# Patient Record
Sex: FEMALE | Race: OTHER | HISPANIC OR LATINO | Employment: UNEMPLOYED | URBAN - METROPOLITAN AREA
[De-identification: names, ages, dates, MRNs, and addresses within clinical notes are randomized per-mention and may not be internally consistent; named-entity substitution may affect disease eponyms.]

---

## 2017-10-17 ENCOUNTER — HOSPITAL ENCOUNTER (EMERGENCY)
Facility: HOSPITAL | Age: 52
Discharge: HOME | End: 2017-10-17
Admitting: EMERGENCY MEDICINE
Payer: COMMERCIAL

## 2017-10-17 VITALS
OXYGEN SATURATION: 98 % | RESPIRATION RATE: 18 BRPM | SYSTOLIC BLOOD PRESSURE: 193 MMHG | DIASTOLIC BLOOD PRESSURE: 84 MMHG | HEART RATE: 86 BPM | WEIGHT: 184.4 LBS | TEMPERATURE: 98 F

## 2017-10-17 DIAGNOSIS — J32.9 SINUSITIS: Primary | ICD-10-CM

## 2017-10-17 DIAGNOSIS — I10 HYPERTENSION: ICD-10-CM

## 2017-10-17 PROCEDURE — 99282 EMERGENCY DEPT VISIT SF MDM: CPT

## 2017-10-17 RX ORDER — LISINOPRIL AND HYDROCHLOROTHIAZIDE 25; 20 MG/1; MG/1
1 TABLET ORAL DAILY
COMMUNITY
End: 2018-03-08 | Stop reason: SDDI

## 2017-10-17 RX ORDER — FLUTICASONE PROPIONATE 50 MCG
1 SPRAY, SUSPENSION (ML) NASAL DAILY
Qty: 16 G | Refills: 0 | Status: SHIPPED | OUTPATIENT
Start: 2017-10-17 | End: 2018-07-24

## 2017-10-17 RX ORDER — AMOXICILLIN AND CLAVULANATE POTASSIUM 875; 125 MG/1; MG/1
1 TABLET, FILM COATED ORAL EVERY 12 HOURS SCHEDULED
Qty: 14 TABLET | Refills: 0 | Status: SHIPPED | OUTPATIENT
Start: 2017-10-17 | End: 2017-10-24

## 2017-10-17 RX ORDER — AMLODIPINE BESYLATE 5 MG/1
10 TABLET ORAL DAILY
COMMUNITY
End: 2019-05-10

## 2017-10-18 NOTE — ED PROVIDER NOTES
History  Chief Complaint   Patient presents with    Cough     Pt reports "I'm sick, I have a cold", reports cough, nasal congestion, denies fevers, generalized body aches "my whole body ache"  46year old female PMH HTN, DM presents today complaining of 5 days of head congestion, sinus pressure and nonproductive cough  She denies fevers but admits to chills  No sick contacts  No recent history of sinusitis  Has not tried taking anything for her symptoms  No chest pain, shortness of breath, abdominal pain, urinary symptoms  Prior to Admission Medications   Prescriptions Last Dose Informant Patient Reported? Taking? amLODIPine (NORVASC) 5 mg tablet   Yes Yes   Sig: Take 5 mg by mouth daily   insulin aspart (NovoLOG) 100 units/mL injection   Yes Yes   Sig: Inject under the skin 3 (three) times a day before meals   insulin detemir (LEVEMIR) 100 units/mL subcutaneous injection   Yes Yes   Sig: Inject under the skin daily at bedtime   lisinopril-hydrochlorothiazide (PRINZIDE,ZESTORETIC) 20-25 MG per tablet   Yes Yes   Sig: Take 1 tablet by mouth daily      Facility-Administered Medications: None       Past Medical History:   Diagnosis Date    Diabetes mellitus ([facility] [address] )     Hypertension        History reviewed  No pertinent surgical history  History reviewed  No pertinent family history  I have reviewed and agree with the history as documented  Social History   Substance Use Topics    Smoking status: Former Smoker    Smokeless tobacco: Never Used    Alcohol use No        Review of Systems   HENT: Positive for congestion, sinus pain and sinus pressure  Respiratory: Positive for cough  Musculoskeletal: Positive for myalgias  All other systems reviewed and are negative        Physical Exam  ED Triage Vitals [10/17/17 1955]   Temperature Pulse Respirations Blood Pressure SpO2   98 °F (36 7 °C) 86 18 (!) 193/84 98 %      Temp Source Heart Rate Source Patient Position - Orthostatic VS BP Location FiO2 (%)   Oral Monitor Sitting Right arm --      Pain Score       Worst Possible Pain           Physical Exam   Constitutional: She is oriented to person, place, and time  She appears well-developed and well-nourished  No distress  HENT:   Head: Normocephalic and atraumatic  Mouth/Throat: Oropharynx is clear and moist  No oropharyngeal exudate  Frontal and maxillary sinuses tender to percussion  Eyes: Conjunctivae and EOM are normal  Pupils are equal, round, and reactive to light  Neck: Normal range of motion  Cardiovascular: Normal rate and regular rhythm  Pulmonary/Chest: Effort normal and breath sounds normal  No respiratory distress  She has no wheezes  Abdominal: Soft  Bowel sounds are normal  She exhibits no distension  There is no tenderness  There is no guarding  Musculoskeletal: Normal range of motion  Lymphadenopathy:     She has no cervical adenopathy  Neurological: She is alert and oriented to person, place, and time  Skin: Skin is warm and dry  Capillary refill takes less than 2 seconds  She is not diaphoretic  Psychiatric: She has a normal mood and affect  Her behavior is normal        ED Medications  Medications - No data to display    Diagnostic Studies  Labs Reviewed - No data to display    No orders to display       Procedures  Procedures      Phone Contacts  ED Phone Contact    ED Course  ED Course                                MDM  Number of Diagnoses or Management Options  Hypertension:   Sinusitis:   Diagnosis management comments: Pt taking her HTN meds (lisinopril-HCTZ and amlodipine) after triage evaluation which explains her hypertension  She is asymptomatic at this time      CritCare Time    Disposition  Final diagnoses:   Sinusitis   Hypertension     Time reflects when diagnosis was documented in both MDM as applicable and the Disposition within this note     Time User Action Codes Description Comment    10/17/2017  8:52 PM Papito Pantoja Add [J32 9] Sinusitis     10/17/2017  8:52 PM Eri Sebastian [facility] [address] Hypertension       ED Disposition     ED Disposition Condition Comment    Discharge  [address] discharge to home/self care  Condition at discharge: Good        Follow-up Information     Follow up With Specialties Details Why [address]  Schedule an appointment as soon as possible for a visit  [address] [facility] [address]  [phone]          Discharge Medication List as of 10/17/2017  8:55 PM      START taking these medications    Details   amoxicillin-clavulanate (AUGMENTIN) 875-125 mg per tablet Take 1 tablet by mouth every 12 (twelve) hours for 7 days, Starting Tue 10/17/2017, Until Tue 10/24/2017, Print      fluticasone (FLONASE) 50 mcg/act nasal spray 1 spray into each nostril daily, Starting Tue 10/17/2017, Print         CONTINUE these medications which have NOT CHANGED    Details   amLODIPine (NORVASC) 5 mg tablet Take 5 mg by mouth daily, Historical Med      insulin aspart (NovoLOG) 100 units/mL injection Inject under the skin 3 (three) times a day before meals, Historical Med      insulin detemir (LEVEMIR) 100 units/mL subcutaneous injection Inject under the skin daily at bedtime, Historical Med      lisinopril-hydrochlorothiazide (PRINZIDE,ZESTORETIC) 20-25 MG per tablet Take 1 tablet by mouth daily, Historical Med           No discharge procedures on file      ED Provider  Electronically Signed by       Shauna Brito PA-C  10/27/17 7813

## 2017-10-18 NOTE — DISCHARGE INSTRUCTIONS

## 2018-03-08 ENCOUNTER — OFFICE VISIT (OUTPATIENT)
Dept: FAMILY MEDICINE CLINIC | Facility: CLINIC | Age: 53
End: 2018-03-08
Payer: COMMERCIAL

## 2018-03-08 VITALS
RESPIRATION RATE: 20 BRPM | DIASTOLIC BLOOD PRESSURE: 80 MMHG | HEIGHT: 59 IN | HEART RATE: 99 BPM | SYSTOLIC BLOOD PRESSURE: 190 MMHG | BODY MASS INDEX: 36.69 KG/M2 | OXYGEN SATURATION: 97 % | TEMPERATURE: 97.1 F | WEIGHT: 182 LBS

## 2018-03-08 DIAGNOSIS — E11.9 TYPE 2 DIABETES MELLITUS WITHOUT COMPLICATION, WITH LONG-TERM CURRENT USE OF INSULIN (HCC): Primary | ICD-10-CM

## 2018-03-08 DIAGNOSIS — Z79.4 TYPE 2 DIABETES MELLITUS WITHOUT COMPLICATION, WITH LONG-TERM CURRENT USE OF INSULIN (HCC): Primary | ICD-10-CM

## 2018-03-08 DIAGNOSIS — E11.9 COMPREHENSIVE DIABETIC FOOT EXAMINATION, TYPE 2 DM, ENCOUNTER FOR (HCC): ICD-10-CM

## 2018-03-08 DIAGNOSIS — E11.9 DIABETIC EYE EXAM (HCC): ICD-10-CM

## 2018-03-08 DIAGNOSIS — Z01.00 DIABETIC EYE EXAM (HCC): ICD-10-CM

## 2018-03-08 DIAGNOSIS — I10 ESSENTIAL HYPERTENSION: ICD-10-CM

## 2018-03-08 LAB — SL AMB POCT HEMOGLOBIN AIC: 14.3

## 2018-03-08 PROCEDURE — 99205 OFFICE O/P NEW HI 60 MIN: CPT | Performed by: FAMILY MEDICINE

## 2018-03-08 PROCEDURE — 83036 HEMOGLOBIN GLYCOSYLATED A1C: CPT | Performed by: FAMILY MEDICINE

## 2018-03-08 RX ORDER — LISINOPRIL 40 MG/1
40 TABLET ORAL DAILY
Qty: 90 TABLET | Refills: 1 | Status: SHIPPED | OUTPATIENT
Start: 2018-03-08 | End: 2018-06-11 | Stop reason: SDUPTHER

## 2018-03-08 RX ORDER — ATORVASTATIN CALCIUM 20 MG/1
20 TABLET, FILM COATED ORAL DAILY
Qty: 90 TABLET | Refills: 1 | Status: SHIPPED | OUTPATIENT
Start: 2018-03-08 | End: 2018-06-11 | Stop reason: SDUPTHER

## 2018-03-08 RX ORDER — BLOOD-GLUCOSE METER
1 KIT MISCELLANEOUS 3 TIMES DAILY
Qty: 1 EACH | Refills: 0 | Status: SHIPPED | OUTPATIENT
Start: 2018-03-08 | End: 2018-07-16 | Stop reason: CLARIF

## 2018-03-08 RX ORDER — CHLORTHALIDONE 25 MG/1
25 TABLET ORAL DAILY
Qty: 90 TABLET | Refills: 1 | Status: SHIPPED | OUTPATIENT
Start: 2018-03-08 | End: 2018-06-11 | Stop reason: SDUPTHER

## 2018-03-08 NOTE — PROGRESS NOTES
Assessment/Plan:    Type 2 diabetes mellitus without complication, with long-term current use of insulin ([facility])  HgA1 POC done today is 14 3   Increase Levemir to 60 units qHs  Increase Novolog to 20 units three times a day  Discussed at length importance  Of a low carbohydrate diet  Discussed difference between simple and complex carbohydrates  Asked to keep detail food and BG journal  Gave examples of low carbohydrate diets and 1 week 1500 claudy ADA menu   Spent over 50% of 40 minute encounter counseling on above, as well as importance of BG control to prevent diabetic complications  Discussed possible diabetic complications  Essential hypertension  Increase Lisinopril to 40 mg  Start Atenolol 25 mg  Continue Chlorthalidone 25 mg, Amlodipine 10   Check BW including kidney function   Discussed importance of low salt diet  Return in 1 week for BP nurse check   Follow up in 1 month   Go to ED if CP or SOB            Problem List Items Addressed This Visit        Endocrine    Type 2 diabetes mellitus without complication, with long-term current use of insulin ([facility]) - Primary     HgA1 POC done today is 14 3   Increase Levemir to 60 units qHs  Increase Novolog to 20 units three times a day  Discussed at length importance  Of a low carbohydrate diet  Discussed difference between simple and complex carbohydrates  Asked to keep detail food and BG journal  Gave examples of low carbohydrate diets and 1 week 1500 claudy ADA menu   Spent over 50% of 40 minute encounter counseling on above, as well as importance of BG control to prevent diabetic complications  Discussed possible diabetic complications            Relevant Medications    atorvastatin (LIPITOR) 20 mg tablet    glucose monitoring kit (FREESTYLE) monitoring kit    glucose monitoring kit (FREESTYLE) monitoring kit    Other Relevant Orders    CBC and differential    Comprehensive metabolic panel    TSH, 3rd generation with T4 reflex    Microalbumin / creatinine urine ratio    Lipid panel    Glucometer test strips    Lancets    POCT hemoglobin A1c (Completed)       Cardiovascular and Mediastinum    Essential hypertension     Increase Lisinopril to 40 mg  Start Atenolol 25 mg  Continue Chlorthalidone 25 mg, Amlodipine 10   Check BW including kidney function   Discussed importance of low salt diet  Return in 1 week for BP nurse check   Follow up in 1 month   Go to ED if CP or SOB            Relevant Medications    lisinopril (ZESTRIL) 40 mg tablet    chlorthalidone 25 mg tablet    atorvastatin (LIPITOR) 20 mg tablet    Other Relevant Orders    CBC and differential    Comprehensive metabolic panel    TSH, 3rd generation with T4 reflex    Microalbumin / creatinine urine ratio    Lipid panel      Other Visit Diagnoses     Diabetic eye exam [facility])        Relevant Orders    Ambulatory referral to Ophthalmology    Comprehensive diabetic foot examination, type 2 DM, encounter for [facility])        Relevant Orders    Ambulatory referral to Podiatry            Subjective:      Patient ID: Venice Little is a 46 y o  female  45 yo female patient new patient to the office  As per patient she has a long standing history of uncontrolled DM and uncontrolled HTN She states she does not check her BG at home because her glucometer broke several months ago  She denies headache, chest pain, lower extremity swelling  She states she has been taking her medications daily for the last month however she ran out of some of her medications 5 days ago  Diabetes   She presents for her initial diabetic visit  She has type 2 diabetes mellitus  No MedicAlert identification noted  Her disease course has been worsening  There are no hypoglycemic associated symptoms  There are no diabetic associated symptoms  There are no hypoglycemic complications  Risk factors for coronary artery disease include hypertension, obesity, post-menopausal and sedentary lifestyle   Current diabetic treatment includes intensive insulin program  She is following a generally unhealthy diet  She rarely participates in exercise  An ACE inhibitor/angiotensin II receptor blocker is being taken  She does not see a podiatrist Eye exam is not current  Hypertension   This is a chronic problem  The current episode started more than 1 year ago  The problem is uncontrolled  There are no associated agents to hypertension  Risk factors for coronary artery disease include diabetes mellitus  Past treatments include diuretics, calcium channel blockers and ACE inhibitors  The current treatment provides mild improvement  The following portions of the patient's history were reviewed and updated as appropriate:   She  has a past medical history of Diabetes mellitus ([facility] ) and Hypertension  She   Patient Active Problem List    Diagnosis Date Noted    Type 2 diabetes mellitus without complication, with long-term current use of insulin ([facility] ) 03/08/2018    Essential hypertension 03/08/2018     She  has no past surgical history on file  Her family history includes Diabetes in her mother; No Known Problems in her father  She  reports that she has quit smoking  She has never used smokeless tobacco  She reports that she does not drink alcohol or use drugs    Current Outpatient Prescriptions   Medication Sig Dispense Refill    amLODIPine (NORVASC) 5 mg tablet Take 10 mg by mouth daily       atorvastatin (LIPITOR) 20 mg tablet Take 1 tablet (20 mg total) by mouth daily 90 tablet 1    chlorthalidone 25 mg tablet Take 1 tablet (25 mg total) by mouth daily 90 tablet 1    fluticasone (FLONASE) 50 mcg/act nasal spray 1 spray into each nostril daily 16 g 0    glucose monitoring kit (FREESTYLE) monitoring kit 1 each by Does not apply route 3 (three) times a day 1 each 0    glucose monitoring kit (FREESTYLE) monitoring kit 1 each by Does not apply route 3 (three) times a day 1 each 0    insulin aspart (NovoLOG) 100 units/mL injection Inject 15 Units under the skin 3 (three) times a day before meals       insulin detemir (LEVEMIR) 100 units/mL subcutaneous injection Inject under the skin daily at bedtime      lisinopril (ZESTRIL) 40 mg tablet Take 1 tablet (40 mg total) by mouth daily 90 tablet 1     No current facility-administered medications for this visit       Review of Systems   Musculoskeletal:        L foot pain   All other systems reviewed and are negative  Objective:      BP (!) 190/80 (BP Location: Right arm, Patient Position: Sitting, Cuff Size: Standard)   Pulse 99   Temp (!) 97 1 °F (36 2 °C) (Tympanic)   Resp 20   Ht 4' 11" (1 499 m)   Wt 82 6 kg (182 lb)   LMP 02/12/2018 (Approximate)   SpO2 97%   Breastfeeding? No   BMI 36 76 kg/m²          Physical Exam   Constitutional: She is oriented to person, place, and time  She appears well-developed  HENT:   Head: Normocephalic  Right Ear: External ear normal    Left Ear: External ear normal    Nose: Nose normal    Mouth/Throat: Oropharynx is clear and moist    Eyes: Conjunctivae and EOM are normal  Pupils are equal, round, and reactive to light  Neck: Normal range of motion  Neck supple  No thyromegaly present  Cardiovascular: Normal rate, regular rhythm and normal heart sounds  Pulmonary/Chest: Effort normal and breath sounds normal    Abdominal: Soft  There is no tenderness  There is no rebound and no guarding  Musculoskeletal: Normal range of motion  Neurological: She is alert and oriented to person, place, and time  She has normal reflexes  Skin: Skin is dry  Psychiatric: She has a normal mood and affect  Nursing note and vitals reviewed

## 2018-03-08 NOTE — ASSESSMENT & PLAN NOTE
HgA1 POC done today is 14 3   Increase Levemir to 60 units qHs  Increase Novolog to 20 units three times a day  Discussed at length importance  Of a low carbohydrate diet  Discussed difference between simple and complex carbohydrates  Asked to keep detail food and BG journal  Gave examples of low carbohydrate diets and 1 week 1500 claudy ADA menu   Spent over 50% of 40 minute encounter counseling on above, as well as importance of BG control to prevent diabetic complications  Discussed possible diabetic complications

## 2018-03-08 NOTE — ASSESSMENT & PLAN NOTE
Increase Lisinopril to 40 mg  Start Atenolol 25 mg  Continue Chlorthalidone 25 mg, Amlodipine 10   Check BW including kidney function   Discussed importance of low salt diet  Return in 1 week for BP nurse check   Follow up in 1 month   Go to ED if CP or SOB

## 2018-06-11 ENCOUNTER — OFFICE VISIT (OUTPATIENT)
Dept: FAMILY MEDICINE CLINIC | Facility: CLINIC | Age: 53
End: 2018-06-11
Payer: COMMERCIAL

## 2018-06-11 VITALS
WEIGHT: 193.56 LBS | DIASTOLIC BLOOD PRESSURE: 74 MMHG | SYSTOLIC BLOOD PRESSURE: 126 MMHG | HEIGHT: 59 IN | TEMPERATURE: 98 F | RESPIRATION RATE: 18 BRPM | OXYGEN SATURATION: 99 % | HEART RATE: 92 BPM | BODY MASS INDEX: 39.02 KG/M2

## 2018-06-11 DIAGNOSIS — I10 ESSENTIAL HYPERTENSION: ICD-10-CM

## 2018-06-11 DIAGNOSIS — E11.9 TYPE 2 DIABETES MELLITUS WITHOUT COMPLICATION, WITH LONG-TERM CURRENT USE OF INSULIN (HCC): Primary | ICD-10-CM

## 2018-06-11 DIAGNOSIS — Z12.11 COLON CANCER SCREENING: ICD-10-CM

## 2018-06-11 DIAGNOSIS — Z79.4 TYPE 2 DIABETES MELLITUS WITHOUT COMPLICATION, WITH LONG-TERM CURRENT USE OF INSULIN (HCC): Primary | ICD-10-CM

## 2018-06-11 DIAGNOSIS — E11.9 TYPE 2 DIABETES MELLITUS WITHOUT COMPLICATION, WITHOUT LONG-TERM CURRENT USE OF INSULIN (HCC): ICD-10-CM

## 2018-06-11 LAB — SL AMB POCT HEMOGLOBIN AIC: 11.8

## 2018-06-11 PROCEDURE — 3078F DIAST BP <80 MM HG: CPT | Performed by: FAMILY MEDICINE

## 2018-06-11 PROCEDURE — 83036 HEMOGLOBIN GLYCOSYLATED A1C: CPT | Performed by: FAMILY MEDICINE

## 2018-06-11 PROCEDURE — 3074F SYST BP LT 130 MM HG: CPT | Performed by: FAMILY MEDICINE

## 2018-06-11 PROCEDURE — 99215 OFFICE O/P EST HI 40 MIN: CPT | Performed by: FAMILY MEDICINE

## 2018-06-11 PROCEDURE — 4010F ACE/ARB THERAPY RXD/TAKEN: CPT | Performed by: FAMILY MEDICINE

## 2018-06-11 PROCEDURE — 3008F BODY MASS INDEX DOCD: CPT | Performed by: FAMILY MEDICINE

## 2018-06-11 RX ORDER — CHLORTHALIDONE 25 MG/1
25 TABLET ORAL DAILY
Qty: 90 TABLET | Refills: 0 | Status: SHIPPED | OUTPATIENT
Start: 2018-06-11 | End: 2018-11-06 | Stop reason: SDUPTHER

## 2018-06-11 RX ORDER — LISINOPRIL 40 MG/1
40 TABLET ORAL DAILY
Qty: 90 TABLET | Refills: 0 | Status: SHIPPED | OUTPATIENT
Start: 2018-06-11 | End: 2018-11-06 | Stop reason: SDUPTHER

## 2018-06-11 RX ORDER — ATORVASTATIN CALCIUM 20 MG/1
20 TABLET, FILM COATED ORAL DAILY
Qty: 90 TABLET | Refills: 0 | Status: SHIPPED | OUTPATIENT
Start: 2018-06-11 | End: 2018-11-06 | Stop reason: SDUPTHER

## 2018-06-11 NOTE — ASSESSMENT & PLAN NOTE
Patient's HgA1c POC done today has improved slightly compared to prior visit  Currently 11 2 compared to 14 3 from last visit  Patient admits that she has improved her diet but still eats plenty of ice cream   Given patient's reaction to Levemir gave samples of Toujeo, if no rash with Toujeo will switch  Increase to 70 units qHs   Discussed diet at length  Discussed simple vs complex carbohydrates  Dicussed how to read food labels  Dicussed importance of physical activity  Start with 15 minute walk 3 to 4 times a week and slowly increase both duration and intensity     Gave written examples of 1500 claudy ADA diet  Gave information for Fit2me amy   Added Metformin to regimen   Over 50% of 40 minute encounter was spent counseling on above

## 2018-06-11 NOTE — PROGRESS NOTES
Assessment/Plan:    Type 2 diabetes mellitus without complication, with long-term current use of insulin ([facility])  Patient's HgA1c POC done today has improved slightly compared to prior visit  Currently 11 2 compared to 14 3 from last visit  Patient admits that she has improved her diet but still eats plenty of ice cream   Given patient's reaction to Levemir gave samples of Toujeo, if no rash with Toujeo will switch  Increase to 70 units qHs   Discussed diet at length  Discussed simple vs complex carbohydrates  Dicussed how to read food labels  Dicussed importance of physical activity  Start with 15 minute walk 3 to 4 times a week and slowly increase both duration and intensity  Gave written examples of 1500 claudy ADA diet  Gave information for Fit2[employer] amy   Over 50% of 40 minute encounter was spent counseling on above          Problem List Items Addressed This Visit        Endocrine    Type 2 diabetes mellitus without complication, with long-term current use of insulin ([facility] ) - Primary     Patient's HgA1c POC done today has improved slightly compared to prior visit  Currently 11 2 compared to 14 3 from last visit  Patient admits that she has improved her diet but still eats plenty of ice cream   Given patient's reaction to Levemir gave samples of Toujeo, if no rash with Toujeo will switch  Increase to 70 units qHs   Discussed diet at length  Discussed simple vs complex carbohydrates  Dicussed how to read food labels  Dicussed importance of physical activity  Start with 15 minute walk 3 to 4 times a week and slowly increase both duration and intensity     Gave written examples of 1500 claudy ADA diet  Gave information for Fit2me amy   Added Metformin to regimen   Over 50% of 40 minute encounter was spent counseling on above             Cardiovascular and Mediastinum    Essential hypertension      Other Visit Diagnoses     Type 2 diabetes mellitus without complication, without long-term current use of insulin ([facility] ) Relevant Orders    POCT hemoglobin A1c (Completed)    Colon cancer screening        Relevant Orders    Ambulatory referral to Gastroenterology            Subjective:      Patient ID: Sherin Carter is a 46 y o  female  45 yo female with uncontrolled DM, here today for follow up  Patient states she has been trying to improve her diet, has cut back on bread and rice however still eating ice cream   FBG at home in the 200 range  Patient states she gets a red itchy rash on the site of the Levemir injection  Diabetes   She presents for her follow-up diabetic visit  She has type 2 diabetes mellitus  No MedicAlert identification noted  Her disease course has been improving  There are no hypoglycemic associated symptoms  There are no diabetic associated symptoms  There are no hypoglycemic complications  Risk factors for coronary artery disease include dyslipidemia, hypertension and sedentary lifestyle  Current diabetic treatment includes insulin injections  She is compliant with treatment most of the time  She is following a generally unhealthy diet  Meal planning includes avoidance of concentrated sweets  She has not had a previous visit with a dietitian  She rarely participates in exercise  An ACE inhibitor/angiotensin II receptor blocker is being taken  The following portions of the patient's history were reviewed and updated as appropriate:   She  has a past medical history of Diabetes mellitus ([facility] [address] ) and Hypertension  She   Patient Active Problem List    Diagnosis Date Noted    Type 2 diabetes mellitus without complication, with long-term current use of insulin ([facility] [address] ) 03/08/2018    Essential hypertension 03/08/2018     She  has no past surgical history on file  Her family history includes Diabetes in her mother; No Known Problems in her father  She  reports that she has quit smoking  She has never used smokeless tobacco  She reports that she does not drink alcohol or use drugs    Current Outpatient Prescriptions   Medication Sig Dispense Refill    amLODIPine (NORVASC) 5 mg tablet Take 10 mg by mouth daily       atorvastatin (LIPITOR) 20 mg tablet Take 1 tablet (20 mg total) by mouth daily 90 tablet 1    chlorthalidone 25 mg tablet Take 1 tablet (25 mg total) by mouth daily 90 tablet 1    fluticasone (FLONASE) 50 mcg/act nasal spray 1 spray into each nostril daily 16 g 0    glucose monitoring kit (FREESTYLE) monitoring kit 1 each by Does not apply route 3 (three) times a day 1 each 0    glucose monitoring kit (FREESTYLE) monitoring kit 1 each by Does not apply route 3 (three) times a day 1 each 0    insulin aspart (NovoLOG) 100 units/mL injection Inject 15 Units under the skin 3 (three) times a day before meals       insulin detemir (LEVEMIR) 100 units/mL subcutaneous injection Inject under the skin daily at bedtime      lisinopril (ZESTRIL) 40 mg tablet Take 1 tablet (40 mg total) by mouth daily 90 tablet 1     No current facility-administered medications for this visit        Current Outpatient Prescriptions on File Prior to Visit   Medication Sig    amLODIPine (NORVASC) 5 mg tablet Take 10 mg by mouth daily     atorvastatin (LIPITOR) 20 mg tablet Take 1 tablet (20 mg total) by mouth daily    chlorthalidone 25 mg tablet Take 1 tablet (25 mg total) by mouth daily    fluticasone (FLONASE) 50 mcg/act nasal spray 1 spray into each nostril daily    glucose monitoring kit (FREESTYLE) monitoring kit 1 each by Does not apply route 3 (three) times a day    glucose monitoring kit (FREESTYLE) monitoring kit 1 each by Does not apply route 3 (three) times a day    insulin aspart (NovoLOG) 100 units/mL injection Inject 15 Units under the skin 3 (three) times a day before meals     insulin detemir (LEVEMIR) 100 units/mL subcutaneous injection Inject under the skin daily at bedtime    lisinopril (ZESTRIL) 40 mg tablet Take 1 tablet (40 mg total) by mouth daily     No current facility-administered medications on file prior to visit       Review of Systems   Skin: Positive for rash  All other systems reviewed and are negative  Objective:      /74 (BP Location: Right arm, Patient Position: Sitting, Cuff Size: Large)   Pulse 92   Temp 98 °F (36 7 °C)   Resp 18   Ht 4' 11" (1 499 m)   Wt 87 8 kg (193 lb 9 oz)   LMP 05/15/2018 (Approximate)   SpO2 99%   BMI 39 09 kg/m²          Physical Exam   Constitutional: She is oriented to person, place, and time  She appears well-developed  HENT:   Head: Normocephalic  Right Ear: External ear normal    Left Ear: External ear normal    Nose: Nose normal    Mouth/Throat: Oropharynx is clear and moist    Eyes: Conjunctivae and EOM are normal  Pupils are equal, round, and reactive to light  Neck: Normal range of motion  Neck supple  No thyromegaly present  Cardiovascular: Normal rate, regular rhythm and normal heart sounds  Pulmonary/Chest: Effort normal and breath sounds normal    Abdominal: Soft  There is no tenderness  There is no rebound and no guarding  Musculoskeletal: Normal range of motion  Neurological: She is alert and oriented to person, place, and time  She has normal reflexes  Skin: Skin is dry  Psychiatric: She has a normal mood and affect  Nursing note and vitals reviewed

## 2018-07-16 ENCOUNTER — TELEPHONE (OUTPATIENT)
Dept: FAMILY MEDICINE CLINIC | Facility: CLINIC | Age: 53
End: 2018-07-16

## 2018-07-16 DIAGNOSIS — Z79.4 TYPE 2 DIABETES MELLITUS WITHOUT COMPLICATION, WITH LONG-TERM CURRENT USE OF INSULIN (HCC): ICD-10-CM

## 2018-07-16 DIAGNOSIS — E11.9 TYPE 2 DIABETES MELLITUS WITHOUT COMPLICATION, WITH LONG-TERM CURRENT USE OF INSULIN (HCC): ICD-10-CM

## 2018-07-16 DIAGNOSIS — E11.9 TYPE 2 DIABETES MELLITUS WITHOUT COMPLICATION, WITH LONG-TERM CURRENT USE OF INSULIN (HCC): Primary | ICD-10-CM

## 2018-07-16 DIAGNOSIS — Z79.4 TYPE 2 DIABETES MELLITUS WITHOUT COMPLICATION, WITH LONG-TERM CURRENT USE OF INSULIN (HCC): Primary | ICD-10-CM

## 2018-07-17 RX ORDER — BLOOD-GLUCOSE METER
EACH MISCELLANEOUS
Qty: 1 EACH | Refills: 0 | Status: SHIPPED | OUTPATIENT
Start: 2018-07-17 | End: 2019-10-30

## 2018-07-24 ENCOUNTER — HOSPITAL ENCOUNTER (EMERGENCY)
Facility: HOSPITAL | Age: 53
Discharge: HOME | End: 2018-07-24
Attending: EMERGENCY MEDICINE | Admitting: EMERGENCY MEDICINE
Payer: COMMERCIAL

## 2018-07-24 ENCOUNTER — APPOINTMENT (EMERGENCY)
Dept: RADIOLOGY | Facility: HOSPITAL | Age: 53
End: 2018-07-24
Payer: COMMERCIAL

## 2018-07-24 VITALS
DIASTOLIC BLOOD PRESSURE: 88 MMHG | TEMPERATURE: 98.3 F | HEART RATE: 94 BPM | RESPIRATION RATE: 18 BRPM | OXYGEN SATURATION: 99 % | SYSTOLIC BLOOD PRESSURE: 219 MMHG | BODY MASS INDEX: 37.77 KG/M2 | WEIGHT: 187 LBS

## 2018-07-24 DIAGNOSIS — S49.91XA SHOULDER INJURY, RIGHT, INITIAL ENCOUNTER: Primary | ICD-10-CM

## 2018-07-24 PROCEDURE — 99283 EMERGENCY DEPT VISIT LOW MDM: CPT

## 2018-07-24 PROCEDURE — 96372 THER/PROPH/DIAG INJ SC/IM: CPT

## 2018-07-24 PROCEDURE — 73030 X-RAY EXAM OF SHOULDER: CPT

## 2018-07-24 RX ORDER — DIAZEPAM 5 MG/ML
5 INJECTION, SOLUTION INTRAMUSCULAR; INTRAVENOUS ONCE
Status: COMPLETED | OUTPATIENT
Start: 2018-07-24 | End: 2018-07-24

## 2018-07-24 RX ORDER — KETOROLAC TROMETHAMINE 30 MG/ML
15 INJECTION, SOLUTION INTRAMUSCULAR; INTRAVENOUS ONCE
Status: COMPLETED | OUTPATIENT
Start: 2018-07-24 | End: 2018-07-24

## 2018-07-24 RX ORDER — MELOXICAM 7.5 MG/1
7.5 TABLET ORAL DAILY
Qty: 5 TABLET | Refills: 0 | Status: SHIPPED | OUTPATIENT
Start: 2018-07-24 | End: 2019-04-30

## 2018-07-24 RX ADMIN — Medication 5 MG: at 08:36

## 2018-07-24 RX ADMIN — KETOROLAC TROMETHAMINE 15 MG: 30 INJECTION, SOLUTION INTRAMUSCULAR at 08:38
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
## 2018-07-24 NOTE — DISCHARGE INSTRUCTIONS
Shoulder Sprain   WHAT YOU NEED TO KNOW:   A shoulder sprain happens when a ligament in your shoulder is stretched or torn  Ligaments are the tough tissues that connect bones  Ligaments allow you to lift, lower, and rotate your arm  DISCHARGE INSTRUCTIONS:   Return to the emergency department if:   · You are short of breath  · Your throat feels tight, or you have trouble swallowing  · You feel sudden, sharp chest pain on the same side as your injury  · Your skin feels cold or clammy  Contact your healthcare provider if:   · The skin on your injured shoulder looks blue or pale  · You have new or increased swelling and pain in your shoulder  · You have new or increased stiffness when you move your injured shoulder  · You have questions or concerns about your condition or care  Medicines:   · Prescription pain medicine  may be given  Ask how to take this medicine safely  · Take your medicine as directed  Contact your healthcare provider if you think your medicine is not helping or if you have side effects  Tell him or her if you are allergic to any medicine  Keep a list of the medicines, vitamins, and herbs you take  Include the amounts, and when and why you take them  Bring the list or the pill bottles to follow-up visits  Carry your medicine list with you in case of an emergency  Follow up with your healthcare provider as directed:  Write down your questions so you remember to ask them during your visits  Self-care:   · Rest  your shoulder so it can heal  Avoid moving your shoulder as your injury heals  This will help decrease the risk of more damage to your shoulder  · Apply ice  on your shoulder for 15 to 20 minutes every hour or as directed  Use an ice pack, or put crushed ice in a plastic bag  Cover it with a towel  Ice helps prevent tissue damage and decreases swelling and pain  · Compress your shoulder as directed   Compression provides support and helps decrease swelling and movement so your shoulder can heal  For mild sprains, you may be given a sling to support your arm  You may need a padded brace or a plaster cast to hold your shoulder in place if the sprain is more serious  How to wear a brace, sling, or splint:  A brace, sling, or splint may be needed to limit your movement and protect your injured shoulder  · Wear your brace, sling, or splint as directed  You may need to wear it all the time and take it off only to bathe or do exercises  Ask your healthcare provider how long you should wear it  · Keep your skin clean and dry  Padding under your armpit will help absorb sweat and prevent sores on your skin  · Do not hunch your shoulders  This may cause pain  Keep your shoulders relaxed  · Position the sling over your arm and hand so that it also covers your knuckles  This will help the sling support your wrist and hand  Position your wrist higher than your elbow  Your wrist may start to hurt or go numb if your sling is too short  Exercise your shoulder:  After you rest your shoulder for 3 to 7 days, you will need to do light exercises to decrease shoulder stiffness  Check with your healthcare provider before you return to your normal activities or sports  Prevent another injury:  You can hurt your shoulder again if you stop treatment too soon  The following may decrease your risk for sprains:  · Do not exercise when you are tired or in pain  Warm up and stretch before you exercise  · Wear equipment to protect yourself when you play sports  · Wear shoes that fit well and run on flat surfaces to prevent falls  © 2017 [address] [employer] Information is for End User's use only and may not be sold, redistributed or otherwise used for commercial purposes  All illustrations and images included in CareNotes® are the copyrighted property of [employer]  or [employer]  The above information is an  only   It is not intended as medical advice for individual conditions or treatments  Talk to your doctor, nurse or pharmacist before following any medical regimen to see if it is safe and effective for you

## 2018-11-06 ENCOUNTER — OFFICE VISIT (OUTPATIENT)
Dept: FAMILY MEDICINE CLINIC | Facility: CLINIC | Age: 53
End: 2018-11-06
Payer: COMMERCIAL

## 2018-11-06 VITALS
OXYGEN SATURATION: 99 % | SYSTOLIC BLOOD PRESSURE: 136 MMHG | WEIGHT: 192 LBS | HEART RATE: 90 BPM | HEIGHT: 59 IN | DIASTOLIC BLOOD PRESSURE: 80 MMHG | BODY MASS INDEX: 38.71 KG/M2 | RESPIRATION RATE: 18 BRPM | TEMPERATURE: 96.6 F

## 2018-11-06 DIAGNOSIS — Z79.4 TYPE 2 DIABETES MELLITUS WITHOUT COMPLICATION, WITH LONG-TERM CURRENT USE OF INSULIN (HCC): Primary | ICD-10-CM

## 2018-11-06 DIAGNOSIS — E11.9 TYPE 2 DIABETES MELLITUS WITHOUT COMPLICATION, WITH LONG-TERM CURRENT USE OF INSULIN (HCC): Primary | ICD-10-CM

## 2018-11-06 DIAGNOSIS — I10 ESSENTIAL HYPERTENSION: ICD-10-CM

## 2018-11-06 DIAGNOSIS — Z12.11 SCREENING FOR COLON CANCER: ICD-10-CM

## 2018-11-06 LAB — SL AMB POCT HEMOGLOBIN AIC: 12

## 2018-11-06 PROCEDURE — 3008F BODY MASS INDEX DOCD: CPT | Performed by: FAMILY MEDICINE

## 2018-11-06 PROCEDURE — 4010F ACE/ARB THERAPY RXD/TAKEN: CPT | Performed by: FAMILY MEDICINE

## 2018-11-06 PROCEDURE — 99215 OFFICE O/P EST HI 40 MIN: CPT | Performed by: FAMILY MEDICINE

## 2018-11-06 PROCEDURE — 3079F DIAST BP 80-89 MM HG: CPT | Performed by: FAMILY MEDICINE

## 2018-11-06 PROCEDURE — 83036 HEMOGLOBIN GLYCOSYLATED A1C: CPT | Performed by: FAMILY MEDICINE

## 2018-11-06 PROCEDURE — 3046F HEMOGLOBIN A1C LEVEL >9.0%: CPT | Performed by: FAMILY MEDICINE

## 2018-11-06 PROCEDURE — 3075F SYST BP GE 130 - 139MM HG: CPT | Performed by: FAMILY MEDICINE

## 2018-11-06 RX ORDER — CHLORTHALIDONE 25 MG/1
25 TABLET ORAL DAILY
Qty: 90 TABLET | Refills: 0 | Status: SHIPPED | OUTPATIENT
Start: 2018-11-06 | End: 2019-04-30

## 2018-11-06 RX ORDER — ATORVASTATIN CALCIUM 20 MG/1
20 TABLET, FILM COATED ORAL DAILY
Qty: 90 TABLET | Refills: 0 | Status: SHIPPED | OUTPATIENT
Start: 2018-11-06 | End: 2019-05-21 | Stop reason: SDUPTHER

## 2018-11-06 RX ORDER — LISINOPRIL 40 MG/1
40 TABLET ORAL DAILY
Qty: 90 TABLET | Refills: 0 | Status: SHIPPED | OUTPATIENT
Start: 2018-11-06 | End: 2019-04-30

## 2018-11-06 NOTE — ASSESSMENT & PLAN NOTE
Lab Results   Component Value Date    HGBA1C 12 0 11/06/2018       No results for input(s): POCGLU in the last 72 hours      Blood Sugar Average: Last 72 hrs:  Spent over 50% of 40 minute encounter counseling patient on:  1- Importance of medication compliance  2- Importance of checking BG  3- Low carbohydrate diet  4- Dangers of uncontrolled BG  5- Importance of getting BW done      Referred to Endocrine

## 2018-11-06 NOTE — PROGRESS NOTES
Assessment/Plan:    Type 2 diabetes mellitus without complication, with long-term current use of insulin ([facility])  Lab Results   Component Value Date    HGBA1C 12 0 11/06/2018       No results for input(s): POCGLU in the last 72 hours  Blood Sugar Average: Last 72 hrs:  Spent over 50% of 40 minute encounter counseling patient on:  1- Importance of medication compliance  2- Importance of checking BG  3- Low carbohydrate diet  4- Dangers of uncontrolled BG  5- Importance of getting BW done      Referred to Endocrine     Stated received Flu vaccine at work     Problem List Items Addressed This Visit        Endocrine    Type 2 diabetes mellitus without complication, with long-term current use of insulin ([facility] [address] ) - Primary     Lab Results   Component Value Date    HGBA1C 12 0 11/06/2018       No results for input(s): POCGLU in the last 72 hours      Blood Sugar Average: Last 72 hrs:  Spent over 50% of 40 minute encounter counseling patient on:  1- Importance of medication compliance  2- Importance of checking BG  3- Low carbohydrate diet  4- Dangers of uncontrolled BG  5- Importance of getting BW done      Referred to Endocrine          Relevant Medications    Insulin Glargine (TOUJEO MAX SOLOSTAR) 300 units/mL CONCETRATED U-300 injection pen    metFORMIN (GLUCOPHAGE) 1000 MG tablet    insulin aspart (NOVOLOG FLEXPEN) 100 Units/mL injection pen    atorvastatin (LIPITOR) 20 mg tablet    Other Relevant Orders    POCT hemoglobin A1c (Completed)    CBC and differential    Comprehensive metabolic panel    Lipid panel    Microalbumin / creatinine urine ratio    TSH, 3rd generation with Free T4 reflex    Ambulatory referral to Endocrinology       Cardiovascular and Mediastinum    Essential hypertension    Relevant Medications    chlorthalidone 25 mg tablet    lisinopril (ZESTRIL) 40 mg tablet    atorvastatin (LIPITOR) 20 mg tablet    Other Relevant Orders    CBC and differential    Comprehensive metabolic panel    Lipid panel Microalbumin / creatinine urine ratio    TSH, 3rd generation with Free T4 reflex      Other Visit Diagnoses     Screening for colon cancer                Subjective:      Patient ID: Kevin Kaur is a 48 y o  female  53 non complaint feamle here today for follow up of chronic conditions   States not watching her diet and often forgets to us her insulin       Diabetes   She presents for her follow-up diabetic visit  She has type 2 diabetes mellitus  No MedicAlert identification noted  Her disease course has been worsening  There are no hypoglycemic associated symptoms  There are no diabetic associated symptoms  There are no hypoglycemic complications  Risk factors for coronary artery disease include dyslipidemia, obesity, hypertension, sedentary lifestyle, post-menopausal and stress  Current diabetic treatment includes intensive insulin program and oral agent (monotherapy)  She is compliant with treatment some of the time  She is currently taking insulin pre-breakfast, pre-lunch, pre-dinner and at bedtime  Insulin injections are given by patient  Her weight is stable  She is following a generally unhealthy diet  When asked about meal planning, she reported none  She has not had a previous visit with a dietitian  She rarely participates in exercise  Her home blood glucose trend is increasing steadily  An ACE inhibitor/angiotensin II receptor blocker is being taken  She does not see a podiatrist Eye exam is not current  Hypertension   This is a chronic problem  The current episode started more than 1 year ago  The problem has been gradually improving since onset  The problem is controlled  There are no associated agents to hypertension  Risk factors for coronary artery disease include diabetes mellitus, obesity and post-menopausal state  Past treatments include ACE inhibitors and diuretics  The current treatment provides moderate improvement         The following portions of the patient's history were reviewed and updated as appropriate:   She  has a past medical history of Diabetes mellitus ([address] ) and Hypertension  She   Patient Active Problem List    Diagnosis Date Noted    Type 2 diabetes mellitus without complication, with long-term current use of insulin ([facility] ) 03/08/2018    Essential hypertension 03/08/2018     She  has no past surgical history on file  Her family history includes Diabetes in her mother; No Known Problems in her father  She  reports that she has quit smoking  She has never used smokeless tobacco  She reports that she does not drink alcohol or use drugs  Current Outpatient Prescriptions   Medication Sig Dispense Refill    amLODIPine (NORVASC) 5 mg tablet Take 10 mg by mouth daily       atorvastatin (LIPITOR) 20 mg tablet Take 1 tablet (20 mg total) by mouth daily 90 tablet 0    Blood Glucose Monitoring Suppl (ONE TOUCH ULTRA 2) w/Device KIT Testing Three times a day 1 each 0    chlorthalidone 25 mg tablet Take 1 tablet (25 mg total) by mouth daily 90 tablet 0    glucose blood (ONE TOUCH ULTRA TEST) test strip Testing Three times a day 100 each 5    insulin aspart (NOVOLOG FLEXPEN) 100 Units/mL injection pen Inject 25 Units under the skin 3 (three) times a day with meals 8 pen 3    lisinopril (ZESTRIL) 40 mg tablet Take 1 tablet (40 mg total) by mouth daily 90 tablet 0    meloxicam (MOBIC) 7 5 mg tablet Take 1 tablet (7 5 mg total) by mouth daily 5 tablet 0    metFORMIN (GLUCOPHAGE) 1000 MG tablet Take 1 tablet (1,000 mg total) by mouth 2 (two) times a day with meals 90 tablet 0    ONETOUCH DELICA LANCETS FINE MISC Testing Three times a day 100 each 5    insulin aspart (NovoLOG) 100 units/mL injection Inject 15 Units under the skin 3 (three) times a day before meals       Insulin Glargine (TOUJEO MAX SOLOSTAR) 300 units/mL CONCETRATED U-300 injection pen Inject 75 Units under the skin daily 6 pen 3     No current facility-administered medications for this visit        Current Outpatient Prescriptions on File Prior to Visit   Medication Sig    amLODIPine (NORVASC) 5 mg tablet Take 10 mg by mouth daily     Blood Glucose Monitoring Suppl (ONE TOUCH ULTRA 2) w/Device KIT Testing Three times a day    glucose blood (ONE TOUCH ULTRA TEST) test strip Testing Three times a day    meloxicam (MOBIC) 7 5 mg tablet Take 1 tablet (7 5 mg total) by mouth daily    ONETOUCH DELICA LANCETS FINE MISC Testing Three times a day    [DISCONTINUED] atorvastatin (LIPITOR) 20 mg tablet Take 1 tablet (20 mg total) by mouth daily    [DISCONTINUED] chlorthalidone 25 mg tablet Take 1 tablet (25 mg total) by mouth daily    [DISCONTINUED] insulin aspart (NOVOLOG FLEXPEN) 100 Units/mL injection pen Inject 20 Units under the skin 3 (three) times a day with meals    [DISCONTINUED] lisinopril (ZESTRIL) 40 mg tablet Take 1 tablet (40 mg total) by mouth daily    [DISCONTINUED] metFORMIN (GLUCOPHAGE) 1000 MG tablet Take 1 tablet (1,000 mg total) by mouth 2 (two) times a day with meals    insulin aspart (NovoLOG) 100 units/mL injection Inject 15 Units under the skin 3 (three) times a day before meals      No current facility-administered medications on file prior to visit       Review of Systems   All other systems reviewed and are negative  Objective:      /80 (BP Location: Left arm, Patient Position: Sitting, Cuff Size: Large)   Pulse 90   Temp (!) 96 6 °F (35 9 °C) (Tympanic)   Resp 18   Ht 4' 11" (1 499 m)   Wt 87 1 kg (192 lb)   SpO2 99%   Breastfeeding? No   BMI 38 78 kg/m²          Physical Exam   Constitutional: She is oriented to person, place, and time  She appears well-developed  HENT:   Head: Normocephalic  Right Ear: External ear normal    Left Ear: External ear normal    Nose: Nose normal    Mouth/Throat: Oropharynx is clear and moist  Abnormal dentition  Dental caries present  Eyes: Pupils are equal, round, and reactive to light   Conjunctivae and EOM are normal    Neck: Normal range of motion  Neck supple  No thyromegaly present  Cardiovascular: Normal rate, regular rhythm and normal heart sounds  Pulmonary/Chest: Effort normal and breath sounds normal    Abdominal: Soft  There is no tenderness  There is no rebound and no guarding  Musculoskeletal: Normal range of motion  Neurological: She is alert and oriented to person, place, and time  She has normal reflexes  Skin: Skin is dry  Psychiatric: She has a normal mood and affect  Nursing note and vitals reviewed

## 2018-11-13 DIAGNOSIS — E11.9 TYPE 2 DIABETES MELLITUS WITHOUT COMPLICATION, WITH LONG-TERM CURRENT USE OF INSULIN (HCC): ICD-10-CM

## 2018-11-13 DIAGNOSIS — Z79.4 TYPE 2 DIABETES MELLITUS WITHOUT COMPLICATION, WITH LONG-TERM CURRENT USE OF INSULIN (HCC): ICD-10-CM

## 2018-11-15 DIAGNOSIS — E11.9 TYPE 2 DIABETES MELLITUS WITHOUT COMPLICATION, WITH LONG-TERM CURRENT USE OF INSULIN (HCC): ICD-10-CM

## 2018-11-15 DIAGNOSIS — Z79.4 TYPE 2 DIABETES MELLITUS WITHOUT COMPLICATION, WITH LONG-TERM CURRENT USE OF INSULIN (HCC): ICD-10-CM

## 2018-11-30 ENCOUNTER — HOSPITAL ENCOUNTER (EMERGENCY)
Facility: HOSPITAL | Age: 53
Discharge: HOME | End: 2018-11-30
Attending: EMERGENCY MEDICINE | Admitting: EMERGENCY MEDICINE
Payer: COMMERCIAL

## 2018-11-30 VITALS
WEIGHT: 198 LBS | SYSTOLIC BLOOD PRESSURE: 185 MMHG | HEART RATE: 97 BPM | RESPIRATION RATE: 20 BRPM | TEMPERATURE: 98 F | BODY MASS INDEX: 39.99 KG/M2 | OXYGEN SATURATION: 97 % | DIASTOLIC BLOOD PRESSURE: 79 MMHG

## 2018-11-30 DIAGNOSIS — J06.9 URI WITH COUGH AND CONGESTION: Primary | ICD-10-CM

## 2018-11-30 PROCEDURE — 99283 EMERGENCY DEPT VISIT LOW MDM: CPT

## 2018-11-30 RX ORDER — GUAIFENESIN/DEXTROMETHORPHAN 100-10MG/5
10 SYRUP ORAL 3 TIMES DAILY PRN
Qty: 118 ML | Refills: 0 | Status: SHIPPED | OUTPATIENT
Start: 2018-11-30 | End: 2019-04-30

## 2018-12-01 NOTE — DISCHARGE INSTRUCTIONS
Upper Respiratory Infection   WHAT YOU NEED TO KNOW:   An upper respiratory infection is also called the common cold  It is an infection that can affect your nose, throat, ears, and sinuses  For healthy people, the common cold is usually not serious and does not need special treatment  Cold symptoms are usually worst for the first 3 to 5 days  Most people get better in 7 to 14 days  You may continue to cough for 2 to 3 weeks  Colds are caused by viruses and do not get better with antibiotics  DISCHARGE INSTRUCTIONS:   Return to the emergency department if:   · You have chest pain or trouble breathing  Contact your healthcare provider if:   · You have a fever over 102ºF (39°C)  · Your sore throat gets worse or you see white or yellow spots in your throat  · Your symptoms get worse after 3 to 5 days or your cold is not better in 14 days  · You have a rash anywhere on your skin  · You have large, tender lumps in your neck  · You have thick, green or yellow drainage from your nose  · You cough up thick yellow, green, or bloody mucus  · You have vomiting for more than 24 hours and cannot keep fluids down  · You have a bad earache  · You have questions or concerns about your condition or care  Medicines: You may need any of the following:  · Decongestants  help reduce nasal congestion and help you breathe more easily  If you take decongestant pills, they may make you feel restless or cause problems with your sleep  Do not use decongestant sprays for more than a few days  · Cough suppressants  help reduce coughing  Ask your healthcare provider which type of cough medicine is best for you  · NSAIDs , such as ibuprofen, help decrease swelling, pain, and fever  NSAIDs can cause stomach bleeding or kidney problems in certain people  If you take blood thinner medicine, always ask your healthcare provider if NSAIDs are safe for you   Always read the medicine label and follow directions  · Acetaminophen  decreases pain and fever  It is available without a doctor's order  Ask how much to take and how often to take it  Follow directions  Read the labels of all other medicines you are using to see if they also contain acetaminophen, or ask your doctor or pharmacist  Acetaminophen can cause liver damage if not taken correctly  Do not use more than 4 grams (4,000 milligrams) total of acetaminophen in one day  · Take your medicine as directed  Contact your healthcare provider if you think your medicine is not helping or if you have side effects  Tell him or her if you are allergic to any medicine  Keep a list of the medicines, vitamins, and herbs you take  Include the amounts, and when and why you take them  Bring the list or the pill bottles to follow-up visits  Carry your medicine list with you in case of an emergency  Follow up with your healthcare provider as directed:  Write down your questions so you remember to ask them during your visits  Self-care:   · Rest as much as possible  Slowly start to do more each day  · Drink more liquids as directed  Liquids will help thin and loosen mucus so you can cough it up  Liquids will also help prevent dehydration  Liquids that help prevent dehydration include water, fruit juice, and broth  Do not drink liquids that contain caffeine  Caffeine can increase your risk for dehydration  Ask your healthcare provider how much liquid to drink each day  · Soothe a sore throat  Gargle with warm salt water  This helps your sore throat feel better  Make salt water by dissolving ¼ teaspoon salt in 1 cup warm water  You may also suck on hard candy or throat lozenges  You may use a sore throat spray  · Use a humidifier or vaporizer  Use a cool mist humidifier or a vaporizer to increase air moisture in your home  This may make it easier for you to breathe and help decrease your cough  · Use saline nasal drops as directed    These help relieve congestion  · Apply petroleum-based jelly around the outside of your nostrils  This can decrease irritation from blowing your nose  · Do not smoke  Nicotine and other chemicals in cigarettes and cigars can make your symptoms worse  They can also cause infections such as bronchitis or pneumonia  Ask your healthcare provider for information if you currently smoke and need help to quit  E-cigarettes or smokeless tobacco still contain nicotine  Talk to your healthcare provider before you use these products  Prevent spreading your cold to others:   · Try to stay away from other people during the first 2 to 3 days of your cold when it is more easily spread  · Do not share food or drinks  · Do not share hand towels with household members  · Wash your hands often, especially after you blow your nose  Turn away from other people and cover your mouth and nose with a tissue when you sneeze or cough  © 2017 [address] [employer]  Information is for End User's use only and may not be sold, redistributed or otherwise used for commercial purposes  All illustrations and images included in CareNotes® are the copyrighted property of [employer]  or [employer]  The above information is an  only  It is not intended as medical advice for individual conditions or treatments  Talk to your doctor, nurse or pharmacist before following any medical regimen to see if it is safe and effective for you

## 2018-12-01 NOTE — ED PROVIDER NOTES
History  Chief Complaint   Patient presents with    Cold Like Symptoms     Nasal congestion and cold symptoms since Thursday  The the 40-year-old female presents to the ER with nasal congestion, rhinorrhea, cough that started on Thursday  Patient denies fevers, chills, nausea, vomiting  patient states the cough is worse at night but states that she has coughing throughout the day as well  Patient states that the cough is mainly a dry cough but at times will be productive  Prior to Admission Medications   Prescriptions Last Dose Informant Patient Reported? Taking? Blood Glucose Monitoring Suppl (ONE TOUCH ULTRA 2) w/Device KIT   No No   Sig: Testing Three times a day   Insulin Glargine (TOUJEO MAX SOLOSTAR) 300 units/mL CONCETRATED U-300 injection pen   No No   Sig: Inject 75 Units under the skin daily   Patient not taking: Reported on 12/6/2018    Insulin Pen Needle 32G X 4 MM MISC   No No   Sig: Use once daily   ONETOUCH DELICA LANCETS FINE MISC   No No   Sig: Testing Three times a day   amLODIPine (NORVASC) 5 mg tablet   Yes No   Sig: Take 10 mg by mouth daily    atorvastatin (LIPITOR) 20 mg tablet   No No   Sig: Take 1 tablet (20 mg total) by mouth daily   chlorthalidone 25 mg tablet   No No   Sig: Take 1 tablet (25 mg total) by mouth daily   glucose blood (ONE TOUCH ULTRA TEST) test strip   No No   Sig: Testing Three times a day   insulin aspart (NOVOLOG FLEXPEN) 100 Units/mL injection pen   No No   Sig: Inject 25 Units under the skin 3 (three) times a day with meals   insulin aspart (NovoLOG) 100 units/mL injection   Yes No   Sig: Inject 15 Units under the skin 3 (three) times a day before meals    insulin glargine (BASAGLAR KWIKPEN) 100 units/mL injection pen   No No   Sig: Inject 75 units under the skin daily     lisinopril (ZESTRIL) 40 mg tablet   No No   Sig: Take 1 tablet (40 mg total) by mouth daily   meloxicam (MOBIC) 7 5 mg tablet   No No   Sig: Take 1 tablet (7 5 mg total) by mouth daily   metFORMIN (GLUCOPHAGE) 1000 MG tablet   No No   Sig: Take 1 tablet (1,000 mg total) by mouth 2 (two) times a day with meals      Facility-Administered Medications: None       Past Medical History:   Diagnosis Date    [facility]     Hypertension        History reviewed  No pertinent surgical history  Family History   Problem Relation Age of Onset    Diabetes Mother     No Known Problems Father      I have reviewed and agree with the history as documented  Social History   Substance Use Topics    Smoking status: Former Smoker    Smokeless tobacco: Former User    Alcohol use No        Review of Systems   Constitutional: Negative for chills and fever  HENT: Positive for congestion, rhinorrhea and sore throat (Scratchy and irritated)  Negative for ear pain  Respiratory: Positive for cough  Negative for chest tightness, shortness of breath and wheezing  Gastrointestinal: Negative for abdominal pain, nausea and vomiting  Musculoskeletal: Negative for back pain  Skin: Negative for rash  Neurological: Negative for dizziness, syncope, weakness and light-headedness  All other systems reviewed and are negative  Physical Exam  Physical Exam   Constitutional: She is oriented to person, place, and time  She appears well-developed and well-nourished  No distress  HENT:   Head: Normocephalic and atraumatic  Right Ear: Tympanic membrane normal    Left Ear: Tympanic membrane normal    Nose: Mucosal edema and rhinorrhea present  Mouth/Throat: Uvula is midline, oropharynx is clear and moist and mucous membranes are normal  No tonsillar exudate  Eyes: Conjunctivae and lids are normal    Neck: Normal range of motion  Cardiovascular: Normal rate, regular rhythm, normal heart sounds and intact distal pulses  Pulmonary/Chest: Effort normal and breath sounds normal    Abdominal: Soft  Bowel sounds are normal  There is no tenderness     Musculoskeletal: Normal range of motion  Neurological: She is alert and oriented to person, place, and time  Skin: Skin is warm and intact  Capillary refill takes less than 2 seconds  No rash noted  She is not diaphoretic  No erythema  Nursing note and vitals reviewed  Vital Signs  ED Triage Vitals   Temperature Pulse Respirations Blood Pressure SpO2   11/30/18 1847 11/30/18 1847 11/30/18 1847 11/30/18 1849 11/30/18 1847   98 °F (36 7 °C) 97 20 (!) 185/79 97 %      Temp Source Heart Rate Source Patient Position - Orthostatic VS BP Location FiO2 (%)   11/30/18 1847 11/30/18 1847 11/30/18 1847 11/30/18 1847 --   Oral Monitor Sitting Right arm       Pain Score       11/30/18 1847       No Pain           Vitals:    11/30/18 1847 11/30/18 1849   BP:  (!) 185/79   Pulse: 97    Patient Position - Orthostatic VS: Sitting        Visual Acuity      ED Medications  Medications - No data to display    Diagnostic Studies  Results Reviewed     None                 No orders to display              Procedures  Procedures       Phone Contacts  ED Phone Contact    ED Course                               MDM  Number of Diagnoses or Management Options  URI with cough and congestion: new and does not require workup  Patient Progress  Patient progress: stable    CritCare Time    Disposition  Final diagnoses:   URI with cough and congestion     Time reflects when diagnosis was documented in both MDM as applicable and the Disposition within this note     Time User Action Codes Description Comment    11/30/2018  7:21 PM Dennise Sheppard Add [J06 9] URI with cough and congestion       ED Disposition     ED Disposition Condition Comment    Discharge  [address] discharge to home/self care      Condition at discharge: Stable        Follow-up Information     Follow up With Specialties Details Why Contact Jeferson Tom MD Family Medicine Call For Recheck, If symptoms worsen [address] [place]  [address] [facility]  [phone] [facility] Discharge Medication List as of 11/30/2018  7:24 PM      START taking these medications    Details   dextromethorphan-guaiFENesin (ROBITUSSIN DM)  mg/5 mL syrup Take 10 mL by mouth 3 (three) times a day as needed for cough or congestion, Starting Fri 11/30/2018, Normal         CONTINUE these medications which have NOT CHANGED    Details   amLODIPine (NORVASC) 5 mg tablet Take 10 mg by mouth daily , Historical Med      atorvastatin (LIPITOR) 20 mg tablet Take 1 tablet (20 mg total) by mouth daily, Starting Tue 11/6/2018, Normal      Blood Glucose Monitoring Suppl (ONE TOUCH ULTRA 2) w/Device KIT Testing Three times a day, Normal      chlorthalidone 25 mg tablet Take 1 tablet (25 mg total) by mouth daily, Starting Tue 11/6/2018, Normal      glucose blood (ONE TOUCH ULTRA TEST) test strip Testing Three times a day, Normal      !! insulin aspart (NOVOLOG FLEXPEN) 100 Units/mL injection pen Inject 25 Units under the skin 3 (three) times a day with meals, Starting Tue 11/6/2018, Normal      !! insulin aspart (NovoLOG) 100 units/mL injection Inject 15 Units under the skin 3 (three) times a day before meals , Historical Med      insulin glargine (BASAGLAR KWIKPEN) 100 units/mL injection pen Inject 75 units under the skin daily  , Normal      Insulin Glargine (TOUJEO MAX SOLOSTAR) 300 units/mL CONCETRATED U-300 injection pen Inject 75 Units under the skin daily, Starting Tue 11/6/2018, Normal      Insulin Pen Needle 32G X 4 MM MISC Use once daily, Normal      lisinopril (ZESTRIL) 40 mg tablet Take 1 tablet (40 mg total) by mouth daily, Starting Tue 11/6/2018, Normal      meloxicam (MOBIC) 7 5 mg tablet Take 1 tablet (7 5 mg total) by mouth daily, Starting Tue 7/24/2018, Print      metFORMIN (GLUCOPHAGE) 1000 MG tablet Take 1 tablet (1,000 mg total) by mouth 2 (two) times a day with meals, Starting Mon 11/19/2018, Normal      ONETOUCH DELICA LANCETS FINE MISC Testing Three times a day, Normal       !! - Potential duplicate medications found  Please discuss with provider  No discharge procedures on file      ED Provider  Electronically Signed by           Constance Buchanan PA-C  12/10/18 3317

## 2018-12-04 ENCOUNTER — HOSPITAL ENCOUNTER (EMERGENCY)
Facility: HOSPITAL | Age: 53
Discharge: HOME | End: 2018-12-04
Attending: EMERGENCY MEDICINE | Admitting: EMERGENCY MEDICINE
Payer: COMMERCIAL

## 2018-12-04 ENCOUNTER — APPOINTMENT (EMERGENCY)
Dept: RADIOLOGY | Facility: HOSPITAL | Age: 53
End: 2018-12-04
Payer: COMMERCIAL

## 2018-12-04 VITALS
OXYGEN SATURATION: 99 % | HEART RATE: 87 BPM | TEMPERATURE: 97.8 F | RESPIRATION RATE: 18 BRPM | SYSTOLIC BLOOD PRESSURE: 143 MMHG | DIASTOLIC BLOOD PRESSURE: 67 MMHG

## 2018-12-04 DIAGNOSIS — J06.9 URI (UPPER RESPIRATORY INFECTION): Primary | ICD-10-CM

## 2018-12-04 DIAGNOSIS — I10 HYPERTENSION: ICD-10-CM

## 2018-12-04 LAB
ANION GAP SERPL CALCULATED.3IONS-SCNC: 11 MMOL/L (ref 4–13)
ATRIAL RATE: 84 BPM
BACTERIA UR QL AUTO: ABNORMAL /HPF
BASOPHILS # BLD AUTO: 0.03 THOUSANDS/ΜL (ref 0–0.1)
BASOPHILS NFR BLD AUTO: 0 % (ref 0–1)
BILIRUB UR QL STRIP: NEGATIVE
BUN SERPL-MCNC: 32 MG/DL (ref 5–25)
CALCIUM SERPL-MCNC: 8.6 MG/DL (ref 8.3–10.1)
CHLORIDE SERPL-SCNC: 101 MMOL/L (ref 100–108)
CLARITY UR: CLEAR
CO2 SERPL-SCNC: 25 MMOL/L (ref 21–32)
COLOR UR: YELLOW
COLOR, POC: YELLOW
CREAT SERPL-MCNC: 1.24 MG/DL (ref 0.6–1.3)
EOSINOPHIL # BLD AUTO: 0.27 THOUSAND/ΜL (ref 0–0.61)
EOSINOPHIL NFR BLD AUTO: 2 % (ref 0–6)
ERYTHROCYTE [DISTWIDTH] IN BLOOD BY AUTOMATED COUNT: 14.6 % (ref 11.6–15.1)
GFR SERPL CREATININE-BSD FRML MDRD: 50 ML/MIN/1.73SQ M
GLUCOSE SERPL-MCNC: 226 MG/DL (ref 65–140)
GLUCOSE UR STRIP-MCNC: NEGATIVE MG/DL
HCT VFR BLD AUTO: 37.6 % (ref 34.8–46.1)
HGB BLD-MCNC: 11.1 G/DL (ref 11.5–15.4)
HGB UR QL STRIP.AUTO: ABNORMAL
IMM GRANULOCYTES # BLD AUTO: 0.07 THOUSAND/UL (ref 0–0.2)
IMM GRANULOCYTES NFR BLD AUTO: 1 % (ref 0–2)
KETONES UR STRIP-MCNC: NEGATIVE MG/DL
LEUKOCYTE ESTERASE UR QL STRIP: NEGATIVE
LYMPHOCYTES # BLD AUTO: 2.14 THOUSANDS/ΜL (ref 0.6–4.47)
LYMPHOCYTES NFR BLD AUTO: 19 % (ref 14–44)
MAGNESIUM SERPL-MCNC: 1.8 MG/DL (ref 1.6–2.6)
MCH RBC QN AUTO: 22.6 PG (ref 26.8–34.3)
MCHC RBC AUTO-ENTMCNC: 29.5 G/DL (ref 31.4–37.4)
MCV RBC AUTO: 76 FL (ref 82–98)
MONOCYTES # BLD AUTO: 0.84 THOUSAND/ΜL (ref 0.17–1.22)
MONOCYTES NFR BLD AUTO: 7 % (ref 4–12)
NEUTROPHILS # BLD AUTO: 8.18 THOUSANDS/ΜL (ref 1.85–7.62)
NEUTS SEG NFR BLD AUTO: 71 % (ref 43–75)
NITRITE UR QL STRIP: NEGATIVE
NON-SQ EPI CELLS URNS QL MICRO: ABNORMAL /HPF
NRBC BLD AUTO-RTO: 0 /100 WBCS
NT-PROBNP SERPL-MCNC: 67 PG/ML
P AXIS: 33 DEGREES
PH UR STRIP.AUTO: 6 [PH] (ref 4.5–8)
PLATELET # BLD AUTO: 302 THOUSANDS/UL (ref 149–390)
PMV BLD AUTO: 10.1 FL (ref 8.9–12.7)
POTASSIUM SERPL-SCNC: 4.6 MMOL/L (ref 3.5–5.3)
PR INTERVAL: 168 MS
PROT UR STRIP-MCNC: ABNORMAL MG/DL
QRS AXIS: 38 DEGREES
QRSD INTERVAL: 76 MS
QT INTERVAL: 342 MS
QTC INTERVAL: 404 MS
RBC # BLD AUTO: 4.92 MILLION/UL (ref 3.81–5.12)
RBC #/AREA URNS AUTO: ABNORMAL /HPF
SODIUM SERPL-SCNC: 137 MMOL/L (ref 136–145)
SP GR UR STRIP.AUTO: 1.01 (ref 1–1.03)
T WAVE AXIS: 8 DEGREES
TROPONIN I SERPL-MCNC: <0.02 NG/ML
UROBILINOGEN UR QL STRIP.AUTO: 0.2 E.U./DL
VENTRICULAR RATE: 84 BPM
WBC # BLD AUTO: 11.53 THOUSAND/UL (ref 4.31–10.16)
WBC #/AREA URNS AUTO: ABNORMAL /HPF

## 2018-12-04 PROCEDURE — 83735 ASSAY OF MAGNESIUM: CPT | Performed by: EMERGENCY MEDICINE

## 2018-12-04 PROCEDURE — 93010 ELECTROCARDIOGRAM REPORT: CPT | Performed by: INTERNAL MEDICINE

## 2018-12-04 PROCEDURE — 80048 BASIC METABOLIC PNL TOTAL CA: CPT | Performed by: EMERGENCY MEDICINE

## 2018-12-04 PROCEDURE — 84484 ASSAY OF TROPONIN QUANT: CPT | Performed by: EMERGENCY MEDICINE

## 2018-12-04 PROCEDURE — 81001 URINALYSIS AUTO W/SCOPE: CPT

## 2018-12-04 PROCEDURE — 83880 ASSAY OF NATRIURETIC PEPTIDE: CPT | Performed by: EMERGENCY MEDICINE

## 2018-12-04 PROCEDURE — 99284 EMERGENCY DEPT VISIT MOD MDM: CPT

## 2018-12-04 PROCEDURE — 85025 COMPLETE CBC W/AUTO DIFF WBC: CPT | Performed by: EMERGENCY MEDICINE

## 2018-12-04 PROCEDURE — 36415 COLL VENOUS BLD VENIPUNCTURE: CPT | Performed by: EMERGENCY MEDICINE

## 2018-12-04 PROCEDURE — 70360 X-RAY EXAM OF NECK: CPT

## 2018-12-04 PROCEDURE — 93005 ELECTROCARDIOGRAM TRACING: CPT

## 2018-12-04 PROCEDURE — 71046 X-RAY EXAM CHEST 2 VIEWS: CPT

## 2018-12-04 RX ORDER — CHLORPHENIRAMINE MALEATE 4 MG/1
4 TABLET ORAL EVERY 6 HOURS PRN
Qty: 20 TABLET | Refills: 0 | Status: SHIPPED | OUTPATIENT
Start: 2018-12-04 | End: 2019-10-30

## 2018-12-04 NOTE — ED PROVIDER NOTES
History  Chief Complaint   Patient presents with    Cold Like Symptoms     Patient reports cough, nasal congestion, sore throat, clogged ears which began friday  No fever  Ill contacts at home  History provided by:  Patient   used: No    Medical Problem - Major   Location:  Cough, congestion and sore throat  Severity:  Moderate  Onset quality:  Gradual  Duration: several days  Timing:  Constant  Progression:  Unchanged  Chronicity:  New  Relieved by:  Nothing  Worsened by:  Nothing  Ineffective treatments:  Over-the-counter cough medicine  Associated symptoms: congestion, cough, rhinorrhea, shortness of breath and sore throat    Associated symptoms: no abdominal pain, no chest pain, no diarrhea, no ear pain, no fever, no headaches, no nausea, no vomiting and no wheezing      Patient with a history of diabetes and hypertension  URI symptoms without fever  Cough congestion sore throat which is getting worse  No nausea or vomiting  Slight shortness of breath  Patient has been taking over-the-counter cough medicines which may be elevating her blood pressure  She has no chest pain  No abdominal pain nausea vomiting or diarrhea  No known fever  She has been checking her blood sugars  No polyuria or polydipsia  Prior to Admission Medications   Prescriptions Last Dose Informant Patient Reported? Taking?    Blood Glucose Monitoring Suppl (ONE TOUCH ULTRA 2) w/Device KIT   No No   Sig: Testing Three times a day   Insulin Glargine (TOUJEO MAX SOLOSTAR) 300 units/mL CONCETRATED U-300 injection pen   No No   Sig: Inject 75 Units under the skin daily   Insulin Pen Needle 32G X 4 MM MISC   No No   Sig: Use once daily   ONETOUCH DELICA LANCETS FINE MISC   No No   Sig: Testing Three times a day   amLODIPine (NORVASC) 5 mg tablet   Yes No   Sig: Take 10 mg by mouth daily    atorvastatin (LIPITOR) 20 mg tablet   No No   Sig: Take 1 tablet (20 mg total) by mouth daily   chlorthalidone 25 mg tablet   No No   Sig: Take 1 tablet (25 mg total) by mouth daily   dextromethorphan-guaiFENesin (ROBITUSSIN DM)  mg/5 mL syrup   No No   Sig: Take 10 mL by mouth 3 (three) times a day as needed for cough or congestion   glucose blood (ONE TOUCH ULTRA TEST) test strip   No No   Sig: Testing Three times a day   insulin aspart (NOVOLOG FLEXPEN) 100 Units/mL injection pen   No No   Sig: Inject 25 Units under the skin 3 (three) times a day with meals   insulin aspart (NovoLOG) 100 units/mL injection   Yes No   Sig: Inject 15 Units under the skin 3 (three) times a day before meals    insulin glargine (BASAGLAR KWIKPEN) 100 units/mL injection pen   No No   Sig: Inject 75 units under the skin daily  lisinopril (ZESTRIL) 40 mg tablet   No No   Sig: Take 1 tablet (40 mg total) by mouth daily   meloxicam (MOBIC) 7 5 mg tablet   No No   Sig: Take 1 tablet (7 5 mg total) by mouth daily   metFORMIN (GLUCOPHAGE) 1000 MG tablet   No No   Sig: Take 1 tablet (1,000 mg total) by mouth 2 (two) times a day with meals      Facility-Administered Medications: None       Past Medical History:   Diagnosis Date    [facility] )     Hypertension        History reviewed  No pertinent surgical history  Family History   Problem Relation Age of Onset    Diabetes Mother     No Known Problems Father      I have reviewed and agree with the history as documented  Social History   Substance Use Topics    Smoking status: Former Smoker    Smokeless tobacco: Never Used    Alcohol use No        Review of Systems   Constitutional: Negative for appetite change, chills and fever  HENT: Positive for congestion, rhinorrhea and sore throat  Negative for ear pain  Respiratory: Positive for cough and shortness of breath  Negative for chest tightness and wheezing  Cardiovascular: Negative for chest pain and leg swelling  Gastrointestinal: Negative for abdominal pain, diarrhea, nausea and vomiting     Genitourinary: Negative for dysuria and flank pain  Neurological: Negative for weakness and headaches  All other systems reviewed and are negative  Physical Exam  Physical Exam   Constitutional: She appears well-developed and well-nourished  She is cooperative  Non-toxic appearance  She does not have a sickly appearance  She does not appear ill  No distress  HENT:   Head: Normocephalic and atraumatic  Right Ear: Hearing and tympanic membrane normal  No drainage or swelling  Left Ear: Hearing and tympanic membrane normal  No drainage or swelling  Nose: Mucosal edema present  Mouth/Throat: Uvula is midline, oropharynx is clear and moist and mucous membranes are normal  No oropharyngeal exudate  Eyes: Conjunctivae and lids are normal  Right eye exhibits no discharge  Left eye exhibits no discharge  Neck: Trachea normal and normal range of motion  Neck supple  No JVD present  Cardiovascular: Normal rate, regular rhythm, normal heart sounds, intact distal pulses and normal pulses  Exam reveals no gallop and no friction rub  No murmur heard  Pulmonary/Chest: Effort normal and breath sounds normal  No stridor  No respiratory distress  She has no wheezes  She has no rales  Abdominal: Soft  Normal appearance  She exhibits no ascites and no mass  There is no hepatosplenomegaly  There is no tenderness  There is no rebound, no guarding and no CVA tenderness  Musculoskeletal: Normal range of motion  She exhibits no edema or tenderness  Lymphadenopathy:     She has no cervical adenopathy  Right: No inguinal adenopathy present  Left: No inguinal adenopathy present  Neurological: She is alert  She has normal strength  No sensory deficit  She exhibits normal muscle tone  Gait normal  GCS eye subscore is 4  GCS verbal subscore is 5  GCS motor subscore is 6  Skin: Skin is warm, dry and intact  No rash noted  She is not diaphoretic  No pallor  Psychiatric: She has a normal mood and affect   Her speech is normal  Cognition and memory are normal    Nursing note and vitals reviewed  Vital Signs  ED Triage Vitals [12/04/18 0829]   Temperature Pulse Respirations Blood Pressure SpO2   97 8 °F (36 6 °C) 88 18 (!) 200/84 98 %      Temp Source Heart Rate Source Patient Position - Orthostatic VS BP Location FiO2 (%)   Oral Monitor Sitting Right arm --      Pain Score       4           Vitals:    12/04/18 0829 12/04/18 1048   BP: (!) 200/84 143/67   Pulse: 88 87   Patient Position - Orthostatic VS: Sitting Sitting       Visual Acuity      ED Medications  Medications - No data to display    Diagnostic Studies  Results Reviewed     Procedure Component Value Units Date/Time    Urine Microscopic [[id number]]  (Abnormal) Collected:  12/04/18 0939    Lab Status:  Final result Specimen:  Urine from Urine, Clean Catch Updated:  12/04/18 0956     RBC, UA 0-1 (A) /hpf      WBC, UA None Seen /hpf      Epithelial Cells Occasional /hpf      Bacteria, UA Occasional /hpf     Basic metabolic panel [[id number]]  (Abnormal) Collected:  12/04/18 0913    Lab Status:  Final result Specimen:  Blood from Arm, Right Updated:  12/04/18 0941     Sodium 137 mmol/L      Potassium 4 6 mmol/L      Chloride 101 mmol/L      CO2 25 mmol/L      ANION GAP 11 mmol/L      BUN 32 (H) mg/dL      Creatinine 1 24 mg/dL      Glucose 226 (H) mg/dL      Calcium 8 6 mg/dL      eGFR 50 ml/min/1 73sq m     Narrative:         National Kidney Disease Education Program recommendations are as follows:  GFR calculation is accurate only with a steady state creatinine  Chronic Kidney disease less than 60 ml/min/1 73 sq  meters  Kidney failure less than 15 ml/min/1 73 sq  meters      Magnesium [[id number]]  (Normal) Collected:  12/04/18 0913    Lab Status:  Final result Specimen:  Blood from Arm, Right Updated:  12/04/18 0941     Magnesium 1 8 mg/dL     B-type natriuretic peptide [[id number]]  (Normal) Collected:  12/04/18 0913    Lab Status:  Final result Specimen:  Blood from Arm, Right Updated:  12/04/18 0941     NT-proBNP 67 pg/mL     Troponin I [[id number]]  (Normal) Collected:  12/04/18 0913    Lab Status:  Final result Specimen:  Blood from Arm, Right Updated:  12/04/18 0937     Troponin I <0 02 ng/mL     POCT urinalysis dipstick [[id number]]  (Abnormal) Resulted:  12/04/18 0926    Lab Status:  Final result Specimen:  Urine Updated:  12/04/18 0926     Color, UA yellow    ED Urine Macroscopic [[id number]]  (Abnormal) Collected:  12/04/18 0939    Lab Status:  Final result Specimen:  Urine Updated:  12/04/18 0925     Color, UA Yellow     Clarity, UA Clear     pH, UA 6 0     Leukocytes, UA Negative     Nitrite, UA Negative     Protein, UA 30 (1+) (A) mg/dl      Glucose, UA Negative mg/dl      Ketones, UA Negative mg/dl      Urobilinogen, UA 0 2 E U /dl      Bilirubin, UA Negative     Blood, UA Trace (A)     Specific [place], UA 1 010    Narrative:       CLINITEK RESULT    CBC and differential [[id number]]  (Abnormal) Collected:  12/04/18 0913    Lab Status:  Final result Specimen:  Blood from Arm, Right Updated:  12/04/18 0919     WBC 11 53 (H) Thousand/uL      RBC 4 92 Million/uL      Hemoglobin 11 1 (L) g/dL      Hematocrit 37 6 %      MCV 76 (L) fL      MCH 22 6 (L) pg      MCHC 29 5 (L) g/dL      RDW 14 6 %      MPV 10 1 fL      Platelets 148 Thousands/uL      nRBC 0 /100 WBCs      Neutrophils Relative 71 %      Immat GRANS % 1 %      Lymphocytes Relative 19 %      Monocytes Relative 7 %      Eosinophils Relative 2 %      Basophils Relative 0 %      Neutrophils Absolute 8 18 (H) Thousands/µL      Immature Grans Absolute 0 07 Thousand/uL      Lymphocytes Absolute 2 14 Thousands/µL      Monocytes Absolute 0 84 Thousand/µL      Eosinophils Absolute 0 27 Thousand/µL      Basophils Absolute 0 03 Thousands/µL                  XR chest 2 views   ED Interpretation by Josette Morel MD (12/04 1020)   I have personally reviewed the x-ray and my findings are: no acute disease        Final Result by Dedra Cantu MD (12/04 1642)      No acute cardiopulmonary disease  Workstation performed: [id number]         XR neck soft tissue   ED Interpretation by Anderson Medrano MD (12/04 1019)   I have personally reviewed the x-ray and my findings are: no acute disease  Final Result by Heather Johansen MD (12/04 1006)      No acute findings  Chronic disc degeneration of the cervical spine  Workstation performed: [id number]                    Procedures  ECG 12 Lead Documentation  Date/Time: 12/4/2018 9:18 AM  Performed by: Jacinto Alcala by: Andres Montenegro     Indications / Diagnosis:  Htn  ECG reviewed by me, the ED Provider: yes    Patient location:  ED  Interpretation:     Interpretation: normal    Rate:     ECG rate assessment: normal    Rhythm:     Rhythm: sinus rhythm    Ectopy:     Ectopy: none    QRS:     QRS axis:  Normal    QRS intervals:  Normal  Conduction:     Conduction: normal    ST segments:     ST segments:  Normal  T waves:     T waves: normal             Phone Contacts  ED Phone Contact    ED Course                               MDM  Number of Diagnoses or Management Options  Hypertension:   URI (upper respiratory infection):   Diagnosis management comments: EKG unremarkable  Chest x-ray unremarkable  Throat x-ray unremarkable  Laboratory evaluation only show some hyperglycemia  I discussed not taking any over-the-counter cough and cold medicines because they have sugar as well as medications in there that can elevate her blood pressure  She has no chest pain and she is not short of breath  She is afebrile without hypoxia  Her blood pressure has improved on observation alone here         Amount and/or Complexity of Data Reviewed  Clinical lab tests: reviewed and ordered  Tests in the radiology section of CPT®: reviewed and ordered  Tests in the medicine section of CPT®: ordered and reviewed  Independent visualization of images, tracings, or specimens: yes    Patient Progress  Patient progress: stable    CritCare Time    Disposition  Final diagnoses:   URI (upper respiratory infection)   Hypertension     Time reflects when diagnosis was documented in both MDM as applicable and the Disposition within this note     Time User Action Codes Description Comment    12/4/2018 10:54 AM Jeanette Bell Add [J06 9] URI (upper respiratory infection)     12/4/2018 10:54 AM Jeanette Bell Add [I10] Hypertension       ED Disposition     ED Disposition Condition Comment    Discharge  [address] [facility] discharge to home/self care      Condition at discharge: Good        Follow-up Information     Follow up With Specialties Details Why [address] [facility] [address], [place] Family Medicine Schedule an appointment as soon as possible for a visit in 2 days If symptoms worsen [address] [facility] [place]  [address] [facility] [address] [facility]            Discharge Medication List as of 12/4/2018 10:57 AM      START taking these medications    Details   chlorpheniramine (CHLORPHEN) 4 MG tablet Take 1 tablet (4 mg total) by mouth every 6 (six) hours as needed for allergies, Starting Tue 12/4/2018, Print         CONTINUE these medications which have NOT CHANGED    Details   amLODIPine (NORVASC) 5 mg tablet Take 10 mg by mouth daily , Historical Med      atorvastatin (LIPITOR) 20 mg tablet Take 1 tablet (20 mg total) by mouth daily, Starting Tue 11/6/2018, Normal      Blood Glucose Monitoring Suppl (ONE TOUCH ULTRA 2) w/Device KIT Testing Three times a day, Normal      chlorthalidone 25 mg tablet Take 1 tablet (25 mg total) by mouth daily, Starting Tue 11/6/2018, Normal      dextromethorphan-guaiFENesin (ROBITUSSIN DM)  mg/5 mL syrup Take 10 mL by mouth 3 (three) times a day as needed for cough or congestion, Starting Fri 11/30/2018, Normal      glucose blood (ONE TOUCH ULTRA TEST) test strip Testing Three times a day, Normal      !! insulin aspart (NOVOLOG FLEXPEN) 100 Units/mL injection pen Inject 25 Units under the skin 3 (three) times a day with meals, Starting Tue 11/6/2018, Normal      !! insulin aspart (NovoLOG) 100 units/mL injection Inject 15 Units under the skin 3 (three) times a day before meals , Historical Med      insulin glargine (BASAGLAR KWIKPEN) 100 units/mL injection pen Inject 75 units under the skin daily  , Normal      Insulin Glargine (TOUJEO MAX SOLOSTAR) 300 units/mL CONCETRATED U-300 injection pen Inject 75 Units under the skin daily, Starting Tue 11/6/2018, Normal      Insulin Pen Needle 32G X 4 MM MISC Use once daily, Normal      lisinopril (ZESTRIL) 40 mg tablet Take 1 tablet (40 mg total) by mouth daily, Starting Tue 11/6/2018, Normal      meloxicam (MOBIC) 7 5 mg tablet Take 1 tablet (7 5 mg total) by mouth daily, Starting Tue 7/24/2018, Print      metFORMIN (GLUCOPHAGE) 1000 MG tablet Take 1 tablet (1,000 mg total) by mouth 2 (two) times a day with meals, Starting Mon 11/19/2018, Normal      ONETOUCH DELICA LANCETS FINE MISC Testing Three times a day, Normal       !! - Potential duplicate medications found  Please discuss with provider  No discharge procedures on file      ED Provider  Electronically Signed by           John Mae MD  12/04/18 8802

## 2018-12-04 NOTE — DISCHARGE INSTRUCTIONS
Avoid over the counter cold medicines  Take your blood pressure and diabetes medicine  Neti pot with distilled water  Humidifier  Chronic Hypertension   WHAT YOU NEED TO KNOW:   Hypertension is high blood pressure (BP)  Your BP is the force of your blood moving against the walls of your arteries  Normal BP is less than 120/80  Prehypertension is between 120/80 and 139/89  Hypertension is 140/90 or higher  Hypertension causes your BP to get so high that your heart has to work much harder than normal  This can damage your heart  Chronic hypertension is a long-term condition that you can control with a healthy lifestyle or medicines  A controlled blood pressure helps protect your organs, such as your heart, lungs, brain, and kidneys  DISCHARGE INSTRUCTIONS:   Call 911 for any of the following:   · You have discomfort in your chest that feels like squeezing, pressure, fullness, or pain  · You become confused or have difficulty speaking  · You suddenly feel lightheaded or have trouble breathing  · You have pain or discomfort in your back, neck, jaw, stomach, or arm  Return to the emergency department if:   · You have a severe headache or vision loss  · You have weakness in an arm or leg  Contact your healthcare provider if:   · You feel faint, dizzy, confused, or drowsy  · You have been taking your BP medicine and your BP is still higher than your healthcare provider says it should be  · You have questions or concerns about your condition or care  Medicines: You may need any of the following:  · Medicine  may be used to help lower your BP  You may need more than one type of medicine  Take the medicine exactly as directed  · Diuretics  help decrease extra fluid that collects in your body  This will help lower your BP  You may urinate more often while you take this medicine  · Cholesterol medicine  helps lower your cholesterol level   A low cholesterol level helps prevent heart disease and makes it easier to control your blood pressure  · Take your medicine as directed  Contact your healthcare provider if you think your medicine is not helping or if you have side effects  Tell him or her if you are allergic to any medicine  Keep a list of the medicines, vitamins, and herbs you take  Include the amounts, and when and why you take them  Bring the list or the pill bottles to follow-up visits  Carry your medicine list with you in case of an emergency  Follow up with your healthcare provider as directed: You will need to return to have your blood pressure checked and to have other lab tests done  Write down your questions so you remember to ask them during your visits  Manage chronic hypertension:  Talk with your healthcare provider about these and other ways to manage hypertension:  · Take your BP at home  Sit and rest for 5 minutes before you take your BP  Extend your arm and support it on a flat surface  Your arm should be at the same level as your heart  Follow the directions that came with your BP monitor  If possible, take at least 2 BP readings each time  Take your BP at least twice a day at the same times each day, such as morning and evening  Keep a record of your BP readings and bring it to your follow-up visits  Ask your healthcare provider what your blood pressure should be  · Limit sodium (salt) as directed  Too much sodium can affect your fluid balance  Check labels to find low-sodium or no-salt-added foods  Some low-sodium foods use potassium salts for flavor  Too much potassium can also cause health problems  Your healthcare provider will tell you how much sodium and potassium are safe for you to have in a day  He or she may recommend that you limit sodium to 2,300 mg a day  · Follow the meal plan recommended by your healthcare provider    A dietitian or your provider can give you more information on low-sodium plans or the DASH (Dietary Approaches to Stop Hypertension) eating plan  The DASH plan is low in sodium, unhealthy fats, and total fat  It is high in potassium, calcium, and fiber  · Exercise to maintain a healthy weight  Exercise at least 30 minutes per day, on most days of the week  This will help decrease your blood pressure  Ask about the best exercise plan for you  · Decrease stress  This may help lower your BP  Learn ways to relax, such as deep breathing or listening to music  · Limit alcohol  Women should limit alcohol to 1 drink a day  Men should limit alcohol to 2 drinks a day  A drink of alcohol is 12 ounces of beer, 5 ounces of wine, or 1½ ounces of liquor  · Do not smoke  Nicotine and other chemicals in cigarettes and cigars can increase your BP and also cause lung damage  Ask your healthcare provider for information if you currently smoke and need help to quit  E-cigarettes or smokeless tobacco still contain nicotine  Talk to your healthcare provider before you use these products  © 2017 [facility] [facility] Information is for End User's use only and may not be sold, redistributed or otherwise used for commercial purposes  All illustrations and images included in CareNotes® are the copyrighted property of [employer]  or [employer]  The above information is an  only  It is not intended as medical advice for individual conditions or treatments  Talk to your doctor, nurse or pharmacist before following any medical regimen to see if it is safe and effective for you  Upper Respiratory Infection   WHAT YOU NEED TO KNOW:   An upper respiratory infection is also called the common cold  It is an infection that can affect your nose, throat, ears, and sinuses  For healthy people, the common cold is usually not serious and does not need special treatment  Cold symptoms are usually worst for the first 3 to 5 days  Most people get better in 7 to 14 days   You may continue to cough for 2 to 3 weeks  Colds are caused by viruses and do not get better with antibiotics  DISCHARGE INSTRUCTIONS:   Return to the emergency department if:   · You have chest pain or trouble breathing  Contact your healthcare provider if:   · You have a fever over 102ºF (39°C)  · Your sore throat gets worse or you see white or yellow spots in your throat  · Your symptoms get worse after 3 to 5 days or your cold is not better in 14 days  · You have a rash anywhere on your skin  · You have large, tender lumps in your neck  · You have thick, green or yellow drainage from your nose  · You cough up thick yellow, green, or bloody mucus  · You have vomiting for more than 24 hours and cannot keep fluids down  · You have a bad earache  · You have questions or concerns about your condition or care  Medicines: You may need any of the following:  · Decongestants  help reduce nasal congestion and help you breathe more easily  If you take decongestant pills, they may make you feel restless or cause problems with your sleep  Do not use decongestant sprays for more than a few days  · Cough suppressants  help reduce coughing  Ask your healthcare provider which type of cough medicine is best for you  · NSAIDs , such as ibuprofen, help decrease swelling, pain, and fever  NSAIDs can cause stomach bleeding or kidney problems in certain people  If you take blood thinner medicine, always ask your healthcare provider if NSAIDs are safe for you  Always read the medicine label and follow directions  · Acetaminophen  decreases pain and fever  It is available without a doctor's order  Ask how much to take and how often to take it  Follow directions  Read the labels of all other medicines you are using to see if they also contain acetaminophen, or ask your doctor or pharmacist  Acetaminophen can cause liver damage if not taken correctly   Do not use more than 4 grams (4,000 milligrams) total of acetaminophen in one day      · Take your medicine as directed  Contact your healthcare provider if you think your medicine is not helping or if you have side effects  Tell him or her if you are allergic to any medicine  Keep a list of the medicines, vitamins, and herbs you take  Include the amounts, and when and why you take them  Bring the list or the pill bottles to follow-up visits  Carry your medicine list with you in case of an emergency  Follow up with your healthcare provider as directed:  Write down your questions so you remember to ask them during your visits  Self-care:   · Rest as much as possible  Slowly start to do more each day  · Drink more liquids as directed  Liquids will help thin and loosen mucus so you can cough it up  Liquids will also help prevent dehydration  Liquids that help prevent dehydration include water, fruit juice, and broth  Do not drink liquids that contain caffeine  Caffeine can increase your risk for dehydration  Ask your healthcare provider how much liquid to drink each day  · Soothe a sore throat  Gargle with warm salt water  This helps your sore throat feel better  Make salt water by dissolving ¼ teaspoon salt in 1 cup warm water  You may also suck on hard candy or throat lozenges  You may use a sore throat spray  · Use a humidifier or vaporizer  Use a cool mist humidifier or a vaporizer to increase air moisture in your home  This may make it easier for you to breathe and help decrease your cough  · Use saline nasal drops as directed  These help relieve congestion  · Apply petroleum-based jelly around the outside of your nostrils  This can decrease irritation from blowing your nose  · Do not smoke  Nicotine and other chemicals in cigarettes and cigars can make your symptoms worse  They can also cause infections such as bronchitis or pneumonia  Ask your healthcare provider for information if you currently smoke and need help to quit   E-cigarettes or smokeless tobacco still contain nicotine  Talk to your healthcare provider before you use these products  Prevent spreading your cold to others:   · Try to stay away from other people during the first 2 to 3 days of your cold when it is more easily spread  · Do not share food or drinks  · Do not share hand towels with household members  · Wash your hands often, especially after you blow your nose  Turn away from other people and cover your mouth and nose with a tissue when you sneeze or cough  © 2017 [address] [employer] Information is for End User's use only and may not be sold, redistributed or otherwise used for commercial purposes  All illustrations and images included in CareNotes® are the copyrighted property of [employer] M [employer] Inc  or [employer]o [employer]  The above information is an  only  It is not intended as medical advice for individual conditions or treatments  Talk to your doctor, nurse or pharmacist before following any medical regimen to see if it is safe and effective for you

## 2018-12-06 ENCOUNTER — OFFICE VISIT (OUTPATIENT)
Dept: FAMILY MEDICINE CLINIC | Facility: CLINIC | Age: 53
End: 2018-12-06
Payer: COMMERCIAL

## 2018-12-06 VITALS
RESPIRATION RATE: 18 BRPM | HEIGHT: 59 IN | DIASTOLIC BLOOD PRESSURE: 80 MMHG | OXYGEN SATURATION: 99 % | WEIGHT: 190.56 LBS | SYSTOLIC BLOOD PRESSURE: 110 MMHG | TEMPERATURE: 98 F | BODY MASS INDEX: 38.42 KG/M2 | HEART RATE: 91 BPM

## 2018-12-06 DIAGNOSIS — J02.9 SORE THROAT: ICD-10-CM

## 2018-12-06 DIAGNOSIS — J02.0 STREP PHARYNGITIS: Primary | ICD-10-CM

## 2018-12-06 LAB — S PYO AG THROAT QL: NEGATIVE

## 2018-12-06 PROCEDURE — 3008F BODY MASS INDEX DOCD: CPT | Performed by: PHYSICIAN ASSISTANT

## 2018-12-06 PROCEDURE — 87880 STREP A ASSAY W/OPTIC: CPT | Performed by: PHYSICIAN ASSISTANT

## 2018-12-06 PROCEDURE — 87070 CULTURE OTHR SPECIMN AEROBIC: CPT | Performed by: PHYSICIAN ASSISTANT

## 2018-12-06 PROCEDURE — 99213 OFFICE O/P EST LOW 20 MIN: CPT | Performed by: PHYSICIAN ASSISTANT

## 2018-12-06 RX ORDER — AMOXICILLIN 500 MG/1
500 CAPSULE ORAL EVERY 12 HOURS SCHEDULED
Qty: 20 CAPSULE | Refills: 0 | Status: SHIPPED | OUTPATIENT
Start: 2018-12-06 | End: 2018-12-16

## 2018-12-06 NOTE — PROGRESS NOTES
Assessment/Plan:    Discussed with patient that her symptoms may still be viral upper respiratory infection, and that if she does not take the antibiotic her symptoms might start improving tomorrow  However due to length of symptoms, and the fact that it seems like the sore throat is getting worse, will send over prescription for amoxicillin  Make sure to drink plenty of fluids  Would recommend following up if there is no improvement symptoms after finishing the antibiotic  Diagnoses and all orders for this visit:    Strep pharyngitis  -     amoxicillin (AMOXIL) 500 mg capsule; Take 1 capsule (500 mg total) by mouth every 12 (twelve) hours for 10 days    Sore throat  -     POCT rapid strepA          Subjective:      Patient ID: Guru Smith is a 48 y o  female  51-year-old female presenting with cold-like symptoms x1 week  Patient states that she has been experiencing nasal congestion, sore throat, nonproductive cough  Was seen in the emergency room on 11/30/2018, and 12/04/2018  A both visits they told her that it was a viral upper respiratory infection  Patient states that the symptoms have not been improving, and the sore throat is getting worse  Has been taking Robitussin, and Tylenol without any relief of her symptoms  States that she has felt feverish, but has not taken her temperature  Patient is a former smoker  Has never been diagnosed with COPD or asthma  The following portions of the patient's history were reviewed and updated as appropriate:   She  has a past medical history of Diabetes mellitus ([facility] [address] ) and Hypertension  She   Patient Active Problem List    Diagnosis Date Noted    Type 2 diabetes mellitus without complication, with long-term current use of insulin ([facility] [address] ) 03/08/2018    Essential hypertension 03/08/2018     She  has no past surgical history on file  Her family history includes Diabetes in her mother; No Known Problems in her father    She  reports that she has quit smoking  She has quit using smokeless tobacco  She reports that she does not drink alcohol or use drugs  Current Outpatient Prescriptions   Medication Sig Dispense Refill    amLODIPine (NORVASC) 5 mg tablet Take 10 mg by mouth daily       atorvastatin (LIPITOR) 20 mg tablet Take 1 tablet (20 mg total) by mouth daily 90 tablet 0    Blood Glucose Monitoring Suppl (ONE TOUCH ULTRA 2) w/Device KIT Testing Three times a day 1 each 0    chlorpheniramine (CHLORPHEN) 4 MG tablet Take 1 tablet (4 mg total) by mouth every 6 (six) hours as needed for allergies 20 tablet 0    chlorthalidone 25 mg tablet Take 1 tablet (25 mg total) by mouth daily 90 tablet 0    dextromethorphan-guaiFENesin (ROBITUSSIN DM)  mg/5 mL syrup Take 10 mL by mouth 3 (three) times a day as needed for cough or congestion 118 mL 0    glucose blood (ONE TOUCH ULTRA TEST) test strip Testing Three times a day 100 each 5    insulin aspart (NOVOLOG FLEXPEN) 100 Units/mL injection pen Inject 25 Units under the skin 3 (three) times a day with meals 8 pen 3    insulin glargine (BASAGLAR KWIKPEN) 100 units/mL injection pen Inject 75 units under the skin daily   5 pen 5    Insulin Pen Needle 32G X 4 MM MISC Use once daily 100 each 3    lisinopril (ZESTRIL) 40 mg tablet Take 1 tablet (40 mg total) by mouth daily 90 tablet 0    meloxicam (MOBIC) 7 5 mg tablet Take 1 tablet (7 5 mg total) by mouth daily 5 tablet 0    metFORMIN (GLUCOPHAGE) 1000 MG tablet Take 1 tablet (1,000 mg total) by mouth 2 (two) times a day with meals 180 tablet 0    ONETOUCH DELICA LANCETS FINE MISC Testing Three times a day 100 each 5    amoxicillin (AMOXIL) 500 mg capsule Take 1 capsule (500 mg total) by mouth every 12 (twelve) hours for 10 days 20 capsule 0    insulin aspart (NovoLOG) 100 units/mL injection Inject 15 Units under the skin 3 (three) times a day before meals       Insulin Glargine (TOUJEO MAX SOLOSTAR) 300 units/mL CONCETRATED U-300 injection pen Inject 75 Units under the skin daily (Patient not taking: Reported on 12/6/2018 ) 6 pen 3     No current facility-administered medications for this visit  She has No Known Allergies       Review of Systems   Constitutional: Negative for chills, diaphoresis, fatigue and fever  HENT: Positive for congestion and sore throat  Negative for ear pain, postnasal drip, rhinorrhea, sinus pain, sinus pressure, sneezing and trouble swallowing  Respiratory: Positive for cough  Negative for shortness of breath and wheezing  Cardiovascular: Negative for chest pain  Gastrointestinal: Negative for abdominal pain  Neurological: Negative for headaches  Objective:      /80 (BP Location: Left arm, Patient Position: Sitting, Cuff Size: Large)   Pulse 91   Temp 98 °F (36 7 °C) (Tympanic)   Resp 18   Ht 4' 11" (1 499 m)   Wt 86 4 kg (190 lb 9 oz)   LMP 11/16/2018   SpO2 99%   BMI 38 49 kg/m²          Physical Exam   Constitutional: She appears well-developed and well-nourished  No distress  HENT:   Right Ear: External ear normal    Left Ear: External ear normal    Nose: Mucosal edema present  Mouth/Throat: Mucous membranes are normal  Oropharyngeal exudate and posterior oropharyngeal erythema present  Neck: Normal range of motion  Neck supple  Cardiovascular: Normal rate, regular rhythm and normal heart sounds  Exam reveals no gallop and no friction rub  No murmur heard  Pulmonary/Chest: Effort normal and breath sounds normal  No respiratory distress  She has no wheezes  She has no rales  Lymphadenopathy:     She has cervical adenopathy  Skin: She is not diaphoretic  Nursing note and vitals reviewed

## 2018-12-06 NOTE — LETTER
December 6, 2018     Patient: Jacob Lyon   YOB: 1965   Date of Visit: 12/6/2018       To Whom it May Concern:    Joaquin Llamas is under my professional care  She was seen in my office on 12/6/2018  She may return to work on 12/7/2018  If you have any questions or concerns, please don't hesitate to call           Sincerely,          Octavia Rosales PA-C        CC: No Recipients

## 2018-12-08 LAB — BACTERIA THROAT CULT: NORMAL

## 2019-01-21 ENCOUNTER — OFFICE VISIT (OUTPATIENT)
Dept: FAMILY MEDICINE CLINIC | Facility: CLINIC | Age: 54
End: 2019-01-21

## 2019-01-21 VITALS
WEIGHT: 194 LBS | OXYGEN SATURATION: 97 % | BODY MASS INDEX: 39.11 KG/M2 | DIASTOLIC BLOOD PRESSURE: 80 MMHG | HEART RATE: 87 BPM | TEMPERATURE: 96.2 F | SYSTOLIC BLOOD PRESSURE: 132 MMHG | HEIGHT: 59 IN | RESPIRATION RATE: 18 BRPM

## 2019-01-21 DIAGNOSIS — S91.109A OPEN WOUND OF TOE, INITIAL ENCOUNTER: Primary | ICD-10-CM

## 2019-01-21 PROCEDURE — 99213 OFFICE O/P EST LOW 20 MIN: CPT | Performed by: PODIATRIST

## 2019-01-21 RX ORDER — CEPHALEXIN 500 MG/1
500 CAPSULE ORAL EVERY 8 HOURS SCHEDULED
Qty: 21 CAPSULE | Refills: 0 | Status: ON HOLD | OUTPATIENT
Start: 2019-01-21 | End: 2019-02-07

## 2019-01-21 NOTE — PROGRESS NOTES
Podiatry Clinic Visit  Molly Lyon 48 y o  female MRN: [id number]  Encounter: [id number]    Assessment/Plan     Assessment:  1  Left 5th digit wound  2  DM type 2    Plan:  - Patient was seen/examined  All questions and concerns addressed  - left 5th digit wound was excisionally debrided with a sterile 15 blade down to subcutaneous tissue  Sharon wound hyperkeratosis, and fibrous tissue was removed  Remaining wound bed was fibro granular in appearance, and measures approximately 2 5 x 2 5 x 0 2 cm  <6sqcm was debrided  - left foot x-rays prescribed to rule out osteomyelitis  - LEADs ordered due to nonpalpable pulses, and nonhealing left foot wound  -prescription for Keflex 500 mg q 8 hours was given for 7 days  - Instructed patient to return to clinic, or [facility] in 1 week  History of Present Illness     HPI:  Karen Rubio is a 48 y o  female who presents with a left 5th digit wound  She states that she works in the [employer] at [employer]  She states that she is unsure of how long the wound has been present  She thinks that it has been there for a couple weeks  She started to experience pain in her left 5th digit for about a week now  She states that over the past couple days the left 5th digit started to become red  The patient denies any nausea, vomiting, fever, chills, shortness of breath, or chest pains  Review of Systems   Constitutional: Negative  HENT: Negative  Eyes: Negative  Respiratory: Negative  Cardiovascular: Negative  Gastrointestinal: Negative  Musculoskeletal: Negative   Skin: left 5th digit wound  Neurological: Negative  Historical Information   Past Medical History:   Diagnosis Date    Diabetes mellitus ([facility] [address] )     Hypertension      No past surgical history on file    Social History   History   Alcohol Use No     History   Drug Use No     History   Smoking Status    Former Smoker   Smokeless Tobacco    Former User Family History:   Family History   Problem Relation Age of Onset    Diabetes Mother     No Known Problems Father        Meds/Allergies     (Not in a hospital admission)  No Known Allergies    Objective     Current Vitals:   Blood Pressure: 132/80 (01/21/19 0929)  Pulse: 87 (01/21/19 0929)  Temperature: (!) 96 2 °F (35 7 °C) (01/21/19 0929)  Temp Source: Tympanic (01/21/19 0929)  Respirations: 18 (01/21/19 0929)  Height: 4' 11" (149 9 cm) (01/21/19 0929)  Weight - Scale: 88 kg (194 lb) (01/21/19 0929)  SpO2: 97 % (01/21/19 0929)        /80 (BP Location: Left arm, Patient Position: Sitting, Cuff Size: Large)   Pulse 87   Temp (!) 96 2 °F (35 7 °C) (Tympanic)   Resp 18   Ht 4' 11" (1 499 m)   Wt 88 kg (194 lb)   LMP 12/23/2018   SpO2 97%   Breastfeeding? No   BMI 39 18 kg/m²       Lower Extremity Exam:    Musculoskeletal:  MMT is 5/5 to all compartments of the LE, +1/4 edema left foot, Digital ROM is intact,      Pulses:   R DP is +0/4, R PT is +0/4, L DP is +0/4, L PT is +0/4, CFT< 3sec to all digits  Pedal hair is Absent     Skin:  Left 5th digit wound noted  Wound bed is fibro granular in appearance and measures 2 5 x 2 5 x 0 2 cm  Wound does not probe to bone  No drainage noted  Sharon wound erythema noted to the level of the MPJ  Neurologic:  Gross sensation is intact   Protective sensation is Diminished

## 2019-01-21 NOTE — LETTER
January 21, 2019     Patient: Mady Lyon   YOB: 1965   Date of Visit: 1/21/2019       To Whom it May Concern:    Maribel Coredro is under my professional care  She was seen in my office on 1/21/2019  Please excuse her from work until her re-evaluation in 1 week (1/28/19)  If you have any questions or concerns, please don't hesitate to call           Sincerely,          [facility]        CC: No Recipients

## 2019-01-24 ENCOUNTER — HOSPITAL ENCOUNTER (OUTPATIENT)
Dept: RADIOLOGY | Facility: HOSPITAL | Age: 54
Discharge: HOME | End: 2019-01-24
Payer: COMMERCIAL

## 2019-01-24 DIAGNOSIS — S91.109A OPEN WOUND OF TOE, INITIAL ENCOUNTER: ICD-10-CM

## 2019-01-24 PROCEDURE — 73630 X-RAY EXAM OF FOOT: CPT

## 2019-01-28 ENCOUNTER — APPOINTMENT (INPATIENT)
Dept: RADIOLOGY | Facility: HOSPITAL | Age: 54
DRG: 253 | End: 2019-01-28
Payer: COMMERCIAL

## 2019-01-28 ENCOUNTER — OFFICE VISIT (OUTPATIENT)
Dept: FAMILY MEDICINE CLINIC | Facility: CLINIC | Age: 54
End: 2019-01-28

## 2019-01-28 ENCOUNTER — HOSPITAL ENCOUNTER (INPATIENT)
Facility: HOSPITAL | Age: 54
LOS: 10 days | Discharge: HOME | DRG: 253 | End: 2019-02-07
Attending: PODIATRIST | Admitting: PODIATRIST
Payer: COMMERCIAL

## 2019-01-28 VITALS
OXYGEN SATURATION: 99 % | WEIGHT: 188 LBS | HEART RATE: 88 BPM | DIASTOLIC BLOOD PRESSURE: 80 MMHG | SYSTOLIC BLOOD PRESSURE: 140 MMHG | HEIGHT: 59 IN | BODY MASS INDEX: 37.9 KG/M2 | TEMPERATURE: 96.6 F | RESPIRATION RATE: 18 BRPM

## 2019-01-28 DIAGNOSIS — E11.9 TYPE 2 DIABETES MELLITUS WITHOUT COMPLICATION, WITH LONG-TERM CURRENT USE OF INSULIN (HCC): ICD-10-CM

## 2019-01-28 DIAGNOSIS — I73.9 PAD (PERIPHERAL ARTERY DISEASE) (HCC): ICD-10-CM

## 2019-01-28 DIAGNOSIS — Z47.89 SURGICAL AFTERCARE, MUSCULOSKELETAL SYSTEM: ICD-10-CM

## 2019-01-28 DIAGNOSIS — L03.032 CELLULITIS OF FIFTH TOE, LEFT: Primary | ICD-10-CM

## 2019-01-28 DIAGNOSIS — M86.272 SUBACUTE OSTEOMYELITIS OF LEFT FOOT (HCC): ICD-10-CM

## 2019-01-28 DIAGNOSIS — E11.9 TYPE 2 DIABETES MELLITUS WITHOUT COMPLICATION, WITH LONG-TERM CURRENT USE OF INSULIN (HCC): Primary | ICD-10-CM

## 2019-01-28 DIAGNOSIS — Z79.4 TYPE 2 DIABETES MELLITUS WITHOUT COMPLICATION, WITH LONG-TERM CURRENT USE OF INSULIN (HCC): Primary | ICD-10-CM

## 2019-01-28 DIAGNOSIS — Z79.4 TYPE 2 DIABETES MELLITUS WITHOUT COMPLICATION, WITH LONG-TERM CURRENT USE OF INSULIN (HCC): ICD-10-CM

## 2019-01-28 DIAGNOSIS — S91.109A OPEN WOUND OF TOE, INITIAL ENCOUNTER: ICD-10-CM

## 2019-01-28 PROBLEM — E78.5 HLD (HYPERLIPIDEMIA): Status: ACTIVE | Noted: 2019-01-28

## 2019-01-28 PROBLEM — L03.039 CELLULITIS OF TOE: Status: ACTIVE | Noted: 2019-01-28

## 2019-01-28 LAB
ALBUMIN SERPL BCP-MCNC: 2.7 G/DL (ref 3.5–5)
ALP SERPL-CCNC: 82 U/L (ref 46–116)
ALT SERPL W P-5'-P-CCNC: 22 U/L (ref 12–78)
ANION GAP SERPL CALCULATED.3IONS-SCNC: 11 MMOL/L (ref 4–13)
AST SERPL W P-5'-P-CCNC: 22 U/L (ref 5–45)
BASOPHILS # BLD AUTO: 0.05 THOUSANDS/ΜL (ref 0–0.1)
BASOPHILS NFR BLD AUTO: 1 % (ref 0–1)
BILIRUB SERPL-MCNC: 0.21 MG/DL (ref 0.2–1)
BUN SERPL-MCNC: 31 MG/DL (ref 5–25)
CALCIUM SERPL-MCNC: 8.5 MG/DL (ref 8.3–10.1)
CHLORIDE SERPL-SCNC: 104 MMOL/L (ref 100–108)
CO2 SERPL-SCNC: 23 MMOL/L (ref 21–32)
CREAT SERPL-MCNC: 1.21 MG/DL (ref 0.6–1.3)
EOSINOPHIL # BLD AUTO: 0.11 THOUSAND/ΜL (ref 0–0.61)
EOSINOPHIL NFR BLD AUTO: 1 % (ref 0–6)
ERYTHROCYTE [DISTWIDTH] IN BLOOD BY AUTOMATED COUNT: 15.1 % (ref 11.6–15.1)
GFR SERPL CREATININE-BSD FRML MDRD: 51 ML/MIN/1.73SQ M
GLUCOSE SERPL-MCNC: 127 MG/DL (ref 65–140)
GLUCOSE SERPL-MCNC: 163 MG/DL (ref 65–140)
GLUCOSE SERPL-MCNC: 242 MG/DL (ref 65–140)
GLUCOSE SERPL-MCNC: 41 MG/DL (ref 65–140)
GLUCOSE SERPL-MCNC: 93 MG/DL (ref 65–140)
HCT VFR BLD AUTO: 34 % (ref 34.8–46.1)
HGB BLD-MCNC: 10.1 G/DL (ref 11.5–15.4)
IMM GRANULOCYTES # BLD AUTO: 0.03 THOUSAND/UL (ref 0–0.2)
IMM GRANULOCYTES NFR BLD AUTO: 0 % (ref 0–2)
LYMPHOCYTES # BLD AUTO: 3.29 THOUSANDS/ΜL (ref 0.6–4.47)
LYMPHOCYTES NFR BLD AUTO: 36 % (ref 14–44)
MCH RBC QN AUTO: 22.4 PG (ref 26.8–34.3)
MCHC RBC AUTO-ENTMCNC: 29.7 G/DL (ref 31.4–37.4)
MCV RBC AUTO: 76 FL (ref 82–98)
MONOCYTES # BLD AUTO: 0.57 THOUSAND/ΜL (ref 0.17–1.22)
MONOCYTES NFR BLD AUTO: 6 % (ref 4–12)
NEUTROPHILS # BLD AUTO: 5.17 THOUSANDS/ΜL (ref 1.85–7.62)
NEUTS SEG NFR BLD AUTO: 56 % (ref 43–75)
NRBC BLD AUTO-RTO: 0 /100 WBCS
PLATELET # BLD AUTO: 272 THOUSANDS/UL (ref 149–390)
PMV BLD AUTO: 10.3 FL (ref 8.9–12.7)
POTASSIUM SERPL-SCNC: 4.1 MMOL/L (ref 3.5–5.3)
PROT SERPL-MCNC: 6.9 G/DL (ref 6.4–8.2)
RBC # BLD AUTO: 4.5 MILLION/UL (ref 3.81–5.12)
SODIUM SERPL-SCNC: 138 MMOL/L (ref 136–145)
WBC # BLD AUTO: 9.22 THOUSAND/UL (ref 4.31–10.16)

## 2019-01-28 PROCEDURE — 71046 X-RAY EXAM CHEST 2 VIEWS: CPT

## 2019-01-28 PROCEDURE — 80053 COMPREHEN METABOLIC PANEL: CPT | Performed by: PODIATRIST

## 2019-01-28 PROCEDURE — 82948 REAGENT STRIP/BLOOD GLUCOSE: CPT

## 2019-01-28 PROCEDURE — 93005 ELECTROCARDIOGRAM TRACING: CPT

## 2019-01-28 PROCEDURE — 85025 COMPLETE CBC W/AUTO DIFF WBC: CPT | Performed by: PODIATRIST

## 2019-01-28 PROCEDURE — 99213 OFFICE O/P EST LOW 20 MIN: CPT | Performed by: STUDENT IN AN ORGANIZED HEALTH CARE EDUCATION/TRAINING PROGRAM

## 2019-01-28 RX ORDER — AMLODIPINE BESYLATE 10 MG/1
10 TABLET ORAL DAILY
Status: DISCONTINUED | OUTPATIENT
Start: 2019-01-28 | End: 2019-01-30

## 2019-01-28 RX ORDER — ACETAMINOPHEN 325 MG/1
650 TABLET ORAL EVERY 6 HOURS PRN
Status: DISCONTINUED | OUTPATIENT
Start: 2019-01-28 | End: 2019-02-07 | Stop reason: HOSPADM

## 2019-01-28 RX ORDER — HEPARIN SODIUM 5000 [USP'U]/ML
5000 INJECTION, SOLUTION INTRAVENOUS; SUBCUTANEOUS EVERY 8 HOURS SCHEDULED
Status: DISCONTINUED | OUTPATIENT
Start: 2019-01-28 | End: 2019-02-07 | Stop reason: HOSPADM

## 2019-01-28 RX ORDER — CHLORTHALIDONE 25 MG/1
25 TABLET ORAL DAILY
Status: DISCONTINUED | OUTPATIENT
Start: 2019-01-28 | End: 2019-01-30

## 2019-01-28 RX ORDER — INSULIN GLARGINE 100 [IU]/ML
75 INJECTION, SOLUTION SUBCUTANEOUS EVERY MORNING
Status: DISCONTINUED | OUTPATIENT
Start: 2019-01-28 | End: 2019-02-03

## 2019-01-28 RX ORDER — CHLORPHENIRAMINE MALEATE 4 MG/1
4 TABLET ORAL EVERY 6 HOURS PRN
Status: DISCONTINUED | OUTPATIENT
Start: 2019-01-28 | End: 2019-01-28 | Stop reason: CLARIF

## 2019-01-28 RX ORDER — OXYCODONE HYDROCHLORIDE AND ACETAMINOPHEN 5; 325 MG/1; MG/1
1 TABLET ORAL EVERY 6 HOURS PRN
Status: DISCONTINUED | OUTPATIENT
Start: 2019-01-28 | End: 2019-02-07 | Stop reason: HOSPADM

## 2019-01-28 RX ORDER — LISINOPRIL 20 MG/1
40 TABLET ORAL DAILY
Status: DISCONTINUED | OUTPATIENT
Start: 2019-01-28 | End: 2019-01-30

## 2019-01-28 RX ORDER — ATORVASTATIN CALCIUM 20 MG/1
20 TABLET, FILM COATED ORAL EVERY EVENING
Status: DISCONTINUED | OUTPATIENT
Start: 2019-01-28 | End: 2019-02-07 | Stop reason: HOSPADM

## 2019-01-28 RX ORDER — LORATADINE 10 MG/1
10 TABLET ORAL DAILY PRN
Status: DISCONTINUED | OUTPATIENT
Start: 2019-01-28 | End: 2019-02-07 | Stop reason: HOSPADM

## 2019-01-28 RX ORDER — CEFAZOLIN SODIUM 2 G/50ML
2000 SOLUTION INTRAVENOUS EVERY 8 HOURS
Status: DISCONTINUED | OUTPATIENT
Start: 2019-01-28 | End: 2019-02-07 | Stop reason: HOSPADM

## 2019-01-28 RX ADMIN — CEFAZOLIN SODIUM 2000 MG: 2 SOLUTION INTRAVENOUS at 15:29

## 2019-01-28 RX ADMIN — HEPARIN SODIUM 5000 UNITS: 5000 INJECTION INTRAVENOUS; SUBCUTANEOUS at 15:40

## 2019-01-28 RX ADMIN — INSULIN LISPRO 15 UNITS: 100 INJECTION, SOLUTION INTRAVENOUS; SUBCUTANEOUS at 15:38

## 2019-01-28 RX ADMIN — INSULIN LISPRO 15 UNITS: 100 INJECTION, SOLUTION INTRAVENOUS; SUBCUTANEOUS at 19:21

## 2019-01-28 RX ADMIN — LISINOPRIL 40 MG: 20 TABLET ORAL at 22:49

## 2019-01-28 RX ADMIN — HEPARIN SODIUM 5000 UNITS: 5000 INJECTION INTRAVENOUS; SUBCUTANEOUS at 22:48

## 2019-01-28 RX ADMIN — ATORVASTATIN CALCIUM 20 MG: 20 TABLET, FILM COATED ORAL at 19:21

## 2019-01-28 RX ADMIN — CEFAZOLIN SODIUM 2000 MG: 2 SOLUTION INTRAVENOUS at 22:48

## 2019-01-28 RX ADMIN — INSULIN LISPRO 1 UNITS: 100 INJECTION, SOLUTION INTRAVENOUS; SUBCUTANEOUS at 19:22

## 2019-01-28 RX ADMIN — INSULIN LISPRO 3 UNITS: 100 INJECTION, SOLUTION INTRAVENOUS; SUBCUTANEOUS at 15:38

## 2019-01-28 RX ADMIN — CHLORTHALIDONE 25 MG: 25 TABLET ORAL at 22:48

## 2019-01-28 NOTE — H&P
H&P Exam - Molly BraxtonCharan 48 y o  female MRN: [id number]    Unit/Bed#: E2 -01 Encounter: [id number]    Assessment:  1  Left 5th toe wound/eschar with associated cellulitis and likely OM  2  DM 2   3  PAD  4  HLD  5  DVT ppx: Heparin  6  Code Status: Level 1    Plan:  · Admit to podiatry service under Dr Leanne Harrell for vascular workup, left 5th digit wound, and possible podiatric surgical intervention  Patient will likely need left 5th digit amputation pending results of LEADs  · Left foot x-rays reviewed  Results show cortical erosion of the left 5th digit  However primary pending  Chest x-ray, and EKG ordered for preoperative planning  · Start IV Ancef  · LEADs ordered due to nonpalpable pulses  If results are below healing range will consult vascular  · Weight-bearing as tolerated in a surgical shoe  · SLIM consulted for medical management, and preoperative clearance  History of Present Illness   Robles Fabian is a 59-year-old female with a past medical history of type 2 diabetes, PAD, HLD that presents for direct admission under the service of Dr Leanne Harrell for left 5th digit osteomyelitis  She was seen at the [facility] today  She was last seen last week for the same problem  She states that the left 5th digit has become painful and has scabbed over  She states that she has been taking Keflex that was given to her 1 week ago  She still says the digit is painful  She was unable to get her LEADs done  X-rays were taken as an outpatient  She denies any recent nausea, vomiting, fever, chills, shortness of breath, or chest pains  Review of Systems   Constitutional: Negative for chills, fatigue and fever  HENT: Negative  Respiratory: Negative for chest tightness, shortness of breath and wheezing  Cardiovascular: Negative for chest pain and leg swelling  Gastrointestinal: Negative for abdominal pain, constipation, diarrhea, nausea and vomiting  Genitourinary: Negative for difficulty urinating  Skin: Positive for color change and wound  Neurological: Negative for dizziness, syncope, weakness, numbness and headaches  Psychiatric/Behavioral: Negative  Historical Information   Past Medical History:   Diagnosis Date    Diabetes mellitus ([address] )     Hypertension      No past surgical history on file  Social History   History   Alcohol Use No     History   Drug Use No     History   Smoking Status    Former Smoker   Smokeless Tobacco    Former User     Family History: non-contributory    Meds/Allergies   all medications and allergies reviewed  No Known Allergies    Objective   First Vitals:   Blood Pressure: 132/70 (01/28/19 1225)  Pulse: 86 (01/28/19 1225)  Temperature: 97 6 °F (36 4 °C) (01/28/19 1225)  Temp Source: Tympanic (01/28/19 1225)  Respirations: 18 (01/28/19 1225)  Height: 5' (152 4 cm) (01/28/19 1225)  Weight - Scale: 86 8 kg (191 lb 5 8 oz) (01/28/19 1225)  SpO2: 100 % (01/28/19 1225)    Current Vitals:   Blood Pressure: 132/70 (01/28/19 1225)  Pulse: 86 (01/28/19 1225)  Temperature: 97 6 °F (36 4 °C) (01/28/19 1225)  Temp Source: Tympanic (01/28/19 1225)  Respirations: 18 (01/28/19 1225)  Height: 5' (152 4 cm) (01/28/19 1225)  Weight - Scale: 86 8 kg (191 lb 5 8 oz) (01/28/19 1225)  SpO2: 100 % (01/28/19 1225)    No intake or output data in the 24 hours ending 01/28/19 1354    Invasive Devices          No matching active lines, drains, or airways          Physical Exam   Constitutional: She is oriented to person, place, and time  She appears well-developed and well-nourished  No distress  HENT:   Head: Normocephalic and atraumatic  Eyes: Pupils are equal, round, and reactive to light  Right eye exhibits no discharge  Left eye exhibits no discharge  Neck: Normal range of motion  Neck supple  Cardiovascular: Normal rate, regular rhythm and normal heart sounds  DP pulses are 0/4 bilaterally  PT pulses are 0/4 bilaterally  Capillary refill time is <3 seconds noted to all digits bilaterally  Pedal hair is absent bilaterally  No edema noted bilaterally  DP, and PT pulses are dopplerable  Pulmonary/Chest: Effort normal and breath sounds normal  No respiratory distress  She has no wheezes  She has no rales  She exhibits no tenderness  Abdominal: Soft  Bowel sounds are normal    Musculoskeletal: Normal range of motion  She exhibits no edema or tenderness  MMT is 5/5 in all compartments of the foot bilaterally  Pain on palpation of the left 5th digit  No calf tenderness noted bilaterally  Neurological: She is alert and oriented to person, place, and time  Gross sensation is intact, protective sensation is diminished bilaterally  Skin: Skin is warm  There is erythema  LLE: 5th toe lateral distal wound dry eschar, no active drainage, periwound erythema-cellulitis extending from toe to distal lateral foot noted, no purulence, crepitus noted  No open Lesions  Skin of the LE is normal texture, turgor  Psychiatric: She has a normal mood and affect  Her behavior is normal  Judgment and thought content normal    Nursing note and vitals reviewed  Lab Results: Pending  Imaging: Pending  EKG, Pathology, and Other Studies: Pending    Code Status: Level 1 - Full Code    Counseling / Coordination of Care: Total floor / unit time spent today 30 minutes

## 2019-01-28 NOTE — PROGRESS NOTES
Podiatry Clinic Visit  Marco A Lyon 48 y o  female MRN: [id number]  Encounter: [id number]    Assessment/Plan     Assessment:  1  Left 5th toe wound/eschar with associated cellulitis  2  DM 2     Plan:  - Patient was seen/examined  All questions and concerns addressed  - Wound to left 5th toe today appears with dry, eschar and surrounding cellulitis  Due to nonpalpable pedal pulses and faintly dopplerable in association with tissue loss and cellulitis to left 5th toe, admission for IV abx and vascular workup warrented  - XR Left foot reviewed; some periosteal reaction noted per my read, however pending official radiology read  - LEADs not yet done by patient; has not yet scheduled  - 7 days course of keflex was started last Tuesday, Q8h  - Applied betadine and bandaid to toe  - Instructed patient to report to [address] for workup      History of Present Illness     HPI:  Guru Smith is a 48 y o  female who presents with left 5th toe wound  She still endorses pain to toe  She has taken her keflex for 7 days; started Tuesday and has about 1 day left, taken 2/day every 8 hrs  She did get the XR of there left foot  She has not gotten her LEADs  Has only changed her dressing once over the course of the week  The patient denies any nausea, vomiting, fever, chills, shortness of breath, or chest pains  Review of Systems   Constitutional: Negative  HENT: Negative  Eyes: Negative  Respiratory: Negative  Cardiovascular: Negative  Gastrointestinal: Negative  Musculoskeletal: Negative   Skin: left 5th toe wound/eschar  Neurological: Negative  Historical Information   Past Medical History:   Diagnosis Date    Diabetes mellitus ([address] )     Hypertension      No past surgical history on file    Social History   History   Alcohol Use No     History   Drug Use No     History   Smoking Status    Former Smoker   Smokeless Tobacco    Former User     Family History: Family History   Problem Relation Age of Onset    Diabetes Mother     No Known Problems Father        Meds/Allergies     (Not in a hospital admission)  No Known Allergies    Objective     Current Vitals: There were no vitals taken for this visit  Lower Extremity Exam:    Musculoskeletal:  MMT is 5/5 to all compartments of the LE, +1/4 edema B/L, Digital ROM is intact      Pulses:   R DP is +0/4, R PT is +0/4, L DP is +0/4, L PT is +0/4, DP biphasic on doppler, PT faintly dopplerable  CFT< 3sec to all digits  Pedal hair is Absent  Legs to toes warm to cool     Skin:  Left 5th toe lateral distal wound dry eschar, no active drainage, periwound erythema-cellulitis extending from toe to distal lateral foot noted, no purulence, crepitus noted  No open Lesions  Skin of the LE is normal texture, turgor  Neurologic:  Gross sensation is intact   Protective sensation is Diminished

## 2019-01-29 ENCOUNTER — APPOINTMENT (INPATIENT)
Dept: NON INVASIVE DIAGNOSTICS | Facility: HOSPITAL | Age: 54
DRG: 253 | End: 2019-01-29
Payer: COMMERCIAL

## 2019-01-29 LAB
ANION GAP SERPL CALCULATED.3IONS-SCNC: 12 MMOL/L (ref 4–13)
ATRIAL RATE: 81 BPM
BUN SERPL-MCNC: 32 MG/DL (ref 5–25)
CALCIUM SERPL-MCNC: 8.8 MG/DL (ref 8.3–10.1)
CHLORIDE SERPL-SCNC: 103 MMOL/L (ref 100–108)
CO2 SERPL-SCNC: 20 MMOL/L (ref 21–32)
CREAT SERPL-MCNC: 1.15 MG/DL (ref 0.6–1.3)
ERYTHROCYTE [DISTWIDTH] IN BLOOD BY AUTOMATED COUNT: 15.2 % (ref 11.6–15.1)
EST. AVERAGE GLUCOSE BLD GHB EST-MCNC: 280 MG/DL
GFR SERPL CREATININE-BSD FRML MDRD: 54 ML/MIN/1.73SQ M
GLUCOSE SERPL-MCNC: 130 MG/DL (ref 65–140)
GLUCOSE SERPL-MCNC: 158 MG/DL (ref 65–140)
GLUCOSE SERPL-MCNC: 200 MG/DL (ref 65–140)
GLUCOSE SERPL-MCNC: 226 MG/DL (ref 65–140)
GLUCOSE SERPL-MCNC: 235 MG/DL (ref 65–140)
GLUCOSE SERPL-MCNC: 63 MG/DL (ref 65–140)
GLUCOSE SERPL-MCNC: 78 MG/DL (ref 65–140)
HBA1C MFR BLD: 11.4 % (ref 4.2–6.3)
HCT VFR BLD AUTO: 34 % (ref 34.8–46.1)
HGB BLD-MCNC: 10.3 G/DL (ref 11.5–15.4)
MCH RBC QN AUTO: 22.8 PG (ref 26.8–34.3)
MCHC RBC AUTO-ENTMCNC: 30.3 G/DL (ref 31.4–37.4)
MCV RBC AUTO: 75 FL (ref 82–98)
P AXIS: 33 DEGREES
PLATELET # BLD AUTO: 268 THOUSANDS/UL (ref 149–390)
PMV BLD AUTO: 10.2 FL (ref 8.9–12.7)
POTASSIUM SERPL-SCNC: 4.4 MMOL/L (ref 3.5–5.3)
PR INTERVAL: 168 MS
QRS AXIS: 12 DEGREES
QRSD INTERVAL: 76 MS
QT INTERVAL: 360 MS
QTC INTERVAL: 418 MS
RBC # BLD AUTO: 4.51 MILLION/UL (ref 3.81–5.12)
SODIUM SERPL-SCNC: 135 MMOL/L (ref 136–145)
T WAVE AXIS: 24 DEGREES
VENTRICULAR RATE: 81 BPM
WBC # BLD AUTO: 9.07 THOUSAND/UL (ref 4.31–10.16)

## 2019-01-29 PROCEDURE — 93925 LOWER EXTREMITY STUDY: CPT

## 2019-01-29 PROCEDURE — 82948 REAGENT STRIP/BLOOD GLUCOSE: CPT

## 2019-01-29 PROCEDURE — 93925 LOWER EXTREMITY STUDY: CPT | Performed by: SURGERY

## 2019-01-29 PROCEDURE — 93005 ELECTROCARDIOGRAM TRACING: CPT

## 2019-01-29 PROCEDURE — 93010 ELECTROCARDIOGRAM REPORT: CPT | Performed by: INTERNAL MEDICINE

## 2019-01-29 PROCEDURE — 83036 HEMOGLOBIN GLYCOSYLATED A1C: CPT | Performed by: PODIATRIST

## 2019-01-29 PROCEDURE — 99253 IP/OBS CNSLTJ NEW/EST LOW 45: CPT | Performed by: INTERNAL MEDICINE

## 2019-01-29 PROCEDURE — 93923 UPR/LXTR ART STDY 3+ LVLS: CPT

## 2019-01-29 PROCEDURE — 93922 UPR/L XTREMITY ART 2 LEVELS: CPT | Performed by: SURGERY

## 2019-01-29 PROCEDURE — 85027 COMPLETE CBC AUTOMATED: CPT | Performed by: PODIATRIST

## 2019-01-29 PROCEDURE — 80048 BASIC METABOLIC PNL TOTAL CA: CPT | Performed by: PODIATRIST

## 2019-01-29 RX ADMIN — CHLORTHALIDONE 25 MG: 25 TABLET ORAL at 21:19

## 2019-01-29 RX ADMIN — CEFAZOLIN SODIUM 2000 MG: 2 SOLUTION INTRAVENOUS at 05:08

## 2019-01-29 RX ADMIN — HEPARIN SODIUM 5000 UNITS: 5000 INJECTION INTRAVENOUS; SUBCUTANEOUS at 13:00

## 2019-01-29 RX ADMIN — INSULIN LISPRO 15 UNITS: 100 INJECTION, SOLUTION INTRAVENOUS; SUBCUTANEOUS at 12:46

## 2019-01-29 RX ADMIN — INSULIN LISPRO 15 UNITS: 100 INJECTION, SOLUTION INTRAVENOUS; SUBCUTANEOUS at 17:11

## 2019-01-29 RX ADMIN — ATORVASTATIN CALCIUM 20 MG: 20 TABLET, FILM COATED ORAL at 17:11

## 2019-01-29 RX ADMIN — CEFAZOLIN SODIUM 2000 MG: 2 SOLUTION INTRAVENOUS at 12:46

## 2019-01-29 RX ADMIN — INSULIN LISPRO 1 UNITS: 100 INJECTION, SOLUTION INTRAVENOUS; SUBCUTANEOUS at 17:11

## 2019-01-29 RX ADMIN — INSULIN LISPRO 15 UNITS: 100 INJECTION, SOLUTION INTRAVENOUS; SUBCUTANEOUS at 09:29

## 2019-01-29 RX ADMIN — CEFAZOLIN SODIUM 2000 MG: 2 SOLUTION INTRAVENOUS at 21:18

## 2019-01-29 RX ADMIN — AMLODIPINE BESYLATE 10 MG: 10 TABLET ORAL at 09:28

## 2019-01-29 RX ADMIN — INSULIN LISPRO 3 UNITS: 100 INJECTION, SOLUTION INTRAVENOUS; SUBCUTANEOUS at 12:46

## 2019-01-29 RX ADMIN — INSULIN LISPRO 2 UNITS: 100 INJECTION, SOLUTION INTRAVENOUS; SUBCUTANEOUS at 09:28

## 2019-01-29 RX ADMIN — HEPARIN SODIUM 5000 UNITS: 5000 INJECTION INTRAVENOUS; SUBCUTANEOUS at 05:08

## 2019-01-29 RX ADMIN — HEPARIN SODIUM 5000 UNITS: 5000 INJECTION INTRAVENOUS; SUBCUTANEOUS at 21:18

## 2019-01-29 RX ADMIN — INSULIN GLARGINE 75 UNITS: 100 INJECTION, SOLUTION SUBCUTANEOUS at 09:27

## 2019-01-29 RX ADMIN — LISINOPRIL 40 MG: 20 TABLET ORAL at 21:19

## 2019-01-29 NOTE — CONSULTS
Inpatient Medical Consultation - [facility]    Patient Information: Fatou Lyon 48 y o  female MRN: [id number]  Unit/Bed#: E2 -01 Encounter: [id number]  PCP: Adina Pan MD  Date of Admission:  1/28/2019  Date of Consultation: 01/29/19  Requesting Physician: Emory Cameron DPM    Reason For Consultation:   Pre op clearance and med management    Assessment/Plan:  1  Left 5th toe wound with associated cellulitis and possible om- per podiatry  Continue ancef  For pre op clearance  Pt will likely be an intermediate risk but awaiting ekg  If wnl may proceed    2  type 2 diabetes- uncontrolled  Agree with holding metformin  Agree with current insulin orders and insulin sliding scale  Will monitor blood glucose and adjust as needed    3  Pad- abnormal leads  Vascular consult pending  Continue statin    4  htn- continue current treatment    5  Hyperlipidemia- continue statin      History of Present Illness:    Mayco Gilbert is a 48 y o  female who is originally admitted to the podiatry service on 1/28/2019 due to left 5th toe wound with cellulitis  We are consulted for pre op clearance and medical management  Pt is noted to be noncompliant with diabetic medications per note from outpt visit  Her last a1c was 12  Pt denies cardiac history  She denies chest pain with ambulation 2 city blocks or up a flight of stairs  She denies shortness of breath with ambulation  She was direct admit from podiatry office due to the toe wound  No f/c no cp no sob no n/v/d no abd pain    Review of Systems:    Review of Systems   Constitutional: Negative  Negative for chills and fever  HENT: Negative  Eyes: Negative  Respiratory: Negative  Cardiovascular: Negative  Gastrointestinal: Negative  Endocrine: Negative  Genitourinary: Negative  Musculoskeletal: Negative  Skin: Negative  Allergic/Immunologic: Negative  Neurological: Negative      Hematological: Negative  Psychiatric/Behavioral: Negative  Past Medical and Surgical History:     Past Medical History:   Diagnosis Date    Diabetes mellitus ([address] )     Hypertension        No past surgical history on file  Meds/Allergies:    all medications and allergies reviewed    Allergies: No Known Allergies    Social History:     Marital Status: Single    Substance Use History:   History   Alcohol Use No     History   Smoking Status    Former Smoker   Smokeless Tobacco    Former User     History   Drug Use No       Family History:    Family History   Problem Relation Age of Onset    Diabetes Mother     No Known Problems Father        Physical Exam:     Vitals:   Blood Pressure: 125/58 (01/29/19 1531)  Pulse: 86 (01/29/19 1531)  Temperature: 97 9 °F (36 6 °C) (01/29/19 1531)  Temp Source: Tympanic (01/29/19 1531)  Respirations: 18 (01/29/19 1531)  Height: 5' (152 4 cm) (01/28/19 1225)  Weight - Scale: 86 8 kg (191 lb 5 8 oz) (01/28/19 1225)  SpO2: 100 % (01/29/19 1531)    Physical Exam   Constitutional: She is oriented to person, place, and time  No distress  HENT:   Head: Normocephalic and atraumatic  Eyes: Pupils are equal, round, and reactive to light  EOM are normal    Neck: Normal range of motion  Neck supple  Cardiovascular: Normal rate, regular rhythm and normal heart sounds  Exam reveals no gallop and no friction rub  No murmur heard  Pulmonary/Chest: Effort normal and breath sounds normal  No respiratory distress  She has no wheezes  She has no rales  Abdominal: Soft  Bowel sounds are normal  She exhibits no distension  There is no tenderness  There is no rebound  Musculoskeletal: Normal range of motion  Neurological: She is alert and oriented to person, place, and time  Skin: She is not diaphoretic  Bandage c/d/i       Additional Data:     Lab Results: I have personally reviewed pertinent reports        Results from last 7 days  Lab Units 01/29/19  0546 01/28/19  1945   WBC Thousand/uL 9 07 9 22   HEMOGLOBIN g/dL 10 3* 10 1*   HEMATOCRIT % 34 0* 34 0*   PLATELETS Thousands/uL 268 272   NEUTROS PCT %  --  56   LYMPHS PCT %  --  36   MONOS PCT %  --  6   EOS PCT %  --  1       Results from last 7 days  Lab Units 01/29/19  0546 01/28/19  1945   POTASSIUM mmol/L 4 4 4 1   CHLORIDE mmol/L 103 104   CO2 mmol/L 20* 23   BUN mg/dL 32* 31*   CREATININE mg/dL 1 15 1 21   CALCIUM mg/dL 8 8 8 5   ALK PHOS U/L  --  82   ALT U/L  --  22   AST U/L  --  22           Imaging: I have personally reviewed pertinent reports  Xr Chest Pa & Lateral    Result Date: 1/28/2019  Narrative: CHEST INDICATION:   Preoperative assessment  COMPARISON:  12/4/2018 EXAM PERFORMED/VIEWS:  XR CHEST PA & LATERAL FINDINGS: Cardiomediastinal silhouette appears unremarkable  The lungs are clear  No pneumothorax or pleural effusion  Osseous structures appear within normal limits for patient age  Impression: No acute cardiopulmonary disease  Workstation performed: [id number]     Vas Lower Limb Arterial Duplex, Complete Bilateral    Result Date: 1/29/2019  Narrative:  [facility] REPORT CLINICAL: Indications:  Left 5th digit ulceration with decreased peripheral pulses bilaterally  Risk Factors The patient has history of Obesity, HTN, Diabetes, previous remote smoking, and HLD    FINDINGS:  Segment                Right                 Left                                          Impression  PSV  EDV  Impression  PSV  EDV  Common Femoral Artery               86    0               79   11  Prox Profunda                      130    0               85   10  Prox SFA                           103   14               64   12  Mid SFA                >75%        407   16  Occluded    133   23  Dist SFA                            91   10               39   12  Proximal Pop                        73   11              104   22  Distal Pop                          56    0               68   24  Dist Post Tibial 21    7                0    0  Dist  Ant  Tibial                   35    0               52   19     CONCLUSION: Impression: RIGHT LOWER LIMB: A >75% stenosis is noted in the mid superficial femoral artery with diffuse disease noted throughout the remaining portions of the femoral-popliteal   Findings suggests tibioperoneal disease  Ankle/Brachial index: 1 61, supra normal, consistent with poorly compressible vessels  PVR/ PPG tracings are dampened  Metatarsal pressure of 96 mm Hg Great toe pressure of 116 mm Hg, above healing threshold for a diabetic  LEFT LOWER LIMB: An occlusion versus a high grade stenosis is noted in the mid superficial femoral artery with distal reconstitution with diffuse disease noted throughout the remaining portions of the femoral-popliteal   Findings suggests tibioperoneal disease  Ankle/Brachial index: 0 66, moderate claudication range  PVR/ PPG tracings are dampened  Metatarsal pressure of 83 mm Hg Great toe pressure of 61 mm Hg, above healing threshold for a diabetic  Recommend repeat testing in 1year as per protocol unless otherwise indicated    SIGNATURE: Electronically Signed by: Sweta Barrientos on 2019-01-29 01:49:55 PM      EKG, Pathology, and Other Studies Reviewed on Admission:   · EKG: pending

## 2019-01-29 NOTE — UTILIZATION REVIEW
Initial Clinical Review    Admission: Date/Time/Statement: 1/28/19 @ 1223   Orders Placed This Encounter   Procedures    Inpatient Admission     Standing Status:   Standing     Number of Occurrences:   1     Order Specific Question:   Admitting Physician     Answer:   Judge Edge     Order Specific Question:   Level of Care     Answer:   Med Surg [16]     Order Specific Question:   Estimated length of stay     Answer:   More than 2 Midnights     Order Specific Question:   Certification     Answer:   I certify that inpatient services are medically necessary for this patient for a duration of greater than two midnights  See H&P and MD Progress Notes for additional information about the patient's course of treatment  Chief Complaint: No chief complaint on file  History of Illness: Alberto Gracia is a 26-year-old female with a past medical history of type 2 diabetes, PAD, HLD that presents for direct admission under the service of Dr Mathieu Li for left 5th digit osteomyelitis  She was seen at the [facility] today  She was last seen last week for the same problem  She states that the left 5th digit has become painful and has scabbed over  She states that she has been taking Keflex that was given to her 1 week ago  She still says the digit is painful  She was unable to get her LEADs done  X-rays were taken as an outpatient    She denies any recent nausea, vomiting, fever, chills, shortness of breath, or chest pains    ED Vital Signs:   ED Triage Vitals   Temperature Pulse Respirations Blood Pressure SpO2   01/28/19 1225 01/28/19 1225 01/28/19 1225 01/28/19 1225 01/28/19 1225   97 6 °F (36 4 °C) 86 18 132/70 100 %      Temp Source Heart Rate Source Patient Position - Orthostatic VS BP Location FiO2 (%)   01/28/19 1225 -- 01/28/19 1225 01/28/19 1225 --   Tympanic  Lying Left arm       Pain Score       01/28/19 1947       No Pain        Wt Readings from Last 1 Encounters:   01/28/19 86 8 kg (191 lb 5 8 oz)     Vital Signs (abnormal): Above    Pertinent Labs/Diagnostic Test Results:    CXR:  NAD  H/H    10 3/34  NA  135  CO2   20  BUN 32           Past Medical/Surgical History: Active Ambulatory Problems     Diagnosis Date Noted    Type 2 diabetes mellitus without complication, with long-term current use of insulin ([facility] ) 03/08/2018    Essential hypertension 03/08/2018    Open wound of toe 01/21/2019     Resolved Ambulatory Problems     Diagnosis Date Noted    No Resolved Ambulatory Problems     Past Medical History:   Diagnosis Date    Diabetes mellitus ([facility] )     Hypertension      Admitting Diagnosis: Cellulitis of toe [L03 [id number]]     Assessment/Plan: Left 5th toe wound/eschar with associated cellulitis and likely OM  2  DM 2   3  PAD  4  HLD  5  DVT ppx: Heparin  6  Code Status: Level 1     Plan:  · Admit to podiatry service under Dr Jonathan Barrett for vascular workup, left 5th digit wound, and possible podiatric surgical intervention  Patient will likely need left 5th digit amputation pending results of LEADs  · Left foot x-rays reviewed  Results show cortical erosion of the left 5th digit  However primary pending  Chest x-ray, and EKG ordered for preoperative planning  · Start IV Ancef  · LEADs ordered due to nonpalpable pulses  If results are below healing range will consult vascular  · Weight-bearing as tolerated in a surgical shoe  SLIM consulted for medical management, and preoperative clearance      Admission Orders:   IP  1/28   @   1308  Scheduled Meds:   Current Facility-Administered Medications:  acetaminophen 650 mg Oral Q6H PRN Acie Crisp, DPM    amLODIPine 10 mg Oral Daily Acie Crisp, DPM    atorvastatin 20 mg Oral QPM Acie Crisp, DPM    cefazolin 2,000 mg Intravenous Q8H Acie Crisp, DPM Last Rate: Stopped (01/29/19 0549)   chlorthalidone 25 mg Oral Daily Acie Crisp, DPM    heparin (porcine) 5,000 Units Subcutaneous [facility] Acie Isabela, DPM insulin glargine 75 Units Subcutaneous QAM Rosmery Marie, DPM    insulin lispro 1-5 Units Subcutaneous HS Rosmery Marie, DPM    insulin lispro 1-6 Units Subcutaneous TID AC Rosmery Marie, DPM    insulin lispro 15 Units Subcutaneous TID With Meals Rosmery Marie, DPM    lisinopril 40 mg Oral Daily Rosmery Marie, DPM    loratadine 10 mg Oral Daily PRN Rosmery Marie, DPM    oxyCODONE-acetaminophen 1 tablet Oral Q6H PRN Rosmery Marie, DPM      Continuous Infusions:    PRN Meds:   acetaminophen    loratadine    oxyCODONE-acetaminophen     CCD diet  VAS  B/L  LE  Cons  IM    PROGRESS  NOTE   1/29  Left 5th toe wound/eschar with associated cellulitis and likely OM  2  DM 2 -HBA1c Pending   3  PAD  4  HLD     Plan:   -afebrile with VSS, no leukocytosis, non toxic in appearance   -c/w Ancef for now   -LEADs pending   -Left foot xray images pending official reading  However on my interpretation there is concern for bony erosion suggestive of osteomyelitis   -WBAT for now       Network Utilization Review Department  Phone: [phone]; Fax [phone]  [email or web address]  ATTENTION: Please call with any questions or concerns to [phone]  and carefully listen to the prompts so that you are directed to the right person  Send all requests for admission clinical reviews, approved or denied determinations and any other requests to fax [phone]   All voicemails are confidential

## 2019-01-29 NOTE — PROGRESS NOTES
Progress Note - Podiatry  Debo Lyon 48 y o  female MRN: [id number]  Unit/Bed#: E2 -01 Encounter: [id number]    Assessment:  1  Left 5th toe wound/eschar with associated cellulitis and likely OM  2  DM 2 -HBA1c Pending   3  PAD  4  HLD    Plan:   -afebrile with VSS, no leukocytosis, non toxic in appearance   -c/w Ancef for now   -LEADs pending   -Left foot xray images pending official reading  However on my interpretation there is concern for bony erosion suggestive of osteomyelitis   -WBAT for now   -SLIM consult pending       Subjective/Objective   Chief Complaint: No chief complaint on file  Subjective: 48 y o  y/o female was seen and evaluated at bedside  Denies any complains at this time such as n/v/f/Sob/CP  Blood pressure 155/79, pulse 66, temperature 97 6 °F (36 4 °C), temperature source Tympanic, resp  rate 18, height 5' (1 524 m), weight 86 8 kg (191 lb 5 8 oz), last menstrual period 01/15/2019, SpO2 97 %, not currently breastfeeding  ,Body mass index is 37 37 kg/m²  Invasive Devices     Peripheral Intravenous Line            Peripheral IV 01/28/19 Left Arm less than 1 day                Physical Exam:   General: Alert, cooperative and no distress  Lungs: Non labored breathing  Heart: Positive S1, S2  Abdomen: Soft, non-tender  Extremity: DP/PT non palpable b/l  Left 5th digit appears to be stable without any drainage  Lab, Imaging and other studies:   I have personally reviewed pertinent lab results    , CBC:   Lab Results   Component Value Date    WBC 9 07 01/29/2019    HGB 10 3 (L) 01/29/2019    HCT 34 0 (L) 01/29/2019    MCV 75 (L) 01/29/2019     01/29/2019    MCH 22 8 (L) 01/29/2019    MCHC 30 3 (L) 01/29/2019    RDW 15 2 (H) 01/29/2019    MPV 10 2 01/29/2019    NRBC 0 01/28/2019   , CMP:   Lab Results   Component Value Date    SODIUM 135 (L) 01/29/2019    K 4 4 01/29/2019     01/29/2019    CO2 20 (L) 01/29/2019    BUN 32 (H) 01/29/2019 CREATININE 1 15 01/29/2019    CALCIUM 8 8 01/29/2019    AST 22 01/28/2019    ALT 22 01/28/2019    ALKPHOS 82 01/28/2019    EGFR 54 01/29/2019       Imaging: I have personally reviewed pertinent films in PACS  EKG, Pathology, and Other Studies: I have personally reviewed pertinent reports    VTE Pharmacologic Prophylaxis: Heparin

## 2019-01-30 ENCOUNTER — APPOINTMENT (INPATIENT)
Dept: MRI IMAGING | Facility: HOSPITAL | Age: 54
DRG: 253 | End: 2019-01-30
Payer: COMMERCIAL

## 2019-01-30 LAB
ANION GAP SERPL CALCULATED.3IONS-SCNC: 11 MMOL/L (ref 4–13)
ATRIAL RATE: 81 BPM
BACTERIA UR QL AUTO: ABNORMAL /HPF
BILIRUB UR QL STRIP: NEGATIVE
BUN SERPL-MCNC: 39 MG/DL (ref 5–25)
CALCIUM SERPL-MCNC: 8.6 MG/DL (ref 8.3–10.1)
CHLORIDE SERPL-SCNC: 102 MMOL/L (ref 100–108)
CLARITY UR: CLEAR
CO2 SERPL-SCNC: 21 MMOL/L (ref 21–32)
COLOR UR: YELLOW
CREAT SERPL-MCNC: 1.41 MG/DL (ref 0.6–1.3)
ERYTHROCYTE [DISTWIDTH] IN BLOOD BY AUTOMATED COUNT: 15.3 % (ref 11.6–15.1)
GFR SERPL CREATININE-BSD FRML MDRD: 43 ML/MIN/1.73SQ M
GLUCOSE SERPL-MCNC: 174 MG/DL (ref 65–140)
GLUCOSE SERPL-MCNC: 281 MG/DL (ref 65–140)
GLUCOSE SERPL-MCNC: 307 MG/DL (ref 65–140)
GLUCOSE SERPL-MCNC: 330 MG/DL (ref 65–140)
GLUCOSE SERPL-MCNC: 71 MG/DL (ref 65–140)
GLUCOSE UR STRIP-MCNC: ABNORMAL MG/DL
HCT VFR BLD AUTO: 36 % (ref 34.8–46.1)
HGB BLD-MCNC: 10.5 G/DL (ref 11.5–15.4)
HGB UR QL STRIP.AUTO: NEGATIVE
INR PPP: 0.99 (ref 0.86–1.17)
KETONES UR STRIP-MCNC: NEGATIVE MG/DL
LEUKOCYTE ESTERASE UR QL STRIP: NEGATIVE
MCH RBC QN AUTO: 22.5 PG (ref 26.8–34.3)
MCHC RBC AUTO-ENTMCNC: 29.2 G/DL (ref 31.4–37.4)
MCV RBC AUTO: 77 FL (ref 82–98)
NITRITE UR QL STRIP: NEGATIVE
NON-SQ EPI CELLS URNS QL MICRO: ABNORMAL /HPF
P AXIS: 21 DEGREES
PH UR STRIP.AUTO: 5.5 [PH] (ref 4.5–8)
PLATELET # BLD AUTO: 274 THOUSANDS/UL (ref 149–390)
PMV BLD AUTO: 10.3 FL (ref 8.9–12.7)
POTASSIUM SERPL-SCNC: 5.1 MMOL/L (ref 3.5–5.3)
PR INTERVAL: 162 MS
PROT UR STRIP-MCNC: NEGATIVE MG/DL
PROTHROMBIN TIME: 13.2 SECONDS (ref 11.8–14.2)
QRS AXIS: 7 DEGREES
QRSD INTERVAL: 78 MS
QT INTERVAL: 366 MS
QTC INTERVAL: 425 MS
RBC # BLD AUTO: 4.66 MILLION/UL (ref 3.81–5.12)
RBC #/AREA URNS AUTO: ABNORMAL /HPF
SODIUM SERPL-SCNC: 134 MMOL/L (ref 136–145)
SP GR UR STRIP.AUTO: 1.02 (ref 1–1.03)
T WAVE AXIS: 12 DEGREES
UROBILINOGEN UR QL STRIP.AUTO: 0.2 E.U./DL
VENTRICULAR RATE: 81 BPM
WBC # BLD AUTO: 8.67 THOUSAND/UL (ref 4.31–10.16)
WBC #/AREA URNS AUTO: ABNORMAL /HPF

## 2019-01-30 PROCEDURE — 99254 IP/OBS CNSLTJ NEW/EST MOD 60: CPT | Performed by: INTERNAL MEDICINE

## 2019-01-30 PROCEDURE — 82948 REAGENT STRIP/BLOOD GLUCOSE: CPT

## 2019-01-30 PROCEDURE — 99222 1ST HOSP IP/OBS MODERATE 55: CPT | Performed by: NURSE PRACTITIONER

## 2019-01-30 PROCEDURE — 99232 SBSQ HOSP IP/OBS MODERATE 35: CPT | Performed by: PHYSICIAN ASSISTANT

## 2019-01-30 PROCEDURE — 73718 MRI LOWER EXTREMITY W/O DYE: CPT

## 2019-01-30 PROCEDURE — 80048 BASIC METABOLIC PNL TOTAL CA: CPT | Performed by: PODIATRIST

## 2019-01-30 PROCEDURE — 85610 PROTHROMBIN TIME: CPT | Performed by: PHYSICIAN ASSISTANT

## 2019-01-30 PROCEDURE — 81001 URINALYSIS AUTO W/SCOPE: CPT | Performed by: NURSE PRACTITIONER

## 2019-01-30 PROCEDURE — 85027 COMPLETE CBC AUTOMATED: CPT | Performed by: PODIATRIST

## 2019-01-30 PROCEDURE — 93010 ELECTROCARDIOGRAM REPORT: CPT | Performed by: INTERNAL MEDICINE

## 2019-01-30 PROCEDURE — 87205 SMEAR GRAM STAIN: CPT | Performed by: NURSE PRACTITIONER

## 2019-01-30 RX ORDER — SODIUM CHLORIDE 9 MG/ML
60 INJECTION, SOLUTION INTRAVENOUS CONTINUOUS
Status: DISCONTINUED | OUTPATIENT
Start: 2019-01-31 | End: 2019-01-31

## 2019-01-30 RX ORDER — SODIUM CHLORIDE 9 MG/ML
60 INJECTION, SOLUTION INTRAVENOUS CONTINUOUS
Status: DISCONTINUED | OUTPATIENT
Start: 2019-01-30 | End: 2019-01-30

## 2019-01-30 RX ORDER — AMLODIPINE BESYLATE 10 MG/1
10 TABLET ORAL DAILY
Status: DISCONTINUED | OUTPATIENT
Start: 2019-01-31 | End: 2019-02-07 | Stop reason: HOSPADM

## 2019-01-30 RX ORDER — ASPIRIN 81 MG/1
81 TABLET, CHEWABLE ORAL DAILY
Status: DISCONTINUED | OUTPATIENT
Start: 2019-01-30 | End: 2019-02-07 | Stop reason: HOSPADM

## 2019-01-30 RX ADMIN — INSULIN LISPRO 1 UNITS: 100 INJECTION, SOLUTION INTRAVENOUS; SUBCUTANEOUS at 21:41

## 2019-01-30 RX ADMIN — ATORVASTATIN CALCIUM 20 MG: 20 TABLET, FILM COATED ORAL at 19:41

## 2019-01-30 RX ADMIN — INSULIN LISPRO 4 UNITS: 100 INJECTION, SOLUTION INTRAVENOUS; SUBCUTANEOUS at 09:01

## 2019-01-30 RX ADMIN — SODIUM CHLORIDE 60 ML/HR: 0.9 INJECTION, SOLUTION INTRAVENOUS at 23:56

## 2019-01-30 RX ADMIN — HEPARIN SODIUM 5000 UNITS: 5000 INJECTION INTRAVENOUS; SUBCUTANEOUS at 13:36

## 2019-01-30 RX ADMIN — INSULIN LISPRO 5 UNITS: 100 INJECTION, SOLUTION INTRAVENOUS; SUBCUTANEOUS at 12:42

## 2019-01-30 RX ADMIN — CEFAZOLIN SODIUM 2000 MG: 2 SOLUTION INTRAVENOUS at 21:07

## 2019-01-30 RX ADMIN — SODIUM CHLORIDE 500 ML: 0.9 INJECTION, SOLUTION INTRAVENOUS at 12:46

## 2019-01-30 RX ADMIN — INSULIN LISPRO 15 UNITS: 100 INJECTION, SOLUTION INTRAVENOUS; SUBCUTANEOUS at 12:41

## 2019-01-30 RX ADMIN — CEFAZOLIN SODIUM 2000 MG: 2 SOLUTION INTRAVENOUS at 12:43

## 2019-01-30 RX ADMIN — ASPIRIN 81 MG 81 MG: 81 TABLET ORAL at 12:46

## 2019-01-30 RX ADMIN — HEPARIN SODIUM 5000 UNITS: 5000 INJECTION INTRAVENOUS; SUBCUTANEOUS at 05:00

## 2019-01-30 RX ADMIN — INSULIN LISPRO 15 UNITS: 100 INJECTION, SOLUTION INTRAVENOUS; SUBCUTANEOUS at 09:01

## 2019-01-30 RX ADMIN — INSULIN GLARGINE 75 UNITS: 100 INJECTION, SOLUTION SUBCUTANEOUS at 09:00

## 2019-01-30 RX ADMIN — HEPARIN SODIUM 5000 UNITS: 5000 INJECTION INTRAVENOUS; SUBCUTANEOUS at 21:15

## 2019-01-30 RX ADMIN — AMLODIPINE BESYLATE 10 MG: 10 TABLET ORAL at 09:02

## 2019-01-30 RX ADMIN — CEFAZOLIN SODIUM 2000 MG: 2 SOLUTION INTRAVENOUS at 05:00

## 2019-01-30 NOTE — PROGRESS NOTES
[facility] Internal Medicine Progress Note  Patient: Shmuel Lyon 48 y o  female   MRN: [id number]  PCP: Jasvir Burns MD  Unit/Bed#: E2 -01 Encounter: [id number]  Date Of Visit: 01/30/19    Assessment:    Principal Problem:    Subacute osteomyelitis of left foot ([facility] )  Active Problems:    Type 2 diabetes mellitus without complication, with long-term current use of insulin ([facility] )    Essential hypertension    Open wound of toe    Cellulitis of toe    HLD (hyperlipidemia)    PAD (peripheral artery disease) ([facility] )      Plan:    · Left 5th toe wound with surrounding cellulitis and possible osteomyelitis: Right foot xray with bone erosions of the 5th toe distable phalanx consistent with osteomyelitis  Management per primary team, Podiatry  Continue Ancef day # 3  Preoperative clearance team to be an intermediate risk  EKG -normal sinus rhythm  · DEMETRIUS on CKD stage 3:  Limited records in terms of baseline  Current creatinine 1 41  Baseline appears to be around 1 1-1 2  Nephrology consulted for assistance with CKD and prevention of DEMETRIUS  Hold chlorthalidone given rise in Cr overnight from 1 1 to 1 4    · Type 2 diabetes:  Uncontrolled with A1c of 11 4  Metformin on hold  Continue with insulin sliding scale  Also on premeal insulin with humalog 15 tid and Lantus 75 units  · PAD:  Abnormal leads 1/29/19   - right leg greater than 75% stenosis of mid SFA with diffuse disease      - left leg- occlusion versus high-grade stenosis of mid SFA with distal reconstitution / diffuse disease throughout fem-pop    · Essential hypertension:  Home regimen of lisinopril 40 mg daily, chlorthalidone 25 mg daily, amlodipine 10 mg daily  Hold lisinopril and chlorthalidone the setting of rising creatinine overnight  Will continue to monitor blood pressures as patient may need additional medications for control  · Hyperlipidemia:  Pre-hospital is not on a statin  Lipid panel pending    Started on atorvastatin 20 mg daily  VTE Pharmacologic Prophylaxis:   Pharmacologic: Heparin  Mechanical VTE Prophylaxis in Place: Yes    Discharge Plan: With need for continued inpatient stay/Podiatry management    Discussions with Specialists or Other Care Team Provider:  Dr Dary Sifuentes    Education and Discussions with Family / Patient:  Patient    Time Spent for Care: 20 minutes  More than 50% of total time spent on counseling and coordination of care as described above  Current Length of Stay: 2 day(s)  Current Patient Status: Inpatient   Code Status: Level 1 - Full Code    Subjective:   Resting in bed  Discussed blood sugars and need for better control the setting of toe wound  Hold nephrotoxins  Denies any chest pain, chest pressure, palpitations, shortness of breath, nausea vomiting, fevers or chills  Discussed medication regimen and aspirin and statin were added this admission  Objective:     Vitals:   Temp (24hrs), Av 3 °F (36 8 °C), Min:97 9 °F (36 6 °C), Max:98 8 °F (37 1 °C)    Temp:  [97 9 °F (36 6 °C)-98 8 °F (37 1 °C)] 98 8 °F (37 1 °C)  HR:  [75-86] 75  Resp:  [18-20] 18  BP: (114-131)/(58-79) 114/79  SpO2:  [96 %-100 %] 96 %  Body mass index is 37 37 kg/m²  Input and Output Summary (last 24 hours): Intake/Output Summary (Last 24 hours) at 19 1017  Last data filed at 19 0901   Gross per 24 hour   Intake              530 ml   Output                0 ml   Net              530 ml       Physical Exam:     Physical Exam   Constitutional: She is oriented to person, place, and time  She appears well-developed and well-nourished  Missing dentition   HENT:   Head: Normocephalic and atraumatic  Eyes: Conjunctivae are normal    Cardiovascular: Normal rate, regular rhythm and normal heart sounds  Pulmonary/Chest: Effort normal and breath sounds normal  No respiratory distress  She has no wheezes  She has no rales  She exhibits no tenderness  Abdominal: Soft   Bowel sounds are normal  She exhibits no distension and no mass  There is no tenderness  There is no rebound and no guarding  Neurological: She is alert and oriented to person, place, and time  Skin: Skin is warm and dry  Psychiatric: She has a normal mood and affect  Her behavior is normal    Nursing note and vitals reviewed  Additional Data:     Labs:      Results from last 7 days  Lab Units 01/30/19  0505  01/28/19  1945   WBC Thousand/uL 8 67  < > 9 22   HEMOGLOBIN g/dL 10 5*  < > 10 1*   HEMATOCRIT % 36 0  < > 34 0*   PLATELETS Thousands/uL 274  < > 272   NEUTROS PCT %  --   --  56   LYMPHS PCT %  --   --  36   MONOS PCT %  --   --  6   EOS PCT %  --   --  1   < > = values in this interval not displayed  Results from last 7 days  Lab Units 01/30/19  0505  01/28/19  1945   POTASSIUM mmol/L 5 1  < > 4 1   CHLORIDE mmol/L 102  < > 104   CO2 mmol/L 21  < > 23   BUN mg/dL 39*  < > 31*   CREATININE mg/dL 1 41*  < > 1 21   CALCIUM mg/dL 8 6  < > 8 5   ALK PHOS U/L  --   --  82   ALT U/L  --   --  22   AST U/L  --   --  22   < > = values in this interval not displayed  * I Have Reviewed All Lab Data Listed Above  * Additional Pertinent Lab Tests Reviewed:  [facility] Admission Reviewed    Imaging:    Imaging Reports Reviewed Today Include:   Imaging Personally Reviewed by Myself Includes:      Recent Cultures (last 7 days):           Last 24 Hours Medication List:     Current Facility-Administered Medications:  acetaminophen 650 mg Oral Q6H PRN Mayco Jin DPM    amLODIPine 10 mg Oral Daily Mayco Jin DPM    aspirin 81 mg Oral Daily MIKEL Bermudez    atorvastatin 20 mg Oral QPM Mayco Jin DPM    cefazolin 2,000 mg Intravenous Q8H Mayco Jin DPM Last Rate: Stopped (01/30/19 0549)   chlorthalidone 25 mg Oral Daily Mayco Jin DPM    heparin (porcine) 5,000 Units Subcutaneous [facility] Mayco Jin DPM    insulin glargine 75 Units Subcutaneous QAM Mayco Jin DPM insulin lispro 1-5 Units Subcutaneous HS Anatoliy Arvizu, DPM    insulin lispro 1-6 Units Subcutaneous TID AC Anatoliy Arvizu, DPM    insulin lispro 15 Units Subcutaneous TID With Meals Anatoliy Arvizu, VIVIANA    lisinopril 40 mg Oral Daily Anatoliy Arvizu, VIVIANA    loratadine 10 mg Oral Daily PRN Anatoliy Arvizu, VIVIANA    oxyCODONE-acetaminophen 1 tablet Oral Q6H PRN Anatoliy Arvizu DPM         Today, Patient Was Seen By: Jeimy Gomes PA-C    ** Please Note: This note has been constructed using a voice recognition system   **

## 2019-01-30 NOTE — CONSULTS
[address] [facility],Third Floor 1965, 48 y o  female MRN: [id number]    Unit/Bed#: E2 -01 Encounter: [id number]    Primary Care Provider: Kayden Neal MD   Date and time admitted to hospital: 1/28/2019 12:23 PM      Inpatient consult to Vascular Surgery  Consult performed by: Yu Nunez ordered by: Winnie Davis          PAD (peripheral artery disease) ([facility] )   Assessment & Plan    Peripheral artery disease with ulceration to left 11h toe  -27-year-old female PMH HTN, HLD, DM, former smoker presents with DFU to left 5th toe, cellulitis, OM    -SHERYL 01/29/2019:    ---RIGHT: >75% stenosis mid SFA w/diffuse dx; tibioperoneal dx; [place]: 1 61/96/116  ---LEFT:  Occlusion versus high-grade stenosis of mid SFA with distal reconstitution w/diffuse disease throughout fem-pop:  Tibioperoneal disease; [place]: 0 66/83/61 within healing range for diabetic  Dampened waveforms   -Left foot Xray: bone erosions of the 5th toe distal phalanx consistent with osteomyelitis  -left AT/DP were good but there is a decrease in the PT/Peroneal  2+femoral bilaterally     Recommendations:  -continue statin  -recommend starting ASA daily  -patient with high-grade stenosis with distal reconstitution and toe pressures above healing range for DM  Will discuss with Dr Jenna Child to determine whether intervention is needed at this time   -continue with local wound care per Podiatry  -patient does have a creatinine of 1 41, GFR 43  If we decide to proceed with an angiogram will need a nephrology consult and recommendations for renal protection  I did explain the possibility of an angiogram to the patient as well as the risks to her kidneys  She expressed understanding  *Discussed with Dr Jenna Child  Will order ASA daily  Consult to Nephrology for renal protection, patient will need to go for angiogram tomorrow if seen by Nephrology and able to be scheduled with ELO DEMARCO (hyperlipidemia)   Assessment & Plan HLD  -no recent lipid panel  -continue with statin therapy;   -recommend LDL < 70     Open wound of toe   Assessment & Plan    DFU with cellulitis to the left 5th toe; dry necrotic tissue with no malodor or drainage  -patient on ABX per primary team  -[facility] per primary team     Type 2 diabetes mellitus without complication, with long-term current use of insulin [facility])   Assessment & Plan    Lab Results   Component Value Date    HGBA1C 11 4 (H) 01/29/2019       Recent Labs      01/29/19 2028 01/29/19 2052 01/29/19 2155 01/30/19   0641   POCGLU  63*  78  130  307*       Blood Sugar Average: Last 72 hrs:  (P) 157 [id number]     -obtain better control of DM for wound healing  -management per SLIM/primary team         Consult Note - Vascular Surgery     Consulting Service: Podiatry    Chief Complaint: PAD, left 5th toe ulceration    HPI: Jerrod Anne is a 48 y o  female who has PMH HTN, HLD, DM, former smoker presents with DFU to left 5th toe, cellulitis, OM  Patient reports left toe ulceration present x2 weeks  She reports some pain to the left foot on the plantar aspect, occasional pain at night to the left foot that wakes her  She denies any persistent claudication or rest pain symptoms  She has never had any interventions on bilateral lower extremities  She is not currently on aspirin  She is a former smoker who quit 7 years ago  She works on her feet and is able to accomplish her daily activities without pain or issue to bilateral lower extremities  Patient denies any fever, chills, chest pain, shortness of breath  She is on statin daily      Review of Systems:  General: negative  Cardiovascular: no chest pain or dyspnea on exertion  Respiratory: no cough, shortness of breath, or wheezing  Gastrointestinal: no abdominal pain, change in bowel habits, or black or bloody stools  Genitourinary ROS: no dysuria, trouble voiding, or hematuria  Musculoskeletal ROS: positive for - left foot pain  Neurological ROS: no TIA or stroke symptoms  Hematological and Lymphatic ROS: negative  Dermatological ROS: left 5th toe ulceration  Psychological ROS: negative  Ophthalmic ROS: negative  ENT ROS: negative    Past Medical History:  Past Medical History:   Diagnosis Date    Diabetes mellitus ([address] )     Hypertension        Past Surgical History:  No past surgical history on file      Social History:  History   Alcohol Use No     History   Drug Use No     History   Smoking Status    Former Smoker   Smokeless Tobacco    Former User       Family History:  Family History   Problem Relation Age of Onset    Diabetes Mother     No Known Problems Father        Allergies:  No Known Allergies    Medications:  Current Facility-Administered Medications   Medication Dose Route Frequency    acetaminophen (TYLENOL) tablet 650 mg  650 mg Oral Q6H PRN    amLODIPine (NORVASC) tablet 10 mg  10 mg Oral Daily    atorvastatin (LIPITOR) tablet 20 mg  20 mg Oral QPM    ceFAZolin (ANCEF) IVPB (premix) 2,000 mg  2,000 mg Intravenous Q8H    chlorthalidone tablet 25 mg  25 mg Oral Daily    heparin (porcine) subcutaneous injection 5,000 Units  5,000 Units Subcutaneous Q8H [address]    insulin glargine (LANTUS) subcutaneous injection 75 Units 0 75 mL  75 Units Subcutaneous QAM    insulin lispro (HumaLOG) 100 units/mL subcutaneous injection 1-5 Units  1-5 Units Subcutaneous HS    insulin lispro (HumaLOG) 100 units/mL subcutaneous injection 1-6 Units  1-6 Units Subcutaneous TID AC    insulin lispro (HumaLOG) 100 units/mL subcutaneous injection 15 Units  15 Units Subcutaneous TID With Meals    lisinopril (ZESTRIL) tablet 40 mg  40 mg Oral Daily    loratadine (CLARITIN) tablet 10 mg  10 mg Oral Daily PRN    oxyCODONE-acetaminophen (PERCOCET) 5-325 mg per tablet 1 tablet  1 tablet Oral Q6H PRN       Vitals:  Vitals:    01/29/19 0815 01/29/19 1531 01/29/19 2300 01/30/19 0824   BP: 155/79 125/58 131/71 114/79   BP Location: Right arm Right arm  Left arm   Pulse: 66 86 76 75   Resp: 18 18 20 18   Temp: 97 6 °F (36 4 °C) 97 9 °F (36 6 °C) 98 2 °F (36 8 °C) 98 8 °F (37 1 °C)   TempSrc: Tympanic Tympanic Tympanic Tympanic   SpO2: 97% 100% 99% 96%   Weight:       Height:           I/Os:  I/O last 3 completed shifts: In: 1483 3 [IV Piggyback:1483 3]  Out: -   No intake/output data recorded  Lab Results and Cultures:   CBC with diff:   Lab Results   Component Value Date    WBC 8 67 2019    HGB 10 5 (L) 2019    HCT 36 0 2019    MCV 77 (L) 2019     2019    MCH 22 5 (L) 2019    MCHC 29 2 (L) 2019    RDW 15 3 (H) 2019    MPV 10 3 2019    NRBC 0 2019   ,   BMP/CMP:  Lab Results   Component Value Date    K 5 1 2019     2019    CO2 21 2019    BUN 39 (H) 2019    CREATININE 1 41 (H) 2019    CALCIUM 8 6 2019    AST 22 2019    ALT 22 2019    ALKPHOS 82 2019    EGFR 43 2019   ,   Lipid Panel: No results found for: CHOL,   Coags: No results found for: PT, PTT, INR,     Blood Culture: No results found for: BLOODCX,   Urinalysis:   Lab Results   Component Value Date    COLORU Yellow 2018    CLARITYU Clear 2018    SPECGRAV 1 010 2018    PHUR 6 0 2018    LEUKOCYTESUR Negative 2018    NITRITE Negative 2018    GLUCOSEU Negative 2018    KETONESU Negative 2018    BILIRUBINUR Negative 2018    BLOODU Trace (A) 2018   ,   Urine Culture: No results found for: URINECX,   Wound Culure: No results found for: WOUNDCULT    Im[facility]19 Left Foot Xray:  IMPRESSION:     There are bone erosions of the 5th toe distal phalanx consistent with osteomyelitis      In addition, there is abnormal alignment of the tarsometatarsal joints, with lateral shift of the 2nd through 5th metatarsals    This is consistent with a Lisfranc ligament tear, either due to acute traumatic injury, or part of neuropathic joint  1/29/19 LEAD:  Impression:  RIGHT LOWER LIMB:  A >75% stenosis is noted in the mid superficial femoral artery with diffuse  disease noted throughout the remaining portions of the femoral-popliteal    Findings suggests tibioperoneal disease  Ankle/Brachial index: 1 61, supra normal, consistent with poorly compressible  vessels  PVR/ PPG tracings are dampened  Metatarsal pressure of 96 mm Hg  Great toe pressure of 116 mm Hg, above healing threshold for a diabetic  LEFT LOWER LIMB:  An occlusion versus a high grade stenosis is noted in the mid superficial  femoral artery with distal reconstitution with diffuse disease noted throughout  the remaining portions of the femoral-popliteal   Findings suggests  tibioperoneal disease  Ankle/Brachial index: 0 66, moderate claudication range  PVR/ PPG tracings are dampened  Metatarsal pressure of 83 mm Hg  Great toe pressure of 61 mm Hg, above healing threshold for a diabetic  Physical Exam:    General appearance: alert and oriented, in no acute distress, cooperative, no distress and moderately obese  Head: Normocephalic, without obvious abnormality, atraumatic  Eyes: PERRL, EOMIs  Throat: abnormal findings: poor dentition, moist mucous membrane  Neck: no adenopathy, no carotid bruit, no JVD, supple, symmetrical, trachea midline and thyroid not enlarged, symmetric, no tenderness/mass/nodules  Lungs: clear to auscultation bilaterally  Chest wall: no tenderness  Heart: regular rate and rhythm, S1, S2 normal, no murmur, click, rub or gallop  Abdomen: soft, non-tender; bowel sounds normal; no masses,  no organomegaly  Extremities: bilateral lower extremities are warm and dry  There are non-palplable pulses, doppler signals obtained as noted below  There is a left 5th toe necrotic ulceration with no drainage, no malodor, no erythema on exam today  No edema noted    Skin: Skin color, texture, turgor normal  No rashes or lesions or left 5th toe ulceration, see clinical images  Neurologic: Grossly normal    Wound/Incision:    LEFT 5th Toe      Pulse exam:  Radial: Right: 2+ Left[de-identified] 2+  Femoral: Right: 2+ Left: 2+  Popliteal: Right: non-palpable Left: non-palpable  DP: Right: doppler signal and non-palpable Left: doppler signal and non-palpable  PT: Right: doppler signal and non-palpable Left: doppler signal and non-palpable  RIGHT: doppler AT/DP, faint PT/Peroneal  LEFT:  Doppler AT/DP with decreased signal at the PT/Peroneal      [address] [place]  1/30/2019  [facility]  [phone]

## 2019-01-30 NOTE — ASSESSMENT & PLAN NOTE
Peripheral artery disease with ulceration to left 11h toe  -51-year-old female PMH HTN, HLD, DM, former smoker presents with DFU to left 5th toe, cellulitis, OM    -SHERYL 01/29/2019:    ---RIGHT: >75% stenosis mid SFA w/diffuse dx; tibioperoneal dx; [place]: 1 61/96/116  ---LEFT:  Occlusion versus high-grade stenosis of mid SFA with distal reconstitution w/diffuse disease throughout fem-pop:  Tibioperoneal disease; [place]: 0 66/83/61 within healing range for diabetic  Dampened waveforms   -Left foot Xray: bone erosions of the 5th toe distal phalanx consistent with osteomyelitis  -left AT/DP were good but there is a decrease in the PT/Peroneal  2+femoral bilaterally     Recommendations:  -continue statin  -recommend starting ASA daily  -patient with high-grade stenosis with distal reconstitution and toe pressures above healing range for DM  Will discuss with Dr Ron Ascencio to determine whether intervention is needed at this time   -continue with local wound care per Podiatry  -patient does have a creatinine of 1 41, GFR 43  If we decide to proceed with an angiogram will need a nephrology consult and recommendations for renal protection  I did explain the possibility of an angiogram to the patient as well as the risks to her kidneys  She expressed understanding

## 2019-01-30 NOTE — NUTRITION
01/30/19 1111   Assessment   Timepoint Initial  (elevated A1c )   Labs   List Completed Labs (A1c 11 4, June 2018 A1c 11 2; Na 134, BUN 39, creat 1 41, , POC 78, 130, 307; meds- lipitor, ancef, lantus, humalog, lisinopril)   Feeding Route   PO Independent   Adequacy of Intake   Nutrition Modality PO  (CCD 2)   Intake Meals %   Estimated calorie intake compared to estimated need this AM pt stated she ate rice krispies with 2% milk and a banana  she does not report any appetite changes  Nutrition Prognosis   Nutrition Concerns (L 5th toe wound with OM, to have amputation; no GI issues at present, BG slightly elevated)   Comorbid Concerns (DM, HLD, OM)   Nutrition Considerations (provided pt with DM diet education with handouts on CHO counting and OP MNT options)   PES Statement   Problem Behavioral-Environmental   Knowledge and Beliefs (1) Food- and nutrition-related knowledge deficit NB-1 1   Related to Other (comment)  (DM)   As evidenced by: Abnormal lab (specify); Other (Comment); Per patient interview  (elevated A1c 11 4, toe wound requiring amputation; pt stating she has never had DM diet education before)   Patient Nutrition Goals   Goal meet PO needs;comprehend education   Goal Status initiated   Timeframe to complete goal by next f/u   Recommendations/Interventions   Summary Pt states that her appetite is good, she does not have any complaints at this time  She relays no prior education for DM diet  Handouts and discussion on CHO counting was provided as well as information regarding OP MNT options  Will continue to monitor po intake and any further diet education needs   Malnutrition/BMI Present No   Interventions Diet: continued as ordered;Education initiated/completed   Nutrition Recommendations Continue diet as ordered; Refer to DSMT class   Nutrition Complexity Risk   Nutrition complexity level Moderate risk   Follow up date 02/05/19

## 2019-01-30 NOTE — PROGRESS NOTES
Patient will be scheduled for [facility] on Friday  Continue to trend Cr  Appreciate Nephro recs  Will start IVF tomorrow at [facility] at 60ml/hr as noted by Dr Murtaza Louis of podiatry informed of plan as well

## 2019-01-30 NOTE — ASSESSMENT & PLAN NOTE
DFU with cellulitis to the left 5th toe; dry necrotic tissue with no malodor or drainage  -patient on ABX per primary team  -[address] per primary team

## 2019-01-30 NOTE — PROGRESS NOTES
Progress Note - Podiatry  Christopher Lyon 48 y o  female MRN: [id number]  Unit/Bed#: E2 -01 Encounter: [id number]    Assessment:  1  Left 5th toe wound/eschar with associated cellulitis and likely OM  2  DM 2 -HBA1c: 11 4%  3  PAD  4  CKD stage 3  5  HTN  6  HLD     Plan:   · Left foot x-ray reviewed  OM noted at the distal phalanx of the 5th digit  Will order MRI to view extent of OM  Patient will need left 5th digit amputation after evascularization  · LEADs reviewed results show left tibioperoneal disease  Per vascular, patient will go for angiogram later this week  · C/w Ancef  · WBAT for now       Subjective/Objective   Chief Complaint: No chief complaint on file  Subjective: 48 y o  y/o female was seen and evaluated at bedside in no acute distress, nontoxic in appearance  Patient denies any recent nausea, vomiting, fever, chills, shortness of breath, chest pains  Blood pressure 114/79, pulse 75, temperature 98 8 °F (37 1 °C), temperature source Tympanic, resp  rate 18, height 5' (1 524 m), weight 86 8 kg (191 lb 5 8 oz), last menstrual period 01/15/2019, SpO2 96 %, not currently breastfeeding  ,Body mass index is 37 37 kg/m²  Invasive Devices     Peripheral Intravenous Line            Peripheral IV 01/28/19 Left Arm 2 days                Physical Exam:   General: Alert, cooperative and no distress  Lungs: Non labored breathing  Heart: Positive S1, S2  Abdomen: Soft, non-tender  Extremity:     NVS at baseline B/l  MSK function at B/l  No calf tenderness noted B/l  LLE: 5th digit continues to be dry and stable without any conversion to wet gangrene      Lab, Imaging and other studies:   CBC:   Lab Results   Component Value Date    WBC 8 67 01/30/2019    HGB 10 5 (L) 01/30/2019    HCT 36 0 01/30/2019    MCV 77 (L) 01/30/2019     01/30/2019    MCH 22 5 (L) 01/30/2019    MCHC 29 2 (L) 01/30/2019    RDW 15 3 (H) 01/30/2019    MPV 10 3 01/30/2019   , CMP:   Lab Results Component Value Date    SODIUM 134 (L) 01/30/2019    K 5 1 01/30/2019     01/30/2019    CO2 21 01/30/2019    BUN 39 (H) 01/30/2019    CREATININE 1 41 (H) 01/30/2019    CALCIUM 8 6 01/30/2019    EGFR 43 01/30/2019       Imaging: I have personally reviewed pertinent films in PACS  EKG, Pathology, and Other Studies: I have personally reviewed pertinent reports    VTE Pharmacologic Prophylaxis: Heparin

## 2019-01-30 NOTE — ASSESSMENT & PLAN NOTE
Lab Results   Component Value Date    HGBA1C 11 4 (H) 01/29/2019       Recent Labs      01/29/19 2028 01/29/19 2052 01/29/19 2155 01/30/19   0641   POCGLU  63*  78  130  307*       Blood Sugar Average: Last 72 hrs:  (P) 157 [id number]     -obtain better control of DM for wound healing  -management per SLIM/primary team

## 2019-01-30 NOTE — CONSULTS
Consultation - Nephrology   Matteo Lyon 48 y o  female MRN: [id number]  Unit/Bed#: E2 -01 Encounter: [id number]    ASSESSMENT:  1  DEMETRIUS on likely CKD III:  Unknown cause, could be medication related, on ACE-inhibitor and chlorthalidone, also AIN, on antibiotics, as well as infection  Baseline creatinine appears to be around 1 1-1 2  Does not follow with Nephrology  At risk for renal failure with contrast   Would recommend holding off on arteriogram until creatinine is at baseline   - hold lisinopril and chlorthalidone    - check UA  - urine for eosinophils    - check bladder scan  - avoid nephrotoxins/hypotension  -will need outpatient follow-up   -I/O  - will give NSS x 500 cc    - continue to monitor renal function  2  PAD with toe wound, possible OM: will need clearance for A-gram    - podiatry following    - on antibiotics  3  DM: with elevated A1C: 11 4      4  Hypertension: blood pressure overall stable  - place holding parameters on all BP medications for SBP <130    -Continue Norvasc 10 mg PO daily  5  Mild hyponatremia: In the setting of hyperglycemia  Corrects with Glucose  HISTORY OF PRESENT ILLNESS:  Requesting Physician: Emilia Mendoza DPM  Reason for Consult: DEMETRIUS, clearance for A-gram    Murtaza Hightower is a 48y o  year old female with PMH of HTN, DM, former smoker, who was admitted to [facility] after presenting with L 5th toe cellulitis, OM, and need for A-gram  A renal consultation is requested today for assistance in the management of DEMETRIUS, clearance for A-gram   The patient states that she has no previous renal related history  Her mom did have end-stage renal disease require dialysis  She denies recent use of NSAIDs  She states that she has been eating normally but does not drink much due to her occupation  She denies any recent illness  She denies chest pain, shortness of breath, nausea, vomiting, diarrhea, or issues with urination  She states that her blood pressure is normally well controlled  PAST MEDICAL HISTORY:  Past Medical History:   Diagnosis Date    Diabetes mellitus ([address] )     Hypertension        PAST SURGICAL HISTORY:  No past surgical history on file      ALLERGIES:  No Known Allergies    SOCIAL HISTORY:  History   Alcohol Use No     History   Drug Use No     History   Smoking Status    Former Smoker   Smokeless Tobacco    Former User       FAMILY HISTORY:  Family History   Problem Relation Age of Onset    Diabetes Mother     No Known Problems Father        MEDICATIONS:    Current Facility-Administered Medications:     acetaminophen (TYLENOL) tablet 650 mg, 650 mg, Oral, Q6H PRN, Acie Crisp, DPM    [START ON 1/31/2019] amLODIPine (NORVASC) tablet 10 mg, 10 mg, Oral, Daily, MIKEL Whitehead    aspirin chewable tablet 81 mg, 81 mg, Oral, Daily, MIKEL Bermudez    atorvastatin (LIPITOR) tablet 20 mg, 20 mg, Oral, QPM, Acie Crisp, DPM, 20 mg at 01/29/19 1711    ceFAZolin (ANCEF) IVPB (premix) 2,000 mg, 2,000 mg, Intravenous, Q8H, Acie Crisp, DPM, Stopped at 01/30/19 0549    heparin (porcine) subcutaneous injection 5,000 Units, 5,000 Units, Subcutaneous, Q8H [address], 5,000 Units at 01/30/19 0500 **AND** [CANCELED] Platelet count, , , Once, Acie Crisp, DPM    insulin glargine (LANTUS) subcutaneous injection 75 Units 0 75 mL, 75 Units, Subcutaneous, QAM, Acie Crisp, DPM, 75 Units at 01/30/19 0900    insulin lispro (HumaLOG) 100 units/mL subcutaneous injection 1-5 Units, 1-5 Units, Subcutaneous, HS, Acie Crisp, DPM    insulin lispro (HumaLOG) 100 units/mL subcutaneous injection 1-6 Units, 1-6 Units, Subcutaneous, TID AC, 4 Units at 01/30/19 0901 **AND** Fingerstick Glucose (POCT), , , TID AC, Acie Crisp, DPM    insulin lispro (HumaLOG) 100 units/mL subcutaneous injection 15 Units, 15 Units, Subcutaneous, TID With Meals, Acie Crisp, DPM, 15 Units at 01/30/19 0901    loratadine (CLARITIN) tablet 10 mg, 10 mg, Oral, Daily PRN, Padmini Dowd, DPM    oxyCODONE-acetaminophen (PERCOCET) 5-325 mg per tablet 1 tablet, 1 tablet, Oral, Q6H PRN, Padmini Dowd, DPM    REVIEW OF SYSTEMS:  A complete review of systems was performed and found to be negative unless otherwise noted in the history of present illness  General: No fevers, chills  Cardiovascular:  - chest pain, - leg edema  Respiratory: No cough, sputum production,  - shortness of breath  Gastrointestinal:  - nausea/vomiting,  - diarrhea,  - abdominal pain  Genitourinary: No hematuria  No foamy urine    No dysuria    PHYSICAL EXAM:  Current Weight: Weight - Scale: 86 8 kg (191 lb 5 8 oz)  First Weight: Weight - Scale: 86 8 kg (191 lb 5 8 oz)  Vitals:    01/29/19 1531 01/29/19 2300 01/30/19 0824 01/30/19 1109   BP: 125/58 131/71 114/79    BP Location: Right arm  Left arm    Pulse: 86 76 75    Resp: 18 20 18    Temp: 97 9 °F (36 6 °C) 98 2 °F (36 8 °C) 98 8 °F (37 1 °C)    TempSrc: Tympanic Tympanic Tympanic    SpO2: 100% 99% 96%    Weight:    86 8 kg (191 lb 5 8 oz)   Height:    5' (1 524 m)       Intake/Output Summary (Last 24 hours) at 01/30/19 1206  Last data filed at 01/30/19 0901   Gross per 24 hour   Intake              530 ml   Output                0 ml   Net              530 ml     General: NAD  Skin: warm, dry, intact, no rash  HEENT: Moist mucous membranes, sclera anicteric, normocephalic, atraumatic  Neck: No apparent JVD appreciated  Chest: lung sounds clear B/L, on RA   CVS:Regular rate and rhythm, no murmer   Abdomen: Soft, round, non-tender, +BS  Extremities: No B/L LE edema present  Neuro: alert and oriented  Psych: appropriate mood and affect     Invasive Devices:      Lab Results:     Results from last 7 days  Lab Units 01/30/19  0505 01/29/19  0546 01/28/19  1945   WBC Thousand/uL 8 67 9 07 9 22   HEMOGLOBIN g/dL 10 5* 10 3* 10 1*   HEMATOCRIT % 36 0 34 0* 34 0*   PLATELETS Thousands/uL 274 268 272   POTASSIUM mmol/L 5 1 4 4 4 1   CHLORIDE mmol/L 102 103 104   CO2 mmol/L 21 20* 23   BUN mg/dL 39* 32* 31*   CREATININE mg/dL 1 41* 1 15 1 21   CALCIUM mg/dL 8 6 8 8 8 5   ALK PHOS U/L  --   --  82   ALT U/L  --   --  22   AST U/L  --   --  22

## 2019-01-31 PROBLEM — N18.30 CKD (CHRONIC KIDNEY DISEASE) STAGE 3, GFR 30-59 ML/MIN (HCC): Status: ACTIVE | Noted: 2019-01-31

## 2019-01-31 LAB
ANION GAP SERPL CALCULATED.3IONS-SCNC: 9 MMOL/L (ref 4–13)
BUN SERPL-MCNC: 40 MG/DL (ref 5–25)
CALCIUM SERPL-MCNC: 8.2 MG/DL (ref 8.3–10.1)
CHLORIDE SERPL-SCNC: 106 MMOL/L (ref 100–108)
CHOLEST SERPL-MCNC: 126 MG/DL (ref 50–200)
CO2 SERPL-SCNC: 21 MMOL/L (ref 21–32)
CREAT SERPL-MCNC: 1.16 MG/DL (ref 0.6–1.3)
EOSINOPHIL NFR URNS MANUAL: 0 %
ERYTHROCYTE [DISTWIDTH] IN BLOOD BY AUTOMATED COUNT: 15.1 % (ref 11.6–15.1)
GFR SERPL CREATININE-BSD FRML MDRD: 54 ML/MIN/1.73SQ M
GLUCOSE SERPL-MCNC: 158 MG/DL (ref 65–140)
GLUCOSE SERPL-MCNC: 171 MG/DL (ref 65–140)
GLUCOSE SERPL-MCNC: 172 MG/DL (ref 65–140)
GLUCOSE SERPL-MCNC: 205 MG/DL (ref 65–140)
GLUCOSE SERPL-MCNC: 57 MG/DL (ref 65–140)
GLUCOSE SERPL-MCNC: 94 MG/DL (ref 65–140)
HCT VFR BLD AUTO: 33.2 % (ref 34.8–46.1)
HDLC SERPL-MCNC: 35 MG/DL (ref 40–60)
HGB BLD-MCNC: 10 G/DL (ref 11.5–15.4)
LDLC SERPL CALC-MCNC: 58 MG/DL (ref 0–100)
MCH RBC QN AUTO: 22.8 PG (ref 26.8–34.3)
MCHC RBC AUTO-ENTMCNC: 30.1 G/DL (ref 31.4–37.4)
MCV RBC AUTO: 76 FL (ref 82–98)
NONHDLC SERPL-MCNC: 91 MG/DL
PLATELET # BLD AUTO: 266 THOUSANDS/UL (ref 149–390)
PMV BLD AUTO: 10.5 FL (ref 8.9–12.7)
POTASSIUM SERPL-SCNC: 4.1 MMOL/L (ref 3.5–5.3)
RBC # BLD AUTO: 4.39 MILLION/UL (ref 3.81–5.12)
SODIUM SERPL-SCNC: 136 MMOL/L (ref 136–145)
TRIGL SERPL-MCNC: 164 MG/DL
WBC # BLD AUTO: 8.37 THOUSAND/UL (ref 4.31–10.16)

## 2019-01-31 PROCEDURE — 85027 COMPLETE CBC AUTOMATED: CPT | Performed by: PODIATRIST

## 2019-01-31 PROCEDURE — 80048 BASIC METABOLIC PNL TOTAL CA: CPT | Performed by: PODIATRIST

## 2019-01-31 PROCEDURE — 80061 LIPID PANEL: CPT | Performed by: PHYSICIAN ASSISTANT

## 2019-01-31 PROCEDURE — 82948 REAGENT STRIP/BLOOD GLUCOSE: CPT

## 2019-01-31 PROCEDURE — 99232 SBSQ HOSP IP/OBS MODERATE 35: CPT | Performed by: INTERNAL MEDICINE

## 2019-01-31 PROCEDURE — 99232 SBSQ HOSP IP/OBS MODERATE 35: CPT | Performed by: PHYSICIAN ASSISTANT

## 2019-01-31 RX ORDER — DEXTROSE AND SODIUM CHLORIDE 5; .45 G/100ML; G/100ML
60 INJECTION, SOLUTION INTRAVENOUS CONTINUOUS
Status: DISCONTINUED | OUTPATIENT
Start: 2019-02-01 | End: 2019-02-02

## 2019-01-31 RX ADMIN — INSULIN LISPRO 1 UNITS: 100 INJECTION, SOLUTION INTRAVENOUS; SUBCUTANEOUS at 13:18

## 2019-01-31 RX ADMIN — ASPIRIN 81 MG 81 MG: 81 TABLET ORAL at 09:15

## 2019-01-31 RX ADMIN — CEFAZOLIN SODIUM 2000 MG: 2 SOLUTION INTRAVENOUS at 05:09

## 2019-01-31 RX ADMIN — INSULIN GLARGINE 75 UNITS: 100 INJECTION, SOLUTION SUBCUTANEOUS at 09:15

## 2019-01-31 RX ADMIN — INSULIN LISPRO 15 UNITS: 100 INJECTION, SOLUTION INTRAVENOUS; SUBCUTANEOUS at 13:17

## 2019-01-31 RX ADMIN — INSULIN LISPRO 15 UNITS: 100 INJECTION, SOLUTION INTRAVENOUS; SUBCUTANEOUS at 09:14

## 2019-01-31 RX ADMIN — INSULIN LISPRO 1 UNITS: 100 INJECTION, SOLUTION INTRAVENOUS; SUBCUTANEOUS at 09:14

## 2019-01-31 RX ADMIN — HEPARIN SODIUM 5000 UNITS: 5000 INJECTION INTRAVENOUS; SUBCUTANEOUS at 13:24

## 2019-01-31 RX ADMIN — CEFAZOLIN SODIUM 2000 MG: 2 SOLUTION INTRAVENOUS at 12:35

## 2019-01-31 RX ADMIN — ATORVASTATIN CALCIUM 20 MG: 20 TABLET, FILM COATED ORAL at 17:22

## 2019-01-31 RX ADMIN — HEPARIN SODIUM 5000 UNITS: 5000 INJECTION INTRAVENOUS; SUBCUTANEOUS at 21:03

## 2019-01-31 RX ADMIN — INSULIN LISPRO 1 UNITS: 100 INJECTION, SOLUTION INTRAVENOUS; SUBCUTANEOUS at 21:03

## 2019-01-31 RX ADMIN — CEFAZOLIN SODIUM 2000 MG: 2 SOLUTION INTRAVENOUS at 20:57

## 2019-01-31 NOTE — ASSESSMENT & PLAN NOTE
47 y/o F former smoker w/hx HTN, HLD, uncontrolled DM II (A1C 11 4), CKD III (baseline creatinine 1 1-1 2) and PAD w/L 5th toe DFU, cellulitis, OM  Diagnostics:  -SHERYL 01/29/2019:  R: >75% stenosis mid SFA, tibioperoneal dx; [place]: 1 61/96/116  L:   vs high-grade stenosis of mid SFA with distal reconstitution w/diffuse disease, tibioperoneal disease; [place]: 0 66/83/61  -Left foot Xray: bone erosions of the 5th toe distal phalanx consistent with osteomyelitis  -L foot MRI:  Osteomyelitis 5th distal phalanx     Plan:  -recommend abdominal aortogram with left lower extremity runoff, possible endovascular intervention  Plan for Agram tomorrow, 2/1/19, by IR  -npo and IVF after midnight  -continue ASA, statin therapy  -continue antibiotics per primary service  -continue with local wound care per Podiatry  -nephrology consult appreciated  Creatinine at baseline @1 16 this am   Continue to optimize renal function pre and postprocedure  IV hydration @ midnight tonight    D/w Dr Misha Mcfarlane  -formal recommendations to follow angiogram tomorrow  -d/w Dr Jolly Robledo

## 2019-01-31 NOTE — ASSESSMENT & PLAN NOTE
DFU L 5th toe w/cellulitis and OM  -continue antibiotics per primary service  -continue [facility] per Podiatry  -will require podiatric intervention after revascularization  -plan as outlined above

## 2019-01-31 NOTE — ASSESSMENT & PLAN NOTE
CKD III, baseline creatinine 1 1-1 2  -creatinine stable and below baseline @ 0 92 this am  -nephrology following  -continue to optimize renal function pre and post angiogram  -IV hydration  -continue to avoid nephrotoxic agents  -continue to trend creatinine

## 2019-01-31 NOTE — PROGRESS NOTES
Progress Note - [address] 1965, 48 y o  female MRN: [id number]    Unit/Bed#: E2 -01 Encounter: [id number]    Primary Care Provider: Grady Ferguson MD   Date and time admitted to hospital: 1/28/2019 12:23 PM    PAD (peripheral artery disease) [facility])   Assessment & Plan    49 y/o F former smoker w/hx HTN, HLD, uncontrolled DM II (A1C 11 4), CKD III (baseline creatinine 1 1-1 2) and PAD w/L 5th toe DFU, cellulitis, OM  Diagnostics:  -SHERYL 01/29/2019:  R: >75% stenosis mid SFA, tibioperoneal dx; [place]: 1 61/96/116  L:   vs high-grade stenosis of mid SFA with distal reconstitution w/diffuse disease, tibioperoneal disease; [place]: 0 66/83/61  -Left foot Xray: bone erosions of the 5th toe distal phalanx consistent with osteomyelitis  -L foot MRI:  Osteomyelitis 5th distal phalanx     Plan:  -recommend abdominal aortogram with left lower extremity runoff, possible endovascular intervention  Plan for Agram tomorrow, 2/1/19, by IR  -npo and IVF after midnight  -continue ASA, statin therapy  -continue antibiotics per primary service  -continue with local wound care per Podiatry  -nephrology consult appreciated  Creatinine at baseline @1 16 this am   Continue to optimize renal function pre and postprocedure  IV hydration @ midnight tonight    D/w Dr Roya Cuellar  -formal recommendations to follow angiogram tomorrow  -d/w Dr Mary Beth Wolf         Open wound of toe   Assessment & Plan    DFU L 5th toe w/cellulitis and OM  -continue antibiotics per primary service  -continue [facility] per Podiatry  -will require podiatric intervention after revascularization  -plan as outlined above     HLD (hyperlipidemia)   Assessment & Plan    HLD  -continue with statin therapy;   -recommend LDL < 70  -management per medical service     Type 2 diabetes mellitus without complication, with long-term current use of insulin [facility])   Assessment & Plan    Lab Results   Component Value Date    HGBA1C 11 4 (H) 01/29/2019 Recent Labs      01/30/19   1616  01/30/19   2129  01/31/19   0650  01/31/19   1101   POCGLU  71  174*  158*  172*       Blood Sugar Average: Last 72 hrs:  (P) 830 6409     -uncontrolled type II DM  -continue to to optimize BS control for optimization of wound healing and prevention of disease  -management per SLIM         Subjective:  Patient without complaints  Denies rest pain  Results of MRI is noted with osteomyelitis of left 5th distal phalanx  No temp spikes  Creatinine stable at 1 16/GFR 54 this a m   VSS    Vitals:  /65 (BP Location: Right arm)   Pulse 81   Temp 98 °F (36 7 °C) (Tympanic)   Resp 18   Ht 5' (1 524 m)   Wt 86 8 kg (191 lb 5 8 oz)   LMP 01/15/2019   SpO2 98%   BMI 37 37 kg/m²     I/Os:  I/O last 3 completed shifts: In: 1873 [P O :960; I V :313; IV Piggyback:600]  Out: 750 [Urine:750]  No intake/output data recorded      Lab Results and Cultures:   Lab Results   Component Value Date    WBC 8 37 01/31/2019    HGB 10 0 (L) 01/31/2019    HCT 33 2 (L) 01/31/2019    MCV 76 (L) 01/31/2019     01/31/2019     Lab Results   Component Value Date    CALCIUM 8 2 (L) 01/31/2019    K 4 1 01/31/2019    CO2 21 01/31/2019     01/31/2019    BUN 40 (H) 01/31/2019    CREATININE 1 16 01/31/2019     Lab Results   Component Value Date    INR 0 99 01/30/2019    PROTIME 13 2 01/30/2019        Blood Culture: No results found for: BLOODCX,   Urinalysis:   Lab Results   Component Value Date    COLORU Yellow 01/30/2019    CLARITYU Clear 01/30/2019    SPECGRAV 1 020 01/30/2019    PHUR 5 5 01/30/2019    LEUKOCYTESUR Negative 01/30/2019    NITRITE Negative 01/30/2019    GLUCOSEU 100 (1/10%) (A) 01/30/2019    KETONESU Negative 01/30/2019    BILIRUBINUR Negative 01/30/2019    BLOODU Negative 01/30/2019   ,   Urine Culture: No results found for: URINECX,   Wound Culure: No results found for: WOUNDCULT    Medications:  Current Facility-Administered Medications   Medication Dose Route Frequency  acetaminophen (TYLENOL) tablet 650 mg  650 mg Oral Q6H PRN    amLODIPine (NORVASC) tablet 10 mg  10 mg Oral Daily    aspirin chewable tablet 81 mg  81 mg Oral Daily    atorvastatin (LIPITOR) tablet 20 mg  20 mg Oral QPM    ceFAZolin (ANCEF) IVPB (premix) 2,000 mg  2,000 mg Intravenous Q8H    [START ON 2/1/2019] dextrose 5 % and sodium chloride 0 45 % infusion  60 mL/hr Intravenous Continuous    heparin (porcine) subcutaneous injection 5,000 Units  5,000 Units Subcutaneous Q8H [address]    insulin glargine (LANTUS) subcutaneous injection 75 Units 0 75 mL  75 Units Subcutaneous QAM    insulin lispro (HumaLOG) 100 units/mL subcutaneous injection 1-5 Units  1-5 Units Subcutaneous HS    insulin lispro (HumaLOG) 100 units/mL subcutaneous injection 1-6 Units  1-6 Units Subcutaneous TID AC    insulin lispro (HumaLOG) 100 units/mL subcutaneous injection 15 Units  15 Units Subcutaneous TID With Meals    loratadine (CLARITIN) tablet 10 mg  10 mg Oral Daily PRN    oxyCODONE-acetaminophen (PERCOCET) 5-325 mg per tablet 1 tablet  1 tablet Oral Q6H PRN       Imaging:  Left foot MRI 1/30/2019:  Osteomyelitis of the distal left 5th phalanx    Physical Exam:    General appearance: alert and oriented, in no acute distress  Neurologic: Grossly normal  Neck: no adenopathy, no carotid bruit, no JVD, supple, symmetrical, trachea midline and thyroid not enlarged, symmetric, no tenderness/mass/nodules  Lungs: clear to auscultation bilaterally  Heart: regular rate and rhythm, S1, S2 normal, no murmur, click, rub or gallop  Abdomen: soft, non-tender; bowel sounds normal; no masses,  no organomegaly and No abdominal bruits  Extremities: Bilateral lower extremities warm, pink and motor and sensory intact  Left 5th toe ulceration noted with eschar but no active drainage, malodor erythema  No edema      Wound/Incision:    Left foot      Pulse exam:  Radial: Right: 2+ Left[de-identified] 2+  Femoral: Right: 2+ Left: 2+  Popliteal: Right: non-palpable Left: non-palpable  DP: Right: non-palpable Left: non-palpable  PT: Right: non-palpable Left: non-palpable      Torres Suarez PA-C  1/31/2019  [facility], [phone]

## 2019-01-31 NOTE — ASSESSMENT & PLAN NOTE
Lab Results   Component Value Date    HGBA1C 11 4 (H) 01/29/2019       Recent Labs      01/30/19   1616  01/30/19   2129  01/31/19   0650  01/31/19   1101   POCGLU  71  174*  158*  172*       Blood Sugar Average: Last 72 hrs:  (P) 832 4783     -uncontrolled type II DM  -continue to to optimize BS control for optimization of wound healing and prevention of disease  -management per [facility]

## 2019-01-31 NOTE — PROGRESS NOTES
[facility] [facility] Internal Medicine Progress Note  Patient: Molly Lyon 48 y o  female   MRN: [id number]  PCP: Christina Ramos MD  Unit/Bed#: E2 -01 Encounter: [id number]  Date Of Visit: 01/31/19    Assessment:    Principal Problem:    Subacute osteomyelitis of left foot ([facility] [address] )  Active Problems:    Type 2 diabetes mellitus without complication, with long-term current use of insulin ([facility] )    Essential hypertension    Open wound of toe    Cellulitis of toe    HLD (hyperlipidemia)    PAD (peripheral artery disease) ([facility] )      Plan:    · Left 5th toe wound with surrounding cellulitis and possible osteomyelitis: Right foot xray with bone erosions of the 5th toe distable phalanx consistent with osteomyelitis  Management per primary team, Podiatry  Continue Ancef day # 4  Preoperative clearance team to be an intermediate risk  EKG -normal sinus rhythm  Scheduled to undergo angiogram tomorrow 2/1/19     · DEMETRIUS on CKD stage 3:  Limited records in terms of baseline  Creatinine 1 41 on 1/30/18  Baseline appears to be around 1 1-1 2  Nephrology consulted for assistance with CKD and prevention of DEMETRIUS  Hold chlorthalidone given rise in Cr overnight from 1 1 to 1 4  Was started on IV fluids of 60 ml/hr at midnight in anticipation for agram tomorrow  Current Cr is 1 16     · Type 2 diabetes:  Uncontrolled with A1c of 11 4  Metformin on hold  Continue with insulin sliding scale  Also on premeal insulin with humalog 15 tid and Lantus 75 units  Sugars 174, 171, 151     · PAD:  Abnormal leads 1/29/19              - right leg greater than 75% stenosis of mid SFA with diffuse disease                 - left leg- occlusion versus high-grade stenosis of mid SFA with distal reconstitution / diffuse disease throughout fem-pop   - for agram tomorrow 2/1/19     · Essential hypertension:  Home regimen of lisinopril 40 mg daily, chlorthalidone 25 mg daily, amlodipine 10 mg daily    Hold lisinopril and chlorthalidone the setting of rising creatinine overnight  Will continue to monitor blood pressures as patient may need additional medications for control  BP stable for now at 125/65, 121/78     · Hyperlipidemia:  Pre-hospital is not on a statin  Lipid panel- cholesterol 126, triglycerides 164, HDL 35, LDL 58  Started on atorvastatin 20 mg daily  · Microcytic anemia: Hgb 10 0 with MCV of 76  Appears to be similar to priors  Will check iron panel      VTE Pharmacologic Prophylaxis:   Pharmacologic: Heparin  Mechanical VTE Prophylaxis in Place: Yes    Discharge Plan: With need for continued inpatient stay / angiogram today    Discussions with Specialists or Other Care Team Provider: Dr Rashaun Almonte    Education and Discussions with Family / Patient: patient    Time Spent for Care: 15 minutes  More than 50% of total time spent on counseling and coordination of care as described above  Current Length of Stay: 3 day(s)  Current Patient Status: Inpatient   Code Status: Level 1 - Full Code    Subjective:   Resting comfortably in bed  Feels nervous about agram tomorrow  She is otherwise without any complaints  Denies any chest pain, chest pressure, palpitations  Objective:     Vitals:   Temp (24hrs), Av 4 °F (36 3 °C), Min:97 °F (36 1 °C), Max:98 °F (36 7 °C)    Temp:  [97 °F (36 1 °C)-98 °F (36 7 °C)] 98 °F (36 7 °C)  HR:  [77-82] 81  Resp:  [18-20] 18  BP: (102-125)/(65-78) 125/65  SpO2:  [98 %-100 %] 98 %  Body mass index is 37 37 kg/m²  Input and Output Summary (last 24 hours): Intake/Output Summary (Last 24 hours) at 19 1010  Last data filed at 19 0558   Gross per 24 hour   Intake             1343 ml   Output              750 ml   Net              593 ml       Physical Exam:     Physical Exam   Constitutional: She is oriented to person, place, and time  She appears well-developed and well-nourished  No distress  HENT:   Head: Normocephalic and atraumatic     Eyes: Conjunctivae are normal    Cardiovascular: Normal rate, regular rhythm and normal heart sounds  Pulmonary/Chest: Effort normal and breath sounds normal  No respiratory distress  She has no wheezes  She has no rales  She exhibits no tenderness  Abdominal: Soft  Bowel sounds are normal  She exhibits no distension and no mass  There is no tenderness  There is no rebound and no guarding  Neurological: She is alert and oriented to person, place, and time  Skin: Skin is warm and dry  She is not diaphoretic  Psychiatric: She has a normal mood and affect  Her behavior is normal    Nursing note and vitals reviewed  Additional Data:     Labs:      Results from last 7 days  Lab Units 01/31/19  0503  01/28/19  1945   WBC Thousand/uL 8 37  < > 9 22   HEMOGLOBIN g/dL 10 0*  < > 10 1*   HEMATOCRIT % 33 2*  < > 34 0*   PLATELETS Thousands/uL 266  < > 272   NEUTROS PCT %  --   --  56   LYMPHS PCT %  --   --  36   MONOS PCT %  --   --  6   EOS PCT %  --   --  1   < > = values in this interval not displayed  Results from last 7 days  Lab Units 01/31/19  0503  01/28/19  1945   POTASSIUM mmol/L 4 1  < > 4 1   CHLORIDE mmol/L 106  < > 104   CO2 mmol/L 21  < > 23   BUN mg/dL 40*  < > 31*   CREATININE mg/dL 1 16  < > 1 21   CALCIUM mg/dL 8 2*  < > 8 5   ALK PHOS U/L  --   --  82   ALT U/L  --   --  22   AST U/L  --   --  22   < > = values in this interval not displayed  Results from last 7 days  Lab Units 01/30/19  1733   INR  0 99       * I Have Reviewed All Lab Data Listed Above  * Additional Pertinent Lab Tests Reviewed:  [address] [facility] Admission Reviewed    Imaging:    Imaging Reports Reviewed Today Include:   Imaging Personally Reviewed by Myself Includes:      Recent Cultures (last 7 days):           Last 24 Hours Medication List:     Current Facility-Administered Medications:  acetaminophen 650 mg Oral Q6H TAMMYN Matthias Bryant DPM    amLODIPine 10 mg Oral Daily MIKEL Elizondo    aspirin 81 mg Oral Daily [facility] atorvastatin 20 mg Oral QPM Maurie Eagle, DPM    cefazolin 2,000 mg Intravenous Q8H Maurie Eagle, DPM Last Rate: Stopped (01/31/19 0558)   heparin (porcine) 5,000 Units Subcutaneous [facility] Maurie [place], DPM    insulin glargine 75 Units Subcutaneous QAM Maurie Eagle, DPM    insulin lispro 1-5 Units Subcutaneous HS Maurie [place], DPM    insulin lispro 1-6 Units Subcutaneous TID AC Maurie [place], DPM    insulin lispro 15 Units Subcutaneous TID With Meals Maurie [place], DPM    loratadine 10 mg Oral Daily PRN Maurie Eagle, DPM    oxyCODONE-acetaminophen 1 tablet Oral Q6H PRN Maurie [place], DPM    sodium chloride 60 mL/hr Intravenous Continuous MIKEL Bermudez Last Rate: 60 mL/hr (01/30/19 8368)        Today, Patient Was Seen By: Zachery Petty PA-C    ** Please Note: This note has been constructed using a voice recognition system   **

## 2019-01-31 NOTE — PROGRESS NOTES
*Addendum: Case discussed with Dr Deysi Benson about MRI finding of LLE Charcot  Will instruct the patient to be NWB on the LLE for now  Progress Note - Podiatry  Inmelany Lyon 48 y o  female MRN: [id number]  Unit/Bed#: E2 -01 Encounter: [id number]    Assessment:  1  Left 5th toe wound/eschar with associated cellulitis and OM  2  Chronic left Charcot arthropathy   3  DM 2 -HBA1c: 11 4%  4  PAD  5  CKD stage 3  6  HTN  7  HLD    Plan:  · Left foot MRI reviewed  Results show osteomyelitis of the 5th distal and middle phalanx  Results also show marrow edema throughout the tarsal metatarsal joints consistent with neuropathic joint  Patient will need left 5th digit amputation once revascularized  · Personally spoke with vascular  Patient is scheduled for left lower extremity angiogram tomorrow 2/1  Will follow up on vascular recommendations  · C/w ancef  · Patient may WBAT as the new finding of Charcot is most likely chronic  No clinical signs of inflammation  Subjective/Objective   Chief Complaint:  Left foot wound      Subjective: 48 y o  y/o female was seen and evaluated at bedside in no acute distress, nontoxic in appearance  She understands that she is going for a vascular procedure tomorrow  Patient denies any recent nausea, vomiting, fever, chills, shortness of breath, chest pains       Blood pressure 125/65, pulse 81, temperature 98 °F (36 7 °C), temperature source Tympanic, resp  rate 18, height 5' (1 524 m), weight 86 8 kg (191 lb 5 8 oz), last menstrual period 01/15/2019, SpO2 98 %, not currently breastfeeding  ,Body mass index is 37 37 kg/m²  Invasive Devices     Peripheral Intravenous Line            Peripheral IV 01/28/19 Left Arm 2 days                Physical Exam:   General: Alert, cooperative and no distress  Lungs: Non labored breathing  Heart: Positive S1, S2  Abdomen: Soft, non-tender  Extremity:     NVS at baseline B/l  MSK function at B/l   No calf tenderness noted B/l     LLE:  5th digit continues to be dry and stable without any acute signs of infection  No conversion to wet gangrene noted  Erythema resolving  Lab, Imaging and other studies:   CBC:   Lab Results   Component Value Date    WBC 8 37 01/31/2019    HGB 10 0 (L) 01/31/2019    HCT 33 2 (L) 01/31/2019    MCV 76 (L) 01/31/2019     01/31/2019    MCH 22 8 (L) 01/31/2019    MCHC 30 1 (L) 01/31/2019    RDW 15 1 01/31/2019    MPV 10 5 01/31/2019   , CMP:   Lab Results   Component Value Date    SODIUM 136 01/31/2019    K 4 1 01/31/2019     01/31/2019    CO2 21 01/31/2019    BUN 40 (H) 01/31/2019    CREATININE 1 16 01/31/2019    CALCIUM 8 2 (L) 01/31/2019    EGFR 54 01/31/2019       Imaging: I have personally reviewed pertinent films in PACS  EKG, Pathology, and Other Studies: I have personally reviewed pertinent reports    VTE Pharmacologic Prophylaxis: Heparin

## 2019-01-31 NOTE — PLAN OF CARE
Problem: METABOLIC, FLUID AND ELECTROLYTES - ADULT  Goal: Glucose maintained within target range  INTERVENTIONS:  - Monitor Blood Glucose as ordered  - Assess for signs and symptoms of hyperglycemia and hypoglycemia  - Administer ordered medications to maintain glucose within target range  - Assess nutritional intake and initiate nutrition service referral as needed   Outcome: Progressing      Problem: PAIN - ADULT  Goal: Verbalizes/displays adequate comfort level or baseline comfort level  Interventions:  - Encourage patient to monitor pain and request assistance  - Assess pain using appropriate pain scale  - Administer analgesics based on type and severity of pain and evaluate response  - Implement non-pharmacological measures as appropriate and evaluate response  - Consider cultural and social influences on pain and pain management  - Notify physician/advanced practitioner if interventions unsuccessful or patient reports new pain   Outcome: Progressing      Problem: INFECTION - ADULT  Goal: Absence or prevention of progression during hospitalization  INTERVENTIONS:  - Assess and monitor for signs and symptoms of infection  - Monitor lab/diagnostic results  - Monitor all insertion sites, i e  indwelling lines, tubes, and drains  - Monitor endotracheal (as able) and nasal secretions for changes in amount and color  - [place] appropriate cooling/warming therapies per order  - Administer medications as ordered  - Instruct and encourage patient and family to use good hand hygiene technique  - Identify and instruct in appropriate isolation precautions for identified infection/condition   Outcome: Progressing    Goal: Absence of fever/infection during neutropenic period  INTERVENTIONS:  - Monitor WBC  - Implement neutropenic guidelines   Outcome: Completed Date Met: 01/30/19      Problem: DISCHARGE PLANNING  Goal: Discharge to home or other facility with appropriate resources  INTERVENTIONS:  - Identify barriers to discharge w/patient and caregiver  - Arrange for needed discharge resources and transportation as appropriate  - Identify discharge learning needs (meds, wound care, etc )  - Arrange for interpretive services to assist at discharge as needed  - Refer to Case Management Department for coordinating discharge planning if the patient needs post-hospital services based on physician/advanced practitioner order or complex needs related to functional status, cognitive ability, or social support system   Outcome: Progressing      Problem: Potential for Falls  Goal: Patient will remain free of falls  INTERVENTIONS:  - Assess patient frequently for physical needs  -  Identify cognitive and physical deficits and behaviors that affect risk of falls    -  [place] fall precautions as indicated by assessment   - Educate patient/family on patient safety including physical limitations  - Instruct patient to call for assistance with activity based on assessment  - Modify environment to reduce risk of injury  - Consider OT/PT consult to assist with strengthening/mobility   Outcome: Progressing      Problem: Prexisting or High Potential for Compromised Skin Integrity  Goal: Skin integrity is maintained or improved  INTERVENTIONS:  - Identify patients at risk for skin breakdown  - Assess and monitor skin integrity  - Assess and monitor nutrition and hydration status  - Monitor labs (i e  albumin)  - Assess for incontinence   - Turn and reposition patient  - Assist with mobility/ambulation  - Relieve pressure over bony prominences  - Avoid friction and shearing  - Provide appropriate hygiene as needed including keeping skin clean and dry  - Evaluate need for skin moisturizer/barrier cream  - Collaborate with interdisciplinary team (i e  Nutrition, Rehabilitation, etc )   - Patient/family teaching   Outcome: Progressing      Problem: Nutrition/Hydration-ADULT  Goal: Nutrient/Hydration intake appropriate for improving, restoring or maintaining nutritional needs  Monitor and assess patient's nutrition/hydration status for malnutrition (ex- brittle hair, bruises, dry skin, pale skin and conjunctiva, muscle wasting, smooth red tongue, and disorientation)  Collaborate with interdisciplinary team and initiate plan and interventions as ordered  Monitor patient's weight and dietary intake as ordered or per policy  Utilize nutrition screening tool and intervene per policy  Determine patient's food preferences and provide high-protein, high-caloric foods as appropriate       INTERVENTIONS:  - Monitor oral intake, urinary output, labs, and treatment plans  - Assess nutrition and hydration status and recommend course of action  - Evaluate amount of meals eaten  - Assist patient with eating if necessary   - Allow adequate time for meals  - Recommend/ encourage appropriate diets, oral nutritional supplements, and vitamin/mineral supplements  - Order, calculate, and assess calorie counts as needed  - Recommend, monitor, and adjust tube feedings and TPN/PPN based on assessed needs  - Assess need for intravenous fluids  - Provide specific nutrition/hydration education as appropriate  - Include patient/family/caregiver in decisions related to nutrition   Outcome: Progressing

## 2019-02-01 ENCOUNTER — APPOINTMENT (INPATIENT)
Dept: RADIOLOGY | Facility: HOSPITAL | Age: 54
DRG: 253 | End: 2019-02-01
Payer: COMMERCIAL

## 2019-02-01 PROBLEM — D50.9 MICROCYTIC ANEMIA: Status: ACTIVE | Noted: 2019-02-01

## 2019-02-01 LAB
ANION GAP SERPL CALCULATED.3IONS-SCNC: 9 MMOL/L (ref 4–13)
BUN SERPL-MCNC: 29 MG/DL (ref 5–25)
CALCIUM SERPL-MCNC: 8.3 MG/DL (ref 8.3–10.1)
CHLORIDE SERPL-SCNC: 103 MMOL/L (ref 100–108)
CO2 SERPL-SCNC: 23 MMOL/L (ref 21–32)
CREAT SERPL-MCNC: 0.92 MG/DL (ref 0.6–1.3)
ERYTHROCYTE [DISTWIDTH] IN BLOOD BY AUTOMATED COUNT: 15.4 % (ref 11.6–15.1)
FERRITIN SERPL-MCNC: 175 NG/ML (ref 8–388)
GFR SERPL CREATININE-BSD FRML MDRD: 71 ML/MIN/1.73SQ M
GLUCOSE SERPL-MCNC: 100 MG/DL (ref 65–140)
GLUCOSE SERPL-MCNC: 130 MG/DL (ref 65–140)
GLUCOSE SERPL-MCNC: 160 MG/DL (ref 65–140)
GLUCOSE SERPL-MCNC: 161 MG/DL (ref 65–140)
GLUCOSE SERPL-MCNC: 187 MG/DL (ref 65–140)
HCT VFR BLD AUTO: 36.4 % (ref 34.8–46.1)
HGB BLD-MCNC: 10.5 G/DL (ref 11.5–15.4)
IRON SATN MFR SERPL: 22 %
IRON SERPL-MCNC: 59 UG/DL (ref 50–170)
MCH RBC QN AUTO: 22.2 PG (ref 26.8–34.3)
MCHC RBC AUTO-ENTMCNC: 28.8 G/DL (ref 31.4–37.4)
MCV RBC AUTO: 77 FL (ref 82–98)
PLATELET # BLD AUTO: 311 THOUSANDS/UL (ref 149–390)
PMV BLD AUTO: 11 FL (ref 8.9–12.7)
POTASSIUM SERPL-SCNC: 4.2 MMOL/L (ref 3.5–5.3)
RBC # BLD AUTO: 4.73 MILLION/UL (ref 3.81–5.12)
SODIUM SERPL-SCNC: 135 MMOL/L (ref 136–145)
TIBC SERPL-MCNC: 264 UG/DL (ref 250–450)
WBC # BLD AUTO: 9.42 THOUSAND/UL (ref 4.31–10.16)

## 2019-02-01 PROCEDURE — 99232 SBSQ HOSP IP/OBS MODERATE 35: CPT | Performed by: PHYSICIAN ASSISTANT

## 2019-02-01 PROCEDURE — 37224 PR REVSC OPN/PRG FEM/POP W/ANGIOPLASTY UNI: CPT | Performed by: RADIOLOGY

## 2019-02-01 PROCEDURE — C1769 GUIDE WIRE: HCPCS

## 2019-02-01 PROCEDURE — 99153 MOD SED SAME PHYS/QHP EA: CPT

## 2019-02-01 PROCEDURE — 75625 CONTRAST EXAM ABDOMINL AORTA: CPT | Performed by: RADIOLOGY

## 2019-02-01 PROCEDURE — C2623 CATH, TRANSLUMIN, DRUG-COAT: HCPCS

## 2019-02-01 PROCEDURE — 83540 ASSAY OF IRON: CPT | Performed by: PHYSICIAN ASSISTANT

## 2019-02-01 PROCEDURE — 85027 COMPLETE CBC AUTOMATED: CPT | Performed by: PHYSICIAN ASSISTANT

## 2019-02-01 PROCEDURE — 82728 ASSAY OF FERRITIN: CPT | Performed by: PHYSICIAN ASSISTANT

## 2019-02-01 PROCEDURE — 75710 ARTERY X-RAYS ARM/LEG: CPT

## 2019-02-01 PROCEDURE — C1760 CLOSURE DEV, VASC: HCPCS

## 2019-02-01 PROCEDURE — C1894 INTRO/SHEATH, NON-LASER: HCPCS

## 2019-02-01 PROCEDURE — C1725 CATH, TRANSLUMIN NON-LASER: HCPCS

## 2019-02-01 PROCEDURE — 82948 REAGENT STRIP/BLOOD GLUCOSE: CPT

## 2019-02-01 PROCEDURE — 047L3Z1 DILATION OF LEFT FEMORAL ARTERY USING DRUG-COATED BALLOON, PERCUTANEOUS APPROACH: ICD-10-PCS | Performed by: RADIOLOGY

## 2019-02-01 PROCEDURE — 99152 MOD SED SAME PHYS/QHP 5/>YRS: CPT

## 2019-02-01 PROCEDURE — 75710 ARTERY X-RAYS ARM/LEG: CPT | Performed by: RADIOLOGY

## 2019-02-01 PROCEDURE — B41DYZZ FLUOROSCOPY OF AORTA AND BILATERAL LOWER EXTREMITY ARTERIES USING OTHER CONTRAST: ICD-10-PCS | Performed by: RADIOLOGY

## 2019-02-01 PROCEDURE — 75625 CONTRAST EXAM ABDOMINL AORTA: CPT

## 2019-02-01 PROCEDURE — 99152 MOD SED SAME PHYS/QHP 5/>YRS: CPT | Performed by: RADIOLOGY

## 2019-02-01 PROCEDURE — 37224 HB FEM/POPL REVAS W/TLA: CPT

## 2019-02-01 PROCEDURE — 80048 BASIC METABOLIC PNL TOTAL CA: CPT | Performed by: PHYSICIAN ASSISTANT

## 2019-02-01 PROCEDURE — 83550 IRON BINDING TEST: CPT | Performed by: PHYSICIAN ASSISTANT

## 2019-02-01 RX ORDER — MIDAZOLAM HYDROCHLORIDE 1 MG/ML
INJECTION INTRAMUSCULAR; INTRAVENOUS CODE/TRAUMA/SEDATION MEDICATION
Status: COMPLETED | OUTPATIENT
Start: 2019-02-01 | End: 2019-02-01

## 2019-02-01 RX ORDER — FENTANYL CITRATE 50 UG/ML
INJECTION, SOLUTION INTRAMUSCULAR; INTRAVENOUS CODE/TRAUMA/SEDATION MEDICATION
Status: COMPLETED | OUTPATIENT
Start: 2019-02-01 | End: 2019-02-01

## 2019-02-01 RX ORDER — HEPARIN SODIUM 1000 [USP'U]/ML
INJECTION, SOLUTION INTRAVENOUS; SUBCUTANEOUS CODE/TRAUMA/SEDATION MEDICATION
Status: COMPLETED | OUTPATIENT
Start: 2019-02-01 | End: 2019-02-01

## 2019-02-01 RX ADMIN — DEXTROSE AND SODIUM CHLORIDE 60 ML/HR: 5; .45 INJECTION, SOLUTION INTRAVENOUS at 00:46

## 2019-02-01 RX ADMIN — FENTANYL CITRATE 50 MCG: 50 INJECTION, SOLUTION INTRAMUSCULAR; INTRAVENOUS at 13:14

## 2019-02-01 RX ADMIN — CEFAZOLIN SODIUM 2000 MG: 2 SOLUTION INTRAVENOUS at 15:49

## 2019-02-01 RX ADMIN — FENTANYL CITRATE 50 MCG: 50 INJECTION, SOLUTION INTRAMUSCULAR; INTRAVENOUS at 13:32

## 2019-02-01 RX ADMIN — INSULIN LISPRO 1 UNITS: 100 INJECTION, SOLUTION INTRAVENOUS; SUBCUTANEOUS at 21:53

## 2019-02-01 RX ADMIN — ATORVASTATIN CALCIUM 20 MG: 20 TABLET, FILM COATED ORAL at 17:30

## 2019-02-01 RX ADMIN — MIDAZOLAM 0.5 MG: 1 INJECTION INTRAMUSCULAR; INTRAVENOUS at 13:32

## 2019-02-01 RX ADMIN — DEXTROSE AND SODIUM CHLORIDE 60 ML/HR: 5; .45 INJECTION, SOLUTION INTRAVENOUS at 20:25

## 2019-02-01 RX ADMIN — INSULIN GLARGINE 75 UNITS: 100 INJECTION, SOLUTION SUBCUTANEOUS at 08:36

## 2019-02-01 RX ADMIN — MIDAZOLAM 1 MG: 1 INJECTION INTRAMUSCULAR; INTRAVENOUS at 13:14

## 2019-02-01 RX ADMIN — HEPARIN SODIUM 5000 UNITS: 1000 INJECTION INTRAVENOUS; SUBCUTANEOUS at 14:06

## 2019-02-01 RX ADMIN — IODIXANOL 110 ML: 320 INJECTION, SOLUTION INTRAVASCULAR at 15:11

## 2019-02-01 RX ADMIN — CEFAZOLIN SODIUM 2000 MG: 2 SOLUTION INTRAVENOUS at 21:53

## 2019-02-01 RX ADMIN — FENTANYL CITRATE 25 MCG: 50 INJECTION, SOLUTION INTRAMUSCULAR; INTRAVENOUS at 14:07

## 2019-02-01 RX ADMIN — CEFAZOLIN SODIUM 2000 MG: 2 SOLUTION INTRAVENOUS at 04:57

## 2019-02-01 RX ADMIN — AMLODIPINE BESYLATE 10 MG: 10 TABLET ORAL at 08:36

## 2019-02-01 RX ADMIN — MIDAZOLAM 0.5 MG: 1 INJECTION INTRAMUSCULAR; INTRAVENOUS at 14:07

## 2019-02-01 RX ADMIN — FENTANYL CITRATE 25 MCG: 50 INJECTION, SOLUTION INTRAMUSCULAR; INTRAVENOUS at 13:40

## 2019-02-01 RX ADMIN — HEPARIN SODIUM 5000 UNITS: 5000 INJECTION INTRAVENOUS; SUBCUTANEOUS at 21:53

## 2019-02-01 RX ADMIN — MIDAZOLAM 0.5 MG: 1 INJECTION INTRAMUSCULAR; INTRAVENOUS at 13:25

## 2019-02-01 NOTE — ASSESSMENT & PLAN NOTE
· Vascular surgery following  · VAS lower limb arterial duplex: >75% noted in the right mid superficial femoral artery with diffuse disease noted throughout the remaining portions of femoral-popliteal, findings suggest tibioperoneal disease, occlusion versus high-grade stenosis noted in the mid superficial left artery with distal reconstitution with diffuse disease noted throughout the remaining portions popliteal  · Per vascular recommendations patient is going to IR for agram today   · Continue aspirin, statin therapy

## 2019-02-01 NOTE — ASSESSMENT & PLAN NOTE
· Stage 3 chronic kidney disease, per chart review baseline creatinine 1 1-1 2  · Creatinine stable at 0 92 today  · Nephrology following

## 2019-02-01 NOTE — PROGRESS NOTES
Patient arrived to IR for an arteriogram and left lower extremity runoff for 5th toe ulcer  The procedure and risks were discussed with the patient  All questions were answered  Informed consent was obtained

## 2019-02-01 NOTE — ASSESSMENT & PLAN NOTE
· Home regimen of lisinopril 40 mg daily, chlorthalidone 25 mg daily, amlodipine 10 mg daily - currently holding lisinopril, chlorthalidone due to DEMETRIUS on admission   · renal function now at baseline    · BP low normal post agram, will continue to hold lisinopril and chlorthalidone and monitor BP in AM

## 2019-02-01 NOTE — PROGRESS NOTES
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   -nephrology on board for pre-agram optimization  -for agram with LLE runoff, possible endovascular intervention today, 2/1  -will follow-up on vascular formal recommendations s/p agram; podiatric surgical timeline to be determined accordingly however will need L 5th digit amputation  -remain strict NWB to the LLE   -will update treatment plan as warranted     Subjective/Objective   Chief Complaint: No chief complaint on file  Subjective: 48 y o  y/o female was seen and evaluated at bedside  No constitutional symptoms reported  Blood pressure 145/81, pulse 80, temperature 97 7 °F (36 5 °C), temperature source Tympanic, resp  rate 20, height 5' (1 524 m), weight 86 8 kg (191 lb 5 8 oz), last menstrual period 01/15/2019, SpO2 98 %, not currently breastfeeding  ,Body mass index is 37 37 kg/m²  Invasive Devices     Peripheral Intravenous Line            Peripheral IV 01/28/19 Left Arm 3 days              Physical Exam:   General: Alert, cooperative and no distress  Lungs: Non labored breathing  Abdomen: Soft, non-tender    Extremity: L 5th digit eschar dry and stable, no purulence, no fluctuance, no crepitus, motor function intact to digits, skin temperature warm to warm to B/L LE       Lab, Imaging and other studies:   CBC:   Lab Results   Component Value Date    WBC 9 42 02/01/2019    HGB 10 5 (L) 02/01/2019    HCT 36 4 02/01/2019    MCV 77 (L) 02/01/2019     02/01/2019    MCH 22 2 (L) 02/01/2019    MCHC 28 8 (L) 02/01/2019    RDW 15 4 (H) 02/01/2019    MPV 11 0 02/01/2019

## 2019-02-01 NOTE — ASSESSMENT & PLAN NOTE
· POA with   · MRI left foot: Associated with the 5th toe tip ulcer, there is osteomyelitis in the distal phalanx and middle phalanx  The proximal phalanx is not involved     · Management per primary- Podiatry   · On IV Ancef per primary  · Vascular following for significant peripheral arterial disease  · Patient to IR today for arteriogram extremity for toe ulcer by IR  · Continue local wound care per primary service  · Likely will need amputation pending agram and vascular recommendations  · NWB LLE per podiatry

## 2019-02-01 NOTE — PLAN OF CARE
Problem: METABOLIC, FLUID AND ELECTROLYTES - ADULT  Goal: Glucose maintained within target range  INTERVENTIONS:  - Monitor Blood Glucose as ordered  - Assess for signs and symptoms of hyperglycemia and hypoglycemia  - Administer ordered medications to maintain glucose within target range  - Assess nutritional intake and initiate nutrition service referral as needed   Outcome: Progressing      Problem: PAIN - ADULT  Goal: Verbalizes/displays adequate comfort level or baseline comfort level  Interventions:  - Encourage patient to monitor pain and request assistance  - Assess pain using appropriate pain scale  - Administer analgesics based on type and severity of pain and evaluate response  - Implement non-pharmacological measures as appropriate and evaluate response  - Consider cultural and social influences on pain and pain management  - Notify physician/advanced practitioner if interventions unsuccessful or patient reports new pain   Outcome: Progressing      Problem: INFECTION - ADULT  Goal: Absence or prevention of progression during hospitalization  INTERVENTIONS:  - Assess and monitor for signs and symptoms of infection  - Monitor lab/diagnostic results  - Monitor all insertion sites, i e  indwelling lines, tubes, and drains  - Monitor endotracheal (as able) and nasal secretions for changes in amount and color  - [place] appropriate cooling/warming therapies per order  - Administer medications as ordered  - Instruct and encourage patient and family to use good hand hygiene technique  - Identify and instruct in appropriate isolation precautions for identified infection/condition   Outcome: Progressing      Problem: DISCHARGE PLANNING  Goal: Discharge to home or other facility with appropriate resources  INTERVENTIONS:  - Identify barriers to discharge w/patient and caregiver  - Arrange for needed discharge resources and transportation as appropriate  - Identify discharge learning needs (meds, wound care, etc )  - Arrange for interpretive services to assist at discharge as needed  - Refer to Case Management Department for coordinating discharge planning if the patient needs post-hospital services based on physician/advanced practitioner order or complex needs related to functional status, cognitive ability, or social support system   Outcome: Progressing      Problem: Potential for Falls  Goal: Patient will remain free of falls  INTERVENTIONS:  - Assess patient frequently for physical needs  -  Identify cognitive and physical deficits and behaviors that affect risk of falls    -  [place] fall precautions as indicated by assessment   - Educate patient/family on patient safety including physical limitations  - Instruct patient to call for assistance with activity based on assessment  - Modify environment to reduce risk of injury  - Consider OT/PT consult to assist with strengthening/mobility   Outcome: Progressing      Problem: Prexisting or High Potential for Compromised Skin Integrity  Goal: Skin integrity is maintained or improved  INTERVENTIONS:  - Identify patients at risk for skin breakdown  - Assess and monitor skin integrity  - Assess and monitor nutrition and hydration status  - Monitor labs (i e  albumin)  - Assess for incontinence   - Turn and reposition patient  - Assist with mobility/ambulation  - Relieve pressure over bony prominences  - Avoid friction and shearing  - Provide appropriate hygiene as needed including keeping skin clean and dry  - Evaluate need for skin moisturizer/barrier cream  - Collaborate with interdisciplinary team (i e  Nutrition, Rehabilitation, etc )   - Patient/family teaching   Outcome: Progressing      Problem: Nutrition/Hydration-ADULT  Goal: Nutrient/Hydration intake appropriate for improving, restoring or maintaining nutritional needs  Monitor and assess patient's nutrition/hydration status for malnutrition (ex- brittle hair, bruises, dry skin, pale skin and conjunctiva, muscle wasting, smooth red tongue, and disorientation)  Collaborate with interdisciplinary team and initiate plan and interventions as ordered  Monitor patient's weight and dietary intake as ordered or per policy  Utilize nutrition screening tool and intervene per policy  Determine patient's food preferences and provide high-protein, high-caloric foods as appropriate       INTERVENTIONS:  - Monitor oral intake, urinary output, labs, and treatment plans  - Assess nutrition and hydration status and recommend course of action  - Evaluate amount of meals eaten  - Assist patient with eating if necessary   - Allow adequate time for meals  - Recommend/ encourage appropriate diets, oral nutritional supplements, and vitamin/mineral supplements  - Order, calculate, and assess calorie counts as needed  - Recommend, monitor, and adjust tube feedings and TPN/PPN based on assessed needs  - Assess need for intravenous fluids  - Provide specific nutrition/hydration education as appropriate  - Include patient/family/caregiver in decisions related to nutrition   Outcome: Progressing

## 2019-02-01 NOTE — ASSESSMENT & PLAN NOTE
DFU L 5th toe w/cellulitis and OM  -continue antibiotics per primary service  -continue [address] per Podiatry  -will require podiatric intervention after revascularization  -plan as outlined above

## 2019-02-01 NOTE — ASSESSMENT & PLAN NOTE
· Hemoglobin 10 5, MCV 77   · Iron studies normal   · Per chart review baseline hgb appears 10-11  · Will continue to monitor closely

## 2019-02-01 NOTE — ASSESSMENT & PLAN NOTE
Lab Results   Component Value Date    HGBA1C 11 4 (H) 01/29/2019       Recent Labs      01/31/19   1608  01/31/19   2040  02/01/19   0642  02/01/19   1110   POCGLU  94  205*  161*  130       Blood Sugar Average: Last 72 hrs:  (P) 720 [id number]   · Uncontrolled type 2 diabetes with A1c 11 4%  · Continue to hold oral medications while hospitalized  · Continue Lantus 75 units q a m , resume Humalog 15 units t i d   Once back on diet, hold while NPO, continue sliding scale insulin, Accu-Checks, continue to monitor blood glucose closely to optimize blood glucose control for appropriate healing

## 2019-02-01 NOTE — CASE MANAGEMENT
CM met with pt at bedside to discuss discharge needs  Pt lives in a house with her 2 daughters  ADL's are completed independently with no DME use  PCP identified as Dr Landen Delgadillo  Pharmacy identified as [facility] on [address] [facility] denies VNA/STR hx   Pt's daughters provide transportation; will transport home at D/C  Pt denies POA and accepted information from CM  No needs expressed or identified  CM following as needed

## 2019-02-01 NOTE — ASSESSMENT & PLAN NOTE
Lab Results   Component Value Date    HGBA1C 11 4 (H) 01/29/2019       Recent Labs      01/31/19   1549  01/31/19   1608  01/31/19 2040  02/01/19   0642   POCGLU  57*  94  205*  161*       Blood Sugar Average: Last 72 hrs:  (P) 090 5768     -uncontrolled type II DM  -continue to to optimize BS control for optimization of wound healing and prevention of disease  -management per [address] [facility]

## 2019-02-01 NOTE — PROGRESS NOTES
Progress Note - [address] 1965, 48 y o  female MRN: [id number]    Unit/Bed#: E2 -01 Encounter: [id number]    Primary Care Provider: Kennedy Engel MD   Date and time admitted to hospital: 1/28/2019 12:23 PM    PAD (peripheral artery disease) [facility])   Assessment & Plan    49 y/o F former smoker w/hx HTN, HLD, uncontrolled DM II (A1C 11 4), CKD III (baseline creatinine 1 1-1 2) and PAD w/L 5th toe DFU, cellulitis, OM  Diagnostics:  -SHERYL 01/29/2019:  R: >75% stenosis mid SFA, tibioperoneal dx; [place]: 1 61/96/116  L:   vs high-grade stenosis of mid SFA with distal reconstitution w/diffuse disease, tibioperoneal disease; [place]: 0 66/83/61  -Left foot Xray and MRI:  5th toe distal phalanx osteomyelitis       Plan:  -for abdominal aortogram with left lower extremity runoff, possible endovascular intervention today by IR  -npo and IVF in anticipation of angiogram  -continue ASA, statin therapy  -continue antibiotics per primary service  -continue with local wound care per Podiatry    Will require podiatric intervention after revascularization   -creat below baseline @ 0 92 this am   Continue IV hydration pre and post-procedure per nephrology  -formal recommendations to follow angiogram   -will d/w Dr Jordan Knutson         Open wound of toe   Assessment & Plan    DFU L 5th toe w/cellulitis and OM  -continue antibiotics per primary service  -continue [address] per Podiatry  -will require podiatric intervention after revascularization  -plan as outlined above     CKD (chronic kidney disease) stage 3, GFR 30-59 ml/min ([facility])   Assessment & Plan    CKD III, baseline creatinine 1 1-1 2  -creatinine stable and below baseline @ 0 92 this am  -nephrology following  -continue to optimize renal function pre and post angiogram  -IV hydration  -continue to avoid nephrotoxic agents  -continue to trend creatinine     Type 2 diabetes mellitus without complication, with long-term current use of insulin ([facility] [address] ) Assessment & Plan    Lab Results   Component Value Date    HGBA1C 11 4 (H) 01/29/2019       Recent Labs      01/31/19   1549  01/31/19   1608  01/31/19   2040  02/01/19   0642   POCGLU  57*  94  205*  161*       Blood Sugar Average: Last 72 hrs:  (P) 354 7576     -uncontrolled type II DM  -continue to to optimize BS control for optimization of wound healing and prevention of disease  -management per SLIM         Subjective:  Patient without complaints  Denies rest pain  NPO in anticipation of LLE angiogram today  Renal function stable and creat down to 0 92 this am  VSS    Vitals:  /79 (BP Location: Right arm)   Pulse 80   Temp 98 °F (36 7 °C) (Tympanic)   Resp 18   Ht 5' (1 524 m)   Wt 86 8 kg (191 lb 5 8 oz)   LMP 01/15/2019   SpO2 96%   BMI 37 37 kg/m²     I/Os:  I/O last 3 completed shifts: In: 1370 [I V :1120; IV Piggyback:250]  Out: 1050 [Urine:1050]  No intake/output data recorded      Lab Results and Cultures:   Lab Results   Component Value Date    WBC 9 42 02/01/2019    HGB 10 5 (L) 02/01/2019    HCT 36 4 02/01/2019    MCV 77 (L) 02/01/2019     02/01/2019     Lab Results   Component Value Date    CALCIUM 8 3 02/01/2019    K 4 2 02/01/2019    CO2 23 02/01/2019     02/01/2019    BUN 29 (H) 02/01/2019    CREATININE 0 92 02/01/2019     Lab Results   Component Value Date    INR 0 99 01/30/2019    PROTIME 13 2 01/30/2019        Blood Culture: No results found for: BLOODCX,   Urinalysis:   Lab Results   Component Value Date    COLORU Yellow 01/30/2019    CLARITYU Clear 01/30/2019    SPECGRAV 1 020 01/30/2019    PHUR 5 5 01/30/2019    LEUKOCYTESUR Negative 01/30/2019    NITRITE Negative 01/30/2019    GLUCOSEU 100 (1/10%) (A) 01/30/2019    KETONESU Negative 01/30/2019    BILIRUBINUR Negative 01/30/2019    BLOODU Negative 01/30/2019   ,   Urine Culture: No results found for: URINECX,   Wound Culure: No results found for: WOUNDCULT    Medications:  Current Facility-Administered Medications   Medication Dose Route Frequency    acetaminophen (TYLENOL) tablet 650 mg  650 mg Oral Q6H PRN    amLODIPine (NORVASC) tablet 10 mg  10 mg Oral Daily    aspirin chewable tablet 81 mg  81 mg Oral Daily    atorvastatin (LIPITOR) tablet 20 mg  20 mg Oral QPM    ceFAZolin (ANCEF) IVPB (premix) 2,000 mg  2,000 mg Intravenous Q8H    dextrose 5 % and sodium chloride 0 45 % infusion  60 mL/hr Intravenous Continuous    heparin (porcine) subcutaneous injection 5,000 Units  5,000 Units Subcutaneous Q8H [facility]    insulin glargine (LANTUS) subcutaneous injection 75 Units 0 75 mL  75 Units Subcutaneous QAM    insulin lispro (HumaLOG) 100 units/mL subcutaneous injection 1-5 Units  1-5 Units Subcutaneous HS    insulin lispro (HumaLOG) 100 units/mL subcutaneous injection 1-6 Units  1-6 Units Subcutaneous TID AC    insulin lispro (HumaLOG) 100 units/mL subcutaneous injection 15 Units  15 Units Subcutaneous TID With Meals    loratadine (CLARITIN) tablet 10 mg  10 mg Oral Daily PRN    oxyCODONE-acetaminophen (PERCOCET) 5-325 mg per tablet 1 tablet  1 tablet Oral Q6H PRN       Imaging:  No new imaging studies for review    Physical Exam:    General appearance: alert and oriented, in no acute distress  Neurologic: Grossly normal  Neck: no adenopathy, no carotid bruit, no JVD, supple, symmetrical, trachea midline and thyroid not enlarged, symmetric, no tenderness/mass/nodules  Lungs: clear to auscultation bilaterally  Heart: regular rate and rhythm, S1, S2 normal, no murmur, click, rub or gallop  Abdomen: soft, non-tender; bowel sounds normal; no masses,  no organomegaly  Extremities: Bilateral lower extremities warm, pink and motor and sensory intact  Left 5th toe ulceration with dry eschar without erythema, induration, drainage, crepitus      Wound/Incision:    Left foot      Pulse exam:  Radial: Right: 2+ Left[de-identified] 2+  Femoral: Right: 2+ Left: 2+  Popliteal: Right: non-palpable Left: non-palpable  DP: Right: non-palpable Left: non-palpable  PT: Right: non-palpable Left: non-palpable      Kay Davison PA-C  2/1/2019  [facility], [phone]

## 2019-02-01 NOTE — PROGRESS NOTES
Progress Note - [address] 1965, 48 y o  female MRN: [id number]  Unit/Bed#: E2 -01 Encounter: [id number]  Primary Care Provider: Angela Estrella MD   Date and time admitted to hospital: 1/28/2019 12:23 PM      * Subacute osteomyelitis of left foot ([address] )   Assessment & Plan    · POA with   · MRI left foot: Associated with the 5th toe tip ulcer, there is osteomyelitis in the distal phalanx and middle phalanx  The proximal phalanx is not involved  · Management per primary- Podiatry   · On IV Ancef per primary  · Vascular following for significant peripheral arterial disease  · Patient to IR today for arteriogram extremity for toe ulcer by IR  · Continue local wound care per primary service  · Likely will need amputation pending agram and vascular recommendations  · NWB LLE per podiatry      Type 2 diabetes mellitus without complication, with long-term current use of insulin [facility])   Assessment & Plan    Lab Results   Component Value Date    HGBA1C 11 4 (H) 01/29/2019       Recent Labs      01/31/19   1608  01/31/19   2040  02/01/19   0642  02/01/19   1110   POCGLU  94  205*  161*  130       Blood Sugar Average: Last 72 hrs:  (P) 289 [id number]   · Uncontrolled type 2 diabetes with A1c 11 4%  · Continue to hold oral medications while hospitalized  · Continue Lantus 75 units q a m , resume Humalog 15 units t i d   Once back on diet, hold while NPO, continue sliding scale insulin, Accu-Checks, continue to monitor blood glucose closely to optimize blood glucose control for appropriate healing     Essential hypertension   Assessment & Plan    · Home regimen of lisinopril 40 mg daily, chlorthalidone 25 mg daily, amlodipine 10 mg daily - currently holding lisinopril, chlorthalidone due to DEMETRIUS on admission   · renal function now at baseline    · BP low normal post agram, will continue to hold lisinopril and chlorthalidone and monitor BP in AM      HLD (hyperlipidemia)   Assessment & Plan · Lipid panel:  LDL 50, HDL 35, triglycerides 164, cholesterol 126  · Continue statin     PAD (peripheral artery disease) ([facility])   Assessment & Plan    · Vascular surgery following  · VAS lower limb arterial duplex: >75% noted in the right mid superficial femoral artery with diffuse disease noted throughout the remaining portions of femoral-popliteal, findings suggest tibioperoneal disease, occlusion versus high-grade stenosis noted in the mid superficial left artery with distal reconstitution with diffuse disease noted throughout the remaining portions popliteal  · Per vascular recommendations patient is going to IR for agram today   · Continue aspirin, statin therapy     CKD (chronic kidney disease) stage 3, GFR 30-59 ml/min ([facility])   Assessment & Plan    · Stage 3 chronic kidney disease, per chart review baseline creatinine 1 1-1 2  · Creatinine stable at 0 92 today  · Nephrology following     Microcytic anemia   Assessment & Plan    · Hemoglobin 10 5, MCV 77   · Iron studies normal   · Per chart review baseline hgb appears 10-11  · Will continue to monitor closely       VTE Pharmacologic Prophylaxis:   Pharmacologic: Heparin  Mechanical VTE Prophylaxis in Place: No    Patient Centered Rounds: I have performed bedside rounds with nursing staff today  Discussions with Specialists or Other Care Team Provider:  Reviewed podiatry, vascular, nephrology progress notes    Education and Discussions with Family / Patient: patient     Time Spent for Care: 30 minutes  More than 50% of total time spent on counseling and coordination of care as described above  Current Length of Stay: 4 day(s)    Current Patient Status: Inpatient   Certification Statement: The patient will continue to require additional inpatient hospital stay due to Subacute osteomyelitis of the left foot    Discharge Plan:  Pending per primary team    Code Status: Level 1 - Full Code      Subjective:   Patient is doing well after agram today    She is currently lying flat in bed and is alert and oriented  Patient denies any significant pain  She is asking currently to eat  She denies any chest pain, shortness of breath, abdominal pain, trouble urinating, lightheadedness, dizziness  Objective:     Vitals:   Temp (24hrs), Av 7 °F (36 5 °C), Min:97 °F (36 1 °C), Max:98 °F (36 7 °C)    Temp:  [97 °F (36 1 °C)-98 °F (36 7 °C)] 98 °F (36 7 °C)  HR:  [78-96] 78  Resp:  [16-20] 16  BP: (101-207)/() 101/54  SpO2:  [93 %-99 %] 98 %  Body mass index is 37 37 kg/m²  Input and Output Summary (last 24 hours): Intake/Output Summary (Last 24 hours) at 19 1650  Last data filed at 19 1020   Gross per 24 hour   Intake              417 ml   Output             1100 ml   Net             -683 ml       Physical Exam:     Physical Exam   Constitutional: She is oriented to person, place, and time  No distress  HENT:   Head: Normocephalic and atraumatic  Eyes: Conjunctivae are normal    Neck: Neck supple  Cardiovascular: Normal rate and regular rhythm  Pulmonary/Chest: Effort normal and breath sounds normal  No respiratory distress  She has no wheezes  Abdominal: Soft  Bowel sounds are normal  There is no tenderness  Musculoskeletal: She exhibits no edema  Left 5th digit with eschar and erythema noted   Neurological: She is alert and oriented to person, place, and time  Skin: Skin is warm and dry  She is not diaphoretic  Psychiatric: She has a normal mood and affect  Nursing note and vitals reviewed  Additional Data:     Labs:      Results from last 7 days  Lab Units 19  0423  19  1945   WBC Thousand/uL 9 42  < > 9 22   HEMOGLOBIN g/dL 10 5*  < > 10 1*   HEMATOCRIT % 36 4  < > 34 0*   PLATELETS Thousands/uL 311  < > 272   NEUTROS PCT %  --   --  56   LYMPHS PCT %  --   --  36   MONOS PCT %  --   --  6   EOS PCT %  --   --  1   < > = values in this interval not displayed      Results from last 7 days  Lab Units 02/01/19  0423  01/28/19  1945   SODIUM mmol/L 135*  < > 138   POTASSIUM mmol/L 4 2  < > 4 1   CHLORIDE mmol/L 103  < > 104   CO2 mmol/L 23  < > 23   BUN mg/dL 29*  < > 31*   CREATININE mg/dL 0 92  < > 1 21   ANION GAP mmol/L 9  < > 11   CALCIUM mg/dL 8 3  < > 8 5   ALBUMIN g/dL  --   --  2 7*   TOTAL BILIRUBIN mg/dL  --   --  0 21   ALK PHOS U/L  --   --  82   ALT U/L  --   --  22   AST U/L  --   --  22   GLUCOSE RANDOM mg/dL 187*  < > 127   < > = values in this interval not displayed  Results from last 7 days  Lab Units 01/30/19  1733   INR  0 99       Results from last 7 days  Lab Units 02/01/19  1110 02/01/19  0642 01/31/19  2040 01/31/19  1608 01/31/19  1549 01/31/19  1101 01/31/19  0650 01/30/19  2129 01/30/19  1616 01/30/19  1100 01/30/19  0641 01/29/19  2155   POC GLUCOSE mg/dl 130 161* 205* 94 57* 172* 158* 174* 71 330* 307* 130       Results from last 7 days  Lab Units 01/29/19  1152   HEMOGLOBIN A1C % 11 4*               * I Have Reviewed All Lab Data Listed Above  * Additional Pertinent Lab Tests Reviewed:  All Labs Within Last 24 Hours Reviewed    Imaging:    Imaging Reports Reviewed Today Include:  MRI left foot, the AS lower limb arterial duplex, chest x-ray, x-ray left foot  Imaging Personally Reviewed by Myself Includes:  none    Recent Cultures (last 7 days):           Last 24 Hours Medication List:     Current Facility-Administered Medications:  acetaminophen 650 mg Oral Q6H PRN Earney Leaks, DPM    amLODIPine 10 mg Oral Daily Antonio Silva, MIKEL    aspirin 81 mg Oral Daily MIKEL Bermudez    atorvastatin 20 mg Oral QPM Earney Leaks, DPM    cefazolin 2,000 mg Intravenous Q8H Earney Leaks, DPM Last Rate: Stopped (02/01/19 1620)   dextrose 5 % and sodium chloride 0 45 % 60 mL/hr Intravenous Continuous Dom Stuart, DO Last Rate: 60 mL/hr (02/01/19 0527)   heparin (porcine) 5,000 Units Subcutaneous [facility] Debra Antoine DPMANOLO    insulin glargine 75 Units Subcutaneous RAKESH Valencia Renda Riedel, DPM    insulin lispro 1-5 Units Subcutaneous HS Bishop Vinh DPM    insulin lispro 1-6 Units Subcutaneous TID AC Bishop Vinh DPM    insulin lispro 15 Units Subcutaneous TID With Meals Bishop Vinh DPM    loratadine 10 mg Oral Daily PRN Bishop Vinh DPM    oxyCODONE-acetaminophen 1 tablet Oral Q6H PRN Bishop Vinh DPM         Today, Patient Was Seen By: Michelle Torres PA-C    ** Please Note: Dictation voice to text software may have been used in the creation of this document   **

## 2019-02-01 NOTE — BRIEF OP NOTE (RAD/CATH)
Aortogram and left lower extremity runoff Procedure Note    PATIENT NAME: Venus Prince  : 1965  MRN: [id number]     Pre-op Diagnosis:   1  Type 2 diabetes mellitus without complication, with long-term current use of insulin ([facility])    2  Subacute osteomyelitis of left foot ([facility] )    3  PAD (peripheral artery disease) ([facility])      Post-op Diagnosis:   1  Type 2 diabetes mellitus without complication, with long-term current use of insulin ([facility])    2  Subacute osteomyelitis of left foot ([facility] [address] )    3  PAD (peripheral artery disease) [facility])        Surgeon:   Zoya Lomeli MD  Assistants:     No qualified resident was available, Resident is only observing    Estimated Blood Loss: Minimal  Findings: Occluded distal left SFA treated with 4 mm x 120 cm KEVIN PTA  Chronic left GOGO dissection      Specimens: none    Complications:  none    Anesthesia: Conscious sedation and Local    Zoya Lomeli MD     Date: 2019  Time: 2:41 PM

## 2019-02-01 NOTE — ASSESSMENT & PLAN NOTE
47 y/o F former smoker w/hx HTN, HLD, uncontrolled DM II (A1C 11 4), CKD III (baseline creatinine 1 1-1 2) and PAD w/L 5th toe DFU, cellulitis, OM  Diagnostics:  -SHERYL 01/29/2019:  R: >75% stenosis mid SFA, tibioperoneal dx; [place]: 1 61/96/116  L:   vs high-grade stenosis of mid SFA with distal reconstitution w/diffuse disease, tibioperoneal disease; [place]: 0 66/83/61  -Left foot Xray and MRI:  5th toe distal phalanx osteomyelitis       Plan:  -for abdominal aortogram with left lower extremity runoff, possible endovascular intervention today by IR  -npo and IVF in anticipation of angiogram  -continue ASA, statin therapy  -continue antibiotics per primary service  -continue with local wound care per Podiatry    Will require podiatric intervention after revascularization   -creat below baseline @ 0 92 this am   Continue IV hydration pre and post-procedure per nephrology  -formal recommendations to follow angiogram   -will d/w Dr Regan Form

## 2019-02-02 ENCOUNTER — ANESTHESIA EVENT (INPATIENT)
Dept: PERIOP | Facility: HOSPITAL | Age: 54
DRG: 253 | End: 2019-02-02
Payer: COMMERCIAL

## 2019-02-02 LAB
ANION GAP SERPL CALCULATED.3IONS-SCNC: 8 MMOL/L (ref 4–13)
BUN SERPL-MCNC: 17 MG/DL (ref 5–25)
CALCIUM SERPL-MCNC: 8.1 MG/DL (ref 8.3–10.1)
CHLORIDE SERPL-SCNC: 104 MMOL/L (ref 100–108)
CO2 SERPL-SCNC: 26 MMOL/L (ref 21–32)
CREAT SERPL-MCNC: 0.93 MG/DL (ref 0.6–1.3)
ERYTHROCYTE [DISTWIDTH] IN BLOOD BY AUTOMATED COUNT: 15.3 % (ref 11.6–15.1)
GFR SERPL CREATININE-BSD FRML MDRD: 70 ML/MIN/1.73SQ M
GLUCOSE SERPL-MCNC: 114 MG/DL (ref 65–140)
GLUCOSE SERPL-MCNC: 119 MG/DL (ref 65–140)
GLUCOSE SERPL-MCNC: 120 MG/DL (ref 65–140)
GLUCOSE SERPL-MCNC: 272 MG/DL (ref 65–140)
GLUCOSE SERPL-MCNC: 283 MG/DL (ref 65–140)
HCT VFR BLD AUTO: 33.5 % (ref 34.8–46.1)
HGB BLD-MCNC: 10.2 G/DL (ref 11.5–15.4)
MCH RBC QN AUTO: 23.2 PG (ref 26.8–34.3)
MCHC RBC AUTO-ENTMCNC: 30.4 G/DL (ref 31.4–37.4)
MCV RBC AUTO: 76 FL (ref 82–98)
PLATELET # BLD AUTO: 282 THOUSANDS/UL (ref 149–390)
PMV BLD AUTO: 10.1 FL (ref 8.9–12.7)
POTASSIUM SERPL-SCNC: 4.2 MMOL/L (ref 3.5–5.3)
RBC # BLD AUTO: 4.39 MILLION/UL (ref 3.81–5.12)
SODIUM SERPL-SCNC: 138 MMOL/L (ref 136–145)
WBC # BLD AUTO: 9.43 THOUSAND/UL (ref 4.31–10.16)

## 2019-02-02 PROCEDURE — 82948 REAGENT STRIP/BLOOD GLUCOSE: CPT

## 2019-02-02 PROCEDURE — 85027 COMPLETE CBC AUTOMATED: CPT | Performed by: PHYSICIAN ASSISTANT

## 2019-02-02 PROCEDURE — 99232 SBSQ HOSP IP/OBS MODERATE 35: CPT | Performed by: PHYSICIAN ASSISTANT

## 2019-02-02 PROCEDURE — 99232 SBSQ HOSP IP/OBS MODERATE 35: CPT | Performed by: INTERNAL MEDICINE

## 2019-02-02 PROCEDURE — 80048 BASIC METABOLIC PNL TOTAL CA: CPT | Performed by: PHYSICIAN ASSISTANT

## 2019-02-02 PROCEDURE — 99233 SBSQ HOSP IP/OBS HIGH 50: CPT | Performed by: NURSE PRACTITIONER

## 2019-02-02 RX ADMIN — INSULIN LISPRO 15 UNITS: 100 INJECTION, SOLUTION INTRAVENOUS; SUBCUTANEOUS at 17:15

## 2019-02-02 RX ADMIN — INSULIN LISPRO 15 UNITS: 100 INJECTION, SOLUTION INTRAVENOUS; SUBCUTANEOUS at 08:52

## 2019-02-02 RX ADMIN — HEPARIN SODIUM 5000 UNITS: 5000 INJECTION INTRAVENOUS; SUBCUTANEOUS at 21:12

## 2019-02-02 RX ADMIN — ATORVASTATIN CALCIUM 20 MG: 20 TABLET, FILM COATED ORAL at 17:15

## 2019-02-02 RX ADMIN — CEFAZOLIN SODIUM 2000 MG: 2 SOLUTION INTRAVENOUS at 05:39

## 2019-02-02 RX ADMIN — HEPARIN SODIUM 5000 UNITS: 5000 INJECTION INTRAVENOUS; SUBCUTANEOUS at 05:39

## 2019-02-02 RX ADMIN — INSULIN LISPRO 15 UNITS: 100 INJECTION, SOLUTION INTRAVENOUS; SUBCUTANEOUS at 13:22

## 2019-02-02 RX ADMIN — HEPARIN SODIUM 5000 UNITS: 5000 INJECTION INTRAVENOUS; SUBCUTANEOUS at 13:25

## 2019-02-02 RX ADMIN — CEFAZOLIN SODIUM 2000 MG: 2 SOLUTION INTRAVENOUS at 20:15

## 2019-02-02 RX ADMIN — CEFAZOLIN SODIUM 2000 MG: 2 SOLUTION INTRAVENOUS at 13:25

## 2019-02-02 RX ADMIN — INSULIN GLARGINE 75 UNITS: 100 INJECTION, SOLUTION SUBCUTANEOUS at 08:51

## 2019-02-02 RX ADMIN — ASPIRIN 81 MG 81 MG: 81 TABLET ORAL at 08:52

## 2019-02-02 NOTE — ASSESSMENT & PLAN NOTE
· Home regimen of lisinopril 40 mg daily, chlorthalidone 25 mg daily, amlodipine 10 mg daily - currently holding lisinopril, chlorthalidone due to DEMETRIUS on admission   · renal function now at baseline  However BP is low normal, will continue to hold lisinopril and chlorthalidone and close monitoring of BP

## 2019-02-02 NOTE — ASSESSMENT & PLAN NOTE
CKD III, baseline creatinine 1 1-1 2  -creatinine stable and below baseline at 0 93/GFR 70 post Agram  -nephrology following  -continue to avoid nephrotoxic agents  -continue to trend creatinine

## 2019-02-02 NOTE — PROGRESS NOTES
Progress Note - [address] 1965, 48 y o  female MRN: [id number]  Unit/Bed#: E2 -01 Encounter: [id number]  Primary Care Provider: Negrito Bernabe MD   Date and time admitted to hospital: 1/28/2019 12:23 PM      * Subacute osteomyelitis of left foot ([address] )   Assessment & Plan    · POA with OM of left 5th digit   · MRI left foot: Associated with the 5th toe tip ulcer, there is osteomyelitis in the distal phalanx and middle phalanx  The proximal phalanx is not involved     · Management per primary- Podiatry   · On IV Ancef per primary  · Vascular following for significant peripheral arterial disease  · Patient underwent arteriogram and LLE runoff 2/01   · 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, Continuous left lower extremity runoff, post intervention, via the peroneal artery to the dorsalis pedis, Findings of occluded distal left SFA a treated with 4 mm x 120cm KEVIN PTA, chronic left GOGO dissection   · Continue local wound care per primary service  · Likely will need amputation pending agram and vascular recommendations  · NWB LLE per podiatry      Type 2 diabetes mellitus without complication, with long-term current use of insulin [facility])   Assessment & Plan    Lab Results   Component Value Date    HGBA1C 11 4 (H) 01/29/2019       Recent Labs      02/01/19   1110  02/01/19   1638  02/01/19   2101  02/02/19   0734   POCGLU  130  100  160*  119       Blood Sugar Average: Last 72 hrs:  (P) 159 [id number]   · Uncontrolled type 2 diabetes with A1c 11 4%  · Continue to hold oral medications while hospitalized  · Continue Lantus 75 units q a m , resume Humalog 15 units t i d , continue sliding scale insulin, Accu-Checks qid and at bedtime, continue to monitor blood glucose closely to optimize blood glucose control for appropriate healing     Essential hypertension   Assessment & Plan    · Home regimen of lisinopril 40 mg daily, chlorthalidone 25 mg daily, amlodipine 10 mg daily - currently holding lisinopril, chlorthalidone due to DEMETRIUS on admission   · renal function now at baseline  However BP is low normal, will continue to hold lisinopril and chlorthalidone and close monitoring of BP      HLD (hyperlipidemia)   Assessment & Plan    · Lipid panel:  LDL 50, HDL 35, triglycerides 164, cholesterol 126  · Continue statin     PAD (peripheral artery disease) ([facility])   Assessment & Plan    · Vascular surgery following  · VAS lower limb arterial duplex: >75% noted in the right mid superficial femoral artery with diffuse disease noted throughout the remaining portions of femoral-popliteal, findings suggest tibioperoneal disease, occlusion versus high-grade stenosis noted in the mid superficial left artery with distal reconstitution with diffuse disease noted throughout the remaining portions popliteal  · Underwent agram with LLE runoff on 2/01 by IR  · Continue aspirin, statin therapy     CKD (chronic kidney disease) stage 3, GFR 30-59 ml/min ([facility])   Assessment & Plan    · Stage 3 chronic kidney disease, per chart review baseline creatinine 1 1-1 2  · Creatinine stable at 0 93 today  · Nephrology following     Microcytic anemia   Assessment & Plan    · Hemoglobin 10 5, MCV 77   · Iron studies normal   · Per chart review baseline hgb appears 10-11  · Currently stable no active signs of bleeding   · Will continue to monitor closely       VTE Pharmacologic Prophylaxis:   Pharmacologic: Heparin  Mechanical VTE Prophylaxis in Place: Yes    Patient Centered Rounds: I have performed bedside rounds with nursing staff today  Discussions with Specialists or Other Care Team Provider: nursing, review podiatry, vascular surgery, nephrology progress    Education and Discussions with Family / Patient: patient     Time Spent for Care: 30 minutes    More than 50% of total time spent on counseling and coordination of care as described above  Current Length of Stay: 5 day(s)    Current Patient Status: Inpatient   Certification Statement: The patient will continue to require additional inpatient hospital stay due to Subacute osteomyelitis left 5th digit    Discharge Plan:  Pending    Code Status: Level 1 - Full Code      Subjective:   Patient is doing well today without any acute complaints  She denies any fevers, night, chills, abdominal pain, urinary symptoms, constipation, chest pain, shortness of breath, difficulty breathing  She denies any significant pain at left 5th digit as well as at the right groin where she underwent arteriogram yesterday  She is asking when she is going to go home after her amputation  Objective:     Vitals:   Temp (24hrs), Av 5 °F (36 4 °C), Min:97 °F (36 1 °C), Max:98 °F (36 7 °C)    Temp:  [97 °F (36 1 °C)-98 °F (36 7 °C)] 97 5 °F (36 4 °C)  HR:  [76-96] 82  Resp:  [16-18] 18  BP: (101-207)/() 126/60  SpO2:  [93 %-99 %] 96 %  Body mass index is 37 37 kg/m²  Input and Output Summary (last 24 hours): Intake/Output Summary (Last 24 hours) at 19 1394  Last data filed at 19 1903   Gross per 24 hour   Intake                0 ml   Output             1100 ml   Net            -1100 ml       Physical Exam:     Physical Exam   Constitutional: She is oriented to person, place, and time  No distress  Pleasant, lying in bed comfortably   HENT:   Head: Normocephalic and atraumatic  Eyes: Conjunctivae are normal    Neck: Neck supple  Cardiovascular: Normal rate and regular rhythm  Pulmonary/Chest: Effort normal and breath sounds normal  No respiratory distress  She has no wheezes  Abdominal: Soft  Bowel sounds are normal  There is no tenderness  Presence of clean, dry dressing of left groin   Musculoskeletal: She exhibits deformity  She exhibits no edema     Left 5th digit with eschar and erythema   Neurological: She is alert and oriented to person, place, and time  Skin: Skin is warm and dry  She is not diaphoretic  Psychiatric: She has a normal mood and affect  Nursing note and vitals reviewed  Additional Data:     Labs:      Results from last 7 days  Lab Units 02/02/19  0607  01/28/19  1945   WBC Thousand/uL 9 43  < > 9 22   HEMOGLOBIN g/dL 10 2*  < > 10 1*   HEMATOCRIT % 33 5*  < > 34 0*   PLATELETS Thousands/uL 282  < > 272   NEUTROS PCT %  --   --  56   LYMPHS PCT %  --   --  36   MONOS PCT %  --   --  6   EOS PCT %  --   --  1   < > = values in this interval not displayed  Results from last 7 days  Lab Units 02/02/19  0607  01/28/19  1945   SODIUM mmol/L 138  < > 138   POTASSIUM mmol/L 4 2  < > 4 1   CHLORIDE mmol/L 104  < > 104   CO2 mmol/L 26  < > 23   BUN mg/dL 17  < > 31*   CREATININE mg/dL 0 93  < > 1 21   ANION GAP mmol/L 8  < > 11   CALCIUM mg/dL 8 1*  < > 8 5   ALBUMIN g/dL  --   --  2 7*   TOTAL BILIRUBIN mg/dL  --   --  0 21   ALK PHOS U/L  --   --  82   ALT U/L  --   --  22   AST U/L  --   --  22   GLUCOSE RANDOM mg/dL 120  < > 127   < > = values in this interval not displayed  Results from last 7 days  Lab Units 01/30/19  1733   INR  0 99       Results from last 7 days  Lab Units 02/02/19  0734 02/01/19  2101 02/01/19  1638 02/01/19  1110 02/01/19  0642 01/31/19  2040 01/31/19  1608 01/31/19  1549 01/31/19  1101 01/31/19  0650 01/30/19  2129 01/30/19  1616   POC GLUCOSE mg/dl 119 160* 100 130 161* 205* 94 57* 172* 158* 174* 71       Results from last 7 days  Lab Units 01/29/19  1152   HEMOGLOBIN A1C % 11 4*               * I Have Reviewed All Lab Data Listed Above  * Additional Pertinent Lab Tests Reviewed:  All Labs Within Last 24 Hours Reviewed    Imaging:    Imaging Reports Reviewed Today Include: IR abdominal angiography   Imaging Personally Reviewed by Myself Includes:  none    Recent Cultures (last 7 days):           Last 24 Hours Medication List:     Current Facility-Administered Medications:  acetaminophen 650 mg Oral Q6H PRN Wilhelmenia Kill, DPM    amLODIPine 10 mg Oral Daily Dimitrios Khan, MIKEL    aspirin 81 mg Oral Daily MIKEL Bermudez    atorvastatin 20 mg Oral QPM Wilhelmenia Kill, DPM    cefazolin 2,000 mg Intravenous Q8H Wilhelmenia Kill, DPM Last Rate: 2,000 mg (02/02/19 0539)   dextrose 5 % and sodium chloride 0 45 % 60 mL/hr Intravenous Continuous Dom Stuart, DO Last Rate: 60 mL/hr (02/01/19 2025)   heparin (porcine) 5,000 Units Subcutaneous [facility] Wilhelmenia Kill, DPM    insulin glargine 75 Units Subcutaneous QAM Wilhelmenia Kill, DPM    insulin lispro 1-5 Units Subcutaneous HS Wilhelmenia Kill, DPM    insulin lispro 1-6 Units Subcutaneous TID AC Anika Blakely PA-C    insulin lispro 15 Units Subcutaneous TID With Meals Wilhelmenia Kill, DPM    loratadine 10 mg Oral Daily PRN Wilhelmenia Kill, DPM    oxyCODONE-acetaminophen 1 tablet Oral Q6H PRN Wilhelmenia Kill, DPM         Today, Patient Was Seen By: Guillermo Remy PA-C    ** Please Note: Dictation voice to text software may have been used in the creation of this document   **

## 2019-02-02 NOTE — ASSESSMENT & PLAN NOTE
Lab Results   Component Value Date    HGBA1C 11 4 (H) 01/29/2019       Recent Labs      02/01/19   1110  02/01/19   1638  02/01/19   2101  02/02/19   0734   POCGLU  130  100  160*  119       Blood Sugar Average: Last 72 hrs:  (P) 159 [id number]   · Uncontrolled type 2 diabetes with A1c 11 4%  · Continue to hold oral medications while hospitalized  · Continue Lantus 75 units q a m , resume Humalog 15 units t i d , continue sliding scale insulin, Accu-Checks qid and at bedtime, continue to monitor blood glucose closely to optimize blood glucose control for appropriate healing

## 2019-02-02 NOTE — ASSESSMENT & PLAN NOTE
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

## 2019-02-02 NOTE — ASSESSMENT & PLAN NOTE
· Vascular surgery following  · VAS lower limb arterial duplex: >75% noted in the right mid superficial femoral artery with diffuse disease noted throughout the remaining portions of femoral-popliteal, findings suggest tibioperoneal disease, occlusion versus high-grade stenosis noted in the mid superficial left artery with distal reconstitution with diffuse disease noted throughout the remaining portions popliteal  · Underwent agram with LLE runoff on 2/01 by IR  · Continue aspirin, statin therapy

## 2019-02-02 NOTE — ASSESSMENT & PLAN NOTE
49 y/o F former smoker w/hx HTN, HLD, uncontrolled DM II (A1C 11 4), CKD III (baseline creatinine 1 1-1 2) and PAD w/L 5th toe DFU, cellulitis, OM  Diagnostics:  -SHERYL 01/29/2019:  R: >75% stenosis mid SFA, tibioperoneal dx; [place]: 1 61/96/116  L:   vs high-grade stenosis of mid SFA with distal reconstitution w/diffuse disease, tibioperoneal disease; [place]: 0 66/83/61  -Left foot Xray and MRI:  5th toe distal phalanx osteomyelitis       Plan:  -s/p angiogram yesterday w/ DCB angioplasty to distal L SFA occlusion, chronic AT/PT occlusion, peroneal runoff w/ filling of DP  Postintervention images appear adequate to heal left 5th toe amp  -Will check postintervention [place] to assess improvement    Podiatry planning L 5th toe amp  -continue ASA + statin therapy  -continue antibiotics per primary service for OM  -continue local wound care per podiatry

## 2019-02-02 NOTE — ASSESSMENT & PLAN NOTE
· Hemoglobin 10 5, MCV 77   · Iron studies normal   · Per chart review baseline hgb appears 10-11  · Currently stable no active signs of bleeding   · Will continue to monitor closely

## 2019-02-02 NOTE — PROGRESS NOTES
Progress Note - Podiatry  Meagan Chelsea Lyon 48 y o  female MRN: [id number]  Unit/Bed#: E2 -01 Encounter: [id number]    Assessment:  1  Left 5th toe wound/eschar with associated cellulitis and OM  2  Chronic left Charcot arthropathy   3  DM 2 -HBA1c: 11 4%  4  PAD  5  CKD stage 3  6  HTN  7  HLD     Plan:  - to OR Mon 2/4 for left 5th toe amputation  - continue with IV cefazolin and monitor cellulitis  - weight-bearing as tolerated  - medical management per SLIM    Dispo:  Patient will continue require in-patient state due to surgical intervention       Subjective/Objective   Chief Complaint: No chief complaint on file  Subjective: 48 y o  y/o female was seen and evaluated at bedside  No acute events overnight  Denies pain to left lower extremity after arteriogram yesterday  Blood pressure 125/71, pulse 80, temperature 97 5 °F (36 4 °C), temperature source Temporal, resp  rate 18, height 5' (1 524 m), weight 86 8 kg (191 lb 5 8 oz), last menstrual period 01/15/2019, SpO2 99 %, not currently breastfeeding  ,Body mass index is 37 37 kg/m²  Invasive Devices     Peripheral Intravenous Line            Peripheral IV 02/01/19 Right Forearm less than 1 day                Physical Exam:   General: Alert, cooperative and no distress  Lungs: Non labored breathing  Heart: Positive S1, S2  Abdomen: Soft, non-tender  Extremity:  MSK function WNL, NVS at baseline  Left foot is warm to touch, left 5th toe dry eschar remains stable          Lab, Imaging and other studies:   CBC:   Lab Results   Component Value Date    WBC 9 43 02/02/2019    HGB 10 2 (L) 02/02/2019    HCT 33 5 (L) 02/02/2019    MCV 76 (L) 02/02/2019     02/02/2019    MCH 23 2 (L) 02/02/2019    MCHC 30 4 (L) 02/02/2019    RDW 15 3 (H) 02/02/2019    MPV 10 1 02/02/2019       Imaging: I have personally reviewed pertinent films in PACS  EKG, Pathology, and Other Studies: I have personally reviewed pertinent reports    VTE Pharmacologic Prophylaxis: Heparin

## 2019-02-02 NOTE — ASSESSMENT & PLAN NOTE
· Stage 3 chronic kidney disease, per chart review baseline creatinine 1 1-1 2  · Creatinine stable at 0 93 today  · Nephrology following

## 2019-02-02 NOTE — PROGRESS NOTES
Progress Note - [address] 1965, 48 y o  female MRN: [id number]    Unit/Bed#: E2 -01 Encounter: [id number]    Primary Care Provider: Kennedy Engel MD   Date and time admitted to hospital: 1/28/2019 12:23 PM        PAD (peripheral artery disease) [facility])   Assessment & Plan    49 y/o F former smoker w/hx HTN, HLD, uncontrolled DM II (A1C 11 4), CKD III (baseline creatinine 1 1-1 2) and PAD w/L 5th toe DFU, cellulitis, OM  Diagnostics:  -SHERYL 01/29/2019:  R: >75% stenosis mid SFA, tibioperoneal dx; [place]: 1 61/96/116  L:   vs high-grade stenosis of mid SFA with distal reconstitution w/diffuse disease, tibioperoneal disease; [place]: 0 66/83/61  -Left foot Xray and MRI:  5th toe distal phalanx osteomyelitis       Plan:  -s/p angiogram yesterday w/ DCB angioplasty to distal L SFA occlusion, chronic AT/PT occlusion, peroneal runoff w/ filling of DP  Postintervention images appear adequate to heal left 5th toe amp  -Will check postintervention [place] to assess improvement    Podiatry planning L 5th toe amp  -continue ASA + statin therapy  -continue antibiotics per primary service for OM  -continue local wound care per podiatry     CKD (chronic kidney disease) stage 3, GFR 30-59 ml/min ([facility])   Assessment & Plan    CKD III, baseline creatinine 1 1-1 2  -creatinine stable and below baseline at 0 93/GFR 70 post Agram  -nephrology following  -continue to avoid nephrotoxic agents  -continue to trend creatinine     Type 2 diabetes mellitus without complication, with long-term current use of insulin [facility])   Assessment & Plan    Lab Results   Component Value Date    HGBA1C 11 4 (H) 01/29/2019       Recent Labs      02/01/19   1110  02/01/19   1638  02/01/19   2101  02/02/19   0734   POCGLU  130  100  160*  119       Blood Sugar Average: Last 72 hrs:  (P) 159 [id number]     -uncontrolled type II DM  -continue to to optimize BS control for optimization of wound healing, prevention of disease progression, and prevention of SSI post 5th toe amp  -management per SLIM         Subjective:  Patient denies any complaints  Right groin access site benign  Hemodynamically stable  Vitals:  /60 (BP Location: Left arm)   Pulse 82   Temp 97 5 °F (36 4 °C) (Tympanic)   Resp 18   Ht 5' (1 524 m)   Wt 86 8 kg (191 lb 5 8 oz)   LMP 01/15/2019   SpO2 96%   BMI 37 37 kg/m²     I/Os:  I/O last 3 completed shifts:   In: 417 [I V :317; IV Piggyback:100]  Out: 1900 [Urine:1900]  I/O this shift:  In: 120 [P O :120]  Out: 450 [Urine:450]    Lab Results and Cultures:   Lab Results   Component Value Date    WBC 9 43 02/02/2019    HGB 10 2 (L) 02/02/2019    HCT 33 5 (L) 02/02/2019    MCV 76 (L) 02/02/2019     02/02/2019     Lab Results   Component Value Date    CALCIUM 8 1 (L) 02/02/2019    K 4 2 02/02/2019    CO2 26 02/02/2019     02/02/2019    BUN 17 02/02/2019    CREATININE 0 93 02/02/2019     Lab Results   Component Value Date    INR 0 99 01/30/2019    PROTIME 13 2 01/30/2019        Blood Culture: No results found for: BLOODCX,   Urinalysis:   Lab Results   Component Value Date    COLORU Yellow 01/30/2019    CLARITYU Clear 01/30/2019    SPECGRAV 1 020 01/30/2019    PHUR 5 5 01/30/2019    LEUKOCYTESUR Negative 01/30/2019    NITRITE Negative 01/30/2019    GLUCOSEU 100 (1/10%) (A) 01/30/2019    KETONESU Negative 01/30/2019    BILIRUBINUR Negative 01/30/2019    BLOODU Negative 01/30/2019   ,   Urine Culture: No results found for: URINECX,   Wound Culure: No results found for: WOUNDCULT    Medications:  Current Facility-Administered Medications   Medication Dose Route Frequency    acetaminophen (TYLENOL) tablet 650 mg  650 mg Oral Q6H PRN    amLODIPine (NORVASC) tablet 10 mg  10 mg Oral Daily    aspirin chewable tablet 81 mg  81 mg Oral Daily    atorvastatin (LIPITOR) tablet 20 mg  20 mg Oral QPM    ceFAZolin (ANCEF) IVPB (premix) 2,000 mg  2,000 mg Intravenous Q8H    dextrose 5 % and sodium chloride 0 45 % infusion  60 mL/hr Intravenous Continuous    heparin (porcine) subcutaneous injection 5,000 Units  5,000 Units Subcutaneous Q8H [address]    insulin glargine (LANTUS) subcutaneous injection 75 Units 0 75 mL  75 Units Subcutaneous QAM    insulin lispro (HumaLOG) 100 units/mL subcutaneous injection 1-5 Units  1-5 Units Subcutaneous HS    insulin lispro (HumaLOG) 100 units/mL subcutaneous injection 1-6 Units  1-6 Units Subcutaneous TID AC    insulin lispro (HumaLOG) 100 units/mL subcutaneous injection 15 Units  15 Units Subcutaneous TID With Meals    loratadine (CLARITIN) tablet 10 mg  10 mg Oral Daily PRN    oxyCODONE-acetaminophen (PERCOCET) 5-325 mg per tablet 1 tablet  1 tablet Oral Q6H PRN       Imagin19 [place] VAMSHIE runoff- Images reviewed    Physical Exam:    General appearance: alert and oriented, in no acute distress  Skin: Skin color, texture, turgor normal  No rashes or lesions  Neurologic: Grossly normal  Head: Normocephalic, without obvious abnormality, atraumatic  Neck: no carotid bruit and no JVD  Lungs: clear to auscultation bilaterally  Heart: regular rate and rhythm, S1, S2 normal, no murmur, click, rub or gallop  Abdomen: soft, non-tender; bowel sounds normal; no masses,  no organomegaly  Extremities: extremities normal, warm and well-perfused; no cyanosis, clubbing, or edema and left 5th toe w/ dry ulceration w/ no signs infection    Right groin access site benign, dsg removed, no pulsatility, no pain, no ecchymosis    Wound/Incision:  As above    Pulse exam:  Femoral: Right: 2+ Left: 2+  Popliteal: Right: non-palpable Left: non-palpable  DP: Right: non-palpable Left: non-palpable  PT: Right: non-palpable Left: non-palpable      MIKEL Wong  2019  [facility]  [phone]

## 2019-02-02 NOTE — PROGRESS NOTES
NEPHROLOGY PROGRESS NOTE   Bre BraxtonCharan 48 y o  female MRN: [id number]  Unit/Bed#: E2 -01 Encounter: [id number]  Reason for Consult: CKD    ASSESSMENT/PLAN:  1  Chronic kidney disease III:  Likely secondary to hypertensive nephrosclerosis, arterial nephrosclerosis, and diabetic nephropathy  -baseline creatinine 1 1-1 2  -patient actually below baseline  -currently meloxicam, lisinopril, and chlorthalidone remains on hold  -BP remains stable at this time-will continue to hold for now  -electrolytes remained stable, acid-base balance remains stable  -will continue to to trend I/O, lab values in volume status    2  Hypertension:  BP acceptable at this time  -as above, -currently meloxicam, lisinopril, and chlorthalidone remains on hold for patient had angiogram yesterday  -will continue to hold for now  -currently on amlodipine 10 mg daily with hold parameters of SBP less than 130    3  Peripheral artery disease: With toe wound and possible osteomyelitis  -status post arteriogram yesterday with 110 mL not with noted distal left SFA occlusion-status post drug-eluting balloon intervention be IR  -podiatry planning left 5th toe amputation  -remains on cefazolin 2 g every 8 hr    4  Uncontrolled diabetes:  Need adequate blood sugar control    5  Anemia:  Likely Chronic Kidney Disease  -stable  -will continue to trend      SUBJECTIVE:  Patient seen and examined  Pain or shortness of Breath    Denies any issues overnight    OBJECTIVE:  Current Weight: Weight - Scale: 86 8 kg (191 lb 5 8 oz)  Vitals:    02/02/19 0021 02/02/19 0729 02/02/19 0854 02/02/19 1100   BP: 111/62 109/73 126/60 123/74   BP Location: Left arm Left arm Left arm Left arm   Pulse: 79 76 82 79   Resp: 18 18  18   Temp: (!) 97 3 °F (36 3 °C) 97 5 °F (36 4 °C)  97 9 °F (36 6 °C)   TempSrc: Tympanic Tympanic  Tympanic   SpO2: 97% 96%  100%   Weight:       Height:           Intake/Output Summary (Last 24 hours) at 02/02/19 1148  Last data filed at 02/02/19 0900   Gross per 24 hour   Intake              120 ml   Output             1250 ml   Net            -1130 ml     General:  No acute distress, out of bed, cooperative  Skin:  Warm and dry without rash  HEENT:  Mucous membranes moist without exudate, sclera anicteric  Neck:  Supple without JVD  Chest:  Essentially clear on auscultation without crackles, wheezes, rhonchi,  Heart:  Regular rate and rhythm without rub  Abdomen:  Soft, nontender, no distension, active bowel sounds  Extremities:  No significant edema bilaterally, left foot with dressing  Neuro:  Alert oriented and awake  Psych:  Appropriate affect    Medications:    Current Facility-Administered Medications:     acetaminophen (TYLENOL) tablet 650 mg, 650 mg, Oral, Q6H PRN, Claven Pedro, DPM    amLODIPine (NORVASC) tablet 10 mg, 10 mg, Oral, Daily, Yandel Ramachandran, CRNP, 10 mg at 02/01/19 0836    aspirin chewable tablet 81 mg, 81 mg, Oral, Daily, Lori Maria, CRNP, 81 mg at 02/02/19 0852    atorvastatin (LIPITOR) tablet 20 mg, 20 mg, Oral, QPM, Claven Sequatchie, DPM, 20 mg at 02/01/19 1730    ceFAZolin (ANCEF) IVPB (premix) 2,000 mg, 2,000 mg, Intravenous, Q8H, Claven [place]ie, DPM, Last Rate: 100 mL/hr at 02/02/19 0539, 2,000 mg at 02/02/19 0539    dextrose 5 % and sodium chloride 0 45 % infusion, 60 mL/hr, Intravenous, Continuous, Dom Stuart, DO, Last Rate: 60 mL/hr at 02/01/19 2025, 60 mL/hr at 02/01/19 2025    heparin (porcine) subcutaneous injection 5,000 Units, 5,000 Units, Subcutaneous, Q8H [facility], 5,000 Units at 02/02/19 0539 **AND** [CANCELED] Platelet count, , , Once, Claven Pedro, DPM    insulin glargine (LANTUS) subcutaneous injection 75 Units 0 75 mL, 75 Units, Subcutaneous, QAM, Clavfransisco Ivyan, DPM, 75 Units at 02/02/19 0851    insulin lispro (HumaLOG) 100 units/mL subcutaneous injection 1-5 Units, 1-5 Units, Subcutaneous, HS, Harvey Vasquez, DPM, 1 Units at 02/01/19 3011    insulin lispro (HumaLOG) 100 units/mL subcutaneous injection 1-6 Units, 1-6 Units, Subcutaneous, TID AC **AND** Fingerstick Glucose (POCT), , , 4x Daily AC and at bedtime, Anika Blakely PA-C    insulin lispro (HumaLOG) 100 units/mL subcutaneous injection 15 Units, 15 Units, Subcutaneous, TID With Meals, Dario Moreno DPM, 15 Units at 02/02/19 0852    loratadine (CLARITIN) tablet 10 mg, 10 mg, Oral, Daily PRN, Dario Moreno DPM    oxyCODONE-acetaminophen (PERCOCET) 5-325 mg per tablet 1 tablet, 1 tablet, Oral, Q6H PRN, Dario Moreno DPM    Laboratory Results:    Results from last 7 days  Lab Units 02/02/19  0607 02/01/19  0423 01/31/19  0503 01/30/19  0505 01/29/19  0546 01/28/19  1945   WBC Thousand/uL 9 43 9 42 8 37 8 67 9 07 9 22   HEMOGLOBIN g/dL 10 2* 10 5* 10 0* 10 5* 10 3* 10 1*   HEMATOCRIT % 33 5* 36 4 33 2* 36 0 34 0* 34 0*   PLATELETS Thousands/uL 282 311 266 274 268 272   POTASSIUM mmol/L 4 2 4 2 4 1 5 1 4 4 4 1   CHLORIDE mmol/L 104 103 106 102 103 104   CO2 mmol/L 26 23 21 21 20* 23   BUN mg/dL 17 29* 40* 39* 32* 31*   CREATININE mg/dL 0 93 0 92 1 16 1 41* 1 15 1 21   CALCIUM mg/dL 8 1* 8 3 8 2* 8 6 8 8 8 5

## 2019-02-02 NOTE — ASSESSMENT & PLAN NOTE
Lab Results   Component Value Date    HGBA1C 11 4 (H) 01/29/2019       Recent Labs      02/01/19   1110  02/01/19   1638  02/01/19   2101  02/02/19   0734   POCGLU  130  100  160*  119       Blood Sugar Average: Last 72 hrs:  (P) 159 [id number]     -uncontrolled type II DM  -continue to to optimize BS control for optimization of wound healing, prevention of disease progression, and prevention of SSI post 5th toe amp  -management per [facility]

## 2019-02-03 LAB
ANION GAP SERPL CALCULATED.3IONS-SCNC: 9 MMOL/L (ref 4–13)
BUN SERPL-MCNC: 20 MG/DL (ref 5–25)
CALCIUM SERPL-MCNC: 7.9 MG/DL (ref 8.3–10.1)
CHLORIDE SERPL-SCNC: 106 MMOL/L (ref 100–108)
CO2 SERPL-SCNC: 25 MMOL/L (ref 21–32)
CREAT SERPL-MCNC: 0.98 MG/DL (ref 0.6–1.3)
GFR SERPL CREATININE-BSD FRML MDRD: 66 ML/MIN/1.73SQ M
GLUCOSE SERPL-MCNC: 106 MG/DL (ref 65–140)
GLUCOSE SERPL-MCNC: 141 MG/DL (ref 65–140)
GLUCOSE SERPL-MCNC: 163 MG/DL (ref 65–140)
GLUCOSE SERPL-MCNC: 199 MG/DL (ref 65–140)
GLUCOSE SERPL-MCNC: 232 MG/DL (ref 65–140)
GLUCOSE SERPL-MCNC: 306 MG/DL (ref 65–140)
GLUCOSE SERPL-MCNC: 54 MG/DL (ref 65–140)
GLUCOSE SERPL-MCNC: 71 MG/DL (ref 65–140)
POTASSIUM SERPL-SCNC: 4 MMOL/L (ref 3.5–5.3)
SODIUM SERPL-SCNC: 140 MMOL/L (ref 136–145)

## 2019-02-03 PROCEDURE — 80048 BASIC METABOLIC PNL TOTAL CA: CPT | Performed by: NURSE PRACTITIONER

## 2019-02-03 PROCEDURE — 82948 REAGENT STRIP/BLOOD GLUCOSE: CPT

## 2019-02-03 PROCEDURE — 99233 SBSQ HOSP IP/OBS HIGH 50: CPT | Performed by: NURSE PRACTITIONER

## 2019-02-03 PROCEDURE — 99232 SBSQ HOSP IP/OBS MODERATE 35: CPT | Performed by: PHYSICIAN ASSISTANT

## 2019-02-03 RX ORDER — SODIUM CHLORIDE 9 MG/ML
50 INJECTION, SOLUTION INTRAVENOUS CONTINUOUS
Status: DISCONTINUED | OUTPATIENT
Start: 2019-02-04 | End: 2019-02-07 | Stop reason: HOSPADM

## 2019-02-03 RX ORDER — INSULIN GLARGINE 100 [IU]/ML
75 INJECTION, SOLUTION SUBCUTANEOUS EVERY MORNING
Status: DISCONTINUED | OUTPATIENT
Start: 2019-02-04 | End: 2019-02-03

## 2019-02-03 RX ORDER — INSULIN GLARGINE 100 [IU]/ML
75 INJECTION, SOLUTION SUBCUTANEOUS EVERY MORNING
Status: DISCONTINUED | OUTPATIENT
Start: 2019-02-03 | End: 2019-02-07 | Stop reason: HOSPADM

## 2019-02-03 RX ORDER — INSULIN GLARGINE 100 [IU]/ML
45 INJECTION, SOLUTION SUBCUTANEOUS
Status: DISCONTINUED | OUTPATIENT
Start: 2019-02-03 | End: 2019-02-03

## 2019-02-03 RX ORDER — INSULIN GLARGINE 100 [IU]/ML
70 INJECTION, SOLUTION SUBCUTANEOUS EVERY MORNING
Status: DISCONTINUED | OUTPATIENT
Start: 2019-02-04 | End: 2019-02-03

## 2019-02-03 RX ADMIN — CEFAZOLIN SODIUM 2000 MG: 2 SOLUTION INTRAVENOUS at 21:01

## 2019-02-03 RX ADMIN — HEPARIN SODIUM 5000 UNITS: 5000 INJECTION INTRAVENOUS; SUBCUTANEOUS at 13:08

## 2019-02-03 RX ADMIN — INSULIN LISPRO 3 UNITS: 100 INJECTION, SOLUTION INTRAVENOUS; SUBCUTANEOUS at 21:09

## 2019-02-03 RX ADMIN — CEFAZOLIN SODIUM 2000 MG: 2 SOLUTION INTRAVENOUS at 12:57

## 2019-02-03 RX ADMIN — INSULIN LISPRO 15 UNITS: 100 INJECTION, SOLUTION INTRAVENOUS; SUBCUTANEOUS at 13:03

## 2019-02-03 RX ADMIN — HEPARIN SODIUM 5000 UNITS: 5000 INJECTION INTRAVENOUS; SUBCUTANEOUS at 21:18

## 2019-02-03 RX ADMIN — INSULIN GLARGINE 75 UNITS: 100 INJECTION, SOLUTION SUBCUTANEOUS at 10:35

## 2019-02-03 RX ADMIN — ATORVASTATIN CALCIUM 20 MG: 20 TABLET, FILM COATED ORAL at 18:30

## 2019-02-03 RX ADMIN — ASPIRIN 81 MG 81 MG: 81 TABLET ORAL at 10:35

## 2019-02-03 RX ADMIN — CEFAZOLIN SODIUM 2000 MG: 2 SOLUTION INTRAVENOUS at 05:08

## 2019-02-03 RX ADMIN — HEPARIN SODIUM 5000 UNITS: 5000 INJECTION INTRAVENOUS; SUBCUTANEOUS at 05:07

## 2019-02-03 NOTE — ASSESSMENT & PLAN NOTE
Lab Results   Component Value Date    HGBA1C 11 4 (H) 01/29/2019       Recent Labs      02/02/19   1610  02/02/19   2050  02/03/19   0723  02/03/19   0753   POCGLU  283*  114  54*  106       Blood Sugar Average: Last 72 hrs:  (P) 669 [id number]   · Uncontrolled type 2 diabetes with A1c 11 4%  · Continue to hold oral medications while hospitalized  · Patient with hypoglycemia this AM with glucose 54, increased to 106 after given food/juice, recheck in 200's, will continue lantus qAM 75 units, continue monitoring blood glucose closely to avoid nato-operative hypoglycemia, resume Humalog 15 units t i d , continue sliding scale insulin, Accu-Checks qid and at bedtime, continue to monitor blood glucose closely to optimize blood glucose control for appropriate healing  · Patient NPO at midnight, hold mealtime insulin while NPO

## 2019-02-03 NOTE — ASSESSMENT & PLAN NOTE
CKD III, baseline creatinine 1 1-1 2  -creatinine stable and below baseline at 0 98/GFR 66 post Agram  -nephrology following  -continue to avoid nephrotoxic agents  -continue to trend creatinine

## 2019-02-03 NOTE — ANESTHESIA PREPROCEDURE EVALUATION
Review of Systems/Medical History  Patient summary reviewed  Chart reviewed      Cardiovascular  Hyperlipidemia, Hypertension , PVD,    Pulmonary  Smoker ex-smoker  ,        GI/Hepatic       Chronic kidney disease stage 3,        Endo/Other  Diabetes type 2 Insulin,   Obesity    GYN       Hematology  Anemia anemia of chronic disease and iron deficiency anemia,     Musculoskeletal    Comment: Osteomyelitis of left foot      Neurology   Psychology           Physical Exam    Airway    Mallampati score: II  TM Distance: <3 FB  Neck ROM: full     Dental   Comment: Missing teeth  Poor dentition  ,     Cardiovascular  Rhythm: regular, Rate: normal,     Pulmonary  Pulmonary exam normal     Other Findings        Anesthesia Plan  ASA Score- 3     Anesthesia Type- general with ASA Monitors  Additional Monitors:   Airway Plan: LMA  Comment: Consent obtained and placed in chart 2/2/19 2021    Questions answered   Plan Factors- Patient instructed to abstain from smoking on day of procedure  Patient did not smoke on day of surgery  Induction- intravenous  Postoperative Plan- Plan for postoperative opioid use  Informed Consent- Anesthetic plan and risks discussed with patient

## 2019-02-03 NOTE — PROGRESS NOTES
Progress Note - [address] 1965, 48 y o  female MRN: [id number]  Unit/Bed#: E2 -01 Encounter: [id number]  Primary Care Provider: Grady Ferguson MD   Date and time admitted to hospital: 1/28/2019 12:23 PM  Late entry note, patient was seen and evaluated at 10AM     * Subacute osteomyelitis of left foot ([address] )   Assessment & Plan    · POA with OM of left 5th digit   · MRI left foot: Associated with the 5th toe tip ulcer, there is osteomyelitis in the distal phalanx and middle phalanx  The proximal phalanx is not involved     · Management per primary- Podiatry   · On IV Ancef per primary  · Vascular following for significant peripheral arterial disease  · Patient underwent arteriogram and LLE runoff 2/01   · 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, Continuous left lower extremity runoff, post intervention, via the peroneal artery to the dorsalis pedis, Findings of occluded distal left SFA a treated with 4 mm x 120cm KEVIN PTA, chronic left GOGO dissection   · Continue local wound care per primary service  · Patient to go to OR 2/04 for left 5th toe amputation   · NWB LLE per podiatry      Type 2 diabetes mellitus without complication, with long-term current use of insulin [facility])   Assessment & Plan    Lab Results   Component Value Date    HGBA1C 11 4 (H) 01/29/2019       Recent Labs      02/02/19   1610  02/02/19   2050  02/03/19   0723  02/03/19   0753   POCGLU  283*  114  54*  106       Blood Sugar Average: Last 72 hrs:  (P) 991 [id number]   · Uncontrolled type 2 diabetes with A1c 11 4%  · Continue to hold oral medications while hospitalized  · Patient with hypoglycemia this AM with glucose 54, increased to 106 after given food/juice, recheck in 200's, will continue lantus qAM 75 units, continue monitoring blood glucose closely to avoid nato-operative hypoglycemia, resume Humalog 15 units t i d , continue sliding scale insulin, Accu-Checks qid and at bedtime, continue to monitor blood glucose closely to optimize blood glucose control for appropriate healing  · Patient NPO at midnight, hold mealtime insulin while NPO      Essential hypertension   Assessment & Plan    · Home regimen of lisinopril 40 mg daily, chlorthalidone 25 mg daily, amlodipine 10 mg daily - currently holding lisinopril, chlorthalidone due to DEMETRIUS on admission   · renal function now at baseline  However BP is currently acceptable, continue to hold lisinopril and chlorthalidone and close monitoring of BP      HLD (hyperlipidemia)   Assessment & Plan    · Lipid panel:  LDL 50, HDL 35, triglycerides 164, cholesterol 126  · Continue statin     PAD (peripheral artery disease) ([facility] [address] )   Assessment & Plan    · Vascular surgery following - now signed off   · VAS lower limb arterial duplex: >75% noted in the right mid superficial femoral artery with diffuse disease noted throughout the remaining portions of femoral-popliteal, findings suggest tibioperoneal disease, occlusion versus high-grade stenosis noted in the mid superficial left artery with distal reconstitution with diffuse disease noted throughout the remaining portions popliteal  · Underwent agram with LLE runoff on 2/01, okay from vascular standpoint to proceed with left 5th toe amputation   · Continue aspirin, statin therapy     CKD (chronic kidney disease) stage 3, GFR 30-59 ml/min ([facility])   Assessment & Plan    · Stage 3 chronic kidney disease, per chart review baseline creatinine 1 1-1 2  · Creatinine stable at 0 98 today   · Nephrology following     Microcytic anemia   Assessment & Plan    · Hemoglobin stable   · Iron studies normal   · Per chart review baseline hgb appears 10-11  · Currently stable no active signs of bleeding   · Will continue to monitor closely with repeat CBC in AM        VTE Pharmacologic Prophylaxis: Pharmacologic: Heparin  Mechanical VTE Prophylaxis in Place: No    Patient Centered Rounds: I have performed bedside rounds with nursing staff today  Discussions with Specialists or Other Care Team Provider: nursing     Education and Discussions with Family / Patient: patient     Time Spent for Care: 30 minutes  More than 50% of total time spent on counseling and coordination of care as described above  Current Length of Stay: 6 day(s)    Current Patient Status: Inpatient   Certification Statement: The patient will continue to require additional inpatient hospital stay due to left 5th ray amputation planned tomorrow     Discharge Plan: pending     Code Status: Level 1 - Full Code      Subjective:   Patient has no acute complaints today  She denies any lightheadedness, dizziness, chest pain, shortness of breath, difficulty breathing, any significant pain in left foot  Objective:     Vitals:   Temp (24hrs), Av 2 °F (36 8 °C), Min:97 5 °F (36 4 °C), Max:98 9 °F (37 2 °C)    Temp:  [97 5 °F (36 4 °C)-98 9 °F (37 2 °C)] 98 9 °F (37 2 °C)  HR:  [73-87] 73  Resp:  [18] 18  BP: (114-125)/(55-85) 114/73  SpO2:  [96 %-99 %] 96 %  Body mass index is 37 37 kg/m²  Input and Output Summary (last 24 hours): Intake/Output Summary (Last 24 hours) at 19 1318  Last data filed at 19 0900   Gross per 24 hour   Intake              120 ml   Output                0 ml   Net              120 ml       Physical Exam:     Physical Exam   Constitutional: She is oriented to person, place, and time  No distress  HENT:   Head: Normocephalic and atraumatic  Eyes: Conjunctivae are normal    Neck: Neck supple  Cardiovascular: Normal rate and regular rhythm  Pulmonary/Chest: Effort normal and breath sounds normal  No respiratory distress  She has no wheezes  She has no rales  Abdominal: Soft  Bowel sounds are normal  There is no tenderness  Musculoskeletal: She exhibits no edema     Left 5th digit with eschar and erythema   Neurological: She is alert and oriented to person, place, and time  Skin: Skin is warm and dry  She is not diaphoretic  Psychiatric: She has a normal mood and affect  Nursing note and vitals reviewed  Additional Data:     Labs:      Results from last 7 days  Lab Units 02/02/19  0607 01/28/19  1945   WBC Thousand/uL 9 43  < > 9 22   HEMOGLOBIN g/dL 10 2*  < > 10 1*   HEMATOCRIT % 33 5*  < > 34 0*   PLATELETS Thousands/uL 282  < > 272   NEUTROS PCT %  --   --  56   LYMPHS PCT %  --   --  36   MONOS PCT %  --   --  6   EOS PCT %  --   --  1   < > = values in this interval not displayed  Results from last 7 days  Lab Units 02/03/19  0600 01/28/19  1945   SODIUM mmol/L 140  < > 138   POTASSIUM mmol/L 4 0  < > 4 1   CHLORIDE mmol/L 106  < > 104   CO2 mmol/L 25  < > 23   BUN mg/dL 20  < > 31*   CREATININE mg/dL 0 98  < > 1 21   ANION GAP mmol/L 9  < > 11   CALCIUM mg/dL 7 9*  < > 8 5   ALBUMIN g/dL  --   --  2 7*   TOTAL BILIRUBIN mg/dL  --   --  0 21   ALK PHOS U/L  --   --  82   ALT U/L  --   --  22   AST U/L  --   --  22   GLUCOSE RANDOM mg/dL 71  < > 127   < > = values in this interval not displayed  Results from last 7 days  Lab Units 01/30/19  1733   INR  0 99       Results from last 7 days  Lab Units 02/03/19  1119 02/03/19  1024 02/03/19  0753 02/03/19  0723 02/02/19  2050 02/02/19  1610 02/02/19  1057 02/02/19  0734 02/01/19  2101 02/01/19  1638 02/01/19  1110 02/01/19  0642   POC GLUCOSE mg/dl 199* 232* 106 54* 114 283* 272* 119 160* 100 130 161*       Results from last 7 days  Lab Units 01/29/19  1152   HEMOGLOBIN A1C % 11 4*               * I Have Reviewed All Lab Data Listed Above  * Additional Pertinent Lab Tests Reviewed:  All Labs Within Last 24 Hours Reviewed    Imaging:    Imaging Reports Reviewed Today Include: no new images to review   Imaging Personally Reviewed by Myself Includes:  none    Recent Cultures (last 7 days):           Last 24 Hours Medication List:     Current Facility-Administered Medications:  acetaminophen 650 mg Oral Q6H PRN Lyndsey Shantel, DPMANOLO    amLODIPine 10 mg Oral Daily MIKEL Whalen    aspirin 81 mg Oral Daily MIKEL Bermudez    atorvastatin 20 mg Oral QPM Lyndsey Funes, DPMANOLO    cefazolin 2,000 mg Intravenous Q8H Lyndsey Funes DPMANOLO Last Rate: 2,000 mg (02/03/19 1257)   heparin (porcine) 5,000 Units Subcutaneous [facility] Lyndsey Funse, DPMANOLO    insulin glargine 75 Units Subcutaneous QAM Anika Blakely PA-C    insulin lispro 1-5 Units Subcutaneous HS Lyndsey Shantel DPMANOLO    insulin lispro 1-6 Units Subcutaneous TID AC Anika Blakely PA-C    insulin lispro 15 Units Subcutaneous TID With Meals Lyndsey Funes, DPMANOLO    loratadine 10 mg Oral Daily PRN Lyndseysimran Funes, DPM    oxyCODONE-acetaminophen 1 tablet Oral Q6H PRN Lyndsey Funes, DPMANOLO         Today, Patient Was Seen By: Gita Romero PA-C    ** Please Note: Dictation voice to text software may have been used in the creation of this document   **

## 2019-02-03 NOTE — PROGRESS NOTES
Progress Note - [address] 1965, 48 y o  female MRN: [id number]    Unit/Bed#: E2 -01 Encounter: [id number]    Primary Care Provider: Loretta Reyes MD   Date and time admitted to hospital: 1/28/2019 12:23 PM        PAD (peripheral artery disease) [facility])   Assessment & Plan    49 y/o F former smoker w/hx HTN, HLD, uncontrolled DM II (A1C 11 4), CKD III (baseline creatinine 1 1-1 2) and PAD w/L 5th toe DFU, cellulitis, OM  Diagnostics:  -SHERYL 01/29/2019:  R: >75% stenosis mid SFA, tibioperoneal dx; [place]: 1 61/96/116  L:   vs high-grade stenosis of mid SFA with distal reconstitution w/diffuse disease, tibioperoneal disease; [place]: 0 66/83/61  -Left foot Xray and MRI:  5th toe distal phalanx osteomyelitis       Plan:  -s/p angiogram 2/1 w/ DCB angioplasty to distal L SFA occlusion, chronic AT/PT occlusion, peroneal runoff w/ filling of DP  Postintervention images appear adequate to heal left 5th toe amp  -Ok to proceed with L 5th toe amp  Notify vascular if nonhealing  Discussed with podiatry  -continue ASA + statin therapy  -continue antibiotics per primary service for OM  -continue local wound care per podiatry  -will sign off  Please notify vascular if we can be off further assistance during this hospitalization    Thank you for allowing us to participate in the care of this patient     CKD (chronic kidney disease) stage 3, GFR 30-59 ml/min (HCC)   Assessment & Plan    CKD III, baseline creatinine 1 1-1 2  -creatinine stable and below baseline at 0 98/GFR 66 post Agram  -nephrology following  -continue to avoid nephrotoxic agents  -continue to trend creatinine     Type 2 diabetes mellitus without complication, with long-term current use of insulin [facility])   Assessment & Plan    Lab Results   Component Value Date    HGBA1C 11 4 (H) 01/29/2019       Recent Labs      02/02/19   1610  02/02/19   2050  02/03/19   0723  02/03/19   0753   POCGLU  283*  114  54*  106       Blood Sugar Average: Last 72 hrs:  (P) 453 [id number]     -uncontrolled type II DM  -continue to to optimize BS control for optimization of wound healing, prevention of disease progression, and prevention of SSI post 5th toe amp  -management per SLIM             Subjective:  Patient seen seated in bedside chair  Denies complaints  Hemodynamically stable  Vitals:  /73 (BP Location: Left arm)   Pulse 73   Temp 98 9 °F (37 2 °C) (Tympanic)   Resp 18   Ht 5' (1 524 m)   Wt 86 8 kg (191 lb 5 8 oz)   LMP 01/15/2019   SpO2 96%   BMI 37 37 kg/m²     I/Os:  I/O last 3 completed shifts:   In: 120 [P O :120]  Out: 1250 [Urine:1250]  I/O this shift:  In: 120 [P O :120]  Out: -     Lab Results and Cultures:   Lab Results   Component Value Date    WBC 9 43 02/02/2019    HGB 10 2 (L) 02/02/2019    HCT 33 5 (L) 02/02/2019    MCV 76 (L) 02/02/2019     02/02/2019     Lab Results   Component Value Date    CALCIUM 7 9 (L) 02/03/2019    K 4 0 02/03/2019    CO2 25 02/03/2019     02/03/2019    BUN 20 02/03/2019    CREATININE 0 98 02/03/2019     Lab Results   Component Value Date    INR 0 99 01/30/2019    PROTIME 13 2 01/30/2019        Blood Culture: No results found for: BLOODCX,   Urinalysis:   Lab Results   Component Value Date    COLORU Yellow 01/30/2019    CLARITYU Clear 01/30/2019    SPECGRAV 1 020 01/30/2019    PHUR 5 5 01/30/2019    LEUKOCYTESUR Negative 01/30/2019    NITRITE Negative 01/30/2019    GLUCOSEU 100 (1/10%) (A) 01/30/2019    KETONESU Negative 01/30/2019    BILIRUBINUR Negative 01/30/2019    BLOODU Negative 01/30/2019   ,   Urine Culture: No results found for: URINECX,   Wound Culure: No results found for: WOUNDCULT    Medications:  Current Facility-Administered Medications   Medication Dose Route Frequency    acetaminophen (TYLENOL) tablet 650 mg  650 mg Oral Q6H PRN    amLODIPine (NORVASC) tablet 10 mg  10 mg Oral Daily    aspirin chewable tablet 81 mg  81 mg Oral Daily    atorvastatin (LIPITOR) tablet 20 mg  20 mg Oral QPM    ceFAZolin (ANCEF) IVPB (premix) 2,000 mg  2,000 mg Intravenous Q8H    heparin (porcine) subcutaneous injection 5,000 Units  5,000 Units Subcutaneous Q8H [address]    insulin glargine (LANTUS) subcutaneous injection 75 Units 0 75 mL  75 Units Subcutaneous QAM    insulin lispro (HumaLOG) 100 units/mL subcutaneous injection 1-5 Units  1-5 Units Subcutaneous HS    insulin lispro (HumaLOG) 100 units/mL subcutaneous injection 1-6 Units  1-6 Units Subcutaneous TID AC    insulin lispro (HumaLOG) 100 units/mL subcutaneous injection 15 Units  15 Units Subcutaneous TID With Meals    loratadine (CLARITIN) tablet 10 mg  10 mg Oral Daily PRN    oxyCODONE-acetaminophen (PERCOCET) 5-325 mg per tablet 1 tablet  1 tablet Oral Q6H PRN       Physical Exam:    General appearance: alert and oriented, in no acute distress  Skin: Skin color, texture, turgor normal  No rashes or lesions  Neurologic: Grossly normal  Head: Normocephalic, without obvious abnormality, atraumatic  Neck: no JVD and supple, symmetrical, trachea midline  Lungs: clear to auscultation bilaterally  Heart: regular rate and rhythm, S1, S2 normal, no murmur, click, rub or gallop  Abdomen: Soft, NT/ND, +BS  Extremities: extremities normal, warm and well-perfused; no cyanosis, clubbing, or edema and left 5th toe w/ dry ulceration ISIDRA w/ no signs infection    Wound/Incision:  As above    Pulse exam:  DP:  Left: non-palpable  PT: Left: non-palpable      Anne Porteous, [address]  2/3/2019  [facility]  [phone]

## 2019-02-03 NOTE — ASSESSMENT & PLAN NOTE
· Home regimen of lisinopril 40 mg daily, chlorthalidone 25 mg daily, amlodipine 10 mg daily - currently holding lisinopril, chlorthalidone due to DEMETRIUS on admission   · renal function now at baseline  However BP is currently acceptable, continue to hold lisinopril and chlorthalidone and close monitoring of BP

## 2019-02-03 NOTE — ASSESSMENT & PLAN NOTE
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

## 2019-02-03 NOTE — ASSESSMENT & PLAN NOTE
· Hemoglobin stable   · Iron studies normal   · Per chart review baseline hgb appears 10-11  · Currently stable no active signs of bleeding   · Will continue to monitor closely with repeat CBC in AM

## 2019-02-03 NOTE — ASSESSMENT & PLAN NOTE
47 y/o F former smoker w/hx HTN, HLD, uncontrolled DM II (A1C 11 4), CKD III (baseline creatinine 1 1-1 2) and PAD w/L 5th toe DFU, cellulitis, OM  Diagnostics:  -SHERYL 01/29/2019:  R: >75% stenosis mid SFA, tibioperoneal dx; [place]: 1 61/96/116  L:   vs high-grade stenosis of mid SFA with distal reconstitution w/diffuse disease, tibioperoneal disease; [place]: 0 66/83/61  -Left foot Xray and MRI:  5th toe distal phalanx osteomyelitis       Plan:  -s/p angiogram 2/1 w/ DCB angioplasty to distal L SFA occlusion, chronic AT/PT occlusion, peroneal runoff w/ filling of DP  Postintervention images appear adequate to heal left 5th toe amp  -Ok to proceed with L 5th toe amp  Notify vascular if nonhealing  Discussed with podiatry  -continue ASA + statin therapy  -continue antibiotics per primary service for OM  -continue local wound care per podiatry  -will sign off  Please notify vascular if we can be off further assistance during this hospitalization    Thank you for allowing us to participate in the care of this patient

## 2019-02-03 NOTE — ASSESSMENT & PLAN NOTE
· Vascular surgery following - now signed off   · VAS lower limb arterial duplex: >75% noted in the right mid superficial femoral artery with diffuse disease noted throughout the remaining portions of femoral-popliteal, findings suggest tibioperoneal disease, occlusion versus high-grade stenosis noted in the mid superficial left artery with distal reconstitution with diffuse disease noted throughout the remaining portions popliteal  · Underwent agram with LLE runoff on 2/01, okay from vascular standpoint to proceed with left 5th toe amputation   · Continue aspirin, statin therapy

## 2019-02-03 NOTE — ASSESSMENT & PLAN NOTE
· Stage 3 chronic kidney disease, per chart review baseline creatinine 1 1-1 2  · Creatinine stable at 0 98 today   · Nephrology following

## 2019-02-03 NOTE — ASSESSMENT & PLAN NOTE
Lab Results   Component Value Date    HGBA1C 11 4 (H) 01/29/2019       Recent Labs      02/02/19   1610  02/02/19   2050  02/03/19   0723  02/03/19   0753   POCGLU  283*  114  54*  106       Blood Sugar Average: Last 72 hrs:  (P) 123 [id number]     -uncontrolled type II DM  -continue to to optimize BS control for optimization of wound healing, prevention of disease progression, and prevention of SSI post 5th toe amp  -management per [facility]

## 2019-02-03 NOTE — PROGRESS NOTES
Progress Note - Podiatry  Clement Ramu Lyon 48 y o  female MRN: [id number]  Unit/Bed#: E2 -01 Encounter: [id number]    Assessment:  1  Left 5th toe wound/eschar with associated cellulitis and OM  - no ascending cellulitis noted, dry and stable eschar to distal 5th toe  2  Chronic left Charcot arthropathy   3  DM 2 -HBA1c: 11 4%  4  PAD  5  CKD stage 3  6  HTN  7  HLD     Plan:  - to OR tomorrow 2/4 for left 5th toe amputation, NPO at MN, appreciates SLIM for preop clearance  - continue with IV cefazolin and monitor for cellulitis  - WBAT  - appreciate medical management per SLIM    Dispo:  Patient will continue to require inpatient admission due to surgical intervention of left 5th toe osteomyelitis    Subjective/Objective   Chief Complaint: No chief complaint on file  Subjective: 48 y o  y/o female was seen and evaluated at bedside  No acute events overnight  Blood pressure 119/55, pulse 76, temperature 98 5 °F (36 9 °C), temperature source Tympanic, resp  rate 18, height 5' (1 524 m), weight 86 8 kg (191 lb 5 8 oz), last menstrual period 01/15/2019, SpO2 98 %, not currently breastfeeding  ,Body mass index is 37 37 kg/m²  Invasive Devices     Peripheral Intravenous Line            Peripheral IV 02/01/19 Right Forearm 1 day                Physical Exam:   General: Alert, cooperative and no distress  Lungs: Non labored breathing  Heart: Positive S1, S2  Abdomen: Soft, non-tender  Extremity: MSK function WNL, left 5th toe with dry and stable eschar, no ascending cellulitis noted          Lab, Imaging and other studies:   I have personally reviewed pertinent lab results  Imaging: I have personally reviewed pertinent films in PACS  EKG, Pathology, and Other Studies: I have personally reviewed pertinent reports    VTE Pharmacologic Prophylaxis: Heparin Speaking Coherently

## 2019-02-04 ENCOUNTER — ANESTHESIA (INPATIENT)
Dept: PERIOP | Facility: HOSPITAL | Age: 54
DRG: 253 | End: 2019-02-04
Payer: COMMERCIAL

## 2019-02-04 ENCOUNTER — APPOINTMENT (INPATIENT)
Dept: RADIOLOGY | Facility: HOSPITAL | Age: 54
DRG: 253 | End: 2019-02-04
Payer: COMMERCIAL

## 2019-02-04 LAB
ANION GAP SERPL CALCULATED.3IONS-SCNC: 6 MMOL/L (ref 4–13)
BUN SERPL-MCNC: 21 MG/DL (ref 5–25)
CALCIUM SERPL-MCNC: 8 MG/DL (ref 8.3–10.1)
CHLORIDE SERPL-SCNC: 105 MMOL/L (ref 100–108)
CO2 SERPL-SCNC: 25 MMOL/L (ref 21–32)
CREAT SERPL-MCNC: 0.83 MG/DL (ref 0.6–1.3)
ERYTHROCYTE [DISTWIDTH] IN BLOOD BY AUTOMATED COUNT: 15.3 % (ref 11.6–15.1)
GFR SERPL CREATININE-BSD FRML MDRD: 81 ML/MIN/1.73SQ M
GLUCOSE SERPL-MCNC: 149 MG/DL (ref 65–140)
GLUCOSE SERPL-MCNC: 150 MG/DL (ref 65–140)
GLUCOSE SERPL-MCNC: 165 MG/DL (ref 65–140)
GLUCOSE SERPL-MCNC: 174 MG/DL (ref 65–140)
GLUCOSE SERPL-MCNC: 184 MG/DL (ref 65–140)
GLUCOSE SERPL-MCNC: 257 MG/DL (ref 65–140)
GLUCOSE SERPL-MCNC: 53 MG/DL (ref 65–140)
GLUCOSE SERPL-MCNC: 81 MG/DL (ref 65–140)
HCT VFR BLD AUTO: 32.7 % (ref 34.8–46.1)
HGB BLD-MCNC: 9.8 G/DL (ref 11.5–15.4)
MCH RBC QN AUTO: 22.7 PG (ref 26.8–34.3)
MCHC RBC AUTO-ENTMCNC: 30 G/DL (ref 31.4–37.4)
MCV RBC AUTO: 76 FL (ref 82–98)
PLATELET # BLD AUTO: 267 THOUSANDS/UL (ref 149–390)
PMV BLD AUTO: 10.1 FL (ref 8.9–12.7)
POTASSIUM SERPL-SCNC: 4 MMOL/L (ref 3.5–5.3)
RBC # BLD AUTO: 4.32 MILLION/UL (ref 3.81–5.12)
SODIUM SERPL-SCNC: 136 MMOL/L (ref 136–145)
WBC # BLD AUTO: 8.04 THOUSAND/UL (ref 4.31–10.16)

## 2019-02-04 PROCEDURE — 85027 COMPLETE CBC AUTOMATED: CPT | Performed by: STUDENT IN AN ORGANIZED HEALTH CARE EDUCATION/TRAINING PROGRAM

## 2019-02-04 PROCEDURE — 99232 SBSQ HOSP IP/OBS MODERATE 35: CPT | Performed by: INTERNAL MEDICINE

## 2019-02-04 PROCEDURE — 80048 BASIC METABOLIC PNL TOTAL CA: CPT | Performed by: STUDENT IN AN ORGANIZED HEALTH CARE EDUCATION/TRAINING PROGRAM

## 2019-02-04 PROCEDURE — 87205 SMEAR GRAM STAIN: CPT | Performed by: PODIATRIST

## 2019-02-04 PROCEDURE — 88300 SURGICAL PATH GROSS: CPT | Performed by: PATHOLOGY

## 2019-02-04 PROCEDURE — 87075 CULTR BACTERIA EXCEPT BLOOD: CPT | Performed by: PODIATRIST

## 2019-02-04 PROCEDURE — 87070 CULTURE OTHR SPECIMN AEROBIC: CPT | Performed by: PODIATRIST

## 2019-02-04 PROCEDURE — 87176 TISSUE HOMOGENIZATION CULTR: CPT | Performed by: PODIATRIST

## 2019-02-04 PROCEDURE — 82948 REAGENT STRIP/BLOOD GLUCOSE: CPT

## 2019-02-04 PROCEDURE — 87186 SC STD MICRODIL/AGAR DIL: CPT | Performed by: PODIATRIST

## 2019-02-04 PROCEDURE — 0Y6Y0Z0 DETACHMENT AT LEFT 5TH TOE, COMPLETE, OPEN APPROACH: ICD-10-PCS | Performed by: PODIATRIST

## 2019-02-04 PROCEDURE — 87147 CULTURE TYPE IMMUNOLOGIC: CPT | Performed by: PODIATRIST

## 2019-02-04 PROCEDURE — 73630 X-RAY EXAM OF FOOT: CPT

## 2019-02-04 RX ORDER — OXYCODONE HYDROCHLORIDE AND ACETAMINOPHEN 5; 325 MG/1; MG/1
2 TABLET ORAL EVERY 6 HOURS PRN
Status: DISCONTINUED | OUTPATIENT
Start: 2019-02-04 | End: 2019-02-07 | Stop reason: HOSPADM

## 2019-02-04 RX ORDER — PROPOFOL 10 MG/ML
INJECTION, EMULSION INTRAVENOUS CONTINUOUS PRN
Status: DISCONTINUED | OUTPATIENT
Start: 2019-02-04 | End: 2019-02-04 | Stop reason: SURG

## 2019-02-04 RX ORDER — HYDROMORPHONE HCL/PF 1 MG/ML
0.5 SYRINGE (ML) INJECTION
Status: DISCONTINUED | OUTPATIENT
Start: 2019-02-04 | End: 2019-02-04 | Stop reason: HOSPADM

## 2019-02-04 RX ORDER — PROPOFOL 10 MG/ML
INJECTION, EMULSION INTRAVENOUS AS NEEDED
Status: DISCONTINUED | OUTPATIENT
Start: 2019-02-04 | End: 2019-02-04 | Stop reason: SURG

## 2019-02-04 RX ORDER — FENTANYL CITRATE/PF 50 MCG/ML
50 SYRINGE (ML) INJECTION
Status: DISCONTINUED | OUTPATIENT
Start: 2019-02-04 | End: 2019-02-04 | Stop reason: HOSPADM

## 2019-02-04 RX ORDER — HYDROMORPHONE HCL/PF 1 MG/ML
0.5 SYRINGE (ML) INJECTION EVERY 4 HOURS PRN
Status: DISCONTINUED | OUTPATIENT
Start: 2019-02-04 | End: 2019-02-07 | Stop reason: HOSPADM

## 2019-02-04 RX ORDER — MEPERIDINE HYDROCHLORIDE 50 MG/ML
12.5 INJECTION INTRAMUSCULAR; INTRAVENOUS; SUBCUTANEOUS ONCE AS NEEDED
Status: DISCONTINUED | OUTPATIENT
Start: 2019-02-04 | End: 2019-02-04 | Stop reason: HOSPADM

## 2019-02-04 RX ORDER — MAGNESIUM HYDROXIDE 1200 MG/15ML
LIQUID ORAL AS NEEDED
Status: DISCONTINUED | OUTPATIENT
Start: 2019-02-04 | End: 2019-02-04 | Stop reason: HOSPADM

## 2019-02-04 RX ORDER — ACETAMINOPHEN 325 MG/1
650 TABLET ORAL EVERY 4 HOURS PRN
Status: DISCONTINUED | OUTPATIENT
Start: 2019-02-04 | End: 2019-02-07 | Stop reason: HOSPADM

## 2019-02-04 RX ORDER — FENTANYL CITRATE 50 UG/ML
INJECTION, SOLUTION INTRAMUSCULAR; INTRAVENOUS AS NEEDED
Status: DISCONTINUED | OUTPATIENT
Start: 2019-02-04 | End: 2019-02-04 | Stop reason: SURG

## 2019-02-04 RX ORDER — MIDAZOLAM HYDROCHLORIDE 1 MG/ML
INJECTION INTRAMUSCULAR; INTRAVENOUS AS NEEDED
Status: DISCONTINUED | OUTPATIENT
Start: 2019-02-04 | End: 2019-02-04 | Stop reason: SURG

## 2019-02-04 RX ORDER — ONDANSETRON 2 MG/ML
4 INJECTION INTRAMUSCULAR; INTRAVENOUS ONCE AS NEEDED
Status: DISCONTINUED | OUTPATIENT
Start: 2019-02-04 | End: 2019-02-04 | Stop reason: HOSPADM

## 2019-02-04 RX ADMIN — SODIUM CHLORIDE 50 ML/HR: 0.9 INJECTION, SOLUTION INTRAVENOUS at 00:12

## 2019-02-04 RX ADMIN — MIDAZOLAM 2 MG: 1 INJECTION INTRAMUSCULAR; INTRAVENOUS at 07:39

## 2019-02-04 RX ADMIN — INSULIN LISPRO 15 UNITS: 100 INJECTION, SOLUTION INTRAVENOUS; SUBCUTANEOUS at 12:29

## 2019-02-04 RX ADMIN — FENTANYL CITRATE 100 MCG: 50 INJECTION, SOLUTION INTRAMUSCULAR; INTRAVENOUS at 07:43

## 2019-02-04 RX ADMIN — CEFAZOLIN SODIUM 2000 MG: 2 SOLUTION INTRAVENOUS at 12:30

## 2019-02-04 RX ADMIN — SODIUM CHLORIDE: 0.9 INJECTION, SOLUTION INTRAVENOUS at 08:23

## 2019-02-04 RX ADMIN — HEPARIN SODIUM 5000 UNITS: 5000 INJECTION INTRAVENOUS; SUBCUTANEOUS at 14:03

## 2019-02-04 RX ADMIN — CEFAZOLIN SODIUM 2000 MG: 2 SOLUTION INTRAVENOUS at 21:33

## 2019-02-04 RX ADMIN — INSULIN LISPRO 15 UNITS: 100 INJECTION, SOLUTION INTRAVENOUS; SUBCUTANEOUS at 17:38

## 2019-02-04 RX ADMIN — HEPARIN SODIUM 5000 UNITS: 5000 INJECTION INTRAVENOUS; SUBCUTANEOUS at 21:33

## 2019-02-04 RX ADMIN — CEFAZOLIN SODIUM 2000 MG: 2 SOLUTION INTRAVENOUS at 06:31

## 2019-02-04 RX ADMIN — PROPOFOL 70 MG: 10 INJECTION, EMULSION INTRAVENOUS at 07:43

## 2019-02-04 RX ADMIN — ATORVASTATIN CALCIUM 20 MG: 20 TABLET, FILM COATED ORAL at 17:38

## 2019-02-04 RX ADMIN — HYDROMORPHONE HYDROCHLORIDE 0.5 MG: 1 INJECTION, SOLUTION INTRAMUSCULAR; INTRAVENOUS; SUBCUTANEOUS at 19:50

## 2019-02-04 RX ADMIN — PROPOFOL 100 MCG/KG/MIN: 10 INJECTION, EMULSION INTRAVENOUS at 07:44

## 2019-02-04 RX ADMIN — OXYCODONE HYDROCHLORIDE AND ACETAMINOPHEN 1 TABLET: 5; 325 TABLET ORAL at 15:38

## 2019-02-04 NOTE — ANESTHESIA POSTPROCEDURE EVALUATION
Post-Op Assessment Note      CV Status:  Stable    Mental Status:  Alert and awake    Hydration Status:  Euvolemic    PONV Controlled:  Controlled    Airway Patency:  Patent    Post Op Vitals Reviewed: Yes          Staff: Anesthesiologist           BP      Temp 97 8 °F (36 6 °C) (02/04/19 0906)    Pulse 74 (02/04/19 0906)   Resp 17 (02/04/19 0906)    SpO2 95 % (02/04/19 0906)

## 2019-02-04 NOTE — OP NOTE
OPERATIVE REPORT  PATIENT NAME: Deneen Albert    :  1965  MRN: [id number]  Pt Location: [facility] OR ROOM 03    SURGERY DATE: 2019    Surgeon(s) and Role:     * Brian Frost DPM - Primary     * Aleatha Merlin, DPM - Assisting    Preop Diagnosis:  Subacute osteomyelitis of left foot ([facility] [address] ) [L77 327]    Post-Op Diagnosis Codes:     * Subacute osteomyelitis of left foot ([facility] [address] ) [M86 [id number]]    Procedure(s) (LRB):  AMPUTATION 5th TOE (Left)    Specimen(s):  ID Type Source Tests Collected by Time Destination   1 : LEFT 5TH TOE Tissue Toe, Left TISSUE EXAM Brian Frost DPM 2019 4597    A : LEFT 5THTOE Tissue Toe, Left ANAEROBIC CULTURE AND GRAM STAIN, CULTURE, TISSUE AND GRAM STAIN Brian Frost DPM 2019 0809        Estimated Blood Loss:   Minimal    Anesthesia Type:   Choice    Operative Indications:  Subacute osteomyelitis of left foot (HCC) [R63 041]    Operative Findings:  Consistent with diagnosis; Good perfusion with amputation, presumed surgical cure    Complications:   None    Procedure and Technique:    Under mild sedation, the patient was brought into the operating room and placed on the operating room table in the supine position  A pneumatic ankle tourniquet was then placed around the patient's left ankle with ample webril padding  A time out was performed to confirm the correct patient, procedure and site with all parties in agreement  Following IV sedation, an reverse vazquez block was performed consisting of 10 ml of 1% Lidocaine and 0 5% Bupivacaine in a 1:1 mixture  The foot was then scrubbed, prepped and draped in the usual aseptic manner and the foot was placed on the operating room table  Attention was directed to the left 5th toe where distal eschar with underlying osteomyelitis and purulent drainage noted to the tip of the distal 5th toe  A racquet type incision was drawn from the metatarsophalangeal joint to the plantar pulp of the toe    Incision was made through skin and subcutaneous tissue straight to bone  The toe was then disarticulated at the 5th metatarsophalangeal joint and passed off the table for gross tissue exam   A portion of the left 5th toe soft tissue was then additionally sent for anaerobic culture, Gram stain, tissue culture  The wound was then flushed with copious amounts of normal sterile saline  There is no remaining purulent sinus tracts or necrotic tissue noted  The skin was then reapproximated horizontal and simple mattress fashion utilizing 4-0 Prolene  The incision was then dressed with Acticoat, dressed all dressing, Ace        Patient to be nonweightbearing for 1 day then transition to a Cam boot to weight-bearing as tolerated with hopeful discharge within 1-2 days    Patient Disposition:  PACU     SIGNATURE: Grecia Huerta DPM  DATE: February 4, 2019  TIME: 8:32 AM

## 2019-02-04 NOTE — PROGRESS NOTES
NEPHROLOGY PROGRESS NOTE   Selene BraxtonChevere 48 y o  female MRN: [id number]  Unit/Bed#: E2 -01 Encounter: [id number]      ASSESSMENT & PLAN:  1  Mild DEMETRIUS on top of CKD stage 3 - baseline Creatinine 1 1 to 1 2  Creatinine peak at 1 4, down to 0 83 today  Keep holding ACE-I and diuretic for now, if renal function remains stable, those can be restarted on discharge  No more NSAID's, patient was taking meloxicam on admission    CKD suspected secondary to DM and HTN    2  Left 5th toe wound with associated cellulitis and OM, s/p 5th toe amputation by Podiatry today  3  PAD s/p angiogram on 2/01, renal function stable  Vascular signed off     4  HTN - BP Ok, as above keep holding ACE-I and diuretics but they can be restarted at discharged if renal function remains stable  5  Anemia - follow H/H, Hgb low buts stable  SUBJECTIVE:  Patient seen and examined, eating, denies SOB or CP, no abdominal pain  Went early to OR s/p 5th toe amputation  Patient's daughter at bedside          OBJECTIVE:  Current Weight: Weight - Scale: 86 8 kg (191 lb 5 8 oz)  Vitals:    02/04/19 0932   BP: 120/68   Pulse: 74   Resp:    Temp:    SpO2:        Intake/Output Summary (Last 24 hours) at 02/04/19 1013  Last data filed at 02/04/19 9939   Gross per 24 hour   Intake              740 ml   Output              300 ml   Net              440 ml     General: conscious, cooperative, in not acute distress  Eyes: conjunctivae pale, anicteric sclerae  ENT: lips and mucous membranes moist  Neck: supple, no JVD  Chest: clear breath sounds bilateral, no crackles, ronchus or wheezings  CVS: distinct S1 & S2, normal rate, regular rhythm  Abdomen: soft, non-tender, non-distended, normoactive bowel sounds  Extremities: no significant edema of both legs, left foot covered with dressing  Skin: no rash  Neuro: awake, alert, oriented      Medications:    Current Facility-Administered Medications:     acetaminophen (TYLENOL) tablet 650 mg, 650 mg, Oral, Q6H PRN, [place] Stake, DPM    acetaminophen (TYLENOL) tablet 650 mg, 650 mg, Oral, Q4H PRN, Annye Lute, DPM    amLODIPine (NORVASC) tablet 10 mg, 10 mg, Oral, Daily, Jonathan Carlton, CRNP, 10 mg at 02/01/19 8325    aspirin chewable tablet 81 mg, 81 mg, Oral, Daily, Lori Maria, CRNP, 81 mg at 02/03/19 1035    atorvastatin (LIPITOR) tablet 20 mg, 20 mg, Oral, QPM, [place] Stake, DPM, 20 mg at 02/03/19 1830    ceFAZolin (ANCEF) IVPB (premix) 2,000 mg, 2,000 mg, Intravenous, Q8H, [place] Stake, DPM, Stopped at 02/04/19 0701    heparin (porcine) subcutaneous injection 5,000 Units, 5,000 Units, Subcutaneous, Q8H [address], Stopped at 02/04/19 0643 **AND** [CANCELED] Platelet count, , , Once, [place] Stake, DPM    insulin glargine (LANTUS) subcutaneous injection 75 Units 0 75 mL, 75 Units, Subcutaneous, QAM, Anika Blakely PA-C, 75 Units at 02/03/19 1035    insulin lispro (HumaLOG) 100 units/mL subcutaneous injection 1-5 Units, 1-5 Units, Subcutaneous, HS, [place] Stake, DPM, 3 Units at 02/03/19 2109    insulin lispro (HumaLOG) 100 units/mL subcutaneous injection 1-6 Units, 1-6 Units, Subcutaneous, TID AC, 2 Units at 02/03/19 1303 **AND** Fingerstick Glucose (POCT), , , 4x Daily AC and at bedtime, Anika Blakely PA-C    insulin lispro (HumaLOG) 100 units/mL subcutaneous injection 15 Units, 15 Units, Subcutaneous, TID With Meals, [place] Stake, DPM, 15 Units at 02/03/19 1303    loratadine (CLARITIN) tablet 10 mg, 10 mg, Oral, Daily PRN, Bela Stake, DPM    oxyCODONE-acetaminophen (PERCOCET) 5-325 mg per tablet 1 tablet, 1 tablet, Oral, Q6H PRN, Bela Denis DPM    sodium chloride 0 9 % infusion, 50 mL/hr, Intravenous, Continuous, Ronak Swenson DPM, Last Rate: 50 mL/hr at 02/04/19 0701    Invasive Devices:        Lab Results:     Results from last 7 days  Lab Units 02/04/19  0450 02/03/19  0600 02/02/19  0607 02/01/19  0423  01/28/19  1945   WBC Thousand/uL 8 04  -- 9  43 9 42  < > 9 22   HEMOGLOBIN g/dL 9 8*  --  10 2* 10 5*  < > 10 1*   HEMATOCRIT % 32 7*  --  33 5* 36 4  < > 34 0*   PLATELETS Thousands/uL 267  --  282 311  < > 272   SODIUM mmol/L 136 140 138 135*  < > 138   POTASSIUM mmol/L 4 0 4 0 4 2 4 2  < > 4 1   CHLORIDE mmol/L 105 106 104 103  < > 104   CO2 mmol/L 25 25 26 23  < > 23   BUN mg/dL 21 20 17 29*  < > 31*   CREATININE mg/dL 0 83 0 98 0 93 0 92  < > 1 21   CALCIUM mg/dL 8 0* 7 9* 8 1* 8 3  < > 8 5   ALK PHOS U/L  --   --   --   --   --  82   ALT U/L  --   --   --   --   --  22   AST U/L  --   --   --   --   --  22   < > = values in this interval not displayed  Portions of the record may have been created with voice recognition software  Occasional wrong word or "sound a like" substitutions may have occurred due to the inherent limitations of voice recognition software  Read the chart carefully and recognize, using context, where substitutions have occurred  If you have any questions, please contact the dictating provider

## 2019-02-04 NOTE — ASSESSMENT & PLAN NOTE
· DEMETRIUS resolved  · Nephrology recommendations reviewed  · She may  Resume ACE and diuretics on discharge if BP remains stable

## 2019-02-04 NOTE — PROGRESS NOTES
PRE-Operative Note - Podiatry  Kerrynegra BraxtonCharan 48 y o  female MRN: [id number]  Unit/Bed#: E2 -01 Encounter: [id number]    Assessment:  1  Left 5th toe wound/eschar with associated cellulitis and OM  - no ascending cellulitis noted, dry and stable eschar to distal 5th toe  2  Chronic left Charcot arthropathy   3  DM 2 -HBA1c: 11 4%  4  PAD  - s/p angiogram 2/1 w/ DCB angioplasty to distal L SFA occlusion, chronic AT/PT occlusion, peroneal runoff w/ filling of DP  5  CKD stage 3  6  HTN  7  HLD    Plan:  1  Consent signed and in chart: Left 5th toe amputation  2  Confirmed NPO status  3  H&P reviewed, no changes  4  Alternatives, risks, and complications discussed with patient  5  All questions answered  No guarantees given to outcome of procedure  6  Will follow-up with Dr Mayorga Members as an outpatient  Subjective/Objective   Chief Complaint: No chief complaint on file  Subjective: Patient seen resting at bedside    Blood pressure 109/57, pulse 79, temperature 97 5 °F (36 4 °C), temperature source Tympanic, resp  rate 18, height 5' (1 524 m), weight 86 8 kg (191 lb 5 8 oz), last menstrual period 01/15/2019, SpO2 96 %, not currently breastfeeding  ,Body mass index is 37 37 kg/m²  Invasive Devices     Peripheral Intravenous Line            Peripheral IV 02/01/19 Right Forearm 2 days                Physical Exam:   General appearance: alert, cooperative and no distress    Extremity: msk and nvs baseline  Left 5th toe appears baseline  Lab, Imaging and other studies:   Admission on 01/28/2019   No results displayed because visit has over 200 results          Imaging: I have personally reviewed pertinent films in PACS

## 2019-02-04 NOTE — ASSESSMENT & PLAN NOTE
Lab Results   Component Value Date    HGBA1C 11 4 (H) 01/29/2019       Recent Labs      02/04/19   0710  02/04/19   0856  02/04/19   1051  02/04/19   1554   POCGLU  149*  150*  174*  257*       Blood Sugar Average: Last 72 hrs:  (P) 870 [id number]   · Uncontrolled type 2 diabetes with A1c 11 4%  · Continue to hold oral medications while hospitalized  · Perioperative blood sugar goal 140-180  · Continue lantus while in the hospital  · Resume basaglar 75 units daily on discharge

## 2019-02-04 NOTE — UTILIZATION REVIEW
Continued Stay Review    Date:      For  2/3/2019    Vital Signs: /68 (BP Location: Left arm)   Pulse 76   Temp (!) 97 °F (36 1 °C) (Tympanic)   Resp 18   Ht 5' (1 524 m)   Wt 86 8 kg (191 lb 5 8 oz)   LMP 01/15/2019   SpO2 99%   BMI 37 37 kg/m²      Assessment/Plan:   Subacute osteomyelitis of left foot (HCC)   Assessment & Plan     · POA with OM of left 5th digit   · MRI left foot: Associated with the 5th toe tip ulcer, there is osteomyelitis in the distal phalanx and middle phalanx   The proximal phalanx is not involved  · Management per primary- Podiatry   · On IV Ancef per primary  · Vascular following for significant peripheral arterial disease  · Patient underwent arteriogram and LLE runoff 2/01   ?  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, Continuous left lower extremity runoff, post intervention, via the peroneal artery to the dorsalis pedis, Findings of occluded distal left SFA a treated with 4 mm x 120cm KEVIN PTA, chronic left GOGO dissection   · Continue local wound care per primary service  · Patient to go to OR 2/04 for left 5th toe amputation   · NWB LLE per podiatry       Type 2 diabetes mellitus without complication, with long-term current use of insulin ([facility])     Essential hypertension   Assessment & Plan     · Home regimen of lisinopril 40 mg daily, chlorthalidone 25 mg daily, amlodipine 10 mg daily - currently holding lisinopril, chlorthalidone due to DEMETRIUS on admission   · renal function now at baseline  However BP is currently acceptable, continue to hold lisinopril and chlorthalidone and close monitoring of BP       HLD (hyperlipidemia)   Assessment & Plan     · Lipid panel:  LDL 50, HDL 35, triglycerides 164, cholesterol 126  · Continue statin      PAD (peripheral artery disease) ([facility])   Assessment & Plan     · Vascular surgery following - now signed off   · VAS lower limb arterial duplex: >75% noted in the right mid superficial femoral artery with diffuse disease noted throughout the remaining portions of femoral-popliteal, findings suggest tibioperoneal disease, occlusion versus high-grade stenosis noted in the mid superficial left artery with distal reconstitution with diffuse disease noted throughout the remaining portions popliteal  · Underwent agram with LLE runoff on 2/01, okay from vascular standpoint to proceed with left 5th toe amputation   · Continue aspirin, statin therapy     CKD (chronic kidney disease) stage 3, GFR 30-59 ml/min ([facility])   Assessment & Plan     · Stage 3 chronic kidney disease, per chart review baseline creatinine 1 1-1 2  · Creatinine stable at 0 98 today   · Nephrology following      Microcytic anemia   Assessment & Plan     · Hemoglobin stable   · Iron studies normal   · Per chart review baseline hgb appears 10-11  · Currently stable no active signs of bleeding   · Will continue to monitor closely with repeat CBC in AM       The patient will continue to require additional inpatient hospital stay due to left 5th ray amputation planned tomorrow            Medications:   Scheduled Meds:   Current Facility-Administered Medications:  acetaminophen 650 mg Oral Q6H PRN Dario Moreno DPM    acetaminophen 650 mg Oral Q4H PRN Grecia Huerta DPM    amLODIPine 10 mg Oral Daily MIKEL Coleman    aspirin 81 mg Oral Daily MIKEL Bermudez    atorvastatin 20 mg Oral QPM Draio Moreno DPM    cefazolin 2,000 mg Intravenous Q8H Dario Moreno DPM Last Rate: 2,000 mg (02/04/19 1230)   heparin (porcine) 5,000 Units Subcutaneous [facility] Dario Moreno DPM    insulin glargine 75 Units Subcutaneous QAM Anika Blakely PA-C    insulin lispro 1-5 Units Subcutaneous HS Dario Moreno DPM    insulin lispro 1-6 Units Subcutaneous TID AC Anika Blakely PA-C    insulin lispro 15 Units Subcutaneous TID With Meals [employer] Srinath Lacrosse, DPM    loratadine 10 mg Oral Daily PRN [place] Passer, DPM    oxyCODONE-acetaminophen 1 tablet Oral Q6H PRN [place] Passer, DPM    sodium chloride 50 mL/hr Intravenous Continuous Vaughan Mould, DPM Last Rate: Stopped (02/04/19 1108)     Continuous Infusions:   sodium chloride 50 mL/hr Last Rate: Stopped (02/04/19 1108)     PRN Meds:   acetaminophen    acetaminophen    loratadine    oxyCODONE-acetaminophen     Pertinent Labs/Diagnostic Results:   Labs   2/3  Unremarkable    SURGERY  DATE  2/4    Preop Diagnosis:  Subacute osteomyelitis of left foot ([facility] [address] ) [I08 066]     Post-Op Diagnosis Codes:     * Subacute osteomyelitis of left foot (HCC) [Q98 335]     Procedure(s) (LRB):  AMPUTATION 5th TOE (Left)    Discharge Plan:    TBD      Network Utilization Review Department  Phone: [phone]; Fax [phone]  [email or web address]  ATTENTION: Please call with any questions or concerns to [phone]  and carefully listen to the prompts so that you are directed to the right person  Send all requests for admission clinical reviews, approved or denied determinations and any other requests to fax [phone]   All voicemails are confidential

## 2019-02-04 NOTE — PROGRESS NOTES
Progress Note - [address] 1965, 48 y o  female MRN: [id number]    Unit/Bed#: E2 -01 Encounter: [id number]    Primary Care Provider: Elvira Hopkins MD   Date and time admitted to hospital: 1/28/2019 12:23 PM        * Subacute osteomyelitis of left foot ([address] )   Assessment & Plan    · POA with OM of left 5th digit   · MRI left foot: Associated with the 5th toe tip ulcer, there is osteomyelitis in the distal phalanx and middle phalanx  The proximal phalanx is not involved     · Vascular following for significant peripheral arterial disease  · Patient underwent arteriogram and LLE runoff 2/01   · 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, Continuous left lower extremity runoff, post intervention, via the peroneal artery to the dorsalis pedis, Findings of occluded distal left SFA a treated with 4 mm x 120cm KEVIN PTA, chronic left GOGO dissection   · S/p left 5th toe amputation     Type 2 diabetes mellitus without complication, with long-term current use of insulin [facility])   Assessment & Plan    Lab Results   Component Value Date    HGBA1C 11 4 (H) 01/29/2019       Recent Labs      02/04/19   0710  02/04/19   0856  02/04/19   1051  02/04/19   1554   POCGLU  149*  150*  174*  257*       Blood Sugar Average: Last 72 hrs:  (P) 289 [id number]   · Uncontrolled type 2 diabetes with A1c 11 4%  · Continue to hold oral medications while hospitalized  · Perioperative blood sugar goal 140-180  · Continue lantus while in the hospital  · Resume basaglar 75 units daily on discharge     Microcytic anemia   Assessment & Plan    · Hemoglobin stable   · No need for transfusion at this time     CKD (chronic kidney disease) stage 3, GFR 30-59 ml/min ([facility])   Assessment & Plan    · DEMETRIUS resolved  · Nephrology recommendations reviewed  · She may  Resume ACE and diuretics on discharge if BP remains stable     PAD (peripheral artery disease) ([facility] [address] )   Assessment & Plan    · Vascular surgery following - now signed off   · VAS lower limb arterial duplex: >75% noted in the right mid superficial femoral artery with diffuse disease noted throughout the remaining portions of femoral-popliteal, findings suggest tibioperoneal disease, occlusion versus high-grade stenosis noted in the mid superficial left artery with distal reconstitution with diffuse disease noted throughout the remaining portions popliteal  · Underwent agram with LLE runoff on , okay from vascular standpoint to proceed with left 5th toe amputation   · Continue aspirin, statin therapy     HLD (hyperlipidemia)   Assessment & Plan    · Lipid panel:  LDL 50, HDL 35, triglycerides 164, cholesterol 126  · Continue lipitor 20 mg daily     Essential hypertension   Assessment & Plan    · DEMETRIUS resolved  · Resume lisinopril and chlorthalidone on discharge         VTE Pharmacologic Prophylaxis:   Pharmacologic: Heparin  Mechanical VTE Prophylaxis in Place: No    Discussions with Specialists or Other Care Team Provider: Hospital course reviewed    Time Spent for Care: 20 minutes  More than 50% of total time spent on counseling and coordination of care as described above  Current Length of Stay: 7 day(s)    Current Patient Status: Inpatient     Discharge Plan: per primary service    Code Status: Level 1 - Full Code      Subjective:   S/p left 5th toe amputation  Minimal pain   No nausea or vomiting    Objective:     Vitals:   Temp (24hrs), Av 5 °F (36 4 °C), Min:97 °F (36 1 °C), Max:97 8 °F (36 6 °C)    Temp:  [97 °F (36 1 °C)-97 8 °F (36 6 °C)] 97 6 °F (36 4 °C)  HR:  [70-83] 83  Resp:  [15-18] 18  BP: (109-144)/(57-79) 121/66  SpO2:  [94 %-100 %] 98 %  Body mass index is 37 37 kg/m²  Input and Output Summary (last 24 hours):        Intake/Output Summary (Last 24 hours) at 19 8921  Last data filed at 19 1898   Gross per 24 hour   Intake            927 5 ml   Output              900 ml   Net             27 5 ml       Physical Exam:     Physical Exam   Constitutional: She appears well-developed and well-nourished  HENT:   Head: Normocephalic and atraumatic  Poor dentition   Eyes: No scleral icterus  Cardiovascular: Regular rhythm  Exam reveals no friction rub  No murmur heard  Pulmonary/Chest: Effort normal  No respiratory distress  She has no wheezes  Abdominal: Soft  She exhibits no distension  There is no tenderness  Musculoskeletal: She exhibits deformity  Neurological: She is alert  Skin: Skin is warm  Psychiatric: She has a normal mood and affect  Additional Data:     Labs:      Results from last 7 days  Lab Units 02/04/19 0450 01/28/19  1945   WBC Thousand/uL 8 04  < > 9 22   HEMOGLOBIN g/dL 9 8*  < > 10 1*   HEMATOCRIT % 32 7*  < > 34 0*   PLATELETS Thousands/uL 267  < > 272   NEUTROS PCT %  --   --  56   LYMPHS PCT %  --   --  36   MONOS PCT %  --   --  6   EOS PCT %  --   --  1   < > = values in this interval not displayed  Results from last 7 days  Lab Units 02/04/19 0450 01/28/19  1945   SODIUM mmol/L 136  < > 138   POTASSIUM mmol/L 4 0  < > 4 1   CHLORIDE mmol/L 105  < > 104   CO2 mmol/L 25  < > 23   BUN mg/dL 21  < > 31*   CREATININE mg/dL 0 83  < > 1 21   ANION GAP mmol/L 6  < > 11   CALCIUM mg/dL 8 0*  < > 8 5   ALBUMIN g/dL  --   --  2 7*   TOTAL BILIRUBIN mg/dL  --   --  0 21   ALK PHOS U/L  --   --  82   ALT U/L  --   --  22   AST U/L  --   --  22   GLUCOSE RANDOM mg/dL 184*  < > 127   < > = values in this interval not displayed      Results from last 7 days  Lab Units 01/30/19  1733   INR  0 99       Results from last 7 days  Lab Units 02/04/19  1554 02/04/19  1051 02/04/19  0856 02/04/19  0710 02/03/19  2036 02/03/19  1828 02/03/19  1554 02/03/19  1119 02/03/19  1024 02/03/19  0753 02/03/19  0723 02/02/19  2050   POC GLUCOSE mg/dl 257* 174* 150* 149* 306* 163* 141* 199* 232* 106 54* 114       Results from last 7 days  Lab Units 01/29/19  1152   HEMOGLOBIN A1C % 11 4*               * I Have Reviewed All Lab Data Listed Above  * Additional Pertinent Lab Tests Reviewed: All Labs Within Last 24 Hours Reviewed    Imaging:    Imaging Reports Reviewed Today Include: left foot xray    Recent Cultures (last 7 days):       Results from last 7 days  Lab Units 02/04/19  0809   GRAM STAIN RESULT  No Polys or Bacteria seen       Last 24 Hours Medication List:     Current Facility-Administered Medications:  acetaminophen 650 mg Oral Q6H PRN Di Abhijit, DPM    acetaminophen 650 mg Oral Q4H PRN Rosalia Champ, DPM    amLODIPine 10 mg Oral Daily Tamir Ortiz, CRNP    aspirin 81 mg Oral Daily Lori Maria, CRNP    atorvastatin 20 mg Oral QPM Di Abhijit, DPM    cefazolin 2,000 mg Intravenous Q8H Di Abhijit, DPM Last Rate: 2,000 mg (02/04/19 1230)   heparin (porcine) 5,000 Units Subcutaneous [facility] Di Abhijit, DPM    insulin glargine 75 Units Subcutaneous QAM Anika Blakely PA-C    insulin lispro 1-5 Units Subcutaneous HS Di Abhijit, DPM    insulin lispro 1-6 Units Subcutaneous TID AC Anika Blakely PA-C    insulin lispro 15 Units Subcutaneous TID With Meals Di Abhijit, DPM    loratadine 10 mg Oral Daily PRN Di Abhijit, DPM    oxyCODONE-acetaminophen 1 tablet Oral Q6H PRN Di Abhijit, DPM    sodium chloride 50 mL/hr Intravenous Continuous Conchita Serafin, DPM Last Rate: Stopped (02/04/19 1108)        Today, Patient Was Seen By: Isidoro Ortiz MD    ** Please Note: Dictation voice to text software may have been used in the creation of this document   **

## 2019-02-04 NOTE — ASSESSMENT & PLAN NOTE
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

## 2019-02-05 PROBLEM — D72.829 LEUKOCYTOSIS: Status: ACTIVE | Noted: 2019-02-05

## 2019-02-05 LAB
ANION GAP SERPL CALCULATED.3IONS-SCNC: 8 MMOL/L (ref 4–13)
BUN SERPL-MCNC: 19 MG/DL (ref 5–25)
CALCIUM SERPL-MCNC: 8.4 MG/DL (ref 8.3–10.1)
CHLORIDE SERPL-SCNC: 102 MMOL/L (ref 100–108)
CO2 SERPL-SCNC: 26 MMOL/L (ref 21–32)
CREAT SERPL-MCNC: 1.08 MG/DL (ref 0.6–1.3)
ERYTHROCYTE [DISTWIDTH] IN BLOOD BY AUTOMATED COUNT: 15.4 % (ref 11.6–15.1)
GFR SERPL CREATININE-BSD FRML MDRD: 59 ML/MIN/1.73SQ M
GLUCOSE SERPL-MCNC: 149 MG/DL (ref 65–140)
GLUCOSE SERPL-MCNC: 234 MG/DL (ref 65–140)
GLUCOSE SERPL-MCNC: 240 MG/DL (ref 65–140)
GLUCOSE SERPL-MCNC: 263 MG/DL (ref 65–140)
GLUCOSE SERPL-MCNC: 285 MG/DL (ref 65–140)
HCT VFR BLD AUTO: 35.7 % (ref 34.8–46.1)
HGB BLD-MCNC: 10.6 G/DL (ref 11.5–15.4)
MCH RBC QN AUTO: 22.7 PG (ref 26.8–34.3)
MCHC RBC AUTO-ENTMCNC: 29.7 G/DL (ref 31.4–37.4)
MCV RBC AUTO: 77 FL (ref 82–98)
PLATELET # BLD AUTO: 298 THOUSANDS/UL (ref 149–390)
PMV BLD AUTO: 10.2 FL (ref 8.9–12.7)
POTASSIUM SERPL-SCNC: 4.8 MMOL/L (ref 3.5–5.3)
RBC # BLD AUTO: 4.66 MILLION/UL (ref 3.81–5.12)
SODIUM SERPL-SCNC: 136 MMOL/L (ref 136–145)
WBC # BLD AUTO: 13.85 THOUSAND/UL (ref 4.31–10.16)

## 2019-02-05 PROCEDURE — 99232 SBSQ HOSP IP/OBS MODERATE 35: CPT | Performed by: PHYSICIAN ASSISTANT

## 2019-02-05 PROCEDURE — 99232 SBSQ HOSP IP/OBS MODERATE 35: CPT | Performed by: INTERNAL MEDICINE

## 2019-02-05 PROCEDURE — 82948 REAGENT STRIP/BLOOD GLUCOSE: CPT

## 2019-02-05 PROCEDURE — 80048 BASIC METABOLIC PNL TOTAL CA: CPT | Performed by: STUDENT IN AN ORGANIZED HEALTH CARE EDUCATION/TRAINING PROGRAM

## 2019-02-05 PROCEDURE — 85027 COMPLETE CBC AUTOMATED: CPT | Performed by: STUDENT IN AN ORGANIZED HEALTH CARE EDUCATION/TRAINING PROGRAM

## 2019-02-05 RX ADMIN — HEPARIN SODIUM 5000 UNITS: 5000 INJECTION INTRAVENOUS; SUBCUTANEOUS at 15:56

## 2019-02-05 RX ADMIN — CEFAZOLIN SODIUM 2000 MG: 2 SOLUTION INTRAVENOUS at 21:58

## 2019-02-05 RX ADMIN — INSULIN LISPRO 15 UNITS: 100 INJECTION, SOLUTION INTRAVENOUS; SUBCUTANEOUS at 07:48

## 2019-02-05 RX ADMIN — OXYCODONE HYDROCHLORIDE AND ACETAMINOPHEN 2 TABLET: 5; 325 TABLET ORAL at 15:57

## 2019-02-05 RX ADMIN — CEFAZOLIN SODIUM 2000 MG: 2 SOLUTION INTRAVENOUS at 05:01

## 2019-02-05 RX ADMIN — INSULIN GLARGINE 75 UNITS: 100 INJECTION, SOLUTION SUBCUTANEOUS at 09:36

## 2019-02-05 RX ADMIN — CEFAZOLIN SODIUM 2000 MG: 2 SOLUTION INTRAVENOUS at 12:45

## 2019-02-05 RX ADMIN — HEPARIN SODIUM 5000 UNITS: 5000 INJECTION INTRAVENOUS; SUBCUTANEOUS at 05:02

## 2019-02-05 RX ADMIN — INSULIN LISPRO 15 UNITS: 100 INJECTION, SOLUTION INTRAVENOUS; SUBCUTANEOUS at 17:23

## 2019-02-05 RX ADMIN — OXYCODONE HYDROCHLORIDE AND ACETAMINOPHEN 1 TABLET: 5; 325 TABLET ORAL at 02:43

## 2019-02-05 RX ADMIN — ASPIRIN 81 MG 81 MG: 81 TABLET ORAL at 09:36

## 2019-02-05 RX ADMIN — OXYCODONE HYDROCHLORIDE AND ACETAMINOPHEN 2 TABLET: 5; 325 TABLET ORAL at 22:10

## 2019-02-05 RX ADMIN — HEPARIN SODIUM 5000 UNITS: 5000 INJECTION INTRAVENOUS; SUBCUTANEOUS at 21:58

## 2019-02-05 RX ADMIN — ATORVASTATIN CALCIUM 20 MG: 20 TABLET, FILM COATED ORAL at 17:22

## 2019-02-05 RX ADMIN — INSULIN LISPRO 15 UNITS: 100 INJECTION, SOLUTION INTRAVENOUS; SUBCUTANEOUS at 12:44

## 2019-02-05 RX ADMIN — HYDROMORPHONE HYDROCHLORIDE 0.5 MG: 1 INJECTION, SOLUTION INTRAMUSCULAR; INTRAVENOUS; SUBCUTANEOUS at 05:04

## 2019-02-05 NOTE — ASSESSMENT & PLAN NOTE
Lab Results   Component Value Date    HGBA1C 11 4 (H) 01/29/2019       Recent Labs      02/04/19   2126  02/04/19   2213  02/04/19   2330  02/05/19   0727   POCGLU  53*  81  165*  263*       Blood Sugar Average: Last 72 hrs:  (P) 260 [id number]   · Uncontrolled type 2 diabetes with A1c 11 4%  · Continue to hold oral medications while hospitalized - resume upon discharge   · Perioperative blood sugar goal 140-180  · Continue lantus 75 units qAM and humalog 15 units tid while in the hospital  · Resume Novolog and basaglar 75 units daily on discharge as well as metformin

## 2019-02-05 NOTE — PROGRESS NOTES
Progress Note - [address] 1965, 48 y o  female MRN: [id number]  Unit/Bed#: E2 -01 Encounter: [id number]  Primary Care Provider: Adina Pan MD   Date and time admitted to hospital: 1/28/2019 12:23 PM    * Subacute osteomyelitis of left foot ([address] )   Assessment & Plan    · POA with OM of left 5th digit   · MRI left foot: Associated with the 5th toe tip ulcer, there is osteomyelitis in the distal phalanx and middle phalanx  The proximal phalanx is not involved     · Vascular following for significant peripheral arterial disease  · Patient underwent arteriogram and LLE runoff 2/01   · 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, Continuous left lower extremity runoff, post intervention, via the peroneal artery to the dorsalis pedis, Findings of occluded distal left SFA a treated with 4 mm x 120cm KEVIN PTA, chronic left GOGO dissection   · S/p left 5th toe amputation on 2/04   · Patient currently denies any significant pain currently      Type 2 diabetes mellitus without complication, with long-term current use of insulin [facility])   Assessment & Plan    Lab Results   Component Value Date    HGBA1C 11 4 (H) 01/29/2019       Recent Labs      02/04/19   2126  02/04/19   2213  02/04/19   2330  02/05/19   0727   POCGLU  53*  81  165*  263*       Blood Sugar Average: Last 72 hrs:  (P) 066 [id number]   · Uncontrolled type 2 diabetes with A1c 11 4%  · Continue to hold oral medications while hospitalized - resume upon discharge   · Perioperative blood sugar goal 140-180  · Continue lantus 75 units qAM and humalog 15 units tid while in the hospital  · Resume Novolog and basaglar 75 units daily on discharge as well as metformin      Essential hypertension   Assessment & Plan    · BP controlled   · Continue amlodipine 10mg oral daily, resume lisinoprl and chlorthalidone upon discharge if renal function and BP remains stable        HLD (hyperlipidemia)   Assessment & Plan    · Lipid panel:  LDL 50, HDL 35, triglycerides 164, cholesterol 126  · Continue lipitor 20 mg daily     CKD (chronic kidney disease) stage 3, GFR 30-59 ml/min ([facility])   Assessment & Plan    · POA with DEMETRIUS peak Cr 1 4   · Cr increased slightly today after procedure yesterday Cr 1 08 still within baseline   · Baseline Cr 1 1-1 2   · Nephrology following   · Resume ACE and diuretics on discharge if renal function remains stable     Microcytic anemia   Assessment & Plan    · Hemoglobin stable, Hgb 10 6 post operatively   · No need for transfusion at this time     Leukocytosis   Assessment & Plan    · WBC 13 85 likely reactive 2/2 left 5th toe amputation yesterday   · Afebrile, encourage incentive spirometer use   · will continue to monitor        VTE Pharmacologic Prophylaxis:   Pharmacologic: Heparin  Mechanical VTE Prophylaxis in Place: Yes right leg     Patient Centered Rounds: I have performed bedside rounds with nursing staff today  Discussions with Specialists or Other Care Team Provider: reviewed nephrology and podiatry progress notes     Education and Discussions with Family / Patient: patient     Time Spent for Care: 30 minutes  More than 50% of total time spent on counseling and coordination of care as described above  Current Length of Stay: 8 day(s)    Current Patient Status: Inpatient   Certification Statement: The patient will continue to require additional inpatient hospital stay due to s/p left 5th toe amputation     Discharge Plan: pending- per primary team     Code Status: Level 1 - Full Code      Subjective:   Patient is doing well today after procedure yesterday  She denies any acute complaints  She admits to some pain yesterday but denies any significant pain currently  She did not sleep well last evening   She denies any chest pain, SOB, difficulty breathing, fevers, night sweats, chills, urinary symptoms, abdominal pain, lightheadedness, dizziness  Objective:     Vitals:   Temp (24hrs), Av 4 °F (36 3 °C), Min:97 °F (36 1 °C), Max:97 8 °F (36 6 °C)    Temp:  [97 °F (36 1 °C)-97 8 °F (36 6 °C)] 97 °F (36 1 °C)  HR:  [70-83] 83  Resp:  [15-18] 16  BP: (104-131)/(61-78) 115/78  SpO2:  [94 %-100 %] 96 %  Body mass index is 37 37 kg/m²  Input and Output Summary (last 24 hours): Intake/Output Summary (Last 24 hours) at 19 0757  Last data filed at 19 0533   Gross per 24 hour   Intake            582 5 ml   Output             1450 ml   Net           -867 5 ml       Physical Exam:     Physical Exam   Constitutional: She is oriented to person, place, and time  No distress  HENT:   Head: Normocephalic and atraumatic  Eyes: Conjunctivae are normal    Neck: Neck supple  Cardiovascular: Normal rate and regular rhythm  Pulmonary/Chest: Effort normal and breath sounds normal  No respiratory distress  She has no wheezes  Abdominal: Soft  Bowel sounds are normal  She exhibits no distension  There is no tenderness  Musculoskeletal: She exhibits deformity  She exhibits no edema  Left foot with dressing present    Neurological: She is alert and oriented to person, place, and time  Able to wiggle left foot toes with full sensation intact of left toes    Skin: Skin is warm and dry  She is not diaphoretic  Psychiatric: She has a normal mood and affect  Nursing note and vitals reviewed        Additional Data:     Labs:      Results from last 7 days  Lab Units 19  0458   WBC Thousand/uL 13 85*   HEMOGLOBIN g/dL 10 6*   HEMATOCRIT % 35 7   PLATELETS Thousands/uL 298       Results from last 7 days  Lab Units 19  0458   SODIUM mmol/L 136   POTASSIUM mmol/L 4 8   CHLORIDE mmol/L 102   CO2 mmol/L 26   BUN mg/dL 19   CREATININE mg/dL 1 08   ANION GAP mmol/L 8   CALCIUM mg/dL 8 4   GLUCOSE RANDOM mg/dL 285*       Results from last 7 days  Lab Units 01/30/19  1733   INR  0 99       Results from last 7 days  Lab Units 02/05/19  0727 02/04/19  2330 02/04/19  2213 02/04/19  2126 02/04/19  1554 02/04/19  1051 02/04/19  0856 02/04/19  0710 02/03/19  2036 02/03/19  1828 02/03/19  1554 02/03/19  1119   POC GLUCOSE mg/dl 263* 165* 81 53* 257* 174* 150* 149* 306* 163* 141* 199*       Results from last 7 days  Lab Units 01/29/19  1152   HEMOGLOBIN A1C % 11 4*               * I Have Reviewed All Lab Data Listed Above  * Additional Pertinent Lab Tests Reviewed:  All Labs Within Last 24 Hours Reviewed    Imaging:    Imaging Reports Reviewed Today Include: XR left foot   Imaging Personally Reviewed by Myself Includes:  none    Recent Cultures (last 7 days):       Results from last 7 days  Lab Units 02/04/19  0809   GRAM STAIN RESULT  No Polys or Bacteria seen       Last 24 Hours Medication List:     Current Facility-Administered Medications:  acetaminophen 650 mg Oral Q6H PRN Caesar Jose, DPM    acetaminophen 650 mg Oral Q4H PRN Glenys Hieu, DPM    amLODIPine 10 mg Oral Daily MIKEL Arnold    aspirin 81 mg Oral Daily MIKEL Bermudez    atorvastatin 20 mg Oral QPM Caesar Jose, DPM    cefazolin 2,000 mg Intravenous Q8H Caesar Jose, DPM Last Rate: Stopped (02/05/19 0533)   heparin (porcine) 5,000 Units Subcutaneous [facility] Caesar Jose, DPM    HYDROmorphone 0 5 mg Intravenous Q4H PRN Arleene Pae, DPM    insulin glargine 75 Units Subcutaneous QAM Anika Blakely PA-C    insulin lispro 1-5 Units Subcutaneous HS Caesar Jose, DPM    insulin lispro 1-6 Units Subcutaneous TID AC Anika Blakely PA-C    insulin lispro 15 Units Subcutaneous TID With Meals Caesar Jose, DPM    loratadine 10 mg Oral Daily PRN Caesar Jose, DPM    oxyCODONE-acetaminophen 1 tablet Oral Q6H PRN Caesar Jose, DPM    oxyCODONE-acetaminophen 2 tablet Oral Q6H PRN Arleene Pae, DPM    sodium chloride 50 mL/hr Intravenous Continuous Arleene Pae, DPM Last Rate: Stopped (02/04/19 1108)        Today, Patient Was Seen By: Katt Willoughby PA-C    ** Please Note: Dictation voice to text software may have been used in the creation of this document   **

## 2019-02-05 NOTE — ASSESSMENT & PLAN NOTE
· POA with DEMETRIUS peak Cr 1 4   · Cr increased slightly today after procedure yesterday Cr 1 08 still within baseline   · Baseline Cr 1 1-1 2   · Nephrology following   · Resume ACE and diuretics on discharge if renal function remains stable

## 2019-02-05 NOTE — PLAN OF CARE

## 2019-02-05 NOTE — ASSESSMENT & PLAN NOTE
· BP controlled   · Continue amlodipine 10mg oral daily, resume lisinoprl and chlorthalidone upon discharge if renal function and BP remains stable

## 2019-02-05 NOTE — PROGRESS NOTES
NEPHROLOGY PROGRESS NOTE   Jerry Dubose Camron 48 y o  female MRN: [id number]  Unit/Bed#: E2 -01 Encounter: [id number]      ASSESSMENT & PLAN:  1  Mild DEMETRIUS on top of CKD stage 3 - baseline Creatinine 1 1 to 1 2  Creatinine peak at 1 4, renal function stable with a creatinine at 1 08 today  Keep holding ACE-I and diuretic for now, if renal function remains stable, those can be restarted on discharge  No more NSAID's, patient was taking meloxicam on admission    CKD suspected secondary to DM and HTN    2  Left 5th toe wound with associated cellulitis and OM, s/p 5th toe amputation by Podiatry 02/04  Noted slightly increase on WBC today    3  PAD s/p angiogram on 2/01, renal function stable  Vascular signed off     4  HTN - BP stable, as above keep holding ACE-I and diuretics but they can be restarted at discharged if renal function remains stable  5  Anemia - follow H/H, Hgb low but stable at 10 6 today      Discussed with Dr Leonel Crowder from [facility]:  Patient seen and examined, denies complaints, no SOB or CP, no urinary problems      OBJECTIVE:  Current Weight: Weight - Scale: 86 8 kg (191 lb 5 8 oz)  Vitals:    02/05/19 0728   BP: 115/78   Pulse: 83   Resp: 16   Temp: (!) 97 °F (36 1 °C)   SpO2: 96%       Intake/Output Summary (Last 24 hours) at 02/05/19 1017  Last data filed at 02/05/19 0533   Gross per 24 hour   Intake            237 5 ml   Output             1450 ml   Net          -1212 5 ml     General: conscious, cooperative, in not acute distress  Eyes: conjunctivae pink, anicteric sclerae  ENT: lips and mucous membranes moist  Neck: supple, no JVD  Chest: clear breath sounds bilateral, no crackles, ronchus or wheezings  CVS: distinct S1 & S2, normal rate, regular rhythm  Abdomen: obese, soft, non-tender, non-distended, normoactive bowel sounds  Extremities: no significant edema of both legs, left foot with dressing  Skin: no rash  Neuro: awake, alert, oriented      Medications:    Current Facility-Administered Medications:     acetaminophen (TYLENOL) tablet 650 mg, 650 mg, Oral, Q6H PRN, Anatoliy Arvizu, VIVIANA    acetaminophen (TYLENOL) tablet 650 mg, 650 mg, Oral, Q4H PRN, Juma Ruiz, VIVIANA    amLODIPine (NORVASC) tablet 10 mg, 10 mg, Oral, Daily, Missael Donovan, CRNP, 10 mg at 02/01/19 3450    aspirin chewable tablet 81 mg, 81 mg, Oral, Daily, MIKEL Bermudez, 81 mg at 02/05/19 0936    atorvastatin (LIPITOR) tablet 20 mg, 20 mg, Oral, QPM, Anatoliy Arvizu, DPM, 20 mg at 02/04/19 1738    ceFAZolin (ANCEF) IVPB (premix) 2,000 mg, 2,000 mg, Intravenous, Q8H, Anatoliy Arvizu DPM, Stopped at 02/05/19 0533    heparin (porcine) subcutaneous injection 5,000 Units, 5,000 Units, Subcutaneous, Q8H [address], 5,000 Units at 02/05/19 0502 **AND** [CANCELED] Platelet count, , , Once, Anatoliy Arvizu DPM    HYDROmorphone (DILAUDID) injection 0 5 mg, 0 5 mg, Intravenous, Q4H PRN, Harrison Gómez DPM, 0 5 mg at 02/05/19 0504    insulin glargine (LANTUS) subcutaneous injection 75 Units 0 75 mL, 75 Units, Subcutaneous, QAM, Anika Blakely PA-C, 75 Units at 02/05/19 0936    insulin lispro (HumaLOG) 100 units/mL subcutaneous injection 1-5 Units, 1-5 Units, Subcutaneous, HS, STEVEN TrejoM, 3 Units at 02/03/19 2109    insulin lispro (HumaLOG) 100 units/mL subcutaneous injection 1-6 Units, 1-6 Units, Subcutaneous, TID AC, 3 Units at 02/05/19 0747 **AND** Fingerstick Glucose (POCT), , , 4x Daily AC and at bedtime, Anika Blakely PA-C    insulin lispro (HumaLOG) 100 units/mL subcutaneous injection 15 Units, 15 Units, Subcutaneous, TID With Meals, Anatoliy Arvizu DPM, 15 Units at 02/05/19 0748    loratadine (CLARITIN) tablet 10 mg, 10 mg, Oral, Daily PRN, Anatoliy Arvizu DPM    oxyCODONE-acetaminophen (PERCOCET) 5-325 mg per tablet 1 tablet, 1 tablet, Oral, Q6H PRN, Anatoliy Arvizu DPM, 1 tablet at 02/05/19 0243    oxyCODONE-acetaminophen (PERCOCET) 5-325 mg per tablet 2 tablet, 2 tablet, Oral, Q6H PRN, Arleene Pae, DPM    sodium chloride 0 9 % infusion, 50 mL/hr, Intravenous, Continuous, Arleene Pae, DPM, Stopped at 02/04/19 1108    Invasive Devices:        Lab Results:     Results from last 7 days  Lab Units 02/05/19  0458 02/04/19  0450 02/03/19  0600 02/02/19  0607   WBC Thousand/uL 13 85* 8 04  --  9 43   HEMOGLOBIN g/dL 10 6* 9 8*  --  10 2*   HEMATOCRIT % 35 7 32 7*  --  33 5*   PLATELETS Thousands/uL 298 267  --  282   SODIUM mmol/L 136 136 140 138   POTASSIUM mmol/L 4 8 4 0 4 0 4 2   CHLORIDE mmol/L 102 105 106 104   CO2 mmol/L 26 25 25 26   BUN mg/dL 19 21 20 17   CREATININE mg/dL 1 08 0 83 0 98 0 93   CALCIUM mg/dL 8 4 8 0* 7 9* 8 1*           Portions of the record may have been created with voice recognition software  Occasional wrong word or "sound a like" substitutions may have occurred due to the inherent limitations of voice recognition software  Read the chart carefully and recognize, using context, where substitutions have occurred  If you have any questions, please contact the dictating provider

## 2019-02-05 NOTE — ASSESSMENT & PLAN NOTE
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

## 2019-02-05 NOTE — ASSESSMENT & PLAN NOTE
· WBC 13 85 likely reactive 2/2 left 5th toe amputation yesterday   · Afebrile, encourage incentive spirometer use   · will continue to monitor

## 2019-02-05 NOTE — PLAN OF CARE
DISCHARGE PLANNING     Discharge to home or other facility with appropriate resources Progressing        DISCHARGE PLANNING - CARE MANAGEMENT     Discharge to post-acute care or home with appropriate resources Progressing        INFECTION - ADULT     Absence or prevention of progression during hospitalization Progressing        METABOLIC, FLUID AND ELECTROLYTES - ADULT     Glucose maintained within target range Progressing        Nutrition/Hydration-ADULT     Nutrient/Hydration intake appropriate for improving, restoring or maintaining nutritional needs Progressing        PAIN - ADULT     Verbalizes/displays adequate comfort level or baseline comfort level Progressing        Potential for Falls     Patient will remain free of falls Progressing        Prexisting or High Potential for Compromised Skin Integrity     Skin integrity is maintained or improved Progressing

## 2019-02-05 NOTE — PROGRESS NOTES
Progress Note - Podiatry  Meagan Lyon 48 y o  female MRN: [id number]  Unit/Bed#: E2 -01 Encounter: [id number]    Assessment/Plan:  3  49 y/o female with left 5th toe eschar with underlying cellulitis and osteomyelitis now s/p left 5th digit amputation (DOS: 2/4/19 by Dr Laura Villarreal)  2  Chronic left foot Charcot arthropathy   3  Diabetes Mellitus 2 (HbA1c: 11 4% from 1/29/19)   4  PAD  5  CKD, stage III  6  HTN  7  HLD    Plan:  -patient examined and evaluated: afebrile, leukocytosis present with jump overnight (13 85<--8 04), nontoxic in appearance   -initiate incentive spirometer and recheck CBC in AM   -left foot dressing wet patient reports due to excessive sweating from fluctuant blood glucose levels: dressing taken down to acticoat to evaluate operative site: all sutures intact, no fluctuance or crepitus, no acute signs of infection: redressed with acticoat, DSD, ACE   -deep operative cultures prelim with no polys or bacteria seen, c/w ancef while in-house   -WBAT to L foot with CAM boot     Dispo: Continues to require inpatient stay due to elevated white count  Hopeful d/c 2/6 pending normalization of WBC  Subjective/Objective   Chief Complaint: No chief complaint on file  Subjective: 48 y o  y/o female was seen and evaluated at bedside  Denies constitutional symptoms  Blood pressure 115/78, pulse 83, temperature (!) 97 °F (36 1 °C), temperature source Tympanic, resp  rate 16, height 5' (1 524 m), weight 86 8 kg (191 lb 5 8 oz), last menstrual period 01/15/2019, SpO2 96 %, not currently breastfeeding  ,Body mass index is 37 37 kg/m²  Invasive Devices     Peripheral Intravenous Line            Peripheral IV 02/04/19 Left Hand 1 day              Physical Exam:   General: Alert, cooperative and no distress  Lungs: Non labored breathing  Abdomen: Soft, non-tender    Extremity: L 5th digit surgical site with all sutures intact, no fluctuance or crepitus, no acute signs of infection       Lab, Imaging and other studies:   CBC:   Lab Results   Component Value Date    WBC 13 85 (H) 02/05/2019    HGB 10 6 (L) 02/05/2019    HCT 35 7 02/05/2019    MCV 77 (L) 02/05/2019     02/05/2019    MCH 22 7 (L) 02/05/2019    MCHC 29 7 (L) 02/05/2019    RDW 15 4 (H) 02/05/2019    MPV 10 2 02/05/2019     VTE Pharmacologic Prophylaxis: Heparin, ASA

## 2019-02-06 LAB
ANION GAP SERPL CALCULATED.3IONS-SCNC: 3 MMOL/L (ref 4–13)
BUN SERPL-MCNC: 18 MG/DL (ref 5–25)
CALCIUM SERPL-MCNC: 8.7 MG/DL (ref 8.3–10.1)
CHLORIDE SERPL-SCNC: 103 MMOL/L (ref 100–108)
CO2 SERPL-SCNC: 31 MMOL/L (ref 21–32)
CREAT SERPL-MCNC: 0.96 MG/DL (ref 0.6–1.3)
ERYTHROCYTE [DISTWIDTH] IN BLOOD BY AUTOMATED COUNT: 15.4 % (ref 11.6–15.1)
GFR SERPL CREATININE-BSD FRML MDRD: 68 ML/MIN/1.73SQ M
GLUCOSE SERPL-MCNC: 113 MG/DL (ref 65–140)
GLUCOSE SERPL-MCNC: 127 MG/DL (ref 65–140)
GLUCOSE SERPL-MCNC: 132 MG/DL (ref 65–140)
GLUCOSE SERPL-MCNC: 132 MG/DL (ref 65–140)
GLUCOSE SERPL-MCNC: 248 MG/DL (ref 65–140)
HCT VFR BLD AUTO: 33.7 % (ref 34.8–46.1)
HGB BLD-MCNC: 9.9 G/DL (ref 11.5–15.4)
MCH RBC QN AUTO: 22.6 PG (ref 26.8–34.3)
MCHC RBC AUTO-ENTMCNC: 29.4 G/DL (ref 31.4–37.4)
MCV RBC AUTO: 77 FL (ref 82–98)
PLATELET # BLD AUTO: 281 THOUSANDS/UL (ref 149–390)
PMV BLD AUTO: 10.7 FL (ref 8.9–12.7)
POTASSIUM SERPL-SCNC: 4.7 MMOL/L (ref 3.5–5.3)
RBC # BLD AUTO: 4.38 MILLION/UL (ref 3.81–5.12)
SODIUM SERPL-SCNC: 137 MMOL/L (ref 136–145)
WBC # BLD AUTO: 10.37 THOUSAND/UL (ref 4.31–10.16)

## 2019-02-06 PROCEDURE — 85027 COMPLETE CBC AUTOMATED: CPT | Performed by: STUDENT IN AN ORGANIZED HEALTH CARE EDUCATION/TRAINING PROGRAM

## 2019-02-06 PROCEDURE — 99232 SBSQ HOSP IP/OBS MODERATE 35: CPT | Performed by: INTERNAL MEDICINE

## 2019-02-06 PROCEDURE — G8979 MOBILITY GOAL STATUS: HCPCS

## 2019-02-06 PROCEDURE — 97163 PT EVAL HIGH COMPLEX 45 MIN: CPT

## 2019-02-06 PROCEDURE — 97760 ORTHOTIC MGMT&TRAING 1ST ENC: CPT

## 2019-02-06 PROCEDURE — 82948 REAGENT STRIP/BLOOD GLUCOSE: CPT

## 2019-02-06 PROCEDURE — 80048 BASIC METABOLIC PNL TOTAL CA: CPT | Performed by: STUDENT IN AN ORGANIZED HEALTH CARE EDUCATION/TRAINING PROGRAM

## 2019-02-06 PROCEDURE — G8978 MOBILITY CURRENT STATUS: HCPCS

## 2019-02-06 RX ADMIN — OXYCODONE HYDROCHLORIDE AND ACETAMINOPHEN 2 TABLET: 5; 325 TABLET ORAL at 21:43

## 2019-02-06 RX ADMIN — HEPARIN SODIUM 5000 UNITS: 5000 INJECTION INTRAVENOUS; SUBCUTANEOUS at 13:23

## 2019-02-06 RX ADMIN — INSULIN GLARGINE 75 UNITS: 100 INJECTION, SOLUTION SUBCUTANEOUS at 08:00

## 2019-02-06 RX ADMIN — INSULIN LISPRO 15 UNITS: 100 INJECTION, SOLUTION INTRAVENOUS; SUBCUTANEOUS at 08:01

## 2019-02-06 RX ADMIN — CEFAZOLIN SODIUM 2000 MG: 2 SOLUTION INTRAVENOUS at 06:07

## 2019-02-06 RX ADMIN — HEPARIN SODIUM 5000 UNITS: 5000 INJECTION INTRAVENOUS; SUBCUTANEOUS at 21:41

## 2019-02-06 RX ADMIN — ATORVASTATIN CALCIUM 20 MG: 20 TABLET, FILM COATED ORAL at 17:16

## 2019-02-06 RX ADMIN — AMLODIPINE BESYLATE 10 MG: 10 TABLET ORAL at 08:00

## 2019-02-06 RX ADMIN — HEPARIN SODIUM 5000 UNITS: 5000 INJECTION INTRAVENOUS; SUBCUTANEOUS at 06:18

## 2019-02-06 RX ADMIN — ASPIRIN 81 MG 81 MG: 81 TABLET ORAL at 08:00

## 2019-02-06 RX ADMIN — INSULIN LISPRO 15 UNITS: 100 INJECTION, SOLUTION INTRAVENOUS; SUBCUTANEOUS at 17:15

## 2019-02-06 RX ADMIN — CEFAZOLIN SODIUM 2000 MG: 2 SOLUTION INTRAVENOUS at 21:37

## 2019-02-06 RX ADMIN — CEFAZOLIN SODIUM 2000 MG: 2 SOLUTION INTRAVENOUS at 12:42

## 2019-02-06 RX ADMIN — INSULIN LISPRO 15 UNITS: 100 INJECTION, SOLUTION INTRAVENOUS; SUBCUTANEOUS at 13:24

## 2019-02-06 RX ADMIN — HYDROMORPHONE HYDROCHLORIDE 0.5 MG: 1 INJECTION, SOLUTION INTRAMUSCULAR; INTRAVENOUS; SUBCUTANEOUS at 02:47

## 2019-02-06 NOTE — PROGRESS NOTES
NEPHROLOGY PROGRESS NOTE   Mary Newell Camron 48 y o  female MRN: [id number]  Unit/Bed#: E2 -01 Encounter: [id number]      ASSESSMENT & PLAN:  1  Mild DEMETRIUS on top of CKD stage 3 - baseline Creatinine 1 1 to 1 2  Creatinine peak at 1 4, renal function stable with a creatinine at 0 6 today  Okay to restart ACE-I and diuretic at discharge  Recommend to avoid NSAID's, she was taking meloxicam on admission    CKD suspected secondary to DM and HTN    2  Left 5th toe wound with associated cellulitis and OM, s/p 5th toe amputation by Podiatry on 02/04  White blood cell trending down    3  PAD s/p angiogram on 02/01, renal function stable  Vascular signed off     4  HTN - BP stable, okay to restart ACE-I and diuretics at discharge    5  Anemia - follow H/H, hemoglobin 9 9 today  Discussed with Dr Nereida Peterson from [facility]:  Patient seen and examined, no complaints, no shortness of breath or chest pain, no abdominal pain, no urinary problems    Patient's daughter at bedside      OBJECTIVE:  Current Weight: Weight - Scale: 86 8 kg (191 lb 5 8 oz)  Vitals:    02/06/19 0725   BP: 149/80   Pulse: 82   Resp: 16   Temp: 98 °F (36 7 °C)   SpO2: 95%       Intake/Output Summary (Last 24 hours) at 02/06/19 4987  Last data filed at 02/06/19 1785   Gross per 24 hour   Intake              100 ml   Output             1500 ml   Net            -1400 ml     General: conscious, cooperative, in not acute distress  Eyes: conjunctivae pink, anicteric sclerae  ENT: lips and mucous membranes moist  Neck: supple, no JVD  Chest: clear breath sounds bilateral, no crackles, ronchus or wheezings  CVS: distinct S1 & S2, normal rate, regular rhythm  Abdomen: soft, non-tender, non-distended, normoactive bowel sounds  Extremities: no edema of both legs, left foot with dressing  Skin: no rash  Neuro: awake, alert, oriented      Medications:    Current Facility-Administered Medications:     acetaminophen (TYLENOL) tablet 650 mg, 650 mg, Oral, Q6H PRN, Matthias Patelbs, DPM    acetaminophen (TYLENOL) tablet 650 mg, 650 mg, Oral, Q4H PRN, STEVEN RankinM    amLODIPine (NORVASC) tablet 10 mg, 10 mg, Oral, Daily, Melissa Dumnot, CRNP, 10 mg at 02/06/19 0800    aspirin chewable tablet 81 mg, 81 mg, Oral, Daily, Lori Maria CRNP, 81 mg at 02/06/19 0800    atorvastatin (LIPITOR) tablet 20 mg, 20 mg, Oral, QPM, Matthias Annette, DPM, 20 mg at 02/05/19 1722    ceFAZolin (ANCEF) IVPB (premix) 2,000 mg, 2,000 mg, Intravenous, Q8H, Matthias Bryant, DPM, Stopped at 02/06/19 3538    heparin (porcine) subcutaneous injection 5,000 Units, 5,000 Units, Subcutaneous, Q8H [facility], 5,000 Units at 02/06/19 0618 **AND** [CANCELED] Platelet count, , , Once, Matthias Bryant, DPM    HYDROmorphone (DILAUDID) injection 0 5 mg, 0 5 mg, Intravenous, Q4H PRN, Diogenes Poon, DPM, 0 5 mg at 02/06/19 0247    insulin glargine (LANTUS) subcutaneous injection 75 Units 0 75 mL, 75 Units, Subcutaneous, QAM, Anika Blakely PA-C, 75 Units at 02/06/19 0800    insulin lispro (HumaLOG) 100 units/mL subcutaneous injection 1-5 Units, 1-5 Units, Subcutaneous, HS, Matthias Bryant, DPM, 3 Units at 02/03/19 2109    insulin lispro (HumaLOG) 100 units/mL subcutaneous injection 1-6 Units, 1-6 Units, Subcutaneous, TID AC, 3 Units at 02/05/19 1722 **AND** Fingerstick Glucose (POCT), , , 4x Daily AC and at bedtime, Anika Blakely PA-C    insulin lispro (HumaLOG) 100 units/mL subcutaneous injection 15 Units, 15 Units, Subcutaneous, TID With Meals, Matthias Bryant, DPM, 15 Units at 02/06/19 0801    loratadine (CLARITIN) tablet 10 mg, 10 mg, Oral, Daily PRN, Matthias Patelbs, DPM    oxyCODONE-acetaminophen (PERCOCET) 5-325 mg per tablet 1 tablet, 1 tablet, Oral, Q6H PRN, Matthias Bryant, DPM, 1 tablet at 02/05/19 0243    oxyCODONE-acetaminophen (PERCOCET) 5-325 mg per tablet 2 tablet, 2 tablet, Oral, Q6H PRN, Diogenes Poon, DPM, 2 tablet at 02/05/19 2210    sodium chloride 0 9 % infusion, 50 mL/hr, Intravenous, Continuous, Quan Guerrero DPM, Stopped at 02/04/19 1108    Invasive Devices:        Lab Results:     Results from last 7 days  Lab Units 02/06/19  0453 02/05/19  0458 02/04/19  0450   WBC Thousand/uL 10 37* 13 85* 8 04   HEMOGLOBIN g/dL 9 9* 10 6* 9 8*   HEMATOCRIT % 33 7* 35 7 32 7*   PLATELETS Thousands/uL 281 298 267   SODIUM mmol/L 137 136 136   POTASSIUM mmol/L 4 7 4 8 4 0   CHLORIDE mmol/L 103 102 105   CO2 mmol/L 31 26 25   BUN mg/dL 18 19 21   CREATININE mg/dL 0 96 1 08 0 83   CALCIUM mg/dL 8 7 8 4 8 0*           Portions of the record may have been created with voice recognition software  Occasional wrong word or "sound a like" substitutions may have occurred due to the inherent limitations of voice recognition software  Read the chart carefully and recognize, using context, where substitutions have occurred  If you have any questions, please contact the dictating provider

## 2019-02-06 NOTE — PROGRESS NOTES
Progress Note - Podiatry  Gianluca Lyon 48 y o  female MRN: [id number]  Unit/Bed#: E2 -01 Encounter: [id number]    Assessment/Plan:  3  49 y/o female with left 5th toe eschar with underlying cellulitis and osteomyelitis now s/p left 5th digit amputation (DOS: 2/4/19 by Dr Juan Manuel Block)  2  Chronic left foot Charcot arthropathy   3  Diabetes Mellitus 2 (HbA1c: 11 4% from 1/29/19)   4  PAD  5  CKD, stage III  6  HTN  7  HLD    Plan:  -patient examined and evaluated: afebrile, leukocytosis present however is thankfully trending down, nontoxic in appearance   -c/w incentive spirometry   -L foot dressing intact  -WBAT with CAM foot to L foot   -deep operative wound cultures with no polys or bacteria seen on prelim, c/w ancef while in-house   -will discuss with attending    Dispo: Hopeful within 24 hours    Subjective/Objective   Chief Complaint: No chief complaint on file  Subjective: 48 y o  y/o female was seen and evaluated at bedside  Denies constitutional symptoms  Blood pressure 149/80, pulse 82, temperature 98 °F (36 7 °C), temperature source Tympanic, resp  rate 16, height 5' (1 524 m), weight 86 8 kg (191 lb 5 8 oz), last menstrual period 01/15/2019, SpO2 95 %, not currently breastfeeding  ,Body mass index is 37 37 kg/m²  Invasive Devices     Peripheral Intravenous Line            Peripheral IV 02/04/19 Left Hand 2 days              Physical Exam:   General: Alert, cooperative and no distress  Lungs: Non labored breathing  Abdomen: Soft, non-tender    Extremity: L foot dressing intact, CAM boot on foot       Lab, Imaging and other studies:   CBC:   Lab Results   Component Value Date    WBC 10 37 (H) 02/06/2019    HGB 9 9 (L) 02/06/2019    HCT 33 7 (L) 02/06/2019    MCV 77 (L) 02/06/2019     02/06/2019    MCH 22 6 (L) 02/06/2019    MCHC 29 4 (L) 02/06/2019    RDW 15 4 (H) 02/06/2019    MPV 10 7 02/06/2019     VTE Pharmacologic Prophylaxis: Heparin, ASA

## 2019-02-06 NOTE — DISCHARGE INSTRUCTIONS
ARTERIOGRAM    WHAT YOU SHOULD KNOW:   An angiogram is a procedure to look at arteries in your body  Arteries are the blood vessels that carry blood from your heart to your body  AFTER YOU LEAVE:     Self-care:   · Limit activity: Rest for the remainder of the day of your procedure  Have some one with you until the next morning  Keep your arm or leg straight as much as possible  Rest as much as possible, sitting lying or reclining  Walk only to go to the bathroom, to bed or to eat  If the angiogram catheter was put in your leg, use the stairs as little as possible  No driving  · Keep your wound clean and dry  You may shower 24 hours after your procedure  The bandage you have on should fall off in 2-3 days  If there is any drainage from the puncture site, you should put on a clean bandage  · Watch for bleeding and bruising: It is normal to have a bruise and soreness where the angiogram catheter went in  · Diet:   · You may resume your regular diet, Sips of flat soda will help with mild nausea  · Drink more liquids than usual for the next 24 hours      · IMMEDIATELY Contact Interventional Radiology at [phone] [place] PATIENTS: Contact Interventional Radiology at [phone]) [facility] PATIENTS: Contact Interventional Radiology at [phone]) if any of the following occur:  · If your bruise gets larger or if you notice any active bleeding  APPLY DIRECT PRESSURE TO THE BLEEDING SITE  · If you notice increased swelling or have increased pain at the puncture site   · If you have any numbness or pain in the extremity of the puncture site   · If that extremity seems cold or pale  · You have fever greater than 101  · Persistent nausea or vomitting    Follow up with your primary healthcare provider  as directed: Write down your questions so you remember to ask them during your visits  Podiatry:   1  Leave dressing intact, clean and dry until post-op appt within 1 week  2   Please be weight-bearing as tolerated in CAM boot using walker    3  Please call Dr Yudith Luna and make an appt for within 1 week of discharge

## 2019-02-06 NOTE — PHYSICAL THERAPY NOTE
PT EVALUATION  Time-In: 0910  Time-Out: 0935  Total Time: 25 minutes    48 y o     [id number]    Cellulitis of toe [S85 431]    Length of Stay: 9    Past Medical History:   Diagnosis Date    Cellulitis of toe 1/28/2019    Diabetes mellitus ([address] )     Hypertension          Past Surgical History:   Procedure Laterality Date    IR ABDOMINAL ANGIOGRAPHY  2/1/2019    TOE AMPUTATION Left 2/4/2019    Procedure: AMPUTATION 5th TOE;  Surgeon: Abbey Colon DPM;  Location: [facility];  Service: Podiatry                         Orthotic Note            Date: 2/6/2019      Patient Name: Conni Bernheim PerezChevere            Reason for Consult:  Consulted for CAM walker boot for LLE  Pt has orders for WBAT in CAM boot  Pt fitted appropriately with medium sized CAM boot  Pt and daughter educated on appropriate donning and doffing of CAM boot  Educated on orders written for CAM boot use by podiatry  Pt and daughter verbal and demonstrate understanding at this time  They are agreeable to use of CAM boot  Pt and daughter would benefit from continued education on CAM boot to optimize patient safety and success with discharge home  Marie Eckert, PT,DPT     02/06/19 0935   Note Type   Note type Eval only   Pain Assessment   Pain Assessment No/denies pain   Pain Score No Pain   Home Living   Type of Home House   Home Layout Two level; Able to live on main level with bedroom/bathroom;Stairs to enter with rails  (5-6 RODRIGUEZ front w/ railing; no RODRIGUEZ back entrance)   Bathroom Shower/Tub Walk-in shower   Bathroom Toilet Standard   Home Equipment (no DME)   Additional Comments Pt lives in 2 story home + attic  Bathroom is on first floor and her bedroom is on the 2nd floor  Daughter plans to have patient stay on first floor at discharge     Prior Function   Level of [place] Independent with ADLs and functional mobility   Lives With Daughter   [place] in the last 6 months 0 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)   Comments PTA pt did not use DME or AD for functional mobility or gait, (-) , (-) recent falls, and (I) ADLS  Restrictions/Precautions   Weight Bearing Precautions Per Order Yes   LLE Weight Bearing Per Order WBAT  (in CAM walker boot)   Braces or Orthoses CAM Boot   Other Precautions Fall Risk;WBS   General   Additional Pertinent History Pt is s/p L 5th toe amputation 2/4/2019  Family/Caregiver Present Yes  (daughter)   Cognition   Overall Cognitive Status WFL   Arousal/Participation Alert   Orientation Level Oriented X4   Memory Within functional limits   Following Commands Follows one step commands without difficulty   RUE Assessment   RUE Assessment WFL   LUE Assessment   LUE Assessment WFL   RLE Assessment   RLE Assessment WFL  (4/5)   LLE Assessment   LLE Assessment WFL  (5th toe amptutation)   Coordination   Movements are Fluid and Coordinated 1   Sensation WFL   Light Touch   RLE Light Touch Grossly intact  (pt denies numbness or tingling in LE)   LLE Light Touch Grossly intact  (pt denies numbness or tingling in LE)   Proprioception   RLE Proprioception Grossly intact   LLE Proprioception Grossly Intact   Bed Mobility   Supine to Sit 6  Modified independent   Additional items HOB elevated; Bedrails; Increased time required   Transfers   Sit to Stand 5  Supervision   Additional items Assist x 1; Increased time required;Verbal cues   Stand to Sit 5  Supervision   Additional items Assist x 1; Increased time required;Verbal cues;Armrests   Additional Comments Verbal cueing for hand placement, technique, and safety  Ambulation/Elevation   Gait pattern Improper Weight shift; Short stride; Forward Flexion   Gait Assistance 5  Supervision   Additional items Assist x 1;Verbal cues   Assistive Device Rolling walker   Distance Pt ambulated [id number] w/ RW  Pt provided with verbal cueing for safety, sequencing, step length, and joanie   Educated pt on the benefits of wearing shoe on RLE to balance height of CAM boot on LLE  Stair Management Assistance 5  Supervision   Additional items Assist x 1;Verbal cues   Stair Management Technique Two rails; Step to pattern; Alternating pattern; Foreward;Reciprocal  (verbal cueing for sequencing and technique)   Number of Stairs 10  (2+3+3+2)   Balance   Static Sitting Fair +   Dynamic Sitting Fair   Static Standing Fair   Dynamic Standing Fair   Ambulatory Fair   Endurance Deficit   Endurance Deficit No   Activity Tolerance   Activity Tolerance Patient tolerated treatment well   Nurse Made Aware yes; Randol Scale, RN   Assessment   Prognosis Good   Problem List Decreased strength;Decreased endurance; Impaired balance;Decreased safety awareness;Decreased skin integrity;Orthopedic restrictions   Assessment PT consult received and evaluation complete  Pt is 48 y o  Female admitted from home w/ daughter for Cellulitis of toe L03 039  Pt is s/p L 5th toe amputation 2/4/2019  Pt has orders for WBAT in CAM walker boot  Pt agreeable to participate w/ therapy and identified by 2 patient identifiers: name and birth date  Precautions include: fall risk, wbs and CAM boot LLE  Pt presents supine in bed with daughter present and R SCD pumps donned  Pt has orders for up and OOB as tolerated  PTA pt was independent w/ all functional mobility w/ no use of DME or AD, lived in multi-level home and had 5-6 front/0 back RODRIGUEZ w/ railing, independent ADLS, no driving, no recent falls 0, and employed  Pt presents w/ RLE MMT 4/5, LLE MMT 4/5, supine>sit mod (I), sit<>stand supervision w/ RW and verbal cueing, gait training [id number] w/ RW supervision and verbal cueing, 10 steps with bilateral railing supervision and verbal cueing, and denies any pain  At end of PT evaluation pt left sitting in bedside chair with CAM boot LLE donned, daughter present, all needs in reach, call bell and phone in hand, and RN aware of patient status   Pt would benefit from continued skilled PT for deficits in strength, balance, locomotion, stair negotiation, functional endurance, functional mobility and safety awareness with mobility At this time recommend d/c OP PT, home with family support, with rolling walker and BSC History: co-morbidities, fall risk, mult-level home and RODRIGUEZ Exam: strength, balance, locomotion, stair negotiation, functional endurance, functional mobility and safety awareness with mobility Barthel Index:70 Modified Dima:3Clinical: unstable/unpredictable: fall risk, ongoing management of medical status, WBS, CAM walker boot, decreased safety awareness and use of AD Complexity: high   Barriers to Discharge None   Goals   Patient Goals "to go home"   LTG Expiration Date 02/16/19   Long Term Goal #1 In 10 days pt will demonstrate: bed mobility (I) for home function OOB, sit<>stand and functional transfers mod (I) w/ RW for home function, gait training 300ft mod (I) w/ RW for home and community distances, steps mod (I) w/ railing for home entrance and negotiation to bedroom on second floor, improve BLE by 1/2 grade strength to optimize functional mobility, improve balance by 1 grade to decrease fall risk, pt able to demonstrate management, donning, and doffing of CAM boot LLE w/o cueing to optimize safety, pt and family education on PT risk, role, benefits, POC, goals, and recommendations to optimize patient outcomes, patient functional, optimize LOS and promote discharge to least restrictive environment  Treatment Day 0   Plan   Treatment/Interventions Functional transfer training;LE strengthening/ROM; Therapeutic exercise; Endurance training;Patient/family training;Equipment eval/education; Bed mobility;Gait training;Spoke to nursing;Family   PT Frequency (3-5x/wk)   Recommendation   Recommendation Home with family support; Outpatient PT   Equipment Recommended Walker  (RW and BSC)   PT - OK to Discharge Yes  (once medically stable)   Modified Dima Scale   Modified Marston Scale 3   Barthel Index   Feeding 10   Bathing 0   Grooming Score 5   Dressing Score 5   Bladder Score 10   Bowels Score 10   Toilet Use Score 5   Transfers (Bed/Chair) Score 10   Mobility (Level Surface) Score 10   Stairs Score 5   Barthel Index Score 70       Viktoriya Friday, PT ,DPT

## 2019-02-06 NOTE — PLAN OF CARE
Problem: PHYSICAL THERAPY ADULT  Goal: Performs mobility at highest level of function for planned discharge setting  See evaluation for individualized goals  Treatment/Interventions: Functional transfer training, LE strengthening/ROM, Therapeutic exercise, Endurance training, Patient/family training, Equipment eval/education, Bed mobility, Gait training, Spoke to nursing, Family  Equipment Recommended: Walker (JEANNETTE and [facility])       See flowsheet documentation for full assessment, interventions and recommendations  Prognosis: Good  Problem List: Decreased strength, Decreased endurance, Impaired balance, Decreased safety awareness, Decreased skin integrity, Orthopedic restrictions  Assessment: PT consult received and evaluation complete  Pt is 48 y o  Female admitted from home w/ daughter for Cellulitis of toe L03 039  Pt is s/p L 5th toe amputation 2/4/2019  Pt has orders for WBAT in CAM walker boot  Pt agreeable to participate w/ therapy and identified by 2 patient identifiers: name and birth date  Precautions include: fall risk, wbs and CAM boot LLE  Pt presents supine in bed with daughter present and R SCD pumps donned  Pt has orders for up and OOB as tolerated  PTA pt was independent w/ all functional mobility w/ no use of DME or AD, lived in multi-level home and had 5-6 front/0 back RODRIGUEZ w/ railing, independent ADLS, no driving, no recent falls 0, and employed  Pt presents w/ RLE MMT 4/5, LLE MMT 4/5, supine>sit mod (I), sit<>stand supervision w/ RW and verbal cueing, gait training [id number] w/ RW supervision and verbal cueing, 10 steps with bilateral railing supervision and verbal cueing, and denies any pain  At end of PT evaluation pt left sitting in bedside chair with CAM boot LLE donned, daughter present, all needs in reach, call bell and phone in hand, and RN aware of patient status   Pt would benefit from continued skilled PT for deficits in strength, balance, locomotion, stair negotiation, functional endurance, functional mobility and safety awareness with mobility At this time recommend d/c OP PT, home with family support, with rolling walker and BSC History: co-morbidities, fall risk, mult-level home and RODRIGUEZ Exam: strength, balance, locomotion, stair negotiation, functional endurance, functional mobility and safety awareness with mobility Barthel Index:70 Modified Dima:3Clinical: unstable/unpredictable: fall risk, ongoing management of medical status, WBS, CAM walker boot, decreased safety awareness and use of AD Complexity: high  Barriers to Discharge: None     Recommendation: Home with family support, Outpatient PT     PT - OK to Discharge: Yes (once medically stable)    See flowsheet documentation for full assessment         Comments: Caden Bueno, PT,DPT

## 2019-02-06 NOTE — PROGRESS NOTES
[facility] Progress Note  Patient: Tim Lyon 48 y o  female   MRN: [id number]  PCP: Reginald Faustin MD  Unit/Bed#: E2 -01 Encounter: [id number]  Date Of Visit: 19    Assessment and plan:    DM2 with hyperglycemia- with prandial hyperglycemia this afternoon due to large lunchtime meal  Continue current regimen: Lantus 75 units daily and humalog 15-15-15    Hyperlipidemia- goal LDL < 100  Continue lipitor 20 mg daily    Essential hypertension-continue amlodipine daily  Anemia-chronic, stable  No need for transfusion at this time    Acute kidney injury-resolved  Resume Ace inhibitor and diuretics after discharge        VTE Pharmacologic Prophylaxis:   Pharmacologic: Heparin  Mechanical VTE Prophylaxis in Place: No    Time Spent for Care: 20 minutes  More than 50% of total time spent on counseling and coordination of care as described above  Current Length of Stay: 9 day(s)    Current Patient Status: Inpatient     Code Status: Level 1 - Full Code      Subjective:   Patient admitted eating a heavy lunch today  She denies nausea or vomiting  Mild pain from the postoperative site, tolerable    Objective:     Vitals:   Temp (24hrs), Av 8 °F (36 6 °C), Min:97 4 °F (36 3 °C), Max:98 1 °F (36 7 °C)    Temp:  [97 4 °F (36 3 °C)-98 1 °F (36 7 °C)] 98 1 °F (36 7 °C)  HR:  [74-82] 82  Resp:  [16-18] 18  BP: (107-149)/(59-80) 107/60  SpO2:  [92 %-97 %] 97 %  Body mass index is 37 37 kg/m²  Input and Output Summary (last 24 hours): Intake/Output Summary (Last 24 hours) at 19 9419  Last data filed at 19 0014   Gross per 24 hour   Intake              100 ml   Output             1300 ml   Net            -1200 ml       Physical Exam:     Physical Exam   Constitutional: She appears well-developed and well-nourished  HENT:   With poor dentition   Eyes: No scleral icterus  Neck: No JVD present  Cardiovascular: Regular rhythm    Exam reveals no friction rub     No murmur heard  Pulmonary/Chest: Effort normal  No respiratory distress  She has no wheezes  Abdominal: Soft  She exhibits no distension  There is no tenderness  Musculoskeletal: She exhibits deformity  She exhibits no edema  Neurological: She is alert  Skin: Skin is warm  Psychiatric: She has a normal mood and affect  Additional Data:     Labs:      Results from last 7 days  Lab Units 02/06/19  0453   WBC Thousand/uL 10 37*   HEMOGLOBIN g/dL 9 9*   HEMATOCRIT % 33 7*   PLATELETS Thousands/uL 281       Results from last 7 days  Lab Units 02/06/19  0453   POTASSIUM mmol/L 4 7   CHLORIDE mmol/L 103   CO2 mmol/L 31   BUN mg/dL 18   CREATININE mg/dL 0 96   CALCIUM mg/dL 8 7           * I Have Reviewed All Lab Data Listed Above  * Additional Pertinent Lab Tests Reviewed:  All Labs Within Last 24 Hours Reviewed    Imaging:    Imaging Reports Reviewed Today Include:  None      Recent Cultures (last 7 days):       Results from last 7 days  Lab Units 02/04/19  0809   GRAM STAIN RESULT  No Polys or Bacteria seen       Last 24 Hours Medication List:     Current Facility-Administered Medications:  acetaminophen 650 mg Oral Q6H PRN STEVEN FordM    acetaminophen 650 mg Oral Q4H PRN Sunday [place], DPM    amLODIPine 10 mg Oral Daily MIKEL Alcocer    aspirin 81 mg Oral Daily MIKEL Bermudez    atorvastatin 20 mg Oral QPM Anne Hernandez DPM    cefazolin 2,000 mg Intravenous Q8H Anne Hernandez DPM Last Rate: 2,000 mg (02/06/19 1242)   heparin (porcine) 5,000 Units Subcutaneous [facility] Anne Hernandez DPM    HYDROmorphone 0 5 mg Intravenous Q4H PRN Eliceo Claros DPM    insulin glargine 75 Units Subcutaneous QAM Anika Blakeyl PA-C    insulin lispro 1-5 Units Subcutaneous HS Anne Hernandez DPM    insulin lispro 1-6 Units Subcutaneous TID AC Anika Blakely PA-C    insulin lispro 15 Units Subcutaneous TID With Meals Anne Hernandez DPM    loratadine 10 mg Oral Daily PRN Karolee Merck José Persaud, DPM    oxyCODONE-acetaminophen 1 tablet Oral Q6H PRN Wicho Mckeon, DPM    oxyCODONE-acetaminophen 2 tablet Oral Q6H PRN Levora Raysa, DPM    sodium chloride 50 mL/hr Intravenous Continuous Levora Raysa, DPM Last Rate: Stopped (02/04/19 1108)        Today, Patient Was Seen By: Nini Shaikh MD    ** Please Note: Dragon 360 Dictation voice to text software may have been used in the creation of this document   **

## 2019-02-06 NOTE — PLAN OF CARE
DISCHARGE PLANNING     Discharge to home or other facility with appropriate resources Progressing        DISCHARGE PLANNING - CARE MANAGEMENT     Discharge to post-acute care or home with appropriate resources Progressing        INFECTION - ADULT     Absence or prevention of progression during hospitalization Progressing        METABOLIC, FLUID AND ELECTROLYTES - ADULT     Glucose maintained within target range Progressing        PAIN - ADULT     Verbalizes/displays adequate comfort level or baseline comfort level Progressing        Potential for Falls     Patient will remain free of falls Progressing        Prexisting or High Potential for Compromised Skin Integrity     Skin integrity is maintained or improved Progressing

## 2019-02-07 ENCOUNTER — TRANSITIONAL CARE MANAGEMENT (OUTPATIENT)
Dept: FAMILY MEDICINE CLINIC | Facility: CLINIC | Age: 54
End: 2019-02-07

## 2019-02-07 VITALS
OXYGEN SATURATION: 98 % | TEMPERATURE: 98 F | HEART RATE: 72 BPM | SYSTOLIC BLOOD PRESSURE: 117 MMHG | HEIGHT: 60 IN | BODY MASS INDEX: 37.57 KG/M2 | DIASTOLIC BLOOD PRESSURE: 70 MMHG | WEIGHT: 191.36 LBS | RESPIRATION RATE: 16 BRPM

## 2019-02-07 PROBLEM — M86.272 SUBACUTE OSTEOMYELITIS OF LEFT FOOT (HCC): Status: RESOLVED | Noted: 2019-01-28 | Resolved: 2019-02-07

## 2019-02-07 LAB
BACTERIA SPEC ANAEROBE CULT: NORMAL
BASOPHILS # BLD AUTO: 0.06 THOUSANDS/ΜL (ref 0–0.1)
BASOPHILS NFR BLD AUTO: 1 % (ref 0–1)
EOSINOPHIL # BLD AUTO: 0.3 THOUSAND/ΜL (ref 0–0.61)
EOSINOPHIL NFR BLD AUTO: 3 % (ref 0–6)
ERYTHROCYTE [DISTWIDTH] IN BLOOD BY AUTOMATED COUNT: 15.8 % (ref 11.6–15.1)
GLUCOSE SERPL-MCNC: 162 MG/DL (ref 65–140)
GLUCOSE SERPL-MCNC: 84 MG/DL (ref 65–140)
HCT VFR BLD AUTO: 35.8 % (ref 34.8–46.1)
HGB BLD-MCNC: 10.5 G/DL (ref 11.5–15.4)
IMM GRANULOCYTES # BLD AUTO: 0.05 THOUSAND/UL (ref 0–0.2)
IMM GRANULOCYTES NFR BLD AUTO: 0 % (ref 0–2)
LYMPHOCYTES # BLD AUTO: 2.75 THOUSANDS/ΜL (ref 0.6–4.47)
LYMPHOCYTES NFR BLD AUTO: 24 % (ref 14–44)
MCH RBC QN AUTO: 22.7 PG (ref 26.8–34.3)
MCHC RBC AUTO-ENTMCNC: 29.3 G/DL (ref 31.4–37.4)
MCV RBC AUTO: 77 FL (ref 82–98)
MONOCYTES # BLD AUTO: 0.66 THOUSAND/ΜL (ref 0.17–1.22)
MONOCYTES NFR BLD AUTO: 6 % (ref 4–12)
NEUTROPHILS # BLD AUTO: 7.57 THOUSANDS/ΜL (ref 1.85–7.62)
NEUTS SEG NFR BLD AUTO: 66 % (ref 43–75)
NRBC BLD AUTO-RTO: 0 /100 WBCS
PLATELET # BLD AUTO: 315 THOUSANDS/UL (ref 149–390)
PMV BLD AUTO: 10.3 FL (ref 8.9–12.7)
RBC # BLD AUTO: 4.63 MILLION/UL (ref 3.81–5.12)
WBC # BLD AUTO: 11.39 THOUSAND/UL (ref 4.31–10.16)

## 2019-02-07 PROCEDURE — 82948 REAGENT STRIP/BLOOD GLUCOSE: CPT

## 2019-02-07 PROCEDURE — 85025 COMPLETE CBC W/AUTO DIFF WBC: CPT | Performed by: STUDENT IN AN ORGANIZED HEALTH CARE EDUCATION/TRAINING PROGRAM

## 2019-02-07 RX ORDER — CEPHALEXIN 500 MG/1
500 CAPSULE ORAL EVERY 8 HOURS SCHEDULED
Qty: 21 CAPSULE | Refills: 0 | Status: SHIPPED | OUTPATIENT
Start: 2019-02-07 | End: 2019-02-14

## 2019-02-07 RX ADMIN — ASPIRIN 81 MG 81 MG: 81 TABLET ORAL at 09:26

## 2019-02-07 RX ADMIN — CEFAZOLIN SODIUM 2000 MG: 2 SOLUTION INTRAVENOUS at 05:04

## 2019-02-07 RX ADMIN — HEPARIN SODIUM 5000 UNITS: 5000 INJECTION INTRAVENOUS; SUBCUTANEOUS at 05:10

## 2019-02-07 RX ADMIN — INSULIN LISPRO 15 UNITS: 100 INJECTION, SOLUTION INTRAVENOUS; SUBCUTANEOUS at 07:50

## 2019-02-07 RX ADMIN — INSULIN GLARGINE 75 UNITS: 100 INJECTION, SOLUTION SUBCUTANEOUS at 09:26

## 2019-02-07 NOTE — PROGRESS NOTES
Progress Note - Podiatry  Evangelina Lyon 48 y o  female MRN: [id number]  Unit/Bed#: E2 -01 Encounter: [id number]    Assessment/Plan:  3  47 y/o female with left 5th toe eschar with underlying cellulitis and osteomyelitis now s/p left 5th digit amputation (DOS: 2/4/19 by Dr Daria Ríos)  2  Chronic left foot Charcot arthropathy   3  Diabetes Mellitus 2 (HbA1c: 11 4% from 1/29/19)   4  PAD  5  CKD, stage III  6  HTN  7  HLD    -pending medical clearance from Internal Medicine, will plan for d/c today  -L foot dressing left intact  -WBAT CAM boot L foot  -patient afebrile  -will send patient home with Rx for p o  Keflex, Rx for left foot x-ray to be done before next appointment  -patient will follow up with Dr Shady Estes, follow-up instructions in chart    Subjective/Objective   Chief Complaint: No chief complaint on file  Subjective: 48 y o  y/o female was seen and evaluated at bedside  Blood pressure 117/70, pulse 72, temperature 98 °F (36 7 °C), resp  rate 16, height 5' (1 524 m), weight 86 8 kg (191 lb 5 8 oz), last menstrual period 01/15/2019, SpO2 98 %, not currently breastfeeding  ,Body mass index is 37 37 kg/m²  Invasive Devices     Peripheral Intravenous Line            Peripheral IV 02/06/19 Right Antecubital less than 1 day                Physical Exam:   General: Alert, cooperative and no distress  Lungs: Non labored breathing  Heart: Positive S1, S2  Abdomen: Soft, non-tender  Extremity:       Neurovascular status gross motor function at baseline  Dressing left clean dry and intact      Lab, Imaging and other studies:   CBC:   Lab Results   Component Value Date    WBC 11 39 (H) 02/07/2019    HGB 10 5 (L) 02/07/2019    HCT 35 8 02/07/2019    MCV 77 (L) 02/07/2019     02/07/2019    MCH 22 7 (L) 02/07/2019    MCHC 29 3 (L) 02/07/2019    RDW 15 8 (H) 02/07/2019    MPV 10 3 02/07/2019    NRBC 0 02/07/2019       Imaging: I have personally reviewed pertinent films in PACS  EKG, Pathology, and Other Studies: I have personally reviewed pertinent reports    VTE Pharmacologic Prophylaxis: Heparin

## 2019-02-07 NOTE — PLAN OF CARE
Problem: METABOLIC, FLUID AND ELECTROLYTES - ADULT  Goal: Glucose maintained within target range  INTERVENTIONS:  - Monitor Blood Glucose as ordered  - Assess for signs and symptoms of hyperglycemia and hypoglycemia  - Administer ordered medications to maintain glucose within target range  - Assess nutritional intake and initiate nutrition service referral as needed   Outcome: Progressing      Problem: PAIN - ADULT  Goal: Verbalizes/displays adequate comfort level or baseline comfort level  Interventions:  - Encourage patient to monitor pain and request assistance  - Assess pain using appropriate pain scale  - Administer analgesics based on type and severity of pain and evaluate response  - Implement non-pharmacological measures as appropriate and evaluate response  - Consider cultural and social influences on pain and pain management  - Notify physician/advanced practitioner if interventions unsuccessful or patient reports new pain   Outcome: Progressing      Problem: INFECTION - ADULT  Goal: Absence or prevention of progression during hospitalization  INTERVENTIONS:  - Assess and monitor for signs and symptoms of infection  - Monitor lab/diagnostic results  - Monitor all insertion sites, i e  indwelling lines, tubes, and drains  - Monitor endotracheal (as able) and nasal secretions for changes in amount and color  - [place] appropriate cooling/warming therapies per order  - Administer medications as ordered  - Instruct and encourage patient and family to use good hand hygiene technique  - Identify and instruct in appropriate isolation precautions for identified infection/condition   Outcome: Progressing      Problem: DISCHARGE PLANNING  Goal: Discharge to home or other facility with appropriate resources  INTERVENTIONS:  - Identify barriers to discharge w/patient and caregiver  - Arrange for needed discharge resources and transportation as appropriate  - Identify discharge learning needs (meds, wound care, etc )  - Arrange for interpretive services to assist at discharge as needed  - Refer to Case Management Department for coordinating discharge planning if the patient needs post-hospital services based on physician/advanced practitioner order or complex needs related to functional status, cognitive ability, or social support system   Outcome: Progressing      Problem: Potential for Falls  Goal: Patient will remain free of falls  INTERVENTIONS:  - Assess patient frequently for physical needs  -  Identify cognitive and physical deficits and behaviors that affect risk of falls    -  [place] fall precautions as indicated by assessment   - Educate patient/family on patient safety including physical limitations  - Instruct patient to call for assistance with activity based on assessment  - Modify environment to reduce risk of injury  - Consider OT/PT consult to assist with strengthening/mobility   Outcome: Progressing      Problem: Prexisting or High Potential for Compromised Skin Integrity  Goal: Skin integrity is maintained or improved  INTERVENTIONS:  - Identify patients at risk for skin breakdown  - Assess and monitor skin integrity  - Assess and monitor nutrition and hydration status  - Monitor labs (i e  albumin)  - Assess for incontinence   - Turn and reposition patient  - Assist with mobility/ambulation  - Relieve pressure over bony prominences  - Avoid friction and shearing  - Provide appropriate hygiene as needed including keeping skin clean and dry  - Evaluate need for skin moisturizer/barrier cream  - Collaborate with interdisciplinary team (i e  Nutrition, Rehabilitation, etc )   - Patient/family teaching   Outcome: Progressing      Problem: DISCHARGE PLANNING - CARE MANAGEMENT  Goal: Discharge to post-acute care or home with appropriate resources  INTERVENTIONS:  - Conduct assessment to determine patient/family and health care team treatment goals, and need for post-acute services based on payer coverage, community resources, and patient preferences, and barriers to discharge  - Address psychosocial, clinical, and financial barriers to discharge as identified in assessment in conjunction with the patient/family and health care team  - Arrange appropriate level of post-acute services according to patients   needs and preference and payer coverage in collaboration with the physician and health care team  - Communicate with and update the patient/family, physician, and health care team regarding progress on the discharge plan  - Arrange appropriate transportation to post-acute venues   Outcome: Progressing

## 2019-02-07 NOTE — DISCHARGE SUMMARY
Discharge Summary -   Edvin Lyon 48 y o  female MRN: [id number]  Unit/Bed#: E2 -01 Encounter: [id number]    Admission Date: 1/28/2019     Admitting Diagnosis: Cellulitis of toe [K47 134]    HPI:  59-year-old female with a past medical history of DM type 2, peripheral arterial disease, hyperlipidemia was admitted on 01/28/2019 for direct admission  She had left 5th digit wound with osteomyelitis and was seen in clinic that day  She had been seen a week prior for the same condition  She states that the 5th toe had become more painful and scabbed over  She had been taking Keflex for a week but it did not seem to help  X-rays were taken outpatient, she denied any nausea, vomiting, fevers, chills, shortness of breath that day  Procedures Performed: AMPUTATION 5th TOE:     Hospital Course:  Admitted 01/28/2019 for left 5th digit wound with osteomyelitis after being sent for direct admission  She was on Ancef for IV antibiotics  She was evaluated by Nephrology and followed for DEMETRIUS on CKD stage 3  X-ray showed osteomyelitis of the left 5th digit, MRI confirmed osteomyelitis of the distal and middle phalanx  Patient underwent a left lower extremity angiogram on 02/01/2019 and then underwent a left 5th digit amputation on 02/04/2019  She was kept nonweightbearing for the 1st 24 hours and then transition to weight-bearing as tolerated in a Cam boot  She was seen by PT, walker was recommended  She will be discharged with 1 week of p o  Keflex and the script for a follow-up left foot x-ray  Dressing will be left clean dry and intact and she will follow up in 1 week  Significant Findings, Care, Treatment and Services Provided:  See above    Complications:  None    Discharge Diagnosis:  Status post left 5th toe amputation    Condition at Discharge: good     Discharge instructions/Information to patient and family:   See after visit summary for information provided to patient and family  Provisions for Follow-Up Care/Important appointments:    See after visit summary for information related to follow-up care and any pertinent home health orders  Disposition: Home    Planned Readmission: No    Discharge Statement   I spent 30 minutes discharging the patient  This time was spent on the day of discharge  I had direct contact with the patient on the day of discharge  The details of this patient's discharge     Discharge Medications:  See after visit summary for reconciled discharge medications provided to patient and family

## 2019-02-07 NOTE — NURSING NOTE
Pt discharged home with outpt nursing  IV discontinued and intact  Prescriptions given to pt, but refused meds to bed  No further questions at this time

## 2019-02-08 LAB
BACTERIA TISS AEROBE CULT: ABNORMAL
BACTERIA TISS AEROBE CULT: ABNORMAL
GRAM STN SPEC: ABNORMAL

## 2019-02-11 ENCOUNTER — OFFICE VISIT (OUTPATIENT)
Dept: FAMILY MEDICINE CLINIC | Facility: CLINIC | Age: 54
End: 2019-02-11

## 2019-02-11 VITALS
SYSTOLIC BLOOD PRESSURE: 124 MMHG | TEMPERATURE: 97.5 F | HEIGHT: 60 IN | BODY MASS INDEX: 38.09 KG/M2 | OXYGEN SATURATION: 96 % | RESPIRATION RATE: 18 BRPM | WEIGHT: 194 LBS | DIASTOLIC BLOOD PRESSURE: 80 MMHG | HEART RATE: 74 BPM

## 2019-02-11 DIAGNOSIS — IMO0002 TOE AMPUTATION STATUS, LEFT: ICD-10-CM

## 2019-02-11 DIAGNOSIS — N18.30 CKD (CHRONIC KIDNEY DISEASE) STAGE 3, GFR 30-59 ML/MIN (HCC): Primary | ICD-10-CM

## 2019-02-11 DIAGNOSIS — Z79.4 TYPE 2 DIABETES MELLITUS WITH OTHER CIRCULATORY COMPLICATION, WITH LONG-TERM CURRENT USE OF INSULIN (HCC): ICD-10-CM

## 2019-02-11 DIAGNOSIS — Z79.4 TYPE 2 DIABETES MELLITUS WITHOUT COMPLICATION, WITH LONG-TERM CURRENT USE OF INSULIN (HCC): ICD-10-CM

## 2019-02-11 DIAGNOSIS — E11.59 TYPE 2 DIABETES MELLITUS WITH OTHER CIRCULATORY COMPLICATION, WITH LONG-TERM CURRENT USE OF INSULIN (HCC): ICD-10-CM

## 2019-02-11 DIAGNOSIS — E11.9 TYPE 2 DIABETES MELLITUS WITHOUT COMPLICATION, WITH LONG-TERM CURRENT USE OF INSULIN (HCC): ICD-10-CM

## 2019-02-11 PROCEDURE — 1111F DSCHRG MED/CURRENT MED MERGE: CPT | Performed by: FAMILY MEDICINE

## 2019-02-11 PROCEDURE — 99496 TRANSJ CARE MGMT HIGH F2F 7D: CPT | Performed by: FAMILY MEDICINE

## 2019-02-11 NOTE — PROGRESS NOTES
Assessment/Plan:     Toe amputation status, left ([facility])  Dressing clean, dry and intact  Follow up with Podiatry as scheduled     Type 2 diabetes mellitus with circulatory disorder, with long-term current use of insulin ([facility])  Lab Results   Component Value Date    HGBA1C 11 4 (H) 01/29/2019       No results for input(s): POCGLU in the last 72 hours  Blood Sugar Average: Last 72 hrs:  Discussed importance of compliance with diet  Continue Basaglar 75 units qHs  Novolog 15 units 3 times a day  Keep BG log          Problem List Items Addressed This Visit        Endocrine    Type 2 diabetes mellitus with circulatory disorder, with long-term current use of insulin ([facility])     Lab Results   Component Value Date    HGBA1C 11 4 (H) 01/29/2019       No results for input(s): POCGLU in the last 72 hours  Blood Sugar Average: Last 72 hrs:  Discussed importance of compliance with diet  Continue Basaglar 75 units qHs  Novolog 15 units 3 times a day  Keep BG log          Relevant Medications    insulin glargine (BASAGLAR KWIKPEN) 100 units/mL injection pen    Other Relevant Orders    Ambulatory referral to Endocrinology       Genitourinary    CKD (chronic kidney disease) stage 3, GFR 30-59 ml/min ([facility]) - Primary    Relevant Orders    Ambulatory referral to Nephrology       Other    Toe amputation status, left ([address] )     Dressing clean, dry and intact  Follow up with Podiatry as scheduled                 Subjective:     Patient ID: Porter Roe is a 48 y o  female  47 yo female with uncontrolled DM, last HgA1c of 11 4, recently admitted to [facility] for cellulitis and subacute osteomyelitis of L 5th toe  Patient is s/p L 5th toe amputation  Currently doing well  Denies any pain  States she is now "taking her diabetes seriously"  Checks her blood sugar at home twice a day  Fasting blood sugar in the 150 range  Review of Systems   All other systems reviewed and are negative          Objective:     Physical Exam Constitutional: She is oriented to person, place, and time  She appears well-developed  HENT:   Head: Normocephalic  Right Ear: External ear normal    Left Ear: External ear normal    Nose: Nose normal    Mouth/Throat: Oropharynx is clear and moist    Eyes: Pupils are equal, round, and reactive to light  Conjunctivae and EOM are normal    Neck: Normal range of motion  Neck supple  No thyromegaly present  Cardiovascular: Normal rate, regular rhythm and normal heart sounds  Pulmonary/Chest: Effort normal and breath sounds normal    Abdominal: Soft  There is no tenderness  There is no rebound and no guarding  Musculoskeletal: Normal range of motion  Cam boot on    Neurological: She is alert and oriented to person, place, and time  She has normal reflexes  Skin: Skin is dry  L foot dressing clean, dry and intact    Psychiatric: She has a normal mood and affect  Nursing note and vitals reviewed  Vitals:    02/11/19 1309   BP: 124/80   BP Location: Left arm   Patient Position: Sitting   Cuff Size: Large   Pulse: 74   Resp: 18   Temp: 97 5 °F (36 4 °C)   TempSrc: Tympanic   SpO2: 96%   Weight: 88 kg (194 lb)   Height: 5' (1 524 m)       Transitional Care Management Review:  Adri Prince is a 48 y o  female here for TCM follow up  During the TCM phone call patient stated:    TCM Call (since 1/11/2019)     Date and time call was made  2/7/2019  3:09 PM    Hospital care reviewed  Records reviewed    [facility]     Date of Admission  01/28/19    Date of discharge  02/07/19    Diagnosis  SUBACUTE OSTEOMYELITIS OF LEFT FOOT    Disposition  Home    Were the patients medications reviewed and updated  Yes    Current Symptoms  None      TCM Call (since 1/11/2019)     Post hospital issues  None    Should patient be enrolled in anticoag monitoring? No    Scheduled for follow up?   Yes    Did you obtain your prescribed medications  Yes    Do you need help managing your prescriptions or medications  No    Is transportation to your appointment needed  No    I have advised the patient to call PCP with any new or worsening symptoms  DOMINGA/ Kartik [facility] members    Counseling  Patient          Kayden Neal MD

## 2019-02-11 NOTE — ASSESSMENT & PLAN NOTE
Lab Results   Component Value Date    HGBA1C 11 4 (H) 01/29/2019       No results for input(s): POCGLU in the last 72 hours      Blood Sugar Average: Last 72 hrs:  Discussed importance of compliance with diet  Continue Basaglar 75 units qHs  Novolog 15 units 3 times a day  Keep BG log

## 2019-02-25 DIAGNOSIS — Z79.4 TYPE 2 DIABETES MELLITUS WITHOUT COMPLICATION, WITH LONG-TERM CURRENT USE OF INSULIN (HCC): ICD-10-CM

## 2019-02-25 DIAGNOSIS — E11.9 TYPE 2 DIABETES MELLITUS WITHOUT COMPLICATION, WITH LONG-TERM CURRENT USE OF INSULIN (HCC): ICD-10-CM

## 2019-04-01 ENCOUNTER — TRANSCRIBE ORDERS (OUTPATIENT)
Dept: ADMINISTRATIVE | Facility: HOSPITAL | Age: 54
End: 2019-04-01

## 2019-04-01 ENCOUNTER — HOSPITAL ENCOUNTER (OUTPATIENT)
Dept: RADIOLOGY | Facility: HOSPITAL | Age: 54
Discharge: HOME | End: 2019-04-01
Attending: PODIATRIST
Payer: COMMERCIAL

## 2019-04-01 DIAGNOSIS — M79.672 LEFT FOOT PAIN: Primary | ICD-10-CM

## 2019-04-01 DIAGNOSIS — M79.671 RIGHT FOOT PAIN: ICD-10-CM

## 2019-04-01 DIAGNOSIS — M79.672 LEFT FOOT PAIN: ICD-10-CM

## 2019-04-01 PROCEDURE — 73630 X-RAY EXAM OF FOOT: CPT

## 2019-04-22 ENCOUNTER — HOSPITAL ENCOUNTER (OUTPATIENT)
Dept: RADIOLOGY | Facility: HOSPITAL | Age: 54
Discharge: HOME | End: 2019-04-22
Attending: PODIATRIST
Payer: COMMERCIAL

## 2019-04-22 DIAGNOSIS — M79.671 RIGHT FOOT PAIN: ICD-10-CM

## 2019-04-22 DIAGNOSIS — M79.672 LEFT FOOT PAIN: ICD-10-CM

## 2019-04-22 PROCEDURE — 73630 X-RAY EXAM OF FOOT: CPT

## 2019-04-30 ENCOUNTER — APPOINTMENT (INPATIENT)
Dept: RADIOLOGY | Facility: HOSPITAL | Age: 54
DRG: 253 | End: 2019-04-30
Attending: SURGERY
Payer: COMMERCIAL

## 2019-04-30 ENCOUNTER — HOSPITAL ENCOUNTER (INPATIENT)
Facility: HOSPITAL | Age: 54
LOS: 2 days | Discharge: HOME HEALTH | DRG: 253 | End: 2019-05-02
Attending: SURGERY | Admitting: SURGERY
Payer: COMMERCIAL

## 2019-04-30 ENCOUNTER — APPOINTMENT (EMERGENCY)
Dept: NON INVASIVE DIAGNOSTICS | Facility: HOSPITAL | Age: 54
End: 2019-04-30
Payer: COMMERCIAL

## 2019-04-30 ENCOUNTER — HOSPITAL ENCOUNTER (EMERGENCY)
Facility: HOSPITAL | Age: 54
End: 2019-04-30
Attending: EMERGENCY MEDICINE | Admitting: EMERGENCY MEDICINE
Payer: COMMERCIAL

## 2019-04-30 ENCOUNTER — APPOINTMENT (EMERGENCY)
Dept: RADIOLOGY | Facility: HOSPITAL | Age: 54
DRG: 253 | End: 2019-04-30
Payer: COMMERCIAL

## 2019-04-30 VITALS
BODY MASS INDEX: 37.54 KG/M2 | DIASTOLIC BLOOD PRESSURE: 76 MMHG | OXYGEN SATURATION: 100 % | RESPIRATION RATE: 16 BRPM | TEMPERATURE: 98.2 F | WEIGHT: 192.24 LBS | HEART RATE: 88 BPM | SYSTOLIC BLOOD PRESSURE: 136 MMHG

## 2019-04-30 DIAGNOSIS — R73.9 HYPERGLYCEMIA: ICD-10-CM

## 2019-04-30 DIAGNOSIS — N18.30 CKD (CHRONIC KIDNEY DISEASE) STAGE 3, GFR 30-59 ML/MIN (HCC): ICD-10-CM

## 2019-04-30 DIAGNOSIS — E11.9 TYPE 2 DIABETES MELLITUS WITHOUT COMPLICATION, WITH LONG-TERM CURRENT USE OF INSULIN (HCC): ICD-10-CM

## 2019-04-30 DIAGNOSIS — Z79.4 TYPE 2 DIABETES MELLITUS WITHOUT COMPLICATION, WITH LONG-TERM CURRENT USE OF INSULIN (HCC): ICD-10-CM

## 2019-04-30 DIAGNOSIS — L03.90 CELLULITIS: ICD-10-CM

## 2019-04-30 DIAGNOSIS — I73.9 PAD (PERIPHERAL ARTERY DISEASE) (HCC): Primary | ICD-10-CM

## 2019-04-30 DIAGNOSIS — Z79.4 TYPE 2 DIABETES MELLITUS WITH OTHER CIRCULATORY COMPLICATION, WITH LONG-TERM CURRENT USE OF INSULIN (HCC): ICD-10-CM

## 2019-04-30 DIAGNOSIS — E11.59 TYPE 2 DIABETES MELLITUS WITH OTHER CIRCULATORY COMPLICATION, WITH LONG-TERM CURRENT USE OF INSULIN (HCC): ICD-10-CM

## 2019-04-30 DIAGNOSIS — T30.0 BURN: ICD-10-CM

## 2019-04-30 DIAGNOSIS — I99.8 ISCHEMIC LEG: ICD-10-CM

## 2019-04-30 DIAGNOSIS — S91.301A OPEN WOUND OF RIGHT FOOT, INITIAL ENCOUNTER: ICD-10-CM

## 2019-04-30 DIAGNOSIS — N28.9 RENAL INSUFFICIENCY: ICD-10-CM

## 2019-04-30 LAB
ANION GAP SERPL CALCULATED.3IONS-SCNC: 9 MMOL/L (ref 4–13)
APTT PPP: 30 SECONDS (ref 26–38)
APTT PPP: 30 SECONDS (ref 26–38)
APTT PPP: >210 SECONDS (ref 26–38)
BASOPHILS # BLD AUTO: 0.04 THOUSANDS/ΜL (ref 0–0.1)
BASOPHILS NFR BLD AUTO: 0 % (ref 0–1)
BUN SERPL-MCNC: 35 MG/DL (ref 5–25)
CALCIUM SERPL-MCNC: 9.2 MG/DL (ref 8.3–10.1)
CHLORIDE SERPL-SCNC: 100 MMOL/L (ref 100–108)
CK SERPL-CCNC: 127 U/L (ref 26–192)
CO2 SERPL-SCNC: 27 MMOL/L (ref 21–32)
CREAT SERPL-MCNC: 1.58 MG/DL (ref 0.6–1.3)
CRP SERPL QL: 25.2 MG/L
EOSINOPHIL # BLD AUTO: 0.18 THOUSAND/ΜL (ref 0–0.61)
EOSINOPHIL NFR BLD AUTO: 2 % (ref 0–6)
ERYTHROCYTE [DISTWIDTH] IN BLOOD BY AUTOMATED COUNT: 14.5 % (ref 11.6–15.1)
ERYTHROCYTE [DISTWIDTH] IN BLOOD BY AUTOMATED COUNT: 14.6 % (ref 11.6–15.1)
ERYTHROCYTE [SEDIMENTATION RATE] IN BLOOD: 35 MM/HOUR (ref 0–20)
FIBRINOGEN PPP-MCNC: 515 MG/DL (ref 227–495)
GFR SERPL CREATININE-BSD FRML MDRD: 37 ML/MIN/1.73SQ M
GLUCOSE SERPL-MCNC: 286 MG/DL (ref 65–140)
GLUCOSE SERPL-MCNC: 308 MG/DL (ref 65–140)
HCT VFR BLD AUTO: 34.7 % (ref 34.8–46.1)
HCT VFR BLD AUTO: 39.7 % (ref 34.8–46.1)
HGB BLD-MCNC: 10.5 G/DL (ref 11.5–15.4)
HGB BLD-MCNC: 12 G/DL (ref 11.5–15.4)
IMM GRANULOCYTES # BLD AUTO: 0.03 THOUSAND/UL (ref 0–0.2)
IMM GRANULOCYTES NFR BLD AUTO: 0 % (ref 0–2)
INR PPP: 0.96 (ref 0.86–1.17)
INR PPP: 1.21 (ref 0.86–1.17)
LYMPHOCYTES # BLD AUTO: 2.27 THOUSANDS/ΜL (ref 0.6–4.47)
LYMPHOCYTES NFR BLD AUTO: 21 % (ref 14–44)
MAGNESIUM SERPL-MCNC: 1.6 MG/DL (ref 1.6–2.6)
MCH RBC QN AUTO: 22.4 PG (ref 26.8–34.3)
MCH RBC QN AUTO: 22.7 PG (ref 26.8–34.3)
MCHC RBC AUTO-ENTMCNC: 30.2 G/DL (ref 31.4–37.4)
MCHC RBC AUTO-ENTMCNC: 30.3 G/DL (ref 31.4–37.4)
MCV RBC AUTO: 74 FL (ref 82–98)
MCV RBC AUTO: 75 FL (ref 82–98)
MONOCYTES # BLD AUTO: 0.59 THOUSAND/ΜL (ref 0.17–1.22)
MONOCYTES NFR BLD AUTO: 5 % (ref 4–12)
NEUTROPHILS # BLD AUTO: 7.77 THOUSANDS/ΜL (ref 1.85–7.62)
NEUTS SEG NFR BLD AUTO: 72 % (ref 43–75)
NRBC BLD AUTO-RTO: 0 /100 WBCS
PLATELET # BLD AUTO: 225 THOUSANDS/UL (ref 149–390)
PLATELET # BLD AUTO: 240 THOUSANDS/UL (ref 149–390)
PMV BLD AUTO: 10.8 FL (ref 8.9–12.7)
PMV BLD AUTO: 11.1 FL (ref 8.9–12.7)
POTASSIUM SERPL-SCNC: 4.1 MMOL/L (ref 3.5–5.3)
PROTHROMBIN TIME: 12.9 SECONDS (ref 11.8–14.2)
PROTHROMBIN TIME: 15.4 SECONDS (ref 11.8–14.2)
RBC # BLD AUTO: 4.68 MILLION/UL (ref 3.81–5.12)
RBC # BLD AUTO: 5.29 MILLION/UL (ref 3.81–5.12)
SODIUM SERPL-SCNC: 136 MMOL/L (ref 136–145)
WBC # BLD AUTO: 10.88 THOUSAND/UL (ref 4.31–10.16)
WBC # BLD AUTO: 11.31 THOUSAND/UL (ref 4.31–10.16)

## 2019-04-30 PROCEDURE — 96375 TX/PRO/DX INJ NEW DRUG ADDON: CPT

## 2019-04-30 PROCEDURE — 99285 EMERGENCY DEPT VISIT HI MDM: CPT

## 2019-04-30 PROCEDURE — 99214 OFFICE O/P EST MOD 30 MIN: CPT | Performed by: PHYSICIAN ASSISTANT

## 2019-04-30 PROCEDURE — 047M3ZZ DILATION OF RIGHT POPLITEAL ARTERY, PERCUTANEOUS APPROACH: ICD-10-PCS | Performed by: RADIOLOGY

## 2019-04-30 PROCEDURE — 83735 ASSAY OF MAGNESIUM: CPT | Performed by: EMERGENCY MEDICINE

## 2019-04-30 PROCEDURE — 36415 COLL VENOUS BLD VENIPUNCTURE: CPT | Performed by: EMERGENCY MEDICINE

## 2019-04-30 PROCEDURE — 37224 PR REVSC OPN/PRG FEM/POP W/ANGIOPLASTY UNI: CPT | Performed by: RADIOLOGY

## 2019-04-30 PROCEDURE — 99285 EMERGENCY DEPT VISIT HI MDM: CPT | Performed by: EMERGENCY MEDICINE

## 2019-04-30 PROCEDURE — 99152 MOD SED SAME PHYS/QHP 5/>YRS: CPT

## 2019-04-30 PROCEDURE — 85610 PROTHROMBIN TIME: CPT | Performed by: SURGERY

## 2019-04-30 PROCEDURE — 93926 LOWER EXTREMITY STUDY: CPT | Performed by: SURGERY

## 2019-04-30 PROCEDURE — 96365 THER/PROPH/DIAG IV INF INIT: CPT

## 2019-04-30 PROCEDURE — 85027 COMPLETE CBC AUTOMATED: CPT | Performed by: SURGERY

## 2019-04-30 PROCEDURE — 99152 MOD SED SAME PHYS/QHP 5/>YRS: CPT | Performed by: RADIOLOGY

## 2019-04-30 PROCEDURE — 85730 THROMBOPLASTIN TIME PARTIAL: CPT | Performed by: SURGERY

## 2019-04-30 PROCEDURE — 85610 PROTHROMBIN TIME: CPT | Performed by: EMERGENCY MEDICINE

## 2019-04-30 PROCEDURE — 85652 RBC SED RATE AUTOMATED: CPT | Performed by: EMERGENCY MEDICINE

## 2019-04-30 PROCEDURE — C1725 CATH, TRANSLUMIN NON-LASER: HCPCS

## 2019-04-30 PROCEDURE — 82550 ASSAY OF CK (CPK): CPT | Performed by: EMERGENCY MEDICINE

## 2019-04-30 PROCEDURE — NC001 PR NO CHARGE: Performed by: SURGERY

## 2019-04-30 PROCEDURE — 87040 BLOOD CULTURE FOR BACTERIA: CPT | Performed by: EMERGENCY MEDICINE

## 2019-04-30 PROCEDURE — 93922 UPR/L XTREMITY ART 2 LEVELS: CPT | Performed by: SURGERY

## 2019-04-30 PROCEDURE — 80048 BASIC METABOLIC PNL TOTAL CA: CPT | Performed by: EMERGENCY MEDICINE

## 2019-04-30 PROCEDURE — 85025 COMPLETE CBC W/AUTO DIFF WBC: CPT | Performed by: EMERGENCY MEDICINE

## 2019-04-30 PROCEDURE — 37224 HB FEM/POPL REVAS W/TLA: CPT

## 2019-04-30 PROCEDURE — 99153 MOD SED SAME PHYS/QHP EA: CPT

## 2019-04-30 PROCEDURE — B41FYZZ FLUOROSCOPY OF RIGHT LOWER EXTREMITY ARTERIES USING OTHER CONTRAST: ICD-10-PCS | Performed by: RADIOLOGY

## 2019-04-30 PROCEDURE — 96367 TX/PROPH/DG ADDL SEQ IV INF: CPT

## 2019-04-30 PROCEDURE — 85730 THROMBOPLASTIN TIME PARTIAL: CPT | Performed by: EMERGENCY MEDICINE

## 2019-04-30 PROCEDURE — 75710 ARTERY X-RAYS ARM/LEG: CPT

## 2019-04-30 PROCEDURE — C1769 GUIDE WIRE: HCPCS

## 2019-04-30 PROCEDURE — 82948 REAGENT STRIP/BLOOD GLUCOSE: CPT

## 2019-04-30 PROCEDURE — 70450 CT HEAD/BRAIN W/O DYE: CPT

## 2019-04-30 PROCEDURE — 86140 C-REACTIVE PROTEIN: CPT | Performed by: EMERGENCY MEDICINE

## 2019-04-30 PROCEDURE — C1760 CLOSURE DEV, VASC: HCPCS

## 2019-04-30 PROCEDURE — 75710 ARTERY X-RAYS ARM/LEG: CPT | Performed by: RADIOLOGY

## 2019-04-30 PROCEDURE — C1887 CATHETER, GUIDING: HCPCS

## 2019-04-30 PROCEDURE — 93926 LOWER EXTREMITY STUDY: CPT

## 2019-04-30 PROCEDURE — 85384 FIBRINOGEN ACTIVITY: CPT | Performed by: RADIOLOGY

## 2019-04-30 RX ORDER — HEPARIN SODIUM 1000 [USP'U]/ML
6800 INJECTION, SOLUTION INTRAVENOUS; SUBCUTANEOUS AS NEEDED
Status: DISCONTINUED | OUTPATIENT
Start: 2019-04-30 | End: 2019-05-01

## 2019-04-30 RX ORDER — CEFAZOLIN SODIUM 2 G/50ML
2000 SOLUTION INTRAVENOUS ONCE
Status: COMPLETED | OUTPATIENT
Start: 2019-04-30 | End: 2019-04-30

## 2019-04-30 RX ORDER — ATORVASTATIN CALCIUM 20 MG/1
20 TABLET, FILM COATED ORAL EVERY EVENING
Status: DISCONTINUED | OUTPATIENT
Start: 2019-04-30 | End: 2019-05-02 | Stop reason: HOSPADM

## 2019-04-30 RX ORDER — SODIUM CHLORIDE 9 MG/ML
60 INJECTION, SOLUTION INTRAVENOUS CONTINUOUS
Status: DISCONTINUED | OUTPATIENT
Start: 2019-04-30 | End: 2019-05-02

## 2019-04-30 RX ORDER — HEPARIN SODIUM 1000 [USP'U]/ML
3400 INJECTION, SOLUTION INTRAVENOUS; SUBCUTANEOUS AS NEEDED
Status: DISCONTINUED | OUTPATIENT
Start: 2019-04-30 | End: 2019-04-30 | Stop reason: SDUPTHER

## 2019-04-30 RX ORDER — HEPARIN SODIUM 1000 [USP'U]/ML
3400 INJECTION, SOLUTION INTRAVENOUS; SUBCUTANEOUS AS NEEDED
Status: DISCONTINUED | OUTPATIENT
Start: 2019-04-30 | End: 2019-05-01

## 2019-04-30 RX ORDER — HEPARIN SODIUM 1000 [USP'U]/ML
INJECTION, SOLUTION INTRAVENOUS; SUBCUTANEOUS CODE/TRAUMA/SEDATION MEDICATION
Status: COMPLETED | OUTPATIENT
Start: 2019-04-30 | End: 2019-04-30

## 2019-04-30 RX ORDER — ACETAMINOPHEN 325 MG/1
650 TABLET ORAL EVERY 6 HOURS PRN
Status: DISCONTINUED | OUTPATIENT
Start: 2019-04-30 | End: 2019-05-02 | Stop reason: HOSPADM

## 2019-04-30 RX ORDER — LORATADINE 10 MG/1
10 TABLET ORAL DAILY
Status: DISCONTINUED | OUTPATIENT
Start: 2019-05-01 | End: 2019-05-02 | Stop reason: HOSPADM

## 2019-04-30 RX ORDER — AMLODIPINE BESYLATE 10 MG/1
10 TABLET ORAL DAILY
Status: DISCONTINUED | OUTPATIENT
Start: 2019-05-01 | End: 2019-05-02 | Stop reason: HOSPADM

## 2019-04-30 RX ORDER — HEPARIN SODIUM 10000 [USP'U]/100ML
3-30 INJECTION, SOLUTION INTRAVENOUS
Status: DISCONTINUED | OUTPATIENT
Start: 2019-04-30 | End: 2019-05-01

## 2019-04-30 RX ORDER — OXYCODONE HYDROCHLORIDE 10 MG/1
10 TABLET ORAL EVERY 4 HOURS PRN
Status: DISCONTINUED | OUTPATIENT
Start: 2019-04-30 | End: 2019-05-02 | Stop reason: HOSPADM

## 2019-04-30 RX ORDER — OXYCODONE HYDROCHLORIDE 5 MG/1
5 TABLET ORAL EVERY 4 HOURS PRN
Status: DISCONTINUED | OUTPATIENT
Start: 2019-04-30 | End: 2019-05-02 | Stop reason: HOSPADM

## 2019-04-30 RX ORDER — HEPARIN SODIUM 10000 [USP'U]/100ML
3-30 INJECTION, SOLUTION INTRAVENOUS
Status: DISCONTINUED | OUTPATIENT
Start: 2019-04-30 | End: 2019-04-30 | Stop reason: SDUPTHER

## 2019-04-30 RX ORDER — HYDROMORPHONE HCL/PF 1 MG/ML
1 SYRINGE (ML) INJECTION
Status: DISCONTINUED | OUTPATIENT
Start: 2019-04-30 | End: 2019-05-02 | Stop reason: HOSPADM

## 2019-04-30 RX ORDER — MIDAZOLAM HYDROCHLORIDE 1 MG/ML
INJECTION INTRAMUSCULAR; INTRAVENOUS CODE/TRAUMA/SEDATION MEDICATION
Status: COMPLETED | OUTPATIENT
Start: 2019-04-30 | End: 2019-04-30

## 2019-04-30 RX ORDER — CLOPIDOGREL BISULFATE 75 MG/1
75 TABLET ORAL DAILY
Status: DISCONTINUED | OUTPATIENT
Start: 2019-04-30 | End: 2019-05-02 | Stop reason: HOSPADM

## 2019-04-30 RX ORDER — HEPARIN SODIUM 10000 [USP'U]/100ML
3-30 INJECTION, SOLUTION INTRAVENOUS
Status: DISCONTINUED | OUTPATIENT
Start: 2019-04-30 | End: 2019-04-30 | Stop reason: HOSPADM

## 2019-04-30 RX ORDER — HEPARIN SODIUM 1000 [USP'U]/ML
6800 INJECTION, SOLUTION INTRAVENOUS; SUBCUTANEOUS AS NEEDED
Status: DISCONTINUED | OUTPATIENT
Start: 2019-04-30 | End: 2019-04-30 | Stop reason: HOSPADM

## 2019-04-30 RX ORDER — FENTANYL CITRATE 50 UG/ML
INJECTION, SOLUTION INTRAMUSCULAR; INTRAVENOUS CODE/TRAUMA/SEDATION MEDICATION
Status: COMPLETED | OUTPATIENT
Start: 2019-04-30 | End: 2019-04-30

## 2019-04-30 RX ORDER — HEPARIN SODIUM 1000 [USP'U]/ML
3400 INJECTION, SOLUTION INTRAVENOUS; SUBCUTANEOUS AS NEEDED
Status: DISCONTINUED | OUTPATIENT
Start: 2019-04-30 | End: 2019-04-30 | Stop reason: HOSPADM

## 2019-04-30 RX ORDER — HEPARIN SODIUM 1000 [USP'U]/ML
6800 INJECTION, SOLUTION INTRAVENOUS; SUBCUTANEOUS AS NEEDED
Status: DISCONTINUED | OUTPATIENT
Start: 2019-04-30 | End: 2019-04-30 | Stop reason: SDUPTHER

## 2019-04-30 RX ADMIN — CEFAZOLIN SODIUM 2000 MG: 2 SOLUTION INTRAVENOUS at 13:45

## 2019-04-30 RX ADMIN — MIDAZOLAM 1 MG: 1 INJECTION INTRAMUSCULAR; INTRAVENOUS at 18:17

## 2019-04-30 RX ADMIN — FENTANYL CITRATE 75 MCG: 50 INJECTION, SOLUTION INTRAMUSCULAR; INTRAVENOUS at 18:30

## 2019-04-30 RX ADMIN — SODIUM CHLORIDE 100 ML/HR: 0.9 INJECTION, SOLUTION INTRAVENOUS at 20:11

## 2019-04-30 RX ADMIN — HEPARIN SODIUM AND DEXTROSE 18 UNITS/KG/HR: 10000; 5 INJECTION INTRAVENOUS at 14:44

## 2019-04-30 RX ADMIN — ATORVASTATIN CALCIUM 20 MG: 20 TABLET, FILM COATED ORAL at 20:15

## 2019-04-30 RX ADMIN — MIDAZOLAM 1 MG: 1 INJECTION INTRAMUSCULAR; INTRAVENOUS at 17:57

## 2019-04-30 RX ADMIN — IODIXANOL 38 ML: 320 INJECTION, SOLUTION INTRAVASCULAR at 18:44

## 2019-04-30 RX ADMIN — FENTANYL CITRATE 25 MCG: 50 INJECTION, SOLUTION INTRAMUSCULAR; INTRAVENOUS at 18:18

## 2019-04-30 RX ADMIN — HEPARIN SODIUM 6800 UNITS: 1000 INJECTION INTRAVENOUS; SUBCUTANEOUS at 14:40

## 2019-04-30 RX ADMIN — OXYCODONE HYDROCHLORIDE 10 MG: 10 TABLET ORAL at 22:36

## 2019-04-30 RX ADMIN — CLOPIDOGREL BISULFATE 75 MG: 75 TABLET ORAL at 20:15

## 2019-04-30 RX ADMIN — HEPARIN SODIUM 5000 UNITS: 1000 INJECTION INTRAVENOUS; SUBCUTANEOUS at 18:15

## 2019-04-30 RX ADMIN — FENTANYL CITRATE 50 MCG: 50 INJECTION, SOLUTION INTRAMUSCULAR; INTRAVENOUS at 17:57

## 2019-04-30 RX ADMIN — INSULIN LISPRO 2 UNITS: 100 INJECTION, SOLUTION INTRAVENOUS; SUBCUTANEOUS at 22:13

## 2019-05-01 LAB
ALBUMIN SERPL BCP-MCNC: 2.8 G/DL (ref 3.5–5)
ALP SERPL-CCNC: 80 U/L (ref 46–116)
ALT SERPL W P-5'-P-CCNC: 14 U/L (ref 12–78)
ANION GAP SERPL CALCULATED.3IONS-SCNC: 7 MMOL/L (ref 4–13)
APTT PPP: 79 SECONDS (ref 26–38)
APTT PPP: >210 SECONDS (ref 26–38)
AST SERPL W P-5'-P-CCNC: 16 U/L (ref 5–45)
BACTERIA UR QL AUTO: ABNORMAL /HPF
BASOPHILS # BLD AUTO: 0.05 THOUSANDS/ΜL (ref 0–0.1)
BASOPHILS NFR BLD AUTO: 1 % (ref 0–1)
BILIRUB SERPL-MCNC: 0.4 MG/DL (ref 0.2–1)
BILIRUB UR QL STRIP: NEGATIVE
BUN SERPL-MCNC: 22 MG/DL (ref 5–25)
CA-I BLD-SCNC: 1.06 MMOL/L (ref 1.12–1.32)
CALCIUM SERPL-MCNC: 7.9 MG/DL (ref 8.3–10.1)
CHLORIDE SERPL-SCNC: 104 MMOL/L (ref 100–108)
CLARITY UR: CLEAR
CO2 SERPL-SCNC: 24 MMOL/L (ref 21–32)
COLOR UR: YELLOW
CREAT SERPL-MCNC: 0.98 MG/DL (ref 0.6–1.3)
CREAT UR-MCNC: 71.5 MG/DL
EOSINOPHIL # BLD AUTO: 0.2 THOUSAND/ΜL (ref 0–0.61)
EOSINOPHIL NFR BLD AUTO: 2 % (ref 0–6)
ERYTHROCYTE [DISTWIDTH] IN BLOOD BY AUTOMATED COUNT: 14.6 % (ref 11.6–15.1)
GFR SERPL CREATININE-BSD FRML MDRD: 66 ML/MIN/1.73SQ M
GLUCOSE SERPL-MCNC: 258 MG/DL (ref 65–140)
GLUCOSE SERPL-MCNC: 274 MG/DL (ref 65–140)
GLUCOSE SERPL-MCNC: 322 MG/DL (ref 65–140)
GLUCOSE SERPL-MCNC: 354 MG/DL (ref 65–140)
GLUCOSE SERPL-MCNC: 407 MG/DL (ref 65–140)
GLUCOSE UR STRIP-MCNC: ABNORMAL MG/DL
HCT VFR BLD AUTO: 33.8 % (ref 34.8–46.1)
HGB BLD-MCNC: 10.4 G/DL (ref 11.5–15.4)
HGB UR QL STRIP.AUTO: ABNORMAL
HYALINE CASTS #/AREA URNS LPF: ABNORMAL /LPF
IMM GRANULOCYTES # BLD AUTO: 0.03 THOUSAND/UL (ref 0–0.2)
IMM GRANULOCYTES NFR BLD AUTO: 0 % (ref 0–2)
INR PPP: 1.14 (ref 0.86–1.17)
KETONES UR STRIP-MCNC: NEGATIVE MG/DL
LEUKOCYTE ESTERASE UR QL STRIP: ABNORMAL
LYMPHOCYTES # BLD AUTO: 2.95 THOUSANDS/ΜL (ref 0.6–4.47)
LYMPHOCYTES NFR BLD AUTO: 28 % (ref 14–44)
MAGNESIUM SERPL-MCNC: 1.6 MG/DL (ref 1.6–2.6)
MCH RBC QN AUTO: 22.9 PG (ref 26.8–34.3)
MCHC RBC AUTO-ENTMCNC: 30.8 G/DL (ref 31.4–37.4)
MCV RBC AUTO: 74 FL (ref 82–98)
MONOCYTES # BLD AUTO: 0.77 THOUSAND/ΜL (ref 0.17–1.22)
MONOCYTES NFR BLD AUTO: 7 % (ref 4–12)
NEUTROPHILS # BLD AUTO: 6.4 THOUSANDS/ΜL (ref 1.85–7.62)
NEUTS SEG NFR BLD AUTO: 62 % (ref 43–75)
NITRITE UR QL STRIP: NEGATIVE
NON-SQ EPI CELLS URNS QL MICRO: ABNORMAL /HPF
NRBC BLD AUTO-RTO: 0 /100 WBCS
PH UR STRIP.AUTO: 6 [PH]
PHOSPHATE SERPL-MCNC: 3.4 MG/DL (ref 2.7–4.5)
PLATELET # BLD AUTO: 214 THOUSANDS/UL (ref 149–390)
PMV BLD AUTO: 11.7 FL (ref 8.9–12.7)
POTASSIUM SERPL-SCNC: 3.5 MMOL/L (ref 3.5–5.3)
PROT SERPL-MCNC: 6.6 G/DL (ref 6.4–8.2)
PROT UR STRIP-MCNC: ABNORMAL MG/DL
PROT UR-MCNC: 28 MG/DL
PROT/CREAT UR: 0.39 MG/G{CREAT} (ref 0–0.1)
PROTHROMBIN TIME: 14.7 SECONDS (ref 11.8–14.2)
RBC # BLD AUTO: 4.54 MILLION/UL (ref 3.81–5.12)
RBC #/AREA URNS AUTO: ABNORMAL /HPF
SODIUM SERPL-SCNC: 135 MMOL/L (ref 136–145)
SP GR UR STRIP.AUTO: 1.01 (ref 1–1.03)
UROBILINOGEN UR QL STRIP.AUTO: 0.2 E.U./DL
WBC # BLD AUTO: 10.4 THOUSAND/UL (ref 4.31–10.16)
WBC #/AREA URNS AUTO: ABNORMAL /HPF

## 2019-05-01 PROCEDURE — 99252 IP/OBS CONSLTJ NEW/EST SF 35: CPT | Performed by: HOSPITALIST

## 2019-05-01 PROCEDURE — 82570 ASSAY OF URINE CREATININE: CPT | Performed by: INTERNAL MEDICINE

## 2019-05-01 PROCEDURE — 99254 IP/OBS CNSLTJ NEW/EST MOD 60: CPT | Performed by: INTERNAL MEDICINE

## 2019-05-01 PROCEDURE — 83735 ASSAY OF MAGNESIUM: CPT | Performed by: SURGERY

## 2019-05-01 PROCEDURE — 85730 THROMBOPLASTIN TIME PARTIAL: CPT | Performed by: SURGERY

## 2019-05-01 PROCEDURE — 81001 URINALYSIS AUTO W/SCOPE: CPT | Performed by: INTERNAL MEDICINE

## 2019-05-01 PROCEDURE — 82948 REAGENT STRIP/BLOOD GLUCOSE: CPT

## 2019-05-01 PROCEDURE — 85610 PROTHROMBIN TIME: CPT | Performed by: SURGERY

## 2019-05-01 PROCEDURE — 84156 ASSAY OF PROTEIN URINE: CPT | Performed by: INTERNAL MEDICINE

## 2019-05-01 PROCEDURE — 85025 COMPLETE CBC W/AUTO DIFF WBC: CPT | Performed by: SURGERY

## 2019-05-01 PROCEDURE — 82330 ASSAY OF CALCIUM: CPT | Performed by: SURGERY

## 2019-05-01 PROCEDURE — 80053 COMPREHEN METABOLIC PANEL: CPT | Performed by: SURGERY

## 2019-05-01 PROCEDURE — 84100 ASSAY OF PHOSPHORUS: CPT | Performed by: SURGERY

## 2019-05-01 PROCEDURE — 99232 SBSQ HOSP IP/OBS MODERATE 35: CPT | Performed by: SURGERY

## 2019-05-01 RX ORDER — ASPIRIN 81 MG/1
81 TABLET, CHEWABLE ORAL DAILY
Status: DISCONTINUED | OUTPATIENT
Start: 2019-05-01 | End: 2019-05-02 | Stop reason: HOSPADM

## 2019-05-01 RX ORDER — INSULIN GLARGINE 100 [IU]/ML
75 INJECTION, SOLUTION SUBCUTANEOUS EVERY MORNING
Status: DISCONTINUED | OUTPATIENT
Start: 2019-05-02 | End: 2019-05-02

## 2019-05-01 RX ADMIN — INSULIN LISPRO 5 UNITS: 100 INJECTION, SOLUTION INTRAVENOUS; SUBCUTANEOUS at 11:21

## 2019-05-01 RX ADMIN — SILVER SULFADIAZINE: 10 CREAM TOPICAL at 12:38

## 2019-05-01 RX ADMIN — SODIUM CHLORIDE 60 ML/HR: 0.9 INJECTION, SOLUTION INTRAVENOUS at 17:00

## 2019-05-01 RX ADMIN — OXYCODONE HYDROCHLORIDE 10 MG: 10 TABLET ORAL at 17:15

## 2019-05-01 RX ADMIN — OXYCODONE HYDROCHLORIDE 10 MG: 10 TABLET ORAL at 03:45

## 2019-05-01 RX ADMIN — HYDROMORPHONE HYDROCHLORIDE 1 MG: 1 INJECTION, SOLUTION INTRAMUSCULAR; INTRAVENOUS; SUBCUTANEOUS at 11:25

## 2019-05-01 RX ADMIN — INSULIN LISPRO 3 UNITS: 100 INJECTION, SOLUTION INTRAVENOUS; SUBCUTANEOUS at 07:21

## 2019-05-01 RX ADMIN — ASPIRIN 81 MG 81 MG: 81 TABLET ORAL at 08:04

## 2019-05-01 RX ADMIN — CLOPIDOGREL BISULFATE 75 MG: 75 TABLET ORAL at 08:04

## 2019-05-01 RX ADMIN — HEPARIN SODIUM AND DEXTROSE 15 UNITS/KG/HR: 10000; 5 INJECTION INTRAVENOUS at 00:09

## 2019-05-01 RX ADMIN — AMLODIPINE BESYLATE 10 MG: 10 TABLET ORAL at 08:04

## 2019-05-01 RX ADMIN — ATORVASTATIN CALCIUM 20 MG: 20 TABLET, FILM COATED ORAL at 17:00

## 2019-05-01 RX ADMIN — INSULIN LISPRO 6 UNITS: 100 INJECTION, SOLUTION INTRAVENOUS; SUBCUTANEOUS at 16:55

## 2019-05-01 RX ADMIN — OXYCODONE HYDROCHLORIDE 10 MG: 10 TABLET ORAL at 10:35

## 2019-05-01 RX ADMIN — SODIUM CHLORIDE 100 ML/HR: 0.9 INJECTION, SOLUTION INTRAVENOUS at 05:59

## 2019-05-01 RX ADMIN — INSULIN LISPRO 4 UNITS: 100 INJECTION, SOLUTION INTRAVENOUS; SUBCUTANEOUS at 21:20

## 2019-05-02 ENCOUNTER — TELEPHONE (OUTPATIENT)
Dept: NEPHROLOGY | Facility: CLINIC | Age: 54
End: 2019-05-02

## 2019-05-02 VITALS
BODY MASS INDEX: 37.74 KG/M2 | SYSTOLIC BLOOD PRESSURE: 136 MMHG | DIASTOLIC BLOOD PRESSURE: 60 MMHG | TEMPERATURE: 98.9 F | OXYGEN SATURATION: 93 % | HEART RATE: 83 BPM | HEIGHT: 60 IN | RESPIRATION RATE: 20 BRPM | WEIGHT: 192.24 LBS

## 2019-05-02 LAB
ANION GAP SERPL CALCULATED.3IONS-SCNC: 6 MMOL/L (ref 4–13)
BUN SERPL-MCNC: 18 MG/DL (ref 5–25)
CALCIUM SERPL-MCNC: 7.7 MG/DL (ref 8.3–10.1)
CHLORIDE SERPL-SCNC: 103 MMOL/L (ref 100–108)
CO2 SERPL-SCNC: 22 MMOL/L (ref 21–32)
CREAT SERPL-MCNC: 1.04 MG/DL (ref 0.6–1.3)
EST. AVERAGE GLUCOSE BLD GHB EST-MCNC: 206 MG/DL
GFR SERPL CREATININE-BSD FRML MDRD: 61 ML/MIN/1.73SQ M
GLUCOSE SERPL-MCNC: 299 MG/DL (ref 65–140)
GLUCOSE SERPL-MCNC: 317 MG/DL (ref 65–140)
GLUCOSE SERPL-MCNC: 322 MG/DL (ref 65–140)
HBA1C MFR BLD: 8.8 % (ref 4.2–6.3)
POTASSIUM SERPL-SCNC: 4 MMOL/L (ref 3.5–5.3)
SODIUM SERPL-SCNC: 131 MMOL/L (ref 136–145)

## 2019-05-02 PROCEDURE — G8987 SELF CARE CURRENT STATUS: HCPCS

## 2019-05-02 PROCEDURE — 82948 REAGENT STRIP/BLOOD GLUCOSE: CPT

## 2019-05-02 PROCEDURE — 99232 SBSQ HOSP IP/OBS MODERATE 35: CPT | Performed by: HOSPITALIST

## 2019-05-02 PROCEDURE — G8979 MOBILITY GOAL STATUS: HCPCS

## 2019-05-02 PROCEDURE — 97163 PT EVAL HIGH COMPLEX 45 MIN: CPT

## 2019-05-02 PROCEDURE — G8978 MOBILITY CURRENT STATUS: HCPCS

## 2019-05-02 PROCEDURE — G8988 SELF CARE GOAL STATUS: HCPCS

## 2019-05-02 PROCEDURE — 99232 SBSQ HOSP IP/OBS MODERATE 35: CPT | Performed by: INTERNAL MEDICINE

## 2019-05-02 PROCEDURE — 97116 GAIT TRAINING THERAPY: CPT

## 2019-05-02 PROCEDURE — 99238 HOSP IP/OBS DSCHRG MGMT 30/<: CPT | Performed by: PHYSICIAN ASSISTANT

## 2019-05-02 PROCEDURE — 80048 BASIC METABOLIC PNL TOTAL CA: CPT | Performed by: INTERNAL MEDICINE

## 2019-05-02 PROCEDURE — 83036 HEMOGLOBIN GLYCOSYLATED A1C: CPT | Performed by: HOSPITALIST

## 2019-05-02 PROCEDURE — NC001 PR NO CHARGE: Performed by: PHYSICIAN ASSISTANT

## 2019-05-02 PROCEDURE — 97166 OT EVAL MOD COMPLEX 45 MIN: CPT

## 2019-05-02 PROCEDURE — 97535 SELF CARE MNGMENT TRAINING: CPT

## 2019-05-02 PROCEDURE — 97530 THERAPEUTIC ACTIVITIES: CPT

## 2019-05-02 RX ORDER — ACETAMINOPHEN 325 MG/1
650 TABLET ORAL EVERY 6 HOURS PRN
COMMUNITY
Start: 2019-05-02 | End: 2020-09-03

## 2019-05-02 RX ORDER — FUROSEMIDE 20 MG/1
20 TABLET ORAL ONCE
Status: COMPLETED | OUTPATIENT
Start: 2019-05-02 | End: 2019-05-02

## 2019-05-02 RX ORDER — CLOPIDOGREL BISULFATE 75 MG/1
75 TABLET ORAL DAILY
Qty: 30 TABLET | Refills: 3 | Status: SHIPPED | OUTPATIENT
Start: 2019-05-03 | End: 2019-05-21 | Stop reason: SDUPTHER

## 2019-05-02 RX ORDER — INSULIN GLARGINE 100 [IU]/ML
78 INJECTION, SOLUTION SUBCUTANEOUS EVERY MORNING
Status: DISCONTINUED | OUTPATIENT
Start: 2019-05-03 | End: 2019-05-02 | Stop reason: HOSPADM

## 2019-05-02 RX ORDER — OXYCODONE HYDROCHLORIDE 5 MG/1
5 TABLET ORAL EVERY 4 HOURS PRN
Qty: 30 TABLET | Refills: 0 | Status: SHIPPED | OUTPATIENT
Start: 2019-05-02 | End: 2019-05-12

## 2019-05-02 RX ORDER — FUROSEMIDE 10 MG/ML
20 SOLUTION ORAL ONCE
Status: DISCONTINUED | OUTPATIENT
Start: 2019-05-02 | End: 2019-05-02

## 2019-05-02 RX ORDER — ASPIRIN 81 MG/1
81 TABLET, CHEWABLE ORAL DAILY
COMMUNITY
Start: 2019-05-03 | End: 2020-05-27 | Stop reason: SDUPTHER

## 2019-05-02 RX ADMIN — CLOPIDOGREL BISULFATE 75 MG: 75 TABLET ORAL at 08:40

## 2019-05-02 RX ADMIN — INSULIN LISPRO 4 UNITS: 100 INJECTION, SOLUTION INTRAVENOUS; SUBCUTANEOUS at 11:11

## 2019-05-02 RX ADMIN — FUROSEMIDE 20 MG: 20 TABLET ORAL at 11:47

## 2019-05-02 RX ADMIN — INSULIN LISPRO 5 UNITS: 100 INJECTION, SOLUTION INTRAVENOUS; SUBCUTANEOUS at 06:43

## 2019-05-02 RX ADMIN — ASPIRIN 81 MG 81 MG: 81 TABLET ORAL at 08:40

## 2019-05-02 RX ADMIN — INSULIN GLARGINE 75 UNITS: 100 INJECTION, SOLUTION SUBCUTANEOUS at 08:38

## 2019-05-02 RX ADMIN — SILVER SULFADIAZINE: 10 CREAM TOPICAL at 08:41

## 2019-05-02 RX ADMIN — AMLODIPINE BESYLATE 10 MG: 10 TABLET ORAL at 08:40

## 2019-05-05 LAB
BACTERIA BLD CULT: NORMAL
BACTERIA BLD CULT: NORMAL

## 2019-05-07 ENCOUNTER — TELEPHONE (OUTPATIENT)
Dept: FAMILY MEDICINE CLINIC | Facility: CLINIC | Age: 54
End: 2019-05-07

## 2019-05-09 ENCOUNTER — TELEPHONE (OUTPATIENT)
Dept: FAMILY MEDICINE CLINIC | Facility: CLINIC | Age: 54
End: 2019-05-09

## 2019-05-09 DIAGNOSIS — E11.65 UNCONTROLLED TYPE 2 DIABETES MELLITUS WITH HYPERGLYCEMIA (HCC): Primary | ICD-10-CM

## 2019-05-13 ENCOUNTER — TELEPHONE (OUTPATIENT)
Dept: NEPHROLOGY | Facility: CLINIC | Age: 54
End: 2019-05-13

## 2019-05-14 ENCOUNTER — OFFICE VISIT (OUTPATIENT)
Dept: NEPHROLOGY | Facility: CLINIC | Age: 54
End: 2019-05-14
Payer: COMMERCIAL

## 2019-05-14 VITALS
HEART RATE: 90 BPM | DIASTOLIC BLOOD PRESSURE: 82 MMHG | RESPIRATION RATE: 15 BRPM | HEIGHT: 60 IN | WEIGHT: 189 LBS | BODY MASS INDEX: 37.11 KG/M2 | SYSTOLIC BLOOD PRESSURE: 154 MMHG

## 2019-05-14 DIAGNOSIS — I73.9 PAD (PERIPHERAL ARTERY DISEASE) (HCC): ICD-10-CM

## 2019-05-14 DIAGNOSIS — Z79.4 TYPE 2 DIABETES MELLITUS WITH OTHER CIRCULATORY COMPLICATION, WITH LONG-TERM CURRENT USE OF INSULIN (HCC): ICD-10-CM

## 2019-05-14 DIAGNOSIS — E11.59 TYPE 2 DIABETES MELLITUS WITH OTHER CIRCULATORY COMPLICATION, WITH LONG-TERM CURRENT USE OF INSULIN (HCC): ICD-10-CM

## 2019-05-14 DIAGNOSIS — E11.22 CKD STAGE 2 DUE TO TYPE 2 DIABETES MELLITUS (HCC): Primary | ICD-10-CM

## 2019-05-14 DIAGNOSIS — I10 ESSENTIAL HYPERTENSION: ICD-10-CM

## 2019-05-14 DIAGNOSIS — N18.2 CKD STAGE 2 DUE TO TYPE 2 DIABETES MELLITUS (HCC): Primary | ICD-10-CM

## 2019-05-14 PROCEDURE — 99214 OFFICE O/P EST MOD 30 MIN: CPT | Performed by: INTERNAL MEDICINE

## 2019-05-21 ENCOUNTER — OFFICE VISIT (OUTPATIENT)
Dept: FAMILY MEDICINE CLINIC | Facility: CLINIC | Age: 54
End: 2019-05-21

## 2019-05-21 VITALS
OXYGEN SATURATION: 98 % | BODY MASS INDEX: 37.61 KG/M2 | WEIGHT: 191.56 LBS | DIASTOLIC BLOOD PRESSURE: 70 MMHG | HEART RATE: 82 BPM | TEMPERATURE: 99 F | SYSTOLIC BLOOD PRESSURE: 130 MMHG | RESPIRATION RATE: 18 BRPM | HEIGHT: 60 IN

## 2019-05-21 DIAGNOSIS — I99.8 LOWER LIMB ISCHEMIA: ICD-10-CM

## 2019-05-21 DIAGNOSIS — I10 ESSENTIAL HYPERTENSION: ICD-10-CM

## 2019-05-21 DIAGNOSIS — Z79.4 TYPE 2 DIABETES MELLITUS WITHOUT COMPLICATION, WITH LONG-TERM CURRENT USE OF INSULIN (HCC): ICD-10-CM

## 2019-05-21 DIAGNOSIS — E11.9 TYPE 2 DIABETES MELLITUS WITHOUT COMPLICATION, WITH LONG-TERM CURRENT USE OF INSULIN (HCC): ICD-10-CM

## 2019-05-21 DIAGNOSIS — I73.9 PAD (PERIPHERAL ARTERY DISEASE) (HCC): Primary | ICD-10-CM

## 2019-05-21 PROCEDURE — 99214 OFFICE O/P EST MOD 30 MIN: CPT | Performed by: PHYSICIAN ASSISTANT

## 2019-05-21 RX ORDER — CLOPIDOGREL BISULFATE 75 MG/1
75 TABLET ORAL DAILY
Qty: 90 TABLET | Refills: 1 | Status: SHIPPED | OUTPATIENT
Start: 2019-05-21 | End: 2019-10-03 | Stop reason: SDUPTHER

## 2019-05-21 RX ORDER — ATORVASTATIN CALCIUM 20 MG/1
20 TABLET, FILM COATED ORAL DAILY
Qty: 90 TABLET | Refills: 1 | Status: SHIPPED | OUTPATIENT
Start: 2019-05-21 | End: 2019-10-03 | Stop reason: SDUPTHER

## 2019-05-28 PROBLEM — I70.229 CRITICAL LOWER LIMB ISCHEMIA (HCC): Status: ACTIVE | Noted: 2019-04-30

## 2019-06-11 ENCOUNTER — TELEPHONE (OUTPATIENT)
Dept: NEPHROLOGY | Facility: CLINIC | Age: 54
End: 2019-06-11

## 2019-06-19 ENCOUNTER — OFFICE VISIT (OUTPATIENT)
Dept: VASCULAR SURGERY | Facility: CLINIC | Age: 54
End: 2019-06-19
Payer: COMMERCIAL

## 2019-06-19 VITALS
BODY MASS INDEX: 38.48 KG/M2 | TEMPERATURE: 98 F | DIASTOLIC BLOOD PRESSURE: 94 MMHG | HEART RATE: 80 BPM | WEIGHT: 196 LBS | RESPIRATION RATE: 16 BRPM | HEIGHT: 60 IN | SYSTOLIC BLOOD PRESSURE: 152 MMHG

## 2019-06-19 DIAGNOSIS — Z79.4 TYPE 2 DIABETES MELLITUS WITH OTHER CIRCULATORY COMPLICATION, WITH LONG-TERM CURRENT USE OF INSULIN (HCC): ICD-10-CM

## 2019-06-19 DIAGNOSIS — I73.9 PAD (PERIPHERAL ARTERY DISEASE) (HCC): ICD-10-CM

## 2019-06-19 DIAGNOSIS — E11.59 TYPE 2 DIABETES MELLITUS WITH OTHER CIRCULATORY COMPLICATION, WITH LONG-TERM CURRENT USE OF INSULIN (HCC): ICD-10-CM

## 2019-06-19 DIAGNOSIS — E78.5 HYPERLIPIDEMIA, UNSPECIFIED HYPERLIPIDEMIA TYPE: ICD-10-CM

## 2019-06-19 DIAGNOSIS — I70.229 CRITICAL LOWER LIMB ISCHEMIA (HCC): Primary | ICD-10-CM

## 2019-06-19 PROCEDURE — 99214 OFFICE O/P EST MOD 30 MIN: CPT | Performed by: PHYSICIAN ASSISTANT

## 2019-06-28 ENCOUNTER — APPOINTMENT (OUTPATIENT)
Dept: LAB | Facility: HOSPITAL | Age: 54
End: 2019-06-28
Attending: INTERNAL MEDICINE

## 2019-06-28 ENCOUNTER — TELEPHONE (OUTPATIENT)
Dept: NEPHROLOGY | Facility: CLINIC | Age: 54
End: 2019-06-28

## 2019-06-28 ENCOUNTER — TRANSCRIBE ORDERS (OUTPATIENT)
Dept: ADMINISTRATIVE | Facility: HOSPITAL | Age: 54
End: 2019-06-28

## 2019-06-28 DIAGNOSIS — Z79.4 TYPE 2 DIABETES MELLITUS WITH OTHER CIRCULATORY COMPLICATION, WITH LONG-TERM CURRENT USE OF INSULIN (HCC): ICD-10-CM

## 2019-06-28 DIAGNOSIS — E11.59 TYPE 2 DIABETES MELLITUS WITH OTHER CIRCULATORY COMPLICATION, WITH LONG-TERM CURRENT USE OF INSULIN (HCC): ICD-10-CM

## 2019-06-28 DIAGNOSIS — I10 ESSENTIAL HYPERTENSION: ICD-10-CM

## 2019-06-28 DIAGNOSIS — N18.2 CKD STAGE 2 DUE TO TYPE 2 DIABETES MELLITUS (HCC): ICD-10-CM

## 2019-06-28 DIAGNOSIS — E11.22 CKD STAGE 2 DUE TO TYPE 2 DIABETES MELLITUS (HCC): ICD-10-CM

## 2019-06-28 LAB
ALBUMIN SERPL BCP-MCNC: 3 G/DL (ref 3.5–5)
ANION GAP SERPL CALCULATED.3IONS-SCNC: 9 MMOL/L (ref 4–13)
BUN SERPL-MCNC: 27 MG/DL (ref 5–25)
CALCIUM SERPL-MCNC: 9.1 MG/DL (ref 8.3–10.1)
CHLORIDE SERPL-SCNC: 103 MMOL/L (ref 100–108)
CO2 SERPL-SCNC: 26 MMOL/L (ref 21–32)
CREAT SERPL-MCNC: 1.1 MG/DL (ref 0.6–1.3)
CREAT UR-MCNC: 72 MG/DL
GFR SERPL CREATININE-BSD FRML MDRD: 57 ML/MIN/1.73SQ M
GLUCOSE P FAST SERPL-MCNC: 301 MG/DL (ref 65–99)
MAGNESIUM SERPL-MCNC: 1.6 MG/DL (ref 1.6–2.6)
MICROALBUMIN UR-MCNC: 42.7 MG/L (ref 0–20)
MICROALBUMIN/CREAT 24H UR: 59 MG/G CREATININE (ref 0–30)
PHOSPHATE SERPL-MCNC: 3.2 MG/DL (ref 2.7–4.5)
POTASSIUM SERPL-SCNC: 3.9 MMOL/L (ref 3.5–5.3)
SODIUM SERPL-SCNC: 138 MMOL/L (ref 136–145)

## 2019-06-28 PROCEDURE — 84165 PROTEIN E-PHORESIS SERUM: CPT | Performed by: PATHOLOGY

## 2019-06-28 PROCEDURE — 82043 UR ALBUMIN QUANTITATIVE: CPT

## 2019-06-28 PROCEDURE — 36415 COLL VENOUS BLD VENIPUNCTURE: CPT

## 2019-06-28 PROCEDURE — 82570 ASSAY OF URINE CREATININE: CPT

## 2019-06-28 PROCEDURE — 80069 RENAL FUNCTION PANEL: CPT

## 2019-06-28 PROCEDURE — 84165 PROTEIN E-PHORESIS SERUM: CPT

## 2019-06-28 PROCEDURE — 83735 ASSAY OF MAGNESIUM: CPT

## 2019-07-01 LAB
ALBUMIN SERPL ELPH-MCNC: 3.48 G/DL (ref 3.5–5)
ALBUMIN SERPL ELPH-MCNC: 51.9 % (ref 52–65)
ALPHA1 GLOB SERPL ELPH-MCNC: 0.33 G/DL (ref 0.1–0.4)
ALPHA1 GLOB SERPL ELPH-MCNC: 4.9 % (ref 2.5–5)
ALPHA2 GLOB SERPL ELPH-MCNC: 1.01 G/DL (ref 0.4–1.2)
ALPHA2 GLOB SERPL ELPH-MCNC: 15 % (ref 7–13)
BETA GLOB ABNORMAL SERPL ELPH-MCNC: 0.38 G/DL (ref 0.4–0.8)
BETA1 GLOB SERPL ELPH-MCNC: 5.7 % (ref 5–13)
BETA2 GLOB SERPL ELPH-MCNC: 5.1 % (ref 2–8)
BETA2+GAMMA GLOB SERPL ELPH-MCNC: 0.34 G/DL (ref 0.2–0.5)
GAMMA GLOB ABNORMAL SERPL ELPH-MCNC: 1.17 G/DL (ref 0.5–1.6)
GAMMA GLOB SERPL ELPH-MCNC: 17.4 % (ref 12–22)
IGG/ALB SER: 1.08 {RATIO} (ref 1.1–1.8)
PROT PATTERN SERPL ELPH-IMP: ABNORMAL
PROT SERPL-MCNC: 6.7 G/DL (ref 6.4–8.2)

## 2019-07-03 ENCOUNTER — OFFICE VISIT (OUTPATIENT)
Dept: NEPHROLOGY | Facility: CLINIC | Age: 54
End: 2019-07-03

## 2019-07-03 VITALS
SYSTOLIC BLOOD PRESSURE: 160 MMHG | WEIGHT: 192 LBS | BODY MASS INDEX: 37.69 KG/M2 | HEART RATE: 72 BPM | HEIGHT: 60 IN | DIASTOLIC BLOOD PRESSURE: 90 MMHG

## 2019-07-03 DIAGNOSIS — E11.22 CKD STAGE 2 DUE TO TYPE 2 DIABETES MELLITUS (HCC): ICD-10-CM

## 2019-07-03 DIAGNOSIS — D50.9 MICROCYTIC ANEMIA: ICD-10-CM

## 2019-07-03 DIAGNOSIS — R80.1 PERSISTENT PROTEINURIA: ICD-10-CM

## 2019-07-03 DIAGNOSIS — N18.2 CKD STAGE 2 DUE TO TYPE 2 DIABETES MELLITUS (HCC): ICD-10-CM

## 2019-07-03 DIAGNOSIS — I10 ESSENTIAL HYPERTENSION: Primary | ICD-10-CM

## 2019-07-03 PROCEDURE — 4010F ACE/ARB THERAPY RXD/TAKEN: CPT | Performed by: PODIATRIST

## 2019-07-03 PROCEDURE — 99212 OFFICE O/P EST SF 10 MIN: CPT | Performed by: NURSE PRACTITIONER

## 2019-07-03 RX ORDER — LISINOPRIL 5 MG/1
5 TABLET ORAL DAILY
Qty: 30 TABLET | Refills: 3 | Status: SHIPPED | OUTPATIENT
Start: 2019-07-03 | End: 2020-05-09 | Stop reason: HOSPADM

## 2019-07-03 NOTE — PROGRESS NOTES
OFFICE FOLLOW UP - Nephrology   Zahra Brionnafransisco Camron 48 y o  female MRN: [id number]       ASSESSMENT and PLAN:  Diagnoses and all orders for this visit:    Essential hypertension  -     lisinopril (ZESTRIL) 5 mg tablet; Take 1 tablet (5 mg total) by mouth daily  -     Basic metabolic panel; Future    CKD stage 2 due to type 2 diabetes mellitus ([facility] )  -     Basic metabolic panel; Future    Microcytic anemia    Persistent proteinuria      Essential hypertension:  Currently not on antihypertensives  Does have history of proteinuria    -goal blood pressure <140/80    - elevated today with SBP in the 160's  - will start on Lisinopril 5 mg PO daily, recheck lab work in one week to ensure stability of creatinine    -follow low-salt diet  CKD stage II:  Baseline creatinine 0 9-1 1  Chronic disease in this setting of diabetes and hypertension   -most recent creatinine 1 1, stable at baseline   -continue to avoid nephrotoxins   -check renal ultrasound prior to next appointment   -schedule for routine follow-up in August with Dr Derk Severe per last office note  Anemia: SPEP negative for monoclonal gammopathy  - continue to monitor, transfuse as needed  Proteinuria: In the setting of diabetes  - start on low dose lisinopril  - check prior to next follow up appointment  Patient will follow up in August  with Dr Derk Severe for routine CKD visit  HPI: Jason Rodriguez is a 48 y o  female with past medical history of CKD stage 2, hypertension, hyperlipidemia, diabetes, PAD, proteinuria, and toe amputation, who is here for blood pressure check  The patient follows with Dr Derk Severe  She was instructed to come to the office today for blood pressure check due to not having blood pressure cuff at home  She denies chest pain or shortness of breath  She denies feeling dizzy or lightheaded  She denies any recent nausea, vomiting, or diarrhea  ROS:   A complete review of systems was done  Pertinent positives and negatives as noted in the HPI, otherwise the review of systems is negative  Allergies: Patient has no known allergies  Medications:   Current Outpatient Medications:     acetaminophen (TYLENOL) 325 mg tablet, Take 2 tablets (650 mg total) by mouth every 6 (six) hours as needed for mild pain, headaches or fever, Disp: , Rfl:     aspirin 81 mg chewable tablet, Chew 1 tablet (81 mg total) daily, Disp: , Rfl:     atorvastatin (LIPITOR) 20 mg tablet, Take 1 tablet (20 mg total) by mouth daily, Disp: 90 tablet, Rfl: 1    Blood Glucose Monitoring Suppl (ONE TOUCH ULTRA 2) w/Device KIT, Testing Three times a day, Disp: 1 each, Rfl: 0    clopidogrel (PLAVIX) 75 mg tablet, Take 1 tablet (75 mg total) by mouth daily, Disp: 90 tablet, Rfl: 1    glucose blood (ONE TOUCH ULTRA TEST) test strip, Testing Three times a day, Disp: 100 each, Rfl: 5    insulin glargine (BASAGLAR KWIKPEN) 100 units/mL injection pen, Inject 78 units under the skin daily  , Disp: 5 pen, Rfl: 3    Insulin Pen Needle 32G X 4 MM MISC, Use once daily, Disp: 100 each, Rfl: 3    metFORMIN (GLUCOPHAGE) 1000 MG tablet, Take 1 tablet (1,000 mg total) by mouth 2 (two) times a day with meals, Disp: 180 tablet, Rfl: 1    ONETOUCH DELICA LANCETS FINE MISC, Testing Three times a day, Disp: 100 each, Rfl: 5    chlorpheniramine (CHLORPHEN) 4 MG tablet, Take 1 tablet (4 mg total) by mouth every 6 (six) hours as needed for allergies (Patient not taking: Reported on 1/28/2019 ), Disp: 20 tablet, Rfl: 0    lisinopril (ZESTRIL) 5 mg tablet, Take 1 tablet (5 mg total) by mouth daily, Disp: 30 tablet, Rfl: 3    silver sulfadiazine (SILVADENE,SSD) 1 % cream, Apply topically daily (Patient not taking: Reported on 5/14/2019), Disp: 50 g, Rfl: 0    Past Medical History:   Diagnosis Date    DEMETRIUS (acute kidney [facility]) ([facility] )     Cellulitis of toe 1/28/2019    CKD (chronic kidney disease)     Diabetes mellitus ([facility] )     Hypertension Past Surgical History:   Procedure Laterality Date    IR ABDOMINAL ANGIOGRAPHY  2/1/2019    IR ARTERIAL LYSIS  4/30/2019    TOE AMPUTATION Left 2/4/2019    Procedure: AMPUTATION 5th TOE;  Surgeon: Mitch De La Garza DPM;  Location: [facility];  Service: Podiatry     Family History   Problem Relation Age of Onset    Diabetes Mother     No Known Problems Father       reports that she quit smoking about 6 years ago  Her smoking use included cigarettes  She has quit using smokeless tobacco  She reports that she drinks alcohol  She reports that she does not use drugs  Physical Exam:   Vitals:    07/03/19 1145 07/03/19 1150   BP:  160/90   BP Location:  Left arm   Patient Position:  Sitting   Cuff Size:  Standard   Pulse:  72   Weight: 87 1 kg (192 lb)    Height: 5' (1 524 m)      Body mass index is 37 5 kg/m²  General: no acute distress   Eyes: conjunctivae pink, anicteric sclerae  ENT: mucous membranes moist  Neck: supple, no JVD  Chest: clear to auscultation bilaterally with no wheezes, rale or rhochi  CVS: regular rate and rhythm   Abdomen: soft, non-tender, non-distended  Extremities: no lower extremity edema   Skin: no rash  Neuro: awake and alert       Lab Results:  Results for orders placed or performed in visit on 06/28/19   Renal function panel   Result Value Ref Range    Albumin 3 0 (L) 3 5 - 5 0 g/dL    Calcium 9 1 8 3 - 10 1 mg/dL    Phosphorus 3 2 2 7 - 4 5 mg/dL    BUN 27 (H) 5 - 25 mg/dL    Creatinine 1 10 0 60 - 1 30 mg/dL    Sodium 138 136 - 145 mmol/L    Potassium 3 9 3 5 - 5 3 mmol/L    Chloride 103 100 - 108 mmol/L    CO2 26 21 - 32 mmol/L    ANION GAP 9 4 - 13 mmol/L    eGFR 57 ml/min/1 73sq m    Glucose, Fasting 301 (H) 65 - 99 mg/dL   Magnesium   Result Value Ref Range    Magnesium 1 6 1 6 - 2 6 mg/dL   Microalbumin / creatinine urine ratio   Result Value Ref Range    Creatinine, Ur 72 0 mg/dL    Microalbum  ,U,Random 42 7 (H) 0 0 - 20 0 mg/L    Microalb Creat Ratio 59 (H) 0 - 30 mg/g creatinine   Protein electrophoresis, serum   Result Value Ref Range    A/G Ratio 1 08 (L) 1 10 - 1 80    Albumin Electrophoresis 51 9 (L) 52 0 - 65 0 %    Albumin CONC 3 48 (L) 3 50 - 5 00 g/dl    Alpha 1 4 9 2 5 - 5 0 %    ALPHA 1 CONC 0 33 0 10 - 0 40 g/dL    Alpha 2 15 0 (H) 7 0 - 13 0 %    ALPHA 2 CONC 1 01 0 40 - 1 20 g/dL    Beta-1 5 7 5 0 - 13 0 %    BETA 1 CONC 0 38 (L) 0 40 - 0 80 g/dL    Beta-2 5 1 2 0 - 8 0 %    BETA 2 CONC 0 34 0 20 - 0 50 g/dL    Gamma Globulin 17 4 12 0 - 22 0 %    GAMMA CONC 1 17 0 50 - 1 60 g/dL    Total Protein 6 7 6 4 - 8 2 g/dL    SPEP Interpretation       No monoclonal bands noted  Reviewed by: Wilfredo [facility], MD, PhD ([id number]) **Electronic Signature**       Results from last 7 days   Lab Units 06/28/19  0801   POTASSIUM mmol/L 3 9   CHLORIDE mmol/L 103   CO2 mmol/L 26   BUN mg/dL 27*   CREATININE mg/dL 1 10   CALCIUM mg/dL 9 1   MAGNESIUM mg/dL 1 6   PHOSPHORUS mg/dL 3 2         Portions of the record may have been created with voice recognition software  Occasional wrong word or "sound a like" substitutions may have occurred due to the inherent limitations of voice recognition software  Read the chart carefully and recognize, using context, where substitutions have occurred  If you have any questions, please contact the dictating provider

## 2019-07-03 NOTE — LETTER
July 3, 2019     Pernell Reyes, [address] [facility]  [address] [address]    Patient: Tammie Lyon   YOB: 1965   Date of Visit: 7/3/2019       Dear Dr Brielle Lindsey:    Thank you for referring Frederick Zapien to me for evaluation  Below are my notes for this consultation  If you have questions, please do not hesitate to call me  I look forward to following your patient along with you           Sincerely,        MIKEL Parry        CC: No Recipients

## 2019-07-03 NOTE — PATIENT INSTRUCTIONS
We are starting on a medication called lisinopril today  Your to take 5 mg once a day  We would like you to recheck her blood work in 1 week to make sure your kidney function remained stable  Please continue to avoid high salt diet  Your kidney function remained stable  I would like you to follow up in August with Dr Dianna Ash as previously discussed  You should obtain a renal ultrasound prior to this appointment as well as repeat blood work and urine studies

## 2019-07-08 ENCOUNTER — OFFICE VISIT (OUTPATIENT)
Dept: FAMILY MEDICINE CLINIC | Facility: CLINIC | Age: 54
End: 2019-07-08

## 2019-07-08 VITALS
DIASTOLIC BLOOD PRESSURE: 80 MMHG | BODY MASS INDEX: 37.69 KG/M2 | TEMPERATURE: 97 F | WEIGHT: 192 LBS | OXYGEN SATURATION: 99 % | HEIGHT: 60 IN | RESPIRATION RATE: 20 BRPM | SYSTOLIC BLOOD PRESSURE: 140 MMHG | HEART RATE: 88 BPM

## 2019-07-08 DIAGNOSIS — G63 POLYNEUROPATHY ASSOCIATED WITH UNDERLYING DISEASE (HCC): ICD-10-CM

## 2019-07-08 DIAGNOSIS — Z79.4 TYPE 2 DIABETES MELLITUS WITH OTHER CIRCULATORY COMPLICATION, WITH LONG-TERM CURRENT USE OF INSULIN (HCC): ICD-10-CM

## 2019-07-08 DIAGNOSIS — L97.511 RIGHT FOOT ULCER, LIMITED TO BREAKDOWN OF SKIN (HCC): Primary | ICD-10-CM

## 2019-07-08 DIAGNOSIS — I73.9 PAD (PERIPHERAL ARTERY DISEASE) (HCC): ICD-10-CM

## 2019-07-08 DIAGNOSIS — E11.59 TYPE 2 DIABETES MELLITUS WITH OTHER CIRCULATORY COMPLICATION, WITH LONG-TERM CURRENT USE OF INSULIN (HCC): ICD-10-CM

## 2019-07-08 DIAGNOSIS — IMO0002 TOE AMPUTATION STATUS, LEFT: ICD-10-CM

## 2019-07-08 PROCEDURE — 99213 OFFICE O/P EST LOW 20 MIN: CPT | Performed by: PODIATRIST
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
## 2019-07-09 ENCOUNTER — HOSPITAL ENCOUNTER (OUTPATIENT)
Dept: ULTRASOUND IMAGING | Facility: HOSPITAL | Age: 54
Discharge: HOME | End: 2019-07-09
Attending: INTERNAL MEDICINE

## 2019-07-09 DIAGNOSIS — Z79.4 TYPE 2 DIABETES MELLITUS WITH OTHER CIRCULATORY COMPLICATION, WITH LONG-TERM CURRENT USE OF INSULIN (HCC): ICD-10-CM

## 2019-07-09 DIAGNOSIS — I10 ESSENTIAL HYPERTENSION: ICD-10-CM

## 2019-07-09 DIAGNOSIS — N18.2 CKD STAGE 2 DUE TO TYPE 2 DIABETES MELLITUS (HCC): ICD-10-CM

## 2019-07-09 DIAGNOSIS — E11.22 CKD STAGE 2 DUE TO TYPE 2 DIABETES MELLITUS (HCC): ICD-10-CM

## 2019-07-09 DIAGNOSIS — E11.59 TYPE 2 DIABETES MELLITUS WITH OTHER CIRCULATORY COMPLICATION, WITH LONG-TERM CURRENT USE OF INSULIN (HCC): ICD-10-CM

## 2019-07-09 PROCEDURE — 76770 US EXAM ABDO BACK WALL COMP: CPT

## 2019-07-11 ENCOUNTER — TELEPHONE (OUTPATIENT)
Dept: NEPHROLOGY | Facility: HOSPITAL | Age: 54
End: 2019-07-11

## 2019-07-11 NOTE — TELEPHONE ENCOUNTER
----- Message from Juleen Siemens, MD sent at 7/11/2019  2:13 PM EDT -----  Please let her know that her renal ultrasound results are normal   Will discuss further at her next upcoming appointment please let me know if any questions or concerns

## 2019-07-19 ENCOUNTER — TELEPHONE (OUTPATIENT)
Dept: ADMINISTRATIVE | Facility: HOSPITAL | Age: 54
End: 2019-07-19

## 2019-07-19 NOTE — TELEPHONE ENCOUNTER
Patient has ov with Dr Kia Cleary on Monday 7/22, she didn't have her testing  Main number in her chart is for [facility]   I called her daughter who is listed as emergency contact and left her a message

## 2019-07-31 ENCOUNTER — TELEPHONE (OUTPATIENT)
Dept: NEPHROLOGY | Facility: CLINIC | Age: 54
End: 2019-07-31

## 2019-08-01 ENCOUNTER — TELEPHONE (OUTPATIENT)
Dept: NEPHROLOGY | Facility: CLINIC | Age: 54
End: 2019-08-01

## 2019-08-01 NOTE — TELEPHONE ENCOUNTER
Pt had appt for f/u today in [facility] with Dr Palak Hannah  However, pt called and decided to cancel because she currently does not have ins  Per her daughter's request I rescheduled her until October so they have enough time to obtain ins in order for her to be seen  I provided the pt with all of her office visit notes from Dr Palak Hannah because they stated they needed them to apply for ins  I answered all of the pt's questions and concerns and advised them to reach out if they need anything in the mean time  Provided them with an appt reminder card which included the appt date, time, and location for October

## 2019-08-05 ENCOUNTER — OFFICE VISIT (OUTPATIENT)
Dept: FAMILY MEDICINE CLINIC | Facility: CLINIC | Age: 54
End: 2019-08-05

## 2019-08-05 VITALS
OXYGEN SATURATION: 96 % | SYSTOLIC BLOOD PRESSURE: 140 MMHG | HEIGHT: 60 IN | DIASTOLIC BLOOD PRESSURE: 80 MMHG | WEIGHT: 193 LBS | TEMPERATURE: 97 F | HEART RATE: 88 BPM | RESPIRATION RATE: 20 BRPM | BODY MASS INDEX: 37.89 KG/M2

## 2019-08-05 DIAGNOSIS — E11.610 CHARCOT FOOT DUE TO DIABETES MELLITUS (HCC): ICD-10-CM

## 2019-08-05 DIAGNOSIS — L97.511 ULCERATED, FOOT, RIGHT, LIMITED TO BREAKDOWN OF SKIN (HCC): Primary | ICD-10-CM

## 2019-08-05 PROCEDURE — 99213 OFFICE O/P EST LOW 20 MIN: CPT | Performed by: PODIATRIST

## 2019-08-05 NOTE — PROGRESS NOTES
Assessment/Plan:     Diagnoses and all orders for this visit:    Ulcerated, foot, right, limited to breakdown of skin ([facility] )  1  Continue local wound care using betadine paint and band aid  Appears stable at this time with no acute signs of infection  Charcot foot due to diabetes mellitus ([facility] )  2  Continue use of CAM walker on left during ambulation    - Return to clinic in 1 month for follow up          Subjective:      Patient ID: Hany Bernstein is a 48 y o  female  Ms Monique Montague is a 48year old female with history of Charcot neuroarthropathy who presents for 1 month follow-up of a wound on the dorsal aspect of the left forefoot  She states that she has been applying betadine paint and band aid daily as instructed  Has not noticed acute changes or purulent drainage  She ambulates with a CAM walker on the left as instructed  No other pedal complaints  Denies n/v/f/chills/sob/cp  The following portions of the patient's history were reviewed and updated as appropriate: allergies, current medications, past family history, past medical history, past social history, past surgical history and problem list     Review of Systems   Constitutional: Negative  Respiratory: Negative  Cardiovascular: Negative  Musculoskeletal:        Charcot on left midfoot   Skin:        Ulcer dorsal right forefoot   Neurological:        Paresthesias/numbness         Objective:      /80   Pulse 88   Temp (!) 97 °F (36 1 °C) (Skin)   Resp 20   Ht 5' (1 524 m)   Wt 87 5 kg (193 lb)   LMP  (LMP Unknown)   SpO2 96%   BMI 37 69 kg/m²          Physical Exam   Cardiovascular: Intact distal pulses  Pulses:       Dorsalis pedis pulses are 2+ on the right side, and 2+ on the left side  Posterior tibial pulses are 2+ on the right side, and 2+ on the left side  Moderate edema of left ankle  Skin temperature WNL bilaterally  Pedal hair present  Musculoskeletal:   Charcot foot on the left   Left 5th digit amputation  Neurological:   Gross sensation intact  Protective sensation diminished  Skin: Capillary refill takes less than 2 seconds  Wound measuring 0 2 x 0 2 x 0 1cm on the dorsal right forefoot  Wound base is fibrotic  Wound margins well circumscribed  Serous drainage noted  No periwound edema or erythema noted  No acute signs of infection noted  Scar from prior amputation noted on left 5th ray

## 2019-10-02 ENCOUNTER — PATIENT OUTREACH (OUTPATIENT)
Dept: FAMILY MEDICINE CLINIC | Facility: CLINIC | Age: 54
End: 2019-10-02

## 2019-10-02 DIAGNOSIS — Z59.6 PATIENT CANNOT AFFORD MEDICATIONS: Primary | ICD-10-CM

## 2019-10-02 SDOH — ECONOMIC STABILITY - INCOME SECURITY: LOW INCOME: Z59.6

## 2019-10-02 NOTE — PROGRESS NOTES
Pt has an appt with Dr Princess Ravi on 10/3 at 1 PM   Financial Counselor has asked RIKI URIAS to meet with pt as pt is MA pending and unable to afford her insulin  Please contact RIKI URIAS, Rosalia Jackman after pt's appt with Dr Princess Ravi  She can be reached via [place] [employer] Text, or at [phone]  Pt will need a psychosocial assessment for any other needs as well as assistance with medication  Dr Princess Ravi, Please let Sherif Castañeda know if you need anything

## 2019-10-03 ENCOUNTER — OFFICE VISIT (OUTPATIENT)
Dept: FAMILY MEDICINE CLINIC | Facility: CLINIC | Age: 54
End: 2019-10-03

## 2019-10-03 ENCOUNTER — PATIENT OUTREACH (OUTPATIENT)
Dept: FAMILY MEDICINE CLINIC | Facility: CLINIC | Age: 54
End: 2019-10-03

## 2019-10-03 VITALS
BODY MASS INDEX: 37.11 KG/M2 | SYSTOLIC BLOOD PRESSURE: 182 MMHG | WEIGHT: 190 LBS | HEART RATE: 98 BPM | TEMPERATURE: 97.6 F | DIASTOLIC BLOOD PRESSURE: 100 MMHG | RESPIRATION RATE: 16 BRPM | OXYGEN SATURATION: 98 %

## 2019-10-03 DIAGNOSIS — I73.9 PAD (PERIPHERAL ARTERY DISEASE) (HCC): ICD-10-CM

## 2019-10-03 DIAGNOSIS — Z23 NEED FOR VACCINATION: ICD-10-CM

## 2019-10-03 DIAGNOSIS — Z79.4 TYPE 2 DIABETES MELLITUS WITHOUT COMPLICATION, WITH LONG-TERM CURRENT USE OF INSULIN (HCC): ICD-10-CM

## 2019-10-03 DIAGNOSIS — I10 ESSENTIAL HYPERTENSION: Primary | ICD-10-CM

## 2019-10-03 DIAGNOSIS — E11.9 TYPE 2 DIABETES MELLITUS WITHOUT COMPLICATION, WITH LONG-TERM CURRENT USE OF INSULIN (HCC): ICD-10-CM

## 2019-10-03 DIAGNOSIS — E11.59 TYPE 2 DIABETES MELLITUS WITH OTHER CIRCULATORY COMPLICATION, WITH LONG-TERM CURRENT USE OF INSULIN (HCC): ICD-10-CM

## 2019-10-03 DIAGNOSIS — Z79.4 TYPE 2 DIABETES MELLITUS WITH OTHER CIRCULATORY COMPLICATION, WITH LONG-TERM CURRENT USE OF INSULIN (HCC): ICD-10-CM

## 2019-10-03 LAB — SL AMB POCT HEMOGLOBIN AIC: 13.7 (ref ?–6.5)

## 2019-10-03 PROCEDURE — 90471 IMMUNIZATION ADMIN: CPT | Performed by: FAMILY MEDICINE

## 2019-10-03 PROCEDURE — 83036 HEMOGLOBIN GLYCOSYLATED A1C: CPT | Performed by: FAMILY MEDICINE

## 2019-10-03 PROCEDURE — 90682 RIV4 VACC RECOMBINANT DNA IM: CPT | Performed by: FAMILY MEDICINE

## 2019-10-03 PROCEDURE — 99214 OFFICE O/P EST MOD 30 MIN: CPT | Performed by: FAMILY MEDICINE

## 2019-10-03 RX ORDER — GLIPIZIDE 5 MG/1
5 TABLET ORAL
Qty: 60 TABLET | Refills: 0 | Status: SHIPPED | OUTPATIENT
Start: 2019-10-03 | End: 2019-11-12 | Stop reason: HOSPADM

## 2019-10-03 RX ORDER — ATORVASTATIN CALCIUM 20 MG/1
20 TABLET, FILM COATED ORAL DAILY
Qty: 90 TABLET | Refills: 1 | Status: SHIPPED | OUTPATIENT
Start: 2019-10-03 | End: 2020-04-07 | Stop reason: SDUPTHER

## 2019-10-03 RX ORDER — CLOPIDOGREL BISULFATE 75 MG/1
75 TABLET ORAL DAILY
Qty: 90 TABLET | Refills: 1 | Status: SHIPPED | OUTPATIENT
Start: 2019-10-03 | End: 2020-04-07 | Stop reason: SDUPTHER

## 2019-10-03 NOTE — ASSESSMENT & PLAN NOTE
Lab Results   Component Value Date    HGBA1C 13 7 (A) 10/03/2019   We discussed at length the dangers of uncontrolled BG  Discussed she should reach out to us if she is unable to afford medications so we can work together to try to find a solution  Patient discussed today with Srinivasa Laird from social work to help with financial assistance for medication  Discussed the importance of low carb diet   Will start Glipizide to help decrease blood sugar till she can get her insulin  We discussed the possible effects of glipizide on her kidneys, however also  discussed the effect of increased blood sugar on her kidneys   Given she can not afford other medications, will take Glipizide to help lower blood sugar till she is able to afford or has financial assistance for insulin

## 2019-10-03 NOTE — PROGRESS NOTES
Assessment/Plan:    No problem-specific Assessment & Plan notes found for this encounter  Problem List Items Addressed This Visit        Endocrine    Type 2 diabetes mellitus with circulatory disorder, with long-term current use of insulin ([facility] )       Lab Results   Component Value Date    HGBA1C 13 7 (A) 10/03/2019   We discussed at length the dangers of uncontrolled BG  Discussed she should reach out to us if she is unable to afford medications so we can work together to try to find a solution  Patient discussed today with Elham Chaves from social work to help with financial assistance for medication  Discussed the importance of low carb diet   Will start Glipizide to help decrease blood sugar till she can get her insulin  We discussed the possible effects of glipizide on her kidneys, however also  discussed the effect of increased blood sugar on her kidneys   Given she can not afford other medications, will take Glipizide to help lower blood sugar till she is able to afford or has financial assistance for insulin          Relevant Medications    insulin glargine (BASAGLAR KWIKPEN) 100 units/mL injection pen    metFORMIN (GLUCOPHAGE) 1000 MG tablet    atorvastatin (LIPITOR) 20 mg tablet    glipiZIDE (GLUCOTROL) 5 mg tablet       Cardiovascular and Mediastinum    PAD (peripheral artery disease) (HCC)    Relevant Medications    clopidogrel (PLAVIX) 75 mg tablet    Essential hypertension - Primary     Only taking Lisinopril 5 mg  Start Metoprolol 25 mg BID   Return in 2 weeks for BP check and 1 month for follow up  Discussed with patient dangers of elevated BP and signs and symptoms of when to go to ED          Relevant Medications    metoprolol tartrate (LOPRESSOR) 25 mg tablet    atorvastatin (LIPITOR) 20 mg tablet      Other Visit Diagnoses     Type 2 diabetes mellitus without complication, with long-term current use of insulin (HCC)        Relevant Medications    insulin glargine (BASAGLAR KWIKPEN) 100 units/mL injection pen    metFORMIN (GLUCOPHAGE) 1000 MG tablet    atorvastatin (LIPITOR) 20 mg tablet    glipiZIDE (GLUCOTROL) 5 mg tablet    Other Relevant Orders    POCT hemoglobin A1c (Completed)    Need for vaccination        Relevant Orders    influenza vaccine, [id number], quadrivalent, recombinant, PF, 0 5 mL, for patients 18 yr+ ([facility]) (Completed)            Subjective:      Patient ID: Cris Pallas is a 48 y o  female  49 yo female with uncontrolled DM, uncontrolled HTN with diabetic complications here today for follow up  States she is doing well  She currently does not have insurance and has been unable to pay for her Eyad Shadow so she has only been taking Metformin 100 mg BID  She does not check her blood sugar at home  She does not follow any particular diet  She is unable to exercise due to her charcot foot  The following portions of the patient's history were reviewed and updated as appropriate: She  has a past medical history of DEMETRIUS (acute kidney injury) ([facility] ), Cellulitis of toe (1/28/2019), CKD (chronic kidney disease), Diabetes mellitus ([facility] ), and Hypertension  She   Patient Active Problem List    Diagnosis Date Noted    Charcot foot due to diabetes mellitus ([facility] ) 08/05/2019    Polyneuropathy associated with underlying disease ([facility] ) 07/08/2019    Critical lower limb ischemia 04/30/2019    Toe amputation status, left 02/11/2019    Leukocytosis 02/05/2019    Microcytic anemia 02/01/2019    CKD stage 2 due to type 2 diabetes mellitus ([facility] ) 01/31/2019    HLD (hyperlipidemia) 01/28/2019    PAD (peripheral artery disease) ([facility] ) 01/28/2019    Type 2 diabetes mellitus with circulatory disorder, with long-term current use of insulin ([facility] ) 03/08/2018    Essential hypertension 03/08/2018     She  has a past surgical history that includes IR abdominal angiography (2/1/2019); Toe amputation (Left, 2/4/2019); and IR arterial lysis (4/30/2019)    Her family history includes Diabetes in her mother; No Known Problems in her father  She  reports that she quit smoking about 6 years ago  Her smoking use included cigarettes  She has quit using smokeless tobacco  She reports that she drinks alcohol  She reports that she does not use drugs  Current Outpatient Medications   Medication Sig Dispense Refill    aspirin 81 mg chewable tablet Chew 1 tablet (81 mg total) daily      atorvastatin (LIPITOR) 20 mg tablet Take 1 tablet (20 mg total) by mouth daily 90 tablet 1    clopidogrel (PLAVIX) 75 mg tablet Take 1 tablet (75 mg total) by mouth daily 90 tablet 1    lisinopril (ZESTRIL) 5 mg tablet Take 1 tablet (5 mg total) by mouth daily 30 tablet 3    metFORMIN (GLUCOPHAGE) 1000 MG tablet Take 1 tablet (1,000 mg total) by mouth 2 (two) times a day with meals 180 tablet 1    acetaminophen (TYLENOL) 325 mg tablet Take 2 tablets (650 mg total) by mouth every 6 (six) hours as needed for mild pain, headaches or fever      Blood Glucose Monitoring Suppl (ONE TOUCH ULTRA 2) w/Device KIT Testing Three times a day 1 each 0    chlorpheniramine (CHLORPHEN) 4 MG tablet Take 1 tablet (4 mg total) by mouth every 6 (six) hours as needed for allergies (Patient not taking: Reported on 1/28/2019 ) 20 tablet 0    glipiZIDE (GLUCOTROL) 5 mg tablet Take 1 tablet (5 mg total) by mouth 2 (two) times a day before meals 60 tablet 0    glucose blood (ONE TOUCH ULTRA TEST) test strip Testing Three times a day 100 each 5    insulin glargine (BASAGLAR KWIKPEN) 100 units/mL injection pen Inject 78 units under the skin daily   5 pen 3    Insulin Pen Needle 32G X 4 MM MISC Use once daily 100 each 3    metoprolol tartrate (LOPRESSOR) 25 mg tablet Take 1 tablet (25 mg total) by mouth every 12 (twelve) hours 180 tablet 1    ONETOUCH DELICA LANCETS FINE MISC Testing Three times a day 100 each 5    silver sulfadiazine (SILVADENE,SSD) 1 % cream Apply topically daily (Patient not taking: Reported on 5/14/2019) 50 g 0     No current facility-administered medications for this visit  Current Outpatient Medications on File Prior to Visit   Medication Sig    aspirin 81 mg chewable tablet Chew 1 tablet (81 mg total) daily    lisinopril (ZESTRIL) 5 mg tablet Take 1 tablet (5 mg total) by mouth daily    [DISCONTINUED] atorvastatin (LIPITOR) 20 mg tablet Take 1 tablet (20 mg total) by mouth daily    [DISCONTINUED] clopidogrel (PLAVIX) 75 mg tablet Take 1 tablet (75 mg total) by mouth daily    [DISCONTINUED] metFORMIN (GLUCOPHAGE) 1000 MG tablet Take 1 tablet (1,000 mg total) by mouth 2 (two) times a day with meals    acetaminophen (TYLENOL) 325 mg tablet Take 2 tablets (650 mg total) by mouth every 6 (six) hours as needed for mild pain, headaches or fever    Blood Glucose Monitoring Suppl (ONE TOUCH ULTRA 2) w/Device KIT Testing Three times a day    chlorpheniramine (CHLORPHEN) 4 MG tablet Take 1 tablet (4 mg total) by mouth every 6 (six) hours as needed for allergies (Patient not taking: Reported on 1/28/2019 )    glucose blood (ONE TOUCH ULTRA TEST) test strip Testing Three times a day    Insulin Pen Needle 32G X 4 MM MISC Use once daily    ONETOUCH DELICA LANCETS FINE MISC Testing Three times a day    silver sulfadiazine (SILVADENE,SSD) 1 % cream Apply topically daily (Patient not taking: Reported on 5/14/2019)    [DISCONTINUED] insulin glargine (BASAGLAR KWIKPEN) 100 units/mL injection pen Inject 78 units under the skin daily  No current facility-administered medications on file prior to visit       Review of Systems   All other systems reviewed and are negative  Objective:      BP (!) 182/100   Pulse 98   Temp 97 6 °F (36 4 °C) (Temporal)   Resp 16   Wt 86 2 kg (190 lb)   LMP  (LMP Unknown)   SpO2 98%   BMI 37 11 kg/m²          Physical Exam   Constitutional: She is oriented to person, place, and time  She appears well-developed  HENT:   Head: Normocephalic     Right Ear: External ear normal    Left Ear: External ear normal    Nose: Nose normal    Mouth/Throat: Oropharynx is clear and moist    Eyes: Pupils are equal, round, and reactive to light  Conjunctivae and EOM are normal    Neck: Normal range of motion  Neck supple  No thyromegaly present  Cardiovascular: Normal rate, regular rhythm and normal heart sounds  Pulmonary/Chest: Effort normal and breath sounds normal    Abdominal: Soft  There is no tenderness  There is no rebound and no guarding  Musculoskeletal: Normal range of motion  L cam walker    Neurological: She is alert and oriented to person, place, and time  She has normal reflexes  Skin: Skin is dry  Psychiatric: She has a normal mood and affect  Nursing note and vitals reviewed

## 2019-10-03 NOTE — ASSESSMENT & PLAN NOTE
Only taking Lisinopril 5 mg  Start Metoprolol 25 mg BID   Return in 2 weeks for BP check and 1 month for follow up  Discussed with patient dangers of elevated BP and signs and symptoms of when to go to ED

## 2019-10-04 NOTE — PROGRESS NOTES
RIKI URIAS received a referral from patient's PCP regarding medication assistance  RIKI URIAS met with patient and explained the application process for Basaglar Insulin prescription assistance  RIKI URIAS explained it would be necessary for patient to provide proof of income in order to complete the application and assess for approval  Patient denies having any income and states she is MA pending and has also applied for SSI but has not received any compensation yet  RIKI URIAS emphasized the need for some sort of supporting document that would display the household income such as last year's income tax return, a W2, or a social security statement  Patient and daughter deny having any of these supporting documents  Patient's daughter states she just started working at her current job and therefore does not have these documents  RIKI URIAS offered to complete the application without that information for now but explained it will be necessary to obtain before sending  RIKI URIAS will follow up with patient and inform her that if this information is not provided, the application cannot be sent in  RIKI CM will remain available for additional support/assistance as needed

## 2019-10-07 ENCOUNTER — PATIENT OUTREACH (OUTPATIENT)
Dept: FAMILY MEDICINE CLINIC | Facility: CLINIC | Age: 54
End: 2019-10-07

## 2019-10-07 NOTE — PROGRESS NOTES
RIKI URIAS contacted patient to discuss prescription assistance application requirements  RIKI URIAS discussed the need for a proof of income document in order to send in the application  Patient denies having income information for any household members  RIKI URIAS stated patient's only other option would be to sign off on a letter stating the household income is zero  RIKI URIAS stated this option would not guarantee the company would provide assistance with the insulin  Patient understood and is agreeable  Patient agreed to come in on Thursday 10/10 at 1:00 PM to sign this document  RIKI URIAS will continue to remain available for additional assistance/support as needed

## 2019-10-08 ENCOUNTER — TELEPHONE (OUTPATIENT)
Dept: NEPHROLOGY | Facility: CLINIC | Age: 54
End: 2019-10-08

## 2019-10-09 ENCOUNTER — TELEPHONE (OUTPATIENT)
Dept: NEPHROLOGY | Facility: CLINIC | Age: 54
End: 2019-10-09

## 2019-10-09 NOTE — TELEPHONE ENCOUNTER
She called to cancel her appt due to not having insurance  She wanted to reschedule for two months from now in hopes she gets insurance by then  No further concerns at this time

## 2019-10-10 ENCOUNTER — PATIENT OUTREACH (OUTPATIENT)
Dept: FAMILY MEDICINE CLINIC | Facility: CLINIC | Age: 54
End: 2019-10-10

## 2019-10-11 NOTE — PROGRESS NOTES
RIKI URIAS met with patient, as scheduled, to complete prescription assistance application  Patient signed off stating she has a yearly household income of zero  The application has been given to PCP to complete the perscriber section  RIKI URIAS informed patient she would be contacted once the application has been sent over  Patient was agreeable  RIKI URIAS provided contact information and will remain available for additional support and assistance as needed

## 2019-10-18 ENCOUNTER — PATIENT OUTREACH (OUTPATIENT)
Dept: FAMILY MEDICINE CLINIC | Facility: CLINIC | Age: 54
End: 2019-10-18

## 2019-10-23 ENCOUNTER — PATIENT OUTREACH (OUTPATIENT)
Dept: FAMILY MEDICINE CLINIC | Facility: CLINIC | Age: 54
End: 2019-10-23

## 2019-10-23 NOTE — PROGRESS NOTES
RIKI URIAS contacted patient to inform her of a note that was received regarding  prescription assistance application  RIKI URIAS informed patient the application has been denied/is pending due to the following missing information: documentation of household income or no income  RIKI URIAS explained proof of income would be necessary in order for further assistance to be provided  Patient denies being able to provide this information  RIKI URIAS will close referral at this time; RIKI URIAS will remain available for additional support as needed

## 2019-10-30 ENCOUNTER — HOSPITAL ENCOUNTER (INPATIENT)
Facility: HOSPITAL | Age: 54
LOS: 13 days | Discharge: HOME HEALTH | DRG: 181 | End: 2019-11-12
Attending: EMERGENCY MEDICINE | Admitting: INTERNAL MEDICINE
Payer: COMMERCIAL

## 2019-10-30 ENCOUNTER — APPOINTMENT (EMERGENCY)
Dept: RADIOLOGY | Facility: HOSPITAL | Age: 54
DRG: 181 | End: 2019-10-30
Payer: COMMERCIAL

## 2019-10-30 DIAGNOSIS — I96 GANGRENE OF TOE OF RIGHT FOOT (HCC): Primary | ICD-10-CM

## 2019-10-30 DIAGNOSIS — Z79.4 TYPE 2 DIABETES MELLITUS WITH OTHER CIRCULATORY COMPLICATION, WITH LONG-TERM CURRENT USE OF INSULIN (HCC): ICD-10-CM

## 2019-10-30 DIAGNOSIS — I96 GANGRENOUS TOE (HCC): ICD-10-CM

## 2019-10-30 DIAGNOSIS — E11.9 TYPE 2 DIABETES MELLITUS WITHOUT COMPLICATION, WITH LONG-TERM CURRENT USE OF INSULIN (HCC): ICD-10-CM

## 2019-10-30 DIAGNOSIS — Z79.4 TYPE 2 DIABETES MELLITUS WITHOUT COMPLICATION, WITH LONG-TERM CURRENT USE OF INSULIN (HCC): ICD-10-CM

## 2019-10-30 DIAGNOSIS — E11.59 TYPE 2 DIABETES MELLITUS WITH OTHER CIRCULATORY COMPLICATION, WITH LONG-TERM CURRENT USE OF INSULIN (HCC): ICD-10-CM

## 2019-10-30 DIAGNOSIS — I73.9 PAD (PERIPHERAL ARTERY DISEASE) (HCC): ICD-10-CM

## 2019-10-30 DIAGNOSIS — A41.9 SEPSIS (HCC): ICD-10-CM

## 2019-10-30 PROBLEM — E87.1 HYPONATREMIA: Status: ACTIVE | Noted: 2019-10-30

## 2019-10-30 PROBLEM — L03.90 CELLULITIS: Status: ACTIVE | Noted: 2019-10-30

## 2019-10-30 PROBLEM — E87.20 LACTIC ACIDOSIS: Status: ACTIVE | Noted: 2019-10-30

## 2019-10-30 PROBLEM — E87.2 LACTIC ACIDOSIS: Status: ACTIVE | Noted: 2019-10-30

## 2019-10-30 LAB
ANION GAP SERPL CALCULATED.3IONS-SCNC: 9 MMOL/L (ref 4–13)
BACTERIA UR QL AUTO: ABNORMAL /HPF
BASOPHILS # BLD AUTO: 0.03 THOUSANDS/ΜL (ref 0–0.1)
BASOPHILS NFR BLD AUTO: 0 % (ref 0–1)
BILIRUB UR QL STRIP: NEGATIVE
BUN SERPL-MCNC: 9 MG/DL (ref 5–25)
CALCIUM SERPL-MCNC: 9 MG/DL (ref 8.3–10.1)
CHLORIDE SERPL-SCNC: 95 MMOL/L (ref 100–108)
CLARITY UR: ABNORMAL
CO2 SERPL-SCNC: 29 MMOL/L (ref 21–32)
COLOR UR: YELLOW
CREAT SERPL-MCNC: 1.04 MG/DL (ref 0.6–1.3)
CRP SERPL QL: >90 MG/L
EOSINOPHIL # BLD AUTO: 0.06 THOUSAND/ΜL (ref 0–0.61)
EOSINOPHIL NFR BLD AUTO: 1 % (ref 0–6)
ERYTHROCYTE [DISTWIDTH] IN BLOOD BY AUTOMATED COUNT: 13.8 % (ref 11.6–15.1)
ERYTHROCYTE [SEDIMENTATION RATE] IN BLOOD: 63 MM/HOUR (ref 0–20)
GFR SERPL CREATININE-BSD FRML MDRD: 61 ML/MIN/1.73SQ M
GLUCOSE SERPL-MCNC: 309 MG/DL (ref 65–140)
GLUCOSE UR STRIP-MCNC: ABNORMAL MG/DL
HCT VFR BLD AUTO: 38.4 % (ref 34.8–46.1)
HGB BLD-MCNC: 11.4 G/DL (ref 11.5–15.4)
HGB UR QL STRIP.AUTO: ABNORMAL
IMM GRANULOCYTES # BLD AUTO: 0.06 THOUSAND/UL (ref 0–0.2)
IMM GRANULOCYTES NFR BLD AUTO: 1 % (ref 0–2)
KETONES UR STRIP-MCNC: ABNORMAL MG/DL
LACTATE SERPL-SCNC: 1.8 MMOL/L (ref 0.5–2)
LACTATE SERPL-SCNC: 2.5 MMOL/L (ref 0.5–2)
LEUKOCYTE ESTERASE UR QL STRIP: NEGATIVE
LYMPHOCYTES # BLD AUTO: 1.75 THOUSANDS/ΜL (ref 0.6–4.47)
LYMPHOCYTES NFR BLD AUTO: 14 % (ref 14–44)
MCH RBC QN AUTO: 22.4 PG (ref 26.8–34.3)
MCHC RBC AUTO-ENTMCNC: 29.7 G/DL (ref 31.4–37.4)
MCV RBC AUTO: 76 FL (ref 82–98)
MONOCYTES # BLD AUTO: 0.73 THOUSAND/ΜL (ref 0.17–1.22)
MONOCYTES NFR BLD AUTO: 6 % (ref 4–12)
NEUTROPHILS # BLD AUTO: 9.94 THOUSANDS/ΜL (ref 1.85–7.62)
NEUTS SEG NFR BLD AUTO: 78 % (ref 43–75)
NITRITE UR QL STRIP: NEGATIVE
NON-SQ EPI CELLS URNS QL MICRO: ABNORMAL /HPF
NRBC BLD AUTO-RTO: 0 /100 WBCS
PH UR STRIP.AUTO: 6 [PH] (ref 4.5–8)
PLATELET # BLD AUTO: 279 THOUSANDS/UL (ref 149–390)
PMV BLD AUTO: 11.2 FL (ref 8.9–12.7)
POTASSIUM SERPL-SCNC: 3.6 MMOL/L (ref 3.5–5.3)
PROT UR STRIP-MCNC: >=300 MG/DL
RBC # BLD AUTO: 5.08 MILLION/UL (ref 3.81–5.12)
RBC #/AREA URNS AUTO: ABNORMAL /HPF
SODIUM SERPL-SCNC: 133 MMOL/L (ref 136–145)
SP GR UR STRIP.AUTO: 1.02 (ref 1–1.03)
UROBILINOGEN UR QL STRIP.AUTO: 0.2 E.U./DL
WBC # BLD AUTO: 12.57 THOUSAND/UL (ref 4.31–10.16)
WBC #/AREA URNS AUTO: ABNORMAL /HPF

## 2019-10-30 PROCEDURE — 96368 THER/DIAG CONCURRENT INF: CPT

## 2019-10-30 PROCEDURE — 99223 1ST HOSP IP/OBS HIGH 75: CPT | Performed by: INTERNAL MEDICINE

## 2019-10-30 PROCEDURE — 96367 TX/PROPH/DG ADDL SEQ IV INF: CPT

## 2019-10-30 PROCEDURE — 81001 URINALYSIS AUTO W/SCOPE: CPT

## 2019-10-30 PROCEDURE — 36415 COLL VENOUS BLD VENIPUNCTURE: CPT | Performed by: EMERGENCY MEDICINE

## 2019-10-30 PROCEDURE — 87040 BLOOD CULTURE FOR BACTERIA: CPT | Performed by: EMERGENCY MEDICINE

## 2019-10-30 PROCEDURE — 80048 BASIC METABOLIC PNL TOTAL CA: CPT | Performed by: EMERGENCY MEDICINE

## 2019-10-30 PROCEDURE — 96361 HYDRATE IV INFUSION ADD-ON: CPT

## 2019-10-30 PROCEDURE — 80076 HEPATIC FUNCTION PANEL: CPT | Performed by: PHYSICIAN ASSISTANT

## 2019-10-30 PROCEDURE — 85025 COMPLETE CBC W/AUTO DIFF WBC: CPT | Performed by: EMERGENCY MEDICINE

## 2019-10-30 PROCEDURE — 83605 ASSAY OF LACTIC ACID: CPT | Performed by: EMERGENCY MEDICINE

## 2019-10-30 PROCEDURE — 99285 EMERGENCY DEPT VISIT HI MDM: CPT

## 2019-10-30 PROCEDURE — 99285 EMERGENCY DEPT VISIT HI MDM: CPT | Performed by: EMERGENCY MEDICINE

## 2019-10-30 PROCEDURE — 85652 RBC SED RATE AUTOMATED: CPT | Performed by: EMERGENCY MEDICINE

## 2019-10-30 PROCEDURE — 96365 THER/PROPH/DIAG IV INF INIT: CPT

## 2019-10-30 PROCEDURE — 86140 C-REACTIVE PROTEIN: CPT | Performed by: EMERGENCY MEDICINE

## 2019-10-30 PROCEDURE — 73630 X-RAY EXAM OF FOOT: CPT

## 2019-10-30 RX ORDER — CEFAZOLIN SODIUM 1 G/50ML
1000 SOLUTION INTRAVENOUS EVERY 8 HOURS
Status: DISCONTINUED | OUTPATIENT
Start: 2019-10-31 | End: 2019-11-08

## 2019-10-30 RX ORDER — HYDRALAZINE HYDROCHLORIDE 20 MG/ML
5 INJECTION INTRAMUSCULAR; INTRAVENOUS EVERY 6 HOURS PRN
Status: DISCONTINUED | OUTPATIENT
Start: 2019-10-30 | End: 2019-11-12 | Stop reason: HOSPADM

## 2019-10-30 RX ORDER — ONDANSETRON 2 MG/ML
4 INJECTION INTRAMUSCULAR; INTRAVENOUS EVERY 6 HOURS PRN
Status: DISCONTINUED | OUTPATIENT
Start: 2019-10-30 | End: 2019-11-12 | Stop reason: HOSPADM

## 2019-10-30 RX ORDER — ACETAMINOPHEN 325 MG/1
650 TABLET ORAL EVERY 6 HOURS PRN
Status: DISCONTINUED | OUTPATIENT
Start: 2019-10-30 | End: 2019-11-12 | Stop reason: HOSPADM

## 2019-10-30 RX ORDER — ATORVASTATIN CALCIUM 20 MG/1
20 TABLET, FILM COATED ORAL
Status: DISCONTINUED | OUTPATIENT
Start: 2019-10-31 | End: 2019-11-12 | Stop reason: HOSPADM

## 2019-10-30 RX ORDER — CLOPIDOGREL BISULFATE 75 MG/1
75 TABLET ORAL DAILY
Status: DISCONTINUED | OUTPATIENT
Start: 2019-10-31 | End: 2019-11-01

## 2019-10-30 RX ORDER — INSULIN GLARGINE 100 [IU]/ML
78 INJECTION, SOLUTION SUBCUTANEOUS EVERY MORNING
Status: DISCONTINUED | OUTPATIENT
Start: 2019-10-31 | End: 2019-11-04

## 2019-10-30 RX ORDER — LISINOPRIL 5 MG/1
5 TABLET ORAL DAILY
Status: DISCONTINUED | OUTPATIENT
Start: 2019-10-31 | End: 2019-10-31

## 2019-10-30 RX ORDER — ASPIRIN 81 MG/1
81 TABLET, CHEWABLE ORAL DAILY
Status: DISCONTINUED | OUTPATIENT
Start: 2019-10-31 | End: 2019-11-12 | Stop reason: HOSPADM

## 2019-10-30 RX ADMIN — CEFEPIME HYDROCHLORIDE 2000 MG: 2 INJECTION, POWDER, FOR SOLUTION INTRAVENOUS at 21:17

## 2019-10-30 RX ADMIN — SODIUM CHLORIDE 1000 ML: 0.9 INJECTION, SOLUTION INTRAVENOUS at 19:58

## 2019-10-30 RX ADMIN — VANCOMYCIN HYDROCHLORIDE 1250 MG: 5 INJECTION, POWDER, LYOPHILIZED, FOR SOLUTION INTRAVENOUS at 22:13

## 2019-10-30 RX ADMIN — METOPROLOL TARTRATE 25 MG: 25 TABLET, FILM COATED ORAL at 23:15

## 2019-10-30 RX ADMIN — SODIUM CHLORIDE 1000 ML: 0.9 INJECTION, SOLUTION INTRAVENOUS at 21:14

## 2019-10-30 RX ADMIN — SODIUM CHLORIDE, SODIUM LACTATE, POTASSIUM CHLORIDE, AND CALCIUM CHLORIDE 1700 ML: .6; .31; .03; .02 INJECTION, SOLUTION INTRAVENOUS at 21:34

## 2019-10-30 NOTE — ED ATTENDING ATTESTATION
10/30/2019  I, Ru Mcdermott MD, saw and evaluated the patient  I have discussed the patient with the resident/non-physician practitioner and agree with the resident's/non-physician practitioner's findings, Plan of Care, and MDM as documented in the resident's/non-physician practitioner's note, except where noted  All available labs and Radiology studies were reviewed  I was present for key portions of any procedure(s) performed by the resident/non-physician practitioner and I was immediately available to provide assistance  At this point I agree with the current assessment done in the Emergency Department  I have conducted an independent evaluation of this patient a history and physical is as follows:    47 YO female presents with a black toe  States this had been worsening for the last 2 weeks  Daughter states pt complained of discomfort last night, this was the time she was found to have a black toe  Pt has a Hx of DM, previous amputations  Gen: Pt is in NAD  HEENT: Head is atraumatic, EOM's intact, neck has FROM  Chest: CTAB, non-tender  Heart: RRR  Abdomen: Soft, NT/ND  Musculoskeletal: FROM in all extremities, gangrenous 4th digit on the Left, discoloration in the 3rd digit, surrounding induration  Skin: No rash, no ecchymosis  Neuro: Awake, alert, oriented x4; Cranial nerves II-XII intact  Psych: Normal affect    MDM - Pt with gangrene in toe, surrounding cellulitis  Will x-ray, check CBC, lactic acid, cultures  Will give fluids and Abx  Pt will require admission        ED Course         Critical Care Time  Procedures

## 2019-10-30 NOTE — ED PROVIDER NOTES
History  Chief Complaint   Patient presents with    Toe Pain     Patient reports noticing her R 4th toe has turned black in color  Patient reports burning her R foot a few months ago but denies any new injuries  Patient presents for evaluation of a darkened right, 4th digit  Started to no some discoloration of the digit approximately 2 weeks ago which has progressed since then  Her daughters then noticed that her foot looked different and upon seeing that the toe was bed dark, black color brought her into the emergency department for further evaluation  States that she has had 2 stents performed on her right leg secondary to peripheral vascular disease  Patient also has an extensive history of diabetic neuropathy and denies any sensation in either of her feet  Had the 5th digit of the left toe amputated for issues with circulation  Patient denies any current pain, fever, chills, shortness of breath, abdominal pain, nausea/vomiting, constipation/diarrhea  Currently taking Plavix for her peripheral vascular disease  Prior to Admission Medications   Prescriptions Last Dose Informant Patient Reported? Taking?    Insulin Pen Needle 32G X 4 MM MISC 10/30/2019 at Unknown time Self No Yes   Sig: Use once daily   ONETOUCH DELICA LANCETS FINE MISC 10/30/2019 at Unknown time Self No Yes   Sig: Testing Three times a day   acetaminophen (TYLENOL) 325 mg tablet Not Taking at Unknown time Self No No   Sig: Take 2 tablets (650 mg total) by mouth every 6 (six) hours as needed for mild pain, headaches or fever   Patient not taking: Reported on 10/31/2019   aspirin 81 mg chewable tablet 10/30/2019 at Unknown time Self No Yes   Sig: Chew 1 tablet (81 mg total) daily   atorvastatin (LIPITOR) 20 mg tablet 10/30/2019 at Unknown time  No Yes   Sig: Take 1 tablet (20 mg total) by mouth daily   clopidogrel (PLAVIX) 75 mg tablet 10/30/2019 at Unknown time  No Yes   Sig: Take 1 tablet (75 mg total) by mouth daily glipiZIDE (GLUCOTROL) 5 mg tablet 10/30/2019 at Unknown time  No Yes   Sig: Take 1 tablet (5 mg total) by mouth 2 (two) times a day before meals   glucose blood (ONE TOUCH ULTRA TEST) test strip 10/30/2019 at Unknown time Self No Yes   Sig: Testing Three times a day   insulin glargine (BASAGLAR KWIKPEN) 100 units/mL injection pen 10/30/2019 at Unknown time  No Yes   Sig: Inject 78 units under the skin daily  lisinopril (ZESTRIL) 5 mg tablet 10/30/2019 at Unknown time  No Yes   Sig: Take 1 tablet (5 mg total) by mouth daily   metFORMIN (GLUCOPHAGE) 1000 MG tablet 10/30/2019 at Unknown time  No Yes   Sig: Take 1 tablet (1,000 mg total) by mouth 2 (two) times a day with meals   metoprolol tartrate (LOPRESSOR) 25 mg tablet 10/30/2019 at Unknown time  No Yes   Sig: Take 1 tablet (25 mg total) by mouth every 12 (twelve) hours      Facility-Administered Medications: None       Past Medical History:   Diagnosis Date    Cellulitis of toe 2019    CKD (chronic kidney disease)     Diabetes mellitus ([facility] )     Hypertension        Past Surgical History:   Procedure Laterality Date    IR ABDOMINAL ANGIOGRAPHY  2019    IR ARTERIAL LYSIS  2019    IR LOWER EXTREMITY / INTERVENTION  2019    TOE AMPUTATION Left 2019    Procedure: AMPUTATION 5th TOE;  Surgeon: Wes Charlton DPM;  Location: [facility];  Service: Podiatry       Family History   Problem Relation Age of Onset    Diabetes Mother     No Known Problems Father      I have reviewed and agree with the history as documented  Social History     Tobacco Use    Smoking status: Former Smoker     Types: Cigarettes     Last attempt to quit: 2013     Years since quittin 8    Smokeless tobacco: Former User   Substance Use Topics    Alcohol use: Yes     Frequency: Monthly or less     Comment: occ    Drug use: No        Review of Systems   Constitutional: Negative for chills, diaphoresis, fatigue and fever     Respiratory: Negative for cough and shortness of breath  Cardiovascular: Negative for chest pain and palpitations  Gastrointestinal: Negative for abdominal distention, abdominal pain, constipation, diarrhea, nausea and vomiting  Genitourinary: Negative for dysuria, frequency and hematuria  Musculoskeletal: Negative for arthralgias, myalgias and neck pain  Skin: Positive for color change and wound  Neurological: Negative for dizziness, syncope, light-headedness and headaches  All other systems reviewed and are negative  Physical Exam  ED Triage Vitals [10/30/19 1826]   Temperature Pulse Respirations Blood Pressure SpO2   97 8 °F (36 6 °C) 93 18 (!) 209/85 99 %      Temp Source Heart Rate Source Patient Position - Orthostatic VS BP Location FiO2 (%)   Temporal Monitor Sitting Right arm --      Pain Score       No Pain             Orthostatic Vital Signs  Vitals:    11/02/19 1647 11/02/19 1657 11/02/19 2116 11/02/19 2329   BP: (!) 183/86 160/74 128/66 125/79   Pulse: 85  78 71   Patient Position - Orthostatic VS: Lying Lying  Lying       Physical Exam   Constitutional: She is oriented to person, place, and time  She appears well-nourished  No distress  HENT:   Head: Normocephalic and atraumatic  Right Ear: External ear normal    Left Ear: External ear normal    Mouth/Throat: Oropharynx is clear and moist    Eyes: Pupils are equal, round, and reactive to light  Conjunctivae and EOM are normal  Right eye exhibits no discharge  Left eye exhibits no discharge  No scleral icterus  Neck: Normal range of motion  Neck supple  No JVD present  Cardiovascular: Normal rate, regular rhythm, normal heart sounds and intact distal pulses  Exam reveals no gallop and no friction rub  No murmur heard  Pulmonary/Chest: Effort normal and breath sounds normal  No respiratory distress  She has no wheezes  She has no rales  Abdominal: Soft  Bowel sounds are normal  She exhibits no distension and no mass  There is no tenderness   There is no guarding  Musculoskeletal: Normal range of motion  She exhibits deformity  She exhibits no tenderness  Feet:    Neurological: She is alert and oriented to person, place, and time  No cranial nerve deficit or sensory deficit  She exhibits normal muscle tone  Coordination normal    Skin: Skin is warm  She is not diaphoretic  Psychiatric: She has a normal mood and affect  Her behavior is normal  Judgment and thought content normal    Vitals reviewed            ED Medications  Medications   metoprolol tartrate (LOPRESSOR) tablet 25 mg (25 mg Oral Given 11/2/19 2116)   aspirin chewable tablet 81 mg (81 mg Oral Given 11/2/19 0901)   atorvastatin (LIPITOR) tablet 20 mg (20 mg Oral Given 11/2/19 1713)   insulin glargine (LANTUS) subcutaneous injection 78 Units 0 78 mL (78 Units Subcutaneous Given 11/2/19 0900)   ondansetron (ZOFRAN) injection 4 mg (4 mg Intravenous Given 11/2/19 1713)   acetaminophen (TYLENOL) tablet 650 mg (650 mg Oral Given 10/31/19 2123)   insulin lispro (HumaLOG) 100 units/mL subcutaneous injection 1-6 Units (4 Units Subcutaneous Given 11/2/19 1714)   insulin lispro (HumaLOG) 100 units/mL subcutaneous injection 1-6 Units (1 Units Subcutaneous Given 11/2/19 2205)   hydrALAZINE (APRESOLINE) injection 5 mg (5 mg Intravenous Not Given 10/31/19 0215)   ceFAZolin (ANCEF) IVPB (premix) 1,000 mg (1,000 mg Intravenous New Bag 11/2/19 2116)   metroNIDAZOLE (FLAGYL) IVPB (premix) 500 mg (500 mg Intravenous New Bag 11/2/19 2205)   enoxaparin (LOVENOX) subcutaneous injection 40 mg (40 mg Subcutaneous Given 11/2/19 0901)   clopidogrel (PLAVIX) tablet 75 mg (75 mg Oral Given 11/2/19 1208)   insulin lispro (HumaLOG) 100 units/mL subcutaneous injection 5 Units (5 Units Subcutaneous Given 11/2/19 1714)   sodium chloride 0 9 % bolus 1,000 mL (1,000 mL Intravenous New Bag 10/30/19 1958)   cefepime (MAXIPIME) 2 g/50 mL dextrose IVPB (0 mg Intravenous Stopped 10/30/19 2210)   vancomycin (VANCOCIN) 1,250 mg in sodium chloride 0 9 % 250 mL IVPB (1,250 mg Intravenous New Bag 10/30/19 2213)   lactated ringers bolus 1,700 mL (1,700 mL Intravenous New Bag 10/30/19 2134)   potassium chloride (K-DUR,KLOR-CON) CR tablet 40 mEq (40 mEq Oral Given 11/1/19 0917)   midazolam (VERSED) injection (1 mg Intravenous Given 11/1/19 1448)   fentanyl citrate (PF) 100 MCG/2ML (50 mcg Intravenous Given 11/1/19 1449)   lidocaine 1% buffered (5 mL Infiltration Given 11/1/19 1344)   heparin (porcine) injection (5,000 Units Intravenous Given 11/1/19 1405)   ceFAZolin (ANCEF) injection (1,000 mg Intravenous Given 11/1/19 1438)   iodixanol (VISIPAQUE) 320 MG/ML injection 150 mL (95 mL Intra-arterial Given 11/1/19 1535)   potassium chloride (K-DUR,KLOR-CON) CR tablet 40 mEq (40 mEq Oral Given 11/2/19 1208)       Diagnostic Studies  Results Reviewed     Procedure Component Value Units Date/Time    Blood culture #1 [[id number]] Collected:  10/30/19 1850    Lab Status:  Preliminary result Specimen:  Blood from Arm, Right Updated:  11/02/19 2301     Blood Culture No Growth at 72 hrs  Blood culture #2 [[id number]] Collected:  10/30/19 2016    Lab Status:  Preliminary result Specimen:  Blood from Hand, Left Updated:  11/02/19 2301     Blood Culture No Growth at 72 hrs  Hepatic function panel [[id number]]  (Abnormal) Collected:  10/30/19 1907    Lab Status:  Final result Specimen:  Blood from Arm, Right Updated:  10/31/19 0507     Total Bilirubin 0 58 mg/dL      Bilirubin, Direct 0 17 mg/dL      Alkaline Phosphatase 92 U/L      AST 17 U/L      ALT 15 U/L      Total Protein 7 6 g/dL      Albumin 2 9 g/dL     Lactic acid, plasma [[id number]]  (Normal) Collected:  10/30/19 2216    Lab Status:  Final result Specimen:  Blood from Arm, Right Updated:  10/30/19 2240     LACTIC ACID 1 8 mmol/L     Narrative:       Result may be elevated if tourniquet was used during collection      Lactic acid, plasma [[id number]]  (Abnormal) Collected:  10/30/19 2017    Lab Status: Final result Specimen:  Blood from Hand, Left Updated:  10/30/19 2051     LACTIC ACID 2 5 mmol/L     Narrative:       Result may be elevated if tourniquet was used during collection      Sedimentation rate, automated [[id number]]  (Abnormal) Collected:  10/30/19 1907    Lab Status:  Final result Specimen:  Blood from Arm, Right Updated:  10/30/19 2016     Sed Rate 63 mm/hour     Basic metabolic panel [[id number]]  (Abnormal) Collected:  10/30/19 1907    Lab Status:  Final result Specimen:  Blood from Arm, Right Updated:  10/30/19 2003     Sodium 133 mmol/L      Potassium 3 6 mmol/L      Chloride 95 mmol/L      CO2 29 mmol/L      ANION GAP 9 mmol/L      BUN 9 mg/dL      Creatinine 1 04 mg/dL      Glucose 309 mg/dL      Calcium 9 0 mg/dL      eGFR 61 ml/min/1 73sq m     Narrative:       Jose Eduardo guidelines for Chronic Kidney Disease (CKD):     Stage 1 with normal or high GFR (GFR > 90 mL/min/1 73 square meters)    Stage 2 Mild CKD (GFR = 60-89 mL/min/1 73 square meters)    Stage 3A Moderate CKD (GFR = 45-59 mL/min/1 73 square meters)    Stage 3B Moderate CKD (GFR = 30-44 mL/min/1 73 square meters)    Stage 4 Severe CKD (GFR = 15-29 mL/min/1 73 square meters)    Stage 5 End Stage CKD (GFR <15 mL/min/1 73 square meters)  Note: GFR calculation is accurate only with a steady state creatinine    C-reactive protein [[id number]]  (Abnormal) Collected:  10/30/19 1907    Lab Status:  Final result Specimen:  Blood from Arm, Right Updated:  10/30/19 2003     CRP >90 0 mg/L     CBC and differential [[id number]]  (Abnormal) Collected:  10/30/19 1907    Lab Status:  Final result Specimen:  Blood from Arm, Right Updated:  10/30/19 1915     WBC 12 57 Thousand/uL      RBC 5 08 Million/uL      Hemoglobin 11 4 g/dL      Hematocrit 38 4 %      MCV 76 fL      MCH 22 4 pg      MCHC 29 7 g/dL      RDW 13 8 %      MPV 11 2 fL      Platelets 586 Thousands/uL      nRBC 0 /100 WBCs      Neutrophils Relative 78 % Immat GRANS % 1 %      Lymphocytes Relative 14 %      Monocytes Relative 6 %      Eosinophils Relative 1 %      Basophils Relative 0 %      Neutrophils Absolute 9 94 Thousands/µL      Immature Grans Absolute 0 06 Thousand/uL      Lymphocytes Absolute 1 75 Thousands/µL      Monocytes Absolute 0 73 Thousand/µL      Eosinophils Absolute 0 06 Thousand/µL      Basophils Absolute 0 03 Thousands/µL     Urine Microscopic [[id number]]  (Abnormal) Collected:  10/30/19 1840    Lab Status:  Final result Specimen:  Urine, Clean Catch Updated:  10/30/19 1858     RBC, UA 4-10 /hpf      WBC, UA 0-1 /hpf      Epithelial Cells Moderate /hpf      Bacteria, UA Occasional /hpf     POCT urinalysis dipstick [[id number]]  (Abnormal) Resulted:  10/30/19 1841    Lab Status:  Final result Updated:  10/30/19 1847    ED Urine Macroscopic [[id number]]  (Abnormal) Collected:  10/30/19 1840    Lab Status:  Final result Specimen:  Urine Updated:  10/30/19 1841     Color, UA Yellow     Clarity, UA Slightly Cloudy     pH, UA 6 0     Leukocytes, UA Negative     Nitrite, UA Negative     Protein, UA >=300 mg/dl      Glucose,  (1/4%) mg/dl      Ketones, UA 15 (1+) mg/dl      Urobilinogen, UA 0 2 E U /dl      Bilirubin, UA Negative     Blood, UA Moderate     Specific [place], UA 1 020    Narrative:       CLINITEK RESULT                 VAS [place] & waveform analysis, multiple levels   Final Result by Aleksandra Degroot MD (11/01 8947)      IR lower extremity / intervention   Final Result by Anabel Etienne MD (11/01 3581)   Impression: Recurrent high-grade distal superficial femoral artery stenosis treated with angioplasty and stent  Focal 80% tibial peroneal trunk stenosis treated with a scoring balloon        Workstation performed: [id number]         VAS lower limb arterial duplex, complete bilateral   Final Result by Donna Moctezuma DO (10/31 1003)      XR foot 3+ views RIGHT   Final Result by Michele Ayala MD (10/31 4448)      No acute osseous abnormality  Workstation performed: [id number]               Procedures  Procedures  Conscious Sedation Assessment      Classification Score   ASA Scale Assessment  3-Severe systemic disease that results in functional limitation filed at 11/01/2019 1323   Mallampati Classification  Class II: soft palate, uvula, fauces visible - No Difficulty filed at 11/01/2019 1323            ED Course  ED Course as of Nov 02 2338   Wed Oct 30, 2019   2055 LACTIC ACID(!!): 2 5   2055 WBC(!): 12 57   2055 Pulse: 93                   Initial Sepsis Screening     Row Name 10/30/19 2112                Is the patient's history suggestive of a new or worsening infection? (!) Yes (Proceed)  -HC        Suspected source of infection  soft tissue  -HC        Are two or more of the following signs & symptoms of infection both present and new to the patient? (!) Yes (Proceed)  -HC        Indicate SIRS criteria  Tachycardia > 90 bpm;Leukocytosis (WBC > 22585 IJL)  -HC        If the answer is yes to both questions, suspicion of sepsis is present          If severe sepsis is present AND tissue hypoperfusion perists in the hour after fluid resuscitation or lactate > 4, the patient meets criteria for SEPTIC SHOCK          Are any of the following organ dysfunction criteria present within 6 hours of suspected infection and SIRS criteria that are NOT considered to be chronic conditions? (!) Yes  -HC        Organ dysfunction  Lactate > 2 0 mmol/L  -HC        Date of presentation of severe sepsis  10/30/19  -        Time of presentation of severe sepsis  2113  [facility]        Tissue hypoperfusion persists in the hour after crystalloid fluid administration, evidenced, by either:          Was hypotension present within one hour of the conclusion of crystalloid fluid administration?           Date of presentation of septic shock          Time of presentation of septic shock            User Key  (r) = Recorded By, (t) = Taken By, (c) = Cosigned By    [address] Name Provider Type    [facility]  Phillip Live MD Resident           Default Flowsheet Data (last 720 hours)      Sepsis Reassess     Row Name 10/30/19 1439                   Repeat Volume Status and Tissue Perfusion Assessment Performed    Repeat Volume Status and Tissue Perfusion Assessment Performed  Yes  -HC           Volume Status and Tissue Perfusion Post Fluid Resuscitation * Must Document All *    Vital Signs Reviewed (HR, RR, BP, T)  Yes  -HC        Shock Index Reviewed  Yes  -HC        Arterial Oxygen Saturation Reviewed (POx, SaO2 or SpO2)  Yes (comment %)  -HC        Cardio  Regular rate and rhythm;Normal S1/S2  -HC        Pulmonary  Normal effort;Clear to auscultation  -        Capillary Refill  Brisk  -        Peripheral Pulses  Radial  -        Peripheral Pulse  +2  -HC        Skin  Warm  -HC        Urine output assessed  Adequate  -HC           *OR*   Intensive Monitoring- Must Document One of the Following Four *:    Vital Signs Reviewed          * Central Venous Pressure (CVP or RAP)          * Central Venous Oxygen (SVO2, ScvO2 or Oxygen saturation via central catheter)          * Bedside Cardiovascular US in IVC diameter and % collapse          * Passive Leg Raise OR Crystalloid Challenge            User Key  (r) = Recorded By, (t) = Taken By, (c) = Cosigned By    Initials Name Provider Type    [facility]  Phillip Live MD Resident                MDM  Number of Diagnoses or Management Options  Gangrene of toe of right foot [facility]):   Sepsis [facility]):   Diagnosis management comments: Patient was found to meet sepsis criteria started on cefepime and vancomycin in the emergency department  I discussed the case with both Podiatry as well as vascular surgery  Vascular surgery does not feel like there is any need for intervention at this time this does not appear to be an acute issue  Podiatry would like to amputate the digit but have a further vascular evaluation done  Patient was discussed with florian admitted for treatment of sepsis and further evaluation by both Podiatry and vascular surgery  Disposition  Final diagnoses:   [facility] of toe of right foot ([facility] )   Sepsis ([facility] )     Time reflects when diagnosis was documented in both MDM as applicable and the Disposition within this note     Time User Action Codes Description Comment    10/30/2019 10:40 PM Ariel [place] Add [I96] Gangrene of toe of right foot ([facility] )     10/30/2019 10:40 PM Sea Ariel [place] Add [A41 9] Sepsis ([facility] )     10/30/2019 11:02 PM Leanora [place] Add [I73 9] PAD (peripheral artery disease) ([facility] )     10/30/2019 11:32 PM Leanora [place] Add [E11 59,  Z79 4] Type 2 diabetes mellitus with other circulatory complication, with long-term current use of insulin [facility])       ED Disposition     ED Disposition Condition Date/Time Comment    Admit Stable Wed Oct 30, 2019 10:40 PM Case was discussed with FLORIAN and the patient's admission status was agreed to be Admission Status: inpatient status to the service of Dr Rodrigue Merritt  Follow-up Information    None         Current Discharge Medication List      CONTINUE these medications which have NOT CHANGED    Details   aspirin 81 mg chewable tablet Chew 1 tablet (81 mg total) daily    Associated Diagnoses: PAD (peripheral artery disease) (HCC)      atorvastatin (LIPITOR) 20 mg tablet Take 1 tablet (20 mg total) by mouth daily  Qty: 90 tablet, Refills: 1    Associated Diagnoses: Type 2 diabetes mellitus without complication, with long-term current use of insulin ([place] [id number] );  Essential hypertension      clopidogrel (PLAVIX) 75 mg tablet Take 1 tablet (75 mg total) by mouth daily  Qty: 90 tablet, Refills: 1    Associated Diagnoses: PAD (peripheral artery disease) (HCC)      glipiZIDE (GLUCOTROL) 5 mg tablet Take 1 tablet (5 mg total) by mouth 2 (two) times a day before meals  Qty: 60 tablet, Refills: 0    Associated Diagnoses: Type 2 diabetes mellitus with other circulatory complication, with long-term current use of insulin ([facility])      glucose blood (ONE TOUCH ULTRA TEST) test strip Testing Three times a day  Qty: 100 each, Refills: 5    Associated Diagnoses: Type 2 diabetes mellitus without complication, with long-term current use of insulin ([facility])      insulin glargine (BASAGLAR KWIKPEN) 100 units/mL injection pen Inject 78 units under the skin daily  Qty: 5 pen, Refills: 3    Associated Diagnoses: Type 2 diabetes mellitus without complication, with long-term current use of insulin ([facility])      Insulin Pen Needle 32G X 4 MM MISC Use once daily  Qty: 100 each, Refills: 3    Associated Diagnoses: Type 2 diabetes mellitus without complication, with long-term current use of insulin ([facility])      lisinopril (ZESTRIL) 5 mg tablet Take 1 tablet (5 mg total) by mouth daily  Qty: 30 tablet, Refills: 3    Associated Diagnoses: Essential hypertension      metFORMIN (GLUCOPHAGE) 1000 MG tablet Take 1 tablet (1,000 mg total) by mouth 2 (two) times a day with meals  Qty: 180 tablet, Refills: 1    Associated Diagnoses: Type 2 diabetes mellitus without complication, with long-term current use of insulin ([facility])      metoprolol tartrate (LOPRESSOR) 25 mg tablet Take 1 tablet (25 mg total) by mouth every 12 (twelve) hours  Qty: 180 tablet, Refills: 1    Associated Diagnoses: Essential hypertension      ONETOUCH DELICA LANCETS FINE MISC Testing Three times a day  Qty: 100 each, Refills: 5    Associated Diagnoses: Type 2 diabetes mellitus without complication, with long-term current use of insulin ([facility])      acetaminophen (TYLENOL) 325 mg tablet Take 2 tablets (650 mg total) by mouth every 6 (six) hours as needed for mild pain, headaches or fever    Associated Diagnoses: PAD (peripheral artery disease) ([facility])           No discharge procedures on file  ED Provider  Attending physically available and evaluated Shivnegra Saadia CHILDS managed the patient along with the ED Attending      Electronically Signed by         Nuria Liu MD  11/02/19 9274

## 2019-10-31 ENCOUNTER — APPOINTMENT (INPATIENT)
Dept: NON INVASIVE DIAGNOSTICS | Facility: HOSPITAL | Age: 54
DRG: 181 | End: 2019-10-31
Payer: COMMERCIAL

## 2019-10-31 LAB
ALBUMIN SERPL BCP-MCNC: 2.4 G/DL (ref 3.5–5)
ALBUMIN SERPL BCP-MCNC: 2.9 G/DL (ref 3.5–5)
ALP SERPL-CCNC: 76 U/L (ref 46–116)
ALP SERPL-CCNC: 92 U/L (ref 46–116)
ALT SERPL W P-5'-P-CCNC: 13 U/L (ref 12–78)
ALT SERPL W P-5'-P-CCNC: 15 U/L (ref 12–78)
ANION GAP SERPL CALCULATED.3IONS-SCNC: 7 MMOL/L (ref 4–13)
AST SERPL W P-5'-P-CCNC: 17 U/L (ref 5–45)
AST SERPL W P-5'-P-CCNC: 17 U/L (ref 5–45)
BASOPHILS # BLD AUTO: 0.03 THOUSANDS/ΜL (ref 0–0.1)
BASOPHILS NFR BLD AUTO: 0 % (ref 0–1)
BILIRUB DIRECT SERPL-MCNC: 0.15 MG/DL (ref 0–0.2)
BILIRUB DIRECT SERPL-MCNC: 0.17 MG/DL (ref 0–0.2)
BILIRUB SERPL-MCNC: 0.46 MG/DL (ref 0.2–1)
BILIRUB SERPL-MCNC: 0.58 MG/DL (ref 0.2–1)
BUN SERPL-MCNC: 10 MG/DL (ref 5–25)
CALCIUM SERPL-MCNC: 8.2 MG/DL (ref 8.3–10.1)
CHLORIDE SERPL-SCNC: 101 MMOL/L (ref 100–108)
CO2 SERPL-SCNC: 28 MMOL/L (ref 21–32)
CREAT SERPL-MCNC: 0.9 MG/DL (ref 0.6–1.3)
EOSINOPHIL # BLD AUTO: 0.04 THOUSAND/ΜL (ref 0–0.61)
EOSINOPHIL NFR BLD AUTO: 0 % (ref 0–6)
ERYTHROCYTE [DISTWIDTH] IN BLOOD BY AUTOMATED COUNT: 13.8 % (ref 11.6–15.1)
GFR SERPL CREATININE-BSD FRML MDRD: 73 ML/MIN/1.73SQ M
GLUCOSE SERPL-MCNC: 134 MG/DL (ref 65–140)
GLUCOSE SERPL-MCNC: 220 MG/DL (ref 65–140)
GLUCOSE SERPL-MCNC: 222 MG/DL (ref 65–140)
GLUCOSE SERPL-MCNC: 236 MG/DL (ref 65–140)
GLUCOSE SERPL-MCNC: 281 MG/DL (ref 65–140)
GLUCOSE SERPL-MCNC: 302 MG/DL (ref 65–140)
HCT VFR BLD AUTO: 32.5 % (ref 34.8–46.1)
HGB BLD-MCNC: 9.7 G/DL (ref 11.5–15.4)
IMM GRANULOCYTES # BLD AUTO: 0.04 THOUSAND/UL (ref 0–0.2)
IMM GRANULOCYTES NFR BLD AUTO: 0 % (ref 0–2)
INR PPP: 1.07 (ref 0.84–1.19)
LYMPHOCYTES # BLD AUTO: 2.73 THOUSANDS/ΜL (ref 0.6–4.47)
LYMPHOCYTES NFR BLD AUTO: 27 % (ref 14–44)
MCH RBC QN AUTO: 22.6 PG (ref 26.8–34.3)
MCHC RBC AUTO-ENTMCNC: 29.8 G/DL (ref 31.4–37.4)
MCV RBC AUTO: 76 FL (ref 82–98)
MONOCYTES # BLD AUTO: 0.66 THOUSAND/ΜL (ref 0.17–1.22)
MONOCYTES NFR BLD AUTO: 7 % (ref 4–12)
NEUTROPHILS # BLD AUTO: 6.65 THOUSANDS/ΜL (ref 1.85–7.62)
NEUTS SEG NFR BLD AUTO: 66 % (ref 43–75)
NRBC BLD AUTO-RTO: 0 /100 WBCS
PLATELET # BLD AUTO: 234 THOUSANDS/UL (ref 149–390)
PMV BLD AUTO: 10.8 FL (ref 8.9–12.7)
POTASSIUM SERPL-SCNC: 3.5 MMOL/L (ref 3.5–5.3)
PROT SERPL-MCNC: 6.6 G/DL (ref 6.4–8.2)
PROT SERPL-MCNC: 7.6 G/DL (ref 6.4–8.2)
PROTHROMBIN TIME: 14 SECONDS (ref 11.6–14.5)
RBC # BLD AUTO: 4.29 MILLION/UL (ref 3.81–5.12)
SODIUM SERPL-SCNC: 136 MMOL/L (ref 136–145)
WBC # BLD AUTO: 10.15 THOUSAND/UL (ref 4.31–10.16)

## 2019-10-31 PROCEDURE — 80048 BASIC METABOLIC PNL TOTAL CA: CPT | Performed by: PHYSICIAN ASSISTANT

## 2019-10-31 PROCEDURE — 93925 LOWER EXTREMITY STUDY: CPT

## 2019-10-31 PROCEDURE — 82948 REAGENT STRIP/BLOOD GLUCOSE: CPT

## 2019-10-31 PROCEDURE — 85025 COMPLETE CBC W/AUTO DIFF WBC: CPT | Performed by: PHYSICIAN ASSISTANT

## 2019-10-31 PROCEDURE — 93922 UPR/L XTREMITY ART 2 LEVELS: CPT | Performed by: SURGERY

## 2019-10-31 PROCEDURE — 93923 UPR/LXTR ART STDY 3+ LVLS: CPT

## 2019-10-31 PROCEDURE — 99255 IP/OBS CONSLTJ NEW/EST HI 80: CPT | Performed by: PHYSICIAN ASSISTANT

## 2019-10-31 PROCEDURE — 85610 PROTHROMBIN TIME: CPT | Performed by: PHYSICIAN ASSISTANT

## 2019-10-31 PROCEDURE — 80076 HEPATIC FUNCTION PANEL: CPT | Performed by: PHYSICIAN ASSISTANT

## 2019-10-31 PROCEDURE — 93925 LOWER EXTREMITY STUDY: CPT | Performed by: SURGERY

## 2019-10-31 RX ORDER — SODIUM CHLORIDE 9 MG/ML
75 INJECTION, SOLUTION INTRAVENOUS CONTINUOUS
Status: DISCONTINUED | OUTPATIENT
Start: 2019-11-01 | End: 2019-11-01

## 2019-10-31 RX ADMIN — INSULIN GLARGINE 78 UNITS: 100 INJECTION, SOLUTION SUBCUTANEOUS at 09:45

## 2019-10-31 RX ADMIN — ENOXAPARIN SODIUM 40 MG: 40 INJECTION SUBCUTANEOUS at 09:45

## 2019-10-31 RX ADMIN — INSULIN LISPRO 7 UNITS: 100 INJECTION, SOLUTION INTRAVENOUS; SUBCUTANEOUS at 17:13

## 2019-10-31 RX ADMIN — METRONIDAZOLE 500 MG: 500 INJECTION, SOLUTION INTRAVENOUS at 15:40

## 2019-10-31 RX ADMIN — INSULIN LISPRO 4 UNITS: 100 INJECTION, SOLUTION INTRAVENOUS; SUBCUTANEOUS at 02:01

## 2019-10-31 RX ADMIN — ACETAMINOPHEN 650 MG: 325 TABLET ORAL at 21:23

## 2019-10-31 RX ADMIN — METRONIDAZOLE 500 MG: 500 INJECTION, SOLUTION INTRAVENOUS at 07:22

## 2019-10-31 RX ADMIN — METRONIDAZOLE 500 MG: 500 INJECTION, SOLUTION INTRAVENOUS at 22:24

## 2019-10-31 RX ADMIN — INSULIN LISPRO 4 UNITS: 100 INJECTION, SOLUTION INTRAVENOUS; SUBCUTANEOUS at 11:59

## 2019-10-31 RX ADMIN — CLOPIDOGREL BISULFATE 75 MG: 75 TABLET ORAL at 09:45

## 2019-10-31 RX ADMIN — INSULIN LISPRO 2 UNITS: 100 INJECTION, SOLUTION INTRAVENOUS; SUBCUTANEOUS at 17:14

## 2019-10-31 RX ADMIN — LISINOPRIL 5 MG: 5 TABLET ORAL at 09:44

## 2019-10-31 RX ADMIN — CEFAZOLIN SODIUM 1000 MG: 1 SOLUTION INTRAVENOUS at 14:28

## 2019-10-31 RX ADMIN — CEFAZOLIN SODIUM 1000 MG: 1 SOLUTION INTRAVENOUS at 21:22

## 2019-10-31 RX ADMIN — METOPROLOL TARTRATE 25 MG: 25 TABLET, FILM COATED ORAL at 21:24

## 2019-10-31 RX ADMIN — METOPROLOL TARTRATE 25 MG: 25 TABLET, FILM COATED ORAL at 09:44

## 2019-10-31 RX ADMIN — CEFAZOLIN SODIUM 1000 MG: 1 SOLUTION INTRAVENOUS at 06:23

## 2019-10-31 RX ADMIN — ATORVASTATIN CALCIUM 20 MG: 20 TABLET, FILM COATED ORAL at 15:42

## 2019-10-31 RX ADMIN — INSULIN LISPRO 3 UNITS: 100 INJECTION, SOLUTION INTRAVENOUS; SUBCUTANEOUS at 09:46

## 2019-10-31 RX ADMIN — ACETAMINOPHEN 650 MG: 325 TABLET ORAL at 02:14

## 2019-10-31 RX ADMIN — ASPIRIN 81 MG 81 MG: 81 TABLET ORAL at 09:45

## 2019-10-31 NOTE — UTILIZATION REVIEW
Initial Clinical Review    Admission: Date/Time/Statement: Inpatient Admission Orders (From admission, onward)     Ordered        10/30/19 2241  Inpatient Admission  Once                   Orders Placed This Encounter   Procedures    Inpatient Admission     Standing Status:   Standing     Number of Occurrences:   1     Order Specific Question:   Admitting Physician     Answer:   Carmelia Oppenheim [[id number]]     Order Specific Question:   Level of Care     Answer:   Med Surg [16]     Order Specific Question:   Estimated length of stay     Answer:   More than 2 Midnights     Order Specific Question:   Certification     Answer:   I certify that inpatient services are medically necessary for this patient for a duration of greater than two midnights  See H&P and MD Progress Notes for additional information about the patient's course of treatment  ED Arrival Information     Expected Arrival Acuity Means of Arrival Escorted By Service Admission Type    - 10/30/2019 18:17 Urgent Wheelchair Family Member General Medicine Urgent    Arrival Complaint    Toe Black        Chief Complaint   Patient presents with    Toe Pain     Patient reports noticing her R 4th toe has turned black in color  Patient reports burning her R foot a few months ago but denies any new injuries  Assessment/Plan:   46y Female to ED presents with gangrenous toe  Toe started changing colors about 2-3 wks ago - chronic wound on the dorsal side of her foot that developed into a blister that popped resulting in streaking redness of the dorsal side of the foot  PMH of Uncontrolled type II, Diabetes, Essential Hypertension, PAD and critial limb ischemia s/p angioplasty  Admit Inpatient level of care for Gangrenous Toe - 4th digit, Cellulitis, Hyponatremia, Lactic acidosis, PAD and Diabetes  Will need surgical intervention, Vascular surgery consult  Iv Antibiotics, Na 133 - 136  Lactic acid - 2 5, IV fluids - repeat and continue lisinopril     10/31 Vascular Surgery cons; R 4th toe gangrene and nonhealing plantar foot ulcer w/lactic acidosis  Bld culture pending, SHERYL pending, trend creat and avoid nephrotoxic agents  -Recommend IV hydration pre and post angiogram  Hold lisinopril 1 day before and day of angiogram  Poorly controlled Diabetes and Hypertension  Podiatry consult  ED Triage Vitals [10/30/19 1826]   Temperature Pulse Respirations Blood Pressure SpO2   97 8 °F (36 6 °C) 93 18 (!) 209/85 99 %      Temp Source Heart Rate Source Patient Position - Orthostatic VS BP Location FiO2 (%)   Temporal Monitor Sitting Right arm --      Pain Score       No Pain        Wt Readings from Last 1 Encounters:   10/31/19 85 1 kg (187 lb 9 8 oz)     Additional Vital Signs:   10/31/19 0802  97 6 °F (36 4 °C)  72  18  114/64  97 %  None (Room air)  Sitting   10/31/19 0215        161/76         10/30/19 2342  97 9 °F (36 6 °C)  89  18  180/78  Abnormal   100 %  None (Room air)  Sitting   10/30/19 2217    91  18  195/82  Abnormal   98 %  None (Room air)  Sitting   10/30/19 2019    87  20  205/87  Abnormal   98 %  None (Room air)       Pertinent Labs/Diagnostic Test Results:   10/30 Xray Right Foot - No acute osseous abnormality    10/31 VAS lower limb arterial duplex, complete bilateral - RIGHT LOWER LIMB:  This evaluation shows >75% stenosis in the mid superficial femoral artery with  suggestion of a short segment occlusion with diffuse atherosclerotic disease  noted in the remaining portions of the femoropopliteal vessels  Findings  suggests tibioperoneal disease  LEFT LOWER LIMB:  This evaluation shows a high grade stenosis versus occlusion in the mid  superficial femoral artery with reconstitution distally  Findings suggests  tibioperoneal disease      Results from last 7 days   Lab Units 10/31/19  0609 10/30/19  1907   WBC Thousand/uL 10 15 12 57*   HEMOGLOBIN g/dL 9 7* 11 4*   HEMATOCRIT % 32 5* 38 4   PLATELETS Thousands/uL 234 279   NEUTROS ABS Thousands/µL 6 65 9 94*         Results from last 7 days   Lab Units 10/31/19  0609 10/30/19  1907   SODIUM mmol/L 136 133*   POTASSIUM mmol/L 3 5 3 6   CHLORIDE mmol/L 101 95*   CO2 mmol/L 28 29   ANION GAP mmol/L 7 9   BUN mg/dL 10 9   CREATININE mg/dL 0 90 1 04   EGFR ml/min/1 73sq m 73 61   CALCIUM mg/dL 8 2* 9 0     Results from last 7 days   Lab Units 10/31/19  0000 10/30/19  1907   AST U/L 17 17   ALT U/L 13 15   ALK PHOS U/L 76 92   TOTAL PROTEIN g/dL 6 6 7 6   ALBUMIN g/dL 2 4* 2 9*   TOTAL BILIRUBIN mg/dL 0 46 0 58   BILIRUBIN DIRECT mg/dL 0 15 0 17     Results from last 7 days   Lab Units 10/31/19  1115 10/31/19  0804 10/31/19  0128   POC GLUCOSE mg/dl 281* 236* 302*     Results from last 7 days   Lab Units 10/31/19  0609 10/30/19  1907   GLUCOSE RANDOM mg/dL 222* 309*       Results from last 7 days   Lab Units 10/31/19  0610   PROTIME seconds 14 0   INR  1 07     Results from last 7 days   Lab Units 10/30/19  2216 10/30/19  2017   LACTIC ACID mmol/L 1 8 2 5*       Results from last 7 days   Lab Units 10/30/19  1907   CRP mg/L >90 0*   SED RATE mm/hour 63*         Results from last 7 days   Lab Units 10/30/19  1840   CLARITY UA  Slightly Cloudy   COLOR UA  Yellow   SPEC GRAV UA  1 020   PH UA  6 0   GLUCOSE UA mg/dl 250 (1/4%)*   KETONES UA mg/dl 15 (1+)*   BLOOD UA  Moderate*   PROTEIN UA mg/dl >=300*   NITRITE UA  Negative   BILIRUBIN UA  Negative   UROBILINOGEN UA E U /dl 0 2   LEUKOCYTES UA  Negative   WBC UA /hpf 0-1*   RBC UA /hpf 4-10*   BACTERIA UA /hpf Occasional   EPITHELIAL CELLS WET PREP /hpf Moderate*     ED Treatment:   Medication Administration from 10/30/2019 1817 to 10/30/2019 2333       Date/Time Order Dose Route Action     10/30/2019 1958 sodium chloride 0 9 % bolus 1,000 mL 1,000 mL Intravenous New Bag     10/30/2019 2117 cefepime (MAXIPIME) 2 g/50 mL dextrose IVPB 2,000 mg Intravenous New Bag     10/30/2019 1300 sodium chloride 0 9 % bolus 1,000 mL 1,000 mL Intravenous New Bag     10/30/2019 0574 vancomycin (VANCOCIN) 1,250 mg in sodium chloride 0 9 % 250 mL IVPB 1,250 mg Intravenous New Bag     10/30/2019 2134 lactated ringers bolus 1,700 mL 1,700 mL Intravenous New Bag     10/30/2019 2315 metoprolol tartrate (LOPRESSOR) tablet 25 mg 25 mg Oral Given        Past Medical History:   Diagnosis Date    Cellulitis of toe 1/28/2019    CKD (chronic kidney disease)     Diabetes mellitus ([place] [address] )     Hypertension      Present on Admission:   PAD [facility]peripheral artery disease) ([facility]   Essential hypertension   Gangrenous toe ([facility]   Cellulitis   Lactic acidosis   Hyponatremia   CKD stage 2 due to type 2 diabetes mellitus ([facility])   HLD (hyperlipidemia)      Admitting Diagnosis: Pain in toe [M79 676]  PAD (peripheral artery disease) ([facility]) [I73 9]  Sepsis ([place] [address] ) [A41 9]  Type 2 diabetes mellitus with other circulatory complication, with long-term current use of insulin ([facility]) [E11 59, Z79 4]  Gangrene of toe of right foot ([place] [address] ) Andreea Arroyo     Age/Sex: 47 y o  female     Admission Orders:  NPO  IR abdominal angiography  Bld culture x2  IP CONSULT TO PODIATRY  IP CONSULT TO VASCULAR SURGERY    Scheduled Medications:  Medications:  aspirin 81 mg Oral Daily   atorvastatin 20 mg Oral Daily With Dinner   cefazolin 1,000 mg Intravenous Q8H   clopidogrel 75 mg Oral Daily   enoxaparin 40 mg Subcutaneous Q24H ARRON   insulin glargine 78 Units Subcutaneous QAM   insulin lispro 1-6 Units Subcutaneous TID AC   insulin lispro 1-6 Units Subcutaneous HS   insulin lispro 7 Units Subcutaneous TID With Meals   metoprolol tartrate 25 mg Oral Q12H [address]   metroNIDAZOLE 500 mg Intravenous Q8H     Continuous IV Infusions:    [START ON 11/1/2019] sodium chloride 75 mL/hr Intravenous Continuous     PRN Meds:    acetaminophen 650 mg Oral Q6H PRN 10/31 x1   hydrALAZINE 5 mg Intravenous Q6H PRN   ondansetron 4 mg Intravenous Q6H PRN     Network Utilization Review Department  [email or web address]  ATTENTION: Please call with any questions or concerns to [phone] and carefully listen to the prompts so that you are directed to the right person  All voicemails are confidential   Eva Antonio all requests for admission clinical reviews, approved or denied determinations and any other requests to dedicated fax number below belonging to the campus where the patient is receiving treatment    FACILITY NAME UR FAX NUMBER   ADMISSION DENIALS (Administrative/Medical Necessity) [address] [facility] (Maternity/NICU/Pediatrics) [phone]   [facility] [id number] [place] [address] [address] [facility] [phone]   [facility] [address] [facility] [phone]   [place] [address] [facility] [address] [phone]

## 2019-10-31 NOTE — ASSESSMENT & PLAN NOTE
· Initial lactic acid of 2 5  · Does not meet other SIRS criteria aside from leukocytosis - also on metformin as an outpatient  · Received IVF - repeat lactic pending

## 2019-10-31 NOTE — PLAN OF CARE
Problem: Potential for Falls  Goal: Patient will remain free of falls  Description  INTERVENTIONS:  - Assess patient frequently for physical needs  -  Identify cognitive and physical deficits and behaviors that affect risk of falls    -  [place] fall precautions as indicated by assessment   - Educate patient/family on patient safety including physical limitations  - Instruct patient to call for assistance with activity based on assessment  - Modify environment to reduce risk of injury  - Consider OT/PT consult to assist with strengthening/mobility  Outcome: Progressing     Problem: PAIN - ADULT  Goal: Verbalizes/displays adequate comfort level or baseline comfort level  Description  Interventions:  - Encourage patient to monitor pain and request assistance  - Assess pain using appropriate pain scale  - Administer analgesics based on type and severity of pain and evaluate response  - Implement non-pharmacological measures as appropriate and evaluate response  - Consider cultural and social influences on pain and pain management  - Notify physician/advanced practitioner if interventions unsuccessful or patient reports new pain  Outcome: Progressing     Problem: INFECTION - ADULT  Goal: Absence or prevention of progression during hospitalization  Description  INTERVENTIONS:  - Assess and monitor for signs and symptoms of infection  - Monitor lab/diagnostic results  - Monitor all insertion sites, i e  indwelling lines, tubes, and drains  - Monitor endotracheal if appropriate and nasal secretions for changes in amount and color  - [place] appropriate cooling/warming therapies per order  - Administer medications as ordered  - Instruct and encourage patient and family to use good hand hygiene technique  - Identify and instruct in appropriate isolation precautions for identified infection/condition  Outcome: Progressing  Goal: Absence of fever/infection during neutropenic period  Description  INTERVENTIONS:  - Monitor WBC    Outcome: Progressing     Problem: SAFETY ADULT  Goal: Patient will remain free of falls  Description  INTERVENTIONS:  - Assess patient frequently for physical needs  -  Identify cognitive and physical deficits and behaviors that affect risk of falls    -  [place] fall precautions as indicated by assessment   - Educate patient/family on patient safety including physical limitations  - Instruct patient to call for assistance with activity based on assessment  - Modify environment to reduce risk of injury  - Consider OT/PT consult to assist with strengthening/mobility  Outcome: Progressing  Goal: Maintain or return to baseline ADL function  Description  INTERVENTIONS:  -  Assess patient's ability to carry out ADLs; assess patient's baseline for ADL function and identify physical deficits which impact ability to perform ADLs (bathing, care of mouth/teeth, toileting, grooming, dressing, etc )  - Assess/evaluate cause of self-care deficits   - Assess range of motion  - Assess patient's mobility; develop plan if impaired  - Assess patient's need for assistive devices and provide as appropriate  - Encourage maximum independence but intervene and supervise when necessary  - Involve family in performance of ADLs  - Assess for home care needs following discharge   - Consider OT consult to assist with ADL evaluation and planning for discharge  - Provide patient education as appropriate  Outcome: Progressing  Goal: Maintain or return mobility status to optimal level  Description  INTERVENTIONS:  - Assess patient's baseline mobility status (ambulation, transfers, stairs, etc )    - Identify cognitive and physical deficits and behaviors that affect mobility  - Identify mobility aids required to assist with transfers and/or ambulation (gait belt, sit-to-stand, lift, walker, cane, etc )  - [place] fall precautions as indicated by assessment  - Record patient progress and toleration of activity level on Mobility SBAR; progress patient to next Phase/Stage  - Instruct patient to call for assistance with activity based on assessment  - Consider rehabilitation consult to assist with strengthening/weightbearing, etc   Outcome: Progressing     Problem: DISCHARGE PLANNING  Goal: Discharge to home or other facility with appropriate resources  Description  INTERVENTIONS:  - Identify barriers to discharge w/patient and caregiver  - Arrange for needed discharge resources and transportation as appropriate  - Identify discharge learning needs (meds, wound care, etc )  - Arrange for interpretive services to assist at discharge as needed  - Refer to Case Management Department for coordinating discharge planning if the patient needs post-hospital services based on physician/advanced practitioner order or complex needs related to functional status, cognitive ability, or social support system  Outcome: Progressing     Problem: Knowledge Deficit  Goal: Patient/family/caregiver demonstrates understanding of disease process, treatment plan, medications, and discharge instructions  Description  Complete learning assessment and assess knowledge base  Interventions:  - Provide teaching at level of understanding  - Provide teaching via preferred learning methods  Outcome: Progressing     Problem: Nutrition/Hydration-ADULT  Goal: Nutrient/Hydration intake appropriate for improving, restoring or maintaining nutritional needs  Description  Monitor and assess patient's nutrition/hydration status for malnutrition  Collaborate with interdisciplinary team and initiate plan and interventions as ordered  Monitor patient's weight and dietary intake as ordered or per policy  Utilize nutrition screening tool and intervene as necessary  Determine patient's food preferences and provide high-protein, high-caloric foods as appropriate       INTERVENTIONS:  - Monitor oral intake, urinary output, labs, and treatment plans  - Assess nutrition and hydration status and recommend course of action  - Evaluate amount of meals eaten  - Assist patient with eating if necessary   - Allow adequate time for meals  - Recommend/ encourage appropriate diets, oral nutritional supplements, and vitamin/mineral supplements  - Order, calculate, and assess calorie counts as needed  - Recommend, monitor, and adjust tube feedings and TPN/PPN based on assessed needs  - Assess need for intravenous fluids  - Provide specific nutrition/hydration education as appropriate  - Include patient/family/caregiver in decisions related to nutrition  Outcome: Progressing     Problem: Prexisting or High Potential for Compromised Skin Integrity  Goal: Skin integrity is maintained or improved  Description  INTERVENTIONS:  - Identify patients at risk for skin breakdown  - Assess and monitor skin integrity  - Assess and monitor nutrition and hydration status  - Monitor labs   - Assess for incontinence   - Turn and reposition patient  - Assist with mobility/ambulation  - Relieve pressure over bony prominences  - Avoid friction and shearing  - Provide appropriate hygiene as needed including keeping skin clean and dry  - Evaluate need for skin moisturizer/barrier cream  - Collaborate with interdisciplinary team   - Patient/family teaching  - Consider wound care consult   Outcome: Progressing

## 2019-10-31 NOTE — ASSESSMENT & PLAN NOTE
· Maintained on ASA and plavix  · History of bilateral angioplasty lower extremities    Was to have follow up imaging as an outpatient  · Vascular consult

## 2019-10-31 NOTE — CONSULTS
Podiatry Consultation - Rosa Maria Bright     Patient Information: Luke Lyon 48 y o  female MRN: [id number]  Unit/Bed#: ED 14 Encounter: [id number]  PCP: Mau Gordillo MD  Date of Admission:  10/30/2019  Date of Consultation: 10/30/19  Requesting Physician: Tommy Garcia MD    Reason For Consultation:   Right 4th digit gangrene    Assessment:     Podiatric assessment:   Right 4th digit wet gangrene with no gas confirmed by XR   Right dorsal foot arterial ulcer   Right submetatarsal 5 ulceration without signs of infection   PAD, history of critical limb ischemia   T2DM, last A1C 13 7   Hypertension on admission   Sepsis, with lactate 2 5      Plan:    · She will be admitted under SLIM service  · Vascular workup recommended with LEADs to follow up IR intervention 4/30/2019  · Radiographs reviewed showing no soft tissue emphysema or bone changes indicating OM, consider possible MRI after admitted  · Sepsis given elevated lactate, ESR and CRP  · Consider empiric antibiotic therapy including Ancef, Flagyl  · Will confirm this plan with my attending        History of Present Illness:    Nahun Lam is a 48 y o  female who is originally admitted 10/30/2019 due to Sepsis and right foot digital gangrene  We are consulted for right foot gangrenous digit  She claims that this started about 3 weeks ago with changes in the 4th toe going from reddish purple to black to the presentation of today  Her daughters are also worried because her 3rd digit is beginning to have same discoloration  The dorsal foot has dusky appearance with discoloration surrounding dorsal punched out ulcer  She was previously admitted in April 2019 for critical limb ischemia of the right leg  She underwent angioplasty at that time however never followed up with noninvasive studies to determine quality of the blood flow    She has been seen in the resident clinic 2 times since this event for local wound care at the dorsal foot wound  When she was last seen in August of 2019 her digit still had normal temp turgor and skin appearance  Review of Systems:    Review of Systems   Constitutional: Negative  HENT: Negative  Eyes: Negative  Respiratory: Negative  Cardiovascular: Negative  Gastrointestinal: Negative  Musculoskeletal: foot pain  Skin: ulceration  Neurological: Negative  Psych: Negative  Past Medical and Surgical History:     Past Medical History:   Diagnosis Date    Cellulitis of toe 1/28/2019    CKD (chronic kidney disease)     Diabetes mellitus ([address] )     Hypertension        Past Surgical History:   Procedure Laterality Date    IR ABDOMINAL ANGIOGRAPHY  2/1/2019    IR ARTERIAL LYSIS  4/30/2019    TOE AMPUTATION Left 2/4/2019    Procedure: AMPUTATION 5th TOE;  Surgeon: Lui Soto DPM;  Location: [facility];  Service: Podiatry       Meds/Allergies:    PTA meds:   Prior to Admission Medications   Prescriptions Last Dose Informant Patient Reported? Taking? Insulin Pen Needle 32G X 4 MM MISC  Self No Yes   Sig: Use once daily   ONETOUCH DELICA LANCETS FINE MISC  Self No Yes   Sig: Testing Three times a day   acetaminophen (TYLENOL) 325 mg tablet  Self No No   Sig: Take 2 tablets (650 mg total) by mouth every 6 (six) hours as needed for mild pain, headaches or fever   aspirin 81 mg chewable tablet  Self No Yes   Sig: Chew 1 tablet (81 mg total) daily   atorvastatin (LIPITOR) 20 mg tablet   No Yes   Sig: Take 1 tablet (20 mg total) by mouth daily   clopidogrel (PLAVIX) 75 mg tablet   No Yes   Sig: Take 1 tablet (75 mg total) by mouth daily   glipiZIDE (GLUCOTROL) 5 mg tablet   No Yes   Sig: Take 1 tablet (5 mg total) by mouth 2 (two) times a day before meals   glucose blood (ONE TOUCH ULTRA TEST) test strip  Self No Yes   Sig: Testing Three times a day   insulin glargine (BASAGLAR KWIKPEN) 100 units/mL injection pen   No Yes   Sig: Inject 78 units under the skin daily     lisinopril (ZESTRIL) 5 mg tablet   No Yes   Sig: Take 1 tablet (5 mg total) by mouth daily   metFORMIN (GLUCOPHAGE) 1000 MG tablet   No Yes   Sig: Take 1 tablet (1,000 mg total) by mouth 2 (two) times a day with meals   metoprolol tartrate (LOPRESSOR) 25 mg tablet   No Yes   Sig: Take 1 tablet (25 mg total) by mouth every 12 (twelve) hours      Facility-Administered Medications: None       Allergies: No Known Allergies    Social History:     Marital Status: Single    Substance Use History:   Social History     Substance and Sexual Activity   Alcohol Use Yes    Frequency: Monthly or less    Comment: occ     Social History     Tobacco Use   Smoking Status Former Smoker    Types: Cigarettes    Last attempt to quit:     Years since quittin 8   Smokeless Tobacco Former User     Social History     Substance and Sexual Activity   Drug Use No       Family History:    Family History   Problem Relation Age of Onset    Diabetes Mother     No Known Problems Father        Physical Exam:     Vitals:   Blood Pressure: (!) 205/87 (10/30/19 2019)  Pulse: 87 (10/30/19 2019)  Temperature: 97 8 °F (36 6 °C) (10/30/19 1826)  Temp Source: Temporal (10/30/19 1826)  Respirations: 20 (10/30/19 2019)  Weight - Scale: 85 1 kg (187 lb 9 8 oz) (10/30/19 1822)  SpO2: 98 % (10/30/19 2019)        Physical Exam  General Appearance:    Alert, cooperative, no distress   Head:    Normocephalic, without obvious abnormality, atraumatic   Eyes:    PERRL, conjunctiva/corneas clear      Nose:   Moist mucous membranes   Neck:   Supple, symmetrical, trachea midline   Back:     Symmetric   Lungs:     Respirations unlabored   Heart:    Regular rate and rhythm   Abdomen:     Soft, non-tender    Foot Exam     Extremities:   MMT is 5/5 to all compartments of the LE, +0/4 edema B/L, No pain on palpation noted bilaterally  No calf tenderness noted bilaterally     Vascular:   Left foot DP weak, PT weak, Right foot pulses are nonpalpable DP absent and  monophasic by Doppler, Right foot PT absent and not able to be Dopplered, right peroneal artery monophasic Doppler, appears DP still has retrograde peroneal flow  Capillary refill delayed ~ 5 seconds to all digits of right foot  Pedal hair is Absent     Skin:   Skin is of Abnormal appearance  Skin is of Abnormal  turgor  Skin is of Abnormal temperature  Right submet 5 ulceration with scab coverage  No surrounding erythema, induration or warmth  Wound located right 4th digit,without sinus tracking or undermining  Wound bed appears slough, eschar, underlying structures and With wet gangrene of the digit with none Serous exudate, Serosanguinous exudate  Malodor is noticed  Wound edge appears rolled edge and Punched-out  Sharon-wound skin appears intact and Dusky, cool to touch, ecchymotic type discoloration   Wound is classified as WIFi, Wound 3, Ischemia 3, Foot infection 3, indicating clinically high risk for need for need for amputation and revascularization              Neurologic:   Gross sensation is absent  Protective sensation is Absent  Only sensation is noted with probing of dorsal foot wound  Images:    Right foot      Right foot    Additional Data:     Lab Results: I have personally reviewed pertinent reports  Results from last 7 days   Lab Units 10/30/19  1907   WBC Thousand/uL 12 57*   HEMOGLOBIN g/dL 11 4*   HEMATOCRIT % 38 4   PLATELETS Thousands/uL 279   NEUTROS PCT % 78*   LYMPHS PCT % 14   MONOS PCT % 6   EOS PCT % 1     Results from last 7 days   Lab Units 10/30/19  1907   POTASSIUM mmol/L 3 6   CHLORIDE mmol/L 95*   CO2 mmol/L 29   BUN mg/dL 9   CREATININE mg/dL 1 04   CALCIUM mg/dL 9 0           Imaging: I have personally reviewed pertinent films in PACS    No results found  ** Please Note: This note has been constructed using a voice recognition system   **

## 2019-10-31 NOTE — CONSULTS
Consulted for DM  Provided verbal and written DM diet education  Reviewed CHO, non CHO foods, discussed balanced meals, portion control, encouraged fiber, lean protein  Also discussed tips for decreasing sodium intake, reviewed ways to flavor food vs salt  Continue CCD1, 2gm Na diet  Will continue to monitor for any futher diet education questions

## 2019-10-31 NOTE — ASSESSMENT & PLAN NOTE
· Appears to have superimposed cellulitis of the right foot - reports blister that ruptured with associated erythema  · CBC leukocytosis of 12 5 - appears chronic  · Initiated on cefepime and vancomycin in the ED    No history of MRSA  · Will transition to ancef/flagyl

## 2019-10-31 NOTE — ASSESSMENT & PLAN NOTE
48 y/o F former smoker w/hx HTN, HLD, CKD II/III (baseline creat 1 0-1 2), uncontrolled type II DM w/neuropathy, Charcot foot and PAD w/bilateral SFA occlusions and single-vessel peroneal runoff, s/p L distal SFA DCB PTA (IR) 2/1/2019 followed by L 5th toe amputation 2/4/2019 and R distal SFA/pop recanalization/PTA (IR) 4/30/2019 for acute on chronic limb ischemia and 4th toe tissue loss w/subsequent healing who presents with R 4th toe/dorsum foot gangrene x 3 weeks, nonhealing R plantar foot wound and lactic acidosis  Vascular surgery consulted for revascularization and optimization of wound healing  Diagnostics:  -SHERYL 10/31/2019:  Pending  -Xray R foot 10/30/19:  No evidence of subcutaneous gas  -BC:  pending  -RLE Agram 4/30/2019:  Short segment distal SFA occlusion w/dominant peroneal runoff filling DP  AT distally atretic and atretic PT   -preop SHERYL 4/30/2019:  50-75% R PFA and >75% mid SFA stenoses and distal R SFA occlusion  R [place] 0 27/-/-    Plan:  -pt known to our practice and last seen in office 6/19/19  Doing well at that time w/tissue loss resolved  Patient never f/u w/surveillance SHERYL and lost to follow-up due to lack of insurance  Now with recurrent RLE tissue loss w/o claudication or rest pain  -SHERYL pending this am     -given known history of PAD requiring intervention and now recurrent R SHERYL tissue loss, will ultimately require angiogram to improve blood flow and optimize wound healing  Will consult IR  -RLE angiogram discussed at length at bedside with patient and daughter  All questions answered  Patient and daughter express understanding and wish to proceed  -continue ASA, Plavix and statin therapy  -continue abx per primary service  -continue [facility] per podiatry  -renal function stable w/creat 0 90/GFR 73  Will plan on pre and post procedure IV hydration  Hold lisinopril 1 day before and day of angiogram to reduce risk of contrast induced nephropathy  May require p r n  antihypertensive agents while temporarily off lisinopril  Management per FLORIAN molina d/w Dr Anthony Duenas  Thank you for allowing us to participate in the care of his patient

## 2019-10-31 NOTE — PLAN OF CARE
Problem: Potential for Falls  Goal: Patient will remain free of falls  Description  INTERVENTIONS:  - Assess patient frequently for physical needs  -  Identify cognitive and physical deficits and behaviors that affect risk of falls    -  [place] fall precautions as indicated by assessment   - Educate patient/family on patient safety including physical limitations  - Instruct patient to call for assistance with activity based on assessment  - Modify environment to reduce risk of injury  - Consider OT/PT consult to assist with strengthening/mobility  Outcome: Progressing     Problem: PAIN - ADULT  Goal: Verbalizes/displays adequate comfort level or baseline comfort level  Description  Interventions:  - Encourage patient to monitor pain and request assistance  - Assess pain using appropriate pain scale  - Administer analgesics based on type and severity of pain and evaluate response  - Implement non-pharmacological measures as appropriate and evaluate response  - Consider cultural and social influences on pain and pain management  - Notify physician/advanced practitioner if interventions unsuccessful or patient reports new pain  Outcome: Progressing     Problem: INFECTION - ADULT  Goal: Absence or prevention of progression during hospitalization  Description  INTERVENTIONS:  - Assess and monitor for signs and symptoms of infection  - Monitor lab/diagnostic results  - Monitor all insertion sites, i e  indwelling lines, tubes, and drains  - Monitor endotracheal if appropriate and nasal secretions for changes in amount and color  - [place] appropriate cooling/warming therapies per order  - Administer medications as ordered  - Instruct and encourage patient and family to use good hand hygiene technique  - Identify and instruct in appropriate isolation precautions for identified infection/condition  Outcome: Progressing  Goal: Absence of fever/infection during neutropenic period  Description  INTERVENTIONS:  - Monitor WBC    Outcome: Progressing     Problem: SAFETY ADULT  Goal: Patient will remain free of falls  Description  INTERVENTIONS:  - Assess patient frequently for physical needs  -  Identify cognitive and physical deficits and behaviors that affect risk of falls    -  [place] fall precautions as indicated by assessment   - Educate patient/family on patient safety including physical limitations  - Instruct patient to call for assistance with activity based on assessment  - Modify environment to reduce risk of injury  - Consider OT/PT consult to assist with strengthening/mobility  Outcome: Progressing  Goal: Maintain or return to baseline ADL function  Description  INTERVENTIONS:  -  Assess patient's ability to carry out ADLs; assess patient's baseline for ADL function and identify physical deficits which impact ability to perform ADLs (bathing, care of mouth/teeth, toileting, grooming, dressing, etc )  - Assess/evaluate cause of self-care deficits   - Assess range of motion  - Assess patient's mobility; develop plan if impaired  - Assess patient's need for assistive devices and provide as appropriate  - Encourage maximum independence but intervene and supervise when necessary  - Involve family in performance of ADLs  - Assess for home care needs following discharge   - Consider OT consult to assist with ADL evaluation and planning for discharge  - Provide patient education as appropriate  Outcome: Progressing  Goal: Maintain or return mobility status to optimal level  Description  INTERVENTIONS:  - Assess patient's baseline mobility status (ambulation, transfers, stairs, etc )    - Identify cognitive and physical deficits and behaviors that affect mobility  - Identify mobility aids required to assist with transfers and/or ambulation (gait belt, sit-to-stand, lift, walker, cane, etc )  - [place] fall precautions as indicated by assessment  - Record patient progress and toleration of activity level on Mobility SBAR; progress patient to next Phase/Stage  - Instruct patient to call for assistance with activity based on assessment  - Consider rehabilitation consult to assist with strengthening/weightbearing, etc   Outcome: Progressing     Problem: DISCHARGE PLANNING  Goal: Discharge to home or other facility with appropriate resources  Description  INTERVENTIONS:  - Identify barriers to discharge w/patient and caregiver  - Arrange for needed discharge resources and transportation as appropriate  - Identify discharge learning needs (meds, wound care, etc )  - Arrange for interpretive services to assist at discharge as needed  - Refer to Case Management Department for coordinating discharge planning if the patient needs post-hospital services based on physician/advanced practitioner order or complex needs related to functional status, cognitive ability, or social support system  Outcome: Progressing     Problem: Knowledge Deficit  Goal: Patient/family/caregiver demonstrates understanding of disease process, treatment plan, medications, and discharge instructions  Description  Complete learning assessment and assess knowledge base    Interventions:  - Provide teaching at level of understanding  - Provide teaching via preferred learning methods  Outcome: Progressing

## 2019-10-31 NOTE — ASSESSMENT & PLAN NOTE
Lab Results   Component Value Date    HGBA1C 13 7 (A) 10/03/2019       No results for input(s): POCGLU in the last 72 hours  Blood Sugar Average: Last 72 hrs:  · Uncontrolled  · Maintained on metformin and lantus 78 units q AM  · Continue lantus 78 units q AM   SSI + accuchek    May need to initiate scheduled mealtime insulin  · Diabetic diet  · Nutrition consult

## 2019-10-31 NOTE — OCCUPATIONAL THERAPY NOTE
Occupational Therapy Cancellation note        Patient Name: Nelia PADILLA Date: 10/31/2019      OT consult received  Chart reviewed  Pt to go to IR today and per podiatry pt to possible go to OR for amputation  Will continue to follow and will need WBS post-op      yMron Hinson MS, OTR/L

## 2019-10-31 NOTE — ASSESSMENT & PLAN NOTE
· Elevated BP at time of admission  · Missed evening dose of metoprolol - give now    Continue BID  · Continue lisinopril

## 2019-10-31 NOTE — H&P
H&P- [address] 1965, 47 y o  female MRN: [id number]    Unit/Bed#: E5 -01 Encounter: [id number]    Primary Care Provider: Bryce Preston MD   Date and time admitted to hospital: 10/30/2019  6:28 PM    * Gangrenous toe [facility])  Assessment & Plan  · Presents to the emergency department with gangrenous right 4th digit, associated wound and superimposed cellulitis  · Vital signs stable at time of admission  Afebrile  · XR:  No evidence of gas, follow up final read  · Assessed by podiatry in the ED  Will follow in consult  Will need surgical intervention - await formal vascular surgery consult  · ED attending discussed with vascular - no immediate interventions at this time  · History of critical limb ischemia s/p angioplasty - was to have follow up noninvasive studies but not yet completed    Cellulitis  Assessment & Plan  · Appears to have superimposed cellulitis of the right foot - reports blister that ruptured with associated erythema  · CBC leukocytosis of 12 5 - appears chronic  · Initiated on cefepime and vancomycin in the ED  No history of MRSA  · Will transition to ancef/flagyl     Hyponatremia  Assessment & Plan  · Pseudohyponatremia of 133 in setting of hyperglycemia  · Corrects to 136    Lactic acidosis  Assessment & Plan  · Initial lactic acid of 2 5  · Does not meet other SIRS criteria aside from leukocytosis - also on metformin as an outpatient  · Received IVF - repeat lactic pending    PAD (peripheral artery disease) (HCC)  Assessment & Plan  · Maintained on ASA and plavix  · History of bilateral angioplasty lower extremities  Was to have follow up imaging as an outpatient  · Vascular consult    Essential hypertension  Assessment & Plan  · Elevated BP at time of admission  · Missed evening dose of metoprolol - give now    Continue BID  · Continue lisinopril    Type 2 diabetes mellitus with circulatory disorder, with long-term current use of insulin (HCC)  Assessment & Plan  Lab Results   Component Value Date    HGBA1C 13 7 (A) 10/03/2019       No results for input(s): POCGLU in the last 72 hours  Blood Sugar Average: Last 72 hrs:  · Uncontrolled  · Maintained on metformin and lantus 78 units q AM  · Continue lantus 78 units q AM   SSI + accuchek  May need to initiate scheduled mealtime insulin  · Diabetic diet  · Nutrition consult    VTE Prophylaxis: Enoxaparin (Lovenox)  / sequential compression device   Code Status: Level 1 - Full Code  POLST: There is no POLST form on file for this patient (pre-hospital)  Discussion with family: Discussed with patient and multiple family members at bedside    Anticipated Length of Stay:  Patient will be admitted on an Inpatient basis with an anticipated length of stay of  Greater than 2 midnights  Justification for Hospital Stay: gangrenous toe    Total Time for Visit, including Counseling / Coordination of Care: 1 hour  Greater than 50% of this total time spent on direct patient counseling and coordination of care  Chief Complaint:   "My toe turned black"    History of Present Illness:    Kandace Cousin is a 47 y o  female who presents with gangrenous toe  Past medical history significant for uncontrolled type II diabetes mellitus, essential hypertension, peripheral arterial disease, prior amputations  Patient's daughter at bedside states she noticed her mother's toe was black today and convinced her to come to the ED  Patient admits her toe started changing colors about 2-3 weeks ago  She does not have sensation in her feet due to severe neuropathy  Also noted a chronic wound on the dorsal side of her foot that developed in to a blister that popped resulting in streaking redness of the dorsal side of the foot  Otherwise patient feels at her baseline  Denies fever/chills, chest pain/palpitations, shortness of breath, nausea/vomiting, abdominal pain, diarrhea    States that her sugars at home have been in the 200's     Review of Systems:    Review of Systems   Constitutional: Negative for chills, fatigue, fever and unexpected weight change  HENT: Negative for congestion, sore throat and trouble swallowing  Eyes: Negative for photophobia, pain and visual disturbance  Respiratory: Negative for cough, shortness of breath and wheezing  Cardiovascular: Negative for chest pain, palpitations and leg swelling  Gastrointestinal: Negative for abdominal pain, constipation, diarrhea, nausea and vomiting  Endocrine: Negative for polyuria  Genitourinary: Negative for difficulty urinating, dysuria, flank pain, hematuria and urgency  Musculoskeletal: Negative for back pain, myalgias, neck pain and neck stiffness  Skin: Positive for color change and wound  Negative for pallor and rash  Neurological: Negative for dizziness, tremors, syncope, weakness, light-headedness, numbness and headaches  Hematological: Does not bruise/bleed easily  Psychiatric/Behavioral: Negative for agitation and confusion  Past Medical and Surgical History:     Past Medical History:   Diagnosis Date    Cellulitis of toe 1/28/2019    CKD (chronic kidney disease)     Diabetes mellitus ([facility] [address] )     Hypertension        Past Surgical History:   Procedure Laterality Date    IR ABDOMINAL ANGIOGRAPHY  2/1/2019    IR ARTERIAL LYSIS  4/30/2019    TOE AMPUTATION Left 2/4/2019    Procedure: AMPUTATION 5th TOE;  Surgeon: Kelly Cantu DPM;  Location: [facility];  Service: Podiatry       Meds/Allergies:    Prior to Admission medications    Medication Sig Start Date End Date Taking?  Authorizing Provider   aspirin 81 mg chewable tablet Chew 1 tablet (81 mg total) daily 5/3/19  Yes Princess Ordoñez PA-C   atorvastatin (LIPITOR) 20 mg tablet Take 1 tablet (20 mg total) by mouth daily 10/3/19  Yes Kevin Wakefield MD   clopidogrel (PLAVIX) 75 mg tablet Take 1 tablet (75 mg total) by mouth daily 10/3/19  Yes Kevin Wakefield MD   glipiZIDE (GLUCOTROL) 5 mg tablet Take 1 tablet (5 mg total) by mouth 2 (two) times a day before meals 10/3/19  Yes Mary Bacon MD   glucose blood (ONE TOUCH ULTRA TEST) test strip Testing Three times a day 7/17/18  Yes Mary Bacon MD   insulin glargine (BASAGLAR KWIKPEN) 100 units/mL injection pen Inject 78 units under the skin daily  10/3/19  Yes Mary Bacon MD   Insulin Pen Needle 32G X 4 MM MISC Use once daily 5/21/19  Yes Toya Adams PA-C   lisinopril (ZESTRIL) 5 mg tablet Take 1 tablet (5 mg total) by mouth daily 7/3/19  Yes MIKEL Richey   metFORMIN (GLUCOPHAGE) 1000 MG tablet Take 1 tablet (1,000 mg total) by mouth 2 (two) times a day with meals 10/3/19  Yes Mary Bacon MD   metoprolol tartrate (LOPRESSOR) 25 mg tablet Take 1 tablet (25 mg total) by mouth every 12 (twelve) hours 10/3/19  Yes Mary Bacon MD   [facility] Testing Three times a day 7/17/18  Yes Mary Bacon MD   acetaminophen (TYLENOL) 325 mg tablet Take 2 tablets (650 mg total) by mouth every 6 (six) hours as needed for mild pain, headaches or fever 5/2/19   Joseluis Morfin PA-C   Blood Glucose Monitoring Suppl (ONE TOUCH ULTRA 2) w/Device KIT Testing Three times a day 7/17/18 10/30/19  Mary Bacon MD   chlorpheniramine (CHLORPHEN) 4 MG tablet Take 1 tablet (4 mg total) by mouth every 6 (six) hours as needed for allergies  Patient not taking: Reported on 1/28/2019  12/4/18 10/30/19  Inder Dixon MD   silver sulfadiazine (SILVADENE,SSD) 1 % cream Apply topically daily  Patient not taking: Reported on 5/14/2019 5/3/19 10/30/19  Joseluis Morfin PA-C     I have reviewed home medications with patient family member      Allergies: No Known Allergies    Social History:     Marital Status: Single   Occupation: Unemployed  Patient Pre-hospital Living Situation: Lives alone  Patient Pre-hospital Level of Mobility: Ambulatory - boot on left foot  Patient Pre-hospital Diet Restrictions: None  Substance Use History:   Social History     Substance and Sexual Activity   Alcohol Use Yes    Frequency: Monthly or less    Comment: occ     Social History     Tobacco Use   Smoking Status Former Smoker    Types: Cigarettes    Last attempt to quit: 2013    Years since quittin 8   Smokeless Tobacco Former User     Social History     Substance and Sexual Activity   Drug Use No       Family History:    Family History   Problem Relation Age of Onset    Diabetes Mother     No Known Problems Father        Physical Exam:     Vitals:   Blood Pressure: (!) 180/78 (10/30/19 2342)  Pulse: 89 (10/30/19 2342)  Temperature: 97 9 °F (36 6 °C) (10/30/19 2342)  Temp Source: Temporal (10/30/19 2342)  Respirations: 18 (10/30/19 2342)  Height: 5' (152 4 cm) (10/31/19 0009)  Weight - Scale: 85 1 kg (187 lb 9 8 oz) (10/31/19 0009)  SpO2: 100 % (10/30/19 2342)    Physical Exam   Constitutional: She is oriented to person, place, and time  Vital signs are normal  She appears well-developed  Appears comfortable, no acute distress   HENT:   Head: Normocephalic  Eyes: Pupils are equal, round, and reactive to light  Conjunctivae and EOM are normal  No scleral icterus  Neck: Normal range of motion  Cardiovascular: Normal rate, regular rhythm and normal heart sounds  No murmur heard  Pulmonary/Chest: Effort normal and breath sounds normal  No respiratory distress  She has no wheezes  She has no rhonchi  She has no rales  Abdominal: Soft  Bowel sounds are normal  There is no tenderness  There is no rigidity, no rebound and no guarding  Musculoskeletal: She exhibits deformity  She exhibits no edema or tenderness  Right foot with blackened, gangrenous fourth digit  Wound at base  Streaking erythema up dorsal aspect of foot  Left lower extremity in boot   Neurological: She is alert and oriented to person, place, and time  Skin: Skin is warm and dry  Psychiatric: She has a normal mood and affect  Her speech is normal and behavior is normal    Nursing note and vitals reviewed  Additional Data:     Lab Results: I have personally reviewed pertinent reports  Results from last 7 days   Lab Units 10/30/19  1907   WBC Thousand/uL 12 57*   HEMOGLOBIN g/dL 11 4*   HEMATOCRIT % 38 4   PLATELETS Thousands/uL 279   NEUTROS PCT % 78*   LYMPHS PCT % 14   MONOS PCT % 6   EOS PCT % 1     Results from last 7 days   Lab Units 10/31/19  0000 10/30/19  1907   SODIUM mmol/L  --  133*   POTASSIUM mmol/L  --  3 6   CHLORIDE mmol/L  --  95*   CO2 mmol/L  --  29   BUN mg/dL  --  9   CREATININE mg/dL  --  1 04   ANION GAP mmol/L  --  9   CALCIUM mg/dL  --  9 0   ALBUMIN g/dL 2 4*  --    TOTAL BILIRUBIN mg/dL 0 46  --    ALK PHOS U/L 76  --    ALT U/L 13  --    AST U/L 17  --    GLUCOSE RANDOM mg/dL  --  309*                 Results from last 7 days   Lab Units 10/30/19  2216 10/30/19  2017   LACTIC ACID mmol/L 1 8 2 5*       Imaging: I have personally reviewed pertinent reports  XR foot 3+ views RIGHT    (Results Pending)       EKG, Pathology, and Other Studies Reviewed on Admission:   · XR foot reviewed    Allscripts / Epic Records Reviewed: Yes     ** Please Note: This note has been constructed using a voice recognition system   **

## 2019-10-31 NOTE — ASSESSMENT & PLAN NOTE
CKD II-III w/baseline creat 1 0-1 2  -creat 0 90/GFR 73 today  -continue to trend creat, particularly after angiogram  -continue to avoid nephrotoxic agents  -recommend IV hydration pre and post angiogram  -hold lisinopril 1 day before and day of angiogram

## 2019-10-31 NOTE — ASSESSMENT & PLAN NOTE
-poorly controlled  -continue to optimize BP control  -medical management per primary medical service

## 2019-10-31 NOTE — ASSESSMENT & PLAN NOTE
R 4th toe gangrene and nonhealing plantar foot ulcer w/lactic acidosis  -plain radiograph without evidence of subcutaneous gas  No obvious osseous abnormality  -consider MRI  Management and need for podiatric intervention per Podiatry   -continue [address] per Podiatry  -BC pending  -continue abx per primary service  -will need revascularization prior to any podiatric intervention  -SHERYL pending    See plan as outlined above

## 2019-10-31 NOTE — ASSESSMENT & PLAN NOTE
· Presents to the emergency department with gangrenous right 4th digit, associated wound and superimposed cellulitis  · Vital signs stable at time of admission  Afebrile  · XR:  No evidence of gas, follow up final read  · Assessed by podiatry in the ED  Will follow in consult  Will need surgical intervention - await formal vascular surgery consult      · ED attending discussed with vascular - no immediate interventions at this time  · History of critical limb ischemia s/p angioplasty - was to have follow up noninvasive studies but not yet completed

## 2019-10-31 NOTE — CONSULTS
[address] 1965, 47 y o  female MRN: [id number]    Unit/Bed#: E5 -01 Encounter: [id number]    Primary Care Provider: Peyton Cerda MD   Date and time admitted to hospital: 10/30/2019  6:28 PM      Inpatient consult to Vascular Surgery  Consult performed by: Danielito Huizar PA-C  Consult ordered by: Marely Bradshaw PA-C        PAD (peripheral artery disease) [facility])  Assessment & Plan  48 y/o F former smoker w/hx HTN, HLD, CKD II/III (baseline creat 1 0-1 2), uncontrolled type II DM w/neuropathy, Charcot foot and PAD w/bilateral SFA occlusions and single-vessel peroneal runoff, s/p L distal SFA DCB PTA (IR) 2/1/2019 followed by L 5th toe amputation 2/4/2019 and R distal SFA/pop recanalization/PTA (IR) 4/30/2019 for acute on chronic limb ischemia and 4th toe tissue loss w/subsequent healing who presents with R 4th toe/dorsum foot gangrene x 3 weeks, nonhealing R plantar foot wound and lactic acidosis  Vascular surgery consulted for revascularization and optimization of wound healing  Diagnostics:  -SHERYL 10/31/2019:  Pending  -Xray R foot 10/30/19:  No evidence of subcutaneous gas  -BC:  pending  -RLE Agram 4/30/2019:  Short segment distal SFA occlusion w/dominant peroneal runoff filling DP  AT distally atretic and atretic PT   -preop SHERYL 4/30/2019:  50-75% R PFA and >75% mid SFA stenoses and distal R SFA occlusion  R [place] 0 27/-/-    Plan:  -pt known to our practice and last seen in office 6/19/19  Doing well at that time w/tissue loss resolved  Patient never f/u w/surveillance SHERYL and lost to follow-up due to lack of insurance  Now with recurrent RLE tissue loss w/o claudication or rest pain  -SHERYL pending this am     -given known history of PAD requiring intervention and now recurrent R SHERYL tissue loss, will ultimately require angiogram to improve blood flow and optimize wound healing  Will consult IR  D/w IR    Will plan for Agram tomorrow, 11/1/19  -RLE angiogram discussed at length at bedside with patient and daughter  All questions answered  Patient and daughter express understanding and wish to proceed  -continue ASA, Plavix and statin therapy  -continue abx per primary service  -continue [facility] per podiatry  -renal function stable w/creat 0 90/GFR 73  Will plan on pre and post procedure IV hydration  Hold lisinopril 1 day before and day of angiogram to reduce risk of contrast induced nephropathy  May require p r n  antihypertensive agents while temporarily off lisinopril  Management per SLIM  -d/w Dr Nini Cam  Thank you for allowing us to participate in the care of his patient  * Gangrenous toe (HCC)  Assessment & Plan  R 4th toe gangrene and nonhealing plantar foot ulcer w/lactic acidosis  -plain radiograph without evidence of subcutaneous gas  No obvious osseous abnormality  -consider MRI  Management and need for podiatric intervention per Podiatry   -continue [facility] per Podiatry  -BC pending  -continue abx per primary service  -will need revascularization prior to any podiatric intervention  -SHERYL pending    See plan as outlined above    CKD stage 2 due to type 2 diabetes mellitus ([facility] [address] )  Assessment & Plan  CKD II-III w/baseline creat 1 0-1 2  -creat 0 90/GFR 73 today  -continue to trend creat, particularly after angiogram  -continue to avoid nephrotoxic agents  -recommend IV hydration pre and post angiogram  -hold lisinopril 1 day before and day of angiogram    Type 2 diabetes mellitus with circulatory disorder, with long-term current use of insulin [facility])  Assessment & Plan  Lab Results   Component Value Date    HGBA1C 13 7 (A) 10/03/2019       Recent Labs     10/31/19  0128 10/31/19  0804   POCGLU 302* 236*       Blood Sugar Average: Last 72 hrs:  (P) 269   -poorly controlled with HgbA1C 13 7  -continue to optimize BS control for optimization of wound healing and prevention of SSI and vascular disease  -management per SLIM    HLD (hyperlipidemia)  Assessment & Plan  -stable  -continue statin therapy  -management per SLIM    Essential hypertension  Assessment & Plan  -poorly controlled  -continue to optimize BP control  -medical management per primary medical service        Consulting Service: SLIM    Chief Complaint: R 4th toe gangrene and nonhealing plantar foot ulcer x3 weeks    HPI: Freddy Garcia is a 47 y o  female former smoker known to the vascular surgery service w/hx HTN, HLD, CKD II/III (baseline creat 1 0-1 2), uncontrolled type II DM w/neuropathy, Charcot foot and PAD w/bilateral SFA occlusions and single-vessel peroneal runoff, s/p L distal SFA DCB PTA (IR) 2/1/2019 followed by L 5th toe amputation 2/4/2019 and R distal SFA/pop recanalization/PTA (IR) 4/30/2019 for acute on chronic limb ischemia and 4th toe tissue loss w/subsequent healing who presents with R 4th toe/dorsum foot gangrene x 3 weeks, nonhealing R plantar foot wound, leukocytosis and lactic acidosis  Patient reports a right plantar foot wound at the base of the 5th metatarsal x 2 months  Three weeks ago she noted slight reddish purple discoloration of the right 4th toe which has now progressed to gangrene extending on to distal dorsum of the foot which is in the same location of her prior tissue loss in April  Patient denies claudication or rest pain  No evidence of left lower extremity tissue loss  Patient had been followed in our office and was last seen on 6/19/2019 at which time she is doing well and her wounds had healed  Post intervention surveillance was ordered but the patient failed to follow-up secondary to insurance concerns  Patient remains on aspirin, Plavix and statin therapy  SHERYL pending  Prior diagnostic studies as noted below  BC pending and lactate normalized  Patient nontoxic and no clinical signs of acute critical limb ischemia  RLE motor ntact with chronic neuropathy    Vascular surgery consulted for revascularization and optimization of wound healing  Diagnostics:  -SHERYL 10/31/2019:  Pending  -Xray R foot 10/30/19:  No evidence of subcutaneous gas  -BC:  pending  -RLE Agram 2019:  Short segment distal SFA occlusion w/dominant peroneal runoff filling DP  AT distally atretic and atretic PT   -preop SHERYL 2019:  50-75% R PFA and >75% mid SFA stenoses and distal R SFA occlusion    R [place] 0 27/-/-      Review of Systems:  General: negative  Cardiovascular: no chest pain or dyspnea on exertion  Respiratory: no cough, shortness of breath, or wheezing  Gastrointestinal: no abdominal pain, change in bowel habits, or black or bloody stools  Genitourinary ROS: no dysuria, trouble voiding, or hematuria  Musculoskeletal ROS:  As per the above HPI  Neurological ROS: no TIA or stroke symptoms  Hematological and Lymphatic ROS: negative  Dermatological ROS: As per the above HPI  Psychological ROS: negative  Ophthalmic ROS: negative  ENT ROS: negative    Past Medical History:  Past Medical History:   Diagnosis Date    Cellulitis of toe 2019    CKD (chronic kidney disease)     Diabetes mellitus ([facility] [address] )     Hypertension        Past Surgical History:  Past Surgical History:   Procedure Laterality Date    IR ABDOMINAL ANGIOGRAPHY  2019    IR ARTERIAL LYSIS  2019    TOE AMPUTATION Left 2019    Procedure: AMPUTATION 5th TOE;  Surgeon: Ila Flannery DPM;  Location: [facility];  Service: Podiatry       Social History:  Social History     Substance and Sexual Activity   Alcohol Use Yes    Frequency: Monthly or less    Comment: occ     Social History     Substance and Sexual Activity   Drug Use No     Social History     Tobacco Use   Smoking Status Former Smoker    Types: Cigarettes    Last attempt to quit: 2013    Years since quittin 8   Smokeless Tobacco Former User       Family History:  Family History   Problem Relation Age of Onset    Diabetes Mother     No Known Problems Father        Allergies:  No Known Allergies    Medications:  Current Facility-Administered Medications   Medication Dose Route Frequency    acetaminophen (TYLENOL) tablet 650 mg  650 mg Oral Q6H PRN    aspirin chewable tablet 81 mg  81 mg Oral Daily    atorvastatin (LIPITOR) tablet 20 mg  20 mg Oral Daily With Dinner    ceFAZolin (ANCEF) IVPB (premix) 1,000 mg  1,000 mg Intravenous Q8H    clopidogrel (PLAVIX) tablet 75 mg  75 mg Oral Daily    enoxaparin (LOVENOX) subcutaneous injection 40 mg  40 mg Subcutaneous Q24H ARRON    hydrALAZINE (APRESOLINE) injection 5 mg  5 mg Intravenous Q6H PRN    insulin glargine (LANTUS) subcutaneous injection 78 Units 0 78 mL  78 Units Subcutaneous QAM    insulin lispro (HumaLOG) 100 units/mL subcutaneous injection 1-6 Units  1-6 Units Subcutaneous TID AC    insulin lispro (HumaLOG) 100 units/mL subcutaneous injection 1-6 Units  1-6 Units Subcutaneous HS    lisinopril (ZESTRIL) tablet 5 mg  5 mg Oral Daily    metoprolol tartrate (LOPRESSOR) tablet 25 mg  25 mg Oral Q12H ARRON    metroNIDAZOLE (FLAGYL) IVPB (premix) 500 mg  500 mg Intravenous Q8H    ondansetron (ZOFRAN) injection 4 mg  4 mg Intravenous Q6H PRN       Vitals:  /64 (BP Location: Left arm)   Pulse 72   Temp 97 6 °F (36 4 °C) (Temporal)   Resp 18   Ht 5' (1 524 m)   Wt 85 1 kg (187 lb 9 8 oz)   LMP  (LMP Unknown)   SpO2 97%   BMI 36 64 kg/m²     I/Os:  I/O last 24 hours:   In: 100 [IV Piggyback:100]  Out: -     Lab Results and Cultures:   Lab Results   Component Value Date    WBC 10 15 10/31/2019    HGB 9 7 (L) 10/31/2019    HCT 32 5 (L) 10/31/2019    MCV 76 (L) 10/31/2019     10/31/2019     Lab Results   Component Value Date    CALCIUM 8 2 (L) 10/31/2019    K 3 5 10/31/2019    CO2 28 10/31/2019     10/31/2019    BUN 10 10/31/2019    CREATININE 0 90 10/31/2019     Lab Results   Component Value Date    INR 1 07 10/31/2019    INR 1 14 05/01/2019    INR 1 21 (H) 04/30/2019    PROTIME 14 0 10/31/2019    PROTIME 14 7 (H) 05/01/2019    PROTIME 15 4 (H) 04/30/2019       Lipid Panel: No results found for: CHOL,     Blood Culture:   Lab Results   Component Value Date    BLOODCX No Growth After 5 Days  04/30/2019   ,   Urinalysis:   Lab Results   Component Value Date    COLORU Yellow 10/30/2019    CLARITYU Slightly Cloudy 10/30/2019    SPECGRAV 1 020 10/30/2019    PHUR 6 0 10/30/2019    LEUKOCYTESUR Negative 10/30/2019    NITRITE Negative 10/30/2019    GLUCOSEU 250 (1/4%) (A) 10/30/2019    KETONESU 15 (1+) (A) 10/30/2019    BILIRUBINUR Negative 10/30/2019    BLOODU Moderate (A) 10/30/2019   ,   Urine Culture: No results found for: URINECX,   Wound Culure: No results found for: WOUNDCULT    Imaging:  SHERYL 10/31/2019:  Pending    X-ray right foot 10/30/2019:  No osseous abnormalities  No evidence of subcutaneous emphysema    Physical Exam:    General appearance: alert and oriented, in no acute distress  Skin: Skin color, texture, turgor normal  No rashes or lesions  Neurologic: Grossly normal  Head: Normocephalic, without obvious abnormality, atraumatic  Eyes: PERRL, EOMI, sclerae nonicteric  Throat: lips, mucosa, and tongue normal; teeth and gums normal  Neck: no adenopathy, no carotid bruit, no JVD, supple, symmetrical, trachea midline and thyroid not enlarged, symmetric, no tenderness/mass/nodules  Back: symmetric, no curvature  ROM normal  No CVA tenderness  Lungs: clear to auscultation bilaterally  Chest wall: no tenderness  Heart: regular rate and rhythm, S1, S2 normal, no murmur, click, rub or gallop  Abdomen: soft, non-tender; bowel sounds normal; no masses,  no organomegaly and No bruits  Extremities: See Clinical images below  Bilateral lower extremities warm, pink, motor and sensory intact  No evidence of left lower extremity tissue loss  Right 4th toe gangrene with ulcer of the plantar aspect of right foot at head of 5th metatarsal   No exposed tendon or bone      Wound/Incision:    Right foot    Right foot      Pulse exam:  Radial: Right: 2+ Left[de-identified] 2+  Femoral: Right: 2+ Left: 2+  Popliteal: Right: non-palpable Left: non-palpable  DP: Right: doppler signal Left: doppler signal  PT: Right: doppler signal Left: non-palpable  Doppler signals:  Right:  Monophasic faint PT, biphasic AT and peroneal, monophasic DP    Left:  Biphasic DP, AT, peroneal   No dopplerable PT    Brian Sparks PA-C  10/31/2019  [facility], [phone]

## 2019-10-31 NOTE — ASSESSMENT & PLAN NOTE
Lab Results   Component Value Date    HGBA1C 13 7 (A) 10/03/2019       Recent Labs     10/31/19  0128 10/31/19  0804   POCGLU 302* 236*       Blood Sugar Average: Last 72 hrs:  (P) 269   -poorly controlled with HgbA1C 13 7  -continue to optimize BS control for optimization of wound healing and prevention of SSI and vascular disease  -management per Jose Roberto Barahona

## 2019-11-01 ENCOUNTER — APPOINTMENT (INPATIENT)
Dept: RADIOLOGY | Facility: HOSPITAL | Age: 54
DRG: 181 | End: 2019-11-01
Payer: COMMERCIAL

## 2019-11-01 ENCOUNTER — APPOINTMENT (INPATIENT)
Dept: NON INVASIVE DIAGNOSTICS | Facility: HOSPITAL | Age: 54
DRG: 181 | End: 2019-11-01
Payer: COMMERCIAL

## 2019-11-01 PROBLEM — E87.2 LACTIC ACIDOSIS: Status: RESOLVED | Noted: 2019-10-30 | Resolved: 2019-11-01

## 2019-11-01 PROBLEM — E87.6 HYPOKALEMIA: Status: ACTIVE | Noted: 2019-11-01

## 2019-11-01 PROBLEM — L03.90 CELLULITIS: Status: RESOLVED | Noted: 2019-10-30 | Resolved: 2019-11-01

## 2019-11-01 PROBLEM — E87.1 HYPONATREMIA: Status: RESOLVED | Noted: 2019-10-30 | Resolved: 2019-11-01

## 2019-11-01 PROBLEM — E87.20 LACTIC ACIDOSIS: Status: RESOLVED | Noted: 2019-10-30 | Resolved: 2019-11-01

## 2019-11-01 LAB
ANION GAP SERPL CALCULATED.3IONS-SCNC: 10 MMOL/L (ref 4–13)
ANION GAP SERPL CALCULATED.3IONS-SCNC: 10 MMOL/L (ref 4–13)
BASOPHILS # BLD AUTO: 0.02 THOUSANDS/ΜL (ref 0–0.1)
BASOPHILS NFR BLD AUTO: 0 % (ref 0–1)
BUN SERPL-MCNC: 6 MG/DL (ref 5–25)
BUN SERPL-MCNC: 8 MG/DL (ref 5–25)
CALCIUM SERPL-MCNC: 8.4 MG/DL (ref 8.3–10.1)
CALCIUM SERPL-MCNC: 8.6 MG/DL (ref 8.3–10.1)
CHLORIDE SERPL-SCNC: 102 MMOL/L (ref 100–108)
CHLORIDE SERPL-SCNC: 103 MMOL/L (ref 100–108)
CO2 SERPL-SCNC: 28 MMOL/L (ref 21–32)
CO2 SERPL-SCNC: 30 MMOL/L (ref 21–32)
CREAT SERPL-MCNC: 0.8 MG/DL (ref 0.6–1.3)
CREAT SERPL-MCNC: 0.9 MG/DL (ref 0.6–1.3)
EOSINOPHIL # BLD AUTO: 0.09 THOUSAND/ΜL (ref 0–0.61)
EOSINOPHIL NFR BLD AUTO: 1 % (ref 0–6)
ERYTHROCYTE [DISTWIDTH] IN BLOOD BY AUTOMATED COUNT: 13.9 % (ref 11.6–15.1)
GFR SERPL CREATININE-BSD FRML MDRD: 73 ML/MIN/1.73SQ M
GFR SERPL CREATININE-BSD FRML MDRD: 84 ML/MIN/1.73SQ M
GLUCOSE SERPL-MCNC: 103 MG/DL (ref 65–140)
GLUCOSE SERPL-MCNC: 109 MG/DL (ref 65–140)
GLUCOSE SERPL-MCNC: 121 MG/DL (ref 65–140)
GLUCOSE SERPL-MCNC: 136 MG/DL (ref 65–140)
GLUCOSE SERPL-MCNC: 136 MG/DL (ref 65–140)
GLUCOSE SERPL-MCNC: 383 MG/DL (ref 65–140)
HCT VFR BLD AUTO: 34.3 % (ref 34.8–46.1)
HGB BLD-MCNC: 10.2 G/DL (ref 11.5–15.4)
IMM GRANULOCYTES # BLD AUTO: 0.03 THOUSAND/UL (ref 0–0.2)
IMM GRANULOCYTES NFR BLD AUTO: 0 % (ref 0–2)
LYMPHOCYTES # BLD AUTO: 1.91 THOUSANDS/ΜL (ref 0.6–4.47)
LYMPHOCYTES NFR BLD AUTO: 19 % (ref 14–44)
MCH RBC QN AUTO: 22.4 PG (ref 26.8–34.3)
MCHC RBC AUTO-ENTMCNC: 29.7 G/DL (ref 31.4–37.4)
MCV RBC AUTO: 75 FL (ref 82–98)
MONOCYTES # BLD AUTO: 0.59 THOUSAND/ΜL (ref 0.17–1.22)
MONOCYTES NFR BLD AUTO: 6 % (ref 4–12)
NEUTROPHILS # BLD AUTO: 7.46 THOUSANDS/ΜL (ref 1.85–7.62)
NEUTS SEG NFR BLD AUTO: 74 % (ref 43–75)
NRBC BLD AUTO-RTO: 0 /100 WBCS
PLATELET # BLD AUTO: 273 THOUSANDS/UL (ref 149–390)
PMV BLD AUTO: 10.6 FL (ref 8.9–12.7)
POTASSIUM SERPL-SCNC: 2.6 MMOL/L (ref 3.5–5.3)
POTASSIUM SERPL-SCNC: 2.7 MMOL/L (ref 3.5–5.3)
POTASSIUM SERPL-SCNC: 3.3 MMOL/L (ref 3.5–5.3)
RBC # BLD AUTO: 4.55 MILLION/UL (ref 3.81–5.12)
SODIUM SERPL-SCNC: 141 MMOL/L (ref 136–145)
SODIUM SERPL-SCNC: 142 MMOL/L (ref 136–145)
WBC # BLD AUTO: 10.1 THOUSAND/UL (ref 4.31–10.16)

## 2019-11-01 PROCEDURE — C1876 STENT, NON-COA/NON-COV W/DEL: HCPCS

## 2019-11-01 PROCEDURE — 75710 ARTERY X-RAYS ARM/LEG: CPT

## 2019-11-01 PROCEDURE — C1887 CATHETER, GUIDING: HCPCS

## 2019-11-01 PROCEDURE — 85025 COMPLETE CBC W/AUTO DIFF WBC: CPT | Performed by: INTERNAL MEDICINE

## 2019-11-01 PROCEDURE — 75710 ARTERY X-RAYS ARM/LEG: CPT | Performed by: RADIOLOGY

## 2019-11-01 PROCEDURE — 84132 ASSAY OF SERUM POTASSIUM: CPT | Performed by: INTERNAL MEDICINE

## 2019-11-01 PROCEDURE — 37228 PR REVASCULARIZE TIBIAL/PERON ARTERY,ANGIOPLASTY INITIAL: CPT | Performed by: RADIOLOGY

## 2019-11-01 PROCEDURE — C1769 GUIDE WIRE: HCPCS

## 2019-11-01 PROCEDURE — C1725 CATH, TRANSLUMIN NON-LASER: HCPCS

## 2019-11-01 PROCEDURE — 82948 REAGENT STRIP/BLOOD GLUCOSE: CPT

## 2019-11-01 PROCEDURE — 93923 UPR/LXTR ART STDY 3+ LVLS: CPT

## 2019-11-01 PROCEDURE — 047T3ZZ DILATION OF RIGHT PERONEAL ARTERY, PERCUTANEOUS APPROACH: ICD-10-PCS | Performed by: RADIOLOGY

## 2019-11-01 PROCEDURE — C1760 CLOSURE DEV, VASC: HCPCS

## 2019-11-01 PROCEDURE — C1894 INTRO/SHEATH, NON-LASER: HCPCS

## 2019-11-01 PROCEDURE — 93923 UPR/LXTR ART STDY 3+ LVLS: CPT | Performed by: SURGERY

## 2019-11-01 PROCEDURE — 37224 HB FEM/POPL REVAS W/TLA: CPT

## 2019-11-01 PROCEDURE — 37226 PR REVASCULARIZE FEM/POP ARTERY,ANGIOPLASTY/STENT: CPT | Performed by: RADIOLOGY

## 2019-11-01 PROCEDURE — 80048 BASIC METABOLIC PNL TOTAL CA: CPT | Performed by: INTERNAL MEDICINE

## 2019-11-01 PROCEDURE — B41FYZZ FLUOROSCOPY OF RIGHT LOWER EXTREMITY ARTERIES USING OTHER CONTRAST: ICD-10-PCS | Performed by: RADIOLOGY

## 2019-11-01 PROCEDURE — 99152 MOD SED SAME PHYS/QHP 5/>YRS: CPT | Performed by: RADIOLOGY

## 2019-11-01 PROCEDURE — 047K34Z DILATION OF RIGHT FEMORAL ARTERY WITH DRUG-ELUTING INTRALUMINAL DEVICE, PERCUTANEOUS APPROACH: ICD-10-PCS | Performed by: RADIOLOGY

## 2019-11-01 PROCEDURE — 99232 SBSQ HOSP IP/OBS MODERATE 35: CPT | Performed by: INTERNAL MEDICINE

## 2019-11-01 PROCEDURE — 76937 US GUIDE VASCULAR ACCESS: CPT | Performed by: RADIOLOGY

## 2019-11-01 PROCEDURE — 76937 US GUIDE VASCULAR ACCESS: CPT

## 2019-11-01 PROCEDURE — 37228 HB TIB/PER REVASC W/TLA: CPT

## 2019-11-01 RX ORDER — LIDOCAINE WITH 8.4% SOD BICARB 0.9%(10ML)
SYRINGE (ML) INJECTION CODE/TRAUMA/SEDATION MEDICATION
Status: COMPLETED | OUTPATIENT
Start: 2019-11-01 | End: 2019-11-01

## 2019-11-01 RX ORDER — POTASSIUM CHLORIDE 20 MEQ/1
40 TABLET, EXTENDED RELEASE ORAL ONCE
Status: COMPLETED | OUTPATIENT
Start: 2019-11-01 | End: 2019-11-01

## 2019-11-01 RX ORDER — FENTANYL CITRATE 50 UG/ML
INJECTION, SOLUTION INTRAMUSCULAR; INTRAVENOUS CODE/TRAUMA/SEDATION MEDICATION
Status: COMPLETED | OUTPATIENT
Start: 2019-11-01 | End: 2019-11-01

## 2019-11-01 RX ORDER — CEFAZOLIN SODIUM 1 G/3ML
INJECTION, POWDER, FOR SOLUTION INTRAMUSCULAR; INTRAVENOUS CODE/TRAUMA/SEDATION MEDICATION
Status: COMPLETED | OUTPATIENT
Start: 2019-11-01 | End: 2019-11-01

## 2019-11-01 RX ORDER — MIDAZOLAM HYDROCHLORIDE 2 MG/2ML
INJECTION, SOLUTION INTRAMUSCULAR; INTRAVENOUS CODE/TRAUMA/SEDATION MEDICATION
Status: COMPLETED | OUTPATIENT
Start: 2019-11-01 | End: 2019-11-01

## 2019-11-01 RX ORDER — POTASSIUM CHLORIDE 20 MEQ/1
40 TABLET, EXTENDED RELEASE ORAL ONCE
Status: DISCONTINUED | OUTPATIENT
Start: 2019-11-01 | End: 2019-11-01

## 2019-11-01 RX ORDER — POTASSIUM CHLORIDE AND SODIUM CHLORIDE 900; 300 MG/100ML; MG/100ML
100 INJECTION, SOLUTION INTRAVENOUS CONTINUOUS
Status: DISCONTINUED | OUTPATIENT
Start: 2019-11-01 | End: 2019-11-02

## 2019-11-01 RX ORDER — CLOPIDOGREL BISULFATE 75 MG/1
75 TABLET ORAL DAILY
Status: DISCONTINUED | OUTPATIENT
Start: 2019-11-02 | End: 2019-11-02

## 2019-11-01 RX ORDER — HEPARIN SODIUM 1000 [USP'U]/ML
INJECTION, SOLUTION INTRAVENOUS; SUBCUTANEOUS CODE/TRAUMA/SEDATION MEDICATION
Status: COMPLETED | OUTPATIENT
Start: 2019-11-01 | End: 2019-11-01

## 2019-11-01 RX ADMIN — POTASSIUM CHLORIDE 40 MEQ: 1500 TABLET, EXTENDED RELEASE ORAL at 09:17

## 2019-11-01 RX ADMIN — METRONIDAZOLE 500 MG: 500 INJECTION, SOLUTION INTRAVENOUS at 06:23

## 2019-11-01 RX ADMIN — CEFAZOLIN SODIUM 1000 MG: 1 SOLUTION INTRAVENOUS at 05:17

## 2019-11-01 RX ADMIN — LIDOCAINE HYDROCHLORIDE 5 ML: 10 INJECTION, SOLUTION INFILTRATION; PERINEURAL at 13:44

## 2019-11-01 RX ADMIN — MIDAZOLAM 0.5 MG: 1 INJECTION INTRAMUSCULAR; INTRAVENOUS at 14:22

## 2019-11-01 RX ADMIN — CEFAZOLIN SODIUM 1000 MG: 1 INJECTION, POWDER, FOR SOLUTION INTRAMUSCULAR; INTRAVENOUS at 14:38

## 2019-11-01 RX ADMIN — FENTANYL CITRATE 50 MCG: 50 INJECTION, SOLUTION INTRAMUSCULAR; INTRAVENOUS at 13:31

## 2019-11-01 RX ADMIN — POTASSIUM CHLORIDE AND SODIUM CHLORIDE 100 ML/HR: 900; 300 INJECTION, SOLUTION INTRAVENOUS at 09:17

## 2019-11-01 RX ADMIN — ATORVASTATIN CALCIUM 20 MG: 20 TABLET, FILM COATED ORAL at 18:14

## 2019-11-01 RX ADMIN — MIDAZOLAM 1 MG: 1 INJECTION INTRAMUSCULAR; INTRAVENOUS at 13:31

## 2019-11-01 RX ADMIN — METRONIDAZOLE 500 MG: 500 INJECTION, SOLUTION INTRAVENOUS at 22:28

## 2019-11-01 RX ADMIN — METOPROLOL TARTRATE 25 MG: 25 TABLET, FILM COATED ORAL at 21:29

## 2019-11-01 RX ADMIN — SODIUM CHLORIDE 75 ML/HR: 0.9 INJECTION, SOLUTION INTRAVENOUS at 00:00

## 2019-11-01 RX ADMIN — HEPARIN SODIUM 5000 UNITS: 1000 INJECTION INTRAVENOUS; SUBCUTANEOUS at 14:05

## 2019-11-01 RX ADMIN — INSULIN LISPRO 6 UNITS: 100 INJECTION, SOLUTION INTRAVENOUS; SUBCUTANEOUS at 21:30

## 2019-11-01 RX ADMIN — CEFAZOLIN SODIUM 1000 MG: 1 SOLUTION INTRAVENOUS at 21:29

## 2019-11-01 RX ADMIN — IODIXANOL 95 ML: 320 INJECTION, SOLUTION INTRAVASCULAR at 15:35

## 2019-11-01 RX ADMIN — FENTANYL CITRATE 50 MCG: 50 INJECTION, SOLUTION INTRAMUSCULAR; INTRAVENOUS at 13:41

## 2019-11-01 RX ADMIN — METRONIDAZOLE 500 MG: 500 INJECTION, SOLUTION INTRAVENOUS at 15:31

## 2019-11-01 RX ADMIN — MIDAZOLAM 1 MG: 1 INJECTION INTRAMUSCULAR; INTRAVENOUS at 14:48

## 2019-11-01 RX ADMIN — MIDAZOLAM 1 MG: 1 INJECTION INTRAMUSCULAR; INTRAVENOUS at 13:40

## 2019-11-01 RX ADMIN — MIDAZOLAM 0.5 MG: 1 INJECTION INTRAMUSCULAR; INTRAVENOUS at 14:19

## 2019-11-01 RX ADMIN — FENTANYL CITRATE 50 MCG: 50 INJECTION, SOLUTION INTRAMUSCULAR; INTRAVENOUS at 14:49

## 2019-11-01 RX ADMIN — FENTANYL CITRATE 25 MCG: 50 INJECTION, SOLUTION INTRAMUSCULAR; INTRAVENOUS at 14:19

## 2019-11-01 RX ADMIN — FENTANYL CITRATE 25 MCG: 50 INJECTION, SOLUTION INTRAMUSCULAR; INTRAVENOUS at 14:22

## 2019-11-01 NOTE — PROGRESS NOTES
Progress Note - Podiatry  Lenka Lyon 47 y o  female MRN: [id number]  Unit/Bed#: E5 -01 Encounter: [id number]    Assessment/Plan:  1  R 4th toe gangrene   2  PAD  3  R dorsal foot ulcer  4  R submet 5 ulcer      -agram per Vasc surg planned for today  -tentative plan for R 4th toe amputation pending vascular surgery recommendations  -iv abx per slim  -medical mngt per primary team    Subjective/Objective   Chief Complaint:   Chief Complaint   Patient presents with    Toe Pain     Patient reports noticing her R 4th toe has turned black in color  Patient reports burning her R foot a few months ago but denies any new injuries  Subjective: 47 y o  y/o female was seen and evaluated at bedside  Blood pressure 165/71, pulse 88, temperature 98 3 °F (36 8 °C), temperature source Temporal, resp  rate 18, height 5' (1 524 m), weight 85 1 kg (187 lb 9 8 oz), SpO2 93 %, not currently breastfeeding  ,Body mass index is 36 64 kg/m²  Invasive Devices     Peripheral Intravenous Line            Peripheral IV 10/30/19 Right Antecubital 1 day                Physical Exam:   General: Alert, cooperative and no distress  Lungs: Non labored breathing  Heart: Positive S1, S2  Abdomen: Soft, non-tender    Extremity:       nvs and gross motor function at baseline, dressing left intact    Lab, Imaging and other studies:   CBC:   Lab Results   Component Value Date    WBC 10 10 11/01/2019    HGB 10 2 (L) 11/01/2019    HCT 34 3 (L) 11/01/2019    MCV 75 (L) 11/01/2019     11/01/2019    MCH 22 4 (L) 11/01/2019    MCHC 29 7 (L) 11/01/2019    RDW 13 9 11/01/2019    MPV 10 6 11/01/2019    NRBC 0 11/01/2019

## 2019-11-01 NOTE — BRIEF OP NOTE (RAD/CATH)
Right Lower Extremity Arteriogram Procedure Note    PATIENT NAME: Rakesh Saldana  : 1965  MRN: [id number]     Pre-op Diagnosis:   1  Gangrene of toe of right foot ([facility] )    2  Sepsis ([facility] )    3  PAD (peripheral artery disease) ([facility] )    4  Type 2 diabetes mellitus with other circulatory complication, with long-term current use of insulin ([facility])      Post-op Diagnosis:   1  [facility] toe of right foot ([facility] )    2  Sepsis ([facility] )    3  PAD (peripheral artery disease) ([place] [address] )    4  Type 2 diabetes mellitus with other circulatory complication, with long-term current use of insulin ([place] [address] )        Surgeon:   Sea Cobb MD  Assistants:     No qualified resident was available, Resident is only observing    Estimated Blood Loss: <25 ml  Findings: Recurrent distal right superficial femoral artery stenosis treated with a 4 mm balloon requiring placement of a 5 mm x 100 mm and 5 mm x 60 mm Innova stent  80% tibioperoneal trunk stenosis treated with a 2 5 mm x 4 cm scoring balloon      Specimens: none    Complications:  none    Anesthesia: Conscious sedation and Local    Sea Cobb MD     Date: 2019  Time: 2:58 PM

## 2019-11-01 NOTE — ASSESSMENT & PLAN NOTE
Lab Results   Component Value Date    HGBA1C 13 7 (A) 10/03/2019       Recent Labs     10/31/19  1617 10/31/19  2111 11/01/19  0743 11/01/19  1116   POCGLU 220* 134 109 121       Blood Sugar Average: Last 72 hrs:  · (P) 200 [id number]  · Maintained on metformin and lantus 78 units q HS  · Continue lantus 78 units q HS  SSI + accuchek    May need to initiate scheduled mealtime insulin  · Diabetic diet  · Nutrition consult

## 2019-11-01 NOTE — PROGRESS NOTES
Progress Note - Yulia Lyon 1965, 47 y o  female MRN: [id number]    Unit/Bed#: E5 -01 Encounter: [id number]    Primary Care Provider: Yessenia Elam MD   Date and time admitted to hospital: 10/30/2019  6:28 PM        * Gangrenous toe ([facility] [address] )  Assessment & Plan  · Presents to the emergency department with gangrenous right 4th digit, associated wound   · Vascular and a podiatry following  · Continue with broad-spectrum antibiotics      PAD (peripheral artery disease) ([facility] )  Assessment & Plan  · History of PD on aspirin Plavix statin  · Continue ASA,Plavix,Statin   · Follow vascular recommendations    Type 2 diabetes mellitus with circulatory disorder, with long-term current use of insulin [facility])  Assessment & Plan  Lab Results   Component Value Date    HGBA1C 13 7 (A) 10/03/2019       Recent Labs     10/31/19  1617 10/31/19  2111 11/01/19  0743 11/01/19  1116   POCGLU 220* 134 109 121       Blood Sugar Average: Last 72 hrs:  · (P) 200 [id number]  · Maintained on metformin and lantus 78 units q HS  · Continue lantus 78 units q HS  SSI + accuchek  May need to initiate scheduled mealtime insulin  · Diabetic diet  · Nutrition consult    Essential hypertension  Assessment & Plan  · BP stable  · Continue home medication    Hypokalemia  Assessment & Plan  Hypokalemia  Potassium replaced    Recheck potassium in 2-3 hours  Hold insulin    VTE Pharmacologic Prophylaxis:   Pharmacologic: Enoxaparin (Lovenox)    Patient Centered Rounds: I have performed bedside rounds with nursing staff today    Discussions with Specialists or Other Care Team Provider:     Education and Discussions with Family / Patient:       Current Length of Stay: 2 day(s)    Current Patient Status: Inpatient   Certification Statement: The patient will continue to require additional inpatient hospital stay due to Right 4s gangrenous stool possible amputation today    Discharge Plan:  In 2 days    Code Status: Level 1 - Full Code      Subjective:   No complaint    Objective:     Vitals:   Temp (24hrs), Av °F (36 7 °C), Min:97 5 °F (36 4 °C), Max:98 3 °F (36 8 °C)    Temp:  [97 5 °F (36 4 °C)-98 3 °F (36 8 °C)] 98 3 °F (36 8 °C)  HR:  [64-88] 83  Resp:  [16-18] 18  BP: (123-184)/(61-92) 159/80  SpO2:  [93 %-99 %] 97 %  Body mass index is 36 64 kg/m²  Input and Output Summary (last 24 hours):     No intake or output data in the 24 hours ending 19 1404    Physical Exam:     General appearance: alert, appears stated age and cooperative  Head: Normocephalic, without obvious abnormality, atraumatic  Lungs: clear to auscultation bilaterally  Heart: regular rate and rhythm  Abdomen: soft, non-tender, positive bowel sounds   Back: negative  Extremities: Clean the dressed right foot  Neurologic: Grossly normal    Additional Data:     Labs:    Results from last 7 days   Lab Units 19  0617   WBC Thousand/uL 10 10   HEMOGLOBIN g/dL 10 2*   HEMATOCRIT % 34 3*   PLATELETS Thousands/uL 273   NEUTROS PCT % 74   LYMPHS PCT % 19   MONOS PCT % 6   EOS PCT % 1     Results from last 7 days   Lab Units 19  0840 19  0617  10/31/19  0000   SODIUM mmol/L  --  141   < >  --    POTASSIUM mmol/L 2 6* 2 7*   < >  --    CHLORIDE mmol/L  --  103   < >  --    CO2 mmol/L  --  28   < >  --    BUN mg/dL  --  8   < >  --    CREATININE mg/dL  --  0 90   < >  --    ANION GAP mmol/L  --  10   < >  --    CALCIUM mg/dL  --  8 6   < >  --    ALBUMIN g/dL  --   --   --  2 4*   TOTAL BILIRUBIN mg/dL  --   --   --  0 46   ALK PHOS U/L  --   --   --  76   ALT U/L  --   --   --  13   AST U/L  --   --   --  17   GLUCOSE RANDOM mg/dL  --  103   < >  --     < > = values in this interval not displayed       Results from last 7 days   Lab Units 10/31/19  0610   INR  1 07     Results from last 7 days   Lab Units 19  1116 19  0743 10/31/19  2111 10/31/19  1617 10/31/19  1115 10/31/19  0804 10/31/19  0128   POC GLUCOSE mg/dl 121 109 134 220* 281* 236* 302*         Results from last 7 days   Lab Units 10/30/19  2216 10/30/19  2017   LACTIC ACID mmol/L 1 8 2 5*           * I Have Reviewed All Lab Data Listed Above  * Additional Pertinent Lab Tests Reviewed: [address] Admission Reviewed    Imaging:    Imaging Reports Reviewed Today Include: images reviewed    Recent Cultures (last 7 days):     Results from last 7 days   Lab Units 10/30/19  2016 10/30/19  1850   BLOOD CULTURE  No Growth at 24 hrs  No Growth at 24 hrs         Last 24 Hours Medication List:     Current Facility-Administered Medications:  acetaminophen 650 mg Oral Q6H PRN Juanita Lopez PA-C    aspirin 81 mg Oral Daily Eileen Alvarez PA-C    atorvastatin 20 mg Oral Daily With Alicia Edmond PA-C    cefazolin 1,000 mg Intravenous Q8H [place], [place] Last Rate: 1,000 mg (11/01/19 0517)   [START ON 11/2/2019] clopidogrel 75 mg Oral Daily Luis A George MD    enoxaparin 40 mg Subcutaneous Q24H [address] Eileen Edmond PA-C    fentanyl citrate (PF)  Intravenous Code/Trauma/Sedation Kirk Uriostegui MD    hydrALAZINE 5 mg Intravenous Q6H PRN Eileen Edmond PA-C    insulin glargine 78 Units Subcutaneous QAM Bonita Alvarez PA-C    insulin lispro 1-6 Units Subcutaneous TID AC Bonita Edmond PA-C    insulin lispro 1-6 Units Subcutaneous HS Juanita Lopez PA-C    lidocaine 1% buffered   Code/Trauma/Sedation Kirk Uriostegui MD    metoprolol tartrate 25 mg Oral Q12H [address] Eileen Edmond PA-C    metroNIDAZOLE 500 mg Intravenous Q8H [place], [place] Last Rate: 500 mg (11/01/19 9597)   midazolam   Code/Trauma/Sedation Kirk Uriostegui MD    ondansetron 4 mg Intravenous Q6H PRN Eileen Edmond PA-C    sodium chloride 0 9 % with KCl 40 mEq/L 100 mL/hr Intravenous Continuous Luis A George MD Last Rate: 100 mL/hr (11/01/19 6218)        Today, Patient Was Seen By: Luis A George MD    ** Please Note: Dictation voice to text software may have been used in the creation of this document   **

## 2019-11-01 NOTE — ASSESSMENT & PLAN NOTE
· Presents to the emergency department with gangrenous right 4th digit, associated wound   · Vascular and a podiatry following  · Continue with broad-spectrum antibiotics

## 2019-11-01 NOTE — OCCUPATIONAL THERAPY NOTE
Occupational Therapy Cancellation Note        Patient Name: Mable Garzon  Today's Date: 11/1/2019    Pt off floor to IR for agram  Will continue to follow to complete OT evaluation as appropriate       Donna Bowden MS, OTR/L

## 2019-11-01 NOTE — PLAN OF CARE
Problem: Potential for Falls  Goal: Patient will remain free of falls  Description  INTERVENTIONS:  - Assess patient frequently for physical needs  -  Identify cognitive and physical deficits and behaviors that affect risk of falls    -  [place] fall precautions as indicated by assessment   - Educate patient/family on patient safety including physical limitations  - Instruct patient to call for assistance with activity based on assessment  - Modify environment to reduce risk of injury  - Consider OT/PT consult to assist with strengthening/mobility  Outcome: Progressing     Problem: PAIN - ADULT  Goal: Verbalizes/displays adequate comfort level or baseline comfort level  Description  Interventions:  - Encourage patient to monitor pain and request assistance  - Assess pain using appropriate pain scale  - Administer analgesics based on type and severity of pain and evaluate response  - Implement non-pharmacological measures as appropriate and evaluate response  - Consider cultural and social influences on pain and pain management  - Notify physician/advanced practitioner if interventions unsuccessful or patient reports new pain  Outcome: Progressing     Problem: INFECTION - ADULT  Goal: Absence or prevention of progression during hospitalization  Description  INTERVENTIONS:  - Assess and monitor for signs and symptoms of infection  - Monitor lab/diagnostic results  - Monitor all insertion sites, i e  indwelling lines, tubes, and drains  - Monitor endotracheal if appropriate and nasal secretions for changes in amount and color  - [place] appropriate cooling/warming therapies per order  - Administer medications as ordered  - Instruct and encourage patient and family to use good hand hygiene technique  - Identify and instruct in appropriate isolation precautions for identified infection/condition  Outcome: Progressing  Goal: Absence of fever/infection during neutropenic period  Description  INTERVENTIONS:  - Monitor WBC    Outcome: Progressing     Problem: SAFETY ADULT  Goal: Patient will remain free of falls  Description  INTERVENTIONS:  - Assess patient frequently for physical needs  -  Identify cognitive and physical deficits and behaviors that affect risk of falls    -  [place] fall precautions as indicated by assessment   - Educate patient/family on patient safety including physical limitations  - Instruct patient to call for assistance with activity based on assessment  - Modify environment to reduce risk of injury  - Consider OT/PT consult to assist with strengthening/mobility  Outcome: Progressing  Goal: Maintain or return to baseline ADL function  Description  INTERVENTIONS:  -  Assess patient's ability to carry out ADLs; assess patient's baseline for ADL function and identify physical deficits which impact ability to perform ADLs (bathing, care of mouth/teeth, toileting, grooming, dressing, etc )  - Assess/evaluate cause of self-care deficits   - Assess range of motion  - Assess patient's mobility; develop plan if impaired  - Assess patient's need for assistive devices and provide as appropriate  - Encourage maximum independence but intervene and supervise when necessary  - Involve family in performance of ADLs  - Assess for home care needs following discharge   - Consider OT consult to assist with ADL evaluation and planning for discharge  - Provide patient education as appropriate  Outcome: Progressing  Goal: Maintain or return mobility status to optimal level  Description  INTERVENTIONS:  - Assess patient's baseline mobility status (ambulation, transfers, stairs, etc )    - Identify cognitive and physical deficits and behaviors that affect mobility  - Identify mobility aids required to assist with transfers and/or ambulation (gait belt, sit-to-stand, lift, walker, cane, etc )  - [place] fall precautions as indicated by assessment  - Record patient progress and toleration of activity level on Mobility SBAR; progress patient to next Phase/Stage  - Instruct patient to call for assistance with activity based on assessment  - Consider rehabilitation consult to assist with strengthening/weightbearing, etc   Outcome: Progressing     Problem: DISCHARGE PLANNING  Goal: Discharge to home or other facility with appropriate resources  Description  INTERVENTIONS:  - Identify barriers to discharge w/patient and caregiver  - Arrange for needed discharge resources and transportation as appropriate  - Identify discharge learning needs (meds, wound care, etc )  - Arrange for interpretive services to assist at discharge as needed  - Refer to Case Management Department for coordinating discharge planning if the patient needs post-hospital services based on physician/advanced practitioner order or complex needs related to functional status, cognitive ability, or social support system  Outcome: Progressing     Problem: Knowledge Deficit  Goal: Patient/family/caregiver demonstrates understanding of disease process, treatment plan, medications, and discharge instructions  Description  Complete learning assessment and assess knowledge base  Interventions:  - Provide teaching at level of understanding  - Provide teaching via preferred learning methods  Outcome: Progressing     Problem: Nutrition/Hydration-ADULT  Goal: Nutrient/Hydration intake appropriate for improving, restoring or maintaining nutritional needs  Description  Monitor and assess patient's nutrition/hydration status for malnutrition  Collaborate with interdisciplinary team and initiate plan and interventions as ordered  Monitor patient's weight and dietary intake as ordered or per policy  Utilize nutrition screening tool and intervene as necessary  Determine patient's food preferences and provide high-protein, high-caloric foods as appropriate       INTERVENTIONS:  - Monitor oral intake, urinary output, labs, and treatment plans  - Assess nutrition and hydration status and recommend course of action  - Evaluate amount of meals eaten  - Assist patient with eating if necessary   - Allow adequate time for meals  - Recommend/ encourage appropriate diets, oral nutritional supplements, and vitamin/mineral supplements  - Order, calculate, and assess calorie counts as needed  - Recommend, monitor, and adjust tube feedings and TPN/PPN based on assessed needs  - Assess need for intravenous fluids  - Provide specific nutrition/hydration education as appropriate  - Include patient/family/caregiver in decisions related to nutrition  Outcome: Progressing     Problem: Prexisting or High Potential for Compromised Skin Integrity  Goal: Skin integrity is maintained or improved  Description  INTERVENTIONS:  - Identify patients at risk for skin breakdown  - Assess and monitor skin integrity  - Assess and monitor nutrition and hydration status  - Monitor labs   - Assess for incontinence   - Turn and reposition patient  - Assist with mobility/ambulation  - Relieve pressure over bony prominences  - Avoid friction and shearing  - Provide appropriate hygiene as needed including keeping skin clean and dry  - Evaluate need for skin moisturizer/barrier cream  - Collaborate with interdisciplinary team   - Patient/family teaching  - Consider wound care consult   Outcome: Progressing

## 2019-11-01 NOTE — PHYSICAL THERAPY NOTE
PHYSICAL THERAPY NOTE          Patient Name: Nelia Palafox  [id number] Date: 11/1/2019     Pt off floor to IR for Agram  Will continue to follow as appropriate   Alfonso Grant, PT

## 2019-11-01 NOTE — SOCIAL WORK
This CM covering today  Met with pt and complete assessment and explained role  PCP is Dr Dena Bauer  Pt lives in [facility] with her two dtrs in a 2 story house with no steps to enter from the back door  Pt was independent with ADLs, amb with RW and dtr drives a car  DME:  RW and [facility]  Pt was not open with any [facility] prior to this admission  Pt uses [facility]  Pt has no hx of MH or D&A   is dtr [facility] and dtr will transport home  PT/OT were not able to see pt today as pt was off the floor in IR for Agram  Pt currently lost her job and has no insurance  Pt is MICAH applied  Pt is concern about paying for her medications  Gave pt the GoodRx card to use at pharmacies  CM will for possible assistance with medications and PT/OT needs

## 2019-11-01 NOTE — ASSESSMENT & PLAN NOTE
· History of PD on aspirin Plavix statin  · Can hold Plavix for planned podiatric surgery  · Follow vascular recommendations

## 2019-11-02 LAB
ANION GAP SERPL CALCULATED.3IONS-SCNC: 8 MMOL/L (ref 4–13)
BASOPHILS # BLD AUTO: 0.03 THOUSANDS/ΜL (ref 0–0.1)
BASOPHILS NFR BLD AUTO: 0 % (ref 0–1)
BUN SERPL-MCNC: 7 MG/DL (ref 5–25)
CALCIUM SERPL-MCNC: 7.6 MG/DL (ref 8.3–10.1)
CHLORIDE SERPL-SCNC: 103 MMOL/L (ref 100–108)
CO2 SERPL-SCNC: 27 MMOL/L (ref 21–32)
CREAT SERPL-MCNC: 0.86 MG/DL (ref 0.6–1.3)
EOSINOPHIL # BLD AUTO: 0.08 THOUSAND/ΜL (ref 0–0.61)
EOSINOPHIL NFR BLD AUTO: 1 % (ref 0–6)
ERYTHROCYTE [DISTWIDTH] IN BLOOD BY AUTOMATED COUNT: 13.8 % (ref 11.6–15.1)
GFR SERPL CREATININE-BSD FRML MDRD: 77 ML/MIN/1.73SQ M
GLUCOSE SERPL-MCNC: 178 MG/DL (ref 65–140)
GLUCOSE SERPL-MCNC: 178 MG/DL (ref 65–140)
GLUCOSE SERPL-MCNC: 235 MG/DL (ref 65–140)
GLUCOSE SERPL-MCNC: 240 MG/DL (ref 65–140)
GLUCOSE SERPL-MCNC: 285 MG/DL (ref 65–140)
HCT VFR BLD AUTO: 32.8 % (ref 34.8–46.1)
HGB BLD-MCNC: 9.9 G/DL (ref 11.5–15.4)
IMM GRANULOCYTES # BLD AUTO: 0.04 THOUSAND/UL (ref 0–0.2)
IMM GRANULOCYTES NFR BLD AUTO: 0 % (ref 0–2)
LYMPHOCYTES # BLD AUTO: 1.76 THOUSANDS/ΜL (ref 0.6–4.47)
LYMPHOCYTES NFR BLD AUTO: 18 % (ref 14–44)
MCH RBC QN AUTO: 22.7 PG (ref 26.8–34.3)
MCHC RBC AUTO-ENTMCNC: 30.2 G/DL (ref 31.4–37.4)
MCV RBC AUTO: 75 FL (ref 82–98)
MONOCYTES # BLD AUTO: 0.83 THOUSAND/ΜL (ref 0.17–1.22)
MONOCYTES NFR BLD AUTO: 8 % (ref 4–12)
NEUTROPHILS # BLD AUTO: 7.33 THOUSANDS/ΜL (ref 1.85–7.62)
NEUTS SEG NFR BLD AUTO: 73 % (ref 43–75)
NRBC BLD AUTO-RTO: 0 /100 WBCS
PLATELET # BLD AUTO: 277 THOUSANDS/UL (ref 149–390)
PMV BLD AUTO: 11.5 FL (ref 8.9–12.7)
POTASSIUM SERPL-SCNC: 3 MMOL/L (ref 3.5–5.3)
RBC # BLD AUTO: 4.37 MILLION/UL (ref 3.81–5.12)
SODIUM SERPL-SCNC: 138 MMOL/L (ref 136–145)
WBC # BLD AUTO: 10.07 THOUSAND/UL (ref 4.31–10.16)

## 2019-11-02 PROCEDURE — 99232 SBSQ HOSP IP/OBS MODERATE 35: CPT | Performed by: INTERNAL MEDICINE

## 2019-11-02 PROCEDURE — 93005 ELECTROCARDIOGRAM TRACING: CPT

## 2019-11-02 PROCEDURE — 99232 SBSQ HOSP IP/OBS MODERATE 35: CPT | Performed by: SURGERY

## 2019-11-02 PROCEDURE — 85025 COMPLETE CBC W/AUTO DIFF WBC: CPT | Performed by: INTERNAL MEDICINE

## 2019-11-02 PROCEDURE — 82948 REAGENT STRIP/BLOOD GLUCOSE: CPT

## 2019-11-02 PROCEDURE — 80048 BASIC METABOLIC PNL TOTAL CA: CPT | Performed by: INTERNAL MEDICINE

## 2019-11-02 RX ORDER — CLOPIDOGREL BISULFATE 75 MG/1
75 TABLET ORAL DAILY
Status: DISCONTINUED | OUTPATIENT
Start: 2019-11-02 | End: 2019-11-12 | Stop reason: HOSPADM

## 2019-11-02 RX ORDER — POTASSIUM CHLORIDE 20 MEQ/1
40 TABLET, EXTENDED RELEASE ORAL ONCE
Status: COMPLETED | OUTPATIENT
Start: 2019-11-02 | End: 2019-11-02

## 2019-11-02 RX ADMIN — INSULIN LISPRO 5 UNITS: 100 INJECTION, SOLUTION INTRAVENOUS; SUBCUTANEOUS at 12:07

## 2019-11-02 RX ADMIN — METOPROLOL TARTRATE 25 MG: 25 TABLET, FILM COATED ORAL at 09:01

## 2019-11-02 RX ADMIN — CLOPIDOGREL BISULFATE 75 MG: 75 TABLET ORAL at 12:08

## 2019-11-02 RX ADMIN — INSULIN LISPRO 3 UNITS: 100 INJECTION, SOLUTION INTRAVENOUS; SUBCUTANEOUS at 09:00

## 2019-11-02 RX ADMIN — INSULIN LISPRO 5 UNITS: 100 INJECTION, SOLUTION INTRAVENOUS; SUBCUTANEOUS at 17:14

## 2019-11-02 RX ADMIN — INSULIN LISPRO 4 UNITS: 100 INJECTION, SOLUTION INTRAVENOUS; SUBCUTANEOUS at 17:14

## 2019-11-02 RX ADMIN — INSULIN GLARGINE 78 UNITS: 100 INJECTION, SOLUTION SUBCUTANEOUS at 09:00

## 2019-11-02 RX ADMIN — INSULIN LISPRO 3 UNITS: 100 INJECTION, SOLUTION INTRAVENOUS; SUBCUTANEOUS at 12:07

## 2019-11-02 RX ADMIN — METRONIDAZOLE 500 MG: 500 INJECTION, SOLUTION INTRAVENOUS at 05:06

## 2019-11-02 RX ADMIN — CEFAZOLIN SODIUM 1000 MG: 1 SOLUTION INTRAVENOUS at 21:16

## 2019-11-02 RX ADMIN — POTASSIUM CHLORIDE AND SODIUM CHLORIDE 100 ML/HR: 900; 300 INJECTION, SOLUTION INTRAVENOUS at 00:02

## 2019-11-02 RX ADMIN — ATORVASTATIN CALCIUM 20 MG: 20 TABLET, FILM COATED ORAL at 17:13

## 2019-11-02 RX ADMIN — ENOXAPARIN SODIUM 40 MG: 40 INJECTION SUBCUTANEOUS at 09:01

## 2019-11-02 RX ADMIN — CEFAZOLIN SODIUM 1000 MG: 1 SOLUTION INTRAVENOUS at 13:54

## 2019-11-02 RX ADMIN — ASPIRIN 81 MG 81 MG: 81 TABLET ORAL at 09:01

## 2019-11-02 RX ADMIN — METRONIDAZOLE 500 MG: 500 INJECTION, SOLUTION INTRAVENOUS at 22:05

## 2019-11-02 RX ADMIN — POTASSIUM CHLORIDE 40 MEQ: 1500 TABLET, EXTENDED RELEASE ORAL at 12:08

## 2019-11-02 RX ADMIN — METOPROLOL TARTRATE 25 MG: 25 TABLET, FILM COATED ORAL at 21:16

## 2019-11-02 RX ADMIN — CEFAZOLIN SODIUM 1000 MG: 1 SOLUTION INTRAVENOUS at 05:43

## 2019-11-02 RX ADMIN — ONDANSETRON 4 MG: 2 INJECTION INTRAMUSCULAR; INTRAVENOUS at 17:13

## 2019-11-02 RX ADMIN — METRONIDAZOLE 500 MG: 500 INJECTION, SOLUTION INTRAVENOUS at 14:57

## 2019-11-02 RX ADMIN — INSULIN LISPRO 1 UNITS: 100 INJECTION, SOLUTION INTRAVENOUS; SUBCUTANEOUS at 22:05

## 2019-11-02 NOTE — ASSESSMENT & PLAN NOTE
· Initial lactic acid of 2 5  · Does not meet other SIRS criteria aside from leukocytosis - also on Metformin as an outpatient  · Received IVF, now resolved

## 2019-11-02 NOTE — ASSESSMENT & PLAN NOTE
· Presents to the emergency department with gangrenous right 4th toe, associated wound   · Vascular and a Podiatry following  · Continue with broad-spectrum antibiotics  · Angiogram with intervention performed on 11/1, continues to have below threshold for wound healing  · Plan for 4th toe amputation per Podiatry pending Vascular recommendations

## 2019-11-02 NOTE — ASSESSMENT & PLAN NOTE
· History of PAD maintained on aspirin, Plavix, statin  · Angiogram with intervention on 11/1  · Follow Vascular recommendations

## 2019-11-02 NOTE — PLAN OF CARE
Problem: Potential for Falls  Goal: Patient will remain free of falls  Description  INTERVENTIONS:  - Assess patient frequently for physical needs  -  Identify cognitive and physical deficits and behaviors that affect risk of falls    -  [place] fall precautions as indicated by assessment   - Educate patient/family on patient safety including physical limitations  - Instruct patient to call for assistance with activity based on assessment  - Modify environment to reduce risk of injury  - Consider OT/PT consult to assist with strengthening/mobility  Outcome: Progressing     Problem: PAIN - ADULT  Goal: Verbalizes/displays adequate comfort level or baseline comfort level  Description  Interventions:  - Encourage patient to monitor pain and request assistance  - Assess pain using appropriate pain scale  - Administer analgesics based on type and severity of pain and evaluate response  - Implement non-pharmacological measures as appropriate and evaluate response  - Consider cultural and social influences on pain and pain management  - Notify physician/advanced practitioner if interventions unsuccessful or patient reports new pain  Outcome: Progressing     Problem: INFECTION - ADULT  Goal: Absence or prevention of progression during hospitalization  Description  INTERVENTIONS:  - Assess and monitor for signs and symptoms of infection  - Monitor lab/diagnostic results  - Monitor all insertion sites, i e  indwelling lines, tubes, and drains  - Monitor endotracheal if appropriate and nasal secretions for changes in amount and color  - [place] appropriate cooling/warming therapies per order  - Administer medications as ordered  - Instruct and encourage patient and family to use good hand hygiene technique  - Identify and instruct in appropriate isolation precautions for identified infection/condition  Outcome: Progressing  Goal: Absence of fever/infection during neutropenic period  Description  INTERVENTIONS:  - Monitor WBC    Outcome: Progressing     Problem: SAFETY ADULT  Goal: Patient will remain free of falls  Description  INTERVENTIONS:  - Assess patient frequently for physical needs  -  Identify cognitive and physical deficits and behaviors that affect risk of falls    -  [place] fall precautions as indicated by assessment   - Educate patient/family on patient safety including physical limitations  - Instruct patient to call for assistance with activity based on assessment  - Modify environment to reduce risk of injury  - Consider OT/PT consult to assist with strengthening/mobility  Outcome: Progressing  Goal: Maintain or return to baseline ADL function  Description  INTERVENTIONS:  -  Assess patient's ability to carry out ADLs; assess patient's baseline for ADL function and identify physical deficits which impact ability to perform ADLs (bathing, care of mouth/teeth, toileting, grooming, dressing, etc )  - Assess/evaluate cause of self-care deficits   - Assess range of motion  - Assess patient's mobility; develop plan if impaired  - Assess patient's need for assistive devices and provide as appropriate  - Encourage maximum independence but intervene and supervise when necessary  - Involve family in performance of ADLs  - Assess for home care needs following discharge   - Consider OT consult to assist with ADL evaluation and planning for discharge  - Provide patient education as appropriate  Outcome: Progressing  Goal: Maintain or return mobility status to optimal level  Description  INTERVENTIONS:  - Assess patient's baseline mobility status (ambulation, transfers, stairs, etc )    - Identify cognitive and physical deficits and behaviors that affect mobility  - Identify mobility aids required to assist with transfers and/or ambulation (gait belt, sit-to-stand, lift, walker, cane, etc )  - [place] fall precautions as indicated by assessment  - Record patient progress and toleration of activity level on Mobility SBAR; progress patient to next Phase/Stage  - Instruct patient to call for assistance with activity based on assessment  - Consider rehabilitation consult to assist with strengthening/weightbearing, etc   Outcome: Progressing     Problem: DISCHARGE PLANNING  Goal: Discharge to home or other facility with appropriate resources  Description  INTERVENTIONS:  - Identify barriers to discharge w/patient and caregiver  - Arrange for needed discharge resources and transportation as appropriate  - Identify discharge learning needs (meds, wound care, etc )  - Arrange for interpretive services to assist at discharge as needed  - Refer to Case Management Department for coordinating discharge planning if the patient needs post-hospital services based on physician/advanced practitioner order or complex needs related to functional status, cognitive ability, or social support system  Outcome: Progressing     Problem: Knowledge Deficit  Goal: Patient/family/caregiver demonstrates understanding of disease process, treatment plan, medications, and discharge instructions  Description  Complete learning assessment and assess knowledge base  Interventions:  - Provide teaching at level of understanding  - Provide teaching via preferred learning methods  Outcome: Progressing     Problem: Nutrition/Hydration-ADULT  Goal: Nutrient/Hydration intake appropriate for improving, restoring or maintaining nutritional needs  Description  Monitor and assess patient's nutrition/hydration status for malnutrition  Collaborate with interdisciplinary team and initiate plan and interventions as ordered  Monitor patient's weight and dietary intake as ordered or per policy  Utilize nutrition screening tool and intervene as necessary  Determine patient's food preferences and provide high-protein, high-caloric foods as appropriate       INTERVENTIONS:  - Monitor oral intake, urinary output, labs, and treatment plans  - Assess nutrition and hydration status and recommend course of action  - Evaluate amount of meals eaten  - Assist patient with eating if necessary   - Allow adequate time for meals  - Recommend/ encourage appropriate diets, oral nutritional supplements, and vitamin/mineral supplements  - Order, calculate, and assess calorie counts as needed  - Recommend, monitor, and adjust tube feedings and TPN/PPN based on assessed needs  - Assess need for intravenous fluids  - Provide specific nutrition/hydration education as appropriate  - Include patient/family/caregiver in decisions related to nutrition  Outcome: Progressing     Problem: Prexisting or High Potential for Compromised Skin Integrity  Goal: Skin integrity is maintained or improved  Description  INTERVENTIONS:  - Identify patients at risk for skin breakdown  - Assess and monitor skin integrity  - Assess and monitor nutrition and hydration status  - Monitor labs   - Assess for incontinence   - Turn and reposition patient  - Assist with mobility/ambulation  - Relieve pressure over bony prominences  - Avoid friction and shearing  - Provide appropriate hygiene as needed including keeping skin clean and dry  - Evaluate need for skin moisturizer/barrier cream  - Collaborate with interdisciplinary team   - Patient/family teaching  - Consider wound care consult   Outcome: Progressing

## 2019-11-02 NOTE — PROGRESS NOTES
Progress Note - [address] 1965, 47 y o  female MRN: [id number]    Unit/Bed#: E5 -01 Encounter: [id number]    Primary Care Provider: Luis Rosario MD   Date and time admitted to hospital: 10/30/2019  6:28 PM        PAD (peripheral artery disease) [facility])  Assessment & Plan  46 y/o F former smoker w/hx HTN, HLD, CKD II/III (baseline creat 1 0-1 2), uncontrolled type II DM w/neuropathy, Charcot foot and PAD w/bilateral SFA occlusions and single-vessel peroneal runoff, s/p L distal SFA DCB PTA (IR) 2/1/2019 followed by L 5th toe amputation 2/4/2019 and R distal SFA/pop recanalization/PTA (IR) 4/30/2019 for acute on chronic limb ischemia and 4th toe tissue loss w/subsequent healing who presents with R 4th toe/dorsum foot gangrene x 3 weeks, nonhealing R plantar foot wound and lactic acidosis  S/p R SFA PTA/stent and TPT PTA (IR) 11/1/2019     Diagnostics:  -post intervention GEORGIA 11/1/2019:  R GEORGIA 0 81/41/35  -RLE Agram 11/1/19:  R SFA short-segment occlusion and T PT stenosis successfully treated with SFA PTA/stent and T PT PTA  Two vessel runoff via dominant peroneal and AT which occludes distally  DP fills plantar artery  -SHERYL 10/31/2019:  R SFA restenoses with GEORGIA 0 59/20/30  L SFA occlusion with GEORGIA 0 45/73/60  -Xray R foot 10/30/19:  No evidence of subcutaneous gas  -BC:  Neg x 48 hrs  -RLE Agram 4/30/2019:  Short segment distal SFA occlusion w/dominant peroneal runoff filling DP  AT distally atretic and atretic PT   -preop SHERYL 4/30/2019:  50-75% R PFA and >75% mid SFA stenoses and distal R SFA occlusion  R GEORIGA 0 27/-/-    Plan:  -s/p RLE Agram yesterday with successful R SFA PTA/stent and tibioperoneal trunk PTA  Post intervention ABIs improved with GEORGIA increased from 0 59 to 0 81  MTP/GTP remains below healing level however patient optimized from a revascularization standpoint    Recommend going forward with podiatric intervention and follow-up postoperative healing   -left femoral access site clear  -continue ASA, Plavix (SFA stent) and statin therapy  -continue abx per primary service  -continue [address] per podiatry  Right 4th toe amputation planned by Podiatry  -renal function stable w/creat 0 86 after angiogram   -outpatient follow-up 2-3 weeks to follow wound healing and discuss long-term surveillance  -will see on a p r n  Basis during his hospitalization  Please contact us with further concerns or poor healing   -will d/w Dr Mark Stone    * Gangrenous toe [facility])  Assessment & Plan  R 4th toe gangrene and nonhealing plantar foot ulcer w/lactic acidosis  -s/p revascularization 11/1/2019  -continue [address] per Podiatry   Right 4th toe amputation per Podiatry  -BC negative times 48 hours  -continue abx per primary service      CKD stage 2 due to type 2 diabetes mellitus ([facility] [address] )  Assessment & Plan  CKD II-III w/baseline creat 1 0-1 2  -creat 0 86/GFR 77 this a m   -renal function stable after angiogram  -continue to trend creat  -continue to avoid nephrotoxic agents        Type 2 diabetes mellitus with circulatory disorder, with long-term current use of insulin [facility])  Assessment & Plan  Lab Results   Component Value Date    HGBA1C 13 7 (A) 10/03/2019       Recent Labs     11/01/19  1747 11/01/19  2128 11/02/19  0851 11/02/19  1207   POCGLU 136 383* 240* 235*       Blood Sugar Average: Last 72 hrs:  (P) 217 [id number]   -poorly controlled with HgbA1C 13 7  -continue to optimize BS control for optimization of wound healing and prevention of SSI and vascular disease  -management per SLIM    HLD (hyperlipidemia)  Assessment & Plan  -stable  -continue statin therapy  -management per SLIM      Subjective:  POD #1 R SFA PTA/stent, TPT PTA (IR)   Patient without complaints  Denies left groin pain  Denies right lower extremity rest pain  no temp spikes  Blood cultures negative times 48 hours    Post intervention ABIs as noted below with increase in GEORGIA   VSS    Vitals:  /60 (BP Location: Right arm)   Pulse 76   Temp 98 9 °F (37 2 °C) (Tympanic)   Resp 18   Ht 5' (1 524 m)   Wt 85 1 kg (187 lb 9 8 oz)   LMP  (LMP Unknown)   SpO2 96%   BMI 36 64 kg/m²     I/Os:  I/O last 3 completed shifts: In: 1000 [I V :1000]  Out: -   I/O this shift: In: 480 [P O :480]  Out: -     Lab Results and Cultures:   Lab Results   Component Value Date    WBC 10 07 11/02/2019    HGB 9 9 (L) 11/02/2019    HCT 32 8 (L) 11/02/2019    MCV 75 (L) 11/02/2019     11/02/2019     Lab Results   Component Value Date    CALCIUM 7 6 (L) 11/02/2019    K 3 0 (L) 11/02/2019    CO2 27 11/02/2019     11/02/2019    BUN 7 11/02/2019    CREATININE 0 86 11/02/2019     Lab Results   Component Value Date    INR 1 07 10/31/2019    INR 1 14 05/01/2019    INR 1 21 (H) 04/30/2019    PROTIME 14 0 10/31/2019    PROTIME 14 7 (H) 05/01/2019    PROTIME 15 4 (H) 04/30/2019        Blood Culture:   Lab Results   Component Value Date    BLOODCX No Growth at 48 hrs   10/30/2019   ,   Urinalysis:   Lab Results   Component Value Date    COLORU Yellow 10/30/2019    CLARITYU Slightly Cloudy 10/30/2019    SPECGRAV 1 020 10/30/2019    PHUR 6 0 10/30/2019    LEUKOCYTESUR Negative 10/30/2019    NITRITE Negative 10/30/2019    GLUCOSEU 250 (1/4%) (A) 10/30/2019    KETONESU 15 (1+) (A) 10/30/2019    BILIRUBINUR Negative 10/30/2019    BLOODU Moderate (A) 10/30/2019   ,   Urine Culture: No results found for: URINECX,   Wound Culure: No results found for: WOUNDCULT    Medications:  Current Facility-Administered Medications   Medication Dose Route Frequency    acetaminophen (TYLENOL) tablet 650 mg  650 mg Oral Q6H PRN    aspirin chewable tablet 81 mg  81 mg Oral Daily    atorvastatin (LIPITOR) tablet 20 mg  20 mg Oral Daily With Dinner    ceFAZolin (ANCEF) IVPB (premix) 1,000 mg  1,000 mg Intravenous Q8H    clopidogrel (PLAVIX) tablet 75 mg  75 mg Oral Daily    enoxaparin (LOVENOX) subcutaneous injection 40 mg  40 mg Subcutaneous Q24H [address]    hydrALAZINE (APRESOLINE) injection 5 mg  5 mg Intravenous Q6H PRN    insulin glargine (LANTUS) subcutaneous injection 78 Units 0 78 mL  78 Units Subcutaneous QAM    insulin lispro (HumaLOG) 100 units/mL subcutaneous injection 1-6 Units  1-6 Units Subcutaneous TID AC    insulin lispro (HumaLOG) 100 units/mL subcutaneous injection 1-6 Units  1-6 Units Subcutaneous HS    insulin lispro (HumaLOG) 100 units/mL subcutaneous injection 5 Units  5 Units Subcutaneous TID With Meals    metoprolol tartrate (LOPRESSOR) tablet 25 mg  25 mg Oral Q12H ARRON    metroNIDAZOLE (FLAGYL) IVPB (premix) 500 mg  500 mg Intravenous Q8H    ondansetron (ZOFRAN) injection 4 mg  4 mg Intravenous Q6H PRN       Imaging:  Post intervention ABIs 11/1/2019:  R GEORGIA 0 81/41/35  Improved from 0 59/21/30    Physical Exam:    General appearance: alert and oriented, in no acute distress  Neurologic: Grossly normal  Neck: no adenopathy, no carotid bruit, no JVD, supple, symmetrical, trachea midline and thyroid not enlarged, symmetric, no tenderness/mass/nodules  Lungs: clear to auscultation bilaterally  Heart: regular rate and rhythm, S1, S2 normal, no murmur, click, rub or gallop  Abdomen: soft, non-tender; bowel sounds normal; no masses,  no organomegaly  Extremities: Bilateral lower extremities warm, pink and motor and sensory intact  Dressing intact right foot  Left groin access site clear, dry without erythema, induration, drainage, hematoma, hemorrhage or pulsatile mass      Wound/Incision:    R foot    R foot      Pulse exam:  Radial: Right: 2+ Left[de-identified] 2+   Femoral: Right: 2+ Left: 2+  Popliteal: Right: 1+ Left: non-palpable  DP: Right: Obscured by dressing Left: non-palpable  PT: Right: Nonpalpable Left: non-palpable      Yaw Macias PA-C  11/2/2019  [facility], [phone]

## 2019-11-02 NOTE — ASSESSMENT & PLAN NOTE
CKD II-III w/baseline creat 1 0-1 2  -creat 0 86/GFR 77 this a m   -renal function stable after angiogram  -continue to trend creat  -continue to avoid nephrotoxic agents

## 2019-11-02 NOTE — ASSESSMENT & PLAN NOTE
46 y/o F former smoker w/hx HTN, HLD, CKD II/III (baseline creat 1 0-1 2), uncontrolled type II DM w/neuropathy, Charcot foot and PAD w/bilateral SFA occlusions and single-vessel peroneal runoff, s/p L distal SFA DCB PTA (IR) 2/1/2019 followed by L 5th toe amputation 2/4/2019 and R distal SFA/pop recanalization/PTA (IR) 4/30/2019 for acute on chronic limb ischemia and 4th toe tissue loss w/subsequent healing who presents with R 4th toe/dorsum foot gangrene x 3 weeks, nonhealing R plantar foot wound and lactic acidosis  S/p R SFA PTA/stent and TPT PTA (IR) 11/1/2019     Diagnostics:  -post intervention GEORGIA 11/1/2019:  R GEORGIA 0 81/41/35  -RLE Agram 11/1/19:  R SFA short-segment occlusion and T PT stenosis successfully treated with SFA PTA/stent and T PT PTA  Two vessel runoff via dominant peroneal and AT which occludes distally  DP fills plantar artery  -SHERYL 10/31/2019:  R SFA restenoses with GEORGIA 0 59/20/30  L SFA occlusion with GEORGIA 0 45/73/60  -Xray R foot 10/30/19:  No evidence of subcutaneous gas  -BC:  Neg x 48 hrs  -RLE Agram 4/30/2019:  Short segment distal SFA occlusion w/dominant peroneal runoff filling DP  AT distally atretic and atretic PT   -preop SHERYL 4/30/2019:  50-75% R PFA and >75% mid SFA stenoses and distal R SFA occlusion  R GEORGIA 0 27/-/-    Plan:  -s/p RLE Agram yesterday with successful R SFA PTA/stent and tibioperoneal trunk PTA  Post intervention ABIs improved with GEORGIA increased from 0 59 to 0 81  MTP/GTP remains below healing level however patient optimized from a revascularization standpoint  Recommend going forward with podiatric intervention and follow-up postoperative healing   -left femoral access site clear  -continue ASA, Plavix (SFA stent) and statin therapy  -continue abx per primary service  -continue [facility] per podiatry    Right 4th toe amputation planned by Podiatry  -renal function stable w/creat 0 86 after angiogram   -outpatient follow-up 2-3 weeks to follow wound healing and discuss long-term surveillance  -will see on a p r n  Basis during his hospitalization    Please contact us with further concerns or poor healing   -will d/w Dr Burak Queen

## 2019-11-02 NOTE — ASSESSMENT & PLAN NOTE
Lab Results   Component Value Date    HGBA1C 13 7 (A) 10/03/2019       Recent Labs     11/01/19  1116 11/01/19  1747 11/01/19 2128 11/02/19  0851   POCGLU 121 136 383* 240*       Blood Sugar Average: Last 72 hrs:  (P) 216 2   · Monitor BMP's  · Avoid nephrotoxins / hypotension  Lab Results   Component Value Date    CREATININE 0 86 11/02/2019    CREATININE 0 80 11/01/2019    CREATININE 0 90 11/01/2019

## 2019-11-02 NOTE — ASSESSMENT & PLAN NOTE
R 4th toe gangrene and nonhealing plantar foot ulcer w/lactic acidosis  -s/p revascularization 11/1/2019  -continue [address] per Podiatry     Right 4th toe amputation per Podiatry  -BC negative times 48 hours  -continue abx per primary service

## 2019-11-02 NOTE — ASSESSMENT & PLAN NOTE
· Appears to have superimposed cellulitis of the right foot - reports blister that ruptured with associated erythema  · Leukocytosis, now resolved  Afebrile  Blood cultures negative to date  · Initiated on Cefepime and Vancomycin in the ED    No history of MRSA  · Transitioned to Ancef and Flagyl  · Continue to monitor VS / CBC / cultures

## 2019-11-02 NOTE — PROGRESS NOTES
[facility]  Progress Note - Aakash Munguia 1965, 47 y o  female MRN: [id number]    Unit/Bed#: E5 -01 Encounter: [id number]    Primary Care Provider: Naina Dudley MD   Date and time admitted to hospital: 10/30/2019  6:28 PM        * Gangrenous toe [facility])  Assessment & Plan  · Presents to the emergency department with gangrenous right 4th toe, associated wound   · Vascular and a Podiatry following  · Continue with broad-spectrum antibiotics  · Angiogram with intervention performed on 11/1, continues to have below threshold for wound healing  · Plan for 4th toe amputation per Podiatry pending Vascular recommendations      PAD (peripheral artery disease) ([facility] [address] )  Assessment & Plan  · History of PAD maintained on aspirin, Plavix, statin  · Angiogram with intervention on 11/1  · Follow Vascular recommendations    Type 2 diabetes mellitus with circulatory disorder, with long-term current use of insulin [facility])  Assessment & Plan  Lab Results   Component Value Date    HGBA1C 13 7 (A) 10/03/2019       Recent Labs     11/01/19  1116 11/01/19  1747 11/01/19  2128 11/02/19  0851   POCGLU 121 136 383* 240*       Blood Sugar Average: Last 72 hrs:  · (P) 216 2   · Uncontrolled with very high A1c  · Maintained on Metformin and Lantus 78 units q HS however patient was not taking insulin as she could not afford it  She was then placed on Glipizide until prescription assistance was in place  · Continue Lantus 78 units q HS  Add prandial insulin     · Monitor FSBS ac / hs and cover with SSI  · Diabetic diet  · Nutrition consult    Lactic acidosisresolved as of 11/1/2019  Assessment & Plan  · Initial lactic acid of 2 5  · Does not meet other SIRS criteria aside from leukocytosis - also on Metformin as an outpatient  · Received IVF, now resolved    Cellulitisresolved as of 11/1/2019  Assessment & Plan  · Appears to have superimposed cellulitis of the right foot - reports blister that ruptured with associated erythema  · Leukocytosis, now resolved  Afebrile  Blood cultures negative to date  · Initiated on Cefepime and Vancomycin in the ED  No history of MRSA  · Transitioned to Ancef and Flagyl  · Continue to monitor VS / CBC / cultures    Hypokalemia  Assessment & Plan  · Replete  · Monitor  Lab Results   Component Value Date    K 3 0 (L) 11/02/2019    K 3 3 (L) 11/01/2019    K 2 6 (LL) 11/01/2019         CKD stage 2 due to type 2 diabetes mellitus [facility])  Assessment & Plan  Lab Results   Component Value Date    HGBA1C 13 7 (A) 10/03/2019       Recent Labs     11/01/19  1116 11/01/19  1747 11/01/19  2128 11/02/19  0851   POCGLU 121 136 383* 240*       Blood Sugar Average: Last 72 hrs:  (P) 216 2   · Monitor BMP's  · Avoid nephrotoxins / hypotension  Lab Results   Component Value Date    CREATININE 0 86 11/02/2019    CREATININE 0 80 11/01/2019    CREATININE 0 90 11/01/2019         HLD (hyperlipidemia)  Assessment & Plan  · Maintained on statin  · Continue while here    Essential hypertension  Assessment & Plan  · BP stable  · Continue home medication    Hyponatremiaresolved as of 11/1/2019  Assessment & Plan  · Pseudohyponatremia of 133 in setting of hyperglycemia  · Corrects to 136        VTE Pharmacologic Prophylaxis:   Pharmacologic: Enoxaparin (Lovenox)  Mechanical VTE Prophylaxis in Place: Yes    Patient Centered Rounds: I have performed bedside rounds with nursing staff today  Discussions with Specialists or Other Care Team Provider: Provider notes reviewed    Education and Discussions with Family / Patient: Plan of care with patient    Time Spent for Care: 20 minutes  More than 50% of total time spent on counseling and coordination of care as described above      Current Length of Stay: 3 day(s)    Current Patient Status: Inpatient   Certification Statement: The patient will continue to require additional inpatient hospital stay due to planned surgical intervention / monitoring    Discharge Plan: Pending clinical improvement    Code Status: Level 1 - Full Code      Subjective:   Patient denies pain  Denies shortness of breath  No complaints  Objective:     Vitals:   Temp (24hrs), Av 5 °F (36 9 °C), Min:97 9 °F (36 6 °C), Max:99 1 °F (37 3 °C)    Temp:  [97 9 °F (36 6 °C)-99 1 °F (37 3 °C)] 98 9 °F (37 2 °C)  HR:  [76-95] 76  Resp:  [18] 18  BP: (122-187)/() 122/60  SpO2:  [94 %-100 %] 96 %  Body mass index is 36 64 kg/m²  Input and Output Summary (last 24 hours): Intake/Output Summary (Last 24 hours) at 2019 1124  Last data filed at 2019 0002  Gross per 24 hour   Intake 1000 ml   Output    Net 1000 ml       Physical Exam:     Physical Exam   Constitutional: She is oriented to person, place, and time  She appears well-developed and well-nourished  No distress  HENT:   Head: Normocephalic and atraumatic  Mouth/Throat: Abnormal dentition  Eyes: Conjunctivae are normal  No scleral icterus  Cardiovascular: Normal rate, regular rhythm and normal heart sounds  No murmur heard  Pulmonary/Chest: Effort normal and breath sounds normal  No respiratory distress  She has no wheezes  She has no rales  Abdominal: Soft  Bowel sounds are normal  She exhibits no distension  There is no tenderness  Musculoskeletal: Normal range of motion  She exhibits no edema or tenderness  Neurological: She is alert and oriented to person, place, and time  Skin: Skin is warm and dry  She is not diaphoretic  There is pallor  Right foot with intact dressing  Psychiatric: She has a normal mood and affect  Her behavior is normal    Nursing note and vitals reviewed          Additional Data:     Labs:    Results from last 7 days   Lab Units 19  0526   WBC Thousand/uL 10 07   HEMOGLOBIN g/dL 9 9*   HEMATOCRIT % 32 8*   PLATELETS Thousands/uL 277   NEUTROS PCT % 73   LYMPHS PCT % 18   MONOS PCT % 8   EOS PCT % 1     Results from last 7 days   Lab Units 19  0526 10/31/19  0000   SODIUM mmol/L 138   < >  --    POTASSIUM mmol/L 3 0*   < >  --    CHLORIDE mmol/L 103   < >  --    CO2 mmol/L 27   < >  --    BUN mg/dL 7   < >  --    CREATININE mg/dL 0 86   < >  --    ANION GAP mmol/L 8   < >  --    CALCIUM mg/dL 7 6*   < >  --    ALBUMIN g/dL  --   --  2 4*   TOTAL BILIRUBIN mg/dL  --   --  0 46   ALK PHOS U/L  --   --  76   ALT U/L  --   --  13   AST U/L  --   --  17   GLUCOSE RANDOM mg/dL 178*   < >  --     < > = values in this interval not displayed  Results from last 7 days   Lab Units 10/31/19  0610   INR  1 07     Results from last 7 days   Lab Units 11/02/19  0851 11/01/19  2128 11/01/19  1747 11/01/19  1116 11/01/19  0743 10/31/19  2111 10/31/19  1617 10/31/19  1115 10/31/19  0804 10/31/19  0128   POC GLUCOSE mg/dl 240* 383* 136 121 109 134 220* 281* 236* 302*         Results from last 7 days   Lab Units 10/30/19  2216 10/30/19  2017   LACTIC ACID mmol/L 1 8 2 5*           * I Have Reviewed All Lab Data Listed Above  * Additional Pertinent Lab Tests Reviewed: [facility] Admission Reviewed    Imaging:    Imaging Reports Reviewed Today Include: XR right foot 10/30, vascular studies 10/31-11/1  Imaging Personally Reviewed by Myself Includes:  None    Recent Cultures (last 7 days):     Results from last 7 days   Lab Units 10/30/19  2016 10/30/19  1850   BLOOD CULTURE  No Growth at 48 hrs  No Growth at 48 hrs         Last 24 Hours Medication List:     Current Facility-Administered Medications:  acetaminophen 650 mg Oral Q6H PRN Tex Wolfe PA-C    aspirin 81 mg Oral Daily Imelda Alvarez PA-C    atorvastatin 20 mg Oral Daily With Havnegade JUAREZ Fabian    cefazolin 1,000 mg Intravenous Q8H [place] Last Rate: 1,000 mg (11/02/19 0543)   clopidogrel 75 mg Oral Daily Constance Saavedra PA-C    enoxaparin 40 mg Subcutaneous Q24H [facility] Imelda Edmond PA-C    hydrALAZINE 5 mg Intravenous Q6H PRN Tex Wolfe PA-C insulin glargine 78 Units Subcutaneous QAM [facility] JUAREZ Edmond    insulin lispro 1-6 Units Subcutaneous TID AC Bonita Edmond PA-C    insulin lispro 1-6 Units Subcutaneous HS [facility] JUAREZ Alvarez    insulin lispro 5 Units Subcutaneous TID With Meals MIKEL Gimenez    metoprolol tartrate 25 mg Oral Q12H [facility] PAIONA    metroNIDAZOLE 500 mg Intravenous Q8H [place], [place] Last Rate: 500 mg (11/02/19 0506)   ondansetron 4 mg Intravenous Q6H PRN Libra Dallas PA-C    potassium chloride 40 mEq Oral Once MIKEL Gimenez         Today, Patient Was Seen By: MIKEL Gimenez    ** Please Note: Dictation voice to text software may have been used in the creation of this document   **

## 2019-11-02 NOTE — ASSESSMENT & PLAN NOTE
Lab Results   Component Value Date    HGBA1C 13 7 (A) 10/03/2019       Recent Labs     11/01/19  1747 11/01/19  2128 11/02/19  0851 11/02/19  1207   POCGLU 136 383* 240* 235*       Blood Sugar Average: Last 72 hrs:  (P) 217 [id number]   -poorly controlled with HgbA1C 13 7  -continue to optimize BS control for optimization of wound healing and prevention of SSI and vascular disease  -management per Veronica House

## 2019-11-02 NOTE — PROGRESS NOTES
[facility] Podiatry - Progress Note  Patient: Aquiles Xie 47 y o  female   MRN: [id number]  PCP: Jeannie Vega MD  Unit/Bed#: E5 -05 Encounter: [id number]  Date Of Visit: 19      Assessment:    Podiatric Assessment:   Right 4th toe gangrene   Peripheral arterial disease   Right foot dorsal ulcer   Right sub metatarsal 5 ulcer      Principal Problem:    Gangrenous toe ([address] )  Active Problems:    Type 2 diabetes mellitus with circulatory disorder, with long-term current use of insulin ([facility] [address] )    Essential hypertension    HLD (hyperlipidemia)    PAD (peripheral artery disease) ([facility] [address] )    CKD stage 2 due to type 2 diabetes mellitus ([facility] [address] )    Hypokalemia        PLAN:    · ABD and waveform analysis post interventional Radiology angiogram reviewed showing new slightly improved blood flow to right foot still below healing range  · Tentative plan for right 4th toe amputation pending vascular surgery recommendations  · IV antibiotics per SLIM  · Medical management per primary team        SUBJECTIVE:     Chief Complaint:   Chief Complaint   Patient presents with    Toe Pain     Patient reports noticing her R 4th toe has turned black in color  Patient reports burning her R foot a few months ago but denies any new injuries  The patient was seen, evaluated, and assessed at bedside today  The patient was awake, alert, and in no acute distress  The patient reports no acute events overnight  The patient reports little pain at this time  Patient denies N/V/F/chills/SOB/CP        OBJECTIVE:     Vitals:   /60 (BP Location: Left arm)   Pulse 83   Temp 99 1 °F (37 3 °C) (Temporal)   Resp 18   Ht 5' (1 524 m)   Wt 85 1 kg (187 lb 9 8 oz)   LMP  (LMP Unknown)   SpO2 96%   BMI 36 64 kg/m²     Temp (24hrs), Av 3 °F (36 8 °C), Min:97 9 °F (36 6 °C), Max:99 1 °F (37 3 °C)      PHYSICAL EXAM:     General: Alert, cooperative and no distress  Lungs: Non labored breathing  Abdomen: Soft, non-tender  Extremity:     NVS at baseline B/l  MSK function at baseline B/l  No calf tenderness noted B/l  Wounds are stable since last visit with little change  Right sub 5th metatarsal ulceration scab covering minimal surrounding erythema, induration, or warmth  Fourth digit gangrenous lesions appreciated  No malodor noticed  Periwound appears intact dusky cold to touch  See images below    Clinical Images 11/02/19:    Right foot    Right foot      Additional Data:     Labs:    Results from last 7 days   Lab Units 11/02/19  0526   WBC Thousand/uL 10 07   HEMOGLOBIN g/dL 9 9*   HEMATOCRIT % 32 8*   PLATELETS Thousands/uL 277   NEUTROS PCT % 73   LYMPHS PCT % 18   MONOS PCT % 8   EOS PCT % 1     Results from last 7 days   Lab Units 11/02/19  0526  10/31/19  0000   POTASSIUM mmol/L 3 0*   < >  --    CHLORIDE mmol/L 103   < >  --    CO2 mmol/L 27   < >  --    BUN mg/dL 7   < >  --    CREATININE mg/dL 0 86   < >  --    CALCIUM mg/dL 7 6*   < >  --    ALK PHOS U/L  --   --  76   ALT U/L  --   --  13   AST U/L  --   --  17    < > = values in this interval not displayed  Results from last 7 days   Lab Units 10/31/19  0610   INR  1 07       * I Have Reviewed All Lab Data Listed Above  Recent Cultures (last 7 days):     Results from last 7 days   Lab Units 10/30/19  2016 10/30/19  1850   BLOOD CULTURE  No Growth at 48 hrs  No Growth at 48 hrs  Imaging: I have personally reviewed pertinent films in PACS  EKG, Pathology, and Other Studies: I have personally reviewed pertinent reports  Today, Patient Was Seen By: Florin Clark DPM    ** Please Note: Portions of the record may have been created with voice recognition software  Occasional wrong word or "sound a like" substitutions may have occurred due to the inherent limitations of voice recognition software  Read the chart carefully and recognize, using context, where substitutions have occurred   **

## 2019-11-02 NOTE — ASSESSMENT & PLAN NOTE
Lab Results   Component Value Date    HGBA1C 13 7 (A) 10/03/2019       Recent Labs     11/01/19  1116 11/01/19  1747 11/01/19  2128 11/02/19  0851   POCGLU 121 136 383* 240*       Blood Sugar Average: Last 72 hrs:  · (P) 216 2   · Uncontrolled with very high A1c  · Maintained on Metformin and Lantus 78 units q HS however patient was not taking insulin as she could not afford it  She was then placed on Glipizide until prescription assistance was in place  · Continue Lantus 78 units q HS  Add prandial insulin     · Monitor FSBS ac / hs and cover with SSI  · Diabetic diet  · Nutrition consult

## 2019-11-02 NOTE — ASSESSMENT & PLAN NOTE
· Replete  · Monitor  Lab Results   Component Value Date    K 3 0 (L) 11/02/2019    K 3 3 (L) 11/01/2019    K 2 6 (LL) 11/01/2019

## 2019-11-03 ENCOUNTER — ANESTHESIA EVENT (INPATIENT)
Dept: PERIOP | Facility: HOSPITAL | Age: 54
DRG: 181 | End: 2019-11-03
Payer: COMMERCIAL

## 2019-11-03 PROBLEM — I96 GANGRENE OF TOE OF RIGHT FOOT (HCC): Status: ACTIVE | Noted: 2019-10-30

## 2019-11-03 LAB
ANION GAP SERPL CALCULATED.3IONS-SCNC: 9 MMOL/L (ref 4–13)
ATRIAL RATE: 81 BPM
BASOPHILS # BLD AUTO: 0.03 THOUSANDS/ΜL (ref 0–0.1)
BASOPHILS NFR BLD AUTO: 0 % (ref 0–1)
BUN SERPL-MCNC: 8 MG/DL (ref 5–25)
CALCIUM SERPL-MCNC: 7.6 MG/DL (ref 8.3–10.1)
CHLORIDE SERPL-SCNC: 103 MMOL/L (ref 100–108)
CO2 SERPL-SCNC: 26 MMOL/L (ref 21–32)
CREAT SERPL-MCNC: 0.87 MG/DL (ref 0.6–1.3)
EOSINOPHIL # BLD AUTO: 0.08 THOUSAND/ΜL (ref 0–0.61)
EOSINOPHIL NFR BLD AUTO: 1 % (ref 0–6)
ERYTHROCYTE [DISTWIDTH] IN BLOOD BY AUTOMATED COUNT: 13.9 % (ref 11.6–15.1)
GFR SERPL CREATININE-BSD FRML MDRD: 76 ML/MIN/1.73SQ M
GLUCOSE SERPL-MCNC: 125 MG/DL (ref 65–140)
GLUCOSE SERPL-MCNC: 153 MG/DL (ref 65–140)
GLUCOSE SERPL-MCNC: 168 MG/DL (ref 65–140)
GLUCOSE SERPL-MCNC: 218 MG/DL (ref 65–140)
GLUCOSE SERPL-MCNC: 287 MG/DL (ref 65–140)
GLUCOSE SERPL-MCNC: 80 MG/DL (ref 65–140)
HCT VFR BLD AUTO: 34.4 % (ref 34.8–46.1)
HGB BLD-MCNC: 10.1 G/DL (ref 11.5–15.4)
IMM GRANULOCYTES # BLD AUTO: 0.04 THOUSAND/UL (ref 0–0.2)
IMM GRANULOCYTES NFR BLD AUTO: 0 % (ref 0–2)
LYMPHOCYTES # BLD AUTO: 1.85 THOUSANDS/ΜL (ref 0.6–4.47)
LYMPHOCYTES NFR BLD AUTO: 15 % (ref 14–44)
MCH RBC QN AUTO: 22.4 PG (ref 26.8–34.3)
MCHC RBC AUTO-ENTMCNC: 29.4 G/DL (ref 31.4–37.4)
MCV RBC AUTO: 76 FL (ref 82–98)
MONOCYTES # BLD AUTO: 0.75 THOUSAND/ΜL (ref 0.17–1.22)
MONOCYTES NFR BLD AUTO: 6 % (ref 4–12)
NEUTROPHILS # BLD AUTO: 9.25 THOUSANDS/ΜL (ref 1.85–7.62)
NEUTS SEG NFR BLD AUTO: 78 % (ref 43–75)
NRBC BLD AUTO-RTO: 0 /100 WBCS
P AXIS: 8 DEGREES
PLATELET # BLD AUTO: 291 THOUSANDS/UL (ref 149–390)
PMV BLD AUTO: 10.9 FL (ref 8.9–12.7)
POTASSIUM SERPL-SCNC: 3 MMOL/L (ref 3.5–5.3)
PR INTERVAL: 154 MS
QRS AXIS: -5 DEGREES
QRSD INTERVAL: 84 MS
QT INTERVAL: 378 MS
QTC INTERVAL: 439 MS
RBC # BLD AUTO: 4.51 MILLION/UL (ref 3.81–5.12)
SODIUM SERPL-SCNC: 138 MMOL/L (ref 136–145)
T WAVE AXIS: -1 DEGREES
VENTRICULAR RATE: 81 BPM
WBC # BLD AUTO: 12 THOUSAND/UL (ref 4.31–10.16)

## 2019-11-03 PROCEDURE — 82948 REAGENT STRIP/BLOOD GLUCOSE: CPT

## 2019-11-03 PROCEDURE — 99232 SBSQ HOSP IP/OBS MODERATE 35: CPT | Performed by: INTERNAL MEDICINE

## 2019-11-03 PROCEDURE — 80048 BASIC METABOLIC PNL TOTAL CA: CPT | Performed by: NURSE PRACTITIONER

## 2019-11-03 PROCEDURE — 85025 COMPLETE CBC W/AUTO DIFF WBC: CPT | Performed by: NURSE PRACTITIONER

## 2019-11-03 PROCEDURE — 93010 ELECTROCARDIOGRAM REPORT: CPT | Performed by: INTERNAL MEDICINE

## 2019-11-03 RX ORDER — POTASSIUM CHLORIDE 20 MEQ/1
40 TABLET, EXTENDED RELEASE ORAL DAILY
Status: DISCONTINUED | OUTPATIENT
Start: 2019-11-03 | End: 2019-11-12 | Stop reason: HOSPADM

## 2019-11-03 RX ORDER — SODIUM CHLORIDE 9 MG/ML
50 INJECTION, SOLUTION INTRAVENOUS CONTINUOUS
Status: DISCONTINUED | OUTPATIENT
Start: 2019-11-04 | End: 2019-11-04

## 2019-11-03 RX ADMIN — CEFAZOLIN SODIUM 1000 MG: 1 SOLUTION INTRAVENOUS at 15:12

## 2019-11-03 RX ADMIN — METRONIDAZOLE 500 MG: 500 INJECTION, SOLUTION INTRAVENOUS at 13:41

## 2019-11-03 RX ADMIN — METRONIDAZOLE 500 MG: 500 INJECTION, SOLUTION INTRAVENOUS at 21:45

## 2019-11-03 RX ADMIN — ASPIRIN 81 MG 81 MG: 81 TABLET ORAL at 08:42

## 2019-11-03 RX ADMIN — INSULIN LISPRO 5 UNITS: 100 INJECTION, SOLUTION INTRAVENOUS; SUBCUTANEOUS at 13:47

## 2019-11-03 RX ADMIN — CEFAZOLIN SODIUM 1000 MG: 1 SOLUTION INTRAVENOUS at 05:53

## 2019-11-03 RX ADMIN — ENOXAPARIN SODIUM 40 MG: 40 INJECTION SUBCUTANEOUS at 08:42

## 2019-11-03 RX ADMIN — INSULIN GLARGINE 78 UNITS: 100 INJECTION, SOLUTION SUBCUTANEOUS at 08:42

## 2019-11-03 RX ADMIN — INSULIN LISPRO 1 UNITS: 100 INJECTION, SOLUTION INTRAVENOUS; SUBCUTANEOUS at 08:43

## 2019-11-03 RX ADMIN — INSULIN LISPRO 2 UNITS: 100 INJECTION, SOLUTION INTRAVENOUS; SUBCUTANEOUS at 17:12

## 2019-11-03 RX ADMIN — INSULIN LISPRO 5 UNITS: 100 INJECTION, SOLUTION INTRAVENOUS; SUBCUTANEOUS at 08:43

## 2019-11-03 RX ADMIN — INSULIN LISPRO 4 UNITS: 100 INJECTION, SOLUTION INTRAVENOUS; SUBCUTANEOUS at 13:46

## 2019-11-03 RX ADMIN — ONDANSETRON 4 MG: 2 INJECTION INTRAMUSCULAR; INTRAVENOUS at 03:31

## 2019-11-03 RX ADMIN — POTASSIUM CHLORIDE 40 MEQ: 1500 TABLET, EXTENDED RELEASE ORAL at 13:41

## 2019-11-03 RX ADMIN — SODIUM CHLORIDE 50 ML/HR: 0.9 INJECTION, SOLUTION INTRAVENOUS at 23:01

## 2019-11-03 RX ADMIN — CEFAZOLIN SODIUM 1000 MG: 1 SOLUTION INTRAVENOUS at 22:57

## 2019-11-03 RX ADMIN — METOPROLOL TARTRATE 25 MG: 25 TABLET, FILM COATED ORAL at 08:42

## 2019-11-03 RX ADMIN — ATORVASTATIN CALCIUM 20 MG: 20 TABLET, FILM COATED ORAL at 17:12

## 2019-11-03 RX ADMIN — INSULIN LISPRO 5 UNITS: 100 INJECTION, SOLUTION INTRAVENOUS; SUBCUTANEOUS at 17:12

## 2019-11-03 RX ADMIN — METRONIDAZOLE 500 MG: 500 INJECTION, SOLUTION INTRAVENOUS at 05:01

## 2019-11-03 RX ADMIN — CLOPIDOGREL BISULFATE 75 MG: 75 TABLET ORAL at 08:42

## 2019-11-03 NOTE — ANESTHESIA PREPROCEDURE EVALUATION
Review of Systems/Medical History          Cardiovascular  Hyperlipidemia, Hypertension , PVD,    Pulmonary  Smoker ex-smoker  Cumulative Pack Years: [de-identified],        GI/Hepatic  Negative GI/hepatic ROS          Chronic kidney disease stage 2,        Endo/Other  Diabetes poorly controlled type 2 Insulin,   Comment: Hgb A1C 13 2    GYN  Negative gynecology ROS          Hematology  Anemia ,     Musculoskeletal    Arthritis     Neurology  Negative neurology ROS      Psychology   Negative psychology ROS              Physical Exam    Airway    Mallampati score: III  TM Distance: >3 FB  Neck ROM: full     Dental   Comment: Multiple missing teeth,     Cardiovascular  Rhythm: regular, Rate: normal, Cardiovascular exam normal    Pulmonary  Pulmonary exam normal Breath sounds clear to auscultation,     Other Findings        Anesthesia Plan  ASA Score- 3     Anesthesia Type- IV sedation with anesthesia and general with ASA Monitors  Additional Monitors:   Airway Plan: LMA  Comment: [place] is most likely neuropathic    I have attempted to contact this patient by telephone, but there is no answer repeatedly  I will continue to try later  A good historian  Plan Factors-    Induction-     Postoperative Plan- Plan for postoperative opioid use  Informed Consent- Anesthetic plan and risks discussed with patient

## 2019-11-03 NOTE — ANESTHESIA PREPROCEDURE EVALUATION
Review of Systems/Medical History  Patient summary reviewed  Chart reviewed      Cardiovascular  Hyperlipidemia, Hypertension , PVD,    Pulmonary  Smoker ex-smoker  ,        GI/Hepatic       Chronic kidney disease stage 3,        Endo/Other  Diabetes type 2 Insulin,   Obesity    GYN       Hematology  Anemia anemia of chronic disease and iron deficiency anemia,     Musculoskeletal    Comment: Osteomyelitis of left foot      Neurology   Psychology           Physical Exam    Airway    Mallampati score: II  TM Distance: <3 FB  Neck ROM: full     Dental   Comment: Missing teeth  Poor dentition  ,     Cardiovascular  Rhythm: regular, Rate: normal,     Pulmonary  Pulmonary exam normal     Other Findings        Anesthesia Plan  ASA Score- 3     Anesthesia Type- general with ASA Monitors  Additional Monitors:   Airway Plan: LMA  Comment: Consent obtained and placed in chart 2/2/19 2021    Questions answered   Plan Factors- Patient instructed to abstain from smoking on day of procedure  Patient did not smoke on day of surgery  Induction- intravenous  Postoperative Plan- Plan for postoperative opioid use  Informed Consent- Anesthetic plan and risks discussed with patient              Review of Systems/Medical History  Patient summary reviewed        Cardiovascular  Hyperlipidemia, Hypertension , PVD,    Pulmonary  Negative pulmonary ROS        GI/Hepatic  Negative GI/hepatic ROS          Chronic kidney disease stage 3,        Endo/Other  Diabetes poorly controlled type 2 Insulin,      GYN  Negative gynecology ROS          Hematology  Negative hematology ROS Anemia ,     Musculoskeletal  Negative musculoskeletal ROS   Comment: Right foot gangrene no gas noted Arthritis     Neurology  Negative neurology ROS      Psychology   Negative psychology ROS            Review of Systems/Medical History  Patient summary reviewed  Chart reviewed      Cardiovascular  Hyperlipidemia, Hypertension , PVD, Pulmonary  Smoker ex-smoker  ,        GI/Hepatic       Chronic kidney disease stage 3,        Endo/Other  Diabetes type 2 Insulin,   Obesity    GYN       Hematology  Anemia anemia of chronic disease and iron deficiency anemia,     Musculoskeletal    Comment: Osteomyelitis of left foot      Neurology   Psychology           Physical Exam    Airway    Mallampati score: II  TM Distance: <3 FB  Neck ROM: full     Dental   Comment: Missing teeth  Poor dentition  ,     Cardiovascular  Rhythm: regular, Rate: normal,     Pulmonary  Pulmonary exam normal     Other Findings        Anesthesia Plan  ASA Score- 3     Anesthesia Type- general with ASA Monitors  Additional Monitors:   Airway Plan: LMA  Comment: Consent obtained and placed in chart 2/2/19 2021    Questions answered   Plan Factors- Patient instructed to abstain from smoking on day of procedure  Patient did not smoke on day of surgery  Induction- intravenous  Postoperative Plan- Plan for postoperative opioid use  Informed Consent- Anesthetic plan and risks discussed with patient  Physical Exam    Airway    Mallampati score: III  TM Distance: >3 FB  Neck ROM: full     Dental   Comment: Multiple missing teeth,     Cardiovascular  Rhythm: regular, Rate: normal, Cardiovascular exam normal    Pulmonary  Pulmonary exam normal Breath sounds clear to auscultation,     Other Findings        Anesthesia Plan  ASA Score- 3     Anesthesia Type- IV sedation with anesthesia and general with ASA Monitors  Additional Monitors:   Airway Plan: LMA  Comment: Probably neuropathic could consider sedation  Plan Factors-    Induction- intravenous  Postoperative Plan- Plan for postoperative opioid use  Informed Consent- Anesthetic plan and risks discussed with patient

## 2019-11-03 NOTE — PLAN OF CARE
Problem: Potential for Falls  Goal: Patient will remain free of falls  Description  INTERVENTIONS:  - Assess patient frequently for physical needs  -  Identify cognitive and physical deficits and behaviors that affect risk of falls    -  [place] fall precautions as indicated by assessment   - Educate patient/family on patient safety including physical limitations  - Instruct patient to call for assistance with activity based on assessment  - Modify environment to reduce risk of injury  - Consider OT/PT consult to assist with strengthening/mobility  Outcome: Progressing     Problem: PAIN - ADULT  Goal: Verbalizes/displays adequate comfort level or baseline comfort level  Description  Interventions:  - Encourage patient to monitor pain and request assistance  - Assess pain using appropriate pain scale  - Administer analgesics based on type and severity of pain and evaluate response  - Implement non-pharmacological measures as appropriate and evaluate response  - Consider cultural and social influences on pain and pain management  - Notify physician/advanced practitioner if interventions unsuccessful or patient reports new pain  Outcome: Progressing     Problem: INFECTION - ADULT  Goal: Absence or prevention of progression during hospitalization  Description  INTERVENTIONS:  - Assess and monitor for signs and symptoms of infection  - Monitor lab/diagnostic results  - Monitor all insertion sites, i e  indwelling lines, tubes, and drains  - Monitor endotracheal if appropriate and nasal secretions for changes in amount and color  - [place] appropriate cooling/warming therapies per order  - Administer medications as ordered  - Instruct and encourage patient and family to use good hand hygiene technique  - Identify and instruct in appropriate isolation precautions for identified infection/condition  Outcome: Progressing  Goal: Absence of fever/infection during neutropenic period  Description  INTERVENTIONS:  - Monitor WBC    Outcome: Progressing     Problem: SAFETY ADULT  Goal: Patient will remain free of falls  Description  INTERVENTIONS:  - Assess patient frequently for physical needs  -  Identify cognitive and physical deficits and behaviors that affect risk of falls    -  [place] fall precautions as indicated by assessment   - Educate patient/family on patient safety including physical limitations  - Instruct patient to call for assistance with activity based on assessment  - Modify environment to reduce risk of injury  - Consider OT/PT consult to assist with strengthening/mobility  Outcome: Progressing  Goal: Maintain or return to baseline ADL function  Description  INTERVENTIONS:  -  Assess patient's ability to carry out ADLs; assess patient's baseline for ADL function and identify physical deficits which impact ability to perform ADLs (bathing, care of mouth/teeth, toileting, grooming, dressing, etc )  - Assess/evaluate cause of self-care deficits   - Assess range of motion  - Assess patient's mobility; develop plan if impaired  - Assess patient's need for assistive devices and provide as appropriate  - Encourage maximum independence but intervene and supervise when necessary  - Involve family in performance of ADLs  - Assess for home care needs following discharge   - Consider OT consult to assist with ADL evaluation and planning for discharge  - Provide patient education as appropriate  Outcome: Progressing  Goal: Maintain or return mobility status to optimal level  Description  INTERVENTIONS:  - Assess patient's baseline mobility status (ambulation, transfers, stairs, etc )    - Identify cognitive and physical deficits and behaviors that affect mobility  - Identify mobility aids required to assist with transfers and/or ambulation (gait belt, sit-to-stand, lift, walker, cane, etc )  - [place] fall precautions as indicated by assessment  - Record patient progress and toleration of activity level on Mobility SBAR; progress patient to next Phase/Stage  - Instruct patient to call for assistance with activity based on assessment  - Consider rehabilitation consult to assist with strengthening/weightbearing, etc   Outcome: Progressing     Problem: DISCHARGE PLANNING  Goal: Discharge to home or other facility with appropriate resources  Description  INTERVENTIONS:  - Identify barriers to discharge w/patient and caregiver  - Arrange for needed discharge resources and transportation as appropriate  - Identify discharge learning needs (meds, wound care, etc )  - Arrange for interpretive services to assist at discharge as needed  - Refer to Case Management Department for coordinating discharge planning if the patient needs post-hospital services based on physician/advanced practitioner order or complex needs related to functional status, cognitive ability, or social support system  Outcome: Progressing     Problem: Knowledge Deficit  Goal: Patient/family/caregiver demonstrates understanding of disease process, treatment plan, medications, and discharge instructions  Description  Complete learning assessment and assess knowledge base  Interventions:  - Provide teaching at level of understanding  - Provide teaching via preferred learning methods  Outcome: Progressing     Problem: Nutrition/Hydration-ADULT  Goal: Nutrient/Hydration intake appropriate for improving, restoring or maintaining nutritional needs  Description  Monitor and assess patient's nutrition/hydration status for malnutrition  Collaborate with interdisciplinary team and initiate plan and interventions as ordered  Monitor patient's weight and dietary intake as ordered or per policy  Utilize nutrition screening tool and intervene as necessary  Determine patient's food preferences and provide high-protein, high-caloric foods as appropriate       INTERVENTIONS:  - Monitor oral intake, urinary output, labs, and treatment plans  - Assess nutrition and hydration status and recommend course of action  - Evaluate amount of meals eaten  - Assist patient with eating if necessary   - Allow adequate time for meals  - Recommend/ encourage appropriate diets, oral nutritional supplements, and vitamin/mineral supplements  - Order, calculate, and assess calorie counts as needed  - Recommend, monitor, and adjust tube feedings and TPN/PPN based on assessed needs  - Assess need for intravenous fluids  - Provide specific nutrition/hydration education as appropriate  - Include patient/family/caregiver in decisions related to nutrition  Outcome: Progressing     Problem: Prexisting or High Potential for Compromised Skin Integrity  Goal: Skin integrity is maintained or improved  Description  INTERVENTIONS:  - Identify patients at risk for skin breakdown  - Assess and monitor skin integrity  - Assess and monitor nutrition and hydration status  - Monitor labs   - Assess for incontinence   - Turn and reposition patient  - Assist with mobility/ambulation  - Relieve pressure over bony prominences  - Avoid friction and shearing  - Provide appropriate hygiene as needed including keeping skin clean and dry  - Evaluate need for skin moisturizer/barrier cream  - Collaborate with interdisciplinary team   - Patient/family teaching  - Consider wound care consult   Outcome: Progressing

## 2019-11-03 NOTE — ASSESSMENT & PLAN NOTE
· Status post right SFA PTA/stent 04/30/2019  · Status post right lower extremity Agram on 11/01/2019 with successful R SFA PTA/stent  · Vascular surgery input appreciated  · Continue with aspirin, Plavix, statin  · History of PAD maintained on aspirin, Plavix, statin

## 2019-11-03 NOTE — ASSESSMENT & PLAN NOTE
51-year-old female history of diabetes mellitus who was unable to take her insulin over the last several months due to lack of insurance and recently re-started in October, hypertension, hyperlipidemia, peripheral arterial disease admitted for gangrenous toe  · Continue Ancef and Flagyl, cultures negative thus far  · Angiogram with intervention performed on 11/1, continues to have below threshold for wound healing  · Plan for 4th toe amputation per Podiatry pending Vascular recommendations  Vascular has recommended to move forward with surgery    · Plan for right 4th toe amputation today, patient remained NPO, will monitor blood sugars

## 2019-11-03 NOTE — ASSESSMENT & PLAN NOTE
Lab Results   Component Value Date    HGBA1C 13 7 (A) 10/03/2019       Recent Labs     11/02/19  1207 11/02/19  1646 11/02/19  2108 11/03/19  0748   POCGLU 235* 285* 178* 168*       Blood Sugar Average: Last 72 hrs:  · (P) 216 [id number]   · Uncontrolled with very high A1c  · Maintained on Metformin and Lantus 78 units q HS however patient was not taking insulin as she could not afford it  She was then placed on Glipizide until prescription assistance was in place  · Continue Lantus 78 units q HS  Prandial insulin added     · Monitor FSBS ac / hs and cover with SSI  · Diabetic diet  · Nutrition consult

## 2019-11-03 NOTE — ASSESSMENT & PLAN NOTE
Lab Results   Component Value Date    HGBA1C 13 7 (A) 10/03/2019       Recent Labs     11/02/19  1207 11/02/19  1646 11/02/19  2108 11/03/19  0748   POCGLU 235* 285* 178* 168*       Blood Sugar Average: Last 72 hrs:  (P) 216 [id number]   · Monitor BMP's  · Avoid nephrotoxins / hypotension  Lab Results   Component Value Date    CREATININE 0 87 11/03/2019    CREATININE 0 86 11/02/2019    CREATININE 0 80 11/01/2019

## 2019-11-03 NOTE — ASSESSMENT & PLAN NOTE
· Presents to the emergency department with gangrenous right 4th toe, associated wound   · Vascular and Podiatry following  · Continue with broad-spectrum antibiotics  · Angiogram with intervention performed on 11/1, continues to have below threshold for wound healing  · Plan for 4th toe amputation per Podiatry pending Vascular recommendations  Vascular has recommended to move forward with surgery

## 2019-11-03 NOTE — PROGRESS NOTES
[facility]   Progress Note - Constance Munguia 1965, 47 y o  female MRN: [id number]    Unit/Bed#: E5 -01 Encounter: [id number]    Primary Care Provider: Melissa Barksdale MD   Date and time admitted to hospital: 10/30/2019  6:28 PM        * Gangrenous toe [facility])  Assessment & Plan  · Presents to the emergency department with gangrenous right 4th toe, associated wound   · Vascular and Podiatry following  · Continue with broad-spectrum antibiotics  · Angiogram with intervention performed on 11/1, continues to have below threshold for wound healing  · Plan for 4th toe amputation per Podiatry pending Vascular recommendations  Vascular has recommended to move forward with surgery  PAD (peripheral artery disease) ([facility] [address] )  Assessment & Plan  · History of PAD maintained on aspirin, Plavix, statin  · Angiogram with intervention on 11/1  · Follow Vascular recommendations    Type 2 diabetes mellitus with circulatory disorder, with long-term current use of insulin [facility])  Assessment & Plan  Lab Results   Component Value Date    HGBA1C 13 7 (A) 10/03/2019       Recent Labs     11/02/19  1207 11/02/19  1646 11/02/19  2108 11/03/19  0748   POCGLU 235* 285* 178* 168*       Blood Sugar Average: Last 72 hrs:  · (P) 216 [id number]   · Uncontrolled with very high A1c  · Maintained on Metformin and Lantus 78 units q HS however patient was not taking insulin as she could not afford it  She was then placed on Glipizide until prescription assistance was in place  · Continue Lantus 78 units q HS  Prandial insulin added     · Monitor FSBS ac / hs and cover with SSI  · Diabetic diet  · Nutrition consult    Lactic acidosisresolved as of 11/1/2019  Assessment & Plan  · Initial lactic acid of 2 5  · Does not meet other SIRS criteria aside from leukocytosis - also on Metformin as an outpatient  · Received IVF, now resolved    Cellulitisresolved as of 11/1/2019  Assessment & Plan  · Appears to have superimposed cellulitis of the right foot - reports blister that ruptured with associated erythema  · Leukocytosis, now resolved  Afebrile  Blood cultures negative to date  · Initiated on Cefepime and Vancomycin in the ED  No history of MRSA  · Transitioned to Ancef and Flagyl  · Continue to monitor VS / CBC / cultures    Hypokalemia  Assessment & Plan  · Repleted but persistent  Place on scheduled daily potassium supplement  · Monitor  Lab Results   Component Value Date    K 3 0 (L) 11/03/2019    K 3 0 (L) 11/02/2019    K 3 3 (L) 11/01/2019         CKD stage 2 due to type 2 diabetes mellitus [facility])  Assessment & Plan  Lab Results   Component Value Date    HGBA1C 13 7 (A) 10/03/2019       Recent Labs     11/02/19  1207 11/02/19  1646 11/02/19  2108 11/03/19  0748   POCGLU 235* 285* 178* 168*       Blood Sugar Average: Last 72 hrs:  (P) 216 [id number]   · Monitor BMP's  · Avoid nephrotoxins / hypotension  Lab Results   Component Value Date    CREATININE 0 87 11/03/2019    CREATININE 0 86 11/02/2019    CREATININE 0 80 11/01/2019         HLD (hyperlipidemia)  Assessment & Plan  · Maintained on statin  · Continue while here    Essential hypertension  Assessment & Plan  · BP stable  · Continue home medication    Hyponatremiaresolved as of 11/1/2019  Assessment & Plan  · Pseudohyponatremia of 133 in setting of hyperglycemia  · Corrects to 136        VTE Pharmacologic Prophylaxis:   Pharmacologic: Enoxaparin (Lovenox)  Mechanical VTE Prophylaxis in Place: Yes    Patient Centered Rounds: I have performed bedside rounds with nursing staff today  Discussions with Specialists or Other Care Team Provider: Provider notes reviewed  Education and Discussions with Family / Patient: Plan of care with patient    Time Spent for Care: 20 minutes  More than 50% of total time spent on counseling and coordination of care as described above      Current Length of Stay: 4 day(s)    Current Patient Status: Inpatient Certification Statement: The patient will continue to require additional inpatient hospital stay due to planned surgical intervention with Podiatry    Discharge Plan: Pending    Code Status: Level 1 - Full Code      Subjective:   Patient had some nausea and vomiting which she attributes to her "nerves"  She denies abdominal pain  No other complaints  Objective:     Vitals:   Temp (24hrs), Av 3 °F (36 8 °C), Min:97 6 °F (36 4 °C), Max:99 1 °F (37 3 °C)    Temp:  [97 6 °F (36 4 °C)-99 1 °F (37 3 °C)] 97 6 °F (36 4 °C)  HR:  [71-86] 86  Resp:  [18-20] 20  BP: (125-183)/(64-86) 162/64  SpO2:  [94 %-97 %] 94 %  Body mass index is 36 64 kg/m²  Input and Output Summary (last 24 hours): Intake/Output Summary (Last 24 hours) at 11/3/2019 1211  Last data filed at 11/3/2019 0744  Gross per 24 hour   Intake 360 ml   Output    Net 360 ml       Physical Exam:     Physical Exam   Constitutional: She is oriented to person, place, and time  She appears well-developed and well-nourished  No distress  HENT:   Head: Normocephalic and atraumatic  Eyes: Conjunctivae are normal  No scleral icterus  Cardiovascular: Normal rate, regular rhythm and normal heart sounds  No murmur heard  Pulmonary/Chest: Effort normal and breath sounds normal  No respiratory distress  She has no wheezes  She has no rales  Abdominal: Soft  Bowel sounds are normal  She exhibits no distension  There is no tenderness  Musculoskeletal: Normal range of motion  She exhibits no edema or tenderness  Neurological: She is alert and oriented to person, place, and time  Skin: Skin is warm and dry  She is not diaphoretic  Right foot with intact dressing  Psychiatric: She has a normal mood and affect  Her behavior is normal    Nursing note and vitals reviewed          Additional Data:     Labs:    Results from last 7 days   Lab Units 19  0458   WBC Thousand/uL 12 00*   HEMOGLOBIN g/dL 10 1*   HEMATOCRIT % 34 4*   PLATELETS Thousands/uL 291   NEUTROS PCT % 78*   LYMPHS PCT % 15   MONOS PCT % 6   EOS PCT % 1     Results from last 7 days   Lab Units 11/03/19  0458  10/31/19  0000   SODIUM mmol/L 138   < >  --    POTASSIUM mmol/L 3 0*   < >  --    CHLORIDE mmol/L 103   < >  --    CO2 mmol/L 26   < >  --    BUN mg/dL 8   < >  --    CREATININE mg/dL 0 87   < >  --    ANION GAP mmol/L 9   < >  --    CALCIUM mg/dL 7 6*   < >  --    ALBUMIN g/dL  --   --  2 4*   TOTAL BILIRUBIN mg/dL  --   --  0 46   ALK PHOS U/L  --   --  76   ALT U/L  --   --  13   AST U/L  --   --  17   GLUCOSE RANDOM mg/dL 125   < >  --     < > = values in this interval not displayed  Results from last 7 days   Lab Units 10/31/19  0610   INR  1 07     Results from last 7 days   Lab Units 11/03/19  0748 11/02/19  2108 11/02/19  1646 11/02/19  1207 11/02/19  0851 11/01/19  2128 11/01/19  1747 11/01/19  1116 11/01/19  0743 10/31/19  2111 10/31/19  1617 10/31/19  1115   POC GLUCOSE mg/dl 168* 178* 285* 235* 240* 383* 136 121 109 134 220* 281*         Results from last 7 days   Lab Units 10/30/19  2216 10/30/19  2017   LACTIC ACID mmol/L 1 8 2 5*           * I Have Reviewed All Lab Data Listed Above  * Additional Pertinent Lab Tests Reviewed: [facility] Admission Reviewed    Imaging:    Imaging Reports Reviewed Today Include: None  Imaging Personally Reviewed by Myself Includes:  None    Recent Cultures (last 7 days):     Results from last 7 days   Lab Units 10/30/19  2016 10/30/19  1850   BLOOD CULTURE  No Growth at 72 hrs  No Growth at 72 hrs         Last 24 Hours Medication List:     Current Facility-Administered Medications:  acetaminophen 650 mg Oral Q6H PRN Brendan Gomez PA-C    aspirin 81 mg Oral Daily Norma Alvarez PA-C    atorvastatin 20 mg Oral Daily With Havnegade JUAREZ Fabian    cefazolin 1,000 mg Intravenous Q8H Norma Cormier [place], [place] Last Rate: 1,000 mg (11/03/19 0553)   clopidogrel 75 mg Oral Daily Vi Evans PA-C enoxaparin 40 mg Subcutaneous Q24H [facility] Eileen Edmond PA-C    hydrALAZINE 5 mg Intravenous Q6H PRN Eileen Edmond PA-C    insulin glargine 78 Units Subcutaneous QAM Eileen Alvarez PA-C    insulin lispro 1-6 Units Subcutaneous TID AC Bonita Edmond PA-C    insulin lispro 1-6 Units Subcutaneous HS Eileen Alvarez PA-C    insulin lispro 5 Units Subcutaneous TID With Meals MIKEL Feldman    metoprolol tartrate 25 mg Oral Q12H [facility] Eileen Edmond PA-C    metroNIDAZOLE 500 mg Intravenous Q8H [place], [place] Last Rate: 500 mg (11/03/19 0501)   ondansetron 4 mg Intravenous Q6H PRN Juanita Lopez PA-C    potassium chloride 40 mEq Oral Daily MIKEL Feldman         Today, Patient Was Seen By: MIKEL Feldman    ** Please Note: Dictation voice to text software may have been used in the creation of this document   **

## 2019-11-03 NOTE — ASSESSMENT & PLAN NOTE
· Repleted but persistent  Place on scheduled daily potassium supplement    · Monitor  Lab Results   Component Value Date    K 3 0 (L) 11/03/2019    K 3 0 (L) 11/02/2019    K 3 3 (L) 11/01/2019

## 2019-11-04 ENCOUNTER — APPOINTMENT (INPATIENT)
Dept: RADIOLOGY | Facility: HOSPITAL | Age: 54
DRG: 181 | End: 2019-11-04
Payer: COMMERCIAL

## 2019-11-04 ENCOUNTER — ANESTHESIA (INPATIENT)
Dept: PERIOP | Facility: HOSPITAL | Age: 54
DRG: 181 | End: 2019-11-04
Payer: COMMERCIAL

## 2019-11-04 LAB
ANION GAP SERPL CALCULATED.3IONS-SCNC: 7 MMOL/L (ref 4–13)
BACTERIA BLD CULT: NORMAL
BACTERIA BLD CULT: NORMAL
BASOPHILS # BLD AUTO: 0.02 THOUSANDS/ΜL (ref 0–0.1)
BASOPHILS NFR BLD AUTO: 0 % (ref 0–1)
BUN SERPL-MCNC: 6 MG/DL (ref 5–25)
CALCIUM SERPL-MCNC: 7.6 MG/DL (ref 8.3–10.1)
CHLORIDE SERPL-SCNC: 104 MMOL/L (ref 100–108)
CO2 SERPL-SCNC: 27 MMOL/L (ref 21–32)
CREAT SERPL-MCNC: 0.94 MG/DL (ref 0.6–1.3)
EOSINOPHIL # BLD AUTO: 0.07 THOUSAND/ΜL (ref 0–0.61)
EOSINOPHIL NFR BLD AUTO: 1 % (ref 0–6)
ERYTHROCYTE [DISTWIDTH] IN BLOOD BY AUTOMATED COUNT: 14 % (ref 11.6–15.1)
GFR SERPL CREATININE-BSD FRML MDRD: 69 ML/MIN/1.73SQ M
GLUCOSE SERPL-MCNC: 109 MG/DL (ref 65–140)
GLUCOSE SERPL-MCNC: 113 MG/DL (ref 65–140)
GLUCOSE SERPL-MCNC: 230 MG/DL (ref 65–140)
GLUCOSE SERPL-MCNC: 275 MG/DL (ref 65–140)
GLUCOSE SERPL-MCNC: 77 MG/DL (ref 65–140)
GLUCOSE SERPL-MCNC: 81 MG/DL (ref 65–140)
GLUCOSE SERPL-MCNC: 81 MG/DL (ref 65–140)
GLUCOSE SERPL-MCNC: 94 MG/DL (ref 65–140)
HCT VFR BLD AUTO: 36.3 % (ref 34.8–46.1)
HGB BLD-MCNC: 10.7 G/DL (ref 11.5–15.4)
IMM GRANULOCYTES # BLD AUTO: 0.06 THOUSAND/UL (ref 0–0.2)
IMM GRANULOCYTES NFR BLD AUTO: 1 % (ref 0–2)
LYMPHOCYTES # BLD AUTO: 2.04 THOUSANDS/ΜL (ref 0.6–4.47)
LYMPHOCYTES NFR BLD AUTO: 17 % (ref 14–44)
MAGNESIUM SERPL-MCNC: 1.6 MG/DL (ref 1.6–2.6)
MCH RBC QN AUTO: 22.3 PG (ref 26.8–34.3)
MCHC RBC AUTO-ENTMCNC: 29.5 G/DL (ref 31.4–37.4)
MCV RBC AUTO: 76 FL (ref 82–98)
MONOCYTES # BLD AUTO: 0.71 THOUSAND/ΜL (ref 0.17–1.22)
MONOCYTES NFR BLD AUTO: 6 % (ref 4–12)
NEUTROPHILS # BLD AUTO: 9 THOUSANDS/ΜL (ref 1.85–7.62)
NEUTS SEG NFR BLD AUTO: 75 % (ref 43–75)
NRBC BLD AUTO-RTO: 0 /100 WBCS
PLATELET # BLD AUTO: 338 THOUSANDS/UL (ref 149–390)
PMV BLD AUTO: 10.7 FL (ref 8.9–12.7)
POTASSIUM SERPL-SCNC: 3.3 MMOL/L (ref 3.5–5.3)
RBC # BLD AUTO: 4.8 MILLION/UL (ref 3.81–5.12)
SODIUM SERPL-SCNC: 138 MMOL/L (ref 136–145)
WBC # BLD AUTO: 11.9 THOUSAND/UL (ref 4.31–10.16)

## 2019-11-04 PROCEDURE — 0Y6V0Z0 DETACHMENT AT RIGHT 4TH TOE, COMPLETE, OPEN APPROACH: ICD-10-PCS | Performed by: PODIATRIST

## 2019-11-04 PROCEDURE — 82948 REAGENT STRIP/BLOOD GLUCOSE: CPT

## 2019-11-04 PROCEDURE — 82088 ASSAY OF ALDOSTERONE: CPT | Performed by: INTERNAL MEDICINE

## 2019-11-04 PROCEDURE — 88305 TISSUE EXAM BY PATHOLOGIST: CPT | Performed by: PATHOLOGY

## 2019-11-04 PROCEDURE — 80048 BASIC METABOLIC PNL TOTAL CA: CPT | Performed by: NURSE PRACTITIONER

## 2019-11-04 PROCEDURE — 84244 ASSAY OF RENIN: CPT | Performed by: INTERNAL MEDICINE

## 2019-11-04 PROCEDURE — 88311 DECALCIFY TISSUE: CPT | Performed by: PATHOLOGY

## 2019-11-04 PROCEDURE — 99232 SBSQ HOSP IP/OBS MODERATE 35: CPT | Performed by: INTERNAL MEDICINE

## 2019-11-04 PROCEDURE — 73630 X-RAY EXAM OF FOOT: CPT

## 2019-11-04 PROCEDURE — 85025 COMPLETE CBC W/AUTO DIFF WBC: CPT | Performed by: NURSE PRACTITIONER

## 2019-11-04 PROCEDURE — 83735 ASSAY OF MAGNESIUM: CPT | Performed by: INTERNAL MEDICINE

## 2019-11-04 PROCEDURE — 0Y6T0Z0 DETACHMENT AT RIGHT 3RD TOE, COMPLETE, OPEN APPROACH: ICD-10-PCS | Performed by: PODIATRIST

## 2019-11-04 RX ORDER — EPHEDRINE SULFATE 50 MG/ML
INJECTION INTRAVENOUS AS NEEDED
Status: DISCONTINUED | OUTPATIENT
Start: 2019-11-04 | End: 2019-11-04 | Stop reason: SURG

## 2019-11-04 RX ORDER — INSULIN GLARGINE 100 [IU]/ML
39 INJECTION, SOLUTION SUBCUTANEOUS EVERY 12 HOURS SCHEDULED
Status: DISCONTINUED | OUTPATIENT
Start: 2019-11-04 | End: 2019-11-04

## 2019-11-04 RX ORDER — MIDAZOLAM HYDROCHLORIDE 2 MG/2ML
INJECTION, SOLUTION INTRAMUSCULAR; INTRAVENOUS AS NEEDED
Status: DISCONTINUED | OUTPATIENT
Start: 2019-11-04 | End: 2019-11-04 | Stop reason: SURG

## 2019-11-04 RX ORDER — TRAMADOL HYDROCHLORIDE 50 MG/1
50 TABLET ORAL EVERY 6 HOURS PRN
Status: DISCONTINUED | OUTPATIENT
Start: 2019-11-04 | End: 2019-11-12 | Stop reason: HOSPADM

## 2019-11-04 RX ORDER — PROPOFOL 10 MG/ML
INJECTION, EMULSION INTRAVENOUS AS NEEDED
Status: DISCONTINUED | OUTPATIENT
Start: 2019-11-04 | End: 2019-11-04 | Stop reason: SURG

## 2019-11-04 RX ORDER — ONDANSETRON 2 MG/ML
4 INJECTION INTRAMUSCULAR; INTRAVENOUS ONCE
Status: DISCONTINUED | OUTPATIENT
Start: 2019-11-04 | End: 2019-11-04 | Stop reason: HOSPADM

## 2019-11-04 RX ORDER — INSULIN GLARGINE 100 [IU]/ML
39 INJECTION, SOLUTION SUBCUTANEOUS ONCE
Status: COMPLETED | OUTPATIENT
Start: 2019-11-04 | End: 2019-11-04

## 2019-11-04 RX ORDER — PROPOFOL 10 MG/ML
INJECTION, EMULSION INTRAVENOUS CONTINUOUS PRN
Status: DISCONTINUED | OUTPATIENT
Start: 2019-11-04 | End: 2019-11-04 | Stop reason: SURG

## 2019-11-04 RX ORDER — DEXTROSE AND SODIUM CHLORIDE 5; .45 G/100ML; G/100ML
50 INJECTION, SOLUTION INTRAVENOUS CONTINUOUS
Status: DISCONTINUED | OUTPATIENT
Start: 2019-11-04 | End: 2019-11-04

## 2019-11-04 RX ORDER — SODIUM CHLORIDE 9 MG/ML
125 INJECTION, SOLUTION INTRAVENOUS ONCE
Status: COMPLETED | OUTPATIENT
Start: 2019-11-04 | End: 2019-11-04

## 2019-11-04 RX ORDER — MAGNESIUM HYDROXIDE 1200 MG/15ML
LIQUID ORAL AS NEEDED
Status: DISCONTINUED | OUTPATIENT
Start: 2019-11-04 | End: 2019-11-04 | Stop reason: HOSPADM

## 2019-11-04 RX ORDER — MAGNESIUM SULFATE 1 G/100ML
1 INJECTION INTRAVENOUS ONCE
Status: COMPLETED | OUTPATIENT
Start: 2019-11-04 | End: 2019-11-04

## 2019-11-04 RX ORDER — OXYCODONE HYDROCHLORIDE 5 MG/1
5 TABLET ORAL EVERY 4 HOURS PRN
Status: DISCONTINUED | OUTPATIENT
Start: 2019-11-04 | End: 2019-11-12 | Stop reason: HOSPADM

## 2019-11-04 RX ORDER — FENTANYL CITRATE 50 UG/ML
INJECTION, SOLUTION INTRAMUSCULAR; INTRAVENOUS AS NEEDED
Status: DISCONTINUED | OUTPATIENT
Start: 2019-11-04 | End: 2019-11-04 | Stop reason: SURG

## 2019-11-04 RX ORDER — HYDROMORPHONE HCL/PF 1 MG/ML
0.5 SYRINGE (ML) INJECTION
Status: DISCONTINUED | OUTPATIENT
Start: 2019-11-04 | End: 2019-11-04 | Stop reason: HOSPADM

## 2019-11-04 RX ORDER — INSULIN GLARGINE 100 [IU]/ML
36 INJECTION, SOLUTION SUBCUTANEOUS EVERY 12 HOURS SCHEDULED
Status: DISCONTINUED | OUTPATIENT
Start: 2019-11-04 | End: 2019-11-06

## 2019-11-04 RX ORDER — FENTANYL CITRATE/PF 50 MCG/ML
25 SYRINGE (ML) INJECTION
Status: DISCONTINUED | OUTPATIENT
Start: 2019-11-04 | End: 2019-11-04 | Stop reason: HOSPADM

## 2019-11-04 RX ADMIN — MIDAZOLAM 2 MG: 1 INJECTION INTRAMUSCULAR; INTRAVENOUS at 16:17

## 2019-11-04 RX ADMIN — PROPOFOL 50 MCG/KG/MIN: 10 INJECTION, EMULSION INTRAVENOUS at 16:35

## 2019-11-04 RX ADMIN — FENTANYL CITRATE 100 MCG: 50 INJECTION, SOLUTION INTRAMUSCULAR; INTRAVENOUS at 16:17

## 2019-11-04 RX ADMIN — ONDANSETRON 4 MG: 2 INJECTION INTRAMUSCULAR; INTRAVENOUS at 16:45

## 2019-11-04 RX ADMIN — ONDANSETRON 4 MG: 2 INJECTION INTRAMUSCULAR; INTRAVENOUS at 00:52

## 2019-11-04 RX ADMIN — METRONIDAZOLE 500 MG: 500 INJECTION, SOLUTION INTRAVENOUS at 22:35

## 2019-11-04 RX ADMIN — PROPOFOL 100 MG: 10 INJECTION, EMULSION INTRAVENOUS at 16:22

## 2019-11-04 RX ADMIN — METOPROLOL TARTRATE 25 MG: 25 TABLET, FILM COATED ORAL at 09:11

## 2019-11-04 RX ADMIN — METRONIDAZOLE 500 MG: 500 INJECTION, SOLUTION INTRAVENOUS at 14:42

## 2019-11-04 RX ADMIN — INSULIN LISPRO 3 UNITS: 100 INJECTION, SOLUTION INTRAVENOUS; SUBCUTANEOUS at 21:55

## 2019-11-04 RX ADMIN — LIDOCAINE HYDROCHLORIDE 100 MG: 20 INJECTION, SOLUTION INTRAVENOUS at 16:17

## 2019-11-04 RX ADMIN — CEFAZOLIN SODIUM 1000 MG: 1 SOLUTION INTRAVENOUS at 14:03

## 2019-11-04 RX ADMIN — CEFAZOLIN SODIUM 1000 MG: 1 SOLUTION INTRAVENOUS at 06:09

## 2019-11-04 RX ADMIN — INSULIN HUMAN 4 UNITS: 100 INJECTION, SOLUTION PARENTERAL at 17:50

## 2019-11-04 RX ADMIN — CLOPIDOGREL BISULFATE 75 MG: 75 TABLET ORAL at 09:11

## 2019-11-04 RX ADMIN — POTASSIUM CHLORIDE 40 MEQ: 1500 TABLET, EXTENDED RELEASE ORAL at 09:11

## 2019-11-04 RX ADMIN — CEFAZOLIN SODIUM 1000 MG: 1 SOLUTION INTRAVENOUS at 21:54

## 2019-11-04 RX ADMIN — TRAMADOL HYDROCHLORIDE 50 MG: 50 TABLET, FILM COATED ORAL at 22:35

## 2019-11-04 RX ADMIN — SODIUM CHLORIDE 125 ML/HR: 0.9 INJECTION, SOLUTION INTRAVENOUS at 18:03

## 2019-11-04 RX ADMIN — METRONIDAZOLE 500 MG: 500 INJECTION, SOLUTION INTRAVENOUS at 05:12

## 2019-11-04 RX ADMIN — ASPIRIN 81 MG 81 MG: 81 TABLET ORAL at 09:11

## 2019-11-04 RX ADMIN — ACETAMINOPHEN 650 MG: 325 TABLET ORAL at 21:52

## 2019-11-04 RX ADMIN — EPHEDRINE SULFATE 10 MG: 50 INJECTION, SOLUTION INTRAVENOUS at 16:31

## 2019-11-04 RX ADMIN — INSULIN GLARGINE 36 UNITS: 100 INJECTION, SOLUTION SUBCUTANEOUS at 21:52

## 2019-11-04 RX ADMIN — DEXTROSE AND SODIUM CHLORIDE 50 ML/HR: 5; .45 INJECTION, SOLUTION INTRAVENOUS at 12:23

## 2019-11-04 RX ADMIN — INSULIN GLARGINE 39 UNITS: 100 INJECTION, SOLUTION SUBCUTANEOUS at 09:26

## 2019-11-04 RX ADMIN — MAGNESIUM SULFATE HEPTAHYDRATE 1 G: 1 INJECTION, SOLUTION INTRAVENOUS at 09:26

## 2019-11-04 RX ADMIN — METOPROLOL TARTRATE 25 MG: 25 TABLET, FILM COATED ORAL at 21:51

## 2019-11-04 NOTE — ASSESSMENT & PLAN NOTE
Lab Results   Component Value Date    HGBA1C 13 7 (A) 10/03/2019       Recent Labs     11/03/19  1546 11/03/19 2007 11/03/19 2048 11/04/19  0734   POCGLU 218* 80 153* 109       Blood Sugar Average: Last 72 hrs:  · (P) 193   · Uncontrolled with very high A1c  · Maintained on Metformin and Lantus 78 units q HS however patient was not taking insulin as she could not afford it  She was then placed on Glipizide until prescription assistance was in place  · Will adjust Lantus to 39 units b i d , continue insulin lispro 5 units t i d   With meals  · Monitor FSBS ac / hs and cover with SSI  · Diabetic diet  · Nutrition consult

## 2019-11-04 NOTE — PLAN OF CARE
Problem: Potential for Falls  Goal: Patient will remain free of falls  Description  INTERVENTIONS:  - Assess patient frequently for physical needs  -  Identify cognitive and physical deficits and behaviors that affect risk of falls    -  [place] fall precautions as indicated by assessment   - Educate patient/family on patient safety including physical limitations  - Instruct patient to call for assistance with activity based on assessment  - Modify environment to reduce risk of injury  - Consider OT/PT consult to assist with strengthening/mobility  Outcome: Progressing     Problem: PAIN - ADULT  Goal: Verbalizes/displays adequate comfort level or baseline comfort level  Description  Interventions:  - Encourage patient to monitor pain and request assistance  - Assess pain using appropriate pain scale  - Administer analgesics based on type and severity of pain and evaluate response  - Implement non-pharmacological measures as appropriate and evaluate response  - Consider cultural and social influences on pain and pain management  - Notify physician/advanced practitioner if interventions unsuccessful or patient reports new pain  Outcome: Progressing     Problem: INFECTION - ADULT  Goal: Absence or prevention of progression during hospitalization  Description  INTERVENTIONS:  - Assess and monitor for signs and symptoms of infection  - Monitor lab/diagnostic results  - Monitor all insertion sites, i e  indwelling lines, tubes, and drains  - Monitor endotracheal if appropriate and nasal secretions for changes in amount and color  - [place] appropriate cooling/warming therapies per order  - Administer medications as ordered  - Instruct and encourage patient and family to use good hand hygiene technique  - Identify and instruct in appropriate isolation precautions for identified infection/condition  Outcome: Progressing  Goal: Absence of fever/infection during neutropenic period  Description  INTERVENTIONS:  - Monitor WBC    Outcome: Progressing     Problem: SAFETY ADULT  Goal: Patient will remain free of falls  Description  INTERVENTIONS:  - Assess patient frequently for physical needs  -  Identify cognitive and physical deficits and behaviors that affect risk of falls    -  [place] fall precautions as indicated by assessment   - Educate patient/family on patient safety including physical limitations  - Instruct patient to call for assistance with activity based on assessment  - Modify environment to reduce risk of injury  - Consider OT/PT consult to assist with strengthening/mobility  Outcome: Progressing  Goal: Maintain or return to baseline ADL function  Description  INTERVENTIONS:  -  Assess patient's ability to carry out ADLs; assess patient's baseline for ADL function and identify physical deficits which impact ability to perform ADLs (bathing, care of mouth/teeth, toileting, grooming, dressing, etc )  - Assess/evaluate cause of self-care deficits   - Assess range of motion  - Assess patient's mobility; develop plan if impaired  - Assess patient's need for assistive devices and provide as appropriate  - Encourage maximum independence but intervene and supervise when necessary  - Involve family in performance of ADLs  - Assess for home care needs following discharge   - Consider OT consult to assist with ADL evaluation and planning for discharge  - Provide patient education as appropriate  Outcome: Progressing  Goal: Maintain or return mobility status to optimal level  Description  INTERVENTIONS:  - Assess patient's baseline mobility status (ambulation, transfers, stairs, etc )    - Identify cognitive and physical deficits and behaviors that affect mobility  - Identify mobility aids required to assist with transfers and/or ambulation (gait belt, sit-to-stand, lift, walker, cane, etc )  - [place] fall precautions as indicated by assessment  - Record patient progress and toleration of activity level on Mobility SBAR; progress patient to next Phase/Stage  - Instruct patient to call for assistance with activity based on assessment  - Consider rehabilitation consult to assist with strengthening/weightbearing, etc   Outcome: Progressing     Problem: DISCHARGE PLANNING  Goal: Discharge to home or other facility with appropriate resources  Description  INTERVENTIONS:  - Identify barriers to discharge w/patient and caregiver  - Arrange for needed discharge resources and transportation as appropriate  - Identify discharge learning needs (meds, wound care, etc )  - Arrange for interpretive services to assist at discharge as needed  - Refer to Case Management Department for coordinating discharge planning if the patient needs post-hospital services based on physician/advanced practitioner order or complex needs related to functional status, cognitive ability, or social support system  Outcome: Progressing     Problem: Knowledge Deficit  Goal: Patient/family/caregiver demonstrates understanding of disease process, treatment plan, medications, and discharge instructions  Description  Complete learning assessment and assess knowledge base  Interventions:  - Provide teaching at level of understanding  - Provide teaching via preferred learning methods  Outcome: Progressing     Problem: Nutrition/Hydration-ADULT  Goal: Nutrient/Hydration intake appropriate for improving, restoring or maintaining nutritional needs  Description  Monitor and assess patient's nutrition/hydration status for malnutrition  Collaborate with interdisciplinary team and initiate plan and interventions as ordered  Monitor patient's weight and dietary intake as ordered or per policy  Utilize nutrition screening tool and intervene as necessary  Determine patient's food preferences and provide high-protein, high-caloric foods as appropriate       INTERVENTIONS:  - Monitor oral intake, urinary output, labs, and treatment plans  - Assess nutrition and hydration status and recommend course of action  - Evaluate amount of meals eaten  - Assist patient with eating if necessary   - Allow adequate time for meals  - Recommend/ encourage appropriate diets, oral nutritional supplements, and vitamin/mineral supplements  - Order, calculate, and assess calorie counts as needed  - Recommend, monitor, and adjust tube feedings and TPN/PPN based on assessed needs  - Assess need for intravenous fluids  - Provide specific nutrition/hydration education as appropriate  - Include patient/family/caregiver in decisions related to nutrition  Outcome: Progressing     Problem: Prexisting or High Potential for Compromised Skin Integrity  Goal: Skin integrity is maintained or improved  Description  INTERVENTIONS:  - Identify patients at risk for skin breakdown  - Assess and monitor skin integrity  - Assess and monitor nutrition and hydration status  - Monitor labs   - Assess for incontinence   - Turn and reposition patient  - Assist with mobility/ambulation  - Relieve pressure over bony prominences  - Avoid friction and shearing  - Provide appropriate hygiene as needed including keeping skin clean and dry  - Evaluate need for skin moisturizer/barrier cream  - Collaborate with interdisciplinary team   - Patient/family teaching  - Consider wound care consult   Outcome: Progressing

## 2019-11-04 NOTE — OCCUPATIONAL THERAPY NOTE
Occupational Therapy Cancellation Note        Patient Name: Nghia Vasquez  WOFNH'X Date: 11/4/2019      OT consult received  Pt to go to OR today for R 4th toe amputation  Will need WB orders postop       Mini Evans MS, OTR/L

## 2019-11-04 NOTE — ASSESSMENT & PLAN NOTE
· Repleted but persistent  Place on scheduled daily potassium supplement    · Will replete magnesium

## 2019-11-04 NOTE — ANESTHESIA POSTPROCEDURE EVALUATION
Post-Op Assessment Note    CV Status:  Stable  Pain Score: 3    Pain management: adequate     Mental Status:  Alert and awake   Hydration Status:  Euvolemic and stable   PONV Controlled:  Controlled   Airway Patency:  Patent   Post Op Vitals Reviewed: Yes      Staff: Anesthesiologist           /72 (11/04/19 1756)    Temp      Pulse 78 (11/04/19 1756)   Resp 19 (11/04/19 1756)    SpO2 96 % (11/04/19 1756)

## 2019-11-04 NOTE — PROGRESS NOTES
PRE-Operative Note - Podiatry  Lico Johnsonleatha 47 y o  female MRN: [id number]  Unit/Bed#: E5 -01 Encounter: [id number]    Assessment:  · Right 4th toe gangrene  · Peripheral arterial disease, s/p R SFA PTA/stent and TPT PTA  · Right foot dorsal ulcer  · Right sub metatarsal 5 ulcer    Plan:  1  Consent signed and in chart  Patient agreeable to R 4th toe amputation with removal of nonviable soft tissue/bone +/- wound vac   2  Confirmed NPO status  3  Alternatives, risks, and complications discussed with patient  4  All questions answered  No guarantees given to outcome of procedure  5  C/w IV ancef        Subjective/Objective   Chief Complaint:   Chief Complaint   Patient presents with    Toe Pain     Patient reports noticing her R 4th toe has turned black in color  Patient reports burning her R foot a few months ago but denies any new injuries  Subjective: patient seen at beside, resting comfortably in bed  Denies n/v/f/c/sob  States she only had a sip of water with her medications this morning and has been NPO since midnight  Aware and agreeable of surgical plan    Blood pressure 169/75, pulse 72, temperature 97 6 °F (36 4 °C), temperature source Temporal, resp  rate 16, height 5' (1 524 m), weight 85 1 kg (187 lb 9 8 oz), SpO2 96 %, not currently breastfeeding  ,Body mass index is 36 64 kg/m²  Invasive Devices     Peripheral Intravenous Line            Peripheral IV 11/03/19 Left Forearm 1 day                Physical Exam:   General appearance: alert, cooperative and no distress    Extremity: Gangrenous 4th digit to right foot, with some malodor  Digit is predominantly dry  NVS at baseline with gross motor function  submet 5 ulceration with HPK skin, no deep probing  Lab, Imaging and other studies:   No results displayed because visit has over 200 results          Imaging: I have personally reviewed pertinent films in PACS

## 2019-11-04 NOTE — PHYSICAL THERAPY NOTE
Physical Therapy Cancellation Note      PT ORDERS RECEIVED, CHART REVIEWED  PATIENT IS SCHEDULED TO GO TO OR TODAY FOR 4TH TOE AMPUTATION  PT WILL CONTINUE TO FOLLOW AND EVALUATE POST OPERATIVELY AS APPROPRIATE      Toya Campa PT, DPT

## 2019-11-04 NOTE — OP NOTE
OPERATIVE REPORT  PATIENT NAME: Dusty Yates    :  1965  MRN: [id number]  Pt Location: [facility] OR ROOM 01    SURGERY DATE: 2019    Surgeon(s) and Role:     * Maryam More DPM - Primary     * Benitez Xie DPM - Assisting    Preop Diagnosis:  Gangrene of toe of right foot ([facility] [address] ) Mary Watson  PAD (peripheral artery disease) ([facility] [address] ) [I73 9]    Post-Op Diagnosis Codes:     * Gangrene of toe of right foot ([facility] [address] ) [I96]     * PAD (peripheral artery disease) ([facility]) [I73 9]    Procedure(s) (LRB):  1) Right 4th toe amputation   2) Right 3rd toe amputation  3) Right toe fillet flap    Specimen(s):  ID Type Source Tests Collected by Time Destination   1 : Right Third and Fourth Toes Tissue Toe, Right TISSUE EXAM Maryam More DPM 2019 1647        Estimated Blood Loss:   Minimal    Drains:  * No LDAs found *    Anesthesia Type:   Choice    Operative Indications:  Gangrene of toe of right foot ([facility] [address] ) [I96]  PAD [facility]peripheral artery disease) [facility] ) [I73 9]    Operative Findings:  Poor perfusion noted; necrotic area on 3rd toe requiring amputation with toe fillet flap; 4th toe completely necrotic    Complications:   None    Procedure and Technique:  Under mild sedation patient was brought into the operating room and transferred to operating table in supine position  IV sedation was achieved by anesthesia team and a universal time-out was performed where operating agreed with our patient, correct procedure and correct site  10 cc of 1:1 mixture 1% lidocaine pain 0 5% bupivacaine plain was injected into the right foot peripheral block fashion  The foot was then prepped and draped in usual septic manner  Attention was directed necrotic 4th toe  Using a 15 blade the 4th toe was disarticulated at the metatarsophalangeal joint in the toe was removed in its entirety and passed off the operating field into formalin for routine pathology review    Necrotic tissue was noted to the lateral aspect of the 3rd toe that required amputation as well, this procedure was confirmed by patient's daughter  The 3rd toe was disarticulated at the metatarsophalangeal joint with the toe fillet flap  This toe was also passed off the operating field into formalin for routine pathology review  The wound was then irrigated with closed wound of normal sterile saline  The toe filet flap was rotated over to cover the wound in its entirety  The skin was reapproximated using 3-0 and 4-0 nylon  The incision site was then dressed with Xeroform and dry sterile dressing  During the procedure poor blood flow was noted  Limb salvage remains guarded at this time  As with many limb salvage procedures, we contemplate the possibility of performing further stages to this procedure  Procedures may include debridements, delayed closure, plastic surgery techniques, or more proximal amputations  This procedure may be considered part of a multi-staged limb salvage treatment plan  Dr Mervat Hernandez was present for the entire procedure and participated in all key aspects of procedure      Patient Disposition:  PACU  and hemodynamically stable    SIGNATURE: Stormy Siu DPM  DATE: November 4, 2019  TIME: 5:21 PM

## 2019-11-04 NOTE — PROGRESS NOTES
Progress Note - Kamala Johnsonleatha 1965, 47 y o  female MRN: [id number]    Unit/Bed#: E5 -01 Encounter: [id number]    Primary Care Provider: Tracee Daniel MD   Date and time admitted to hospital: 10/30/2019  6:28 PM        * Gangrenous toe [facility])  Assessment & Plan  66-year-old female history of diabetes mellitus who was unable to take her insulin over the last several months due to lack of insurance and recently re-started in October, hypertension, hyperlipidemia, peripheral arterial disease admitted for gangrenous toe  · Continue Ancef and Flagyl, cultures negative thus far  · Angiogram with intervention performed on 11/1, continues to have below threshold for wound healing  · Plan for 4th toe amputation per Podiatry pending Vascular recommendations  Vascular has recommended to move forward with surgery  · Plan for right 4th toe amputation today, patient remained NPO, will monitor blood sugars      Hypokalemia  Assessment & Plan  · Repleted but persistent  Place on scheduled daily potassium supplement  · Will replete magnesium      CKD stage 2 due to type 2 diabetes mellitus ([facility] [address] )  Assessment & Plan  · Monitor BMP's  · Avoid nephrotoxins / hypotension  Lab Results   Component Value Date    CREATININE 0 94 11/04/2019    CREATININE 0 87 11/03/2019    CREATININE 0 86 11/02/2019         PAD (peripheral artery disease) ([facility] [address] )  Assessment & Plan  · Status post right SFA PTA/stent 04/30/2019  · Status post right lower extremity Agram on 11/01/2019 with successful R SFA PTA/stent  · Vascular surgery input appreciated  · Continue with aspirin, Plavix, statin  · History of PAD maintained on aspirin, Plavix, statin    HLD (hyperlipidemia)  Assessment & Plan  · Maintained on statin    Essential hypertension  Assessment & Plan  · BP stable  · Continue metoprolol 25 mg b i d      Type 2 diabetes mellitus with circulatory disorder, with long-term current use of insulin [facility])  Assessment & Plan  Lab Results   Component Value Date    HGBA1C 13 7 (A) 10/03/2019       Recent Labs     19  1546 19  0734   POCGLU 218* 80 153* 109       Blood Sugar Average: Last 72 hrs:  · (P) 193   · Uncontrolled with very high A1c  · Maintained on Metformin and Lantus 78 units q HS however patient was not taking insulin as she could not afford it  She was then placed on Glipizide until prescription assistance was in place  · Will adjust Lantus to 39 units b i d , continue insulin lispro 5 units t i d  With meals  · Monitor FSBS ac / hs and cover with SSI  · Diabetic diet  · Nutrition consult        VTE Pharmacologic Prophylaxis:   Pharmacologic:  Lovenox    Patient Centered Rounds: I have performed bedside rounds with nursing staff today  Discussions with Specialists or Other Care Team Provider:  Daughter, at bedside    Time Spent for Care: 20 minutes  More than 50% of total time spent on counseling and coordination of care as described above  Current Length of Stay: 5 day(s)    Current Patient Status: Inpatient   Certification Statement: The patient will continue to require additional inpatient hospital stay due to Right 4th toe amputation    Discharge Plan / Estimated Discharge Date:  2-3 days    Code Status: Level 1 - Full Code      Subjective:   Patient seen and examined at bedside, denies any chest pain, palpitations, dyspnea    Objective:     Vitals:   Temp (24hrs), Av 9 °F (36 6 °C), Min:97 5 °F (36 4 °C), Max:98 7 °F (37 1 °C)    Temp:  [97 5 °F (36 4 °C)-98 7 °F (37 1 °C)] 97 6 °F (36 4 °C)  HR:  [67-74] 72  Resp:  [16-18] 16  BP: (102-169)/(60-75) 169/75  SpO2:  [95 %-97 %] 96 %  Body mass index is 36 64 kg/m²  Input and Output Summary (last 24 hours):        Intake/Output Summary (Last 24 hours) at 2019 1041  Last data filed at 2019 0851  Gross per 24 hour   Intake 480 ml   Output    Net 480 ml       Physical Exam:    Constitutional: Patient is oriented to person, place and time, no acute distress  HEENT:  Normocephalic, atraumatic, EOMI, PERRLA, no scleral icterus, no pallor, moist oral mucosa  Neck:  Supple, no masses, no thyromegaly, no bruits Normal range of motion  Lymph nodes:  No lymphadenopathy  Cardiovascular: Normal S1S2, RRR, No murmurs/rubs/gallops appreciated  Pulmonary:  Bilateral air entry, No rhonchi/rales/wheezing appreciated  Abdominal: Soft, Bowel sounds present, Non-tender, Non-distended, No rebound/guarding, no hepatomegaly   Musculoskeletal: No tenderness/abnormality   Extremities:  No cyanosis, clubbing or edema  Peripheral pulses palpable and equal bilaterally  Neurological: Cranial nerves II-XII grossly intact, sensation intact, otherwise no focal neurological symptoms  Skin:  Right foot in dressing    Additional Data:     Labs:    Results from last 7 days   Lab Units 11/04/19  0524   WBC Thousand/uL 11 90*   HEMOGLOBIN g/dL 10 7*   HEMATOCRIT % 36 3   PLATELETS Thousands/uL 338   NEUTROS PCT % 75   LYMPHS PCT % 17   MONOS PCT % 6   EOS PCT % 1     Results from last 7 days   Lab Units 11/04/19  0524  10/31/19  0000   POTASSIUM mmol/L 3 3*   < >  --    CHLORIDE mmol/L 104   < >  --    CO2 mmol/L 27   < >  --    BUN mg/dL 6   < >  --    CREATININE mg/dL 0 94   < >  --    CALCIUM mg/dL 7 6*   < >  --    ALK PHOS U/L  --   --  76   ALT U/L  --   --  13   AST U/L  --   --  17    < > = values in this interval not displayed  Results from last 7 days   Lab Units 10/31/19  0610   INR  1 07        I Have Reviewed All Lab Data Listed Above  Recent Cultures (last 7 days):     Results from last 7 days   Lab Units 10/30/19  2016 10/30/19  1850   BLOOD CULTURE  No Growth After 4 Days  No Growth After 4 Days         Last 24 Hours Medication List:     Current Facility-Administered Medications:  acetaminophen 650 mg Oral Q6H PRN Juanita Lopez PA-C    aspirin 81 mg Oral Daily Eileen Edmond PA-C    atorvastatin 20 mg Oral Daily With Ramsey Romero PA-C    cefazolin 1,000 mg Intravenous Q8H [place], [place] Last Rate: 1,000 mg (11/04/19 1740)   clopidogrel 75 mg Oral Daily Constance Saavedra PA-C    enoxaparin 40 mg Subcutaneous Q24H [address] Dereck Edmond PA-C    hydrALAZINE 5 mg Intravenous Q6H PRN Dereck Edmond PA-C    insulin glargine 39 Units Subcutaneous Q12H [address] Milka Peña MD    insulin lispro 1-6 Units Subcutaneous TID [facility] Dereck Edmond PA-C    insulin lispro 1-6 Units Subcutaneous HS Dereck Edmond PA-C    insulin lispro 5 Units Subcutaneous TID With Meals MIKEL Dickerson    metoprolol tartrate 25 mg Oral Q12H [address] Dereck Edmond PA-C    metroNIDAZOLE 500 mg Intravenous Q8H [place] Last Rate: 500 mg (11/04/19 0512)   ondansetron 4 mg Intravenous Q6H PRN Dereck Edmond PA-C    potassium chloride 40 mEq Oral Daily MIKEL Dickerson    sodium chloride 50 mL/hr Intravenous Continuous Ajit Lang, DPMANOLO Last Rate: 50 mL/hr (11/03/19 2301)        Today, Patient Was Seen By: Milka Peña MD

## 2019-11-04 NOTE — ASSESSMENT & PLAN NOTE
· Monitor BMP's  · Avoid nephrotoxins / hypotension  Lab Results   Component Value Date    CREATININE 0 94 11/04/2019    CREATININE 0 87 11/03/2019    CREATININE 0 86 11/02/2019

## 2019-11-05 LAB
ANION GAP SERPL CALCULATED.3IONS-SCNC: 8 MMOL/L (ref 4–13)
BASOPHILS # BLD AUTO: 0.03 THOUSANDS/ΜL (ref 0–0.1)
BASOPHILS NFR BLD AUTO: 0 % (ref 0–1)
BUN SERPL-MCNC: 5 MG/DL (ref 5–25)
CALCIUM SERPL-MCNC: 7.2 MG/DL (ref 8.3–10.1)
CHLORIDE SERPL-SCNC: 105 MMOL/L (ref 100–108)
CO2 SERPL-SCNC: 27 MMOL/L (ref 21–32)
CREAT SERPL-MCNC: 0.85 MG/DL (ref 0.6–1.3)
EOSINOPHIL # BLD AUTO: 0.27 THOUSAND/ΜL (ref 0–0.61)
EOSINOPHIL NFR BLD AUTO: 2 % (ref 0–6)
ERYTHROCYTE [DISTWIDTH] IN BLOOD BY AUTOMATED COUNT: 14.3 % (ref 11.6–15.1)
GFR SERPL CREATININE-BSD FRML MDRD: 78 ML/MIN/1.73SQ M
GLUCOSE SERPL-MCNC: 128 MG/DL (ref 65–140)
GLUCOSE SERPL-MCNC: 152 MG/DL (ref 65–140)
GLUCOSE SERPL-MCNC: 216 MG/DL (ref 65–140)
GLUCOSE SERPL-MCNC: 64 MG/DL (ref 65–140)
GLUCOSE SERPL-MCNC: 75 MG/DL (ref 65–140)
GLUCOSE SERPL-MCNC: 89 MG/DL (ref 65–140)
HCT VFR BLD AUTO: 33 % (ref 34.8–46.1)
HGB BLD-MCNC: 9.8 G/DL (ref 11.5–15.4)
IMM GRANULOCYTES # BLD AUTO: 0.06 THOUSAND/UL (ref 0–0.2)
IMM GRANULOCYTES NFR BLD AUTO: 1 % (ref 0–2)
LYMPHOCYTES # BLD AUTO: 2.56 THOUSANDS/ΜL (ref 0.6–4.47)
LYMPHOCYTES NFR BLD AUTO: 22 % (ref 14–44)
MCH RBC QN AUTO: 22.6 PG (ref 26.8–34.3)
MCHC RBC AUTO-ENTMCNC: 29.7 G/DL (ref 31.4–37.4)
MCV RBC AUTO: 76 FL (ref 82–98)
MONOCYTES # BLD AUTO: 0.95 THOUSAND/ΜL (ref 0.17–1.22)
MONOCYTES NFR BLD AUTO: 8 % (ref 4–12)
NEUTROPHILS # BLD AUTO: 7.69 THOUSANDS/ΜL (ref 1.85–7.62)
NEUTS SEG NFR BLD AUTO: 67 % (ref 43–75)
NRBC BLD AUTO-RTO: 0 /100 WBCS
PLATELET # BLD AUTO: 338 THOUSANDS/UL (ref 149–390)
PMV BLD AUTO: 10.3 FL (ref 8.9–12.7)
POTASSIUM SERPL-SCNC: 3.4 MMOL/L (ref 3.5–5.3)
RBC # BLD AUTO: 4.33 MILLION/UL (ref 3.81–5.12)
SODIUM SERPL-SCNC: 140 MMOL/L (ref 136–145)
WBC # BLD AUTO: 11.56 THOUSAND/UL (ref 4.31–10.16)

## 2019-11-05 PROCEDURE — G8987 SELF CARE CURRENT STATUS: HCPCS

## 2019-11-05 PROCEDURE — G8979 MOBILITY GOAL STATUS: HCPCS

## 2019-11-05 PROCEDURE — 85025 COMPLETE CBC W/AUTO DIFF WBC: CPT | Performed by: STUDENT IN AN ORGANIZED HEALTH CARE EDUCATION/TRAINING PROGRAM

## 2019-11-05 PROCEDURE — G8988 SELF CARE GOAL STATUS: HCPCS

## 2019-11-05 PROCEDURE — 97163 PT EVAL HIGH COMPLEX 45 MIN: CPT

## 2019-11-05 PROCEDURE — 99232 SBSQ HOSP IP/OBS MODERATE 35: CPT | Performed by: INTERNAL MEDICINE

## 2019-11-05 PROCEDURE — 97166 OT EVAL MOD COMPLEX 45 MIN: CPT

## 2019-11-05 PROCEDURE — 82948 REAGENT STRIP/BLOOD GLUCOSE: CPT

## 2019-11-05 PROCEDURE — G8978 MOBILITY CURRENT STATUS: HCPCS

## 2019-11-05 PROCEDURE — 80048 BASIC METABOLIC PNL TOTAL CA: CPT | Performed by: STUDENT IN AN ORGANIZED HEALTH CARE EDUCATION/TRAINING PROGRAM

## 2019-11-05 RX ORDER — SODIUM CHLORIDE 9 MG/ML
100 INJECTION, SOLUTION INTRAVENOUS CONTINUOUS
Status: DISCONTINUED | OUTPATIENT
Start: 2019-11-06 | End: 2019-11-06

## 2019-11-05 RX ORDER — METRONIDAZOLE 500 MG/1
500 TABLET ORAL EVERY 8 HOURS SCHEDULED
Status: DISCONTINUED | OUTPATIENT
Start: 2019-11-05 | End: 2019-11-08

## 2019-11-05 RX ADMIN — INSULIN LISPRO 5 UNITS: 100 INJECTION, SOLUTION INTRAVENOUS; SUBCUTANEOUS at 12:17

## 2019-11-05 RX ADMIN — CLOPIDOGREL BISULFATE 75 MG: 75 TABLET ORAL at 09:03

## 2019-11-05 RX ADMIN — OXYCODONE HYDROCHLORIDE 5 MG: 5 TABLET ORAL at 12:17

## 2019-11-05 RX ADMIN — METOPROLOL TARTRATE 25 MG: 25 TABLET, FILM COATED ORAL at 09:03

## 2019-11-05 RX ADMIN — METRONIDAZOLE 500 MG: 500 TABLET ORAL at 14:02

## 2019-11-05 RX ADMIN — ATORVASTATIN CALCIUM 20 MG: 20 TABLET, FILM COATED ORAL at 16:07

## 2019-11-05 RX ADMIN — CEFAZOLIN SODIUM 1000 MG: 1 SOLUTION INTRAVENOUS at 21:39

## 2019-11-05 RX ADMIN — CEFAZOLIN SODIUM 1000 MG: 1 SOLUTION INTRAVENOUS at 05:03

## 2019-11-05 RX ADMIN — METRONIDAZOLE 500 MG: 500 TABLET ORAL at 21:38

## 2019-11-05 RX ADMIN — ENOXAPARIN SODIUM 40 MG: 40 INJECTION SUBCUTANEOUS at 09:03

## 2019-11-05 RX ADMIN — CEFAZOLIN SODIUM 1000 MG: 1 SOLUTION INTRAVENOUS at 14:02

## 2019-11-05 RX ADMIN — POTASSIUM CHLORIDE 40 MEQ: 1500 TABLET, EXTENDED RELEASE ORAL at 09:03

## 2019-11-05 RX ADMIN — INSULIN LISPRO 1 UNITS: 100 INJECTION, SOLUTION INTRAVENOUS; SUBCUTANEOUS at 16:07

## 2019-11-05 RX ADMIN — ASPIRIN 81 MG 81 MG: 81 TABLET ORAL at 09:03

## 2019-11-05 RX ADMIN — TRAMADOL HYDROCHLORIDE 50 MG: 50 TABLET, FILM COATED ORAL at 09:03

## 2019-11-05 RX ADMIN — METOPROLOL TARTRATE 25 MG: 25 TABLET, FILM COATED ORAL at 21:38

## 2019-11-05 RX ADMIN — METRONIDAZOLE 500 MG: 500 INJECTION, SOLUTION INTRAVENOUS at 05:49

## 2019-11-05 RX ADMIN — INSULIN LISPRO 5 UNITS: 100 INJECTION, SOLUTION INTRAVENOUS; SUBCUTANEOUS at 16:07

## 2019-11-05 RX ADMIN — INSULIN LISPRO 2 UNITS: 100 INJECTION, SOLUTION INTRAVENOUS; SUBCUTANEOUS at 12:17

## 2019-11-05 NOTE — PROGRESS NOTES
Progress Note - Irving Lyon 1965, 47 y o  female MRN: [id number]    Unit/Bed#: E5 -01 Encounter: [id number]    Primary Care Provider: Bg Mccormack MD   Date and time admitted to hospital: 10/30/2019  6:28 PM        * Gangrenous toe [facility])  Assessment & Plan  44-year-old female history of diabetes mellitus who was unable to take her insulin over the last several months due to lack of insurance and recently re-started in October, hypertension, hyperlipidemia, peripheral arterial disease admitted for gangrenous toe  · Maintained on empiric antibiotics, Ancef and Flagyl, day 8  cultures negative thus far  · Angiogram with intervention performed on 11/1, continues to have below threshold for wound healing  · Status post right 3rd and 4th toe amputations postop day 1      Hypokalemia  Assessment & Plan  · c/w scheduled daily potassium supplement  CKD stage 2 due to type 2 diabetes mellitus [facility] )  Assessment & Plan  · Monitor BMP's  · Avoid nephrotoxins / hypotension  Lab Results   Component Value Date    CREATININE 0 85 11/05/2019    CREATININE 0 94 11/04/2019    CREATININE 0 87 11/03/2019         PAD [facility]peripheral artery disease) [facility] )  Assessment & Plan  · Status post right SFA PTA/stent 04/30/2019  · Status post right lower extremity Agram on 11/01/2019 with successful R SFA PTA/stent  · Vascular surgery input appreciated  · Continue with aspirin, Plavix, statin  · History of PAD maintained on aspirin, Plavix, statin    HLD (hyperlipidemia)  Assessment & Plan  · Maintained on statin    Essential hypertension  Assessment & Plan  · BP stable  · Continue metoprolol 25 mg b i d      Type 2 diabetes mellitus with circulatory disorder, with long-term current use of insulin [facility])  Assessment & Plan  Lab Results   Component Value Date    HGBA1C 13 7 (A) 10/03/2019       Recent Labs     11/04/19  1732 11/04/19  2116 11/05/19  0728 11/05/19  0822   POCGLU 275* 230* 64* 89       Blood Sugar Average: Last 72 hrs:  · (P) 247 [id number]   · Uncontrolled with very high A1c  · Maintained on Metformin and Lantus 78 units q HS however patient was not taking insulin as she could not afford it  She was then placed on Glipizide until prescription assistance was in place  · Episodes of hypoglycemia however patient was NPO majority of the day on 2019, held patient's Lantus this morning, will monitor Accu-Cheks  · Continue with Lantus 39 units at bedtime  · Monitor FSBS ac / hs and cover with SSI  · Diabetic diet  · Nutrition consult      VTE Pharmacologic Prophylaxis:   Pharmacologic: lovenox    Patient Centered Rounds: I have performed bedside rounds with nursing staff today  Time Spent for Care: 20 minutes  More than 50% of total time spent on counseling and coordination of care as described above  Current Length of Stay: 6 day(s)    Current Patient Status: Inpatient   Certification Statement: The patient will continue to require additional inpatient hospital stay due to toe amputations, uncontrolled DM    Discharge Plan / Estimated Discharge Date: 24-48 hours    Code Status: Level 1 - Full Code      Subjective:   Patient seen and examined at bedside, currently denies any complaints    Objective:     Vitals:   Temp (24hrs), Av 7 °F (36 5 °C), Min:96 7 °F (35 9 °C), Max:98 4 °F (36 9 °C)    Temp:  [96 7 °F (35 9 °C)-98 4 °F (36 9 °C)] 98 °F (36 7 °C)  HR:  [63-80] 71  Resp:  [14-20] 20  BP: (134-162)/(64-73) 143/70  SpO2:  [95 %-99 %] 98 %  Body mass index is 36 64 kg/m²  Input and Output Summary (last 24 hours):        Intake/Output Summary (Last 24 hours) at 2019 1145  Last data filed at 2019 1845  Gross per 24 hour   Intake 1694 16 ml   Output 10 ml   Net 1684 16 ml       Physical Exam:    Constitutional: Patient is oriented to person, place and time, no acute distress  HEENT:  Normocephalic, atraumatic, EOMI, PERRLA, no scleral icterus, no pallor, moist oral mucosa  Neck: Supple, no masses, no thyromegaly, no bruits Normal range of motion  Lymph nodes:  No lymphadenopathy  Cardiovascular: Normal S1S2, RRR, No murmurs/rubs/gallops appreciated  Pulmonary:  Bilateral air entry, No rhonchi/rales/wheezing appreciated  Abdominal: Soft, Bowel sounds present, Non-tender, Non-distended, No rebound/guarding, no hepatomegaly   Musculoskeletal: No tenderness/abnormality   Extremities:  No cyanosis, clubbing or edema  Peripheral pulses palpable and equal bilaterally  Neurological: Cranial nerves II-XII grossly intact, sensation intact, otherwise no focal neurological symptoms  Skin:  Right foot in dressing    Additional Data:     Labs:    Results from last 7 days   Lab Units 11/05/19  0612   WBC Thousand/uL 11 56*   HEMOGLOBIN g/dL 9 8*   HEMATOCRIT % 33 0*   PLATELETS Thousands/uL 338   NEUTROS PCT % 67   LYMPHS PCT % 22   MONOS PCT % 8   EOS PCT % 2     Results from last 7 days   Lab Units 11/05/19  0612  10/31/19  0000   POTASSIUM mmol/L 3 4*   < >  --    CHLORIDE mmol/L 105   < >  --    CO2 mmol/L 27   < >  --    BUN mg/dL 5   < >  --    CREATININE mg/dL 0 85   < >  --    CALCIUM mg/dL 7 2*   < >  --    ALK PHOS U/L  --   --  76   ALT U/L  --   --  13   AST U/L  --   --  17    < > = values in this interval not displayed  Results from last 7 days   Lab Units 10/31/19  0610   INR  1 07        I Have Reviewed All Lab Data Listed Above  Recent Cultures (last 7 days):     Results from last 7 days   Lab Units 10/30/19  2016 10/30/19  1850   BLOOD CULTURE  No Growth After 5 Days  No Growth After 5 Days         Last 24 Hours Medication List:     Current Facility-Administered Medications:  acetaminophen 650 mg Oral Q6H PRN Brandon Parr DPMANOLO    aspirin 81 mg Oral Daily Brandon Parr DPM    atorvastatin 20 mg Oral Daily With [employer], DPM    cefazolin 1,000 mg Intravenous Q8H Brandon Parr DPM Last Rate: 1,000 mg (11/05/19 0503)   clopidogrel 75 mg Oral Daily Stormy Face, DPM    enoxaparin 40 mg Subcutaneous Q24H [facility] Stormy Face, DPM    hydrALAZINE 5 mg Intravenous Q6H PRN Stormy Face, DPM    insulin glargine 36 Units Subcutaneous Q12H [facility], DPM    insulin lispro 1-6 Units Subcutaneous TID [facility], DPM    insulin lispro 1-6 Units Subcutaneous HS Stormy Face, DPM    insulin lispro 5 Units Subcutaneous TID With Meals Stormy Face, DPM    metoprolol tartrate 25 mg Oral Q12H [facility] Stormy Face, DPM    metroNIDAZOLE 500 mg Oral Q8H [facility] Nasra Cali MD    ondansetron 4 mg Intravenous Q6H PRN Stormy Face, DPM    oxyCODONE 5 mg Oral Q4H PRN Sharee Schlatter [place], PA-C    potassium chloride 40 mEq Oral Daily Stormy Face, DPM    traMADol 50 mg Oral Q6H PRN Raj Fiore PA-C         Today, Patient Was Seen By: Nasra Cali MD

## 2019-11-05 NOTE — PLAN OF CARE
Problem: Potential for Falls  Goal: Patient will remain free of falls  Description  INTERVENTIONS:  - Assess patient frequently for physical needs  -  Identify cognitive and physical deficits and behaviors that affect risk of falls    -  [place] fall precautions as indicated by assessment   - Educate patient/family on patient safety including physical limitations  - Instruct patient to call for assistance with activity based on assessment  - Modify environment to reduce risk of injury  - Consider OT/PT consult to assist with strengthening/mobility  Outcome: Progressing     Problem: PAIN - ADULT  Goal: Verbalizes/displays adequate comfort level or baseline comfort level  Description  Interventions:  - Encourage patient to monitor pain and request assistance  - Assess pain using appropriate pain scale  - Administer analgesics based on type and severity of pain and evaluate response  - Implement non-pharmacological measures as appropriate and evaluate response  - Consider cultural and social influences on pain and pain management  - Notify physician/advanced practitioner if interventions unsuccessful or patient reports new pain  Outcome: Progressing     Problem: INFECTION - ADULT  Goal: Absence or prevention of progression during hospitalization  Description  INTERVENTIONS:  - Assess and monitor for signs and symptoms of infection  - Monitor lab/diagnostic results  - Monitor all insertion sites, i e  indwelling lines, tubes, and drains  - Monitor endotracheal if appropriate and nasal secretions for changes in amount and color  - [place] appropriate cooling/warming therapies per order  - Administer medications as ordered  - Instruct and encourage patient and family to use good hand hygiene technique  - Identify and instruct in appropriate isolation precautions for identified infection/condition  Outcome: Progressing  Goal: Absence of fever/infection during neutropenic period  Description  INTERVENTIONS:  - Monitor WBC    Outcome: Progressing     Problem: SAFETY ADULT  Goal: Patient will remain free of falls  Description  INTERVENTIONS:  - Assess patient frequently for physical needs  -  Identify cognitive and physical deficits and behaviors that affect risk of falls    -  [place] fall precautions as indicated by assessment   - Educate patient/family on patient safety including physical limitations  - Instruct patient to call for assistance with activity based on assessment  - Modify environment to reduce risk of injury  - Consider OT/PT consult to assist with strengthening/mobility  Outcome: Progressing  Goal: Maintain or return to baseline ADL function  Description  INTERVENTIONS:  -  Assess patient's ability to carry out ADLs; assess patient's baseline for ADL function and identify physical deficits which impact ability to perform ADLs (bathing, care of mouth/teeth, toileting, grooming, dressing, etc )  - Assess/evaluate cause of self-care deficits   - Assess range of motion  - Assess patient's mobility; develop plan if impaired  - Assess patient's need for assistive devices and provide as appropriate  - Encourage maximum independence but intervene and supervise when necessary  - Involve family in performance of ADLs  - Assess for home care needs following discharge   - Consider OT consult to assist with ADL evaluation and planning for discharge  - Provide patient education as appropriate  Outcome: Progressing  Goal: Maintain or return mobility status to optimal level  Description  INTERVENTIONS:  - Assess patient's baseline mobility status (ambulation, transfers, stairs, etc )    - Identify cognitive and physical deficits and behaviors that affect mobility  - Identify mobility aids required to assist with transfers and/or ambulation (gait belt, sit-to-stand, lift, walker, cane, etc )  - [place] fall precautions as indicated by assessment  - Record patient progress and toleration of activity level on Mobility SBAR; progress patient to next Phase/Stage  - Instruct patient to call for assistance with activity based on assessment  - Consider rehabilitation consult to assist with strengthening/weightbearing, etc   Outcome: Progressing     Problem: DISCHARGE PLANNING  Goal: Discharge to home or other facility with appropriate resources  Description  INTERVENTIONS:  - Identify barriers to discharge w/patient and caregiver  - Arrange for needed discharge resources and transportation as appropriate  - Identify discharge learning needs (meds, wound care, etc )  - Arrange for interpretive services to assist at discharge as needed  - Refer to Case Management Department for coordinating discharge planning if the patient needs post-hospital services based on physician/advanced practitioner order or complex needs related to functional status, cognitive ability, or social support system  Outcome: Progressing     Problem: Knowledge Deficit  Goal: Patient/family/caregiver demonstrates understanding of disease process, treatment plan, medications, and discharge instructions  Description  Complete learning assessment and assess knowledge base  Interventions:  - Provide teaching at level of understanding  - Provide teaching via preferred learning methods  Outcome: Progressing     Problem: Nutrition/Hydration-ADULT  Goal: Nutrient/Hydration intake appropriate for improving, restoring or maintaining nutritional needs  Description  Monitor and assess patient's nutrition/hydration status for malnutrition  Collaborate with interdisciplinary team and initiate plan and interventions as ordered  Monitor patient's weight and dietary intake as ordered or per policy  Utilize nutrition screening tool and intervene as necessary  Determine patient's food preferences and provide high-protein, high-caloric foods as appropriate       INTERVENTIONS:  - Monitor oral intake, urinary output, labs, and treatment plans  - Assess nutrition and hydration status and recommend course of action  - Evaluate amount of meals eaten  - Assist patient with eating if necessary   - Allow adequate time for meals  - Recommend/ encourage appropriate diets, oral nutritional supplements, and vitamin/mineral supplements  - Order, calculate, and assess calorie counts as needed  - Recommend, monitor, and adjust tube feedings and TPN/PPN based on assessed needs  - Assess need for intravenous fluids  - Provide specific nutrition/hydration education as appropriate  - Include patient/family/caregiver in decisions related to nutrition  Outcome: Progressing     Problem: Prexisting or High Potential for Compromised Skin Integrity  Goal: Skin integrity is maintained or improved  Description  INTERVENTIONS:  - Identify patients at risk for skin breakdown  - Assess and monitor skin integrity  - Assess and monitor nutrition and hydration status  - Monitor labs   - Assess for incontinence   - Turn and reposition patient  - Assist with mobility/ambulation  - Relieve pressure over bony prominences  - Avoid friction and shearing  - Provide appropriate hygiene as needed including keeping skin clean and dry  - Evaluate need for skin moisturizer/barrier cream  - Collaborate with interdisciplinary team   - Patient/family teaching  - Consider wound care consult   Outcome: Progressing

## 2019-11-05 NOTE — PLAN OF CARE
Problem: PHYSICAL THERAPY ADULT  Goal: Performs mobility at highest level of function for planned discharge setting  See evaluation for individualized goals  Description  Treatment/Interventions: Functional transfer training, LE strengthening/ROM, Elevations, Therapeutic exercise, Endurance training, Patient/family training, Equipment eval/education, Bed mobility, Gait training, Compensatory technique education, Spoke to nursing, OT  Equipment Recommended: Hen[place] Lo will need wheelchair for longer distances)       See flowsheet documentation for full assessment, interventions and recommendations  Note:   Prognosis: Good  Problem List: Decreased strength, Decreased endurance, Impaired balance, Decreased mobility, Decreased cognition, Impaired judgement, Decreased safety awareness, Decreased skin integrity, Orthopedic restrictions, Pain  Assessment: Pt is a 47 y o  female seen for PT evaluation s/p admit to [facility] [id number] on 10/30/19  Two pt identifiers were used to confirm  Pt presented with a gangrenous toe on 10/30/19  Pt was admitted with a primary dx of: pain in toe  PT now consulted for assessment of mobility and d/c needs  Pts current co morbidities effecting treatment include: DM, essential HTN, HLD, PAD, CKD, hypokalemia and personal factors including no RODRIGUEZ home environment  Pt currently lives in a two story home with her two daughters  Pts current clinical presentation is Unstable/ Unpredictable (high complexity) due to Ongoing medical management for primary dx, decreased activity tolerance compared to baseline, fall risk, increased assistance needed from caregiver at current time, significant change in functional ability secondary to change to NWB on RLE, decline in overall functional mobility status  Prior to admission, pt was mod I with ambulation with the use of a RW as per pt   Upon evaluation, pt currently is requiring supervision assist for bed mobility; min A for transfers and min A for ambulation w/ RW  Pt displays hopping gait pattern to maintain NWB status, decreased step length, decreased gait speed  Pt presents at PT eval functioning below baseline and currently w/ overall mobility deficits secondary to: decreased strength, decreased endurance, impaired balance, impaired cognition  Pt currently at a fall risk secondary to impairments listed above  Based on PT evaluation, pt will continue to benefit from skilled acute PT interventions to address stated impairments; to maximize functional mobility; for ongoing pt/ family training; and DME needs  At conclusion of PT session pt was left seated in bedside chair, all needs within reach  Provided pt education regarding PT plan to improve functional mobility status  PT is currently recommending home with family support and home PT  Pt agreeable to plan and goals as stated on evaluation  PT will continue to follow during hospital stay  Recommendation: Home with family support, Home PT     PT - OK to Discharge: Yes    See flowsheet documentation for full assessment

## 2019-11-05 NOTE — OCCUPATIONAL THERAPY NOTE
[address] Evaluation     Patient Name: Oralia Lyon  Today's Date: 11/5/2019  Problem List  Principal Problem:    Gangrenous toe ([facility] )  Active Problems:    Type 2 diabetes mellitus with circulatory disorder, with long-term current use of insulin (HCC)    Essential hypertension    HLD (hyperlipidemia)    PAD (peripheral artery disease) ([facility] )    CKD stage 2 due to type 2 diabetes mellitus ([facility] )    Hypokalemia    Gangrene of toe of right foot [facility])    Past Medical History  Past Medical History:   Diagnosis Date    Cellulitis of toe 1/28/2019    CKD (chronic kidney disease)     Diabetes mellitus ([facility] )     Hypertension      Past Surgical History  Past Surgical History:   Procedure Laterality Date    IR ABDOMINAL ANGIOGRAPHY  2/1/2019    IR ARTERIAL LYSIS  4/30/2019    IR LOWER EXTREMITY / INTERVENTION  11/1/2019    TOE AMPUTATION Left 2/4/2019    Procedure: AMPUTATION 5th TOE;  Surgeon: Reynaldo Lang DPM;  [facility];  Service: Podiatry    TOE AMPUTATION Right 11/4/2019    Procedure: AMPUTATION RIGHT 3RD AND 4TH TOE;  Surgeon: Reynaldo Lang DPM;  [facility];  Service: Podiatry             11/05/19 1009   Note Type   Note type Eval/Treat   Restrictions/Precautions   Weight Bearing Precautions Per Order Yes   RLE Weight Bearing Per Order NWB   Braces or Orthoses   (protective boot for L LE)   Other Precautions Chair Alarm;Cognitive; Bed Alarm; Fall Risk;Multiple lines;WBS;Pain   Pain Assessment   Pain Assessment No/denies pain   Pain Score No Pain   Home Living   Type of [address] [place] [address] Two level; Able to live on main level with bedroom/bathroom; Performs ADLs on one level  (0 RODRIGUEZ in back)   Bathroom Shower/Tub Walk-in shower   Bathroom Toilet [address]   Additional Comments pt reports living w/ daughters and one daughter works nights and one works days so someone is always there to assist w/ IADLs   Prior Function   Level of [place] Independent with ADLs and functional mobility   Lives With Daughter  (2 daughters)   Receives Help From Family;Home health   ADL Assistance Independent   IADLs Needs assistance   Falls in the last 6 months 0   Vocational Unemployed  (reports in process of getting disability)   Comments pt reports daughters can assist w/ IADLs and reports has a BSC educated pt that she can use that in the shower   Lifestyle   Autonomy per pt independent w/ ADLs, independent w/ functional transfers and mobility w/ RW, independent w/ light IADLs, daughters transport   Reciprocal Relationships daughters   Service to Others unemployed   [address] watching tv   ADL   Where Assessed Chair   Eating Assistance 5  Supervision/Setup   Grooming Assistance 5  Supervision/Setup   UB Bathing Assistance 5  Supervision/Setup   LB Bathing Assistance 4  Minimal Assistance   [address] 5  Supervision/Setup    [address] 3  Moderate Assistance   [address]  4  Minimal Assistance   Bed Mobility   Supine to Sit 5  Supervision   Additional items Increased time required;HOB elevated   Additional Comments increased time to complete   Transfers   Sit to Stand 4  Minimal assistance   Additional items Assist x 1; Increased time required;Verbal cues   Stand to Sit 4  Minimal assistance   Additional items Assist x 1; Increased time required;Verbal cues   Additional Comments VCs for hand placement and positioning, increased time able to maintain NWB on R LE   Functional Mobility   Functional Mobility 4  Minimal assistance   Additional Comments assist x1 w/ increased time to complete, unilateral hopping on L LE w/ protective boot   Additional items Rolling walker   Balance   Static Sitting Good   Dynamic Sitting Fair +   Static Standing Fair -   Dynamic Standing Poor +   Ambulatory Poor +   Activity Tolerance   Activity Tolerance Patient limited by fatigue;Patient limited by pain   Nurse Made Aware appropriate to see per Edvin MEI Assessment   RUE Assessment WFL  (4-/5)   LUE Assessment   LUE Assessment WFL  (4-/5)   Hand Function   Gross Motor Coordination Functional   Fine Motor Coordination Functional   Sensation   Light Touch Partial deficits in the RLE;Partial deficits in the LLE   Proprioception   Proprioception No apparent deficits   Vision-Basic Assessment   Current Vision Wears glasses only for reading   Vision - Complex Assessment   Ocular Range of Motion [facility] Able to read clock/calendar on wall without difficulty   Cognition   Overall Cognitive Status Impaired   Arousal/Participation Responsive; Cooperative   Attention Attends with cues to redirect   Orientation Level Oriented to person;Oriented to place;Oriented to time;Oriented to situation   Memory Decreased recall of precautions   Following Commands Follows one step commands with increased time or repetition   Comments pt w/ increased processing time, engages in appropriate conversations and adheres to NWB on R LE   Assessment   Limitation Decreased ADL status; Decreased UE strength;Decreased cognition;Decreased Safe judgement during ADL;Decreased endurance;Decreased self-care trans;Decreased high-level ADLs   Prognosis Good   Assessment Pt is a 47 y o  female seen for OT evaluation s/p admit to [facility] on 10/30/2019 w/ Gangrenous toe ([facility] ), s/p amputation R 3rd and 4th toe R foot on 11/4 and now NWB R LE  Pt w/ protected boot for L LE  Pt w/ agram on 11/1/19  Comorbidities affecting pt's functional performance at time of assessment include: hypokalemia, CKD II, DM II, PAD, HTN  Personal factors affecting pt at time of IE include: increased pain, increased processing time  Prior to admission, pt was living w/ 2 daughters and reports (-) alone and states independent w/ ADLs, independent w/ functional transfers and mobility w/ RW, independent w/ light IADLs, assist transportation   Upon evaluation: Pt requires supervision supine<>sit bed mobility w/ increased time to complete, MIN assist x1 sit<>stand w/ VCs for hand placement, MIN assist functional mobility w/ unilateral hopping on L LE while maintaining NWB on R LE, MIN assist LB ADLs, setup UB ADLs 2* the following deficits impacting occupational performance: decreased strength and endurance, impaired balance, impaired activity tolerance, decreased safety awareness, NWB R LE, impaired functional reach, increased pain  Pt to benefit from continued skilled OT tx while in the hospital to address deficits as defined above and maximize level of functional independence w ADL's and functional mobility  Occupational Performance areas to address include: grooming, bathing/shower, toilet hygiene, dressing, socialization, health maintenance, functional mobility, clothing management, cleaning and meal prep, safety education  From OT standpoint, recommendation at time of d/c would be home w/ family support and HOME OT if family not able to provide support pt may need STR  Pt prefers to return home  Goals   Patient Goals "go home"   LTG Time Frame 10-14   Long Term Goal please see below goals   Plan   Treatment Interventions ADL retraining; Endurance training;UE strengthening/ROM; Equipment evaluation/education;Cognitive reorientation;Patient/family training; Compensatory technique education; Energy conservation; Activityengagement   Goal Expiration Date 11/19/19   OT Frequency 3-5x/wk   Recommendation   OT Discharge Recommendation Home OT   Equipment Recommended   (w/c)   OT - OK to Discharge   (when medically stable)   Barthel Index   Feeding 10   Bathing 0   Grooming Score 5   Dressing Score 5   Bladder Score 10   Bowels Score 10   Toilet Use Score 5   Transfers (Bed/Chair) Score 10   Mobility (Level Surface) Score 0   Stairs Score 0   Barthel Index Score 55   Modified Dima Scale   Modified [place] Scale 4     Occupational Therapy Goals to be met in 10-14 days:  1) Pt will improve activity tolerance to G for min 30 min txment sessions to enhance ADLs  2) Pt will complete ADLs/self care w/ mod I   3) Pt will complete toileting w/ mod I w/ G hygiene/thoroughness using DME PRN  4) Pt will improve functional transfers on/off all surfaces using DME PRN w/ G balance/safety including toileting w/ mod I while maintaining NWB R LE  5) Pt will improve fx'l mobility during I/ADl/leisure tasks using DME/w/c PRN w/ g balance/safety w/ mod I while maintaining NWB R LE  6) Pt will engage in ongoing cognitive assessment w/ G participation to A w/ safe d/c planning/recommendations  7) Pt will demonstrate G carryover of pt/caregiver education and training as appropriate w/ mod I  w/ G tolerance  8) Pt will engage in depression screen/leisure interest checklist w/ G participation to monitor s/s depression and ID 3 positive coping strategies to A w/ emotional regulation and management  9) Pt will demonstrate 100% carryover of E C  techniques w/ mod I t/o fx'l I/ADL/leisure tasks w/o cues s/p skilled education  10) Pt will demonstrate improved standing tolerance to 3-5 minutes during functional tasks w/ Fair + balance to enhance ADL performance  11) Pt will demonstrate improved b/l UE strength by 1 MMT grade to enhance ADLS and functional transfers     Documentation completed by: Jax Horne MS, OTR/L

## 2019-11-05 NOTE — ASSESSMENT & PLAN NOTE
Lab Results   Component Value Date    HGBA1C 13 7 (A) 10/03/2019       Recent Labs     11/04/19  1732 11/04/19  2116 11/05/19  0728 11/05/19  0822   POCGLU 275* 230* 64* 89       Blood Sugar Average: Last 72 hrs:  · (P) 058 [id number]   · Uncontrolled with very high A1c  · Maintained on Metformin and Lantus 78 units q HS however patient was not taking insulin as she could not afford it  She was then placed on Glipizide until prescription assistance was in place    · Episodes of hypoglycemia however patient was NPO majority of the day on 11/04/2019, held patient's Lantus this morning, will monitor Accu-Cheks  · Continue with Lantus 39 units at bedtime  · Monitor FSBS ac / hs and cover with SSI  · Diabetic diet  · Nutrition consult

## 2019-11-05 NOTE — ASSESSMENT & PLAN NOTE
59-year-old female history of diabetes mellitus who was unable to take her insulin over the last several months due to lack of insurance and recently re-started in October, hypertension, hyperlipidemia, peripheral arterial disease admitted for gangrenous toe      · Maintained on empiric antibiotics, Ancef and Flagyl, day 8  cultures negative thus far  · Angiogram with intervention performed on 11/1, continues to have below threshold for wound healing  · Status post right 3rd and 4th toe amputations postop day 1

## 2019-11-05 NOTE — PHYSICAL THERAPY NOTE
Physical Therapy Evaluation    Patient Name: Dusty Yates    TKHKT'U Date: 11/5/2019     Problem List  Principal Problem:    Gangrenous toe ([facility] )  Active Problems:    Type 2 diabetes mellitus with circulatory disorder, with long-term current use of insulin (HCC)    Essential hypertension    HLD (hyperlipidemia)    PAD (peripheral artery disease) ([facility] )    CKD stage 2 due to type 2 diabetes mellitus (HCC)    Hypokalemia    Gangrene of toe of right foot [facility])       Past Medical History  Past Medical History:   Diagnosis Date    Cellulitis of toe 1/28/2019    CKD (chronic kidney disease)     Diabetes mellitus ([facility] )     Hypertension         Past Surgical History  Past Surgical History:   Procedure Laterality Date    IR ABDOMINAL ANGIOGRAPHY  2/1/2019    IR ARTERIAL LYSIS  4/30/2019    IR LOWER EXTREMITY / INTERVENTION  11/1/2019    TOE AMPUTATION Left 2/4/2019    Procedure: AMPUTATION 5th TOE;  Surgeon: Maryam More DPM;  [facility];  Service: Podiatry    TOE AMPUTATION Right 11/4/2019    Procedure: AMPUTATION RIGHT 3RD AND 4TH TOE;  Surgeon: Maryam More DPM;  [facility];  Service: Podiatry           11/05/19 1010   Note Type   Note type Eval only   Pain Assessment   Pain Assessment No/denies pain   Pain Score No Pain   Home Living   Type of [facility] Two level; Able to live on main level with bedroom/bathroom   Bathroom Shower/Tub Walk-in shower   [address]   (RW)   Prior Function   Level of [place] Independent with ADLs and functional mobility; Needs assistance with IADLs   Lives With Daughter  (2 dtrs, 1 works day shift, 1 works night shift)   Receives Help From [employer]   ADL Assistance Independent   IADLs Needs assistance   Falls in the last 6 months 0  (per pt)   Restrictions/Precautions   Weight Bearing Precautions Per Order Yes   RLE Weight Bearing Per Order NWB   Braces or Orthoses Other (Comment)  (protective boot for LLE)   Other Precautions Cognitive; Chair Alarm; Bed Alarm;WBS;Multiple lines; Fall Risk;Pain   General   Family/Caregiver Present No   Cognition   Overall Cognitive Status Impaired   Arousal/Participation Cooperative   Orientation Level Oriented X4   Following Commands Follows one step commands with increased time or repetition   RUE Assessment   RUE Assessment X  (refer to OT evaluation)   LUE Assessment   LUE Assessment X  (refer to OT evaluation)   RLE Assessment   RLE Assessment X  (NWB, hip and knee flexors 2+/5)   LLE Assessment   LLE Assessment X  (grossly 4/5)   Coordination   Movements are Fluid and Coordinated 0   Coordination and Movement Description slow movements, rigid posture   Bed Mobility   Supine to Sit 5  Supervision   Additional items Increased time required;Verbal cues   Additional Comments Pt left sitting in bedside chair at termination of session, all needs within reach   Transfers   Sit to Stand 4  Minimal assistance   Additional items Increased time required;Verbal cues   Stand to Sit 4  Minimal assistance   Additional items Increased time required;Verbal cues   Additional Comments RW used for transfers   Ambulation/Elevation   Gait pattern Forward Flexion;Decreased foot clearance; Short stride; Excessively slow  (hopping gait pattern to maintain NWB on RLE)   Gait Assistance 4  Minimal assist   Additional items Verbal cues; Tactile cues   Assistive Device Rolling walker   Distance 10'   Stair Management Assistance Not tested   Balance   Static Sitting Good   Dynamic Sitting Fair +   Static Standing Fair -  (RW)   Dynamic Standing Poor +  (RW)   Ambulatory Poor +  (RW)   Endurance Deficit   Endurance Deficit Yes   Endurance Deficit Description weakness, fatigue   Activity Tolerance   Activity Tolerance Patient limited by fatigue   Medical Staff Made Aware OT Malka   Nurse Made Aware RN cleared patient for PT evaluation   Assessment   Prognosis Good   Problem List Decreased strength;Decreased endurance; Impaired balance;Decreased mobility; Decreased cognition; Impaired judgement;Decreased safety awareness;Decreased skin integrity;Orthopedic restrictions;Pain   Assessment Pt is a 47 y o  female seen for PT evaluation s/p admit to [facility] on 10/30/19  Two pt identifiers were used to confirm  Pt presented with a gangrenous toe on 10/30/19  Pt was admitted with a primary dx of: pain in toe  PT now consulted for assessment of mobility and d/c needs  Pts current co morbidities effecting treatment include: DM, essential HTN, HLD, PAD, CKD, hypokalemia and personal factors including no RODRIGUEZ home environment  Pt currently lives in a two story home with her two daughters  Pts current clinical presentation is Unstable/ Unpredictable (high complexity) due to Ongoing medical management for primary dx, decreased activity tolerance compared to baseline, fall risk, increased assistance needed from caregiver at current time, significant change in functional ability secondary to change to NWB on RLE, decline in overall functional mobility status  Prior to admission, pt was mod I with ambulation with the use of a RW as per pt  Upon evaluation, pt currently is requiring supervision assist for bed mobility; min A for transfers and min A for ambulation w/ RW  Pt displays hopping gait pattern to maintain NWB status, decreased step length, decreased gait speed  Pt presents at PT eval functioning below baseline and currently w/ overall mobility deficits secondary to: decreased strength, decreased endurance, impaired balance, impaired cognition  Pt currently at a fall risk secondary to impairments listed above  Based on PT evaluation, pt will continue to benefit from skilled acute PT interventions to address stated impairments; to maximize functional mobility; for ongoing pt/ family training; and DME needs   At conclusion of PT session pt was left seated in bedside chair, all needs within reach  Provided pt education regarding PT plan to improve functional mobility status  PT is currently recommending home with family support and home PT  Pt agreeable to plan and goals as stated on evaluation  PT will continue to follow during hospital stay  Goals   Patient Goals go home   STG Expiration Date 11/19/19   Short Term Goal #1 1  Pt will increased strength by 1 grade in order to increase functional independence with transfers  2  Pt will increase balance grade by 1 in order to improve safety  3  Pt will improve transfer ability to mod I to increase functional independence and decrease caregiver burden  4  Pt will perform bed mobility independently to decrease caregiver burden  5  Pt will be able to amb >50 ft with RW and mod I and NWB RLE to increase functional independence in home and community environments  6  Pt will be able to ascend and descend 12 stairs mod I to increase functional independence within the home environment  PT Treatment Day 0   Plan   Treatment/Interventions Functional transfer training;LE strengthening/ROM; Elevations; Therapeutic exercise; Endurance training;Patient/family training;Equipment eval/education; Bed mobility;Gait training; Compensatory technique education;Spoke to nursing;OT   PT Frequency Other (Comment)  (3-5x/wk)   Recommendation   Recommendation Home with family support;Home PT   Equipment Recommended Walker; Wheelchair  (pt will need wheelchair for longer distances)   PT - OK to Discharge Yes   Additional Comments when medically stable pending wheelchair for longer distances @ d/c   Modified Logan Scale   Modified Logan Scale 4   Barthel Index   Feeding 10   Bathing 0   Grooming Score 5   Dressing Score 5   Bladder Score 10   Bowels Score 10   Toilet Use Score 5   Transfers (Bed/Chair) Score 5   Mobility (Level Surface) Score 0   Stairs Score 0   Barthel Index Score 50       Toya Campa PT, DPT

## 2019-11-05 NOTE — QUICK NOTE
Discussed case with Dr Anjali Silva and and vascular team  Due to poor post-operative healing and necrotic/devitalized toe fillet flap, we will proceed with TMA in near future  Patient is optimized per vascular although still remains at extremely high risk for limb loss  This was discussed with patient and she is understanding and agreeable to procedure  TMA date and time to be decided, will update with further information

## 2019-11-05 NOTE — ASSESSMENT & PLAN NOTE
· Monitor BMP's  · Avoid nephrotoxins / hypotension  Lab Results   Component Value Date    CREATININE 0 85 11/05/2019    CREATININE 0 94 11/04/2019    CREATININE 0 87 11/03/2019

## 2019-11-05 NOTE — PLAN OF CARE
Problem: OCCUPATIONAL THERAPY ADULT  Goal: Performs self-care activities at highest level of function for planned discharge setting  See evaluation for individualized goals  Description  Treatment Interventions: ADL retraining, Endurance training, UE strengthening/ROM, Equipment evaluation/education, Cognitive reorientation, Patient/family training, Compensatory technique education, Energy conservation, Activityengagement  Equipment Recommended: (w/c)       See flowsheet documentation for full assessment, interventions and recommendations  Note:   Limitation: Decreased ADL status, Decreased UE strength, Decreased cognition, Decreased Safe judgement during ADL, Decreased endurance, Decreased self-care trans, Decreased high-level ADLs  Prognosis: Good  Assessment: Pt is a 47 y o  female seen for OT evaluation s/p admit to [facility] on 10/30/2019 w/ Gangrenous toe ([address] ), s/p amputation R 3rd and 4th toe R foot on 11/4 and now NWB R LE  Pt w/ protected boot for L LE  Pt w/ agram on 11/1/19  Comorbidities affecting pt's functional performance at time of assessment include: hypokalemia, CKD II, DM II, PAD, HTN  Personal factors affecting pt at time of IE include: increased pain, increased processing time  Prior to admission, pt was living w/ 2 daughters and reports (-) alone and states independent w/ ADLs, independent w/ functional transfers and mobility w/ RW, independent w/ light IADLs, assist transportation  Upon evaluation: Pt requires supervision supine<>sit bed mobility w/ increased time to complete, MIN assist x1 sit<>stand w/ VCs for hand placement, MIN assist functional mobility w/ unilateral hopping on L LE while maintaining NWB on R LE, MIN assist LB ADLs, setup UB ADLs 2* the following deficits impacting occupational performance: decreased strength and endurance, impaired balance, impaired activity tolerance, decreased safety awareness, NWB R LE, impaired functional reach, increased pain   Pt to benefit from continued skilled OT tx while in the hospital to address deficits as defined above and maximize level of functional independence w ADL's and functional mobility  Occupational Performance areas to address include: grooming, bathing/shower, toilet hygiene, dressing, socialization, health maintenance, functional mobility, clothing management, cleaning and meal prep, safety education  From OT standpoint, recommendation at time of d/c would be home w/ family support and HOME OT if family not able to provide support pt may need STR  Pt prefers to return home       OT Discharge Recommendation: Home OT  OT - OK to Discharge: (when medically stable)

## 2019-11-05 NOTE — PROGRESS NOTES
The metronidazole IV has / have been converted to Oral per [facility] IV-to-PO Auto-Conversion Protocol for Adults as approved by the Pharmacy and Therapeutics Committee  The patient met all eligible criteria:  1) Age = > 25years old   2) Received at least one dose of the IV form   3) Receiving at least one other scheduled oral/enteral medication   4) Tolerating an oral/enteral diet   and did not have any exclusions:   1) Critical care patient   2) Active GI bleed (IF assessing H2RAs or PPIs)   3) Continuous tube feeding (IF assessing cipro, doxycycline, levofloxacin, minocycline, rifampin, or voriconazole)   4) Receiving PO vancomycin (IF assessing metronidazole)   5) Persistent nausea and/or vomiting   6) Ileus or gastrointestinal obstruction   7) Aishwarya/nasogastric tube set for continuous suction   8) Specific order not to automatically convert to PO (in the order's comments or if discussed in the most recent Infectious Disease or primary team's progress notes)

## 2019-11-05 NOTE — PROGRESS NOTES
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, s/p 3rd and 4th toe amputations with toe fillet flap (DOS 11/4/19)  · Peripheral arterial disease, s/p RLE SFA PTA/stent and TPT PTA  · Right foot dorsal ulcer  · Right sub metatarsal 5 ulcer    Plan:  - POD #1 3rd and 4th toe amputations with toe fillet flap, dressings taken down and amputation site assessed; Appears without acute infection however fillet flap appears dysvascular without appreciated capillary refill, proximal incision appears necrotic  Limb salvage continues to remain highly guarded at this time  Will discuss further plan for discharge with attending and update from there  - Continue with IV ancef and po flagyl per primary  Will discuss with attending  - Right foot incision site redressed with xeroform, dsd    - Strict NWB to RLE       Subjective/Objective   Chief Complaint:   Chief Complaint   Patient presents with    Toe Pain     Patient reports noticing her R 4th toe has turned black in color  Patient reports burning her R foot a few months ago but denies any new injuries  Subjective: 47 y o  y/o female was seen and evaluated at bedside  Right foot dressing intact without strikethrough  Endorses some mild pain to operative foot but it is tolerable and controlled with mild pain medication  Has not been walking on foot as instructed  Patient denies nausea, vomiting, chest pain, shortness of breath, chills, fever  Blood pressure 143/70, pulse 71, temperature 98 °F (36 7 °C), temperature source Temporal, resp  rate 20, height 5' (1 524 m), weight 85 1 kg (187 lb 9 8 oz), SpO2 98 %, not currently breastfeeding  ,Body mass index is 36 64 kg/m²      Invasive Devices     Peripheral Intravenous Line            Peripheral IV 11/03/19 Left Forearm 2 days                Physical Exam:   General: Alert, cooperative and no distress  Lungs: Non labored breathing  Heart: Positive S1, S2  Abdomen: Soft, non-tender  Extremity: neurovascular and msk function baseline  S/P right foot 3rd and 4th toe amputations  Incision site assessed to right foot appears coapted w/o gross dehiscence with all sutures intact however proximal incision appears necrotic  Capillary refill time to toe fillet flap essentially absent; appears disvascular and cold to touch  Small area at proximal incision with sanguinous drainage and small area of dehiscence proximally  No signs of hematoma or infection at this time  right foot         Lab, Imaging and other studies:   CBC:   Lab Results   Component Value Date    WBC 11 56 (H) 11/05/2019    HGB 9 8 (L) 11/05/2019    HCT 33 0 (L) 11/05/2019    MCV 76 (L) 11/05/2019     11/05/2019    MCH 22 6 (L) 11/05/2019    MCHC 29 7 (L) 11/05/2019    RDW 14 3 11/05/2019    MPV 10 3 11/05/2019    NRBC 0 11/05/2019   , CMP:   Lab Results   Component Value Date    SODIUM 140 11/05/2019    K 3 4 (L) 11/05/2019     11/05/2019    CO2 27 11/05/2019    BUN 5 11/05/2019    CREATININE 0 85 11/05/2019    CALCIUM 7 2 (L) 11/05/2019    EGFR 78 11/05/2019       Imaging: I have personally reviewed pertinent films in PACS  EKG, Pathology, and Other Studies: I have personally reviewed pertinent reports    VTE Pharmacologic Prophylaxis: Enoxaparin (Lovenox)

## 2019-11-06 ENCOUNTER — ANESTHESIA EVENT (INPATIENT)
Dept: PERIOP | Facility: HOSPITAL | Age: 54
DRG: 181 | End: 2019-11-06
Payer: COMMERCIAL

## 2019-11-06 PROBLEM — E87.6 HYPOKALEMIA: Status: RESOLVED | Noted: 2019-11-01 | Resolved: 2019-11-06

## 2019-11-06 LAB
ANION GAP SERPL CALCULATED.3IONS-SCNC: 7 MMOL/L (ref 4–13)
BASOPHILS # BLD AUTO: 0.03 THOUSANDS/ΜL (ref 0–0.1)
BASOPHILS NFR BLD AUTO: 0 % (ref 0–1)
BUN SERPL-MCNC: 7 MG/DL (ref 5–25)
CALCIUM SERPL-MCNC: 7.6 MG/DL (ref 8.3–10.1)
CHLORIDE SERPL-SCNC: 104 MMOL/L (ref 100–108)
CO2 SERPL-SCNC: 28 MMOL/L (ref 21–32)
CREAT SERPL-MCNC: 0.9 MG/DL (ref 0.6–1.3)
EOSINOPHIL # BLD AUTO: 0.3 THOUSAND/ΜL (ref 0–0.61)
EOSINOPHIL NFR BLD AUTO: 3 % (ref 0–6)
ERYTHROCYTE [DISTWIDTH] IN BLOOD BY AUTOMATED COUNT: 14.3 % (ref 11.6–15.1)
GFR SERPL CREATININE-BSD FRML MDRD: 73 ML/MIN/1.73SQ M
GLUCOSE SERPL-MCNC: 110 MG/DL (ref 65–140)
GLUCOSE SERPL-MCNC: 111 MG/DL (ref 65–140)
GLUCOSE SERPL-MCNC: 140 MG/DL (ref 65–140)
GLUCOSE SERPL-MCNC: 214 MG/DL (ref 65–140)
GLUCOSE SERPL-MCNC: 232 MG/DL (ref 65–140)
GLUCOSE SERPL-MCNC: 289 MG/DL (ref 65–140)
HCT VFR BLD AUTO: 36.2 % (ref 34.8–46.1)
HGB BLD-MCNC: 10.5 G/DL (ref 11.5–15.4)
IMM GRANULOCYTES # BLD AUTO: 0.07 THOUSAND/UL (ref 0–0.2)
IMM GRANULOCYTES NFR BLD AUTO: 1 % (ref 0–2)
LYMPHOCYTES # BLD AUTO: 2.03 THOUSANDS/ΜL (ref 0.6–4.47)
LYMPHOCYTES NFR BLD AUTO: 18 % (ref 14–44)
MCH RBC QN AUTO: 22.2 PG (ref 26.8–34.3)
MCHC RBC AUTO-ENTMCNC: 29 G/DL (ref 31.4–37.4)
MCV RBC AUTO: 77 FL (ref 82–98)
MONOCYTES # BLD AUTO: 0.72 THOUSAND/ΜL (ref 0.17–1.22)
MONOCYTES NFR BLD AUTO: 7 % (ref 4–12)
NEUTROPHILS # BLD AUTO: 7.93 THOUSANDS/ΜL (ref 1.85–7.62)
NEUTS SEG NFR BLD AUTO: 71 % (ref 43–75)
NRBC BLD AUTO-RTO: 0 /100 WBCS
PLATELET # BLD AUTO: 378 THOUSANDS/UL (ref 149–390)
PMV BLD AUTO: 10.3 FL (ref 8.9–12.7)
POTASSIUM SERPL-SCNC: 4.3 MMOL/L (ref 3.5–5.3)
RBC # BLD AUTO: 4.73 MILLION/UL (ref 3.81–5.12)
SODIUM SERPL-SCNC: 139 MMOL/L (ref 136–145)
WBC # BLD AUTO: 11.08 THOUSAND/UL (ref 4.31–10.16)

## 2019-11-06 PROCEDURE — 99232 SBSQ HOSP IP/OBS MODERATE 35: CPT | Performed by: INTERNAL MEDICINE

## 2019-11-06 PROCEDURE — 97110 THERAPEUTIC EXERCISES: CPT

## 2019-11-06 PROCEDURE — 97530 THERAPEUTIC ACTIVITIES: CPT

## 2019-11-06 PROCEDURE — 85025 COMPLETE CBC W/AUTO DIFF WBC: CPT | Performed by: INTERNAL MEDICINE

## 2019-11-06 PROCEDURE — 80048 BASIC METABOLIC PNL TOTAL CA: CPT | Performed by: INTERNAL MEDICINE

## 2019-11-06 PROCEDURE — 82948 REAGENT STRIP/BLOOD GLUCOSE: CPT

## 2019-11-06 RX ORDER — SODIUM CHLORIDE 9 MG/ML
125 INJECTION, SOLUTION INTRAVENOUS CONTINUOUS
Status: CANCELLED | OUTPATIENT
Start: 2019-11-07

## 2019-11-06 RX ORDER — INSULIN GLARGINE 100 [IU]/ML
36 INJECTION, SOLUTION SUBCUTANEOUS
Status: DISCONTINUED | OUTPATIENT
Start: 2019-11-06 | End: 2019-11-09

## 2019-11-06 RX ORDER — SODIUM CHLORIDE 9 MG/ML
100 INJECTION, SOLUTION INTRAVENOUS CONTINUOUS
Status: DISCONTINUED | OUTPATIENT
Start: 2019-11-07 | End: 2019-11-07

## 2019-11-06 RX ADMIN — INSULIN LISPRO 3 UNITS: 100 INJECTION, SOLUTION INTRAVENOUS; SUBCUTANEOUS at 21:20

## 2019-11-06 RX ADMIN — METOPROLOL TARTRATE 25 MG: 25 TABLET, FILM COATED ORAL at 09:39

## 2019-11-06 RX ADMIN — INSULIN GLARGINE 36 UNITS: 100 INJECTION, SOLUTION SUBCUTANEOUS at 21:20

## 2019-11-06 RX ADMIN — METRONIDAZOLE 500 MG: 500 TABLET ORAL at 21:20

## 2019-11-06 RX ADMIN — ATORVASTATIN CALCIUM 20 MG: 20 TABLET, FILM COATED ORAL at 17:52

## 2019-11-06 RX ADMIN — INSULIN LISPRO 4 UNITS: 100 INJECTION, SOLUTION INTRAVENOUS; SUBCUTANEOUS at 17:50

## 2019-11-06 RX ADMIN — POTASSIUM CHLORIDE 40 MEQ: 1500 TABLET, EXTENDED RELEASE ORAL at 09:39

## 2019-11-06 RX ADMIN — METOPROLOL TARTRATE 25 MG: 25 TABLET, FILM COATED ORAL at 21:20

## 2019-11-06 RX ADMIN — METRONIDAZOLE 500 MG: 500 TABLET ORAL at 14:16

## 2019-11-06 RX ADMIN — CEFAZOLIN SODIUM 1000 MG: 1 SOLUTION INTRAVENOUS at 21:20

## 2019-11-06 RX ADMIN — METRONIDAZOLE 500 MG: 500 TABLET ORAL at 05:34

## 2019-11-06 RX ADMIN — ASPIRIN 81 MG 81 MG: 81 TABLET ORAL at 09:39

## 2019-11-06 RX ADMIN — ONDANSETRON 4 MG: 2 INJECTION INTRAMUSCULAR; INTRAVENOUS at 17:38

## 2019-11-06 RX ADMIN — CEFAZOLIN SODIUM 1000 MG: 1 SOLUTION INTRAVENOUS at 05:34

## 2019-11-06 RX ADMIN — CLOPIDOGREL BISULFATE 75 MG: 75 TABLET ORAL at 09:39

## 2019-11-06 RX ADMIN — ONDANSETRON 4 MG: 2 INJECTION INTRAMUSCULAR; INTRAVENOUS at 09:39

## 2019-11-06 RX ADMIN — ENOXAPARIN SODIUM 40 MG: 40 INJECTION SUBCUTANEOUS at 09:40

## 2019-11-06 RX ADMIN — SODIUM CHLORIDE 100 ML/HR: 0.9 INJECTION, SOLUTION INTRAVENOUS at 05:34

## 2019-11-06 RX ADMIN — INSULIN LISPRO 2 UNITS: 100 INJECTION, SOLUTION INTRAVENOUS; SUBCUTANEOUS at 14:16

## 2019-11-06 RX ADMIN — CEFAZOLIN SODIUM 1000 MG: 1 SOLUTION INTRAVENOUS at 14:16

## 2019-11-06 NOTE — OCCUPATIONAL THERAPY NOTE
OccupationalTherapy Progress Note     Patient Name: Faustina Zee  USTTX'L Date: 11/6/2019  Problem List  Principal Problem:    Gangrenous toe ([facility] )  Active Problems:    Type 2 diabetes mellitus with circulatory disorder, with long-term current use of insulin ([facility] )    Essential hypertension    HLD (hyperlipidemia)    PAD (peripheral artery disease) ([facility] )    CKD stage 2 due to type 2 diabetes mellitus ([place] [id number] )    Gangrene of toe of right foot ([place] [address] )                11/06/19 1523   Restrictions/Precautions   Weight Bearing Precautions Per Order Yes   RLE Weight Bearing Per Order NWB   Braces or Orthoses   (protective boot for L LE)   Other Precautions Cognitive; Chair Alarm; Bed Alarm; Fall Risk;Pain;WBS;Multiple lines   Pain Assessment   Pain Assessment No/denies pain   Pain Score No Pain   ADL   Where Assessed Chair   LB Dressing Assistance 3  Moderate Assistance   LB Dressing Deficit Setup;Verbal cueing;Supervision/safety; Increased time to complete; Don/doff L sock; Don/doff R sock   LB Dressing Comments assist to don protective boot on L LE   Bed Mobility   Supine to Sit 5  Supervision   Additional items Assist x 1; Increased time required;Verbal cues;LE management   Additional Comments increased time to complete   Transfers   Sit to Stand 4  Minimal assistance   Additional items Assist x 1; Increased time required;Verbal cues   Stand to Sit 4  Minimal assistance   Additional items Assist x 1; Increased time required;Verbal cues;Armrests   Additional Comments VCs for hand placement and positioning, able to maintain NWB on R LE   Functional Mobility   Functional Mobility 4  Minimal assistance   Additional Comments assist x1 w/ increased time, unilateral hopping on L LE in protective boot, while maintaining NWB R LE   Additional items Rolling walker   Therapeutic Exercise - ROM   UE-ROM Yes   ROM- Right Upper Extremities   R Shoulder AROM; Flexion; Extension;ABduction   R Elbow AROM;Elbow flexion;Elbow extension  (punchouts)   R Weight/Reps/Sets 2 sets x 10 reps    RUE ROM Comment tolerated well; 2 sets x5 reps of chair pushups   ROM - Left Upper Extremities    L Shoulder AROM; Flexion; Extension;ABduction   L Elbow AROM;Elbow extension;Elbow flexion  (punchouts)   L Weight/Reps/Sets 2 sets x 10 reps   LUE ROM Comment tolerated well and 2 sets x 5 reps UE chair pushups   Cognition   Overall Cognitive Status Impaired   Arousal/Participation Responsive; Cooperative   Attention Attends with cues to redirect   Orientation Level Oriented to person;Oriented to place;Oriented to time;Oriented to situation   Memory Decreased recall of precautions   Following Commands Follows one step commands with increased time or repetition   Comments pt engages in appropriate conversations and reports she is to have further surgery on R foot tomorrow (TMA)   Additional Activities   Additional Activities   (educated on benefit of OOB for all meals)   Additional Activities Comments pt receptive   Activity Tolerance   Activity Tolerance Patient limited by fatigue;Patient limited by pain   Medical Staff Made Aware appropriate to see per [facility] MEI   Assessment   Assessment Pt seen for skilled OT session focused on ADLs, functional transfers and mobility and UE exercises  Pt in bed upon arrival  Pt w/ supervision supine>sit bed mobility w/ increased time to complete  Pt w/ MOD assist to don protective boot on L LE 2* impaired functional reach  Pt w/ MIN assist sit>stand from bed w/ increased time to complete  Pt w/ MIN assist functional mobility RW and unilateral hopping on L LE while maintaining NWB on R LE  Pt w/ MIN assist stand>sit transfers w/ cues to feel chair on LEs prior to sitting down  Pt completed b/l UE exercises and UE chair pushups while in recliner to increased strength and endurance for ADLs, functional transfers and mobility  Pt seated in bedside chair w/ all needs met   Pt continues to be limited due to decreased strength and endurance, NWB R LE, impaired balance, decreased activity tolerance all causing a decline in ADLs, functional transfers and mobility  Pt to go to OR for TMA on 11/7/19  Recommend HOME w/ HOME OT and w/c and increased family support when medically stable  If pt family unable to provide increased assist and support pt may need STR  Will continue to follow to address OT POC  Plan   Treatment Interventions ADL retraining;Functional transfer training;UE strengthening/ROM; Cognitive reorientation; Endurance training;Patient/family training;Equipment evaluation/education; Compensatory technique education; Activityengagement; Energy conservation   Goal Expiration Date 11/19/19   OT Treatment Day 1   OT Frequency 3-5x/wk   Recommendation   OT Discharge Recommendation Home OT   Equipment Recommended   (wheelchair)   Barthel Index   Feeding 10   Bathing 0   Grooming Score 5   Dressing Score 5   Bladder Score 10   Bowels Score 10   Toilet Use Score 5   Transfers (Bed/Chair) Score 10   Mobility (Level Surface) Score 0   Stairs Score 0   Barthel Index Score 55   Modified [place] Scale   Modified [place] Scale 4     Documentation completed by: Denisha Cruz MS, OTR/L

## 2019-11-06 NOTE — PLAN OF CARE
Problem: Potential for Falls  Goal: Patient will remain free of falls  Description  INTERVENTIONS:  - Assess patient frequently for physical needs  -  Identify cognitive and physical deficits and behaviors that affect risk of falls    -  [place] fall precautions as indicated by assessment   - Educate patient/family on patient safety including physical limitations  - Instruct patient to call for assistance with activity based on assessment  - Modify environment to reduce risk of injury  - Consider OT/PT consult to assist with strengthening/mobility  Outcome: Progressing     Problem: PAIN - ADULT  Goal: Verbalizes/displays adequate comfort level or baseline comfort level  Description  Interventions:  - Encourage patient to monitor pain and request assistance  - Assess pain using appropriate pain scale  - Administer analgesics based on type and severity of pain and evaluate response  - Implement non-pharmacological measures as appropriate and evaluate response  - Consider cultural and social influences on pain and pain management  - Notify physician/advanced practitioner if interventions unsuccessful or patient reports new pain  Outcome: Progressing     Problem: INFECTION - ADULT  Goal: Absence or prevention of progression during hospitalization  Description  INTERVENTIONS:  - Assess and monitor for signs and symptoms of infection  - Monitor lab/diagnostic results  - Monitor all insertion sites, i e  indwelling lines, tubes, and drains  - Monitor endotracheal if appropriate and nasal secretions for changes in amount and color  - [place] appropriate cooling/warming therapies per order  - Administer medications as ordered  - Instruct and encourage patient and family to use good hand hygiene technique  - Identify and instruct in appropriate isolation precautions for identified infection/condition  Outcome: Progressing  Goal: Absence of fever/infection during neutropenic period  Description  INTERVENTIONS:  - Monitor WBC    Outcome: Progressing     Problem: SAFETY ADULT  Goal: Patient will remain free of falls  Description  INTERVENTIONS:  - Assess patient frequently for physical needs  -  Identify cognitive and physical deficits and behaviors that affect risk of falls  -  [place] fall precautions as indicated by assessment   - Educate patient/family on patient safety including physical limitations  - Instruct patient to call for assistance with activity based on assessment  - Modify environment to reduce risk of injury  - Consider OT/PT consult to assist with strengthening/mobility  Outcome: Progressing  Goal: Maintain or return to baseline ADL function  Description  INTERVENTIONS:  - Assess patient frequently for physical needs  -  Identify cognitive and physical deficits and behaviors that affect risk of falls    -  [place] fall precautions as indicated by assessment   - Educate patient/family on patient safety including physical limitations  - Instruct patient to call for assistance with activity based on assessment  - Modify environment to reduce risk of injury  - Consider OT/PT consult to assist with strengthening/mobility  Outcome: Progressing  Goal: Maintain or return mobility status to optimal level  Description  INTERVENTIONS:  -  Assess patient's ability to carry out ADLs; assess patient's baseline for ADL function and identify physical deficits which impact ability to perform ADLs (bathing, care of mouth/teeth, toileting, grooming, dressing, etc )  - Assess/evaluate cause of self-care deficits   - Assess range of motion  - Assess patient's mobility; develop plan if impaired  - Assess patient's need for assistive devices and provide as appropriate  - Encourage maximum independence but intervene and supervise when necessary  - Involve family in performance of ADLs  - Assess for home care needs following discharge   - Consider OT consult to assist with ADL evaluation and planning for discharge  - Provide patient education as appropriate  Outcome: Progressing     Problem: DISCHARGE PLANNING  Goal: Discharge to home or other facility with appropriate resources  Description  INTERVENTIONS:  - Identify barriers to discharge w/patient and caregiver  - Arrange for needed discharge resources and transportation as appropriate  - Identify discharge learning needs (meds, wound care, etc )  - Arrange for interpretive services to assist at discharge as needed  - Refer to Case Management Department for coordinating discharge planning if the patient needs post-hospital services based on physician/advanced practitioner order or complex needs related to functional status, cognitive ability, or social support system  Outcome: Progressing     Problem: Knowledge Deficit  Goal: Patient/family/caregiver demonstrates understanding of disease process, treatment plan, medications, and discharge instructions  Description  Complete learning assessment and assess knowledge base  Interventions:  - Provide teaching at level of understanding  - Provide teaching via preferred learning methods  Outcome: Progressing     Problem: Nutrition/Hydration-ADULT  Goal: Nutrient/Hydration intake appropriate for improving, restoring or maintaining nutritional needs  Description  Monitor and assess patient's nutrition/hydration status for malnutrition  Collaborate with interdisciplinary team and initiate plan and interventions as ordered  Monitor patient's weight and dietary intake as ordered or per policy  Utilize nutrition screening tool and intervene as necessary  Determine patient's food preferences and provide high-protein, high-caloric foods as appropriate       INTERVENTIONS:  - Monitor oral intake, urinary output, labs, and treatment plans  - Assess nutrition and hydration status and recommend course of action  - Evaluate amount of meals eaten  - Assist patient with eating if necessary   - Allow adequate time for meals  - Recommend/ encourage appropriate diets, oral nutritional supplements, and vitamin/mineral supplements  - Order, calculate, and assess calorie counts as needed  - Recommend, monitor, and adjust tube feedings and TPN/PPN based on assessed needs  - Assess need for intravenous fluids  - Provide specific nutrition/hydration education as appropriate  - Include patient/family/caregiver in decisions related to nutrition  Outcome: Progressing     Problem: Prexisting or High Potential for Compromised Skin Integrity  Goal: Skin integrity is maintained or improved  Description  INTERVENTIONS:  - Identify patients at risk for skin breakdown  - Assess and monitor skin integrity  - Assess and monitor nutrition and hydration status  - Monitor labs   - Assess for incontinence   - Turn and reposition patient  - Assist with mobility/ambulation  - Relieve pressure over bony prominences  - Avoid friction and shearing  - Provide appropriate hygiene as needed including keeping skin clean and dry  - Evaluate need for skin moisturizer/barrier cream  - Collaborate with interdisciplinary team   - Patient/family teaching  - Consider wound care consult   Outcome: Progressing

## 2019-11-06 NOTE — PROGRESS NOTES
Progress Note - Podiatry  Cristian BraxtonDelialeatha 47 y o  female MRN: [id number]  Unit/Bed#: E5 -01 Encounter: [id number]    Assessment/Plan:  48yo female with CKD2, severe PAD, DM2 with right foot 3rd and 4th toe dry gangrene s/p right 3rd and 4th toe amputation with toe fillet flap (DOS 11/6/19) now with flap failure and signs of necrosis at amputation site  - to OR tomorrow 11/7 5pm for right foot transmetatarsal amputation, patient is in agreeable to the treatment plan, she understands she remains at high risk of limb loss due to significant PAD that may require BKA, NPO at 9am, may have breakfast, IVF, hold AM lovenox 11/7  - rest of care per primary team    Subjective/Objective   Chief Complaint:   Chief Complaint   Patient presents with    Toe Pain     Patient reports noticing her R 4th toe has turned black in color  Patient reports burning her R foot a few months ago but denies any new injuries  Subjective: 47 y o  y/o female was seen and evaluated at bedside  No acute events overnight  Blood pressure 146/76, pulse 75, temperature 97 7 °F (36 5 °C), temperature source Temporal, resp  rate 16, height 5' (1 524 m), weight 85 1 kg (187 lb 9 8 oz), SpO2 97 %, not currently breastfeeding  ,Body mass index is 36 64 kg/m²      Invasive Devices     Peripheral Intravenous Line            Peripheral IV 11/03/19 Left Forearm 3 days                Physical Exam:   General: Alert, cooperative and no distress  Lungs: Non labored breathing  Heart: Regular rhythm and rate  Abdomen: non-tender, non-distended  Lower Extremities: right foot dressings left clean, dry and intact      Lab, Imaging and other studies:   CBC:   Lab Results   Component Value Date    WBC 11 08 (H) 11/06/2019    HGB 10 5 (L) 11/06/2019    HCT 36 2 11/06/2019    MCV 77 (L) 11/06/2019     11/06/2019    MCH 22 2 (L) 11/06/2019    MCHC 29 0 (L) 11/06/2019    RDW 14 3 11/06/2019    MPV 10 3 11/06/2019    NRBC 0 11/06/2019 Imaging: I have personally reviewed pertinent films in PACS  EKG, Pathology, and Other Studies: I have personally reviewed pertinent reports  VTE Pharmacologic Prophylaxis: Enoxaparin (Lovenox)    Portions of the record may have been created with voice recognition software  Occasional wrong word or "sound a like" substitutions may have occurred due to the inherent limitations of voice recognition software  Read the chart carefully and recognize, using context, where substitutions have occurred

## 2019-11-06 NOTE — ASSESSMENT & PLAN NOTE
49-year-old female history of diabetes mellitus who was unable to take her insulin over the last several months due to lack of insurance and recently re-started in October, hypertension, hyperlipidemia, peripheral arterial disease admitted for gangrenous toe  · Maintained on empiric antibiotics, Ancef and Flagyl, day 9  cultures negative thus far  · Angiogram with intervention performed on 11/1, continues to have below threshold for wound healing  · Status post right 3rd and 4th toe amputations postop day 2  · Discussed with Podiatry team, plan for OR tomorrow 11/07/2019 at 5:00 p m  For right foot transmetatarsal amputation, as per vascular surgery patient remains high risk for limb loss due to significant PAD and may eventually require a BKA  · NPO at 9:00 a m

## 2019-11-06 NOTE — ANESTHESIA PREPROCEDURE EVALUATION
Review of Systems/Medical History  Patient summary reviewed  Chart reviewed  No history of anesthetic complications     Cardiovascular  Hyperlipidemia, Hypertension , PVD,   Pulmonary hypertension Pulmonary  Smoker ex-smoker  Cumulative Pack Years: [de-identified],        GI/Hepatic  Negative GI/hepatic ROS          Chronic kidney disease stage 2,        Endo/Other  Diabetes poorly controlled type 2 Insulin,   Comment: Hgb A1C 13 7    GYN  Negative gynecology ROS          Hematology  Anemia ,     Musculoskeletal    Arthritis     Neurology  Negative neurology ROS      Psychology   Negative psychology ROS              Physical Exam    Airway    Mallampati score: III  TM Distance: >3 FB  Neck ROM: full     Dental   Comment: Multiple missing teeth,     Cardiovascular  Rhythm: regular, Rate: normal, Cardiovascular exam normal    Pulmonary  Pulmonary exam normal Breath sounds clear to auscultation,     Other Findings        Anesthesia Plan  ASA Score- 3     Anesthesia Type- IV sedation with anesthesia and general with ASA Monitors  Additional Monitors:   Airway Plan: LMA  Comment:  pt has been done under TIVA for previous cases  Plan Factors-    Induction-     Postoperative Plan- Plan for postoperative opioid use  Informed Consent- Anesthetic plan and risks discussed with patient

## 2019-11-06 NOTE — PROGRESS NOTES
Progress Note - Oralia Lyon 1965, 47 y o  female MRN: [id number]    Unit/Bed#: E5 -01 Encounter: [id number]    Primary Care Provider: Rory Lopez MD   Date and time admitted to hospital: 10/30/2019  6:28 PM        * Gangrenous toe [facility])  Assessment & Plan  17-year-old female history of diabetes mellitus who was unable to take her insulin over the last several months due to lack of insurance and recently re-started in October, hypertension, hyperlipidemia, peripheral arterial disease admitted for gangrenous toe  · Maintained on empiric antibiotics, Ancef and Flagyl, day 9  cultures negative thus far  · Angiogram with intervention performed on 11/1, continues to have below threshold for wound healing  · Status post right 3rd and 4th toe amputations postop day 2  · Discussed with Podiatry team, plan for OR tomorrow 11/07/2019 at 5:00 p m  For right foot transmetatarsal amputation, as per vascular surgery patient remains high risk for limb loss due to significant PAD and may eventually require a BKA  · NPO at 9:00 a m  CKD stage 2 due to type 2 diabetes mellitus ([facility] [address] )  Assessment & Plan  · Monitor BMP's  · Avoid nephrotoxins / hypotension  Lab Results   Component Value Date    CREATININE 0 90 11/06/2019    CREATININE 0 85 11/05/2019    CREATININE 0 94 11/04/2019         PAD (peripheral artery disease) ([facility] [address] )  Assessment & Plan  · Status post right SFA PTA/stent 04/30/2019  · Status post right lower extremity Agram on 11/01/2019 with successful R SFA PTA/stent  · Vascular surgery input appreciated  · Continue with aspirin, Plavix, statin  · History of PAD maintained on aspirin, Plavix, statin    HLD (hyperlipidemia)  Assessment & Plan  · Maintained on statin    Essential hypertension  Assessment & Plan  · BP stable  · Continue metoprolol 25 mg b i d      Type 2 diabetes mellitus with circulatory disorder, with long-term current use of insulin [facility])  Assessment & Plan  Lab Results Component Value Date    HGBA1C 13 7 (A) 10/03/2019       Recent Labs     19  0348 19  0731 19  1204   POCGLU 128 111 140 214*       Blood Sugar Average: Last 72 hrs:  · (P) 148 35   · Uncontrolled with very high A1c  · Maintained on Metformin and Lantus 78 units q HS however patient was not taking insulin as she could not afford it  She was then placed on Glipizide until prescription assistance was in place  · Patient having episodes of hypoglycemia, on 2019 patient's Lantus held in the morning, she refused Lantus in the evening defer has not received any basal insulin in the last 24 hours, patient has had a poor p o  Intake  · Will continue with Lantus 36 units at bedtime, decreased with meal coverage to 3 units t i d   · Monitor FSBS ac / hs and cover with SSI  · Diabetic diet  · Nutrition consult      VTE Pharmacologic Prophylaxis:   Pharmacologic:  Lovenox    Patient Centered Rounds: I have performed bedside rounds with nursing staff today  Time Spent for Care: 20 minutes  More than 50% of total time spent on counseling and coordination of care as described above  Current Length of Stay: 7 day(s)    Current Patient Status: Inpatient   Certification Statement: The patient will continue to require additional inpatient hospital stay due to Right TMA    Discharge Plan / Estimated Discharge Date: TBD    Code Status: Level 1 - Full Code      Subjective:   Patient seen and examined at bedside, denies any abdominal pain, nausea, vomiting    Objective:     Vitals:   Temp (24hrs), Av 6 °F (36 4 °C), Min:97 4 °F (36 3 °C), Max:97 7 °F (36 5 °C)    Temp:  [97 4 °F (36 3 °C)-97 7 °F (36 5 °C)] 97 7 °F (36 5 °C)  HR:  [70-78] 75  Resp:  [16-20] 16  BP: (103-146)/() 146/76  SpO2:  [97 %-98 %] 97 %  Body mass index is 36 64 kg/m²  Input and Output Summary (last 24 hours):        Intake/Output Summary (Last 24 hours) at 2019 1252  Last data filed at 2019 8724  Gross per 24 hour   Intake 421 67 ml   Output 400 ml   Net 21 67 ml       Physical Exam:    Constitutional: Patient is oriented to person, place and time, no acute distress  HEENT:  Normocephalic, atraumatic, EOMI, PERRLA, no scleral icterus, no pallor, moist oral mucosa  Neck:  Supple, no masses, no thyromegaly, no bruits Normal range of motion  Lymph nodes:  No lymphadenopathy  Cardiovascular: Normal S1S2, RRR, No murmurs/rubs/gallops appreciated  Pulmonary:  Bilateral air entry, No rhonchi/rales/wheezing appreciated  Abdominal: Soft, Bowel sounds present, Non-tender, Non-distended, No rebound/guarding, no hepatomegaly   Musculoskeletal: No tenderness/abnormality   Extremities:  No cyanosis, clubbing or edema  Peripheral pulses palpable and equal bilaterally  Neurological: Cranial nerves II-XII grossly intact, sensation intact, otherwise no focal neurological symptoms  Skin:right foot in dressing    Additional Data:     Labs:    Results from last 7 days   Lab Units 11/06/19  0556   WBC Thousand/uL 11 08*   HEMOGLOBIN g/dL 10 5*   HEMATOCRIT % 36 2   PLATELETS Thousands/uL 378   NEUTROS PCT % 71   LYMPHS PCT % 18   MONOS PCT % 7   EOS PCT % 3     Results from last 7 days   Lab Units 11/06/19  0556  10/31/19  0000   POTASSIUM mmol/L 4 3   < >  --    CHLORIDE mmol/L 104   < >  --    CO2 mmol/L 28   < >  --    BUN mg/dL 7   < >  --    CREATININE mg/dL 0 90   < >  --    CALCIUM mg/dL 7 6*   < >  --    ALK PHOS U/L  --   --  76   ALT U/L  --   --  13   AST U/L  --   --  17    < > = values in this interval not displayed  Results from last 7 days   Lab Units 10/31/19  0610   INR  1 07        I Have Reviewed All Lab Data Listed Above  Recent Cultures (last 7 days):     Results from last 7 days   Lab Units 10/30/19  2016 10/30/19  1850   BLOOD CULTURE  No Growth After 5 Days  No Growth After 5 Days         Last 24 Hours Medication List:     Current Facility-Administered Medications:  acetaminophen 650 mg Oral Q6H PRN Rohan Aleman, DPM    aspirin 81 mg Oral Daily Rohan Aleman, DPM    atorvastatin 20 mg Oral Daily With [employer], DPM    cefazolin 1,000 mg Intravenous Q8H Rohan Aleman, DPM Last Rate: 1,000 mg (11/06/19 0534)   clopidogrel 75 mg Oral Daily Rohan Aleman, DPM    enoxaparin 40 mg Subcutaneous Q24H [address] Ronak Swenson, DPM    hydrALAZINE 5 mg Intravenous Q6H PRN Rohan Aleman, DPM    insulin glargine 36 Units Subcutaneous HS Magdalena Ortega MD    insulin lispro 1-6 Units Subcutaneous TID AC Ronak Swenson, DPM    insulin lispro 1-6 Units Subcutaneous HS Rohan Aleman, DPM    insulin lispro 3 Units Subcutaneous TID With Meals Magdalena Ortega MD    metoprolol tartrate 25 mg Oral Q12H [address] Rohan Aleman, DPM    metroNIDAZOLE 500 mg Oral Q8H [address] Magdalena Ortega MD    ondansetron 4 mg Intravenous Q6H PRN Rohan Aleman, DPM    oxyCODONE 5 mg Oral Q4H PRN Samra Edmond PA-C    potassium chloride 40 mEq Oral Daily Rohan Aleman DPM    [START ON 11/7/2019] sodium chloride 100 mL/hr Intravenous Continuous Rohan Aleman, DPM    traMADol 50 mg Oral Q6H PRN Jeannette Marsh PA-C         Today, Patient Was Seen By: Magdalena Ortega MD

## 2019-11-06 NOTE — ASSESSMENT & PLAN NOTE
Lab Results   Component Value Date    HGBA1C 13 7 (A) 10/03/2019       Recent Labs     11/05/19 2059 11/06/19  0348 11/06/19  0731 11/06/19  1204   POCGLU 128 111 140 214*       Blood Sugar Average: Last 72 hrs:  · (P) 148 35   · Uncontrolled with very high A1c  · Maintained on Metformin and Lantus 78 units q HS however patient was not taking insulin as she could not afford it  She was then placed on Glipizide until prescription assistance was in place  · Patient having episodes of hypoglycemia, on 11/05/2019 patient's Lantus held in the morning, she refused Lantus in the evening defer has not received any basal insulin in the last 24 hours, patient has had a poor p o   Intake  · Will continue with Lantus 36 units at bedtime, decreased with meal coverage to 3 units t i d   · Monitor FSBS ac / hs and cover with SSI  · Diabetic diet  · Nutrition consult

## 2019-11-06 NOTE — PLAN OF CARE
Problem: OCCUPATIONAL THERAPY ADULT  Goal: Performs self-care activities at highest level of function for planned discharge setting  See evaluation for individualized goals  Description  Treatment Interventions: ADL retraining, Endurance training, UE strengthening/ROM, Equipment evaluation/education, Cognitive reorientation, Patient/family training, Compensatory technique education, Energy conservation, Activityengagement  Equipment Recommended: (w/c)       See flowsheet documentation for full assessment, interventions and recommendations  Note:   Limitation: Decreased ADL status, Decreased UE strength, Decreased cognition, Decreased Safe judgement during ADL, Decreased endurance, Decreased self-care trans, Decreased high-level ADLs  Prognosis: Good  Assessment: Pt seen for skilled OT session focused on ADLs, functional transfers and mobility and UE exercises  Pt in bed upon arrival  Pt w/ supervision supine>sit bed mobility w/ increased time to complete  Pt w/ MOD assist to don protective boot on L LE 2* impaired functional reach  Pt w/ MIN assist sit>stand from bed w/ increased time to complete  Pt w/ MIN assist functional mobility RW and unilateral hopping on L LE while maintaining NWB on R LE  Pt w/ MIN assist stand>sit transfers w/ cues to feel chair on LEs prior to sitting down  Pt completed b/l UE exercises and UE chair pushups while in recliner to increased strength and endurance for ADLs, functional transfers and mobility  Pt seated in bedside chair w/ all needs met  Pt continues to be limited due to decreased strength and endurance, NWB R LE, impaired balance, decreased activity tolerance all causing a decline in ADLs, functional transfers and mobility  Pt to go to OR for TMA on 11/7/19  Recommend HOME w/ HOME OT and w/c and increased family support when medically stable  If pt family unable to provide increased assist and support pt may need STR  Will continue to follow to address OT POC       OT Discharge Recommendation: Home OT  OT - OK to Discharge: (when medically stable)

## 2019-11-06 NOTE — ASSESSMENT & PLAN NOTE
· Monitor BMP's  · Avoid nephrotoxins / hypotension  Lab Results   Component Value Date    CREATININE 0 90 11/06/2019    CREATININE 0 85 11/05/2019    CREATININE 0 94 11/04/2019

## 2019-11-07 ENCOUNTER — APPOINTMENT (INPATIENT)
Dept: RADIOLOGY | Facility: HOSPITAL | Age: 54
DRG: 181 | End: 2019-11-07
Payer: COMMERCIAL

## 2019-11-07 ENCOUNTER — ANESTHESIA (INPATIENT)
Dept: PERIOP | Facility: HOSPITAL | Age: 54
DRG: 181 | End: 2019-11-07
Payer: COMMERCIAL

## 2019-11-07 LAB
ALDOST SERPL-MCNC: <1 NG/DL (ref 0–30)
ANION GAP SERPL CALCULATED.3IONS-SCNC: 7 MMOL/L (ref 4–13)
BASOPHILS # BLD AUTO: 0.03 THOUSANDS/ΜL (ref 0–0.1)
BASOPHILS NFR BLD AUTO: 0 % (ref 0–1)
BUN SERPL-MCNC: 6 MG/DL (ref 5–25)
CALCIUM SERPL-MCNC: 7.9 MG/DL (ref 8.3–10.1)
CHLORIDE SERPL-SCNC: 105 MMOL/L (ref 100–108)
CO2 SERPL-SCNC: 27 MMOL/L (ref 21–32)
CREAT SERPL-MCNC: 0.79 MG/DL (ref 0.6–1.3)
EOSINOPHIL # BLD AUTO: 0.28 THOUSAND/ΜL (ref 0–0.61)
EOSINOPHIL NFR BLD AUTO: 3 % (ref 0–6)
ERYTHROCYTE [DISTWIDTH] IN BLOOD BY AUTOMATED COUNT: 14.6 % (ref 11.6–15.1)
GFR SERPL CREATININE-BSD FRML MDRD: 85 ML/MIN/1.73SQ M
GLUCOSE SERPL-MCNC: 101 MG/DL (ref 65–140)
GLUCOSE SERPL-MCNC: 121 MG/DL (ref 65–140)
GLUCOSE SERPL-MCNC: 161 MG/DL (ref 65–140)
GLUCOSE SERPL-MCNC: 97 MG/DL (ref 65–140)
HCT VFR BLD AUTO: 35.4 % (ref 34.8–46.1)
HGB BLD-MCNC: 10.3 G/DL (ref 11.5–15.4)
IMM GRANULOCYTES # BLD AUTO: 0.06 THOUSAND/UL (ref 0–0.2)
IMM GRANULOCYTES NFR BLD AUTO: 1 % (ref 0–2)
LYMPHOCYTES # BLD AUTO: 3.06 THOUSANDS/ΜL (ref 0.6–4.47)
LYMPHOCYTES NFR BLD AUTO: 30 % (ref 14–44)
MCH RBC QN AUTO: 22.2 PG (ref 26.8–34.3)
MCHC RBC AUTO-ENTMCNC: 29.1 G/DL (ref 31.4–37.4)
MCV RBC AUTO: 76 FL (ref 82–98)
MONOCYTES # BLD AUTO: 0.71 THOUSAND/ΜL (ref 0.17–1.22)
MONOCYTES NFR BLD AUTO: 7 % (ref 4–12)
NEUTROPHILS # BLD AUTO: 6.11 THOUSANDS/ΜL (ref 1.85–7.62)
NEUTS SEG NFR BLD AUTO: 59 % (ref 43–75)
NRBC BLD AUTO-RTO: 0 /100 WBCS
PLATELET # BLD AUTO: 443 THOUSANDS/UL (ref 149–390)
PMV BLD AUTO: 10.2 FL (ref 8.9–12.7)
POTASSIUM SERPL-SCNC: 4.9 MMOL/L (ref 3.5–5.3)
RBC # BLD AUTO: 4.64 MILLION/UL (ref 3.81–5.12)
RENIN PLAS-CCNC: 0.84 NG/ML/HR (ref 0.17–5.38)
SODIUM SERPL-SCNC: 139 MMOL/L (ref 136–145)
WBC # BLD AUTO: 10.25 THOUSAND/UL (ref 4.31–10.16)

## 2019-11-07 PROCEDURE — 85025 COMPLETE CBC W/AUTO DIFF WBC: CPT | Performed by: INTERNAL MEDICINE

## 2019-11-07 PROCEDURE — 73630 X-RAY EXAM OF FOOT: CPT

## 2019-11-07 PROCEDURE — 80048 BASIC METABOLIC PNL TOTAL CA: CPT | Performed by: INTERNAL MEDICINE

## 2019-11-07 PROCEDURE — 0Y6M0ZB DETACHMENT AT RIGHT FOOT, PARTIAL 2ND RAY, OPEN APPROACH: ICD-10-PCS | Performed by: PODIATRIST

## 2019-11-07 PROCEDURE — 88307 TISSUE EXAM BY PATHOLOGIST: CPT | Performed by: PATHOLOGY

## 2019-11-07 PROCEDURE — 82948 REAGENT STRIP/BLOOD GLUCOSE: CPT

## 2019-11-07 PROCEDURE — 88311 DECALCIFY TISSUE: CPT | Performed by: PATHOLOGY

## 2019-11-07 PROCEDURE — 0L8N3ZZ DIVISION OF RIGHT LOWER LEG TENDON, PERCUTANEOUS APPROACH: ICD-10-PCS | Performed by: PODIATRIST

## 2019-11-07 PROCEDURE — 0Y6M0ZD DETACHMENT AT RIGHT FOOT, PARTIAL 4TH RAY, OPEN APPROACH: ICD-10-PCS | Performed by: PODIATRIST

## 2019-11-07 PROCEDURE — 0Y6M0ZF DETACHMENT AT RIGHT FOOT, PARTIAL 5TH RAY, OPEN APPROACH: ICD-10-PCS | Performed by: PODIATRIST

## 2019-11-07 PROCEDURE — 0Y6M0ZC DETACHMENT AT RIGHT FOOT, PARTIAL 3RD RAY, OPEN APPROACH: ICD-10-PCS | Performed by: PODIATRIST

## 2019-11-07 PROCEDURE — 99232 SBSQ HOSP IP/OBS MODERATE 35: CPT | Performed by: NURSE PRACTITIONER

## 2019-11-07 PROCEDURE — 0Y6M0Z9 DETACHMENT AT RIGHT FOOT, PARTIAL 1ST RAY, OPEN APPROACH: ICD-10-PCS | Performed by: PODIATRIST

## 2019-11-07 RX ORDER — MIDAZOLAM HYDROCHLORIDE 2 MG/2ML
INJECTION, SOLUTION INTRAMUSCULAR; INTRAVENOUS AS NEEDED
Status: DISCONTINUED | OUTPATIENT
Start: 2019-11-07 | End: 2019-11-07 | Stop reason: SURG

## 2019-11-07 RX ORDER — FENTANYL CITRATE 50 UG/ML
INJECTION, SOLUTION INTRAMUSCULAR; INTRAVENOUS AS NEEDED
Status: DISCONTINUED | OUTPATIENT
Start: 2019-11-07 | End: 2019-11-07 | Stop reason: SURG

## 2019-11-07 RX ORDER — PROPOFOL 10 MG/ML
INJECTION, EMULSION INTRAVENOUS AS NEEDED
Status: DISCONTINUED | OUTPATIENT
Start: 2019-11-07 | End: 2019-11-07 | Stop reason: SURG

## 2019-11-07 RX ORDER — OXYCODONE HYDROCHLORIDE AND ACETAMINOPHEN 5; 325 MG/1; MG/1
1 TABLET ORAL EVERY 4 HOURS PRN
Status: DISCONTINUED | OUTPATIENT
Start: 2019-11-07 | End: 2019-11-08 | Stop reason: SDUPTHER

## 2019-11-07 RX ORDER — ACETAMINOPHEN 325 MG/1
650 TABLET ORAL EVERY 4 HOURS PRN
Status: DISCONTINUED | OUTPATIENT
Start: 2019-11-07 | End: 2019-11-08 | Stop reason: SDUPTHER

## 2019-11-07 RX ORDER — ONDANSETRON 2 MG/ML
INJECTION INTRAMUSCULAR; INTRAVENOUS AS NEEDED
Status: DISCONTINUED | OUTPATIENT
Start: 2019-11-07 | End: 2019-11-07 | Stop reason: SURG

## 2019-11-07 RX ORDER — ALBUTEROL SULFATE 2.5 MG/3ML
2.5 SOLUTION RESPIRATORY (INHALATION) ONCE AS NEEDED
Status: DISCONTINUED | OUTPATIENT
Start: 2019-11-07 | End: 2019-11-07 | Stop reason: HOSPADM

## 2019-11-07 RX ORDER — HYDROMORPHONE HCL/PF 1 MG/ML
0.5 SYRINGE (ML) INJECTION
Status: DISCONTINUED | OUTPATIENT
Start: 2019-11-07 | End: 2019-11-12 | Stop reason: HOSPADM

## 2019-11-07 RX ORDER — ONDANSETRON 2 MG/ML
4 INJECTION INTRAMUSCULAR; INTRAVENOUS EVERY 6 HOURS PRN
Status: DISCONTINUED | OUTPATIENT
Start: 2019-11-07 | End: 2019-11-08 | Stop reason: SDUPTHER

## 2019-11-07 RX ORDER — EPHEDRINE SULFATE 50 MG/ML
INJECTION INTRAVENOUS AS NEEDED
Status: DISCONTINUED | OUTPATIENT
Start: 2019-11-07 | End: 2019-11-07 | Stop reason: SURG

## 2019-11-07 RX ORDER — FENTANYL CITRATE/PF 50 MCG/ML
50 SYRINGE (ML) INJECTION
Status: DISCONTINUED | OUTPATIENT
Start: 2019-11-07 | End: 2019-11-07 | Stop reason: HOSPADM

## 2019-11-07 RX ORDER — MEPERIDINE HYDROCHLORIDE 50 MG/ML
12.5 INJECTION INTRAMUSCULAR; INTRAVENOUS; SUBCUTANEOUS AS NEEDED
Status: DISCONTINUED | OUTPATIENT
Start: 2019-11-07 | End: 2019-11-07 | Stop reason: HOSPADM

## 2019-11-07 RX ADMIN — FENTANYL CITRATE 25 MCG: 50 INJECTION, SOLUTION INTRAMUSCULAR; INTRAVENOUS at 21:12

## 2019-11-07 RX ADMIN — ASPIRIN 81 MG 81 MG: 81 TABLET ORAL at 08:22

## 2019-11-07 RX ADMIN — LIDOCAINE HYDROCHLORIDE 100 MG: 20 INJECTION, SOLUTION INTRAVENOUS at 20:46

## 2019-11-07 RX ADMIN — EPHEDRINE SULFATE 10 MG: 50 INJECTION, SOLUTION INTRAVENOUS at 20:52

## 2019-11-07 RX ADMIN — METOPROLOL TARTRATE 25 MG: 25 TABLET, FILM COATED ORAL at 22:53

## 2019-11-07 RX ADMIN — FENTANYL CITRATE 25 MCG: 50 INJECTION, SOLUTION INTRAMUSCULAR; INTRAVENOUS at 20:54

## 2019-11-07 RX ADMIN — METRONIDAZOLE 500 MG: 500 TABLET ORAL at 22:54

## 2019-11-07 RX ADMIN — CEFAZOLIN SODIUM 1000 MG: 1 SOLUTION INTRAVENOUS at 13:52

## 2019-11-07 RX ADMIN — MIDAZOLAM 2 MG: 1 INJECTION INTRAMUSCULAR; INTRAVENOUS at 20:33

## 2019-11-07 RX ADMIN — METOPROLOL TARTRATE 25 MG: 25 TABLET, FILM COATED ORAL at 08:22

## 2019-11-07 RX ADMIN — PHENYLEPHRINE HYDROCHLORIDE 100 MCG: 10 INJECTION INTRAVENOUS at 21:19

## 2019-11-07 RX ADMIN — CEFAZOLIN SODIUM 1000 MG: 1 SOLUTION INTRAVENOUS at 05:18

## 2019-11-07 RX ADMIN — CEFAZOLIN SODIUM 1000 MG: 1 SOLUTION INTRAVENOUS at 20:32

## 2019-11-07 RX ADMIN — METRONIDAZOLE 500 MG: 500 TABLET ORAL at 13:52

## 2019-11-07 RX ADMIN — SODIUM CHLORIDE 100 ML/HR: 0.9 INJECTION, SOLUTION INTRAVENOUS at 08:23

## 2019-11-07 RX ADMIN — PHENYLEPHRINE HYDROCHLORIDE 100 MCG: 10 INJECTION INTRAVENOUS at 21:33

## 2019-11-07 RX ADMIN — METRONIDAZOLE 500 MG: 500 TABLET ORAL at 05:18

## 2019-11-07 RX ADMIN — FENTANYL CITRATE 50 MCG: 50 INJECTION, SOLUTION INTRAMUSCULAR; INTRAVENOUS at 21:52

## 2019-11-07 RX ADMIN — PHENYLEPHRINE HYDROCHLORIDE 100 MCG: 10 INJECTION INTRAVENOUS at 21:12

## 2019-11-07 RX ADMIN — ONDANSETRON 4 MG: 2 INJECTION INTRAMUSCULAR; INTRAVENOUS at 21:18

## 2019-11-07 RX ADMIN — CLOPIDOGREL BISULFATE 75 MG: 75 TABLET ORAL at 08:22

## 2019-11-07 RX ADMIN — INSULIN GLARGINE 36 UNITS: 100 INJECTION, SOLUTION SUBCUTANEOUS at 22:54

## 2019-11-07 RX ADMIN — PROPOFOL 200 MG: 10 INJECTION, EMULSION INTRAVENOUS at 20:46

## 2019-11-07 NOTE — ASSESSMENT & PLAN NOTE
Lab Results   Component Value Date    HGBA1C 13 7 (A) 10/03/2019       Recent Labs     11/06/19  1552 11/06/19  2051 11/07/19  0709 11/07/19  1103   POCGLU 289* 232* 97 161*       Blood Sugar Average: Last 72 hrs:  · (P) 149 [id number]   · Uncontrolled with very high A1c  · Maintained on Metformin and Lantus 78 units q HS however patient was not taking insulin as she could not afford it  She was then placed on Glipizide until prescription assistance was in place  · Patient having episodes of hypoglycemia, on 11/05/2019 patient's Lantus held in the morning, she refused Lantus in the evening and had not received any basal insulin in 24 hours, patient has had a poor p o  Intake  · Continue with Lantus 36 units at bedtime, decreased with meal coverage to 3 units t i d  Continue to titrate as needed    · Monitor FSBS ac / hs and cover with SSI  · Diabetic diet  · Nutrition consult

## 2019-11-07 NOTE — ASSESSMENT & PLAN NOTE
· Monitor BMP's  · Avoid nephrotoxins / hypotension  Lab Results   Component Value Date    CREATININE 0 79 11/07/2019    CREATININE 0 90 11/06/2019    CREATININE 0 85 11/05/2019

## 2019-11-07 NOTE — ASSESSMENT & PLAN NOTE
49-year-old female history of diabetes mellitus who was unable to take her insulin over the last several months due to lack of insurance and recently re-started in October, hypertension, hyperlipidemia, peripheral arterial disease admitted for gangrenous toe  · Maintained on empiric antibiotics, Ancef and Flagyl  Blood cultures negative    · Angiogram with intervention performed on 11/1, continues to have below threshold for wound healing  · Status post right 3rd and 4th toe amputations on 11/4  · To go to OR today at 5:00 p m  for right foot transmetatarsal amputation, as per vascular surgery patient remains high risk for limb loss due to significant PAD and may eventually require a BKA

## 2019-11-07 NOTE — PHYSICAL THERAPY NOTE
Physical Therapy Cancellation Note    PATIENT SCHEDULED TO GO TO OR TODAY FOR R TRANSMETATARSAL AMPUTATION  PT WILL CONTINUE TO FOLLOW AND RE-EVALUATE POST OPERATIVELY      Norma Lazo PT, DPT

## 2019-11-07 NOTE — PLAN OF CARE
Problem: Potential for Falls  Goal: Patient will remain free of falls  Description  INTERVENTIONS:  - Assess patient frequently for physical needs  -  Identify cognitive and physical deficits and behaviors that affect risk of falls    -  [place] fall precautions as indicated by assessment   - Educate patient/family on patient safety including physical limitations  - Instruct patient to call for assistance with activity based on assessment  - Modify environment to reduce risk of injury  - Consider OT/PT consult to assist with strengthening/mobility  Outcome: Progressing     Problem: PAIN - ADULT  Goal: Verbalizes/displays adequate comfort level or baseline comfort level  Description  Interventions:  - Encourage patient to monitor pain and request assistance  - Assess pain using appropriate pain scale  - Administer analgesics based on type and severity of pain and evaluate response  - Implement non-pharmacological measures as appropriate and evaluate response  - Consider cultural and social influences on pain and pain management  - Notify physician/advanced practitioner if interventions unsuccessful or patient reports new pain  Outcome: Progressing     Problem: INFECTION - ADULT  Goal: Absence or prevention of progression during hospitalization  Description  INTERVENTIONS:  - Assess and monitor for signs and symptoms of infection  - Monitor lab/diagnostic results  - Monitor all insertion sites, i e  indwelling lines, tubes, and drains  - Monitor endotracheal if appropriate and nasal secretions for changes in amount and color  - [place] appropriate cooling/warming therapies per order  - Administer medications as ordered  - Instruct and encourage patient and family to use good hand hygiene technique  - Identify and instruct in appropriate isolation precautions for identified infection/condition  Outcome: Progressing  Goal: Absence of fever/infection during neutropenic period  Description  INTERVENTIONS:  - Monitor WBC    Outcome: Progressing     Problem: SAFETY ADULT  Goal: Patient will remain free of falls  Description  INTERVENTIONS:  - Assess patient frequently for physical needs  -  Identify cognitive and physical deficits and behaviors that affect risk of falls    -  [place] fall precautions as indicated by assessment   - Educate patient/family on patient safety including physical limitations  - Instruct patient to call for assistance with activity based on assessment  - Modify environment to reduce risk of injury  - Consider OT/PT consult to assist with strengthening/mobility  Outcome: Progressing  Goal: Maintain or return to baseline ADL function  Description  INTERVENTIONS:  -  Assess patient's ability to carry out ADLs; assess patient's baseline for ADL function and identify physical deficits which impact ability to perform ADLs (bathing, care of mouth/teeth, toileting, grooming, dressing, etc )  - Assess/evaluate cause of self-care deficits   - Assess range of motion  - Assess patient's mobility; develop plan if impaired  - Assess patient's need for assistive devices and provide as appropriate  - Encourage maximum independence but intervene and supervise when necessary  - Involve family in performance of ADLs  - Assess for home care needs following discharge   - Consider OT consult to assist with ADL evaluation and planning for discharge  - Provide patient education as appropriate  Outcome: Progressing  Goal: Maintain or return mobility status to optimal level  Description  INTERVENTIONS:  - Assess patient's baseline mobility status (ambulation, transfers, stairs, etc )    - Identify cognitive and physical deficits and behaviors that affect mobility  - Identify mobility aids required to assist with transfers and/or ambulation (gait belt, sit-to-stand, lift, walker, cane, etc )  - [place] fall precautions as indicated by assessment  - Record patient progress and toleration of activity level on Mobility SBAR; progress patient to next Phase/Stage  - Instruct patient to call for assistance with activity based on assessment  - Consider rehabilitation consult to assist with strengthening/weightbearing, etc   Outcome: Progressing     Problem: DISCHARGE PLANNING  Goal: Discharge to home or other facility with appropriate resources  Description  INTERVENTIONS:  - Identify barriers to discharge w/patient and caregiver  - Arrange for needed discharge resources and transportation as appropriate  - Identify discharge learning needs (meds, wound care, etc )  - Arrange for interpretive services to assist at discharge as needed  - Refer to Case Management Department for coordinating discharge planning if the patient needs post-hospital services based on physician/advanced practitioner order or complex needs related to functional status, cognitive ability, or social support system  Outcome: Progressing     Problem: Knowledge Deficit  Goal: Patient/family/caregiver demonstrates understanding of disease process, treatment plan, medications, and discharge instructions  Description  Complete learning assessment and assess knowledge base  Interventions:  - Provide teaching at level of understanding  - Provide teaching via preferred learning methods  Outcome: Progressing     Problem: Nutrition/Hydration-ADULT  Goal: Nutrient/Hydration intake appropriate for improving, restoring or maintaining nutritional needs  Description  Monitor and assess patient's nutrition/hydration status for malnutrition  Collaborate with interdisciplinary team and initiate plan and interventions as ordered  Monitor patient's weight and dietary intake as ordered or per policy  Utilize nutrition screening tool and intervene as necessary  Determine patient's food preferences and provide high-protein, high-caloric foods as appropriate       INTERVENTIONS:  - Monitor oral intake, urinary output, labs, and treatment plans  - Assess nutrition and hydration status and recommend course of action  - Evaluate amount of meals eaten  - Assist patient with eating if necessary   - Allow adequate time for meals  - Recommend/ encourage appropriate diets, oral nutritional supplements, and vitamin/mineral supplements  - Order, calculate, and assess calorie counts as needed  - Recommend, monitor, and adjust tube feedings and TPN/PPN based on assessed needs  - Assess need for intravenous fluids  - Provide specific nutrition/hydration education as appropriate  - Include patient/family/caregiver in decisions related to nutrition  Outcome: Progressing     Problem: Prexisting or High Potential for Compromised Skin Integrity  Goal: Skin integrity is maintained or improved  Description  INTERVENTIONS:  - Identify patients at risk for skin breakdown  - Assess and monitor skin integrity  - Assess and monitor nutrition and hydration status  - Monitor labs   - Assess for incontinence   - Turn and reposition patient  - Assist with mobility/ambulation  - Relieve pressure over bony prominences  - Avoid friction and shearing  - Provide appropriate hygiene as needed including keeping skin clean and dry  - Evaluate need for skin moisturizer/barrier cream  - Collaborate with interdisciplinary team   - Patient/family teaching  - Consider wound care consult   Outcome: Progressing

## 2019-11-07 NOTE — PROGRESS NOTES
[facility]  Progress Note - Mable Mcneillerwin 1965, 47 y o  female MRN: [id number]    Unit/Bed#: E5 -01 Encounter: [id number]    Primary Care Provider: Glenn Moscoso MD   Date and time admitted to hospital: 10/30/2019  6:28 PM        * Gangrenous toe [facility])  Assessment & Plan  60-year-old female history of diabetes mellitus who was unable to take her insulin over the last several months due to lack of insurance and recently re-started in October, hypertension, hyperlipidemia, peripheral arterial disease admitted for gangrenous toe  · Maintained on empiric antibiotics, Ancef and Flagyl  Blood cultures negative  · Angiogram with intervention performed on 11/1, continues to have below threshold for wound healing  · Status post right 3rd and 4th toe amputations on 11/4  · To go to OR today at 5:00 p m  for right foot transmetatarsal amputation, as per vascular surgery patient remains high risk for limb loss due to significant PAD and may eventually require a BKA      PAD (peripheral artery disease) ([facility] [address] )  Assessment & Plan  · Status post right SFA PTA/stent 04/30/2019  · Status post right lower extremity Agram on 11/01/2019 with successful R SFA PTA/stent  · Vascular surgery input appreciated  · Continue with aspirin, Plavix, statin  · History of PAD maintained on aspirin, Plavix, statin    Type 2 diabetes mellitus with circulatory disorder, with long-term current use of insulin [facility])  Assessment & Plan  Lab Results   Component Value Date    HGBA1C 13 7 (A) 10/03/2019       Recent Labs     11/06/19  1552 11/06/19  2051 11/07/19  0709 11/07/19  1103   POCGLU 289* 232* 97 161*       Blood Sugar Average: Last 72 hrs:  · (P) 149 [id number]   · Uncontrolled with very high A1c  · Maintained on Metformin and Lantus 78 units q HS however patient was not taking insulin as she could not afford it    She was then placed on Glipizide until prescription assistance was in place   · Patient having episodes of hypoglycemia, on 11/05/2019 patient's Lantus held in the morning, she refused Lantus in the evening and had not received any basal insulin in 24 hours, patient has had a poor p o  Intake  · Continue with Lantus 36 units at bedtime, decreased with meal coverage to 3 units t i d  Continue to titrate as needed  · Monitor FSBS ac / hs and cover with SSI  · Diabetic diet  · Nutrition consult    Lactic acidosisresolved as of 11/1/2019  Assessment & Plan  · Initial lactic acid of 2 5  · Does not meet other SIRS criteria aside from leukocytosis - also on Metformin as an outpatient  · Received IVF, now resolved    Cellulitisresolved as of 11/1/2019  Assessment & Plan  · Appears to have superimposed cellulitis of the right foot - reports blister that ruptured with associated erythema  · Leukocytosis, now resolved  Afebrile  Blood cultures negative to date  · Initiated on Cefepime and Vancomycin in the ED  No history of MRSA  · Transitioned to Ancef and Flagyl  · Continue to monitor VS / CBC / cultures    CKD stage 2 due to type 2 diabetes mellitus [facility])  Assessment & Plan  · Monitor BMP's  · Avoid nephrotoxins / hypotension  Lab Results   Component Value Date    CREATININE 0 79 11/07/2019    CREATININE 0 90 11/06/2019    CREATININE 0 85 11/05/2019         HLD (hyperlipidemia)  Assessment & Plan  · Maintained on statin    Essential hypertension  Assessment & Plan  · BP stable  · Continue Metoprolol 25 mg b i d   · Monitor VS    Hypokalemiaresolved as of 11/6/2019  Assessment & Plan  · c/w scheduled daily potassium supplement  Hyponatremiaresolved as of 11/1/2019  Assessment & Plan  · Pseudohyponatremia of 133 in setting of hyperglycemia  · Corrects to 136        VTE Pharmacologic Prophylaxis:   Pharmacologic: Enoxaparin (Lovenox)  Mechanical VTE Prophylaxis in Place: Yes    Patient Centered Rounds: I have performed bedside rounds with nursing staff today      Discussions with Specialists or Other Care Team Provider: Provider notes reviewed    Education and Discussions with Family / Patient: Plan of care with patient    Time Spent for Care: 20 minutes  More than 50% of total time spent on counseling and coordination of care as described above  Current Length of Stay: 8 day(s)    Current Patient Status: Inpatient   Certification Statement: The patient will continue to require additional inpatient hospital stay due to planned surgical intervention / post-op monitoring    Discharge Plan: Pending clinical improvement    Code Status: Level 1 - Full Code      Subjective:   Patient denies pain  No other complaints  Anticipating surgery today  Objective:     Vitals:   Temp (24hrs), Av 7 °F (36 5 °C), Min:97 6 °F (36 4 °C), Max:97 9 °F (36 6 °C)    Temp:  [97 6 °F (36 4 °C)-97 9 °F (36 6 °C)] 97 9 °F (36 6 °C)  HR:  [62-93] 74  Resp:  [16-17] 16  BP: (103-158)/(56-85) 158/79  SpO2:  [95 %-99 %] 97 %  Body mass index is 36 64 kg/m²  Input and Output Summary (last 24 hours): Intake/Output Summary (Last 24 hours) at 2019 1406  Last data filed at 2019 2132  Gross per 24 hour   Intake    Output 300 ml   Net -300 ml       Physical Exam:     Physical Exam   Constitutional: She is oriented to person, place, and time  She appears well-developed and well-nourished  No distress  HENT:   Head: Normocephalic and atraumatic  Eyes: Conjunctivae are normal  No scleral icterus  Cardiovascular: Normal rate, regular rhythm and normal heart sounds  No murmur heard  Pulmonary/Chest: Effort normal and breath sounds normal  No respiratory distress  She has no wheezes  She has no rales  Abdominal: Soft  Bowel sounds are normal  She exhibits no distension  There is no tenderness  Musculoskeletal: Normal range of motion  She exhibits no edema or tenderness  Neurological: She is alert and oriented to person, place, and time  Skin: Skin is warm and dry   She is not diaphoretic  There is pallor  Dressing intact on right foot  Psychiatric: She has a normal mood and affect  Her behavior is normal    Nursing note and vitals reviewed  Additional Data:     Labs:    Results from last 7 days   Lab Units 11/07/19  0518   WBC Thousand/uL 10 25*   HEMOGLOBIN g/dL 10 3*   HEMATOCRIT % 35 4   PLATELETS Thousands/uL 443*   NEUTROS PCT % 59   LYMPHS PCT % 30   MONOS PCT % 7   EOS PCT % 3     Results from last 7 days   Lab Units 11/07/19  0518   SODIUM mmol/L 139   POTASSIUM mmol/L 4 9   CHLORIDE mmol/L 105   CO2 mmol/L 27   BUN mg/dL 6   CREATININE mg/dL 0 79   ANION GAP mmol/L 7   CALCIUM mg/dL 7 9*   GLUCOSE RANDOM mg/dL 101         Results from last 7 days   Lab Units 11/07/19  1103 11/07/19  0709 11/06/19  2051 11/06/19  1552 11/06/19  1204 11/06/19  0731 11/06/19  0348 11/05/19  2059 11/05/19  1540 11/05/19  1151 11/05/19  0822 11/05/19  0728   POC GLUCOSE mg/dl 161* 97 232* 289* 214* 140 111 128 152* 216* 89 64*                   * I Have Reviewed All Lab Data Listed Above  * Additional Pertinent Lab Tests Reviewed:  [facility] Admission Reviewed    Imaging:    Imaging Reports Reviewed Today Include: None  Imaging Personally Reviewed by Myself Includes:  None    Recent Cultures (last 7 days):           Last 24 Hours Medication List:     Current Facility-Administered Medications:  acetaminophen 650 mg Oral Q6H PRN Angi Yaima, DPM    aspirin 81 mg Oral Daily Angi Yaima, DPM    atorvastatin 20 mg Oral Daily With [employer], DPM    cefazolin 1,000 mg Intravenous Q8H Angi Yaima, DPM Last Rate: 1,000 mg (11/07/19 1352)   clopidogrel 75 mg Oral Daily Angi Yaima, DPM    enoxaparin 40 mg Subcutaneous Q24H [address] Jassie Messi, DPM    hydrALAZINE 5 mg Intravenous Q6H PRN Angi Yaima, DPM    insulin glargine 36 Units Subcutaneous HS Jetta Cogan, MD    insulin lispro 1-6 Units Subcutaneous TID AC Angi Yaima, DPM    insulin lispro 1-6 Units Subcutaneous HS Abiola Mcdonough, DPMANOLO    insulin lispro 3 Units Subcutaneous TID With Meals Jessica Talavera MD    metoprolol tartrate 25 mg Oral Q12H [address] Abiola Mcdonough DPM    metroNIDAZOLE 500 mg Oral Q8H [address] Jessica Talavera MD    ondansetron 4 mg Intravenous Q6H PRN Abiola Sharonda, DPM    oxyCODONE 5 mg Oral Q4H PRN Akira Edmond PA-C    potassium chloride 40 mEq Oral Daily Abiola Sharonda, DPM    sodium chloride 100 mL/hr Intravenous Continuous Abiola Sharonda, DPM Last Rate: 100 mL/hr (11/07/19 9347)   traMADol 50 mg Oral Q6H PRN Meaghan Drake PA-C         Today, Patient Was Seen By: MIKEL Chacon    ** Please Note: Dictation voice to text software may have been used in the creation of this document   **

## 2019-11-07 NOTE — PROGRESS NOTES
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 s/p right 3rd and 4th toe amputation with toe fillet flap (DOS 11/6/19) now with flap failure and signs of necrosis at amputation site  - to OR today 11/7 at 5pm for right foot transmetatarsal amputation, patient is in agreeable to treatment plan, all questions and concerns addressed to patient's satisfaction, remains at high risk for limb loss due to PAD; this was also discussed with patient's daughter  - NPO, continue with IVF  - rest of care per primary team    Subjective/Objective   Chief Complaint:   Chief Complaint   Patient presents with    Toe Pain     Patient reports noticing her R 4th toe has turned black in color  Patient reports burning her R foot a few months ago but denies any new injuries  Subjective: 47 y o  y/o female was seen and evaluated at bedside  Daughter is at bedside  No acute events overnight  Blood pressure 158/79, pulse 74, temperature 97 9 °F (36 6 °C), temperature source Temporal, resp  rate 16, height 5' (1 524 m), weight 85 1 kg (187 lb 9 8 oz), SpO2 97 %, not currently breastfeeding  ,Body mass index is 36 64 kg/m²  Invasive Devices     Peripheral Intravenous Line            Peripheral IV 11/07/19 Distal;Right;Ventral (anterior) Forearm less than 1 day                Physical Exam:   General: Alert, cooperative and no distress  Lungs: Non labored breathing  Heart: Regular rhythm and rate  Abdomen: non-tender, non-distended  Lower Extremities: right foot dressings left clean, dry and intact      Lab, Imaging and other studies:   I have personally reviewed pertinent lab results  Imaging: I have personally reviewed pertinent films in PACS  EKG, Pathology, and Other Studies: I have personally reviewed pertinent reports    VTE Pharmacologic Prophylaxis: Reason for no pharmacologic prophylaxis to OR today    Portions of the record may have been created with voice recognition software  Occasional wrong word or "sound a like" substitutions may have occurred due to the inherent limitations of voice recognition software  Read the chart carefully and recognize, using context, where substitutions have occurred

## 2019-11-08 LAB
ANION GAP SERPL CALCULATED.3IONS-SCNC: 6 MMOL/L (ref 4–13)
BASOPHILS # BLD AUTO: 0.03 THOUSANDS/ΜL (ref 0–0.1)
BASOPHILS NFR BLD AUTO: 0 % (ref 0–1)
BUN SERPL-MCNC: 5 MG/DL (ref 5–25)
CALCIUM SERPL-MCNC: 7.8 MG/DL (ref 8.3–10.1)
CHLORIDE SERPL-SCNC: 106 MMOL/L (ref 100–108)
CO2 SERPL-SCNC: 27 MMOL/L (ref 21–32)
CREAT SERPL-MCNC: 0.75 MG/DL (ref 0.6–1.3)
EOSINOPHIL # BLD AUTO: 0.19 THOUSAND/ΜL (ref 0–0.61)
EOSINOPHIL NFR BLD AUTO: 2 % (ref 0–6)
ERYTHROCYTE [DISTWIDTH] IN BLOOD BY AUTOMATED COUNT: 14.6 % (ref 11.6–15.1)
GFR SERPL CREATININE-BSD FRML MDRD: 91 ML/MIN/1.73SQ M
GLUCOSE SERPL-MCNC: 107 MG/DL (ref 65–140)
GLUCOSE SERPL-MCNC: 121 MG/DL (ref 65–140)
GLUCOSE SERPL-MCNC: 142 MG/DL (ref 65–140)
GLUCOSE SERPL-MCNC: 149 MG/DL (ref 65–140)
GLUCOSE SERPL-MCNC: 333 MG/DL (ref 65–140)
GLUCOSE SERPL-MCNC: 351 MG/DL (ref 65–140)
GLUCOSE SERPL-MCNC: 391 MG/DL (ref 65–140)
HCT VFR BLD AUTO: 33.1 % (ref 34.8–46.1)
HGB BLD-MCNC: 9.5 G/DL (ref 11.5–15.4)
IMM GRANULOCYTES # BLD AUTO: 0.09 THOUSAND/UL (ref 0–0.2)
IMM GRANULOCYTES NFR BLD AUTO: 1 % (ref 0–2)
LYMPHOCYTES # BLD AUTO: 3.31 THOUSANDS/ΜL (ref 0.6–4.47)
LYMPHOCYTES NFR BLD AUTO: 36 % (ref 14–44)
MCH RBC QN AUTO: 22 PG (ref 26.8–34.3)
MCHC RBC AUTO-ENTMCNC: 28.7 G/DL (ref 31.4–37.4)
MCV RBC AUTO: 77 FL (ref 82–98)
MONOCYTES # BLD AUTO: 0.58 THOUSAND/ΜL (ref 0.17–1.22)
MONOCYTES NFR BLD AUTO: 6 % (ref 4–12)
NEUTROPHILS # BLD AUTO: 5.09 THOUSANDS/ΜL (ref 1.85–7.62)
NEUTS SEG NFR BLD AUTO: 55 % (ref 43–75)
NRBC BLD AUTO-RTO: 0 /100 WBCS
PLATELET # BLD AUTO: 425 THOUSANDS/UL (ref 149–390)
PMV BLD AUTO: 10.1 FL (ref 8.9–12.7)
POTASSIUM SERPL-SCNC: 3.5 MMOL/L (ref 3.5–5.3)
RBC # BLD AUTO: 4.31 MILLION/UL (ref 3.81–5.12)
SODIUM SERPL-SCNC: 139 MMOL/L (ref 136–145)
WBC # BLD AUTO: 9.29 THOUSAND/UL (ref 4.31–10.16)

## 2019-11-08 PROCEDURE — 82948 REAGENT STRIP/BLOOD GLUCOSE: CPT

## 2019-11-08 PROCEDURE — 80048 BASIC METABOLIC PNL TOTAL CA: CPT | Performed by: STUDENT IN AN ORGANIZED HEALTH CARE EDUCATION/TRAINING PROGRAM

## 2019-11-08 PROCEDURE — 99232 SBSQ HOSP IP/OBS MODERATE 35: CPT | Performed by: INTERNAL MEDICINE

## 2019-11-08 PROCEDURE — 85025 COMPLETE CBC W/AUTO DIFF WBC: CPT | Performed by: STUDENT IN AN ORGANIZED HEALTH CARE EDUCATION/TRAINING PROGRAM

## 2019-11-08 RX ORDER — OXYCODONE HYDROCHLORIDE 5 MG/1
TABLET ORAL
Status: COMPLETED
Start: 2019-11-08 | End: 2019-11-08

## 2019-11-08 RX ADMIN — INSULIN LISPRO 6 UNITS: 100 INJECTION, SOLUTION INTRAVENOUS; SUBCUTANEOUS at 21:08

## 2019-11-08 RX ADMIN — INSULIN LISPRO 4 UNITS: 100 INJECTION, SOLUTION INTRAVENOUS; SUBCUTANEOUS at 12:50

## 2019-11-08 RX ADMIN — OXYCODONE HYDROCHLORIDE 5 MG: 5 TABLET ORAL at 05:21

## 2019-11-08 RX ADMIN — INSULIN GLARGINE 36 UNITS: 100 INJECTION, SOLUTION SUBCUTANEOUS at 21:06

## 2019-11-08 RX ADMIN — OXYCODONE HYDROCHLORIDE 5 MG: 5 TABLET ORAL at 12:50

## 2019-11-08 RX ADMIN — ATORVASTATIN CALCIUM 20 MG: 20 TABLET, FILM COATED ORAL at 16:44

## 2019-11-08 RX ADMIN — POTASSIUM CHLORIDE 40 MEQ: 1500 TABLET, EXTENDED RELEASE ORAL at 08:32

## 2019-11-08 RX ADMIN — CLOPIDOGREL BISULFATE 75 MG: 75 TABLET ORAL at 08:32

## 2019-11-08 RX ADMIN — METRONIDAZOLE 500 MG: 500 TABLET ORAL at 14:43

## 2019-11-08 RX ADMIN — CEFAZOLIN SODIUM 1000 MG: 1 SOLUTION INTRAVENOUS at 14:43

## 2019-11-08 RX ADMIN — INSULIN LISPRO 6 UNITS: 100 INJECTION, SOLUTION INTRAVENOUS; SUBCUTANEOUS at 16:43

## 2019-11-08 RX ADMIN — CEFAZOLIN SODIUM 1000 MG: 1 SOLUTION INTRAVENOUS at 05:21

## 2019-11-08 RX ADMIN — METRONIDAZOLE 500 MG: 500 TABLET ORAL at 05:20

## 2019-11-08 RX ADMIN — OXYCODONE HYDROCHLORIDE 5 MG: 5 TABLET ORAL at 18:47

## 2019-11-08 RX ADMIN — METOPROLOL TARTRATE 25 MG: 25 TABLET, FILM COATED ORAL at 08:32

## 2019-11-08 RX ADMIN — METOPROLOL TARTRATE 25 MG: 25 TABLET, FILM COATED ORAL at 21:06

## 2019-11-08 RX ADMIN — HYDROMORPHONE HYDROCHLORIDE 0.5 MG: 1 INJECTION, SOLUTION INTRAMUSCULAR; INTRAVENOUS; SUBCUTANEOUS at 20:15

## 2019-11-08 RX ADMIN — ENOXAPARIN SODIUM 40 MG: 40 INJECTION SUBCUTANEOUS at 08:31

## 2019-11-08 RX ADMIN — ASPIRIN 81 MG 81 MG: 81 TABLET ORAL at 08:32

## 2019-11-08 NOTE — PROGRESS NOTES
Progress Note - Lakesha Johnsonleatha 1965, 47 y o  female MRN: [id number]    Unit/Bed#: E5 -01 Encounter: [id number]    Primary Care Provider: Margarita Beckett MD   Date and time admitted to hospital: 10/30/2019  6:28 PM        * Gangrenous toe [facility])  Assessment & Plan  51-year-old female history of diabetes mellitus who was unable to take her insulin over the last several months due to lack of insurance and recently re-started in October, hypertension, hyperlipidemia, peripheral arterial disease admitted for gangrenous toe  · No further abx needed as per discussion with Podiatry Blood cultures negative    · Angiogram with intervention performed on 11/1, continues to have below threshold for wound healing  · Status post right 3rd and 4th toe amputations on 11/4  · Status post right foot TMA on 11/07/2019, postop day 1  · Podiatry follow-up appreciated  · patient remains high risk for limb loss due to significant PAD and may eventually require a BKA      CKD stage 2 due to type 2 diabetes mellitus ([facility] [address] )  Assessment & Plan  · Monitor BMP's  · Avoid nephrotoxins / hypotension  Lab Results   Component Value Date    CREATININE 0 75 11/08/2019    CREATININE 0 79 11/07/2019    CREATININE 0 90 11/06/2019         PAD (peripheral artery disease) ([facility] [address] )  Assessment & Plan  · Status post right SFA PTA/stent 04/30/2019  · Status post right lower extremity Agram on 11/01/2019 with successful R SFA PTA/stent  · Vascular surgery input appreciated  · Continue with aspirin, Plavix, statin  · History of PAD maintained on aspirin, Plavix, statin    HLD (hyperlipidemia)  Assessment & Plan  · Maintained on statin    Essential hypertension  Assessment & Plan  · BP stable  · Continue Metoprolol 25 mg b i d   · Monitor VS    Type 2 diabetes mellitus with circulatory disorder, with long-term current use of insulin [facility])  Assessment & Plan  Lab Results   Component Value Date    HGBA1C 13 7 (A) 10/03/2019       Recent Labs 19  2212 19  2241 19  0727 19  1136   POCGLU 149* 142* 107 333*       Blood Sugar Average: Last 72 hrs:  · (P) 972 [id number]   · Uncontrolled with very high A1c  · Maintained on Metformin and Lantus 78 units q HS however patient was not taking insulin as she could not afford it  She was then placed on Glipizide until prescription assistance was in place  · Continue with Lantus 36 units at bedtime,meal coverage to 3 units t i d  Continue to titrate as needed  · Monitor FSBS ac / hs and cover with SSI  · Diabetic diet  · Nutrition consult      VTE Pharmacologic Prophylaxis:   Pharmacologic:  Lovenox    Patient Centered Rounds: I have performed bedside rounds with nursing staff today  Time Spent for Care: 20 minutes  More than 50% of total time spent on counseling and coordination of care as described above  Current Length of Stay: 9 day(s)    Current Patient Status: Inpatient   Certification Statement: The patient will continue to require additional inpatient hospital stay due to Right foot TMA    Discharge Plan / Estimated Discharge Date: 2-3 days    Code Status: Level 1 - Full Code      Subjective:   Patient seen and examined at bedside, denies any abdominal pain, nausea, vomiting  States her surgical site pain is controlled with pain medications    Objective:     Vitals:   Temp (24hrs), Av 4 °F (36 3 °C), Min:96 9 °F (36 1 °C), Max:98 6 °F (37 °C)    Temp:  [96 9 °F (36 1 °C)-98 6 °F (37 °C)] 97 9 °F (36 6 °C)  HR:  [64-85] 79  Resp:  [16-22] 18  BP: ()/(38-74) 153/70  SpO2:  [93 %-99 %] 99 %  Body mass index is 36 64 kg/m²  Input and Output Summary (last 24 hours):        Intake/Output Summary (Last 24 hours) at 2019 1554  Last data filed at 2019 2141  Gross per 24 hour   Intake 1200 ml   Output 40 ml   Net 1160 ml       Physical Exam:    Constitutional: Patient is oriented to person, place and time, no acute distress  HEENT:  Normocephalic, atraumatic, EOMI, PERRLA, no scleral icterus, no pallor, moist oral mucosa  Neck:  Supple, no masses, no thyromegaly, no bruits Normal range of motion  Lymph nodes:  No lymphadenopathy  Cardiovascular: Normal S1S2, RRR, No murmurs/rubs/gallops appreciated  Pulmonary:  Bilateral air entry, No rhonchi/rales/wheezing appreciated  Abdominal: Soft, Bowel sounds present, Non-tender, Non-distended, No rebound/guarding, no hepatomegaly   Musculoskeletal: No tenderness/abnormality   Extremities:  No cyanosis, clubbing or edema  Peripheral pulses palpable and equal bilaterally  Neurological: Cranial nerves II-XII grossly intact, sensation intact, otherwise no focal neurological symptoms  Skin:  Right foot in dressing    Additional Data:     Labs:    Results from last 7 days   Lab Units 11/08/19  0443   WBC Thousand/uL 9 29   HEMOGLOBIN g/dL 9 5*   HEMATOCRIT % 33 1*   PLATELETS Thousands/uL 425*   NEUTROS PCT % 55   LYMPHS PCT % 36   MONOS PCT % 6   EOS PCT % 2     Results from last 7 days   Lab Units 11/08/19  0443   POTASSIUM mmol/L 3 5   CHLORIDE mmol/L 106   CO2 mmol/L 27   BUN mg/dL 5   CREATININE mg/dL 0 75   CALCIUM mg/dL 7 8*            I Have Reviewed All Lab Data Listed Above          Recent Cultures (last 7 days):           Last 24 Hours Medication List:     Current Facility-Administered Medications:  acetaminophen 650 mg Oral Q6H PRN Oscar Zhou DPM   aspirin 81 mg Oral Daily Oscar Zhou DPM   atorvastatin 20 mg Oral Daily With [facility], DPM   clopidogrel 75 mg Oral Daily Oscar Zhou DPM   enoxaparin 40 mg Subcutaneous Q24H [facility], DPM   hydrALAZINE 5 mg Intravenous Q6H PRN Oscar Zhou DPMANOLO   HYDROmorphone 0 5 mg Intravenous Q3H PRN Oscar Zhou DPMANOLO   insulin glargine 36 Units Subcutaneous HS Oscar Zhou DPMANOLO   insulin lispro 1-6 Units Subcutaneous TID AC Oscar Zhou DPMANOLO   insulin lispro 1-6 Units Subcutaneous HS Oscar Zhou DPMANOLO   insulin lispro 3 Units Subcutaneous TID With Meals Laveta Gey, DPM   metoprolol tartrate 25 mg Oral Q12H [address] Laveta Gey, DPM   ondansetron 4 mg Intravenous Q6H PRN Laveta Gey, DPM   oxyCODONE 5 mg Oral Q4H PRN Laveta Gey, DPM   potassium chloride 40 mEq Oral Daily Laveta Gey, DPM   traMADol 50 mg Oral Q6H PRN Laveta Gey, DPM        Today, Patient Was Seen By: Rachel Hart MD

## 2019-11-08 NOTE — PLAN OF CARE
Problem: Potential for Falls  Goal: Patient will remain free of falls  Description  INTERVENTIONS:  - Assess patient frequently for physical needs  -  Identify cognitive and physical deficits and behaviors that affect risk of falls    -  [place] fall precautions as indicated by assessment   - Educate patient/family on patient safety including physical limitations  - Instruct patient to call for assistance with activity based on assessment  - Modify environment to reduce risk of injury  - Consider OT/PT consult to assist with strengthening/mobility  Outcome: Progressing     Problem: PAIN - ADULT  Goal: Verbalizes/displays adequate comfort level or baseline comfort level  Description  Interventions:  - Encourage patient to monitor pain and request assistance  - Assess pain using appropriate pain scale  - Administer analgesics based on type and severity of pain and evaluate response  - Implement non-pharmacological measures as appropriate and evaluate response  - Consider cultural and social influences on pain and pain management  - Notify physician/advanced practitioner if interventions unsuccessful or patient reports new pain  Outcome: Progressing     Problem: INFECTION - ADULT  Goal: Absence or prevention of progression during hospitalization  Description  INTERVENTIONS:  - Assess and monitor for signs and symptoms of infection  - Monitor lab/diagnostic results  - Monitor all insertion sites, i e  indwelling lines, tubes, and drains  - Monitor endotracheal if appropriate and nasal secretions for changes in amount and color  - [place] appropriate cooling/warming therapies per order  - Administer medications as ordered  - Instruct and encourage patient and family to use good hand hygiene technique  - Identify and instruct in appropriate isolation precautions for identified infection/condition  Outcome: Progressing  Goal: Absence of fever/infection during neutropenic period  Description  INTERVENTIONS:  - Monitor WBC    Outcome: Progressing     Problem: SAFETY ADULT  Goal: Patient will remain free of falls  Description  INTERVENTIONS:  - Assess patient frequently for physical needs  -  Identify cognitive and physical deficits and behaviors that affect risk of falls    -  [place] fall precautions as indicated by assessment   - Educate patient/family on patient safety including physical limitations  - Instruct patient to call for assistance with activity based on assessment  - Modify environment to reduce risk of injury  - Consider OT/PT consult to assist with strengthening/mobility  Outcome: Progressing  Goal: Maintain or return to baseline ADL function  Description  INTERVENTIONS:  -  Assess patient's ability to carry out ADLs; assess patient's baseline for ADL function and identify physical deficits which impact ability to perform ADLs (bathing, care of mouth/teeth, toileting, grooming, dressing, etc )  - Assess/evaluate cause of self-care deficits   - Assess range of motion  - Assess patient's mobility; develop plan if impaired  - Assess patient's need for assistive devices and provide as appropriate  - Encourage maximum independence but intervene and supervise when necessary  - Involve family in performance of ADLs  - Assess for home care needs following discharge   - Consider OT consult to assist with ADL evaluation and planning for discharge  - Provide patient education as appropriate  Outcome: Progressing  Goal: Maintain or return mobility status to optimal level  Description  INTERVENTIONS:  - Assess patient's baseline mobility status (ambulation, transfers, stairs, etc )    - Identify cognitive and physical deficits and behaviors that affect mobility  - Identify mobility aids required to assist with transfers and/or ambulation (gait belt, sit-to-stand, lift, walker, cane, etc )  - [place] fall precautions as indicated by assessment  - Record patient progress and toleration of activity level on Mobility SBAR; progress patient to next Phase/Stage  - Instruct patient to call for assistance with activity based on assessment  - Consider rehabilitation consult to assist with strengthening/weightbearing, etc   Outcome: Progressing     Problem: DISCHARGE PLANNING  Goal: Discharge to home or other facility with appropriate resources  Description  INTERVENTIONS:  - Identify barriers to discharge w/patient and caregiver  - Arrange for needed discharge resources and transportation as appropriate  - Identify discharge learning needs (meds, wound care, etc )  - Arrange for interpretive services to assist at discharge as needed  - Refer to Case Management Department for coordinating discharge planning if the patient needs post-hospital services based on physician/advanced practitioner order or complex needs related to functional status, cognitive ability, or social support system  Outcome: Progressing     Problem: Knowledge Deficit  Goal: Patient/family/caregiver demonstrates understanding of disease process, treatment plan, medications, and discharge instructions  Description  Complete learning assessment and assess knowledge base  Interventions:  - Provide teaching at level of understanding  - Provide teaching via preferred learning methods  Outcome: Progressing     Problem: Nutrition/Hydration-ADULT  Goal: Nutrient/Hydration intake appropriate for improving, restoring or maintaining nutritional needs  Description  Monitor and assess patient's nutrition/hydration status for malnutrition  Collaborate with interdisciplinary team and initiate plan and interventions as ordered  Monitor patient's weight and dietary intake as ordered or per policy  Utilize nutrition screening tool and intervene as necessary  Determine patient's food preferences and provide high-protein, high-caloric foods as appropriate       INTERVENTIONS:  - Monitor oral intake, urinary output, labs, and treatment plans  - Assess nutrition and hydration status and recommend course of action  - Evaluate amount of meals eaten  - Assist patient with eating if necessary   - Allow adequate time for meals  - Recommend/ encourage appropriate diets, oral nutritional supplements, and vitamin/mineral supplements  - Order, calculate, and assess calorie counts as needed  - Recommend, monitor, and adjust tube feedings and TPN/PPN based on assessed needs  - Assess need for intravenous fluids  - Provide specific nutrition/hydration education as appropriate  - Include patient/family/caregiver in decisions related to nutrition  Outcome: Progressing     Problem: Prexisting or High Potential for Compromised Skin Integrity  Goal: Skin integrity is maintained or improved  Description  INTERVENTIONS:  - Identify patients at risk for skin breakdown  - Assess and monitor skin integrity  - Assess and monitor nutrition and hydration status  - Monitor labs   - Assess for incontinence   - Turn and reposition patient  - Assist with mobility/ambulation  - Relieve pressure over bony prominences  - Avoid friction and shearing  - Provide appropriate hygiene as needed including keeping skin clean and dry  - Evaluate need for skin moisturizer/barrier cream  - Collaborate with interdisciplinary team   - Patient/family teaching  - Consider wound care consult   Outcome: Progressing

## 2019-11-08 NOTE — ASSESSMENT & PLAN NOTE
51-year-old female history of diabetes mellitus who was unable to take her insulin over the last several months due to lack of insurance and recently re-started in October, hypertension, hyperlipidemia, peripheral arterial disease admitted for gangrenous toe  · No further abx needed as per discussion with Podiatry Blood cultures negative    · Angiogram with intervention performed on 11/1, continues to have below threshold for wound healing  · Status post right 3rd and 4th toe amputations on 11/4  · Status post right foot TMA on 11/07/2019, postop day 1  · Podiatry follow-up appreciated  · patient remains high risk for limb loss due to significant PAD and may eventually require a BKA

## 2019-11-08 NOTE — ASSESSMENT & PLAN NOTE
Lab Results   Component Value Date    HGBA1C 13 7 (A) 10/03/2019       Recent Labs     11/07/19  2212 11/07/19  2241 11/08/19  0727 11/08/19  1136   POCGLU 149* 142* 107 333*       Blood Sugar Average: Last 72 hrs:  · (P) 231 [id number]   · Uncontrolled with very high A1c  · Maintained on Metformin and Lantus 78 units q HS however patient was not taking insulin as she could not afford it  She was then placed on Glipizide until prescription assistance was in place  · Continue with Lantus 36 units at bedtime,meal coverage to 3 units t i d  Continue to titrate as needed    · Monitor FSBS ac / hs and cover with SSI  · Diabetic diet  · Nutrition consult

## 2019-11-08 NOTE — OP NOTE
OPERATIVE REPORT - Podiatry  PATIENT NAME: Mary Cole    :  1965  MRN: [id number]  Pt Location: [facility] OR ROOM 02    SURGERY DATE: 2019    Surgeon(s) and Role:     * Luke Cyr DPM - Primary     * Luanne Padgett DPM - Assisting    Pre-op Diagnosis:  Gangrene of toe of right foot ([facility] [address] ) Clinton Castellon  PAD (peripheral artery disease) ([facility] [address] ) [I73 9]  Gastrocnemius-soleus Equinus    Post-Op Diagnosis Codes:     * Gangrene of toe of right foot ([facility] [address] ) [I96]     * PAD (peripheral artery disease) ([facility] [address] ) [I73 9]     *Gastrocnemius-soleus Equinus    Procedure(s):  AMPUTATION TRANSMETATARSAL (TMA) WITH ACHILLES TENDON LENGTHING    Specimen(s):  ID Type Source Tests Collected by Time Destination   1 : RIGHT FORE FOOT Tissue Other [facility]        Estimated Blood Loss:   40 mL    Drains:  * No LDAs found *    Anesthesia Type:   Choice with 18 ml of 1% Lidocaine and 0 5% Bupivacaine in a 1:1 mixture    Hemostasis:  Anatomic Dissection     Materials:  3-0 Vicryl, 3-0 Nylon, 2-0 Nylon, Skin staples    Operative Findings:  After passing the amputated forefoot off of the field, the remaining plantar flap was inspected and was found to have substantial non-viable necrotic tissue surrounding the 3rd-5th metatarsal heads  This non-viable tissue was excisionally debrided with a 10 blade to normal appearing deep tissue and the remaining plantar and dorsal flaps were red granular, viable, and displayed healthy bleeding  Complications:   None    Procedure and Technique:    Under mild sedation, the patient was brought into the operating room and placed on the operating room table in the supine position  A time out was performed to confirm the correct patient, procedure and site with all parties in agreement  Following IV sedation, an ankle block was performed consisting of 18 ml of 1% Lidocaine and 0 5% Bupivacaine in a 1:1 mixture   The foot was then scrubbed, prepped and draped in the usual aseptic manner       Attention was directed to the achilles tendon  Utilizing a #11 blade, 3 stab incisions were made to cut the achilles tendon half-way width wise  One medial and 2 laterally directed incisions were made measuring about 1cm each about 2cm apart longitudinally on the achilles tendon  It is noted that the achilles adequately elongated and and the ankle was able to dorsiflex sufficiently  Thereafter, a fishmouth type incision was drawn out over the forefoot  Utilizing a #10 blade a full thickness incision was made down to bone  The forefoot was then sharply dissected out to isolate the metatarsal shafts  Utilizing a key elevator soft tissues were freed from each of the metatarsals  A sagittal saw was then used to make the transmetatarsal amputation osteotomies  Each metatarsal was cut in a dorsal distal to plantar proximal fashion with care taken to maintain the metatarsal parabola  The entire forefoot was then removed from the table and passed off  The remaining wound bed was then inspected  No remaining purulent sinus tracts were visualized  Any necrotic or nonviable soft tissues were sharply excised from the wound utilizing a 15 blade  Any residual exposed tendons were excised proximally to prevent any infection from tracking proximally  The wound was then flushed with copious amounts of normal sterile saline  Subcutaneous closure was obtained utilizing Vicryl in an interrupted suture technique  Skin edges were reapproximated and closure was obtained utilizing interrupted retention sutures utilizing Nylon   The foot was then cleansed and dried  The incision site was dressed with Xeroform  This was then covered with adequate dry sterile dressing with padding, and a Kerlix and Ace Wrap  A posterior splint was then applied to the right lower extremity  As with many limb salvage procedures, we contemplate the possibility of performing further stages to this procedure   Procedures may include debridements, delayed closure, plastic surgery techniques, or more proximal amputations  This procedure may be considered part of a multi-staged limb salvage treatment plan  The patient tolerated the procedure and anesthesia well and was transported to the PACU with vital signs stable  Dr Myron Clemons was present during the entire procedure and participated in all key aspects      SIGNATURE: Maxime Tejeda DPM  DATE: November 7, 2019  TIME: 10:18 PM

## 2019-11-08 NOTE — DISCHARGE INSTRUCTIONS
ARTERIOGRAM    WHAT YOU SHOULD KNOW:   An angiogram is a procedure to look at arteries in your body  Arteries are the blood vessels that carry blood from your heart to your body  AFTER YOU LEAVE:     Self-care:   · Limit activity: Rest for the remainder of the day of your procedure  Have some one with you until the next morning  Keep your arm or leg straight as much as possible  Rest as much as possible, sitting lying or reclining  Walk only to go to the bathroom, to bed or to eat  If the angiogram catheter was put in your leg, use the stairs as little as possible  No driving  · Keep your wound clean and dry  You may shower 24 hours after your procedure  The bandage you have on should fall off in 2-3 days  If there is any drainage from the puncture site, you should put on a clean bandage  · Watch for bleeding and bruising: It is normal to have a bruise and soreness where the angiogram catheter went in  · Diet:   · You may resume your regular diet, Sips of flat soda will help with mild nausea  · Drink more liquids than usual for the next 24 hours      · IMMEDIATELY Contact Interventional Radiology at [phone] [place] PATIENTS: Contact Interventional Radiology at [phone]) Shaun Benitez PATIENTS: Contact Interventional Radiology at [phone]) if any of the following occur:  · If your bruise gets larger or if you notice any active bleeding  APPLY DIRECT PRESSURE TO THE BLEEDING SITE  · If you notice increased swelling or have increased pain at the puncture site   · If you have any numbness or pain in the extremity of the puncture site   · If that extremity seems cold or pale      · You have fever greater than 101  · Persistent nausea or vomitting    Follow up with your primary healthcare provider  as directed: Write down your questions so you remember to ask them during your visits        Procedural Sedation   WHAT YOU NEED TO KNOW:   Procedural sedation is medicine used during procedures to help you feel relaxed and calm  You will remember little to none of the procedure  After sedation you may feel tired, weak, or unsteady on your feet  You may also have trouble concentrating or short-term memory loss  These symptoms should go away in 24 hours or less  DISCHARGE INSTRUCTIONS:     Call 911 or have someone else call for any of the following:   · You have sudden trouble breathing      · You cannot be woken  Contact Interventional Radiology at [phone]   [place] PATIENTS: Contact Interventional Radiology at [phone]) Tanna Waller PATIENTS: Contact Interventional Radiology at [phone]) if any of the following occur:     · You have a severe headache or dizziness      · Your heart is beating faster than usual     · You have a fever or chills      · Your skin is itchy, swollen, or you have a rash      · You have nausea or are vomiting for more than 8 hours after the procedure       · You have questions or concerns about your condition or care  Self-care:   · Have someone stay with you for 24 hours  This person can drive you to errands and help you do things around the house  This person can also watch for problems       · Rest and do quiet activities for 24 hours  Do not exercise, ride a bike, or play sports  Stand up slowly to prevent dizziness and falls  Take short walks around the house with another person  Slowly return to your usual activities the next day       · Do not drive or use dangerous machines or tools for 24 hours  You may injure yourself or others  Examples include a lawnmower, saw, or drill  Do not return to work for 24 hours if you use dangerous machines or tools for work       · Do not make important decisions for 24 hours  For example, do not sign important papers or invest money       · Drink liquids as directed  Liquids help flush the sedation medicine out of your body   Ask how much liquid to drink each day and which liquids are best for you       · Eat small, frequent meals to prevent nausea and vomiting  Start with clear liquids such as juice or broth  If you do not vomit after clear liquids, you can eat your usual foods       · Do not drink alcohol or take medicines that make you drowsy  This includes medicines that help you sleep and anxiety medicines  Ask your healthcare provider if it is safe for you to take pain medicine  Follow up with your healthcare provider as directed: Write down your questions so you remember to ask them during your visits  Discharge Instructions - Podiatry    Weight Bearing Status:    Nonweightbearing to right lower extremity with use of assistive device                                         Pain: Continue analgesics as directed    Follow-up appointment instructions: Please make an appointment within one week of discharge at [address]   Contact sooner if any increase in pain, or signs of infection occur    Wound Care: change dressings on incision site (posterior heel and TMA) every 3 days with xeroform, 4x4, ABD, cast padding up to leg, reuse posterior splint

## 2019-11-08 NOTE — PROGRESS NOTES
Progress Note - Podiatry  Tim Lyon 47 y o  female MRN: [id number]  Unit/Bed#: E5 -01 Encounter: [id number]    Assessment/Plan:  48yo female with CKD2, severe PAD, DM2 with right foot 3rd and 4th toe dry gangrene s/p right 3rd and 4th toe amputation with toe fillet flap (DOS 11/6/19) and now s/p R TMA and percutaneous FRANCINE (11/7/19)  - POD1, incision sites are well reapproximated with sutures and skin staples intact, no active drainage, no wound dehiscence noted, cap refill time on plantar flap is brisk  - strict NWB to RLE, PT/OT to eval  - patient is stable from podiatry standpoint, will reassess on Monday 11/11 if still in-house, discharge instructions are in place    Subjective/Objective   Chief Complaint:   Chief Complaint   Patient presents with    Toe Pain     Patient reports noticing her R 4th toe has turned black in color  Patient reports burning her R foot a few months ago but denies any new injuries  Subjective: 47 y o  y/o female was seen and evaluated at bedside  No acute events overnight  Reports pain is well under control with current pain regimen  Blood pressure 146/73, pulse 76, temperature (!) 96 9 °F (36 1 °C), temperature source Temporal, resp  rate 16, height 5' (1 524 m), weight 85 1 kg (187 lb 9 8 oz), SpO2 98 %, not currently breastfeeding  ,Body mass index is 36 64 kg/m²      Invasive Devices     Peripheral Intravenous Line            Peripheral IV 11/07/19 Distal;Right;Ventral (anterior) Forearm 1 day                Physical Exam:   General: Alert, cooperative and no distress  Lungs: Non labored breathing  Heart: Regular rhythm and rate  Abdomen: non-tender, non-distended  Lower Extremities: right surgical sites are well approximated with sutures and skin staples intact,no active drainage, no wound dehiscence noted, cap refill time on plantar flap is brisk                      Lab, Imaging and other studies:   CBC:   Lab Results   Component Value Date    WBC 9 29 11/08/2019    HGB 9 5 (L) 11/08/2019    HCT 33 1 (L) 11/08/2019    MCV 77 (L) 11/08/2019     (H) 11/08/2019    MCH 22 0 (L) 11/08/2019    MCHC 28 7 (L) 11/08/2019    RDW 14 6 11/08/2019    MPV 10 1 11/08/2019    NRBC 0 11/08/2019       Imaging: I have personally reviewed pertinent films in PACS  EKG, Pathology, and Other Studies: I have personally reviewed pertinent reports  VTE Pharmacologic Prophylaxis: Heparin    Portions of the record may have been created with voice recognition software  Occasional wrong word or "sound a like" substitutions may have occurred due to the inherent limitations of voice recognition software  Read the chart carefully and recognize, using context, where substitutions have occurred

## 2019-11-08 NOTE — ASSESSMENT & PLAN NOTE
· Monitor BMP's  · Avoid nephrotoxins / hypotension  Lab Results   Component Value Date    CREATININE 0 75 11/08/2019    CREATININE 0 79 11/07/2019    CREATININE 0 90 11/06/2019

## 2019-11-09 LAB
ANION GAP SERPL CALCULATED.3IONS-SCNC: 7 MMOL/L (ref 4–13)
BASOPHILS # BLD AUTO: 0.05 THOUSANDS/ΜL (ref 0–0.1)
BASOPHILS NFR BLD AUTO: 0 % (ref 0–1)
BUN SERPL-MCNC: 7 MG/DL (ref 5–25)
CALCIUM SERPL-MCNC: 8.2 MG/DL (ref 8.3–10.1)
CHLORIDE SERPL-SCNC: 103 MMOL/L (ref 100–108)
CO2 SERPL-SCNC: 27 MMOL/L (ref 21–32)
CREAT SERPL-MCNC: 0.81 MG/DL (ref 0.6–1.3)
EOSINOPHIL # BLD AUTO: 0.22 THOUSAND/ΜL (ref 0–0.61)
EOSINOPHIL NFR BLD AUTO: 2 % (ref 0–6)
ERYTHROCYTE [DISTWIDTH] IN BLOOD BY AUTOMATED COUNT: 14.8 % (ref 11.6–15.1)
GFR SERPL CREATININE-BSD FRML MDRD: 83 ML/MIN/1.73SQ M
GLUCOSE SERPL-MCNC: 178 MG/DL (ref 65–140)
GLUCOSE SERPL-MCNC: 187 MG/DL (ref 65–140)
GLUCOSE SERPL-MCNC: 247 MG/DL (ref 65–140)
GLUCOSE SERPL-MCNC: 277 MG/DL (ref 65–140)
GLUCOSE SERPL-MCNC: 303 MG/DL (ref 65–140)
HCT VFR BLD AUTO: 34.9 % (ref 34.8–46.1)
HGB BLD-MCNC: 9.9 G/DL (ref 11.5–15.4)
IMM GRANULOCYTES # BLD AUTO: 0.1 THOUSAND/UL (ref 0–0.2)
IMM GRANULOCYTES NFR BLD AUTO: 1 % (ref 0–2)
LYMPHOCYTES # BLD AUTO: 2.67 THOUSANDS/ΜL (ref 0.6–4.47)
LYMPHOCYTES NFR BLD AUTO: 23 % (ref 14–44)
MCH RBC QN AUTO: 22 PG (ref 26.8–34.3)
MCHC RBC AUTO-ENTMCNC: 28.4 G/DL (ref 31.4–37.4)
MCV RBC AUTO: 77 FL (ref 82–98)
MONOCYTES # BLD AUTO: 1.02 THOUSAND/ΜL (ref 0.17–1.22)
MONOCYTES NFR BLD AUTO: 9 % (ref 4–12)
NEUTROPHILS # BLD AUTO: 7.76 THOUSANDS/ΜL (ref 1.85–7.62)
NEUTS SEG NFR BLD AUTO: 65 % (ref 43–75)
NRBC BLD AUTO-RTO: 0 /100 WBCS
PLATELET # BLD AUTO: 484 THOUSANDS/UL (ref 149–390)
PMV BLD AUTO: 9.9 FL (ref 8.9–12.7)
POTASSIUM SERPL-SCNC: 4.9 MMOL/L (ref 3.5–5.3)
RBC # BLD AUTO: 4.51 MILLION/UL (ref 3.81–5.12)
SODIUM SERPL-SCNC: 137 MMOL/L (ref 136–145)
WBC # BLD AUTO: 11.82 THOUSAND/UL (ref 4.31–10.16)

## 2019-11-09 PROCEDURE — 80048 BASIC METABOLIC PNL TOTAL CA: CPT | Performed by: INTERNAL MEDICINE

## 2019-11-09 PROCEDURE — 82948 REAGENT STRIP/BLOOD GLUCOSE: CPT

## 2019-11-09 PROCEDURE — 85025 COMPLETE CBC W/AUTO DIFF WBC: CPT | Performed by: INTERNAL MEDICINE

## 2019-11-09 PROCEDURE — 99232 SBSQ HOSP IP/OBS MODERATE 35: CPT | Performed by: INTERNAL MEDICINE

## 2019-11-09 RX ORDER — INSULIN GLARGINE 100 [IU]/ML
25 INJECTION, SOLUTION SUBCUTANEOUS EVERY 12 HOURS SCHEDULED
Status: DISCONTINUED | OUTPATIENT
Start: 2019-11-09 | End: 2019-11-10

## 2019-11-09 RX ADMIN — INSULIN LISPRO 1 UNITS: 100 INJECTION, SOLUTION INTRAVENOUS; SUBCUTANEOUS at 08:25

## 2019-11-09 RX ADMIN — METOPROLOL TARTRATE 25 MG: 25 TABLET, FILM COATED ORAL at 08:23

## 2019-11-09 RX ADMIN — INSULIN GLARGINE 25 UNITS: 100 INJECTION, SOLUTION SUBCUTANEOUS at 21:04

## 2019-11-09 RX ADMIN — ATORVASTATIN CALCIUM 20 MG: 20 TABLET, FILM COATED ORAL at 16:39

## 2019-11-09 RX ADMIN — ASPIRIN 81 MG 81 MG: 81 TABLET ORAL at 08:23

## 2019-11-09 RX ADMIN — METOPROLOL TARTRATE 25 MG: 25 TABLET, FILM COATED ORAL at 21:09

## 2019-11-09 RX ADMIN — HYDROMORPHONE HYDROCHLORIDE 0.5 MG: 1 INJECTION, SOLUTION INTRAMUSCULAR; INTRAVENOUS; SUBCUTANEOUS at 20:54

## 2019-11-09 RX ADMIN — INSULIN GLARGINE 25 UNITS: 100 INJECTION, SOLUTION SUBCUTANEOUS at 12:12

## 2019-11-09 RX ADMIN — CLOPIDOGREL BISULFATE 75 MG: 75 TABLET ORAL at 08:23

## 2019-11-09 RX ADMIN — INSULIN LISPRO 3 UNITS: 100 INJECTION, SOLUTION INTRAVENOUS; SUBCUTANEOUS at 12:11

## 2019-11-09 RX ADMIN — ENOXAPARIN SODIUM 40 MG: 40 INJECTION SUBCUTANEOUS at 08:24

## 2019-11-09 RX ADMIN — INSULIN LISPRO 4 UNITS: 100 INJECTION, SOLUTION INTRAVENOUS; SUBCUTANEOUS at 16:39

## 2019-11-09 RX ADMIN — HYDROMORPHONE HYDROCHLORIDE 0.5 MG: 1 INJECTION, SOLUTION INTRAMUSCULAR; INTRAVENOUS; SUBCUTANEOUS at 13:57

## 2019-11-09 RX ADMIN — OXYCODONE HYDROCHLORIDE 5 MG: 5 TABLET ORAL at 08:22

## 2019-11-09 RX ADMIN — INSULIN LISPRO 5 UNITS: 100 INJECTION, SOLUTION INTRAVENOUS; SUBCUTANEOUS at 21:05

## 2019-11-09 NOTE — PLAN OF CARE
Problem: Potential for Falls  Goal: Patient will remain free of falls  Description  INTERVENTIONS:  - Assess patient frequently for physical needs  -  Identify cognitive and physical deficits and behaviors that affect risk of falls    -  [place] fall precautions as indicated by assessment   - Educate patient/family on patient safety including physical limitations  - Instruct patient to call for assistance with activity based on assessment  - Modify environment to reduce risk of injury  - Consider OT/PT consult to assist with strengthening/mobility  Outcome: Progressing     Problem: PAIN - ADULT  Goal: Verbalizes/displays adequate comfort level or baseline comfort level  Description  Interventions:  - Encourage patient to monitor pain and request assistance  - Assess pain using appropriate pain scale  - Administer analgesics based on type and severity of pain and evaluate response  - Implement non-pharmacological measures as appropriate and evaluate response  - Consider cultural and social influences on pain and pain management  - Notify physician/advanced practitioner if interventions unsuccessful or patient reports new pain  Outcome: Progressing     Problem: INFECTION - ADULT  Goal: Absence or prevention of progression during hospitalization  Description  INTERVENTIONS:  - Assess and monitor for signs and symptoms of infection  - Monitor lab/diagnostic results  - Monitor all insertion sites, i e  indwelling lines, tubes, and drains  - Monitor endotracheal if appropriate and nasal secretions for changes in amount and color  - [place] appropriate cooling/warming therapies per order  - Administer medications as ordered  - Instruct and encourage patient and family to use good hand hygiene technique  - Identify and instruct in appropriate isolation precautions for identified infection/condition  Outcome: Progressing  Goal: Absence of fever/infection during neutropenic period  Description  INTERVENTIONS:  - Monitor WBC    Outcome: Progressing     Problem: SAFETY ADULT  Goal: Patient will remain free of falls  Description  INTERVENTIONS:  - Assess patient frequently for physical needs  -  Identify cognitive and physical deficits and behaviors that affect risk of falls    -  [place] fall precautions as indicated by assessment   - Educate patient/family on patient safety including physical limitations  - Instruct patient to call for assistance with activity based on assessment  - Modify environment to reduce risk of injury  - Consider OT/PT consult to assist with strengthening/mobility  Outcome: Progressing  Goal: Maintain or return to baseline ADL function  Description  INTERVENTIONS:  -  Assess patient's ability to carry out ADLs; assess patient's baseline for ADL function and identify physical deficits which impact ability to perform ADLs (bathing, care of mouth/teeth, toileting, grooming, dressing, etc )  - Assess/evaluate cause of self-care deficits   - Assess range of motion  - Assess patient's mobility; develop plan if impaired  - Assess patient's need for assistive devices and provide as appropriate  - Encourage maximum independence but intervene and supervise when necessary  - Involve family in performance of ADLs  - Assess for home care needs following discharge   - Consider OT consult to assist with ADL evaluation and planning for discharge  - Provide patient education as appropriate  Outcome: Progressing  Goal: Maintain or return mobility status to optimal level  Description  INTERVENTIONS:  - Assess patient's baseline mobility status (ambulation, transfers, stairs, etc )    - Identify cognitive and physical deficits and behaviors that affect mobility  - Identify mobility aids required to assist with transfers and/or ambulation (gait belt, sit-to-stand, lift, walker, cane, etc )  - [place] fall precautions as indicated by assessment  - Record patient progress and toleration of activity level on Mobility SBAR; progress patient to next Phase/Stage  - Instruct patient to call for assistance with activity based on assessment  - Consider rehabilitation consult to assist with strengthening/weightbearing, etc   Outcome: Progressing     Problem: DISCHARGE PLANNING  Goal: Discharge to home or other facility with appropriate resources  Description  INTERVENTIONS:  - Identify barriers to discharge w/patient and caregiver  - Arrange for needed discharge resources and transportation as appropriate  - Identify discharge learning needs (meds, wound care, etc )  - Arrange for interpretive services to assist at discharge as needed  - Refer to Case Management Department for coordinating discharge planning if the patient needs post-hospital services based on physician/advanced practitioner order or complex needs related to functional status, cognitive ability, or social support system  Outcome: Progressing     Problem: Knowledge Deficit  Goal: Patient/family/caregiver demonstrates understanding of disease process, treatment plan, medications, and discharge instructions  Description  Complete learning assessment and assess knowledge base  Interventions:  - Provide teaching at level of understanding  - Provide teaching via preferred learning methods  Outcome: Progressing     Problem: Nutrition/Hydration-ADULT  Goal: Nutrient/Hydration intake appropriate for improving, restoring or maintaining nutritional needs  Description  Monitor and assess patient's nutrition/hydration status for malnutrition  Collaborate with interdisciplinary team and initiate plan and interventions as ordered  Monitor patient's weight and dietary intake as ordered or per policy  Utilize nutrition screening tool and intervene as necessary  Determine patient's food preferences and provide high-protein, high-caloric foods as appropriate       INTERVENTIONS:  - Monitor oral intake, urinary output, labs, and treatment plans  - Assess nutrition and hydration status and recommend course of action  - Evaluate amount of meals eaten  - Assist patient with eating if necessary   - Allow adequate time for meals  - Recommend/ encourage appropriate diets, oral nutritional supplements, and vitamin/mineral supplements  - Order, calculate, and assess calorie counts as needed  - Recommend, monitor, and adjust tube feedings and TPN/PPN based on assessed needs  - Assess need for intravenous fluids  - Provide specific nutrition/hydration education as appropriate  - Include patient/family/caregiver in decisions related to nutrition  Outcome: Progressing     Problem: Prexisting or High Potential for Compromised Skin Integrity  Goal: Skin integrity is maintained or improved  Description  INTERVENTIONS:  - Identify patients at risk for skin breakdown  - Assess and monitor skin integrity  - Assess and monitor nutrition and hydration status  - Monitor labs   - Assess for incontinence   - Turn and reposition patient  - Assist with mobility/ambulation  - Relieve pressure over bony prominences  - Avoid friction and shearing  - Provide appropriate hygiene as needed including keeping skin clean and dry  - Evaluate need for skin moisturizer/barrier cream  - Collaborate with interdisciplinary team   - Patient/family teaching  - Consider wound care consult   Outcome: Progressing

## 2019-11-09 NOTE — ASSESSMENT & PLAN NOTE
· Monitor BMP's  · Avoid nephrotoxins / hypotension  Lab Results   Component Value Date    CREATININE 0 81 11/09/2019    CREATININE 0 75 11/08/2019    CREATININE 0 79 11/07/2019

## 2019-11-09 NOTE — ASSESSMENT & PLAN NOTE
54-year-old female history of diabetes mellitus who was unable to take her insulin over the last several months due to lack of insurance and recently re-started in October, hypertension, hyperlipidemia, peripheral arterial disease admitted for gangrenous toe  · No further abx needed as per discussion with Podiatry Blood cultures negative    · Angiogram with intervention performed on 11/1, continues to have below threshold for wound healing  · Status post right 3rd and 4th toe amputations on 11/4  · Status post right foot TMA on 11/07/2019, postop day 2  · Podiatry follow-up appreciated  · patient remains high risk for limb loss due to significant PAD and may eventually require a BKA

## 2019-11-09 NOTE — PROGRESS NOTES
Progress Note - Olive Lyon 1965, 47 y o  female MRN: [id number]    Unit/Bed#: E5 -01 Encounter: [id number]    Primary Care Provider: Krystle Platt MD   Date and time admitted to hospital: 10/30/2019  6:28 PM        * Gangrenous toe [facility])  Assessment & Plan  57-year-old female history of diabetes mellitus who was unable to take her insulin over the last several months due to lack of insurance and recently re-started in October, hypertension, hyperlipidemia, peripheral arterial disease admitted for gangrenous toe  · No further abx needed as per discussion with Podiatry Blood cultures negative    · Angiogram with intervention performed on 11/1, continues to have below threshold for wound healing  · Status post right 3rd and 4th toe amputations on 11/4  · Status post right foot TMA on 11/07/2019, postop day 2  · Podiatry follow-up appreciated  · patient remains high risk for limb loss due to significant PAD and may eventually require a BKA      CKD stage 2 due to type 2 diabetes mellitus ([facility] [address] )  Assessment & Plan  · Monitor BMP's  · Avoid nephrotoxins / hypotension  Lab Results   Component Value Date    CREATININE 0 81 11/09/2019    CREATININE 0 75 11/08/2019    CREATININE 0 79 11/07/2019         PAD (peripheral artery disease) ([facility] [address] )  Assessment & Plan  · Status post right SFA PTA/stent 04/30/2019  · Status post right lower extremity Agram on 11/01/2019 with successful R SFA PTA/stent  · Vascular surgery input appreciated  · Continue with aspirin, Plavix, statin  · History of PAD maintained on aspirin, Plavix, statin    HLD (hyperlipidemia)  Assessment & Plan  · Maintained on statin    Essential hypertension  Assessment & Plan  · BP stable  · Continue Metoprolol 25 mg b i d   · Monitor VS    Type 2 diabetes mellitus with circulatory disorder, with long-term current use of insulin [facility])  Assessment & Plan  Lab Results   Component Value Date    HGBA1C 13 7 (A) 10/03/2019       Recent Labs 19  1626 19  2047 19  0724 19  1145   POCGLU 391* 351* 178* 247*       Blood Sugar Average: Last 72 hrs:  · (P) 656 3136   · Uncontrolled with very high A1c  · Maintained on Metformin and Lantus 78 units q HS however patient was not taking insulin as she could not afford it  She was then placed on Glipizide until prescription assistance was in place  · Patient now with a more proper diet and elevated Accu-Cheks, will adjust Lantus to 25 units b i d , insulin lispro 5 units t i d  With meals  · Monitor FSBS ac / hs and cover with SSI      VTE Pharmacologic Prophylaxis:   Pharmacologic: lovenox    Patient Centered Rounds: I have performed bedside rounds with nursing staff today  Time Spent for Care: 20 minutes  More than 50% of total time spent on counseling and coordination of care as described above  Current Length of Stay: 10 day(s)    Current Patient Status: Inpatient   Certification Statement: The patient will continue to require additional inpatient hospital stay due to s/p TMA, PT/OT eval    Discharge Plan / Estimated Discharge Date: TBD    Code Status: Level 1 - Full Code      Subjective:   Patient seen and examined at bedside, currently denies any complaints    Objective:     Vitals:   Temp (24hrs), Av °F (36 7 °C), Min:97 8 °F (36 6 °C), Max:98 4 °F (36 9 °C)    Temp:  [97 8 °F (36 6 °C)-98 4 °F (36 9 °C)] 97 8 °F (36 6 °C)  HR:  [75-82] 82  Resp:  [18] 18  BP: (107-178)/(52-85) 115/56  SpO2:  [96 %-99 %] 96 %  Body mass index is 36 64 kg/m²  Input and Output Summary (last 24 hours):        Intake/Output Summary (Last 24 hours) at 2019 1459  Last data filed at 2019 1310  Gross per 24 hour   Intake 480 ml   Output 300 ml   Net 180 ml       Physical Exam:    Constitutional: Patient is oriented to person, place and time, no acute distress  HEENT:  Normocephalic, atraumatic, EOMI, PERRLA, no scleral icterus, no pallor, moist oral mucosa  Neck:  Supple, no masses, no thyromegaly, no bruits Normal range of motion  Lymph nodes:  No lymphadenopathy  Cardiovascular: Normal S1S2, RRR, No murmurs/rubs/gallops appreciated  Pulmonary:  Bilateral air entry, No rhonchi/rales/wheezing appreciated  Abdominal: Soft, Bowel sounds present, Non-tender, Non-distended, No rebound/guarding, no hepatomegaly   Musculoskeletal: No tenderness/abnormality   Extremities:  No cyanosis, clubbing or edema  Peripheral pulses palpable and equal bilaterally  Neurological: Cranial nerves II-XII grossly intact, sensation intact, otherwise no focal neurological symptoms  Skin:  Right foot in dressing    Additional Data:     Labs:    Results from last 7 days   Lab Units 11/09/19  0634   WBC Thousand/uL 11 82*   HEMOGLOBIN g/dL 9 9*   HEMATOCRIT % 34 9   PLATELETS Thousands/uL 484*   NEUTROS PCT % 65   LYMPHS PCT % 23   MONOS PCT % 9   EOS PCT % 2     Results from last 7 days   Lab Units 11/09/19  0634   POTASSIUM mmol/L 4 9   CHLORIDE mmol/L 103   CO2 mmol/L 27   BUN mg/dL 7   CREATININE mg/dL 0 81   CALCIUM mg/dL 8 2*            I Have Reviewed All Lab Data Listed Above          Recent Cultures (last 7 days):           Last 24 Hours Medication List:     Current Facility-Administered Medications:  acetaminophen 650 mg Oral Q6H PRN Vanda Frederick, DPM   aspirin 81 mg Oral Daily Vanda Frederick, DPM   atorvastatin 20 mg Oral Daily With [address] [facility], DPM   clopidogrel 75 mg Oral Daily Vanda Frederick, DPM   enoxaparin 40 mg Subcutaneous Q24H [address], DPM   hydrALAZINE 5 mg Intravenous Q6H PRN Vanda Frederick, DPM   HYDROmorphone 0 5 mg Intravenous Q3H PRN Vanda Otoniel, DPM   insulin glargine 25 Units Subcutaneous Q12H [address] Jetta Cogan, MD   insulin lispro 1-6 Units Subcutaneous TID AC Vanda Frederick DPM   insulin lispro 1-6 Units Subcutaneous HS Vanda Frederick DPM   insulin lispro 5 Units Subcutaneous TID With Meals Jetta Cogan, MD   metoprolol tartrate 25 mg Oral Q12H [facility] Cait Martinez DPM   ondansetron 4 mg Intravenous Q6H PRN Veronica Garter, DPM   oxyCODONE 5 mg Oral Q4H PRN Veronica Garter, DPM   potassium chloride 40 mEq Oral Daily Veronica Garter, DPM   traMADol 50 mg Oral Q6H PRN Veronica Sameer, STEVENM        Today, Patient Was Seen By: Mela Ruiz MD

## 2019-11-09 NOTE — ASSESSMENT & PLAN NOTE
Lab Results   Component Value Date    HGBA1C 13 7 (A) 10/03/2019       Recent Labs     11/08/19  1626 11/08/19  2047 11/09/19  0724 11/09/19  1145   POCGLU 391* 351* 178* 247*       Blood Sugar Average: Last 72 hrs:  · (P) 094 1907   · Uncontrolled with very high A1c  · Maintained on Metformin and Lantus 78 units q HS however patient was not taking insulin as she could not afford it  She was then placed on Glipizide until prescription assistance was in place  · Patient now with a more proper diet and elevated Accu-Cheks, will adjust Lantus to 25 units b i d , insulin lispro 5 units t i d   With meals  · Monitor FSBS ac / hs and cover with SSI

## 2019-11-10 LAB
ANION GAP SERPL CALCULATED.3IONS-SCNC: 7 MMOL/L (ref 4–13)
BASOPHILS # BLD AUTO: 0.04 THOUSANDS/ΜL (ref 0–0.1)
BASOPHILS NFR BLD AUTO: 0 % (ref 0–1)
BUN SERPL-MCNC: 5 MG/DL (ref 5–25)
CALCIUM SERPL-MCNC: 8.6 MG/DL (ref 8.3–10.1)
CHLORIDE SERPL-SCNC: 103 MMOL/L (ref 100–108)
CO2 SERPL-SCNC: 29 MMOL/L (ref 21–32)
CREAT SERPL-MCNC: 0.85 MG/DL (ref 0.6–1.3)
EOSINOPHIL # BLD AUTO: 0.21 THOUSAND/ΜL (ref 0–0.61)
EOSINOPHIL NFR BLD AUTO: 2 % (ref 0–6)
ERYTHROCYTE [DISTWIDTH] IN BLOOD BY AUTOMATED COUNT: 15 % (ref 11.6–15.1)
GFR SERPL CREATININE-BSD FRML MDRD: 78 ML/MIN/1.73SQ M
GLUCOSE SERPL-MCNC: 200 MG/DL (ref 65–140)
GLUCOSE SERPL-MCNC: 231 MG/DL (ref 65–140)
GLUCOSE SERPL-MCNC: 244 MG/DL (ref 65–140)
GLUCOSE SERPL-MCNC: 245 MG/DL (ref 65–140)
GLUCOSE SERPL-MCNC: 305 MG/DL (ref 65–140)
HCT VFR BLD AUTO: 36.7 % (ref 34.8–46.1)
HGB BLD-MCNC: 10.4 G/DL (ref 11.5–15.4)
IMM GRANULOCYTES # BLD AUTO: 0.12 THOUSAND/UL (ref 0–0.2)
IMM GRANULOCYTES NFR BLD AUTO: 1 % (ref 0–2)
LYMPHOCYTES # BLD AUTO: 3.42 THOUSANDS/ΜL (ref 0.6–4.47)
LYMPHOCYTES NFR BLD AUTO: 31 % (ref 14–44)
MCH RBC QN AUTO: 21.9 PG (ref 26.8–34.3)
MCHC RBC AUTO-ENTMCNC: 28.3 G/DL (ref 31.4–37.4)
MCV RBC AUTO: 77 FL (ref 82–98)
MONOCYTES # BLD AUTO: 0.85 THOUSAND/ΜL (ref 0.17–1.22)
MONOCYTES NFR BLD AUTO: 8 % (ref 4–12)
NEUTROPHILS # BLD AUTO: 6.3 THOUSANDS/ΜL (ref 1.85–7.62)
NEUTS SEG NFR BLD AUTO: 58 % (ref 43–75)
NRBC BLD AUTO-RTO: 0 /100 WBCS
PLATELET # BLD AUTO: 504 THOUSANDS/UL (ref 149–390)
PMV BLD AUTO: 9.9 FL (ref 8.9–12.7)
POTASSIUM SERPL-SCNC: 4.4 MMOL/L (ref 3.5–5.3)
RBC # BLD AUTO: 4.74 MILLION/UL (ref 3.81–5.12)
SODIUM SERPL-SCNC: 139 MMOL/L (ref 136–145)
WBC # BLD AUTO: 10.94 THOUSAND/UL (ref 4.31–10.16)

## 2019-11-10 PROCEDURE — 97530 THERAPEUTIC ACTIVITIES: CPT

## 2019-11-10 PROCEDURE — 85025 COMPLETE CBC W/AUTO DIFF WBC: CPT | Performed by: INTERNAL MEDICINE

## 2019-11-10 PROCEDURE — 80048 BASIC METABOLIC PNL TOTAL CA: CPT | Performed by: INTERNAL MEDICINE

## 2019-11-10 PROCEDURE — 97535 SELF CARE MNGMENT TRAINING: CPT

## 2019-11-10 PROCEDURE — 82948 REAGENT STRIP/BLOOD GLUCOSE: CPT

## 2019-11-10 PROCEDURE — 99232 SBSQ HOSP IP/OBS MODERATE 35: CPT | Performed by: INTERNAL MEDICINE

## 2019-11-10 RX ORDER — INSULIN GLARGINE 100 [IU]/ML
30 INJECTION, SOLUTION SUBCUTANEOUS EVERY 12 HOURS SCHEDULED
Status: DISCONTINUED | OUTPATIENT
Start: 2019-11-10 | End: 2019-11-12 | Stop reason: HOSPADM

## 2019-11-10 RX ADMIN — METOPROLOL TARTRATE 25 MG: 25 TABLET, FILM COATED ORAL at 08:42

## 2019-11-10 RX ADMIN — INSULIN LISPRO 3 UNITS: 100 INJECTION, SOLUTION INTRAVENOUS; SUBCUTANEOUS at 21:17

## 2019-11-10 RX ADMIN — METOPROLOL TARTRATE 25 MG: 25 TABLET, FILM COATED ORAL at 21:17

## 2019-11-10 RX ADMIN — INSULIN GLARGINE 30 UNITS: 100 INJECTION, SOLUTION SUBCUTANEOUS at 21:17

## 2019-11-10 RX ADMIN — INSULIN LISPRO 4 UNITS: 100 INJECTION, SOLUTION INTRAVENOUS; SUBCUTANEOUS at 11:41

## 2019-11-10 RX ADMIN — HYDROMORPHONE HYDROCHLORIDE 0.5 MG: 1 INJECTION, SOLUTION INTRAMUSCULAR; INTRAVENOUS; SUBCUTANEOUS at 21:22

## 2019-11-10 RX ADMIN — CLOPIDOGREL BISULFATE 75 MG: 75 TABLET ORAL at 08:42

## 2019-11-10 RX ADMIN — ENOXAPARIN SODIUM 40 MG: 40 INJECTION SUBCUTANEOUS at 08:39

## 2019-11-10 RX ADMIN — INSULIN LISPRO 3 UNITS: 100 INJECTION, SOLUTION INTRAVENOUS; SUBCUTANEOUS at 08:39

## 2019-11-10 RX ADMIN — INSULIN GLARGINE 25 UNITS: 100 INJECTION, SOLUTION SUBCUTANEOUS at 08:39

## 2019-11-10 RX ADMIN — INSULIN LISPRO 3 UNITS: 100 INJECTION, SOLUTION INTRAVENOUS; SUBCUTANEOUS at 16:47

## 2019-11-10 RX ADMIN — POTASSIUM CHLORIDE 40 MEQ: 1500 TABLET, EXTENDED RELEASE ORAL at 08:41

## 2019-11-10 RX ADMIN — ASPIRIN 81 MG 81 MG: 81 TABLET ORAL at 08:42

## 2019-11-10 RX ADMIN — TRAMADOL HYDROCHLORIDE 50 MG: 50 TABLET, FILM COATED ORAL at 08:41

## 2019-11-10 RX ADMIN — ATORVASTATIN CALCIUM 20 MG: 20 TABLET, FILM COATED ORAL at 16:48

## 2019-11-10 NOTE — PLAN OF CARE
Problem: OCCUPATIONAL THERAPY ADULT  Goal: Performs self-care activities at highest level of function for planned discharge setting  See evaluation for individualized goals  Description  Treatment Interventions: ADL retraining, Endurance training, UE strengthening/ROM, Equipment evaluation/education, Cognitive reorientation, Patient/family training, Compensatory technique education, Energy conservation, Activityengagement  Equipment Recommended: (w/c)       See flowsheet documentation for full assessment, interventions and recommendations  Outcome: Progressing  Note:   Limitation: Decreased ADL status, Decreased UE strength, Decreased cognition, Decreased Safe judgement during ADL, Decreased endurance, Decreased self-care trans, Decreased high-level ADLs  Prognosis: Good  Assessment: Pt seen for OT treatment session this AM focusing on functional activity tolerance, bed mobility, ADLs, and functional mobility/transfers  Of note, pt s/p R TMA 11/7/19  Per podiatry: Pt remains NWB R LE  Pt alert and cooperative throughout session  Pt lying supine at start of session, requiring Supervision for supine>sit with use of bed rails  Transfers completed with Supervision with cues for hand placement  Min A required for functional mobility with pt demonstrating good adherence to NWB R LE  Pt requiring multiple standing rest breaks 2* increased fatigue  Toileting tasks completed with Min A with CGA required for clothing management up/down 2* decreased dynamic standing balance demonstrated  ADL tasks completed while seated in chair  UB dressing completed with Supervision 2* setup required and increased time to complete  LB dressing completed with Mod A  Pt able to doff L socks, however required increased assist to don L sock 2* impaired functional reach  Pt seated OOB in chair at end of session with call bell and phone within reach  All needs met and pt reports no further questions for OT at this time   Continue to recommend STR vs Home OT with increased family support when medically cleared  If family unable to provide additional support, recommend STR however pt currently declining  OT to follow pt on caseload        OT Discharge Recommendation: Home OT (pt declining STR)  OT - OK to Discharge: Yes(when medically cleared)

## 2019-11-10 NOTE — ASSESSMENT & PLAN NOTE
· Monitor BMP's  · Avoid nephrotoxins / hypotension  Lab Results   Component Value Date    CREATININE 0 85 11/10/2019    CREATININE 0 81 11/09/2019    CREATININE 0 75 11/08/2019

## 2019-11-10 NOTE — ASSESSMENT & PLAN NOTE
Lab Results   Component Value Date    HGBA1C 13 7 (A) 10/03/2019       Recent Labs     11/09/19  1600 11/09/19  2059 11/10/19  0759 11/10/19  1110   POCGLU 277* 303* 231* 305*       Blood Sugar Average: Last 72 hrs:  · (P) 226 2   · Uncontrolled with very high A1c  · Maintained on Metformin and Lantus 78 units q HS however patient was not taking insulin as she could not afford it  She was then placed on Glipizide until prescription assistance was in place  · Will increase Lantus to 30 units b i d , continue insulin lispro 5 units t i d   With meals  · Monitor FSBS ac / hs and cover with SSI

## 2019-11-10 NOTE — OCCUPATIONAL THERAPY NOTE
OccupationalTherapy Progress Note     Patient Name: Christiane YOUNGBLOOD Date: 11/10/2019  Problem List  Principal Problem:    Gangrenous toe ([facility] )  Active Problems:    Type 2 diabetes mellitus with circulatory disorder, with long-term current use of insulin (HCC)    Essential hypertension    HLD (hyperlipidemia)    PAD (peripheral artery disease) ([facility] )    CKD stage 2 due to type 2 diabetes mellitus ([place] [id number] )          11/10/19 0823   Restrictions/Precautions   Weight Bearing Precautions Per Order Yes   RLE Weight Bearing Per Order NWB   Braces or Orthoses Other (Comment)  (protective boot for L LE)   Other Precautions Cognitive; Chair Alarm; Bed Alarm; Fall Risk;Multiple lines;WBS;Pain   General   Response to Previous Treatment Patient with no complaints from previous session   Lifestyle   Autonomy per pt independent w/ ADLs, independent w/ functional transfers and mobility w/ RW, independent w/ light IADLs, daughters transport   Reciprocal Relationships daughters   Service to Others unemployed   Intrinsic Gratification watching tv   Pain Assessment   Pain Assessment 0-10   Pain Score 6   Pain Type Surgical pain   Pain Location Foot   Pain Orientation [facility] Pain Intervention(s) Repositioned; Ambulation/increased activity; Emotional support; Rest   Response to Interventions Tolerated OT   Multiple Pain Sites No   ADL   Where Assessed Chair   Grooming Assistance 5  Supervision/Setup   Grooming Deficit Setup;Supervision/safety; Increased time to complete;Wash/dry hands; Wash/dry face   Grooming Comments Setup required   UB Dressing Assistance 5  Supervision/Setup   UB Dressing Deficit Setup;Supervision/safety; Increased time to complete   UB Dressing Comments Don/doff hospital gown   LB Dressing Assistance 3  Moderate Assistance   LB Dressing Deficit Setup;Supervision/safety;Verbal cueing; Increased time to complete; Don/doff L sock   LB Dressing Comments Impaired functional reach noted   Toileting Assistance  4  Minimal Assistance   Toileting Deficit Setup;Steadying;Verbal cueing;Supervison/safety; Increased time to complete;Clothing management up;Clothing management down;Perineal hygiene   Toileting Comments Decreased dynamic standing balance during clothing management up/down   Functional Standing Tolerance   Time 1-2 mins   Activity ADL tasks   Comments Increased fatigue demonstrated, requiring seated/standing rest breaks   Bed Mobility   Supine to Sit 5  Supervision   Additional items Assist x 1;HOB elevated; Bedrails; Increased time required;Verbal cues   Sit to Supine Unable to assess   Additional Comments Pt seated OOB in chair at end of session with call bell and phone within reach  All needs met and pt reports no further questions for OT at this time  Transfers   Sit to Stand 5  Supervision   Additional items Assist x 1; Increased time required;Verbal cues   Stand to Sit 5  Supervision   Additional items Assist x 1; Armrests; Increased time required;Verbal cues   Toilet transfer 4  Minimal assistance   Additional items Assist x 1; Increased time required;Verbal cues; Commode   Additional Comments Cues for hand placement    Functional Mobility   Functional Mobility 4  Minimal assistance   Additional Comments Assist x1; Pt able to demonstrate adherence to NWB R LE   Additional items Rolling walker   Toilet Transfers   Toilet Transfer From Rolling walker   Toilet Transfer Type To and from   Toilet Transfer to Standard bedside commode   Toilet Transfer Technique Ambulating   Toilet Transfers Minimal assistance;Verbal cues   Toilet Transfers Comments Cues for safe technique and hand placement   Cognition   Overall Cognitive Status Impaired   Arousal/Participation Responsive; Cooperative   Attention Attends with cues to redirect   Orientation Level Oriented X4   Memory Decreased recall of precautions   Following Commands Follows one step commands without difficulty   Activity Tolerance   Activity Tolerance Patient limited by fatigue;Patient limited by pain   Medical Staff Made Aware Pt appropriate to be seen per Alden Del Toro RN   Assessment   Assessment Pt seen for OT treatment session this AM focusing on functional activity tolerance, bed mobility, ADLs, and functional mobility/transfers  Of note, pt s/p R TMA 11/7/19  Per podiatry: Pt remains NWB R LE  Pt alert and cooperative throughout session  Pt lying supine at start of session, requiring Supervision for supine>sit with use of bed rails  Transfers completed with Supervision with cues for hand placement  Min A required for functional mobility with pt demonstrating good adherence to NWB R LE  Pt requiring multiple standing rest breaks 2* increased fatigue  Toileting tasks completed with Min A with CGA required for clothing management up/down 2* decreased dynamic standing balance demonstrated  ADL tasks completed while seated in chair  UB dressing completed with Supervision 2* setup required and increased time to complete  LB dressing completed with Mod A  Pt able to doff L socks, however required increased assist to don L sock 2* impaired functional reach  Pt seated OOB in chair at end of session with call bell and phone within reach  All needs met and pt reports no further questions for OT at this time  Continue to recommend STR vs Home OT with increased family support when medically cleared  If family unable to provide additional support, recommend STR however pt currently declining  OT to follow pt on caseload  Plan   Treatment Interventions ADL retraining;Functional transfer training;UE strengthening/ROM; Endurance training;Patient/family training;Equipment evaluation/education; Compensatory technique education; Energy conservation; Activityengagement   Goal Expiration Date 11/19/19   OT Treatment Day 2   OT Frequency 3-5x/wk   Recommendation   OT Discharge Recommendation Home OT (pt declining STR)   Equipment Recommended   (W/C)   OT - OK to Discharge Yes  (when medically cleared)   Barthel Index   Feeding 10   Bathing 0   Grooming Score 5   Dressing Score 5   Bladder Score 10   Bowels Score 10   Toilet Use Score 5   Transfers (Bed/Chair) Score 10   Mobility (Level Surface) Score 0   Stairs Score 0   Barthel Index Score 55   Modified Dima Scale   Modified Dima Scale 4       Brie Reynolds, OTR/L

## 2019-11-10 NOTE — ASSESSMENT & PLAN NOTE
66-year-old female history of diabetes mellitus who was unable to take her insulin over the last several months due to lack of insurance and recently re-started in October, hypertension, hyperlipidemia, peripheral arterial disease admitted for gangrenous toe  · No further abx needed as per discussion with Podiatry Blood cultures negative    · Angiogram with intervention performed on 11/1, continues to have below threshold for wound healing  · Status post right 3rd and 4th toe amputations on 11/4  · Status post right foot TMA on 11/07/2019, postop day 3  · Podiatry follow-up appreciated  · patient remains high risk for limb loss due to significant PAD and may eventually require a BKA

## 2019-11-10 NOTE — PROGRESS NOTES
Progress Note - Edil Lyon 1965, 47 y o  female MRN: [id number]    Unit/Bed#: E5 -01 Encounter: [id number]    Primary Care Provider: Kimber Rincon MD   Date and time admitted to hospital: 10/30/2019  6:28 PM        * Gangrenous toe [facility])  Assessment & Plan  54-year-old female history of diabetes mellitus who was unable to take her insulin over the last several months due to lack of insurance and recently re-started in October, hypertension, hyperlipidemia, peripheral arterial disease admitted for gangrenous toe  · No further abx needed as per discussion with Podiatry Blood cultures negative    · Angiogram with intervention performed on 11/1, continues to have below threshold for wound healing  · Status post right 3rd and 4th toe amputations on 11/4  · Status post right foot TMA on 11/07/2019, postop day 3  · Podiatry follow-up appreciated  · patient remains high risk for limb loss due to significant PAD and may eventually require a BKA      CKD stage 2 due to type 2 diabetes mellitus ([facility] )  Assessment & Plan  · Monitor BMP's  · Avoid nephrotoxins / hypotension  Lab Results   Component Value Date    CREATININE 0 85 11/10/2019    CREATININE 0 81 11/09/2019    CREATININE 0 75 11/08/2019         PAD (peripheral artery disease) ([facility] )  Assessment & Plan  · Status post right SFA PTA/stent 04/30/2019  · Status post right lower extremity Agram on 11/01/2019 with successful R SFA PTA/stent  · Vascular surgery input appreciated  · Continue with aspirin, Plavix, statin  · History of PAD maintained on aspirin, Plavix, statin    HLD (hyperlipidemia)  Assessment & Plan  · Maintained on statin    Essential hypertension  Assessment & Plan  · BP stable  · Continue Metoprolol 25 mg b i d   · Monitor VS    Type 2 diabetes mellitus with circulatory disorder, with long-term current use of insulin [facility])  Assessment & Plan  Lab Results   Component Value Date    HGBA1C 13 7 (A) 10/03/2019       Recent Labs 19  1600 199 11/10/19  0759 11/10/19  1110   POCGLU 277* 303* 231* 305*       Blood Sugar Average: Last 72 hrs:  · (P) 226 2   · Uncontrolled with very high A1c  · Maintained on Metformin and Lantus 78 units q HS however patient was not taking insulin as she could not afford it  She was then placed on Glipizide until prescription assistance was in place  · Will increase Lantus to 30 units b i d , continue insulin lispro 5 units t i d  With meals  · Monitor FSBS ac / hs and cover with SSI          VTE Pharmacologic Prophylaxis:   Pharmacologic:  Lovenox    Patient Centered Rounds: I have performed bedside rounds with nursing staff today  Time Spent for Care: 20 minutes  More than 50% of total time spent on counseling and coordination of care as described above  Current Length of Stay: 11 day(s)    Current Patient Status: Inpatient   Certification Statement: The patient will continue to require additional inpatient hospital stay due to PT OT eval, need for rehab, uncontrolled blood sugars    Discharge Plan / Estimated Discharge Date:  2-3 days    Code Status: Level 1 - Full Code      Subjective:   Patient seen and examined at bedside, denies any abdominal pain, nausea, vomiting, chest pain    Objective:     Vitals:   Temp (24hrs), Av °F (36 7 °C), Min:97 5 °F (36 4 °C), Max:98 6 °F (37 °C)    Temp:  [97 5 °F (36 4 °C)-98 6 °F (37 °C)] 97 5 °F (36 4 °C)  HR:  [74-86] 86  Resp:  [18] 18  BP: (117-167)/(60-80) 167/80  SpO2:  [91 %-97 %] 95 %  Body mass index is 36 64 kg/m²  Input and Output Summary (last 24 hours):        Intake/Output Summary (Last 24 hours) at 11/10/2019 1329  Last data filed at 11/10/2019 0759  Gross per 24 hour   Intake 240 ml   Output 200 ml   Net 40 ml       Physical Exam:    Constitutional: Patient is oriented to person, place and time, no acute distress  HEENT:  Normocephalic, atraumatic, EOMI, PERRLA, no scleral icterus, no pallor, moist oral mucosa  Neck: Supple, no masses, no thyromegaly, no bruits Normal range of motion  Lymph nodes:  No lymphadenopathy  Cardiovascular: Normal S1S2, RRR, No murmurs/rubs/gallops appreciated  Pulmonary:  Bilateral air entry, No rhonchi/rales/wheezing appreciated  Abdominal: Soft, Bowel sounds present, Non-tender, Non-distended, No rebound/guarding, no hepatomegaly   Musculoskeletal: No tenderness/abnormality   Extremities:  No cyanosis, clubbing or edema  Peripheral pulses palpable and equal bilaterally  Neurological: Cranial nerves II-XII grossly intact, sensation intact, otherwise no focal neurological symptoms  Skin:  Right foot in dressing    Additional Data:     Labs:    Results from last 7 days   Lab Units 11/10/19  0443   WBC Thousand/uL 10 94*   HEMOGLOBIN g/dL 10 4*   HEMATOCRIT % 36 7   PLATELETS Thousands/uL 504*   NEUTROS PCT % 58   LYMPHS PCT % 31   MONOS PCT % 8   EOS PCT % 2     Results from last 7 days   Lab Units 11/10/19  0443   POTASSIUM mmol/L 4 4   CHLORIDE mmol/L 103   CO2 mmol/L 29   BUN mg/dL 5   CREATININE mg/dL 0 85   CALCIUM mg/dL 8 6            I Have Reviewed All Lab Data Listed Above          Recent Cultures (last 7 days):           Last 24 Hours Medication List:     Current Facility-Administered Medications:  acetaminophen 650 mg Oral Q6H PRN Frosty Monk, DPM   aspirin 81 mg Oral Daily Frosty Monk, DPM   atorvastatin 20 mg Oral Daily With [address], DPM   clopidogrel 75 mg Oral Daily Frosty Monk, DPM   enoxaparin 40 mg Subcutaneous Q24H [address], DPM   hydrALAZINE 5 mg Intravenous Q6H PRN Frosty Monk, DPM   HYDROmorphone 0 5 mg Intravenous Q3H PRN Frosty Monk, DPM   insulin glargine 30 Units Subcutaneous Q12H [facility] Andres Means MD   insulin lispro 1-6 Units Subcutaneous TID AC Frosty Monk, DPM   insulin lispro 1-6 Units Subcutaneous HS Frosty Monk, DPM   insulin lispro 5 Units Subcutaneous TID With Meals Andres Means MD   metoprolol tartrate 25 mg Oral Q12H [address], DPM   ondansetron 4 mg Intravenous Q6H PRN Annie Raddle, DPM   oxyCODONE 5 mg Oral Q4H PRN Annie Raddle, DPM   potassium chloride 40 mEq Oral Daily Annie Raddle, DPM   traMADol 50 mg Oral Q6H PRN Annie Raddle, DPM        Today, Patient Was Seen By: Juanpablo Leal MD

## 2019-11-10 NOTE — PLAN OF CARE
Problem: Potential for Falls  Goal: Patient will remain free of falls  Description  INTERVENTIONS:  - Assess patient frequently for physical needs  -  Identify cognitive and physical deficits and behaviors that affect risk of falls    -  [place] fall precautions as indicated by assessment   - Educate patient/family on patient safety including physical limitations  - Instruct patient to call for assistance with activity based on assessment  - Modify environment to reduce risk of injury  - Consider OT/PT consult to assist with strengthening/mobility  Outcome: Progressing     Problem: PAIN - ADULT  Goal: Verbalizes/displays adequate comfort level or baseline comfort level  Description  Interventions:  - Encourage patient to monitor pain and request assistance  - Assess pain using appropriate pain scale  - Administer analgesics based on type and severity of pain and evaluate response  - Implement non-pharmacological measures as appropriate and evaluate response  - Consider cultural and social influences on pain and pain management  - Notify physician/advanced practitioner if interventions unsuccessful or patient reports new pain  Outcome: Progressing     Problem: INFECTION - ADULT  Goal: Absence or prevention of progression during hospitalization  Description  INTERVENTIONS:  - Assess and monitor for signs and symptoms of infection  - Monitor lab/diagnostic results  - Monitor all insertion sites, i e  indwelling lines, tubes, and drains  - Monitor endotracheal if appropriate and nasal secretions for changes in amount and color  - [place] appropriate cooling/warming therapies per order  - Administer medications as ordered  - Instruct and encourage patient and family to use good hand hygiene technique  - Identify and instruct in appropriate isolation precautions for identified infection/condition  Outcome: Progressing  Goal: Absence of fever/infection during neutropenic period  Description  INTERVENTIONS:  - Monitor WBC    Outcome: Progressing     Problem: SAFETY ADULT  Goal: Patient will remain free of falls  Description  INTERVENTIONS:  - Assess patient frequently for physical needs  -  Identify cognitive and physical deficits and behaviors that affect risk of falls    -  [place] fall precautions as indicated by assessment   - Educate patient/family on patient safety including physical limitations  - Instruct patient to call for assistance with activity based on assessment  - Modify environment to reduce risk of injury  - Consider OT/PT consult to assist with strengthening/mobility  Outcome: Progressing  Goal: Maintain or return to baseline ADL function  Description  INTERVENTIONS:  -  Assess patient's ability to carry out ADLs; assess patient's baseline for ADL function and identify physical deficits which impact ability to perform ADLs (bathing, care of mouth/teeth, toileting, grooming, dressing, etc )  - Assess/evaluate cause of self-care deficits   - Assess range of motion  - Assess patient's mobility; develop plan if impaired  - Assess patient's need for assistive devices and provide as appropriate  - Encourage maximum independence but intervene and supervise when necessary  - Involve family in performance of ADLs  - Assess for home care needs following discharge   - Consider OT consult to assist with ADL evaluation and planning for discharge  - Provide patient education as appropriate  Outcome: Progressing  Goal: Maintain or return mobility status to optimal level  Description  INTERVENTIONS:  - Assess patient's baseline mobility status (ambulation, transfers, stairs, etc )    - Identify cognitive and physical deficits and behaviors that affect mobility  - Identify mobility aids required to assist with transfers and/or ambulation (gait belt, sit-to-stand, lift, walker, cane, etc )  - [place] fall precautions as indicated by assessment  - Record patient progress and toleration of activity level on Mobility SBAR; progress patient to next Phase/Stage  - Instruct patient to call for assistance with activity based on assessment  - Consider rehabilitation consult to assist with strengthening/weightbearing, etc   Outcome: Progressing     Problem: DISCHARGE PLANNING  Goal: Discharge to home or other facility with appropriate resources  Description  INTERVENTIONS:  - Identify barriers to discharge w/patient and caregiver  - Arrange for needed discharge resources and transportation as appropriate  - Identify discharge learning needs (meds, wound care, etc )  - Arrange for interpretive services to assist at discharge as needed  - Refer to Case Management Department for coordinating discharge planning if the patient needs post-hospital services based on physician/advanced practitioner order or complex needs related to functional status, cognitive ability, or social support system  Outcome: Progressing     Problem: Knowledge Deficit  Goal: Patient/family/caregiver demonstrates understanding of disease process, treatment plan, medications, and discharge instructions  Description  Complete learning assessment and assess knowledge base  Interventions:  - Provide teaching at level of understanding  - Provide teaching via preferred learning methods  Outcome: Progressing     Problem: Nutrition/Hydration-ADULT  Goal: Nutrient/Hydration intake appropriate for improving, restoring or maintaining nutritional needs  Description  Monitor and assess patient's nutrition/hydration status for malnutrition  Collaborate with interdisciplinary team and initiate plan and interventions as ordered  Monitor patient's weight and dietary intake as ordered or per policy  Utilize nutrition screening tool and intervene as necessary  Determine patient's food preferences and provide high-protein, high-caloric foods as appropriate       INTERVENTIONS:  - Monitor oral intake, urinary output, labs, and treatment plans  - Assess nutrition and hydration status and recommend course of action  - Evaluate amount of meals eaten  - Assist patient with eating if necessary   - Allow adequate time for meals  - Recommend/ encourage appropriate diets, oral nutritional supplements, and vitamin/mineral supplements  - Order, calculate, and assess calorie counts as needed  - Recommend, monitor, and adjust tube feedings and TPN/PPN based on assessed needs  - Assess need for intravenous fluids  - Provide specific nutrition/hydration education as appropriate  - Include patient/family/caregiver in decisions related to nutrition  Outcome: Progressing     Problem: Prexisting or High Potential for Compromised Skin Integrity  Goal: Skin integrity is maintained or improved  Description  INTERVENTIONS:  - Identify patients at risk for skin breakdown  - Assess and monitor skin integrity  - Assess and monitor nutrition and hydration status  - Monitor labs   - Assess for incontinence   - Turn and reposition patient  - Assist with mobility/ambulation  - Relieve pressure over bony prominences  - Avoid friction and shearing  - Provide appropriate hygiene as needed including keeping skin clean and dry  - Evaluate need for skin moisturizer/barrier cream  - Collaborate with interdisciplinary team   - Patient/family teaching  - Consider wound care consult   Outcome: Progressing

## 2019-11-11 PROBLEM — I96 GANGRENOUS TOE (HCC): Status: RESOLVED | Noted: 2019-10-30 | Resolved: 2019-11-11

## 2019-11-11 LAB
ANION GAP SERPL CALCULATED.3IONS-SCNC: 5 MMOL/L (ref 4–13)
BASOPHILS # BLD AUTO: 0.03 THOUSANDS/ΜL (ref 0–0.1)
BASOPHILS NFR BLD AUTO: 0 % (ref 0–1)
BUN SERPL-MCNC: 7 MG/DL (ref 5–25)
CALCIUM SERPL-MCNC: 8.7 MG/DL (ref 8.3–10.1)
CHLORIDE SERPL-SCNC: 104 MMOL/L (ref 100–108)
CO2 SERPL-SCNC: 29 MMOL/L (ref 21–32)
CREAT SERPL-MCNC: 0.82 MG/DL (ref 0.6–1.3)
EOSINOPHIL # BLD AUTO: 0.23 THOUSAND/ΜL (ref 0–0.61)
EOSINOPHIL NFR BLD AUTO: 2 % (ref 0–6)
ERYTHROCYTE [DISTWIDTH] IN BLOOD BY AUTOMATED COUNT: 15.2 % (ref 11.6–15.1)
GFR SERPL CREATININE-BSD FRML MDRD: 81 ML/MIN/1.73SQ M
GLUCOSE SERPL-MCNC: 117 MG/DL (ref 65–140)
GLUCOSE SERPL-MCNC: 216 MG/DL (ref 65–140)
GLUCOSE SERPL-MCNC: 221 MG/DL (ref 65–140)
GLUCOSE SERPL-MCNC: 88 MG/DL (ref 65–140)
GLUCOSE SERPL-MCNC: 91 MG/DL (ref 65–140)
HCT VFR BLD AUTO: 33 % (ref 34.8–46.1)
HGB BLD-MCNC: 9.6 G/DL (ref 11.5–15.4)
IMM GRANULOCYTES # BLD AUTO: 0.09 THOUSAND/UL (ref 0–0.2)
IMM GRANULOCYTES NFR BLD AUTO: 1 % (ref 0–2)
LYMPHOCYTES # BLD AUTO: 3.22 THOUSANDS/ΜL (ref 0.6–4.47)
LYMPHOCYTES NFR BLD AUTO: 32 % (ref 14–44)
MCH RBC QN AUTO: 22.3 PG (ref 26.8–34.3)
MCHC RBC AUTO-ENTMCNC: 29.1 G/DL (ref 31.4–37.4)
MCV RBC AUTO: 77 FL (ref 82–98)
MONOCYTES # BLD AUTO: 0.76 THOUSAND/ΜL (ref 0.17–1.22)
MONOCYTES NFR BLD AUTO: 8 % (ref 4–12)
NEUTROPHILS # BLD AUTO: 5.81 THOUSANDS/ΜL (ref 1.85–7.62)
NEUTS SEG NFR BLD AUTO: 57 % (ref 43–75)
NRBC BLD AUTO-RTO: 0 /100 WBCS
PLATELET # BLD AUTO: 462 THOUSANDS/UL (ref 149–390)
PMV BLD AUTO: 9.5 FL (ref 8.9–12.7)
POTASSIUM SERPL-SCNC: 3.7 MMOL/L (ref 3.5–5.3)
RBC # BLD AUTO: 4.31 MILLION/UL (ref 3.81–5.12)
SODIUM SERPL-SCNC: 138 MMOL/L (ref 136–145)
WBC # BLD AUTO: 10.14 THOUSAND/UL (ref 4.31–10.16)

## 2019-11-11 PROCEDURE — 80048 BASIC METABOLIC PNL TOTAL CA: CPT | Performed by: INTERNAL MEDICINE

## 2019-11-11 PROCEDURE — 85025 COMPLETE CBC W/AUTO DIFF WBC: CPT | Performed by: INTERNAL MEDICINE

## 2019-11-11 PROCEDURE — 97164 PT RE-EVAL EST PLAN CARE: CPT

## 2019-11-11 PROCEDURE — 82948 REAGENT STRIP/BLOOD GLUCOSE: CPT

## 2019-11-11 PROCEDURE — 97530 THERAPEUTIC ACTIVITIES: CPT

## 2019-11-11 PROCEDURE — 97535 SELF CARE MNGMENT TRAINING: CPT

## 2019-11-11 PROCEDURE — G8978 MOBILITY CURRENT STATUS: HCPCS

## 2019-11-11 PROCEDURE — G8979 MOBILITY GOAL STATUS: HCPCS

## 2019-11-11 PROCEDURE — 99232 SBSQ HOSP IP/OBS MODERATE 35: CPT | Performed by: NURSE PRACTITIONER

## 2019-11-11 RX ORDER — TRAMADOL HYDROCHLORIDE 50 MG/1
50 TABLET ORAL EVERY 6 HOURS PRN
Qty: 12 TABLET | Refills: 0 | Status: SHIPPED | OUTPATIENT
Start: 2019-11-11 | End: 2019-11-14

## 2019-11-11 RX ADMIN — INSULIN GLARGINE 30 UNITS: 100 INJECTION, SOLUTION SUBCUTANEOUS at 08:50

## 2019-11-11 RX ADMIN — METOPROLOL TARTRATE 25 MG: 25 TABLET, FILM COATED ORAL at 08:40

## 2019-11-11 RX ADMIN — CLOPIDOGREL BISULFATE 75 MG: 75 TABLET ORAL at 08:40

## 2019-11-11 RX ADMIN — INSULIN LISPRO 2 UNITS: 100 INJECTION, SOLUTION INTRAVENOUS; SUBCUTANEOUS at 16:29

## 2019-11-11 RX ADMIN — INSULIN LISPRO 2 UNITS: 100 INJECTION, SOLUTION INTRAVENOUS; SUBCUTANEOUS at 21:31

## 2019-11-11 RX ADMIN — ENOXAPARIN SODIUM 40 MG: 40 INJECTION SUBCUTANEOUS at 08:40

## 2019-11-11 RX ADMIN — OXYCODONE HYDROCHLORIDE 5 MG: 5 TABLET ORAL at 21:32

## 2019-11-11 RX ADMIN — METOPROLOL TARTRATE 25 MG: 25 TABLET, FILM COATED ORAL at 21:35

## 2019-11-11 RX ADMIN — POTASSIUM CHLORIDE 40 MEQ: 1500 TABLET, EXTENDED RELEASE ORAL at 08:40

## 2019-11-11 RX ADMIN — ASPIRIN 81 MG 81 MG: 81 TABLET ORAL at 08:40

## 2019-11-11 RX ADMIN — INSULIN GLARGINE 30 UNITS: 100 INJECTION, SOLUTION SUBCUTANEOUS at 21:31

## 2019-11-11 RX ADMIN — ATORVASTATIN CALCIUM 20 MG: 20 TABLET, FILM COATED ORAL at 16:30

## 2019-11-11 NOTE — ASSESSMENT & PLAN NOTE
· Status post right SFA PTA/stent 04/30/2019  · Status post right lower extremity Agram on 11/01/2019 with successful R SFA PTA/stent  · Vascular surgery input appreciated  · Continue with aspirin, Plavix, statin

## 2019-11-11 NOTE — PLAN OF CARE
Problem: Potential for Falls  Goal: Patient will remain free of falls  Description  INTERVENTIONS:  - Assess patient frequently for physical needs  -  Identify cognitive and physical deficits and behaviors that affect risk of falls    -  [place] fall precautions as indicated by assessment   - Educate patient/family on patient safety including physical limitations  - Instruct patient to call for assistance with activity based on assessment  - Modify environment to reduce risk of injury  - Consider OT/PT consult to assist with strengthening/mobility  Outcome: Progressing     Problem: PAIN - ADULT  Goal: Verbalizes/displays adequate comfort level or baseline comfort level  Description  Interventions:  - Encourage patient to monitor pain and request assistance  - Assess pain using appropriate pain scale  - Administer analgesics based on type and severity of pain and evaluate response  - Implement non-pharmacological measures as appropriate and evaluate response  - Consider cultural and social influences on pain and pain management  - Notify physician/advanced practitioner if interventions unsuccessful or patient reports new pain  Outcome: Progressing     Problem: INFECTION - ADULT  Goal: Absence or prevention of progression during hospitalization  Description  INTERVENTIONS:  - Assess and monitor for signs and symptoms of infection  - Monitor lab/diagnostic results  - Monitor all insertion sites, i e  indwelling lines, tubes, and drains  - Monitor endotracheal if appropriate and nasal secretions for changes in amount and color  - [place] appropriate cooling/warming therapies per order  - Administer medications as ordered  - Instruct and encourage patient and family to use good hand hygiene technique  - Identify and instruct in appropriate isolation precautions for identified infection/condition  Outcome: Progressing  Goal: Absence of fever/infection during neutropenic period  Description  INTERVENTIONS:  - Monitor WBC    Outcome: Progressing     Problem: SAFETY ADULT  Goal: Patient will remain free of falls  Description  INTERVENTIONS:  - Assess patient frequently for physical needs  -  Identify cognitive and physical deficits and behaviors that affect risk of falls    -  [place] fall precautions as indicated by assessment   - Educate patient/family on patient safety including physical limitations  - Instruct patient to call for assistance with activity based on assessment  - Modify environment to reduce risk of injury  - Consider OT/PT consult to assist with strengthening/mobility  Outcome: Progressing  Goal: Maintain or return to baseline ADL function  Description  INTERVENTIONS:  -  Assess patient's ability to carry out ADLs; assess patient's baseline for ADL function and identify physical deficits which impact ability to perform ADLs (bathing, care of mouth/teeth, toileting, grooming, dressing, etc )  - Assess/evaluate cause of self-care deficits   - Assess range of motion  - Assess patient's mobility; develop plan if impaired  - Assess patient's need for assistive devices and provide as appropriate  - Encourage maximum independence but intervene and supervise when necessary  - Involve family in performance of ADLs  - Assess for home care needs following discharge   - Consider OT consult to assist with ADL evaluation and planning for discharge  - Provide patient education as appropriate  Outcome: Progressing  Goal: Maintain or return mobility status to optimal level  Description  INTERVENTIONS:  - Assess patient's baseline mobility status (ambulation, transfers, stairs, etc )    - Identify cognitive and physical deficits and behaviors that affect mobility  - Identify mobility aids required to assist with transfers and/or ambulation (gait belt, sit-to-stand, lift, walker, cane, etc )  - [place] fall precautions as indicated by assessment  - Record patient progress and toleration of activity level on Mobility SBAR; progress patient to next Phase/Stage  - Instruct patient to call for assistance with activity based on assessment  - Consider rehabilitation consult to assist with strengthening/weightbearing, etc   Outcome: Progressing     Problem: DISCHARGE PLANNING  Goal: Discharge to home or other facility with appropriate resources  Description  INTERVENTIONS:  - Identify barriers to discharge w/patient and caregiver  - Arrange for needed discharge resources and transportation as appropriate  - Identify discharge learning needs (meds, wound care, etc )  - Arrange for interpretive services to assist at discharge as needed  - Refer to Case Management Department for coordinating discharge planning if the patient needs post-hospital services based on physician/advanced practitioner order or complex needs related to functional status, cognitive ability, or social support system  Outcome: Progressing     Problem: Knowledge Deficit  Goal: Patient/family/caregiver demonstrates understanding of disease process, treatment plan, medications, and discharge instructions  Description  Complete learning assessment and assess knowledge base  Interventions:  - Provide teaching at level of understanding  - Provide teaching via preferred learning methods  Outcome: Progressing     Problem: Nutrition/Hydration-ADULT  Goal: Nutrient/Hydration intake appropriate for improving, restoring or maintaining nutritional needs  Description  Monitor and assess patient's nutrition/hydration status for malnutrition  Collaborate with interdisciplinary team and initiate plan and interventions as ordered  Monitor patient's weight and dietary intake as ordered or per policy  Utilize nutrition screening tool and intervene as necessary  Determine patient's food preferences and provide high-protein, high-caloric foods as appropriate       INTERVENTIONS:  - Monitor oral intake, urinary output, labs, and treatment plans  - Assess nutrition and hydration status and recommend course of action  - Evaluate amount of meals eaten  - Assist patient with eating if necessary   - Allow adequate time for meals  - Recommend/ encourage appropriate diets, oral nutritional supplements, and vitamin/mineral supplements  - Order, calculate, and assess calorie counts as needed  - Recommend, monitor, and adjust tube feedings and TPN/PPN based on assessed needs  - Assess need for intravenous fluids  - Provide specific nutrition/hydration education as appropriate  - Include patient/family/caregiver in decisions related to nutrition  Outcome: Progressing     Problem: Prexisting or High Potential for Compromised Skin Integrity  Goal: Skin integrity is maintained or improved  Description  INTERVENTIONS:  - Identify patients at risk for skin breakdown  - Assess and monitor skin integrity  - Assess and monitor nutrition and hydration status  - Monitor labs   - Assess for incontinence   - Turn and reposition patient  - Assist with mobility/ambulation  - Relieve pressure over bony prominences  - Avoid friction and shearing  - Provide appropriate hygiene as needed including keeping skin clean and dry  - Evaluate need for skin moisturizer/barrier cream  - Collaborate with interdisciplinary team   - Patient/family teaching  - Consider wound care consult   Outcome: Progressing

## 2019-11-11 NOTE — OCCUPATIONAL THERAPY NOTE
Occupational Therapy Treatment Note:         11/11/19 1533   Restrictions/Precautions   Weight Bearing Precautions Per Order Yes   RLE Weight Bearing Per Order NWB   Other Precautions Cognitive; Chair Alarm; Bed Alarm;Multiple lines; Fall Risk   Pain Assessment   Pain Assessment No/denies pain   Pain Score No Pain   Pain Type Surgical pain   Pain Location Foot   Pain Orientation Right   ADL   Where Assessed Chair   Grooming Assistance 5  Supervision/Setup   Grooming Deficit Setup   LB Bathing Assistance 4  Minimal Assistance   LB Bathing Deficit Setup;Steadying; Increased time to complete;Supervision/safety;Verbal cueing;Perineal area; Buttocks;Right lower leg including foot; Left lower leg including foot   LB Dressing Assistance 4  Minimal Assistance   LB Dressing Deficit Steadying;Setup; Requires assistive device for steadying;Verbal cueing;Supervision/safety; Increased time to complete   LB Dressing Comments hands on A with clothing management  Toileting Assistance  4  Minimal Assistance   Toileting Deficit Setup;Steadying;Supervison/safety;Verbal cueing; Increased time to complete; Bedside commode;Clothing management up;Clothing management down;Perineal hygiene   Functional Standing Tolerance   Time 2 mins   Activity self toileting      Comments Poor safety awareness noted with activity   Bed Mobility   Supine to Sit 5  Supervision   Additional items Assist x 1   Sit to Supine 5  Supervision   Additional items Assist x 1   Transfers   Sit to Stand 4  Minimal assistance   Additional items Assist x 1   Stand to Sit 4  Minimal assistance   Additional items Assist x 1   Stand pivot 4  Minimal assistance   Additional items Assist x 1   Toilet transfer 4  Minimal assistance   Additional items Assist x 1   Additional Comments Inconsistent use of RW noted with activity   Functional Mobility   Functional Mobility 4  Minimal assistance   Additional Comments x1   Additional items Rolling walker   Toilet Transfers   Toilet Transfer From Rolling walker   Toilet Transfer Type To and from   Toilet Transfer to Standard bedside commode   Toilet Transfer Technique Ambulating;Stand pivot   Toilet Transfers Verbal cues; Minimal assistance   Cognition   Overall Cognitive Status Impaired   Arousal/Participation Responsive; Cooperative   Attention Attends with cues to redirect   Orientation Level Oriented X4   Memory Decreased recall of recent events;Decreased recall of precautions;Decreased short term memory   Following Commands Follows one step commands without difficulty   Comments Pt with poor safety awarenss  Cues required    Additional Activities   Additional Activities Other (Comment)  (reviewed fall prevention  )   Additional Activities Comments Written information provided to encourage carry over  Activity Tolerance   Activity Tolerance Patient limited by fatigue   Medical Staff Made Aware Reported all findings to nursing staff  Assessment   Assessment Pt was seen for skilled OT with focus on completion of self care tasks, functional transfers, review of safety with LE dressing/self toileting, review of fall prevention and review of current plan of care  Pt with some inconsistencies with use of RW  Poor safety awareness with impulsive movements noted through out tx session  see above levels of A required for all functional tasks  Pt would benefit from continued therapies but prefers home with with home OT  Family support and live with Pt  Are able to assist     Plan   Treatment Interventions ADL retraining;Functional transfer training; Endurance training;Cognitive reorientation   Goal Expiration Date 11/19/19   OT Treatment Day 3   OT Frequency 3-5x/wk   Recommendation   OT Discharge Recommendation Home OT  (vs STR Pt declining STR)   OT - OK to Discharge Yes   Barthel Index   Feeding 10   Bathing 0   Grooming Score 5   Dressing Score 5   Bladder Score 10   Bowels Score 10   Toilet Use Score 5   Transfers (Bed/Chair) Score 10 Mobility (Level Surface) Score 0   Stairs Score 0   Barthel Index Score 55   Modified Dima Scale   Modified [place] Scale 4   Nir Weinberg

## 2019-11-11 NOTE — PLAN OF CARE
Problem: PHYSICAL THERAPY ADULT  Goal: Performs mobility at highest level of function for planned discharge setting  See evaluation for individualized goals  Description  Treatment/Interventions: Functional transfer training, LE strengthening/ROM, Elevations, Therapeutic exercise, Endurance training, Patient/family training, Equipment eval/education, Bed mobility, Gait training, Compensatory technique education, Spoke to nursing, OT  Equipment Recommended: Olga Daniel will need wheelchair for longer distances)       See flowsheet documentation for full assessment, interventions and recommendations  Note:   Prognosis: Good  Problem List: Decreased strength, Decreased range of motion, Decreased endurance, Impaired balance, Decreased mobility, Decreased coordination, Impaired judgement, Decreased safety awareness, Impaired sensation, Decreased skin integrity, Orthopedic restrictions  Assessment: Pt presents for physical therapy re-evaluation s/p R TMA  Pt continues to be NWB on RLE and declined to utilize protective boot for LLE that she utilized on initial evaluation  Pt has maintain functional mobility level displayed on evaluation  Pt requires verbal cues and redirection for safe completion of functional mobility tasks  Pt will benefit from continued skilled physical therapy in rehab setting however would be able to discharge home with home PT if she has adequate family support  Skilled physical therapy is indicated to address listed functional deficits focusing on strength, endurance and functional mobility  Recommendation: Other (Comment)(rehab vs home with home PT pending family support)     PT - OK to Discharge: Yes    See flowsheet documentation for full assessment

## 2019-11-11 NOTE — ASSESSMENT & PLAN NOTE
59-year-old female history of diabetes mellitus who was unable to take her insulin over the last several months due to lack of insurance  Also with hypertension, hyperlipidemia, peripheral arterial disease admitted for gangrenous toe  · Angiogram with intervention performed on 11/1, continues to have below threshold for wound healing  · Status post right 3rd and 4th toe amputations on 11/4  · Status post right foot TMA on 11/07/2019, postop day 4  · Podiatry follow-up appreciated  · Patient remains high risk for limb loss due to significant PAD and may eventually require a BKA  · No further abx needed as per discussion with Podiatry with surgical cure, blood cultures negative  · Wound with no dehiscence, Podiatry has cleared patient for discharge  To remain NWB    · Outpatient follow up   · Seen by PT / OT, recommend HH vs  STR, patient prefers home with home health RN /PT, Case Management to arrange

## 2019-11-11 NOTE — ASSESSMENT & PLAN NOTE
· Appears to have superimposed cellulitis of the right foot - reports blister that ruptured with associated erythema  · Leukocytosis, now resolved  Afebrile  Blood cultures negative  · Initiated on Cefepime and Vancomycin in the ED    No history of MRSA  · Transitioned to Ancef and Flagyl  · Resolved

## 2019-11-11 NOTE — ASSESSMENT & PLAN NOTE
Lab Results   Component Value Date    HGBA1C 13 7 (A) 10/03/2019       Recent Labs     11/10/19  2020 11/11/19  0721 11/11/19  1110 11/11/19  1601   POCGLU 244* 88 117 221*       Blood Sugar Average: Last 72 hrs:  · (P) 622 [id number]   · Uncontrolled with very high A1c  · Maintained on Metformin and Lantus 78 units q HS however patient was not taking insulin as she could not afford it  She was then placed on Glipizide until prescription assistance was in place  · Now on Lantus to 30 units b i d , continue insulin lispro 5 units t i d  with meals  · Monitor FSBS ac / hs and cover with SSI  · Will discharge on Lantus 30 units BID and continue Metformin  Discussed with Case Management who will arrange for patient to get insulin prior to discharge

## 2019-11-11 NOTE — PLAN OF CARE
Problem: OCCUPATIONAL THERAPY ADULT  Goal: Performs self-care activities at highest level of function for planned discharge setting  See evaluation for individualized goals  Description  Treatment Interventions: ADL retraining, Endurance training, UE strengthening/ROM, Equipment evaluation/education, Cognitive reorientation, Patient/family training, Compensatory technique education, Energy conservation, Activityengagement  Equipment Recommended: (w/c)       See flowsheet documentation for full assessment, interventions and recommendations  Outcome: Progressing  Note:   Limitation: Decreased ADL status, Decreased UE strength, Decreased cognition, Decreased Safe judgement during ADL, Decreased endurance, Decreased self-care trans, Decreased high-level ADLs  Prognosis: Good  Assessment: Pt was seen for skilled OT with focus on completion of self care tasks, functional transfers, review of safety with LE dressing/self toileting, review of fall prevention and review of current plan of care  Pt with some inconsistencies with use of RW  Poor safety awareness with impulsive movements noted through out tx session  see above levels of A required for all functional tasks  Pt would benefit from continued therapies but prefers home with with home OT  Family support and live with Pt  Are able to assist       OT Discharge Recommendation: Home OT(vs STR Pt declining STR)  OT - OK to Discharge:  Yes

## 2019-11-11 NOTE — ASSESSMENT & PLAN NOTE
· Monitor BMP's  · Avoid nephrotoxins / hypotension  Lab Results   Component Value Date    CREATININE 0 82 11/11/2019    CREATININE 0 85 11/10/2019    CREATININE 0 81 11/09/2019

## 2019-11-11 NOTE — PROGRESS NOTES
Progress Note - Podiatry  [place] Rakeshsimran Lyon 47 y o  female MRN: [id number]  Unit/Bed#: E5 -01 Encounter: [id number]    Assessment/Plan:  48yo female with CKD2, severe PAD, DM2 with right foot 3rd and 4th toe dry gangrene s/p right 3rd and 4th toe amputation with toe fillet flap (DOS 11/6/19) and now s/p R TMA and percutaneous FRANCINE (11/7/19)  - Incision sites assessed to right TMA site and posterior FRANCINE site appear well coapted w/o dehiscence with all sutures and skin staples intact, no active drainage, no signs of infection  No tissue necrosis  Capillary refill time brisk to plantar and dorsal flaps  - Strict NWB to RLE  PT/OT consulted  - Patient is stable for d/c from podiatry pov, discharge instructions in place  Subjective/Objective   Chief Complaint:   Chief Complaint   Patient presents with    Toe Pain     Patient reports noticing her R 4th toe has turned black in color  Patient reports burning her R foot a few months ago but denies any new injuries  Subjective: 47 y o  y/o female was seen and evaluated at bedside  Slightly tearful at dressing change due to appearance of foot however denies pain and states she feels well overall  Has been doing PT, without issues being NWB  Patient denies nausea, vomiting, chest pain, shortness of breath, chills, fever  Blood pressure 147/76, pulse 85, temperature 97 7 °F (36 5 °C), temperature source Temporal, resp  rate 18, height 5' (1 524 m), weight 85 1 kg (187 lb 9 8 oz), SpO2 97 %, not currently breastfeeding  ,Body mass index is 36 64 kg/m²  Invasive Devices     Peripheral Intravenous Line            Peripheral IV 11/09/19 Left Forearm 1 day                Physical Exam:   General: Alert, cooperative and no distress  Lungs: Non labored breathing  Heart: Positive S1, S2  Abdomen: Soft, non-tender  Extremity: msk and nvs baseline   Right TMA and FRANCINE incision sites assessed and appears well coapted w/o dehiscence with all sutures intact, no signs of infection noted  TMA incision appears with dried sanguinous drainage however without tissue necrosis  Plantar and dorsal flaps CRT brisk and warm         R TMA    R TMA    R FRANCINE (posterior ankle)        Lab, Imaging and other studies:   CBC:   Lab Results   Component Value Date    WBC 10 14 11/11/2019    HGB 9 6 (L) 11/11/2019    HCT 33 0 (L) 11/11/2019    MCV 77 (L) 11/11/2019     (H) 11/11/2019    MCH 22 3 (L) 11/11/2019    MCHC 29 1 (L) 11/11/2019    RDW 15 2 (H) 11/11/2019    MPV 9 5 11/11/2019    NRBC 0 11/11/2019   , CMP:   Lab Results   Component Value Date    SODIUM 138 11/11/2019    K 3 7 11/11/2019     11/11/2019    CO2 29 11/11/2019    BUN 7 11/11/2019    CREATININE 0 82 11/11/2019    CALCIUM 8 7 11/11/2019    EGFR 81 11/11/2019

## 2019-11-11 NOTE — PHYSICAL THERAPY NOTE
Physical Therapy Re-Evaluation    Patient Name: Kunal Ortega    AQLJZ'I Date: 11/11/2019     Problem List  Principal Problem:    Gangrenous toe ([facility] )  Active Problems:    Type 2 diabetes mellitus with circulatory disorder, with long-term current use of insulin (HCC)    Essential hypertension    HLD (hyperlipidemia)    PAD (peripheral artery disease) ([place] [address] )    CKD stage 2 due to type 2 diabetes mellitus [facility])       Past Medical History  Past Medical History:   Diagnosis Date    Cellulitis of toe 1/28/2019    CKD (chronic kidney disease)     Diabetes mellitus ([place] [address] )     Hypertension         Past Surgical History  Past Surgical History:   Procedure Laterality Date    IR ABDOMINAL ANGIOGRAPHY  2/1/2019    IR ARTERIAL LYSIS  4/30/2019    IR LOWER EXTREMITY / INTERVENTION  11/1/2019    KS AMPUTATION FOOT,TRANSMETATARSAL Right 11/7/2019    Procedure: AMPUTATION TRANSMETATARSAL (TMA) WITH ACHILLES TENDON LENGTHING;  Surgeon: Luba Tierney DPM;  Location: [facility];  Service: Podiatry    TOE AMPUTATION Left 2/4/2019    Procedure: AMPUTATION 5th TOE;  Surgeon: Luba Tierney DPM;  [facility];  Service: Podiatry    TOE AMPUTATION Right 11/4/2019    Procedure: AMPUTATION RIGHT 3RD AND 4TH TOE;  Surgeon: Luba Tierney DPM;  [facility];  Service: Podiatry           11/11/19 0835   Note Type   Note type Re-eval   Pain Assessment   Pain Assessment No/denies pain   Pain Score No Pain   Hospital Pain Intervention(s) Repositioned; Ambulation/increased activity   Response to Interventions tolerated   Home Living   Additional Comments Refer to PT initial evaluation   Prior Function   Comments Refer to PT initial evaluation   Restrictions/Precautions   Weight Bearing Precautions Per Order Yes   RLE Weight Bearing Per Order NWB   Other Precautions Cognitive; Chair Alarm; Bed Alarm;Multiple lines; Fall Risk   General   Family/Caregiver Present No Cognition   Overall Cognitive Status Impaired   Arousal/Participation Cooperative   Attention Attends with cues to redirect   Orientation Level Oriented X4   Following Commands Follows one step commands with increased time or repetition   RLE Assessment   RLE Assessment X  (NWB, hip and knee flexors 2+/5)   LLE Assessment   LLE Assessment X  (grossly 4/5)   Coordination   Movements are Fluid and Coordinated 0   Coordination and Movement Description slow movements, rigid posture   Bed Mobility   Supine to Sit 5  Supervision   Additional items Increased time required;Verbal cues   Additional Comments Pt left seated in bedside chair at termination of session, all needs within reach   Transfers   Sit to Stand 5  Supervision   Additional items Increased time required;Verbal cues   Stand to Sit 5  Supervision   Additional items Increased time required;Verbal cues   Additional Comments RW used for transfers   Ambulation/Elevation   Gait pattern Forward Flexion;Decreased foot clearance; Short stride; Excessively slow  (hopping on LLE to maintain NWB on RLE)   Gait Assistance 4  Minimal assist  (steadying assist)   Additional items Verbal cues; Tactile cues   Assistive Device Rolling walker   Distance 15'   Balance   Static Sitting Good   Dynamic Sitting Fair +   Static Standing Fair  (RW)   Dynamic Standing Fair -  (RW)   Ambulatory Fair -  (RW)   Endurance Deficit   Endurance Deficit Yes   Endurance Deficit Description weakness, fatigue   Activity Tolerance   Activity Tolerance Patient limited by fatigue   Nurse Made Aware RN cleared patient for PT session   Assessment   Prognosis Good   Problem List Decreased strength;Decreased range of motion;Decreased endurance; Impaired balance;Decreased mobility; Decreased coordination; Impaired judgement;Decreased safety awareness; Impaired sensation;Decreased skin integrity;Orthopedic restrictions   Assessment Pt presents for physical therapy re-evaluation s/p R TMA   Pt continues to be NWB on RLE and declined to utilize protective boot for LLE that she utilized on initial evaluation  Pt has maintain functional mobility level displayed on evaluation  Pt requires verbal cues and redirection for safe completion of functional mobility tasks  Pt will benefit from continued skilled physical therapy in rehab setting however would be able to discharge home with home PT if she has adequate family support  Skilled physical therapy is indicated to address listed functional deficits focusing on strength, endurance and functional mobility  Goals   Patient Goals go home   STG Expiration Date 11/25/19   Short Term Goal #1 goals continue to be appropriate: 1  Pt will increased strength by 1 grade in order to increase functional independence with transfers  2  Pt will increase balance grade by 1 in order to improve safety  3  Pt will improve transfer ability to mod I to increase functional independence and decrease caregiver burden  4  Pt will perform bed mobility independently to decrease caregiver burden  5  Pt will be able to amb >50 ft with RW and mod I and NWB RLE to increase functional independence in home and community environments  6  Pt will be able to ascend and descend 12 stairs mod I to increase functional independence within the home environment  PT Treatment Day 0   Plan   Treatment/Interventions Functional transfer training;LE strengthening/ROM; Elevations; Therapeutic exercise; Endurance training;Patient/family training;Equipment eval/education; Bed mobility;Gait training; Compensatory technique education;Spoke to nursing   PT Frequency Other (Comment)  (3-5x/wk)   Recommendation   Recommendation Other (Comment)  (rehab vs home with home PT pending family support)   Equipment Recommended Wilver Neal; Wheelchair  (RW; pt will need wheelchair for longer distances)   PT - OK to Discharge Yes   Additional Comments when medically stable   Modified Guilford Scale   Modified Guilford Scale 4   Barthel Index Feeding 10   Bathing 0   Grooming Score 5   Dressing Score 5   Bladder Score 10   Bowels Score 10   Toilet Use Score 5   Transfers (Bed/Chair) Score 10   Mobility (Level Surface) Score 0   Stairs Score 0   Barthel Index Score 55       Kevin Peguero PT, DPT

## 2019-11-11 NOTE — PROGRESS NOTES
[facility] Internal Medicine  Progress Note - Nelia Palafox 1965, 47 y o  female MRN: [id number]    Unit/Bed#: E5 -01 Encounter: [id number]    Primary Care Provider: Margarita Beckett MD   Date and time admitted to hospital: 10/30/2019  6:28 PM        * Gangrenous toe (HCC)resolved as of 11/11/2019  Assessment & Plan  28-year-old female history of diabetes mellitus who was unable to take her insulin over the last several months due to lack of insurance  Also with hypertension, hyperlipidemia, peripheral arterial disease admitted for gangrenous toe  · Angiogram with intervention performed on 11/1, continues to have below threshold for wound healing  · Status post right 3rd and 4th toe amputations on 11/4  · Status post right foot TMA on 11/07/2019, postop day 4  · Podiatry follow-up appreciated  · Patient remains high risk for limb loss due to significant PAD and may eventually require a BKA  · No further abx needed as per discussion with Podiatry with surgical cure, blood cultures negative  · Wound with no dehiscence, Podiatry has cleared patient for discharge  To remain NWB    · Outpatient follow up   · Seen by PT / OT, recommend HH vs  STR, patient prefers home with home health RN /PT, Case Management to arrange      PAD (peripheral artery disease) ([facility] )  Assessment & Plan  · Status post right SFA PTA/stent 04/30/2019  · Status post right lower extremity Agram on 11/01/2019 with successful R SFA PTA/stent  · Vascular surgery input appreciated  · Continue with aspirin, Plavix, statin    Type 2 diabetes mellitus with circulatory disorder, with long-term current use of insulin [facility])  Assessment & Plan  Lab Results   Component Value Date    HGBA1C 13 7 (A) 10/03/2019       Recent Labs     11/10/19  2020 11/11/19  0721 11/11/19  1110 11/11/19  1601   POCGLU 244* 88 117 221*       Blood Sugar Average: Last 72 hrs:  · (P) 723 [id number]   · Uncontrolled with very high A1c  · Maintained on Metformin and Lantus 78 units q HS however patient was not taking insulin as she could not afford it  She was then placed on Glipizide until prescription assistance was in place  · Now on Lantus to 30 units b i d , continue insulin lispro 5 units t i d  with meals  · Monitor FSBS ac / hs and cover with SSI  · Will discharge on Lantus 30 units BID and continue Metformin  Discussed with Case Management who will arrange for patient to get insulin prior to discharge  Lactic acidosisresolved as of 11/1/2019  Assessment & Plan  · Initial lactic acid of 2 5  · Does not meet other SIRS criteria aside from leukocytosis - also on Metformin as an outpatient  · Received IVF, now resolved    Cellulitisresolved as of 11/1/2019  Assessment & Plan  · Appears to have superimposed cellulitis of the right foot - reports blister that ruptured with associated erythema  · Leukocytosis, now resolved  Afebrile  Blood cultures negative  · Initiated on Cefepime and Vancomycin in the ED  No history of MRSA  · Transitioned to Ancef and Flagyl  · Resolved    CKD stage 2 due to type 2 diabetes mellitus ([facility] )  Assessment & Plan  · Monitor BMP's  · Avoid nephrotoxins / hypotension  Lab Results   Component Value Date    CREATININE 0 82 11/11/2019    CREATININE 0 85 11/10/2019    CREATININE 0 81 11/09/2019         HLD (hyperlipidemia)  Assessment & Plan  · Maintained on statin    Essential hypertension  Assessment & Plan  · BP stable  · Continue Metoprolol 25 mg b i d   · Monitor VS    Hypokalemiaresolved as of 11/6/2019  Assessment & Plan  · Repleted      Hyponatremiaresolved as of 11/1/2019  Assessment & Plan  · Pseudohyponatremia of 133 in setting of hyperglycemia  · Corrects to 136      VTE Pharmacologic Prophylaxis:   Pharmacologic: Enoxaparin (Lovenox)  Mechanical VTE Prophylaxis in Place: Yes    Patient Centered Rounds: I have performed bedside rounds with nursing staff today      Discussions with Specialists or Other Care Team Provider: Provider notes reviewed  Discussed with Case Management  Education and Discussions with Family / Patient: Plan of care with patient  Time Spent for Care: 30 minutes  More than 50% of total time spent on counseling and coordination of care as described above  Current Length of Stay: 12 day(s)    Current Patient Status: Inpatient   Certification Statement: The patient will continue to require additional inpatient hospital stay due to arrangements for discharge    Discharge Plan: Probable discharge tomorrow    Code Status: Level 1 - Full Code      Subjective:   Patient denies pain  She reports wanting to go home with home health services instead of rehab  Objective:     Vitals:   Temp (24hrs), Av 6 °F (36 4 °C), Min:97 5 °F (36 4 °C), Max:97 7 °F (36 5 °C)    Temp:  [97 5 °F (36 4 °C)-97 7 °F (36 5 °C)] 97 7 °F (36 5 °C)  HR:  [76-85] 85  Resp:  [18-19] 18  BP: (117-147)/(72-76) 147/76  SpO2:  [97 %] 97 %  Body mass index is 36 64 kg/m²  Input and Output Summary (last 24 hours): Intake/Output Summary (Last 24 hours) at 2019 1636  Last data filed at 2019 1301  Gross per 24 hour   Intake 680 ml   Output 400 ml   Net 280 ml       Physical Exam:     Physical Exam   Constitutional: She is oriented to person, place, and time  She appears well-developed and well-nourished  No distress  HENT:   Head: Normocephalic and atraumatic  Eyes: Conjunctivae are normal  No scleral icterus  Cardiovascular: Normal rate, regular rhythm and normal heart sounds  No murmur heard  Pulmonary/Chest: Effort normal and breath sounds normal  No respiratory distress  She has no wheezes  She has no rales  Abdominal: Soft  Bowel sounds are normal  She exhibits no distension  There is no tenderness  Musculoskeletal: Normal range of motion  She exhibits no tenderness  Neurological: She is alert and oriented to person, place, and time  Skin: Skin is warm and dry   She is not diaphoretic  There is pallor  Dressing intact on right foot  Psychiatric: She has a normal mood and affect  Her behavior is normal    Nursing note and vitals reviewed  Additional Data:     Labs:    Results from last 7 days   Lab Units 11/11/19  0512   WBC Thousand/uL 10 14   HEMOGLOBIN g/dL 9 6*   HEMATOCRIT % 33 0*   PLATELETS Thousands/uL 462*   NEUTROS PCT % 57   LYMPHS PCT % 32   MONOS PCT % 8   EOS PCT % 2     Results from last 7 days   Lab Units 11/11/19  0512   SODIUM mmol/L 138   POTASSIUM mmol/L 3 7   CHLORIDE mmol/L 104   CO2 mmol/L 29   BUN mg/dL 7   CREATININE mg/dL 0 82   ANION GAP mmol/L 5   CALCIUM mg/dL 8 7   GLUCOSE RANDOM mg/dL 91         Results from last 7 days   Lab Units 11/11/19  1601 11/11/19  1110 11/11/19  0721 11/10/19  2020 11/10/19  1646 11/10/19  1110 11/10/19  0759 11/09/19  2059 11/09/19  1600 11/09/19  1145 11/09/19  0724 11/08/19  2047   POC GLUCOSE mg/dl 221* 117 88 244* 245* 305* 231* 303* 277* 247* 178* 351*                   * I Have Reviewed All Lab Data Listed Above  * Additional Pertinent Lab Tests Reviewed:  [facility] Admission Reviewed    Imaging:    Imaging Reports Reviewed Today Include: None  Imaging Personally Reviewed by Myself Includes:  None    Recent Cultures (last 7 days):           Last 24 Hours Medication List:     Current Facility-Administered Medications:  acetaminophen 650 mg Oral Q6H PRN John Deal, DPM   aspirin 81 mg Oral Daily John Deal, DPM   atorvastatin 20 mg Oral Daily With Dinner John Deal, DPM   clopidogrel 75 mg Oral Daily John Deal, DPM   enoxaparin 40 mg Subcutaneous Q24H [address], DPMANOLO   hydrALAZINE 5 mg Intravenous Q6H PRN John Romerode, DPM   HYDROmorphone 0 5 mg Intravenous Q3H PRN John Romerode, DPM   insulin glargine 30 Units Subcutaneous Q12H [address] Milka Peña MD   insulin lispro 1-6 Units Subcutaneous TID AC John Deal DPM   insulin lispro 1-6 Units Subcutaneous HS [address] Alejandra Cantor, VIVIANA   insulin lispro 5 Units Subcutaneous TID With Meals Sia Worthington MD   metoprolol tartrate 25 mg Oral Q12H [address] Brittany Sawyers, DPM   ondansetron 4 mg Intravenous Q6H PRN Brittany Jacquess, DPM   oxyCODONE 5 mg Oral Q4H PRN Brittany Jacquess, DPM   potassium chloride 40 mEq Oral Daily Brittany Jacquess, DPM   traMADol 50 mg Oral Q6H PRN Brittany Orozco, DPM        Today, Patient Was Seen By: MIKEL Silver    ** Please Note: Dictation voice to text software may have been used in the creation of this document   **

## 2019-11-12 VITALS
SYSTOLIC BLOOD PRESSURE: 171 MMHG | HEIGHT: 60 IN | HEART RATE: 81 BPM | RESPIRATION RATE: 18 BRPM | OXYGEN SATURATION: 97 % | BODY MASS INDEX: 36.83 KG/M2 | TEMPERATURE: 97.8 F | WEIGHT: 187.61 LBS | DIASTOLIC BLOOD PRESSURE: 71 MMHG

## 2019-11-12 LAB
GLUCOSE SERPL-MCNC: 115 MG/DL (ref 65–140)
GLUCOSE SERPL-MCNC: 80 MG/DL (ref 65–140)

## 2019-11-12 PROCEDURE — 99239 HOSP IP/OBS DSCHRG MGMT >30: CPT | Performed by: NURSE PRACTITIONER

## 2019-11-12 PROCEDURE — 82948 REAGENT STRIP/BLOOD GLUCOSE: CPT

## 2019-11-12 RX ADMIN — ENOXAPARIN SODIUM 40 MG: 40 INJECTION SUBCUTANEOUS at 08:36

## 2019-11-12 RX ADMIN — ASPIRIN 81 MG 81 MG: 81 TABLET ORAL at 08:37

## 2019-11-12 RX ADMIN — METOPROLOL TARTRATE 25 MG: 25 TABLET, FILM COATED ORAL at 08:37

## 2019-11-12 RX ADMIN — INSULIN GLARGINE 30 UNITS: 100 INJECTION, SOLUTION SUBCUTANEOUS at 08:36

## 2019-11-12 RX ADMIN — POTASSIUM CHLORIDE 40 MEQ: 1500 TABLET, EXTENDED RELEASE ORAL at 08:37

## 2019-11-12 RX ADMIN — CLOPIDOGREL BISULFATE 75 MG: 75 TABLET ORAL at 08:37

## 2019-11-12 NOTE — ASSESSMENT & PLAN NOTE
79-year-old female history of diabetes mellitus who was unable to take her insulin over the last several months due to lack of insurance  Also with hypertension, hyperlipidemia, peripheral arterial disease admitted for gangrenous toe  · Angiogram with intervention performed on 11/1, continues to have below threshold for wound healing  · Status post right 3rd and 4th toe amputations on 11/4  · Status post right foot TMA on 11/07/2019, postop day 5  · Podiatry follow-up appreciated  · Patient remains high risk for limb loss due to significant PAD and may eventually require a BKA  · No further abx needed as per discussion with Podiatry with surgical cure, blood cultures negative  · Wound with no dehiscence, Podiatry has cleared patient for discharge  To remain NWB    · Outpatient follow up   · Seen by PT / OT, recommend HH vs  STR, patient prefers home with home health RN /PT, Case Management to arrange

## 2019-11-12 NOTE — SOCIAL WORK
CM s/w the pt and the pt's daughter Stefania Garcia at bedside  The pt's ex  Freda Lay will be providing transport [facility]  Freda Lay will be picking up the pt at 7pm   [facility] contact number is [phone]

## 2019-11-12 NOTE — ASSESSMENT & PLAN NOTE
· Avoid nephrotoxins / hypotension  Lab Results   Component Value Date    CREATININE 0 82 11/11/2019    CREATININE 0 85 11/10/2019    CREATININE 0 81 11/09/2019

## 2019-11-12 NOTE — PLAN OF CARE
Problem: Potential for Falls  Goal: Patient will remain free of falls  Description  INTERVENTIONS:  - Assess patient frequently for physical needs  -  Identify cognitive and physical deficits and behaviors that affect risk of falls    -  [place] fall precautions as indicated by assessment   - Educate patient/family on patient safety including physical limitations  - Instruct patient to call for assistance with activity based on assessment  - Modify environment to reduce risk of injury  - Consider OT/PT consult to assist with strengthening/mobility  Outcome: Progressing     Problem: PAIN - ADULT  Goal: Verbalizes/displays adequate comfort level or baseline comfort level  Description  Interventions:  - Encourage patient to monitor pain and request assistance  - Assess pain using appropriate pain scale  - Administer analgesics based on type and severity of pain and evaluate response  - Implement non-pharmacological measures as appropriate and evaluate response  - Consider cultural and social influences on pain and pain management  - Notify physician/advanced practitioner if interventions unsuccessful or patient reports new pain  Outcome: Progressing     Problem: INFECTION - ADULT  Goal: Absence or prevention of progression during hospitalization  Description  INTERVENTIONS:  - Assess and monitor for signs and symptoms of infection  - Monitor lab/diagnostic results  - Monitor all insertion sites, i e  indwelling lines, tubes, and drains  - Monitor endotracheal if appropriate and nasal secretions for changes in amount and color  - [place] appropriate cooling/warming therapies per order  - Administer medications as ordered  - Instruct and encourage patient and family to use good hand hygiene technique  - Identify and instruct in appropriate isolation precautions for identified infection/condition  Outcome: Progressing  Goal: Absence of fever/infection during neutropenic period  Description  INTERVENTIONS:  - Monitor WBC    Outcome: Progressing     Problem: SAFETY ADULT  Goal: Patient will remain free of falls  Description  INTERVENTIONS:  - Assess patient frequently for physical needs  -  Identify cognitive and physical deficits and behaviors that affect risk of falls    -  [place] fall precautions as indicated by assessment   - Educate patient/family on patient safety including physical limitations  - Instruct patient to call for assistance with activity based on assessment  - Modify environment to reduce risk of injury  - Consider OT/PT consult to assist with strengthening/mobility  Outcome: Progressing  Goal: Maintain or return to baseline ADL function  Description  INTERVENTIONS:  -  Assess patient's ability to carry out ADLs; assess patient's baseline for ADL function and identify physical deficits which impact ability to perform ADLs (bathing, care of mouth/teeth, toileting, grooming, dressing, etc )  - Assess/evaluate cause of self-care deficits   - Assess range of motion  - Assess patient's mobility; develop plan if impaired  - Assess patient's need for assistive devices and provide as appropriate  - Encourage maximum independence but intervene and supervise when necessary  - Involve family in performance of ADLs  - Assess for home care needs following discharge   - Consider OT consult to assist with ADL evaluation and planning for discharge  - Provide patient education as appropriate  Outcome: Progressing  Goal: Maintain or return mobility status to optimal level  Description  INTERVENTIONS:  - Assess patient's baseline mobility status (ambulation, transfers, stairs, etc )    - Identify cognitive and physical deficits and behaviors that affect mobility  - Identify mobility aids required to assist with transfers and/or ambulation (gait belt, sit-to-stand, lift, walker, cane, etc )  - [place] fall precautions as indicated by assessment  - Record patient progress and toleration of activity level on Mobility SBAR; progress patient to next Phase/Stage  - Instruct patient to call for assistance with activity based on assessment  - Consider rehabilitation consult to assist with strengthening/weightbearing, etc   Outcome: Progressing     Problem: DISCHARGE PLANNING  Goal: Discharge to home or other facility with appropriate resources  Description  INTERVENTIONS:  - Identify barriers to discharge w/patient and caregiver  - Arrange for needed discharge resources and transportation as appropriate  - Identify discharge learning needs (meds, wound care, etc )  - Arrange for interpretive services to assist at discharge as needed  - Refer to Case Management Department for coordinating discharge planning if the patient needs post-hospital services based on physician/advanced practitioner order or complex needs related to functional status, cognitive ability, or social support system  Outcome: Progressing     Problem: Knowledge Deficit  Goal: Patient/family/caregiver demonstrates understanding of disease process, treatment plan, medications, and discharge instructions  Description  Complete learning assessment and assess knowledge base  Interventions:  - Provide teaching at level of understanding  - Provide teaching via preferred learning methods  Outcome: Progressing     Problem: Nutrition/Hydration-ADULT  Goal: Nutrient/Hydration intake appropriate for improving, restoring or maintaining nutritional needs  Description  Monitor and assess patient's nutrition/hydration status for malnutrition  Collaborate with interdisciplinary team and initiate plan and interventions as ordered  Monitor patient's weight and dietary intake as ordered or per policy  Utilize nutrition screening tool and intervene as necessary  Determine patient's food preferences and provide high-protein, high-caloric foods as appropriate       INTERVENTIONS:  - Monitor oral intake, urinary output, labs, and treatment plans  - Assess nutrition and hydration status and recommend course of action  - Evaluate amount of meals eaten  - Assist patient with eating if necessary   - Allow adequate time for meals  - Recommend/ encourage appropriate diets, oral nutritional supplements, and vitamin/mineral supplements  - Order, calculate, and assess calorie counts as needed  - Recommend, monitor, and adjust tube feedings and TPN/PPN based on assessed needs  - Assess need for intravenous fluids  - Provide specific nutrition/hydration education as appropriate  - Include patient/family/caregiver in decisions related to nutrition  Outcome: Progressing     Problem: Prexisting or High Potential for Compromised Skin Integrity  Goal: Skin integrity is maintained or improved  Description  INTERVENTIONS:  - Identify patients at risk for skin breakdown  - Assess and monitor skin integrity  - Assess and monitor nutrition and hydration status  - Monitor labs   - Assess for incontinence   - Turn and reposition patient  - Assist with mobility/ambulation  - Relieve pressure over bony prominences  - Avoid friction and shearing  - Provide appropriate hygiene as needed including keeping skin clean and dry  - Evaluate need for skin moisturizer/barrier cream  - Collaborate with interdisciplinary team   - Patient/family teaching  - Consider wound care consult   Outcome: Progressing

## 2019-11-12 NOTE — ASSESSMENT & PLAN NOTE
Lab Results   Component Value Date    HGBA1C 13 7 (A) 10/03/2019       Recent Labs     11/11/19  1110 11/11/19  1601 11/11/19  2119 11/12/19  0834   POCGLU 117 221* 216* 115       Blood Sugar Average: Last 72 hrs:  · (P) 499 [id number]   · Uncontrolled with very high A1c  · Maintained on Metformin and Lantus 78 units q HS however patient was not taking insulin as she could not afford it  She was then placed on Glipizide until prescription assistance was in place  · Now on Lantus to 30 units b i d  and insulin lispro 5 units t i d  with meals  · Monitor FSBS ac / hs and cover with SSI  · Will discharge on Lantus 30 units BID and continue Metformin  Discussed with Case Management who will arrange for patient to get insulin prior to discharge

## 2019-11-13 ENCOUNTER — TRANSITIONAL CARE MANAGEMENT (OUTPATIENT)
Dept: FAMILY MEDICINE CLINIC | Facility: CLINIC | Age: 54
End: 2019-11-13

## 2019-11-13 NOTE — UTILIZATION REVIEW
URGENT/EMERGENT  INPATIENT/SPU AUTHORIZATION REQUEST    Date: 11/13/19            # Pages in this Request:     X New Request   Additional Information for PA#:     Office Contact Name:  Mike Rodriguez Title: Utilization Review, Registed Nurse     Phone: [phone]  Ext  Availability (Date/Time): Wednesday - Friday 8 am- 4 pm    X Inpatient Review  SPU Review        Current       X Late Pick-up   · How your facility was first notified of the Late Pick-up: EVS  · When your facility was first notified of the Late Pick-up (date): 11/9         RECIPIENT INFORMATION    Recipient [place]#:[phone]   Recipient Name: Radha Del Toro    YOB: 1965  47 y o  Recipient Alias:     Gender:   Male X Female Medicaid Eligibility ([address] [place] [address]): INSURANCE INFORMATION    (All other private or governmental health insurance benefits must be utilized prior to billing the [place] Program)    Was this admission the result of an MVA, other accident, assault, injury, fall, gunshot, bite etc ? Yes X No                   If yes, provide a brief description of the incident  Does the recipient have other insurance coverage? Yes X No        Insurance Company Name/Policy #      Did that insurance pay on this claim? Yes  No        Did that insurance deny this claim? Yes  No    If yes, reason for denial:      Does the recipient have Medicare? Yes X No        Did Medicare exhaust prior to this admission? Yes  No            Did Medicare partially pay this claim? Yes  No        Did that insurance deny this claim? Yes  No    If yes, reason for denial:          Was the recipient a prisoner at the time of admission?    Yes X No            PROVIDER INFORMATION    Hospital Name: [facility]    [employer] Provider ID#: [id number]    [address] [place] [address] Physician Name: [facility]    [employer] Provider ID#: [id number]        ADMISSION INFORMATION    Type of Admission: (please choose one)    X ED      Direct    If yes, from where? Transfer    If yes, transferring hospital (inpatient, rehab, or psych) Provider Name/Provider ID#: Admission Floor or Unit Type: MED-SURG    Dates/Times:        ED Date/Time: 10/30/2019  1817        Observation Date/Time:         Admission Date/Time: 10/30/19 2241        Discharge or Transfer Date/Time: 11/12/2019  1139        DIAGNOSIS/PROCEDURE CODES    Primary Diagnosis Code/Primary Diagnosis Code description:   PAD (peripheral artery disease) ([facility] ) [I73 9]  Sepsis ([facility] ) [A41 9]  CELULITIS OF RLL L03 115  Gangrene of toe of right foot ([facility] ) [I96]   (MAY RE-ORDER DX CODES)  Additional Diagnosis Code(s) and Description(s)-(up to three additional codes):     Procedure Code (one) and description: [id number] DILATION OF R FEM ART W/ DRUG ELUT, PERC LJ        CLINICAL INFORMATION - [facility]    Is there a prior admission with a discharge date within 30 days of the date of this admission? X No (Proceed to the next section - "Clinical Information - General Review Checklist:)      Yes (Provide the following information)     Prior admission dates:    MA Prior Authorization Number:        Review Outcome:     Diagnosis Code(s)/Description:    Procedure Code/Description:    Findings:    Treatment:    Condition on Discharge:   Vitals:    Labs:   Imaging:   Medications: Follow-up Instructions:    Disposition:        CLINICAL INFORMATION - GENERAL REVIEW CHECKLIST    EMERGENCY DEPARTMENT: (Proceed to "ADMISSION" if Direct Admission)    Presenting Signs/Symptoms:    Toe Pain       Patient reports noticing her R 4th toe has turned black in color  Patient reports burning her R foot a few months ago but denies any new injuries       Assessment/Plan:   46y Female to ED presents with gangrenous toe  Toe started changing colors about 2-3 wks ago - chronic wound on the dorsal side of her foot that developed into a blister that popped resulting in streaking redness of the dorsal side of the foot  PMH of Uncontrolled type II, Diabetes, Essential Hypertension, PAD and critial limb ischemia s/p angioplasty  Admit Inpatient level of care for Gangrenous Toe - 4th digit, Cellulitis, Hyponatremia, Lactic acidosis, PAD and Diabetes  Will need surgical intervention, Vascular surgery consult  Iv Antibiotics, Na 133 - 136  Lactic acid - 2 5, IV fluids - repeat and continue lisinopril  10/31 Vascular Surgery cons; R 4th toe gangrene and nonhealing plantar foot ulcer w/lactic acidosis  Bld culture pending, SHERYL pending, trend creat and avoid nephrotoxic agents  -Recommend IV hydration pre and post angiogram  Hold lisinopril 1 day before and day of angiogram  Poorly controlled Diabetes and Hypertension  Podiatry consult  Medication/treatment prior to arrival in the ED:     Past Medical History:    Active Ambulatory Problems     Diagnosis Date Noted    Type 2 diabetes mellitus with circulatory disorder, with long-term current use of insulin ([place] [address] ) 03/08/2018    Essential hypertension 03/08/2018    HLD (hyperlipidemia) 01/28/2019    PAD (peripheral artery disease) ([place] [address] ) 01/28/2019    CKD stage 2 due to type 2 diabetes mellitus ([place] [address] ) 01/31/2019    Microcytic anemia 02/01/2019    Leukocytosis 02/05/2019    Toe amputation status, left 02/11/2019    Critical lower limb ischemia 04/30/2019    Polyneuropathy associated with underlying disease ([place] [address] ) 07/08/2019    Charcot foot due to diabetes mellitus ([place] [address] ) 08/05/2019    Gangrene of toe of right foot ([place] [address] ) 10/30/2019       Past Medical History:   Diagnosis Date    Cellulitis of toe 1/28/2019    CKD (chronic kidney disease)     Diabetes mellitus (HCC)     Hypertension        Clinical Exam:     Initial Vital Signs: (Temp, Pulse, Resp, and BP)   ED Triage Vitals [10/30/19 1826]   Temperature Pulse Respirations Blood Pressure SpO2   97 8 °F (36 6 °C) 93 18 (!) 209/85 99 %      Temp Source Heart Rate Source Patient Position - Orthostatic VS BP Location FiO2 (%) Temporal Monitor Sitting Right arm --      Pain Score       No Pain           Pertinent Repeat Vital Signs: (include times they were obtained)  10/31/19 0802   97 6 °F (36 4 °C)   72   18   114/64   97 %   None (Room air)   Sitting   10/31/19 0215            161/76            10/30/19 2342   97 9 °F (36 6 °C)   89   18   180/78  Abnormal    100 %   None (Room air)   Sitting   10/30/19 2217      91   18   195/82  Abnormal    98 %   None (Room air)   Sitting   10/30/19 2019      87   20   205/87  Abnormal    98 %   None (Room air)           Pertinent Sustained Findings: (include times they were obtained)    Weight in Kilograms:  Wt Readings from Last 1 Encounters:   10/31/19 85 1 kg (187 lb 9 8 oz)       Pertinent Labs (results):  Lab Units 10/31/19  0609 10/30/19  1907   WBC Thousand/uL 10 15 12 57*   HEMOGLOBIN g/dL 9 7* 11 4*   HEMATOCRIT % 32 5* 38 4   PLATELETS Thousands/uL 234 279   NEUTROS ABS Thousands/µL 6 65 9 94*                Results from last 7 days   Lab Units 10/31/19  0609 10/30/19  1907   SODIUM mmol/L 136 133*   POTASSIUM mmol/L 3 5 3 6   CHLORIDE mmol/L 101 95*   CO2 mmol/L 28 29   ANION GAP mmol/L 7 9   BUN mg/dL 10 9   CREATININE mg/dL 0 90 1 04   EGFR ml/min/1 73sq m 73 61   CALCIUM mg/dL 8 2* 9 0            Results from last 7 days   Lab Units 10/31/19  0000 10/30/19  1907   AST U/L 17 17   ALT U/L 13 15   ALK PHOS U/L 76 92   TOTAL PROTEIN g/dL 6 6 7 6   ALBUMIN g/dL 2 4* 2 9*   TOTAL BILIRUBIN mg/dL 0 46 0 58   BILIRUBIN DIRECT mg/dL 0 15 0 17      Results from last 7 days   Lab Units 10/31/19  1115 10/31/19  0804 10/31/19  0128   POC GLUCOSE mg/dl 281* 236* 302*            Results from last 7 days   Lab Units 10/31/19  0609 10/30/19  1907   GLUCOSE RANDOM mg/dL 222* 309*         Results from last 7 days   Lab Units 10/31/19  0610   PROTIME seconds 14 0   INR   1 07            Results from last 7 days   Lab Units 10/30/19  2216 10/30/19  2017   LACTIC ACID mmol/L 1 8 2 5*        Results from last 7 days   Lab Units 10/30/19  1907   CRP mg/L >90 0*   SED RATE mm/hour 63*               Results from last 7 days   Lab Units 10/30/19  1840   CLARITY UA   Slightly Cloudy   COLOR UA   Yellow   SPEC GRAV UA   1 020   PH UA   6 0   GLUCOSE UA mg/dl 250 (1/4%)*   KETONES UA mg/dl 15 (1+)*   BLOOD UA   Moderate*   PROTEIN UA mg/dl >=300*   NITRITE UA   Negative   BILIRUBIN UA   Negative   UROBILINOGEN UA E U /dl 0 2   LEUKOCYTES UA   Negative   WBC UA /hpf 0-1*   RBC UA /hpf 4-10*   BACTERIA UA /hpf Occasional   EPITHELIAL CELLS WET PREP /hpf Moderate*        Radiology (results):  10/30 Xray Right Foot - No acute osseous abnormality     10/31 VAS lower limb arterial duplex, complete bilateral - RIGHT LOWER LIMB:  This evaluation shows >75% stenosis in the mid superficial femoral artery with  suggestion of a short segment occlusion with diffuse atherosclerotic disease  noted in the remaining portions of the femoropopliteal vessels  Findings  suggests tibioperoneal disease      LEFT LOWER LIMB:  This evaluation shows a high grade stenosis versus occlusion in the mid  superficial femoral artery with reconstitution distally  Findings suggests  tibioperoneal disease  EKG (results):      Other tests (results):    Tests pending final results:    Treatment in the ED:   Medication Administration from 10/30/2019 1817 to 10/30/2019 2333       Date/Time Order Dose Route Action       10/30/2019 1958 sodium chloride 0 9 % bolus 1,000 mL 1,000 mL Intravenous New Bag                  10/30/2019 2117 cefepime (MAXIPIME) 2 g/50 mL dextrose IVPB 2,000 mg Intravenous New Bag                  10/30/2019 2114 sodium chloride 0 9 % bolus 1,000 mL 1,000 mL Intravenous New Bag       10/30/2019 2213 vancomycin (VANCOCIN) 1,250 mg in sodium chloride 0 9 % 250 mL IVPB 1,250 mg Intravenous New Bag       10/30/2019 2134 lactated ringers bolus 1,700 mL 1,700 mL Intravenous New Bag       10/30/2019 2315 metoprolol tartrate (LOPRESSOR) tablet 25 mg 25 mg Oral Given             Meds: Name, dose, route, time, number of doses given        Nebulizer treatments: Type, total number given      IVs: Type, rate, total amt  given          Other treatments:       Change in condition while in the ED:       Response to ED Treatment:           OBSERVATION: (Proceed to "ADMISSION" if Direct Admission)    Orders written during the observation period  Meds Name, dose, route, time, how may doses given:  PRN Meds Name, dose, route, time, how many doses given within the first 24 hrs :  IVs Type, rate, and total amt  ordered/given:  Labs, imaging, other:  Consults and findings:    Test Results during the observation period  Pertinent Lab tests (dates/results):  Culture results (blood, urine, spinal, wound, respiratory, etc ):  Imaging tests (dates/results):  EKG (dates/results):   Other test (dates/results):  Tests pending (dates/results):    Surgical or Invasive Procedures during the observation period  Name of surgery/procedure:  Date & Time:  Patient Response:  Post-operative orders:  Operative Report/Findings:    Response to Treatment, Major Change in Condition, Major Charge in Treatment during the observation period          ADMISSION:    DIRECT Admissions Only:    · Presenting Signs/Symptoms:   ·   · Medication/treatment prior to arrival:  ·   · Past Medical History:  ·   · Clinical Exam on admission:  ·   · Vital Signs on admission: (Temp, Pulse, Resp, and BP)  ·   · Weight in kilograms:     ALL Admissions:    Admission Orders and Other Orders written within the first 24 hrs after admission  NPO  IR abdominal angiography  Bld culture x2  IP CONSULT TO PODIATRY  IP CONSULT TO VASCULAR SURGERY    Meds Name, dose, route, time, how may doses given:  aspirin 81 mg Oral Daily   atorvastatin 20 mg Oral Daily With Dinner   cefazolin 1,000 mg Intravenous Q8H   clopidogrel 75 mg Oral Daily   enoxaparin 40 mg Subcutaneous Q24H [address]   insulin glargine 78 Units Subcutaneous QAM   insulin lispro 1-6 Units Subcutaneous TID AC   insulin lispro 1-6 Units Subcutaneous HS   insulin lispro 7 Units Subcutaneous TID With Meals   metoprolol tartrate 25 mg Oral Q12H [address]   metroNIDAZOLE 500 mg Intravenous Q8H       PRN Meds Name, dose, route, time, how many doses given within the first 24 hrs :  acetaminophen 650 mg Oral Q6H PRN 10/31 x1   hydrALAZINE 5 mg Intravenous Q6H PRN   ondansetron 4 mg Intravenous Q6H PRN         IVs Type, rate, and total amt  ordered/given:  START ON 11/1/2019] sodium chloride 75 mL/hr Intravenous Continuous       Labs, imaging, other:      Consults and findings:  * Gangrenous toe ([address] )  Assessment & Plan  · Presents to the emergency department with gangrenous right 4th digit, associated wound and superimposed cellulitis  · Vital signs stable at time of admission  Afebrile  · XR:  No evidence of gas, follow up final read  · Assessed by podiatry in the ED  Will follow in consult  Will need surgical intervention - await formal vascular surgery consult  · ED attending discussed with vascular - no immediate interventions at this time  · History of critical limb ischemia s/p angioplasty - was to have follow up noninvasive studies but not yet completed     Cellulitis  Assessment & Plan  · Appears to have superimposed cellulitis of the right foot - reports blister that ruptured with associated erythema  · CBC leukocytosis of 12 5 - appears chronic  · Initiated on cefepime and vancomycin in the ED    No history of MRSA  · Will transition to ancef/flagyl      Hyponatremia  Assessment & Plan  · Pseudohyponatremia of 133 in setting of hyperglycemia  · Corrects to 136     Lactic acidosis  Assessment & Plan  · Initial lactic acid of 2 5  · Does not meet other SIRS criteria aside from leukocytosis - also on metformin as an outpatient  · Received IVF - repeat lactic pending     PAD (peripheral artery disease) (HCC)  Assessment & Plan  · Maintained on ASA and plavix  · History of bilateral angioplasty lower extremities  Was to have follow up imaging as an outpatient  · Vascular consult     Essential hypertension  Assessment & Plan  · Elevated BP at time of admission  · Missed evening dose of metoprolol - give now  Continue BID  · Continue lisinopril     Type 2 diabetes mellitus with circulatory disorder, with long-term current use of insulin (HCC)  Assessment & Plan        Lab Results   Component Value Date     HGBA1C 13 7 (A) 10/03/2019         No results for input(s): POCGLU in the last 72 hours      Blood Sugar Average: Last 72 hrs:  · Uncontrolled  · Maintained on metformin and lantus 78 units q AM  · Continue lantus 78 units q AM   SSI + accuchek  May need to initiate scheduled mealtime insulin  · Diabetic diet  · Nutrition consult        Anticipated Length of Stay:  Patient will be admitted on an Inpatient basis with an anticipated length of stay of  Greater than 2 midnights     Justification for Hospital Stay: gangrenous toe     Podiatry Consultation - [address]         Reason For Consultation:   Right 4th digit gangrene     Assessment:      Podiatric assessment:  · Right 4th digit wet gangrene with no gas confirmed by XR  · Right dorsal foot arterial ulcer  · Right submetatarsal 5 ulceration without signs of infection  · PAD, history of critical limb ischemia  · T2DM, last A1C 13 7  · Hypertension on admission  · Sepsis, with lactate 2 5        Plan:     · She will be admitted under SLIM service  · Vascular workup recommended with LEADs to follow up IR intervention 4/30/2019  · Radiographs reviewed showing no soft tissue emphysema or bone changes indicating OM, consider possible MRI after admitted  · Sepsis given elevated lactate, ESR and CRP  · Consider empiric antibiotic therapy including Ancef, Flagyl  · Will confirm this plan with my attending      Inpatient consult to Vascular Surgery  Consult performed by: Sarah Miller PA-C  Consult ordered by: Jet Serina Schmitz PA-C        PAD (peripheral artery disease) [facility])  Assessment & Plan  48 y/o F former smoker w/hx HTN, HLD, CKD II/III (baseline creat 1 0-1 2), uncontrolled type II DM w/neuropathy, Charcot foot and PAD w/bilateral SFA occlusions and single-vessel peroneal runoff, s/p L distal SFA DCB PTA (IR) 2/1/2019 followed by L 5th toe amputation 2/4/2019 and R distal SFA/pop recanalization/PTA (IR) 4/30/2019 for acute on chronic limb ischemia and 4th toe tissue loss w/subsequent healing who presents with R 4th toe/dorsum foot gangrene x 3 weeks, nonhealing R plantar foot wound and lactic acidosis  Vascular surgery consulted for revascularization and optimization of wound healing      Diagnostics:  -SHERYL 10/31/2019:  Pending  -Xray R foot 10/30/19:  No evidence of subcutaneous gas  -BC:  pending  -RLE Agram 4/30/2019:  Short segment distal SFA occlusion w/dominant peroneal runoff filling DP  AT distally atretic and atretic PT   -preop SHERYL 4/30/2019:  50-75% R PFA and >75% mid SFA stenoses and distal R SFA occlusion  R [place] 0 27/-/-     Plan:  -pt known to our practice and last seen in office 6/19/19  Doing well at that time w/tissue loss resolved  Patient never f/u w/surveillance SHERYL and lost to follow-up due to lack of insurance  Now with recurrent RLE tissue loss w/o claudication or rest pain  -SHERYL pending this am     -given known history of PAD requiring intervention and now recurrent R SHERYL tissue loss, will ultimately require angiogram to improve blood flow and optimize wound healing  Will consult IR  D/w IR  Will plan for Agram tomorrow, 11/1/19  -RLE angiogram discussed at length at bedside with patient and daughter  All questions answered  Patient and daughter express understanding and wish to proceed  -continue ASA, Plavix and statin therapy  -continue abx per primary service  -continue [facility] per podiatry  -renal function stable w/creat 0 90/GFR 73  Will plan on pre and post procedure IV hydration  Hold lisinopril 1 day before and day of angiogram to reduce risk of contrast induced nephropathy  May require p r n  antihypertensive agents while temporarily off lisinopril  Management per SLIM  -d/w Dr Kim Boyd  Thank you for allowing us to participate in the care of his patient      * Gangrenous toe ([facility] )  Assessment & Plan  R 4th toe gangrene and nonhealing plantar foot ulcer w/lactic acidosis  -plain radiograph without evidence of subcutaneous gas  No obvious osseous abnormality  -consider MRI  Management and need for podiatric intervention per Podiatry   -continue [address] per Podiatry  -BC pending  -continue abx per primary service  -will need revascularization prior to any podiatric intervention  -SHERYL pending    See plan as outlined above     CKD stage 2 due to type 2 diabetes mellitus ([place] [address] )  Assessment & Plan  CKD II-III w/baseline creat 1 0-1 2  -creat 0 90/GFR 73 today  -continue to trend creat, particularly after angiogram  -continue to avoid nephrotoxic agents  -recommend IV hydration pre and post angiogram  -hold lisinopril 1 day before and day of angiogram     Type 2 diabetes mellitus with circulatory disorder, with long-term current use of insulin (HCC)  Assessment & Plan        Lab Results   Component Value Date     HGBA1C 13 7 (A) 10/03/2019              Recent Labs     10/31/19  0128 10/31/19  0804   POCGLU 302* 236*         Blood Sugar Average: Last 72 hrs:  (P) 269   -poorly controlled with HgbA1C 13 7  -continue to optimize BS control for optimization of wound healing and prevention of SSI and vascular disease  -management per SLIM     HLD (hyperlipidemia)  Assessment & Plan  -stable  -continue statin therapy  -management per SLIM     Essential hypertension  Assessment & Plan  -poorly controlled  -continue to optimize BP control  -medical management per primary medical service               Test Results after admission  Pertinent Lab tests (dates/results):  Culture results (blood, urine, spinal, wound, respiratory, etc ):  Imaging tests (dates/results):  EKG (dates/results): Other test (dates/results):  Tests pending (dates/results):    Surgical or Invasive Procedures  Name of surgery/procedure:Right Lower Extremity Arteriogram Procedure Note       Pre-op Diagnosis:   1  Gangrene of toe of right foot ([facility] )    2  Sepsis ([facility] )    3  PAD (peripheral artery disease) ([facility] )    4  Type 2 diabetes mellitus with other circulatory complication, with long-term current use of insulin ([facility])       Post-op Diagnosis:   1  Gangrene of toe of right foot ([facility] )    2  Sepsis ([facility] )    3  PAD (peripheral artery disease) ([facility] )    4  Type 2 diabetes mellitus with other circulatory complication, with long-term current use of insulin [facility])          Surgeon:   Naya Denis MD  Assistants:      No qualified resident was available, Resident is only observing     Estimated Blood Loss: <25 ml  Findings: Recurrent distal right superficial femoral artery stenosis treated with a 4 mm balloon requiring placement of a 5 mm x 100 mm and 5 mm x 60 mm Innova stent   80% tibioperoneal trunk stenosis treated with a 2 5 mm x 4 cm scoring balloon      Specimens: none     Complications:  none     Anesthesia: Conscious sedation and Local     Naya Denis MD      Date: 11/1/2019  Time: 2:58 PM        SURGERY DATE: 11/4/2019     Surgeon(s) and Role:     * Luke Cyr DPM - Primary     * Stormy Siu DPM - Assisting     Preop Diagnosis:  Gangrene of toe of right foot ([facility] ) Clinton Castellon  PAD (peripheral artery disease) ([facility] ) [I73 9]     Post-Op Diagnosis Codes:     * Gangrene of toe of right foot ([facility] ) [I96]     * PAD (peripheral artery disease) ([facility] ) [I73 9]     Procedure(s) (LRB):  1) Right 4th toe amputation   2) Right 3rd toe amputation  3) Right toe fillet flap     Specimen(s):  ID Type Source Tests Collected by Time Destination   1 : Right Third and Fourth Toes Tissue Toe, Right TISSUE EXAM Luke Cyr DPM 11/4/2019 1643         Estimated Blood Loss:   Minimal     Drains:  * No LDAs found *     Anesthesia Type:   Choice     Operative Indications:  Gangrene of toe of right foot ([facility] ) [I96]  PAD (peripheral artery disease) ([facility]) [I73 9]     Operative Findings:  Poor perfusion noted; necrotic area on 3rd toe requiring amputation with toe fillet flap; 4th toe completely necrotic     Complications:   None    SURGERY DATE: 11/7/2019       Pre-op Diagnosis:  Gangrene of toe of right foot ([facility] ) Jazzy Ganser  PAD (peripheral artery disease) ([facility] ) [I73 9]  Gastrocnemius-soleus Equinus     Post-Op Diagnosis Codes:     * Gangrene of toe of right foot ([facility] ) [I96]     * PAD (peripheral artery disease) ([facility]) [I73 9]     *Gastrocnemius-soleus Equinus     Procedure(s):  AMPUTATION TRANSMETATARSAL (TMA) WITH ACHILLES TENDON LENGTHING    Estimated Blood Loss:   40 mL     Drains:  * No LDAs found *     Anesthesia Type:   Choice with 18 ml of 1% Lidocaine and 0 5% Bupivacaine in a 1:1 mixture     Hemostasis:  Anatomic Dissection        Materials:  3-0 Vicryl, 3-0 Nylon, 2-0 Nylon, Skin staples     Operative Findings:  After passing the amputated forefoot off of the field, the remaining plantar flap was inspected and was found to have substantial non-viable necrotic tissue surrounding the 3rd-5th metatarsal heads   This non-viable tissue was excisionally debrided with a 10 blade to normal appearing deep tissue and the remaining plantar and dorsal flaps were red granular, viable, and displayed healthy bleeding       Complications:   None           Response to Treatment, Major Change in Condition, Major Charge in Treatment anytime during admission    Disposition on Discharge  Home, Rehab, SNF, LTC, Shelter, etc : Home with [facility] ([facility])  If a patient expires during an admission, in addition to the above information, please include:    Summary/timeline of the patient's decline in condition:    Medications and treatment:    Patient response to treatment:    Date and time patient ceased to breathe:        Is there a Readmission that follows this admission? Yes X No    If yes, reason for denial:          InterQual Review    InterQual Criteria Met:  Yes X No  N/A        Please include the InterQual Review, InterQual year/version used, and the criteria selected: Patient: Emmett Dickerson  Review Number: [id number]  Review Status: In Primary  Criteria Status: Not Met     Condition Specific: Yes        OUTCOMES  Outcome Type: Primary           REVIEW DETAILS     Product: Lyssa Seble Adult  Subset: Infection: Skin      (Symptom or finding within 24h)         (Excludes PO medications unless noted)          Select Day, One:              [ ] Episode Day 1, One:                  [ ] ACUTE, >= One:                      [ ] Cellulitis and, Both:                          [X] Anti-infective     Version: InterQual® 2019 1  InterQual® and InterQual®  © 2019 [employer] and/or one of its [place]  All Rights Reserved  CPT only © 2018 [employer]  All Rights Reserved  PLEASE SUBMIT THE COMPLETED FORM TO THE DEPARTMENT OF HUMAN SERVICES - DIVISION OF CLINICAL  REVIEW VIA FAX -[phone] or VIA E-MAIL TO [email or web address]    Signature: Sonia Murray Date:  11/13/19    Confidentiality Notice: The documents accompanying this telecopy may contain confidential information belonging to the sender  The information is intended only for the use of the individual named above  If you are not the intended recipient, you are hereby notified  That any disclosure, copying, distribution or taking of any telecopy is strictly prohibited

## 2019-11-13 NOTE — CASE MANAGEMENT
The pt had issues affording her diabetes medication  ADONAY submitted a search [place] request for financial assistance for indigent meds  The pt was approved at 100%  The FAESR for $388 16 was signed by management  [facility]  Meds were delivered at bedside  The pt stated that she would f/u w/out patient case management after her DC

## 2019-11-14 ENCOUNTER — TELEPHONE (OUTPATIENT)
Dept: FAMILY MEDICINE CLINIC | Facility: CLINIC | Age: 54
End: 2019-11-14

## 2019-11-14 NOTE — TELEPHONE ENCOUNTER
[facility] nurse Joaquín Robert is looking for a wheelchair script  She states pt is NWB b/l and bedbound  How she left the hospital without this is beyond me

## 2019-11-15 DIAGNOSIS — Z89.431 S/P TRANSMETATARSAL AMPUTATION OF FOOT, RIGHT (HCC): Primary | ICD-10-CM

## 2019-11-16 NOTE — SOCIAL WORK
CM s/w pt at bedside to discuss home PT/OT agency preferences  The pt selected [employer]  A referral was placed and the pt was accepted

## 2019-11-18 ENCOUNTER — TELEPHONE (OUTPATIENT)
Dept: FAMILY MEDICINE CLINIC | Facility: CLINIC | Age: 54
End: 2019-11-18

## 2019-11-18 NOTE — TELEPHONE ENCOUNTER
Called patient to make an appt with Podiatry as per podiatry physician today  Patient has an appt on 11/25/19 w/ podiatry and wanted to wait for that appt did not want to schedule for today

## 2019-11-20 ENCOUNTER — OFFICE VISIT (OUTPATIENT)
Dept: VASCULAR SURGERY | Facility: CLINIC | Age: 54
End: 2019-11-20
Payer: COMMERCIAL

## 2019-11-20 VITALS
BODY MASS INDEX: 36.64 KG/M2 | HEIGHT: 60 IN | SYSTOLIC BLOOD PRESSURE: 164 MMHG | DIASTOLIC BLOOD PRESSURE: 98 MMHG | RESPIRATION RATE: 16 BRPM | TEMPERATURE: 98.9 F | HEART RATE: 94 BPM

## 2019-11-20 DIAGNOSIS — I77.9 PAOD (PERIPHERAL ARTERIAL OCCLUSIVE DISEASE) (HCC): Primary | ICD-10-CM

## 2019-11-20 DIAGNOSIS — I70.229 CRITICAL LOWER LIMB ISCHEMIA (HCC): ICD-10-CM

## 2019-11-20 DIAGNOSIS — I73.9 PAD (PERIPHERAL ARTERY DISEASE) (HCC): ICD-10-CM

## 2019-11-20 DIAGNOSIS — E11.610 CHARCOT FOOT DUE TO DIABETES MELLITUS (HCC): ICD-10-CM

## 2019-11-20 PROBLEM — Z89.439 HISTORY OF TRANSMETATARSAL AMPUTATION OF FOOT (HCC): Status: ACTIVE | Noted: 2019-11-20

## 2019-11-20 PROCEDURE — 99214 OFFICE O/P EST MOD 30 MIN: CPT | Performed by: SURGERY

## 2019-11-20 NOTE — PATIENT INSTRUCTIONS
Peripheral Artery Disease   AMBULATORY CARE:   Peripheral artery disease (PAD)  is narrow, weak, or blocked arteries  It may affect any arteries outside of your heart and brain  PAD is usually the result of a buildup of fat and cholesterol, also called plaque, along your artery walls  Inflammation, a blood clot, or abnormal cell growth could also block your arteries  PAD prevents normal blood flow to your legs and arms  You are at risk of an amputation if poor blood flow keeps wounds from healing or causes gangrene (tissue death)  Without treatment, PAD can also cause a heart attack or stroke  Common symptoms include:  Mild PAD usually does not cause symptoms  As the disease worsens over time, you may have the following:  · Pain or cramps in your leg or hip while you walk     · A numb, weak, or heavy feeling in your legs     · Dry, scaly, red, or pale skin on your legs     · Thick or brittle nails, or hair loss on your arms and legs     · Foot sores that will not heal     · Burning or aching in your feet and toes while resting (this may be worse when you lie down)  Call 911 for the following:   · You have any of the following signs of a heart attack:      ¨ Squeezing, pressure, or pain in your chest that lasts longer than 5 minutes or returns    ¨ Discomfort or pain in your back, neck, jaw, stomach, or arm     ¨ Trouble breathing    ¨ Nausea or vomiting    ¨ Lightheadedness or a sudden cold sweat, especially with chest pain or trouble breathing    · You have any of the following signs of a stroke:      ¨ Numbness or drooping on one side of your face     ¨ Weakness in an arm or leg    ¨ Confusion or difficulty speaking    ¨ Dizziness, a severe headache, or vision loss  Seek care immediately if:   · You have sores or wounds that will not heal      · You notice black or discolored skin on your arm or leg  · Your skin is cool to the touch    Contact your healthcare provider if:   · You have leg pain when you walk 1/8 mile (200 meters) or less, even with treatment  · Your legs are red, dry, or pale, even with treatment  · You have questions or concerns about your condition or care  Treatment for PAD  can help reduce your risk of a heart attack, stroke, or amputation  You may need more than one of the following:  · Medicines  may be given to prevent blood clots and reduce the risk of a heart attack or stroke  You may be given medicine to help prevent your PAD from getting worse  · A supervised exercise program  helps you stay active in normal daily activities and may prevent disability  Healthcare providers will help you safely walk or do strength training exercises 3 times a week for 30 to 60 minutes  You will do this for several months, then transition to walking on your own  · Angioplasty  is a procedure to open your artery so blood can flow through normally  A thin tube called a catheter is used to insert a small balloon into your artery  The balloon is inflated to open your blocked artery, and then removed  A tube called a stent may be placed in your artery to hold it open  · Bypass surgery  is used to make a new connection to your artery with a vein from another part of your body, or an artificial graft  The vein or graft is attached to your artery above and below your blockage  This allows blood to flow around the blocked portion of your artery  Manage and prevent PAD:   · Walk for 30 to 60 minutes at least 4 times a week  Your healthcare provider may also refer you to an supervised exercise program  The program helps increase how far you can walk without pain  It also helps you stay active in normal daily activities and may prevent disability caused by PAD  · Do not smoke  Nicotine and other chemicals in cigarettes and cigars can worsen PAD  They can also increase your risk for a heart attack or stroke  Ask your healthcare provider for information if you currently smoke and need help to quit  E-cigarettes or smokeless tobacco still contain nicotine  Talk to your healthcare provider before you use these products  · Manage other health conditions  Take your medicines as directed and follow your healthcare provider's instructions if you have high blood pressure or high cholesterol  Perform foot care and check your blood sugar levels as directed if you have diabetes  · Eat heart healthy foods  Eat whole grains, fruits, and vegetables every day  Limit salt and high-fat foods  Ask your healthcare provider for more information on a heart healthy diet  Ask if you need to lose weight  Your healthcare provider can help you create a healthy weight-loss plan  Follow up with your healthcare provider as directed:  Write down your questions so you remember to ask them during your visits  © 2017 [facility] Information is for End User's use only and may not be sold, redistributed or otherwise used for commercial purposes  All illustrations and images included in CareNotes® are the copyrighted property of A D A M , Inc  or Pac[employer]  The above information is an  only  It is not intended as medical advice for individual conditions or treatments  Talk to your doctor, nurse or pharmacist before following any medical regimen to see if it is safe and effective for you

## 2019-11-20 NOTE — PROGRESS NOTES
Assessment/Plan:    History of transmetatarsal amputation of foot ([facility] )  Status post right transmetatarsal amputation on 11/07 by Dr Leatha Santiago  TMA site appears to be healing well with sutures/staples in place  She is scheduled to see Podiatry early next week  Diagnoses and all orders for this visit:    PAOD (peripheral arterial occlusive disease) ([facility] )  -     VAS lower limb arterial duplex, complete bilateral; Future    Charcot foot due to diabetes mellitus ([facility] )    Critical lower limb ischemia    PAD (peripheral artery disease) ([facility])          Subjective:      Patient ID: Sonja Hand is a 47 y o  female  Pt is here S/P RLE Arteriogram w/Angioplasty and Stenting on 11/1/19  Pt denies any pain, numbness or tingling to her RLE  RLE appears to have no edema  Incision site is clean and dry  Pt is currently taking ASA, Plavix and Lipitor  Ms Delmy Way is a pleasant 59-year-old woman with multiple comorbidities who recently underwent right lower extremity arteriogram with SFA into Katie peroneal trunk intervention for acute on chronic limb ischemia with tissue loss  She subsequently underwent left transmetatarsal amputation on 11/07 by Dr Leatha Santiago  She returns to the office for routine postprocedure visit      The following portions of the patient's history were reviewed and updated as appropriate: allergies, current medications, past family history, past medical history, past social history, past surgical history and problem list     Review of Systems   Constitutional: Negative  HENT: Negative  Eyes: Negative  Respiratory: Negative  Cardiovascular: Negative  Gastrointestinal: Negative  Endocrine: Negative  Genitourinary: Negative  Musculoskeletal: Negative  Skin: Negative  Allergic/Immunologic: Negative  Neurological: Negative  Hematological: Bruises/bleeds easily  Psychiatric/Behavioral: Negative          I have personally reviewed the ROS entered by MA and agree as documented  Objective:      /98 (BP Location: Right arm, Patient Position: Sitting, Cuff Size: Adult)   Pulse 94   Temp 98 9 °F (37 2 °C) (Tympanic)   Resp 16   Ht 5' (1 524 m)   LMP  (LMP Unknown)   BMI 36 64 kg/m²          Physical Exam   Constitutional: She is oriented to person, place, and time  She appears well-developed and well-nourished  No distress  HENT:   Head: Normocephalic and atraumatic  Eyes: Conjunctivae and EOM are normal  No scleral icterus  Neck: Normal range of motion  Neck supple  No tracheal deviation present  Cardiovascular: Normal rate, regular rhythm and normal heart sounds  R DP doppler signal   Pulmonary/Chest: Effort normal and breath sounds normal    Abdominal: Soft  She exhibits no distension and no mass (no appreciable aortic pulsation/aneurysm)  There is no tenderness  There is no rebound and no guarding  Musculoskeletal: She exhibits edema  Neurological: She is alert and oriented to person, place, and time  Skin: Skin is warm and dry  Right transmetatarsal amputation site appears to be healing adequately with sutures and staples in place  Psychiatric: She has a normal mood and affect  Her behavior is normal  Judgment and thought content normal          Post intervention [place]/waveforms-Impression  RIGHT LOWER LIMB  Ankle/Brachial Index: 0 81, which is in the moderate claudication range (Prior  0 59)  PPG/PVR Tracings are slightly dampened  Metatarsal Pressure 41 mm Hg  Great Toe Pressure: 35 mm Hg, below the healing range for a diabetic (Prior 30  mm Hg)

## 2019-11-20 NOTE — ASSESSMENT & PLAN NOTE
Patient is status post recent right SFA  recanalization using balloon angioplasty/self expanding stents x2 as well as tibioperoneal trunk balloon angioplasty on 11/01/2019 by Dr Jaun Bruce DP doppler signal    Recommend surveillance arterial duplex in 3 mon to assess stent patency

## 2019-11-20 NOTE — ASSESSMENT & PLAN NOTE
Status post right transmetatarsal amputation on 11/07 by Dr Tushar Espinoza  TMA site appears to be healing well with sutures/staples in place  She is scheduled to see Podiatry early next week

## 2019-11-21 ENCOUNTER — PATIENT OUTREACH (OUTPATIENT)
Dept: FAMILY MEDICINE CLINIC | Facility: CLINIC | Age: 54
End: 2019-11-21

## 2019-11-25 ENCOUNTER — OFFICE VISIT (OUTPATIENT)
Dept: FAMILY MEDICINE CLINIC | Facility: CLINIC | Age: 54
End: 2019-11-25

## 2019-11-25 VITALS
TEMPERATURE: 96.7 F | SYSTOLIC BLOOD PRESSURE: 158 MMHG | RESPIRATION RATE: 20 BRPM | DIASTOLIC BLOOD PRESSURE: 94 MMHG | OXYGEN SATURATION: 99 % | HEART RATE: 68 BPM

## 2019-11-25 DIAGNOSIS — Z09 POSTOP CHECK: ICD-10-CM

## 2019-11-25 DIAGNOSIS — Z79.4 TYPE 2 DIABETES MELLITUS WITH OTHER CIRCULATORY COMPLICATION, WITH LONG-TERM CURRENT USE OF INSULIN (HCC): Primary | ICD-10-CM

## 2019-11-25 DIAGNOSIS — E11.59 TYPE 2 DIABETES MELLITUS WITH OTHER CIRCULATORY COMPLICATION, WITH LONG-TERM CURRENT USE OF INSULIN (HCC): Primary | ICD-10-CM

## 2019-11-25 PROCEDURE — 99203 OFFICE O/P NEW LOW 30 MIN: CPT | Performed by: PODIATRIST

## 2019-11-25 NOTE — PROGRESS NOTES
Podiatry Clinic Visit  Francois Lyon 47 y o  female MRN: [id number]  Encounter: [id number]    Assessment/Plan        Diagnoses and all orders for this visit:    Type 2 diabetes mellitus with other circulatory complication, with long-term current use of insulin ([facility] )    Postop check           Plan:   Patient was seen and examined with all their questions and concerns addressed   Patient is s/p right TMA (DOS: 11/06/19) surgical site looks stable with skin edges well coapted, staples and sutures intact, and no evidence of acute infection or skin necrosis   Staples removed and sutures were left intact without incident   Will remove sutures in 1 week   Patient was instructed to remain NWB to RLE   Patient was re-appointed for 1 week     - Dr Vy Monsalve was available/present for entirety of patient encounter and present for all procedures  History of Present Illness     HPI: Dusty Ruiz is a 47 y o  female who presents for her first visit to our clinic and her first post op visit following her right foot TMA 2 and 1/2 weeks ago  She reports no pain or drainage postoperatively  She denies fevers, or chills  She has been compliant with NWB status post op and ambulates today with the assistance of a wheel chair  She has no further podiatric complaints at this time  Review of Systems   Constitutional: Negative  HENT: Negative  Eyes: Negative  Respiratory: Negative  Cardiovascular: Negative  Gastrointestinal: Negative  Musculoskeletal: negative  Skin: Post operative surgical incision  Neurological: Negative          Historical Information   Past Medical History:   Diagnosis Date    Cellulitis of toe 1/28/2019    CKD (chronic kidney disease)     Diabetes mellitus ([facility] )     Hypertension      Past Surgical History:   Procedure Laterality Date    IR ABDOMINAL ANGIOGRAPHY  2/1/2019    IR ARTERIAL LYSIS  4/30/2019    IR LOWER EXTREMITY / INTERVENTION 2019    NV AMPUTATION FOOT,TRANSMETATARSAL Right 2019    Procedure: AMPUTATION TRANSMETATARSAL (TMA) WITH ACHILLES TENDON LENGTHING;  Surgeon: Ila Flannery DPM;  Location: [facility];  Service: Podiatry    TOE AMPUTATION Left 2019    Procedure: AMPUTATION 5th TOE;  Surgeon: Ila Flannery DPM;  [facility];  Service: Podiatry    TOE AMPUTATION Right 2019    Procedure: AMPUTATION RIGHT 3RD AND 4TH TOE;  Surgeon: Ila Flannery DPM;  [facility];  Service: Podiatry     Social History   Social History     Substance and Sexual Activity   Alcohol Use Yes    Frequency: Monthly or less    Comment: occ     Social History     Substance and Sexual Activity   Drug Use No     Social History     Tobacco Use   Smoking Status Former Smoker    Types: Cigarettes    Last attempt to quit:     Years since quittin 9   Smokeless Tobacco Former User     Family History:   Family History   Problem Relation Age of Onset    Diabetes Mother     No Known Problems Father        Meds/Allergies     (Not in a hospital admission)  No Known Allergies    Objective     Current Vitals:   Blood Pressure: 158/94 (19)  Pulse: 68 (19)  Temperature: (!) 96 7 °F (35 9 °C) (19)  Temp Source: Skin (19)  Respirations: 20 (19)  SpO2: 99 % (19)        /94   Pulse 68   Temp (!) 96 7 °F (35 9 °C) (Skin)   Resp 20   LMP  (LMP Unknown)   SpO2 99%       Lower Extremity Exam:    Foot Exam    Musculoskeletal:  MMT is 4/5 to all compartments of the LE, +0/4 edema B/L, Hallux limitus is not present  Equinus is not present  Vascular:   DP pulses and PT pulses are monophasic by Doppler bilaterally  , CFT< 3sec to all digits  Pedal hair is Present  regular rate and rhythm  Dermatological:  No open Lesions  Skin of the LE is normal texture, temperature, turgor  Interdigital maceration is not present   Surgical site of R foot looks stable with skin edges well coapted and all sutures intact  No acute signs of infection or skin necrosis noted  Neurologic:  Gross sensation is intact  Protective sensation is Intact  Vibratory sensation is Absent  Sharp sensation is absent

## 2019-11-27 ENCOUNTER — OFFICE VISIT (OUTPATIENT)
Dept: FAMILY MEDICINE CLINIC | Facility: CLINIC | Age: 54
End: 2019-11-27

## 2019-11-27 VITALS
DIASTOLIC BLOOD PRESSURE: 80 MMHG | RESPIRATION RATE: 18 BRPM | OXYGEN SATURATION: 98 % | SYSTOLIC BLOOD PRESSURE: 130 MMHG | TEMPERATURE: 97 F | HEART RATE: 80 BPM

## 2019-11-27 DIAGNOSIS — Z89.439 HISTORY OF TRANSMETATARSAL AMPUTATION OF FOOT (HCC): Primary | ICD-10-CM

## 2019-11-27 DIAGNOSIS — Z79.4 TYPE 2 DIABETES MELLITUS WITHOUT COMPLICATION, WITH LONG-TERM CURRENT USE OF INSULIN (HCC): ICD-10-CM

## 2019-11-27 DIAGNOSIS — I10 ESSENTIAL HYPERTENSION: ICD-10-CM

## 2019-11-27 DIAGNOSIS — Z79.4 TYPE 2 DIABETES MELLITUS WITH OTHER CIRCULATORY COMPLICATION, WITH LONG-TERM CURRENT USE OF INSULIN (HCC): ICD-10-CM

## 2019-11-27 DIAGNOSIS — E11.59 TYPE 2 DIABETES MELLITUS WITH OTHER CIRCULATORY COMPLICATION, WITH LONG-TERM CURRENT USE OF INSULIN (HCC): ICD-10-CM

## 2019-11-27 DIAGNOSIS — E11.9 TYPE 2 DIABETES MELLITUS WITHOUT COMPLICATION, WITH LONG-TERM CURRENT USE OF INSULIN (HCC): ICD-10-CM

## 2019-11-27 PROCEDURE — 99214 OFFICE O/P EST MOD 30 MIN: CPT | Performed by: FAMILY MEDICINE

## 2019-11-27 NOTE — PROGRESS NOTES
Assessment/Plan:     History of transmetatarsal amputation of foot ([facility] )  Continue follow up with Podiatry     Type 2 diabetes mellitus with circulatory disorder, with long-term current use of insulin ([place] [address] )    Lab Results   Component Value Date    HGBA1C 13 7 (A) 10/03/2019   Recheck HgA1c in January  Patient currently has Medicaid and has prescription coverage, continue Basaglar 30 units BID and Metformin 1000 mg BID  Keep detailed blood glucose and food journal   Patient currently refusing referral to Endocrinology states she has too many follow ups at this time  Will revisit referral at next visit        Diagnoses and all orders for this visit:    History of transmetatarsal amputation of foot ([place] [address] )    Type 2 diabetes mellitus with other circulatory complication, with long-term current use of insulin ([place] [address] )    Type 2 diabetes mellitus without complication, with long-term current use of insulin ([facility])  -     insulin glargine (BASAGLAR KWIKPEN) 100 units/mL injection pen; Inject 30 Units under the skin every 12 (twelve) hours  -     metFORMIN (GLUCOPHAGE) 1000 MG tablet; Take 1 tablet (1,000 mg total) by mouth 2 (two) times a day with meals    Essential hypertension  -     metoprolol tartrate (LOPRESSOR) 25 mg tablet; Take 1 tablet (25 mg total) by mouth every 12 (twelve) hours     This appointment was scheduled as a Transition of Care however patient was discharged over 14 days ago  Subjective:     Patient ID: Aquiles Xie is a 47 y o  female  47 y o  female patient  With uncontrolled diabetes mellitus, hypertension, hyperlipidemia, PVD with previous revascularization, neuropathy, previous amputation on left king who originally presented to the hospital on 10/30/2019 due to toe pain  Patient noted that her right 4th toe had turned black in color  She was evaluated in the ER with the above findings and admitted for further evaluation and management    Upon admission, IV antibiotics were administered with Ancef and Flagyl  Consultations were requested with Vascular Surgery and Podiatry  Patient underwent angiogram with intervention and then right 3rd and 4th toe amputations  Her wounds were monitored post-operatively but were not healing  Patient underwent subsequent right TMA on 11/7  This wound was again monitored and was healing well  Patient was discharged home with [facility]NA  Patient is here today for follow up, currently without complains  Denies any pain  States had pain last night for which she took an Oxycodone and pain resolved  She has home health services, including [facility] and PT  As per patient fasting blood sugar at home are in the 60 to 80 range, postprandial at night in the low 200  She is using 30 units of Lantus twice a day as instructed at discharge from the hospital        Review of Systems      Objective:     Physical Exam      Vitals:    11/27/19 0932   BP: 130/80   BP Location: Right arm   Patient Position: Sitting   Cuff Size: Standard   Pulse: 80   Resp: 18   Temp: (!) 97 °F (36 1 °C)   TempSrc: Temporal   SpO2: 98%       Transitional Care Management Review:  Gene Castillo is a 47 y o  female here for TCM follow up  During the TCM phone call patient stated:    TCM Call (since 10/27/2019)     Date and time call was made  11/14/2019  3:33 PM    Hospital care reviewed  Records reviewed    Patient was hospitialized at  [address] [place] [address]    Date of Admission  10/30/19    Date of discharge  11/12/19    Diagnosis  Gangrenous toe    Disposition  Home; Home health services    Were the patients medications reviewed and updated  No    Current Symptoms  None      TCM Call (since 10/27/2019)     Post hospital issues  None    Should patient be enrolled in anticoag monitoring? No    Scheduled for follow up?   Yes    I have advised the patient to call PCP with any new or worsening symptoms  Ivan Bettencourt MD

## 2019-11-27 NOTE — ASSESSMENT & PLAN NOTE
Lab Results   Component Value Date    HGBA1C 13 7 (A) 10/03/2019   Recheck HgA1c in January  Patient currently has Medicaid and has prescription coverage, continue Basaglar 30 units BID and Metformin 1000 mg BID  Keep detailed blood glucose and food journal   Patient currently refusing referral to Endocrinology states she has too many follow ups at this time   Will revisit referral at next visit

## 2019-12-02 ENCOUNTER — OFFICE VISIT (OUTPATIENT)
Dept: FAMILY MEDICINE CLINIC | Facility: CLINIC | Age: 54
End: 2019-12-02

## 2019-12-02 VITALS
RESPIRATION RATE: 20 BRPM | TEMPERATURE: 97 F | DIASTOLIC BLOOD PRESSURE: 80 MMHG | HEART RATE: 80 BPM | SYSTOLIC BLOOD PRESSURE: 120 MMHG

## 2019-12-02 DIAGNOSIS — Z98.890 POSTOPERATIVE STATE: Primary | ICD-10-CM

## 2019-12-02 DIAGNOSIS — M79.672 PAIN IN LEFT FOOT: ICD-10-CM

## 2019-12-02 DIAGNOSIS — E11.42 TYPE 2 DIABETES MELLITUS WITH DIABETIC POLYNEUROPATHY, WITH LONG-TERM CURRENT USE OF INSULIN (HCC): ICD-10-CM

## 2019-12-02 DIAGNOSIS — Z79.4 TYPE 2 DIABETES MELLITUS WITH DIABETIC POLYNEUROPATHY, WITH LONG-TERM CURRENT USE OF INSULIN (HCC): ICD-10-CM

## 2019-12-02 PROCEDURE — S0630 REMOVAL OF SUTURES: HCPCS | Performed by: PODIATRIST

## 2019-12-02 PROCEDURE — 99212 OFFICE O/P EST SF 10 MIN: CPT | Performed by: PODIATRIST

## 2019-12-02 NOTE — PROGRESS NOTES
Podiatry Clinic Visit  Shayla Lyon 47 y o  female MRN: [id number]  Encounter: [id number]    Assessment/Plan        Diagnoses and all orders for this visit:    Postoperative state  -     Diabetic Shoe  -     Diabetic Shoe Inserts  -     XR foot 3+ vw right; Future    Type 2 diabetes mellitus with diabetic polyneuropathy, with long-term current use of insulin (HCC)  -     Diabetic Shoe  -     Diabetic Shoe Inserts  -     XR foot 3+ vw right; Future    Pain in left foot  -     XR foot 3+ vw left; Future           Plan:   Patient was seen/examined  All questions and concerns addressed   Incision is well coapted and steri strips placed    Still NWB to RLE for 1 week and WBAT to LLE   Educated on [employer] Prescribed shoes with filler    RTC in 1 week    Dr Yaw Tijerina was present during this entire procedure  History of Present Illness     HPI:  She is presenting one week after having staples removed from her surgical incision and sutures left intact  She denies pain in the foot  She has been NWB to the right foot for the week  She has been non weight bearing to the left foot after fear of an old fracture and being told to be non weight bearing and not having been cleared to bear weight  Review of Systems   Constitutional: Negative  HENT: Negative  Eyes: Negative  Respiratory: Negative  Cardiovascular: Negative  Gastrointestinal: Negative  Musculoskeletal: righ TMA  Skin: right foot incision  Neurological: Negative          Historical Information   Past Medical History:   Diagnosis Date    Cellulitis of toe 1/28/2019    CKD (chronic kidney disease)     Diabetes mellitus ([facility] [address] )     Hypertension      Past Surgical History:   Procedure Laterality Date    IR ABDOMINAL ANGIOGRAPHY  2/1/2019    IR ARTERIAL LYSIS  4/30/2019    IR LOWER EXTREMITY / INTERVENTION  11/1/2019    CA AMPUTATION FOOT,TRANSMETATARSAL Right 11/7/2019    Procedure: AMPUTATION TRANSMETATARSAL (TMA) WITH ACHILLES TENDON LENGTHING;  Surgeon: Kelly Cantu DPM;  [facility];  Service: Podiatry    TOE AMPUTATION Left 2019    Procedure: AMPUTATION 5th TOE;  Surgeon: Kelly Cantu DPM;  [facility];  Service: Podiatry    TOE AMPUTATION Right 2019    Procedure: AMPUTATION RIGHT 3RD AND 4TH TOE;  Surgeon: Kelly Cantu DPM;  [facility];  Service: Podiatry     Social History   Social History     Substance and Sexual Activity   Alcohol Use Yes    Frequency: Monthly or less    Comment: occ     Social History     Substance and Sexual Activity   Drug Use No     Social History     Tobacco Use   Smoking Status Former Smoker    Types: Cigarettes    Last attempt to quit:     Years since quittin 9   Smokeless Tobacco Former User     Family History:   Family History   Problem Relation Age of Onset    Diabetes Mother     No Known Problems Father        Meds/Allergies     (Not in a hospital admission)  No Known Allergies    Objective     Current Vitals:   Blood Pressure: 120/80 (19 1017)  Pulse: 80 (19 1017)  Temperature: (!) 97 °F (36 1 °C) (19 1017)  Temp Source: Skin (19 1017)  Respirations: 20 (19 1017)        /80   Pulse 80   Temp (!) 97 °F (36 1 °C) (Skin)   Resp 20   LMP  (LMP Unknown)       Lower Extremity Exam:    Foot Exam    Musculoskeletal:  MMT is 5/5 to all compartments of the LE, +0/4 edema B/L, Hallux limitus is not present  Equinus is not present  Vascular:   R DP is +2/4, R PT is +2/4, L DP is +2/4, L PT is +2/4, CFT< 3sec to all digits  Pedal hair is Absent  regular rate and rhythm  Skin:  No open Lesions  Skin of the LE is normal texture, temperature, turgor  Interdigital maceration is not present  Right foot incision is well coapted with sutures removed no dehiscence noted  Cap refill to plantar flap is intact  No purulence, fluctuance or crepitus is noted  Neurologic:  Gross sensation is intact   Protective sensation is Intact  Vibratory sensation is N/A  Sharp sensation is present

## 2019-12-02 NOTE — PATIENT INSTRUCTIONS
Tendinitis   WHAT YOU NEED TO KNOW:   Tendinitis is painful inflammation or breakdown of your tendons  It may also be called tendinopathy  Tendinitis often occurs in the knee, shoulder, ankle, hip, or elbow  DISCHARGE INSTRUCTIONS:   Medicines:   · Pain medicines  such as acetaminophen or NSAIDs may decrease swelling and pain or fever  These medicines are available without a doctor's order  Ask which medicine to take  Ask how much to take and when to take it  Follow directions  Acetaminophen and NSAIDs can cause liver or kidney damage if not taken correctly  If you take blood thinner medicine, always ask your healthcare provider if NSAIDs are safe for you  Always read the medicine label and follow the directions on it before using these medicine  · Take your medicine as directed  Contact your healthcare provider if you think your medicine is not helping or if you have side effects  Tell him if you are allergic to any medicine  Keep a list of the medicines, vitamins, and herbs you take  Include the amounts, and when and why you take them  Bring the list or the pill bottles to follow-up visits  Carry your medicine list with you in case of an emergency  Management:   · Rest  your tendon as directed to help it heal  Ask your healthcare provider if you need to stop putting weight on your affected area  · Ice  helps decrease swelling and pain  Ice may also help prevent tissue damage  Use an ice pack, or put crushed ice in a plastic bag  Cover it with a towel and place it on the affected area for 10 to 15 minutes every hour or as directed  · Support devices  such as a cane, splint, shoe insert, or brace may help reduce your pain  · Physical therapy  may be ordered by your healthcare provider  This may be used to teach you exercises to help improve movement and strength, and to decrease pain  You may also learn how to improve your posture, and how to lift or exercise correctly    Prevention:   · Stretch and warm up  before you exercise  · Exercise regularly  to strengthen the muscles around your joint  Ease into an exercise routine for the first 3 weeks to prevent another injury  Ask your healthcare provider about the best exercise plan for you  Rest fully between activities  · Use the right equipment  for sports and exercise  Wear braces or tape around weak joints as directed  Follow up with your healthcare provider as directed:  Write down your questions so you remember to ask them during your visits  Contact your healthcare provider if:   · You have increased pain even after you take medicine  · You have questions or concerns about your condition or care  Return to the emergency department if:   · You have increased redness over the joint, or swelling in the joint  · You suddenly cannot move your joint  © 2017 [employer]  Information is for End User's use only and may not be sold, redistributed or otherwise used for commercial purposes  All illustrations and images included in CareNotes® are the copyrighted property of [employer]  or [employer]  The above information is an  only  It is not intended as medical advice for individual conditions or treatments  Talk to your doctor, nurse or pharmacist before following any medical regimen to see if it is safe and effective for you

## 2019-12-31 ENCOUNTER — OFFICE VISIT (OUTPATIENT)
Dept: NEPHROLOGY | Facility: CLINIC | Age: 54
End: 2019-12-31
Payer: COMMERCIAL

## 2019-12-31 VITALS — SYSTOLIC BLOOD PRESSURE: 120 MMHG | DIASTOLIC BLOOD PRESSURE: 60 MMHG | RESPIRATION RATE: 16 BRPM | HEART RATE: 68 BPM

## 2019-12-31 DIAGNOSIS — E11.59 TYPE 2 DIABETES MELLITUS WITH OTHER CIRCULATORY COMPLICATION, WITH LONG-TERM CURRENT USE OF INSULIN (HCC): ICD-10-CM

## 2019-12-31 DIAGNOSIS — Z79.4 TYPE 2 DIABETES MELLITUS WITH OTHER CIRCULATORY COMPLICATION, WITH LONG-TERM CURRENT USE OF INSULIN (HCC): ICD-10-CM

## 2019-12-31 DIAGNOSIS — I10 ESSENTIAL HYPERTENSION: ICD-10-CM

## 2019-12-31 DIAGNOSIS — E78.5 HYPERLIPIDEMIA, UNSPECIFIED HYPERLIPIDEMIA TYPE: ICD-10-CM

## 2019-12-31 DIAGNOSIS — N18.2 CKD STAGE 2 DUE TO TYPE 2 DIABETES MELLITUS (HCC): Primary | ICD-10-CM

## 2019-12-31 DIAGNOSIS — D50.9 MICROCYTIC ANEMIA: ICD-10-CM

## 2019-12-31 DIAGNOSIS — E11.22 CKD STAGE 2 DUE TO TYPE 2 DIABETES MELLITUS (HCC): Primary | ICD-10-CM

## 2019-12-31 PROCEDURE — 99214 OFFICE O/P EST MOD 30 MIN: CPT | Performed by: INTERNAL MEDICINE

## 2019-12-31 RX ORDER — FERROUS SULFATE TAB EC 324 MG (65 MG FE EQUIVALENT) 324 (65 FE) MG
324 TABLET DELAYED RESPONSE ORAL
Qty: 180 TABLET | Refills: 3 | Status: SHIPPED | OUTPATIENT
Start: 2019-12-31 | End: 2022-01-22 | Stop reason: HOSPADM

## 2019-12-31 NOTE — PATIENT INSTRUCTIONS
- start iron pills twice a day    - Please call me in 10 days after having your blood work done to review the results if you do not hear back from me or my office, as I may have not received the results  - please remember to perform blood work prior to the next visit  - Please call if the blood pressure top number is greater than 150 or less than 110 consistently  - Please call if you are gaining more than 2lbs in 2 days for adjustment of water pills   ~ Please AVOID the following pain medications  LIST OF NSAIDS (NONSTEROIDAL ANTI-INFLAMMATORY DRUGS) AND BOBO-2 INHIBITORS    DIFLUNISAL (DOLOBID)  IBUPROFEN (MOTRIN, ADVIL)  FLURBIPROFEN (ANSAID)  KETOPROFEN (ORUDIS, ORUVAIL)  FENOPROFEN (NALFON)  NABUMETONE (RELAFEN)  PIROXICAM (FELDENE)  NAPROXEN (ALEVE, NAPROSYN, NAPRELAN, ANAPROX)  DICLOFENAC (VOLTAREN, CATAFLAM)  INDOMETHACIN (INDOCIN)  SULINDAC (CLINORIL)  TOLMETIN (TOLETIN)  ETODOLAC (LODINE)  MELOXICAM (MOBIC)  KETOROLAC (TORADOL)  OXAPROZIN (DAYPRO)  CELECOXIB (CELEBREX)    Things to do to reduce your blood pressure include working with all your physician to do the following:  ~ stop smoking if you smoke  ~ increase cardiovascular exercise like walking and swimming    ~ modify your diet to decrease fat and salt intake  ~ reduce your weight if you are overweight or obese   ~ increase the consumption of fruits, vegetables and whole grains  ~ decrease alcohol consumption if you consume alcohol    ~ try to minimize stress in your life with lifestyle modifications  ~ be compliant with your anti-hypertensive medications  ~ adjust your medications to help improve your vascular stiffness and decrease risks for heart attacks and strokes

## 2019-12-31 NOTE — PROGRESS NOTES
Nephrology Follow up Consultation  Cristian Lyon 47 y o  female MRN: [id number]            BACKGROUND:  Aicha Thurman is a 47 y  o female who was referred by Issac Lynne MD for evaluation of Follow-up and Chronic Kidney Disease    ASSESSMENT / PLAN:   47 y o   female with pmh of multiple co-morbidities including hypertension, diabetes, PAD and CKD stage 2 status post recent hospitalization at which time she was noted to have acute kidney injury presented to the office for routine follow-up  1  CKD stage II:  - Patient has a baseline creatinine of 0 9-1 1mg/dL  Most recent labs show a Creatinine of 0 82 mg/dL  Renal function remains stable  - s/p episode of acute kidney injury on 04/30/2019 likely due to prerenal azotemia  Peak creatinine at that time was 1 58 mg/dL  - patient likely has underlying CKD secondary to diabetic glomerular nodular sclerosis plus hypertensive nephrosclerosis plus renal vascular disease plus age-related nephron loss  - SPEP from June 20, 2019 within normal limits  - renal ultrasound from July 9, 2019 shows right kidney 10 8 cm left kidney 10 5 cm  No hydronephrosis no masses  - Proteinuria - most recent protein creatinine ratio of 59 mg as of June 2019  Will check UA, spot urine protein and creatinine    - Acid base and lytes stable  - Clinically the patient appears to be euvolemic  - Recommend to avoid use of NSAIDs, nephrotoxins  Caution advised with regards to exposure to IV contrast dye    - Discussed with the patient in depth her renal status, including the possible etiologies for CKD  - Advised the patient that when her GFR is close to 20mL/min then will start discussing about RRT(renal replacement therapy) options such as renal transplant, peritoneal dialysis and hemodialysis  - advised patient that she is at increased risk for CKD progression due to her episode of acute kidney injury    - Informed the patient about the various options for Renal Replacement therapy  - Discussed with the patient how we need to work together to delay the progression of CKD with optimal BP control based on their age and co-morbidities, optimal BS control with HbA1c of <7% and trying to reduce proteinuria by the use of anti-proteinuric agents  2  Hypertension:  - Patient is on lisinopril 5 mg p o  Q day, Lopressor 25 mg p o  B i d  - Goal BP of < 140/90 based on age and comorbidities  - Instructed to follow low sodium (2gm)diet   - Advised to hold ACEI/ARBs if patient suffers from dehydration due to gastrointestinal losses due to risk of DEMETRIUS secondary to failure to autoregulate  3  Hemoglobin:  - Goal Hb of 10-12 g/dL  - Most recent labs suggestive of 9 6 grams/deciliter  - no role for IV iron at this time  - start p o  Ferrous sulfate b i d  recheck CBC prior to next visit    4  CKD-MBD(Mineral Bone Disease): - Based on patients CKD stage following is the goal of therapy  - Maintain calcium phosphorus product of < 55   - Continue patient on no vitamin-D  - prior to next visit check vitamin-D levels, supplementation may help with proteinuria    5  Lipids:  - goal LDL less than 70  - management as per PCP  - on lipitor    6  DM:  - management as per Primary team  - most recent A1c of 13 7% as of October 3, 2019  - advised patient for tighter glycemic control   - on lnsulin and metformin  - readvised patient that if her renal parameters continued to deteriorate then she will need to be taken off the metformin  7  PAD:  - Management as per primary team  - follow-up with vascular  - status post right SFA occlusion with right lower extremity arteriogram on 04/30/2019  - status post right TMA on 11/07/2019     8  Nutrition:  - Encouraged patient to follow a renal diet comprising of moderate potassium, low phosphorus and protein restriction to 0 8gm/kg  Also advised patient to restrict fluid to 1 8 L per day    - Will check serum albumin with next blood work  9  Followup:  - Patient is to follow-up in 6 months, with lab work to be performed a few days prior to the next visit  Advised patient to call me after she has blood work done  Jimbo Dickson MD, [facility], 12/31/2019, 1:57 PM             SUBJECTIVE: 47 y o  female presents to the office for routine follow-up  In October she was hospitalized status post lower extremity angiogram as well as amputation of right 3rd and 4th toe amputations, subsequently underwent right TMA on 11/07/2019  Is following up with Podiatry and vascular surgery  Not checking BP at home  Home BS of 100-200  No NSAID use  Happy with all the care information that she has gotten today  Presents with her daughter, currently on a wheelchair  Requesting today's note to be faxed over to the agency dealing with her disability  Review of Systems   Constitutional: Negative for appetite change, chills, fatigue and fever  HENT: Negative for congestion, postnasal drip, rhinorrhea and sore throat  Respiratory: Negative for cough, shortness of breath and wheezing  Cardiovascular: Negative for chest pain, palpitations and leg swelling  Gastrointestinal: Negative for abdominal pain, constipation, diarrhea, nausea and vomiting  Genitourinary: Negative for difficulty urinating, dysuria and hematuria  Musculoskeletal: Negative for back pain  Skin: Positive for wound  Neurological: Negative for dizziness, light-headedness and headaches  Psychiatric/Behavioral: Negative for confusion  All other systems reviewed and are negative        PAST MEDICAL HISTORY:  Past Medical History:   Diagnosis Date    Cellulitis of toe 1/28/2019    CKD (chronic kidney disease)     [facility] [address] [facility]     Hypertension        PROBLEM LIST    Patient Active Problem List   Diagnosis    Type 2 diabetes mellitus with circulatory disorder, with long-term current use of insulin ([facility] [address] )    Essential hypertension    HLD (hyperlipidemia)    [facility]AD [facility]per[facility]    CKD stage 2 due to type 2 diabetes mellitus (HCC)    Microcytic anemia    Leukocytosis    Toe amputation status, left    Critical lower limb ischemia    Polyneuropathy associated with underlying disease ([facility] )    Charcot foot due to diabetes mellitus ([facility] )    Gangrene of toe of right foot ([facility] )    History of transmetatarsal amputation of foot ([facility] )       PAST SURGICAL HISTORY:  Past Surgical History:   Procedure Laterality Date    IR ABDOMINAL ANGIOGRAPHY  2/1/2019    IR ARTERIAL LYSIS  4/30/2019    IR LOWER EXTREMITY / INTERVENTION  11/1/2019    [place] AMPUTATION FOOT,TRANSMETATARSAL Right 11/7/2019    Procedure: AMPUTATION TRANSMETATARSAL (TMA) WITH ACHILLES TENDON LENGTHING;  Surgeon: Lynda Victor DPM;  [facility];  Service: Podiatry    TOE AMPUTATION Left 2/4/2019    Procedure: AMPUTATION 5th TOE;  Surgeon: Lynda Victor DPM;  [facility];  Service: Podiatry    TOE AMPUTATION Right 11/4/2019    Procedure: AMPUTATION RIGHT 3RD AND 4TH TOE;  Surgeon: Lynda Victor DPM;  [facility];  Service: Podiatry       SOCIAL HISTORY :   reports that she quit smoking about 7 years ago  Her smoking use included cigarettes  She has quit using smokeless tobacco  She reports that she drinks alcohol  She reports that she does not use drugs  FAMILY HISTORY:  Family History   Problem Relation Age of Onset    Diabetes Mother     No Known Problems Father        ALLERGIES:  No Known Allergies        PHYSICAL EXAM:  Vitals:    12/31/19 1336   BP: 120/60   BP Location: Right arm   Patient Position: Sitting   Cuff Size: Standard   Pulse: 68   Resp: 16     There is no height or weight on file to calculate BMI  Physical Exam   Constitutional: She is oriented to person, place, and time  She appears well-developed and well-nourished  No distress  HENT:   Head: Normocephalic and atraumatic     Mouth/Throat: Oropharynx is clear and moist  No oropharyngeal exudate  Eyes: Conjunctivae are normal  No scleral icterus  Neck: Neck supple  No JVD present  No tracheal deviation present  No thyromegaly present  Cardiovascular: Normal heart sounds  Exam reveals no friction rub  Pulmonary/Chest: She has no wheezes  She has no rales  Abdominal: Soft  She exhibits no distension and no mass  There is no tenderness  Musculoskeletal: She exhibits no edema  rtight foot TMA wound dressing in place  Neurological: She is alert and oriented to person, place, and time  Skin: Skin is warm  No rash noted  She is not diaphoretic  Psychiatric: She has a normal mood and affect  Her behavior is normal    Vitals reviewed  LABORATORY DATA:     Results from last 6 Months   Lab Units 11/11/19  0512 11/10/19  0443 11/09/19  0634  11/04/19  0524   WBC Thousand/uL 10 14 10 94* 11 82*   < > 11 90*   HEMOGLOBIN g/dL 9 6* 10 4* 9 9*   < > 10 7*   HEMATOCRIT % 33 0* 36 7 34 9   < > 36 3   PLATELETS Thousands/uL 462* 504* 484*   < > 338   POTASSIUM mmol/L 3 7 4 4 4 9   < > 3 3*   CHLORIDE mmol/L 104 103 103   < > 104   CO2 mmol/L 29 29 27   < > 27   BUN mg/dL 7 5 7   < > 6   CREATININE mg/dL 0 82 0 85 0 81   < > 0 94   CALCIUM mg/dL 8 7 8 6 8 2*   < > 7 6*   MAGNESIUM mg/dL  --   --   --   --  1 6    < > = values in this interval not displayed          rest all reviewed    RADIOLOGY:  No orders to display     Rest all reviewed        MEDICATIONS:    Current Outpatient Medications:     acetaminophen (TYLENOL) 325 mg tablet, Take 2 tablets (650 mg total) by mouth every 6 (six) hours as needed for mild pain, headaches or fever, Disp: , Rfl:     aspirin 81 mg chewable tablet, Chew 1 tablet (81 mg total) daily, Disp: , Rfl:     atorvastatin (LIPITOR) 20 mg tablet, Take 1 tablet (20 mg total) by mouth daily, Disp: 90 tablet, Rfl: 1    clopidogrel (PLAVIX) 75 mg tablet, Take 1 tablet (75 mg total) by mouth daily, Disp: 90 tablet, Rfl: 1    glucose blood (ONE TOUCH ULTRA TEST) test strip, Testing Three times a day, Disp: 100 each, Rfl: 5    insulin glargine (BASAGLAR KWIKPEN) 100 units/mL injection pen, Inject 30 Units under the skin every 12 (twelve) hours, Disp: 6 pen, Rfl: 2    Insulin Pen Needle 32G X 4 MM MISC, Use once daily, Disp: 100 each, Rfl: 0    lisinopril (ZESTRIL) 5 mg tablet, Take 1 tablet (5 mg total) by mouth daily, Disp: 30 tablet, Rfl: 3    metFORMIN (GLUCOPHAGE) 1000 MG tablet, Take 1 tablet (1,000 mg total) by mouth 2 (two) times a day with meals, Disp: 180 tablet, Rfl: 1    metoprolol tartrate (LOPRESSOR) 25 mg tablet, Take 1 tablet (25 mg total) by mouth every 12 (twelve) hours, Disp: 180 tablet, Rfl: 1    ONETOUCH DELICA LANCETS FINE MISC, Testing Three times a day, Disp: 100 each, Rfl: 5    ferrous sulfate 324 (65 Fe) mg, Take 1 tablet (324 mg total) by mouth 2 (two) times a day before meals, Disp: 180 tablet, Rfl: 3          Portions of the record may have been created with voice recognition software  Occasional wrong word or "sound a like" substitutions may have occurred due to the inherent limitations of voice recognition software  Read the chart carefully and recognize, using context, where substitutions have occurred  If you have any questions, please contact the dictating provider

## 2020-01-20 ENCOUNTER — TELEPHONE (OUTPATIENT)
Dept: FAMILY MEDICINE CLINIC | Facility: CLINIC | Age: 55
End: 2020-01-20

## 2020-01-20 ENCOUNTER — OFFICE VISIT (OUTPATIENT)
Dept: FAMILY MEDICINE CLINIC | Facility: CLINIC | Age: 55
End: 2020-01-20

## 2020-01-20 VITALS
TEMPERATURE: 68 F | RESPIRATION RATE: 20 BRPM | SYSTOLIC BLOOD PRESSURE: 140 MMHG | HEART RATE: 80 BPM | OXYGEN SATURATION: 99 % | DIASTOLIC BLOOD PRESSURE: 80 MMHG

## 2020-01-20 DIAGNOSIS — E11.42 TYPE 2 DIABETES MELLITUS WITH DIABETIC POLYNEUROPATHY, WITH LONG-TERM CURRENT USE OF INSULIN (HCC): Primary | ICD-10-CM

## 2020-01-20 DIAGNOSIS — E11.59 TYPE 2 DIABETES MELLITUS WITH OTHER CIRCULATORY COMPLICATION, WITH LONG-TERM CURRENT USE OF INSULIN (HCC): ICD-10-CM

## 2020-01-20 DIAGNOSIS — Z89.431 HISTORY OF TRANSMETATARSAL AMPUTATION OF RIGHT FOOT (HCC): Primary | ICD-10-CM

## 2020-01-20 DIAGNOSIS — Z79.4 TYPE 2 DIABETES MELLITUS WITH OTHER CIRCULATORY COMPLICATION, WITH LONG-TERM CURRENT USE OF INSULIN (HCC): ICD-10-CM

## 2020-01-20 DIAGNOSIS — Z79.4 TYPE 2 DIABETES MELLITUS WITH DIABETIC POLYNEUROPATHY, WITH LONG-TERM CURRENT USE OF INSULIN (HCC): Primary | ICD-10-CM

## 2020-01-20 PROCEDURE — 99213 OFFICE O/P EST LOW 20 MIN: CPT | Performed by: PODIATRIST

## 2020-01-20 NOTE — TELEPHONE ENCOUNTER
pts came in stating [employer] insurance  is no longer taking     insulin glargine (BASAGLAR KWIKPEN) 100 units    Please advise

## 2020-01-20 NOTE — PROGRESS NOTES
Podiatry Clinic Visit  Tim Lyon 47 y o  female MRN: [id number]  Encounter: [id number]    Assessment/Plan        Diagnoses and all orders for this visit:    History of transmetatarsal amputation of right foot ([facility] [address] )  -     Diabetic Shoe  -     Diabetic Shoe Inserts    Type 2 diabetes mellitus with other circulatory complication, with long-term current use of insulin ([facility] [address] )  -     Diabetic Shoe  -     Diabetic Shoe Inserts           Plan:   Patient was seen/examined  All questions and concerns addressed   Incision is well coapted two small areas of superficial ulceration noted  Wounds appear stable with no SOI   Partial weight bearing jorge surgical shoe until she gets her custom DM shoes with inserts   Educated on [place] with filler    She was instructed to dress her wound with betadine and DSD every other day   RTC in 3 weeks    Dr Theodosia Goldberg was present during this entire procedure  History of Present Illness     HPI:  She is presenting one month after having sutures removed from her surgical incision  She denies pain in the foot  She has been NWB to the right foot for the month  She reports that she has not been able to get her DM shoes  She reports the continued use a a Posterior splint  She has not changed the bandages since her last visit  Review of Systems   Constitutional: Negative  HENT: Negative  Eyes: Negative  Respiratory: Negative  Cardiovascular: Negative  Gastrointestinal: Negative  Musculoskeletal: righ TMA  Skin: right foot incision  Neurological: Negative          Historical Information   Past Medical History:   Diagnosis Date    Cellulitis of toe 1/28/2019    CKD (chronic kidney disease)     Diabetes mellitus ([facility] [address] )     Hypertension      Past Surgical History:   Procedure Laterality Date    IR ABDOMINAL ANGIOGRAPHY  2/1/2019    IR ARTERIAL LYSIS  4/30/2019    IR LOWER EXTREMITY / INTERVENTION  11/1/2019    OK AMPUTATION FOOT,TRANSMETATARSAL Right 2019    Procedure: AMPUTATION TRANSMETATARSAL (TMA) WITH ACHILLES TENDON LENGTHING;  Surgeon: Keyana Alfaro DPM;  [facility]: [facility];  Service: Podiatry    TOE AMPUTATION Left 2019    Procedure: AMPUTATION 5th TOE;  Surgeon: Keyana Alfaro DPM;  [facility];  Service: Podiatry    TOE AMPUTATION Right 2019    Procedure: AMPUTATION RIGHT 3RD AND 4TH TOE;  Surgeon: Keyana Alfaro DPM;  [facility];  Service: Podiatry     Social History   Social History     Substance and Sexual Activity   Alcohol Use Yes    Frequency: Monthly or less    Comment: occ     Social History     Substance and Sexual Activity   Drug Use No     Social History     Tobacco Use   Smoking Status Former Smoker    Types: Cigarettes    Last attempt to quit:     Years since quittin 0   Smokeless Tobacco Former User     Family History:   Family History   Problem Relation Age of Onset    Diabetes Mother     No Known Problems Father        Meds/Allergies     (Not in a hospital admission)  No Known Allergies    Objective     Current Vitals:   Blood Pressure: 140/80 (20)  Pulse: 80 (20)  Temperature: (!) 68 °F (20 °C) (20)  Respirations: 20 (20)  SpO2: 99 % (20)        /80   Pulse 80   Temp (!) 68 °F (20 °C)   Resp 20   LMP  (LMP Unknown)   SpO2 99%       Lower Extremity Exam:    Foot Exam    Musculoskeletal:  MMT is 5/5 to all compartments of the LE, +0/4 edema B/L, Hallux limitus is not present  Equinus is not present  Vascular:   R DP is +2/4, R PT is +2/4, L DP is +2/4, L PT is +2/4, CFT< 3sec to all digits  Pedal hair is Absent  regular rate and rhythm  Skin:  No open Lesions  Skin of the LE is normal texture, temperature, turgor  Interdigital maceration is not present  Right foot incision is well coapted with sutures removed no dehiscence noted  Cap refill to plantar flap is intact   No purulence, fluctuance or crepitus is noted  Two samll superficial ulcerations are noted with no deep probing or clinical signs of infection  Mild serous dranage is noted of the wounds  Neurologic:  Gross sensation is intact  Protective sensation is Intact  Vibratory sensation is N/A  Sharp sensation is present

## 2020-01-23 ENCOUNTER — TELEPHONE (OUTPATIENT)
Dept: FAMILY MEDICINE CLINIC | Facility: CLINIC | Age: 55
End: 2020-01-23

## 2020-01-23 NOTE — TELEPHONE ENCOUNTER
ANH w/ patient to call me back to schedule an appointment @ [facility]    JOSES

## 2020-01-27 ENCOUNTER — TELEPHONE (OUTPATIENT)
Dept: FAMILY MEDICINE CLINIC | Facility: CLINIC | Age: 55
End: 2020-01-27

## 2020-01-27 ENCOUNTER — OFFICE VISIT (OUTPATIENT)
Dept: FAMILY MEDICINE CLINIC | Facility: CLINIC | Age: 55
End: 2020-01-27

## 2020-01-27 VITALS
HEART RATE: 83 BPM | TEMPERATURE: 97 F | RESPIRATION RATE: 18 BRPM | DIASTOLIC BLOOD PRESSURE: 88 MMHG | OXYGEN SATURATION: 97 % | SYSTOLIC BLOOD PRESSURE: 140 MMHG

## 2020-01-27 DIAGNOSIS — Z79.4 TYPE 2 DIABETES MELLITUS WITH DIABETIC POLYNEUROPATHY, WITH LONG-TERM CURRENT USE OF INSULIN (HCC): Primary | ICD-10-CM

## 2020-01-27 DIAGNOSIS — E11.59 TYPE 2 DIABETES MELLITUS WITH OTHER CIRCULATORY COMPLICATION, WITH LONG-TERM CURRENT USE OF INSULIN (HCC): ICD-10-CM

## 2020-01-27 DIAGNOSIS — E66.01 CLASS 2 SEVERE OBESITY DUE TO EXCESS CALORIES WITH SERIOUS COMORBIDITY AND BODY MASS INDEX (BMI) OF 36.0 TO 36.9 IN ADULT (HCC): ICD-10-CM

## 2020-01-27 DIAGNOSIS — E11.9 DIABETIC EYE EXAM (HCC): ICD-10-CM

## 2020-01-27 DIAGNOSIS — E11.42 TYPE 2 DIABETES MELLITUS WITH DIABETIC POLYNEUROPATHY, WITH LONG-TERM CURRENT USE OF INSULIN (HCC): Primary | ICD-10-CM

## 2020-01-27 DIAGNOSIS — Z01.00 DIABETIC EYE EXAM (HCC): ICD-10-CM

## 2020-01-27 DIAGNOSIS — I10 ESSENTIAL HYPERTENSION: ICD-10-CM

## 2020-01-27 DIAGNOSIS — Z12.11 COLON CANCER SCREENING: ICD-10-CM

## 2020-01-27 DIAGNOSIS — E11.9 ENCOUNTER FOR DIABETIC FOOT EXAM (HCC): ICD-10-CM

## 2020-01-27 DIAGNOSIS — Z12.39 BREAST CANCER SCREENING: ICD-10-CM

## 2020-01-27 DIAGNOSIS — Z79.4 TYPE 2 DIABETES MELLITUS WITH OTHER CIRCULATORY COMPLICATION, WITH LONG-TERM CURRENT USE OF INSULIN (HCC): ICD-10-CM

## 2020-01-27 DIAGNOSIS — I73.9 PAD (PERIPHERAL ARTERY DISEASE) (HCC): ICD-10-CM

## 2020-01-27 DIAGNOSIS — Z89.439 HISTORY OF TRANSMETATARSAL AMPUTATION OF FOOT (HCC): ICD-10-CM

## 2020-01-27 PROBLEM — E66.812 CLASS 2 SEVERE OBESITY DUE TO EXCESS CALORIES WITH SERIOUS COMORBIDITY AND BODY MASS INDEX (BMI) OF 36.0 TO 36.9 IN ADULT (HCC): Status: ACTIVE | Noted: 2020-01-27

## 2020-01-27 LAB — SL AMB POCT HEMOGLOBIN AIC: 8.8 (ref ?–6.5)

## 2020-01-27 PROCEDURE — 1036F TOBACCO NON-USER: CPT | Performed by: FAMILY MEDICINE

## 2020-01-27 PROCEDURE — 99214 OFFICE O/P EST MOD 30 MIN: CPT | Performed by: FAMILY MEDICINE

## 2020-01-27 PROCEDURE — 83036 HEMOGLOBIN GLYCOSYLATED A1C: CPT | Performed by: FAMILY MEDICINE

## 2020-01-27 PROCEDURE — 3052F HG A1C>EQUAL 8.0%<EQUAL 9.0%: CPT | Performed by: FAMILY MEDICINE

## 2020-01-27 NOTE — PROGRESS NOTES
Assessment/Plan:    History of transmetatarsal amputation of foot ([facility] )  Follows with Podiatry      Type 2 diabetes mellitus with circulatory disorder, with long-term current use of insulin ([facility] )    Lab Results   Component Value Date    HGBA1C 13 7 (A) 10/03/2019   Greatly improved today's HgA1c is 8 8  As per patient's daughter [address] stated that they did not receive the refill for patient's Lantus, which as per our records was sent and received by [facility] on 1/20/2020  Will have clinical staff call [facility] and verify what is the issue with patient's insulin  Increase insulin to 32 units BID for a total of 64 units daily   Counseled on importance of low carb diet     PAD (peripheral artery disease) ([place] [address] )  Follow up with Vascular  As per Vascular patient due for surveillance arterial duplex next month (Feb 2020), patient aware     Class 2 severe obesity due to excess calories with serious comorbidity and body mass index (BMI) of 36 0 to 36 9 in adult ([place] [address] )  BMI Counseling: There is no height or weight on file to calculate BMI  The BMI is above normal  Nutrition recommendations include reducing portion sizes, decreasing overall calorie intake, 3-5 servings of fruits/vegetables daily, reducing fast food intake and moderation in carbohydrate intake  Diagnoses and all orders for this visit:    Type 2 diabetes mellitus with diabetic polyneuropathy, with long-term current use of insulin ([facility] )  -     POCT hemoglobin A1c    Breast cancer screening  -     Mammo screening bilateral w cad; Future    Colon cancer screening  -     Ambulatory referral to Gastroenterology; Future    Diabetic eye exam [facility])  -     Ambulatory referral to Ophthalmology;  Future    Encounter for diabetic foot exam ([place] [address] )    History of transmetatarsal amputation of foot ([facility] )    Type 2 diabetes mellitus with other circulatory complication, with long-term current use of insulin ([facility] )    Essential hypertension    PAD (peripheral artery disease) ([facility] )    Class 2 severe obesity due to excess calories with serious comorbidity and body mass index (BMI) of 36 0 to 36 9 in [facility])          Subjective:      Patient ID: Ho Davis is a 47 y o  female  48 yo female with uncontrolled DM, s/p post right foot TMA on 11/07/2019, here today for follow up of chronic conditions  Diabetes   She presents for her follow-up diabetic visit  She has type 2 diabetes mellitus  No MedicAlert identification noted  Her disease course has been improving  There are no hypoglycemic associated symptoms  Associated symptoms include blurred vision and visual change  There are no hypoglycemic complications  Diabetic complications include autonomic neuropathy, nephropathy and PVD  Risk factors for coronary artery disease include diabetes mellitus, dyslipidemia, hypertension, obesity and sedentary lifestyle  Current diabetic treatment includes insulin injections and oral agent (monotherapy)  She is compliant with treatment most of the time  Her weight is stable  She is following a generally healthy diet  When asked about meal planning, she reported none  She has had a previous visit with a dietitian  She never participates in exercise  Her home blood glucose trend is decreasing steadily  Her breakfast blood glucose is taken between 8-9 am  Her breakfast blood glucose range is generally 140-180 mg/dl  An ACE inhibitor/angiotensin II receptor blocker is being taken  She sees a podiatrist Eye exam is not current  The following portions of the patient's history were reviewed and updated as appropriate: She  has a past medical history of Cellulitis of toe (1/28/2019), CKD (chronic kidney disease), Diabetes mellitus ([facility] ), and Hypertension    She   Patient Active Problem List    Diagnosis Date Noted    Class 2 severe obesity due to excess calories with serious comorbidity and body mass index (BMI) of 36 0 to 36 9 in [facility]) 01/27/2020    History of transmetatarsal amputation of foot ([place] [address] ) 11/20/2019    Gangrene of toe of right foot ([place] [address] ) 10/30/2019    Charcot foot due to diabetes mellitus ([place] [address] ) 08/05/2019    Polyneuropathy associated with underlying disease ([place] [address] ) 07/08/2019    Critical lower limb ischemia 04/30/2019    Toe amputation status, left 02/11/2019    Leukocytosis 02/05/2019    Microcytic anemia 02/01/2019    CKD stage 2 due to type 2 diabetes mellitus ([place] [address] ) 01/31/2019    HLD (hyperlipidemia) 01/28/2019    PAD (peripheral artery disease) ([place] [address] ) 01/28/2019    Type 2 diabetes mellitus with circulatory disorder, with long-term current use of insulin ([place] [address] ) 03/08/2018    Essential hypertension 03/08/2018     She  has a past surgical history that includes IR abdominal angiography (2/1/2019); Toe amputation (Left, 2/4/2019); IR arterial lysis (4/30/2019); IR lower extremity / intervention (11/1/2019); Toe amputation (Right, 11/4/2019); and pr amputation foot,transmetatarsal (Right, 11/7/2019)  Her family history includes Diabetes in her mother; No Known Problems in her father  She  reports that she quit smoking about 7 years ago  Her smoking use included cigarettes  She has quit using smokeless tobacco  She reports that she drinks alcohol  She reports that she does not use drugs    Current Outpatient Medications   Medication Sig Dispense Refill    acetaminophen (TYLENOL) 325 mg tablet Take 2 tablets (650 mg total) by mouth every 6 (six) hours as needed for mild pain, headaches or fever      aspirin 81 mg chewable tablet Chew 1 tablet (81 mg total) daily      atorvastatin (LIPITOR) 20 mg tablet Take 1 tablet (20 mg total) by mouth daily 90 tablet 1    clopidogrel (PLAVIX) 75 mg tablet Take 1 tablet (75 mg total) by mouth daily 90 tablet 1    ferrous sulfate 324 (65 Fe) mg Take 1 tablet (324 mg total) by mouth 2 (two) times a day before meals 180 tablet 3    glucose blood (ONE TOUCH ULTRA TEST) test strip Testing Three times a day 100 each 5  insulin glargine (LANTUS SOLOSTAR) 100 units/mL injection pen Inject 60 Units under the skin daily 30 units BID for a total of 60 units a day 5 pen 2    Insulin Pen Needle 32G X 4 MM MISC Use once daily 100 each 0    lisinopril (ZESTRIL) 5 mg tablet Take 1 tablet (5 mg total) by mouth daily 30 tablet 3    metFORMIN (GLUCOPHAGE) 1000 MG tablet Take 1 tablet (1,000 mg total) by mouth 2 (two) times a day with meals 180 tablet 1    metoprolol tartrate (LOPRESSOR) 25 mg tablet Take 1 tablet (25 mg total) by mouth every 12 (twelve) hours 180 tablet 1    ONETOUCH DELICA LANCETS FINE MISC Testing Three times a day 100 each 5     No current facility-administered medications for this visit        Current Outpatient Medications on File Prior to Visit   Medication Sig    acetaminophen (TYLENOL) 325 mg tablet Take 2 tablets (650 mg total) by mouth every 6 (six) hours as needed for mild pain, headaches or fever    aspirin 81 mg chewable tablet Chew 1 tablet (81 mg total) daily    atorvastatin (LIPITOR) 20 mg tablet Take 1 tablet (20 mg total) by mouth daily    clopidogrel (PLAVIX) 75 mg tablet Take 1 tablet (75 mg total) by mouth daily    ferrous sulfate 324 (65 Fe) mg Take 1 tablet (324 mg total) by mouth 2 (two) times a day before meals    glucose blood (ONE TOUCH ULTRA TEST) test strip Testing Three times a day    insulin glargine (LANTUS SOLOSTAR) 100 units/mL injection pen Inject 60 Units under the skin daily 30 units BID for a total of 60 units a day    Insulin Pen Needle 32G X 4 MM MISC Use once daily    lisinopril (ZESTRIL) 5 mg tablet Take 1 tablet (5 mg total) by mouth daily    metFORMIN (GLUCOPHAGE) 1000 MG tablet Take 1 tablet (1,000 mg total) by mouth 2 (two) times a day with meals    metoprolol tartrate (LOPRESSOR) 25 mg tablet Take 1 tablet (25 mg total) by mouth every 12 (twelve) hours    ONETOUCH DELICA LANCETS FINE MISC Testing Three times a day     No current facility-administered medications on file prior to visit       Review of Systems   Eyes: Positive for blurred vision  All other systems reviewed and are negative  Objective:      /88   Pulse 83   Temp (!) 97 °F (36 1 °C) (Temporal)   Resp 18   LMP  (LMP Unknown)   SpO2 97%          Physical Exam   Constitutional: She is oriented to person, place, and time  She appears well-developed  HENT:   Head: Normocephalic  Right Ear: External ear normal    Left Ear: External ear normal    Nose: Nose normal    Mouth/Throat: Oropharynx is clear and moist    Eyes: Pupils are equal, round, and reactive to light  Conjunctivae and EOM are normal    Neck: Normal range of motion  Neck supple  No thyromegaly present  Cardiovascular: Normal rate, regular rhythm and normal heart sounds  Pulses:       Dorsalis pedis pulses are 1+ on the right side, and 1+ on the left side  Posterior tibial pulses are 1+ on the right side, and 1+ on the left side  Pulmonary/Chest: Effort normal and breath sounds normal    Abdominal: Soft  There is no tenderness  There is no rebound and no guarding  Musculoskeletal: Normal range of motion  Feet:   Right Foot: amputated  Left Foot:   Skin Integrity: Negative for ulcer, skin breakdown, erythema, warmth, callus or dry skin  Neurological: She is alert and oriented to person, place, and time  She has normal reflexes  Skin: Skin is dry  Psychiatric: She has a normal mood and affect  Nursing note and vitals reviewed  Diabetic Foot Exam    Patient's shoes and socks removed  Right Foot/Ankle   Right Foot Inspection  Skin Exam:                        Amputation: amputation right foot       Vascular    The right DP pulse is 1+  The right PT pulse is 1+       Left Foot/Ankle  Left Foot Inspection  Skin Exam: skin normal and skin intactno dry skin, no warmth, no erythema, no maceration, normal color, no pre-ulcer, no ulcer and no callus                         Toe Exam: ROM and strength within normal limits                   Sensory     Proprioception: intact  Monofilament: intact  Vascular  Capillary refills: < 3 seconds  The left DP pulse is 1+  The left PT pulse is 1+  Assign Risk Category:  Deformity present;  No loss of protective sensation;        Risk: 1

## 2020-01-27 NOTE — TELEPHONE ENCOUNTER
ANH w/ patient to call me back to schedule an appointment for DM education @ [facility]  Left my call back # for patient    DILMA

## 2020-01-27 NOTE — ASSESSMENT & PLAN NOTE
Follow up with Vascular  As per Vascular patient due for surveillance arterial duplex next month (Feb 2020), patient aware

## 2020-01-27 NOTE — ASSESSMENT & PLAN NOTE
BMI Counseling: There is no height or weight on file to calculate BMI  The BMI is above normal  Nutrition recommendations include reducing portion sizes, decreasing overall calorie intake, 3-5 servings of fruits/vegetables daily, reducing fast food intake and moderation in carbohydrate intake

## 2020-01-27 NOTE — ASSESSMENT & PLAN NOTE
Lab Results   Component Value Date    HGBA1C 13 7 (A) 10/03/2019   Greatly improved today's HgA1c is 8 8  As per patient's daughter [address] stated that they did not receive the refill for patient's Lantus, which as per our records was sent and received by [facility] on 1/20/2020  Will have clinical staff call [facility] and verify what is the issue with patient's insulin  Increase insulin to 32 units BID for a total of 64 units daily   Counseled on importance of low carb diet

## 2020-01-28 ENCOUNTER — TELEPHONE (OUTPATIENT)
Dept: FAMILY MEDICINE CLINIC | Facility: CLINIC | Age: 55
End: 2020-01-28

## 2020-01-28 NOTE — TELEPHONE ENCOUNTER
Order mailed    Mammo apt([phone]) is on 3/20/20 at 9:20 am at [address] [place] [address], [facility], [facility]

## 2020-01-29 DIAGNOSIS — E11.42 TYPE 2 DIABETES MELLITUS WITH DIABETIC POLYNEUROPATHY, WITH LONG-TERM CURRENT USE OF INSULIN (HCC): ICD-10-CM

## 2020-01-29 DIAGNOSIS — Z79.4 TYPE 2 DIABETES MELLITUS WITH DIABETIC POLYNEUROPATHY, WITH LONG-TERM CURRENT USE OF INSULIN (HCC): ICD-10-CM

## 2020-01-31 ENCOUNTER — TELEPHONE (OUTPATIENT)
Dept: FAMILY MEDICINE CLINIC | Facility: CLINIC | Age: 55
End: 2020-01-31

## 2020-02-03 ENCOUNTER — TELEPHONE (OUTPATIENT)
Dept: FAMILY MEDICINE CLINIC | Facility: CLINIC | Age: 55
End: 2020-02-03

## 2020-02-03 NOTE — TELEPHONE ENCOUNTER
ANH w/ patient confirming that her appointment w/ me is 1pm, Feb 12th @ [facility]  Asked patient to call back if she has any questions  Otherwise, it is scheduled    DDS

## 2020-02-11 ENCOUNTER — TELEPHONE (OUTPATIENT)
Dept: FAMILY MEDICINE CLINIC | Facility: CLINIC | Age: 55
End: 2020-02-11

## 2020-02-11 NOTE — TELEPHONE ENCOUNTER
Tried calling patient to remind her about tomorrow's DM education appointment, but got message that her phone Mailbox is full, was nota ble to leave a message    DDS

## 2020-02-20 ENCOUNTER — HOSPITAL ENCOUNTER (OUTPATIENT)
Dept: NON INVASIVE DIAGNOSTICS | Facility: CLINIC | Age: 55
Discharge: HOME | End: 2020-02-20
Payer: COMMERCIAL

## 2020-02-20 DIAGNOSIS — I77.9 PAOD (PERIPHERAL ARTERIAL OCCLUSIVE DISEASE) (HCC): ICD-10-CM

## 2020-02-20 PROCEDURE — 93923 UPR/LXTR ART STDY 3+ LVLS: CPT

## 2020-02-20 PROCEDURE — 93925 LOWER EXTREMITY STUDY: CPT

## 2020-02-21 PROCEDURE — 93925 LOWER EXTREMITY STUDY: CPT | Performed by: SURGERY

## 2020-02-21 PROCEDURE — 93922 UPR/L XTREMITY ART 2 LEVELS: CPT | Performed by: SURGERY

## 2020-03-17 DIAGNOSIS — E11.42 TYPE 2 DIABETES MELLITUS WITH DIABETIC POLYNEUROPATHY, WITH LONG-TERM CURRENT USE OF INSULIN (HCC): ICD-10-CM

## 2020-03-17 DIAGNOSIS — Z79.4 TYPE 2 DIABETES MELLITUS WITH DIABETIC POLYNEUROPATHY, WITH LONG-TERM CURRENT USE OF INSULIN (HCC): ICD-10-CM

## 2020-03-19 ENCOUNTER — TELEPHONE (OUTPATIENT)
Dept: FAMILY MEDICINE CLINIC | Facility: CLINIC | Age: 55
End: 2020-03-19

## 2020-03-19 NOTE — TELEPHONE ENCOUNTER
Via fax Kayley 30:   would like to clarify lantus solostar insulin     60 units one a day or 30 units twice a day?

## 2020-03-27 ENCOUNTER — TELEPHONE (OUTPATIENT)
Dept: FAMILY MEDICINE CLINIC | Facility: CLINIC | Age: 55
End: 2020-03-27

## 2020-04-02 ENCOUNTER — TELEPHONE (OUTPATIENT)
Dept: FAMILY MEDICINE CLINIC | Facility: CLINIC | Age: 55
End: 2020-04-02

## 2020-04-07 ENCOUNTER — TELEMEDICINE (OUTPATIENT)
Dept: FAMILY MEDICINE CLINIC | Facility: CLINIC | Age: 55
End: 2020-04-07

## 2020-04-07 ENCOUNTER — TELEPHONE (OUTPATIENT)
Dept: OTHER | Facility: OTHER | Age: 55
End: 2020-04-07

## 2020-04-07 ENCOUNTER — TELEPHONE (OUTPATIENT)
Dept: FAMILY MEDICINE CLINIC | Facility: CLINIC | Age: 55
End: 2020-04-07

## 2020-04-07 DIAGNOSIS — Z79.4 TYPE 2 DIABETES MELLITUS WITH DIABETIC POLYNEUROPATHY, WITH LONG-TERM CURRENT USE OF INSULIN (HCC): ICD-10-CM

## 2020-04-07 DIAGNOSIS — E11.42 TYPE 2 DIABETES MELLITUS WITH DIABETIC POLYNEUROPATHY, WITH LONG-TERM CURRENT USE OF INSULIN (HCC): ICD-10-CM

## 2020-04-07 DIAGNOSIS — E11.59 TYPE 2 DIABETES MELLITUS WITH OTHER CIRCULATORY COMPLICATION, WITH LONG-TERM CURRENT USE OF INSULIN (HCC): Primary | ICD-10-CM

## 2020-04-07 DIAGNOSIS — Z79.4 TYPE 2 DIABETES MELLITUS WITH OTHER CIRCULATORY COMPLICATION, WITH LONG-TERM CURRENT USE OF INSULIN (HCC): Primary | ICD-10-CM

## 2020-04-07 DIAGNOSIS — I10 ESSENTIAL HYPERTENSION: ICD-10-CM

## 2020-04-07 DIAGNOSIS — I73.9 PAD (PERIPHERAL ARTERY DISEASE) (HCC): ICD-10-CM

## 2020-04-07 DIAGNOSIS — Z79.4 TYPE 2 DIABETES MELLITUS WITHOUT COMPLICATION, WITH LONG-TERM CURRENT USE OF INSULIN (HCC): ICD-10-CM

## 2020-04-07 DIAGNOSIS — E11.9 TYPE 2 DIABETES MELLITUS WITHOUT COMPLICATION, WITH LONG-TERM CURRENT USE OF INSULIN (HCC): ICD-10-CM

## 2020-04-07 PROCEDURE — G2012 BRIEF CHECK IN BY MD/QHP: HCPCS | Performed by: FAMILY MEDICINE

## 2020-04-07 RX ORDER — CLOPIDOGREL BISULFATE 75 MG/1
75 TABLET ORAL DAILY
Qty: 90 TABLET | Refills: 1 | Status: SHIPPED | OUTPATIENT
Start: 2020-04-07 | End: 2020-05-27 | Stop reason: SDUPTHER

## 2020-04-07 RX ORDER — ATORVASTATIN CALCIUM 20 MG/1
20 TABLET, FILM COATED ORAL DAILY
Qty: 90 TABLET | Refills: 1 | Status: SHIPPED | OUTPATIENT
Start: 2020-04-07 | End: 2020-11-16 | Stop reason: SDUPTHER

## 2020-05-04 ENCOUNTER — HOSPITAL ENCOUNTER (INPATIENT)
Facility: HOSPITAL | Age: 55
LOS: 5 days | Discharge: HOME HEALTH | DRG: 305 | End: 2020-05-09
Attending: PODIATRIST | Admitting: PODIATRIST
Payer: COMMERCIAL

## 2020-05-04 ENCOUNTER — APPOINTMENT (INPATIENT)
Dept: NON INVASIVE DIAGNOSTICS | Facility: HOSPITAL | Age: 55
DRG: 305 | End: 2020-05-04
Payer: COMMERCIAL

## 2020-05-04 ENCOUNTER — OFFICE VISIT (OUTPATIENT)
Dept: FAMILY MEDICINE CLINIC | Facility: CLINIC | Age: 55
End: 2020-05-04

## 2020-05-04 ENCOUNTER — APPOINTMENT (INPATIENT)
Dept: RADIOLOGY | Facility: HOSPITAL | Age: 55
DRG: 305 | End: 2020-05-04
Payer: COMMERCIAL

## 2020-05-04 VITALS
DIASTOLIC BLOOD PRESSURE: 100 MMHG | HEART RATE: 100 BPM | OXYGEN SATURATION: 97 % | SYSTOLIC BLOOD PRESSURE: 150 MMHG | RESPIRATION RATE: 18 BRPM | TEMPERATURE: 98.3 F

## 2020-05-04 DIAGNOSIS — E11.22 CKD STAGE 2 DUE TO TYPE 2 DIABETES MELLITUS (HCC): ICD-10-CM

## 2020-05-04 DIAGNOSIS — E11.59 TYPE 2 DIABETES MELLITUS WITH OTHER CIRCULATORY COMPLICATION, WITH LONG-TERM CURRENT USE OF INSULIN (HCC): ICD-10-CM

## 2020-05-04 DIAGNOSIS — I73.9 PAD (PERIPHERAL ARTERY DISEASE) (HCC): ICD-10-CM

## 2020-05-04 DIAGNOSIS — T81.30XA WOUND DEHISCENCE: ICD-10-CM

## 2020-05-04 DIAGNOSIS — L03.115 CELLULITIS OF RIGHT LOWER EXTREMITY: Primary | ICD-10-CM

## 2020-05-04 DIAGNOSIS — Z89.439 HISTORY OF TRANSMETATARSAL AMPUTATION OF FOOT (HCC): ICD-10-CM

## 2020-05-04 DIAGNOSIS — N18.2 CKD STAGE 2 DUE TO TYPE 2 DIABETES MELLITUS (HCC): ICD-10-CM

## 2020-05-04 DIAGNOSIS — E11.59 TYPE 2 DIABETES MELLITUS WITH OTHER CIRCULATORY COMPLICATION, WITH LONG-TERM CURRENT USE OF INSULIN (HCC): Primary | ICD-10-CM

## 2020-05-04 DIAGNOSIS — E78.5 HYPERLIPIDEMIA, UNSPECIFIED HYPERLIPIDEMIA TYPE: ICD-10-CM

## 2020-05-04 DIAGNOSIS — Z79.4 TYPE 2 DIABETES MELLITUS WITH OTHER CIRCULATORY COMPLICATION, WITH LONG-TERM CURRENT USE OF INSULIN (HCC): ICD-10-CM

## 2020-05-04 DIAGNOSIS — Z79.4 TYPE 2 DIABETES MELLITUS WITH OTHER CIRCULATORY COMPLICATION, WITH LONG-TERM CURRENT USE OF INSULIN (HCC): Primary | ICD-10-CM

## 2020-05-04 DIAGNOSIS — Z01.818 PREOPERATIVE CLEARANCE: ICD-10-CM

## 2020-05-04 DIAGNOSIS — L03.115 CELLULITIS OF RIGHT LOWER EXTREMITY: ICD-10-CM

## 2020-05-04 DIAGNOSIS — M86.471 CHRONIC OSTEOMYELITIS OF RIGHT FOOT WITH DRAINING SINUS (HCC): ICD-10-CM

## 2020-05-04 DIAGNOSIS — I10 ESSENTIAL HYPERTENSION: ICD-10-CM

## 2020-05-04 PROBLEM — T81.31XA DEHISCENCE OF CLOSURE OF SKIN: Status: ACTIVE | Noted: 2020-05-04

## 2020-05-04 LAB
ALBUMIN SERPL BCP-MCNC: 2.8 G/DL (ref 3.5–5)
ALP SERPL-CCNC: 99 U/L (ref 46–116)
ALT SERPL W P-5'-P-CCNC: 13 U/L (ref 12–78)
ANION GAP SERPL CALCULATED.3IONS-SCNC: 4 MMOL/L (ref 4–13)
AST SERPL W P-5'-P-CCNC: 18 U/L (ref 5–45)
BASOPHILS # BLD AUTO: 0.04 THOUSANDS/ΜL (ref 0–0.1)
BASOPHILS NFR BLD AUTO: 0 % (ref 0–1)
BILIRUB SERPL-MCNC: 0.19 MG/DL (ref 0.2–1)
BUN SERPL-MCNC: 19 MG/DL (ref 5–25)
CALCIUM SERPL-MCNC: 9.2 MG/DL (ref 8.3–10.1)
CHLORIDE SERPL-SCNC: 111 MMOL/L (ref 100–108)
CO2 SERPL-SCNC: 25 MMOL/L (ref 21–32)
CREAT SERPL-MCNC: 0.89 MG/DL (ref 0.6–1.3)
EOSINOPHIL # BLD AUTO: 0.12 THOUSAND/ΜL (ref 0–0.61)
EOSINOPHIL NFR BLD AUTO: 1 % (ref 0–6)
ERYTHROCYTE [DISTWIDTH] IN BLOOD BY AUTOMATED COUNT: 16.3 % (ref 11.6–15.1)
GFR SERPL CREATININE-BSD FRML MDRD: 74 ML/MIN/1.73SQ M
GLUCOSE SERPL-MCNC: 115 MG/DL (ref 65–140)
GLUCOSE SERPL-MCNC: 121 MG/DL (ref 65–140)
GLUCOSE SERPL-MCNC: 124 MG/DL (ref 65–140)
GLUCOSE SERPL-MCNC: 182 MG/DL (ref 65–140)
HCT VFR BLD AUTO: 34.9 % (ref 34.8–46.1)
HGB BLD-MCNC: 10.5 G/DL (ref 11.5–15.4)
IMM GRANULOCYTES # BLD AUTO: 0.06 THOUSAND/UL (ref 0–0.2)
IMM GRANULOCYTES NFR BLD AUTO: 1 % (ref 0–2)
LYMPHOCYTES # BLD AUTO: 2.57 THOUSANDS/ΜL (ref 0.6–4.47)
LYMPHOCYTES NFR BLD AUTO: 21 % (ref 14–44)
MCH RBC QN AUTO: 21.7 PG (ref 26.8–34.3)
MCHC RBC AUTO-ENTMCNC: 30.1 G/DL (ref 31.4–37.4)
MCV RBC AUTO: 72 FL (ref 82–98)
MONOCYTES # BLD AUTO: 0.59 THOUSAND/ΜL (ref 0.17–1.22)
MONOCYTES NFR BLD AUTO: 5 % (ref 4–12)
NEUTROPHILS # BLD AUTO: 9.07 THOUSANDS/ΜL (ref 1.85–7.62)
NEUTS SEG NFR BLD AUTO: 72 % (ref 43–75)
NRBC BLD AUTO-RTO: 0 /100 WBCS
PLATELET # BLD AUTO: 310 THOUSANDS/UL (ref 149–390)
PMV BLD AUTO: 10.5 FL (ref 8.9–12.7)
POTASSIUM SERPL-SCNC: 4.3 MMOL/L (ref 3.5–5.3)
PROT SERPL-MCNC: 7.6 G/DL (ref 6.4–8.2)
RBC # BLD AUTO: 4.83 MILLION/UL (ref 3.81–5.12)
SODIUM SERPL-SCNC: 140 MMOL/L (ref 136–145)
WBC # BLD AUTO: 12.45 THOUSAND/UL (ref 4.31–10.16)

## 2020-05-04 PROCEDURE — 3080F DIAST BP >= 90 MM HG: CPT | Performed by: PODIATRIST

## 2020-05-04 PROCEDURE — 93925 LOWER EXTREMITY STUDY: CPT | Performed by: SURGERY

## 2020-05-04 PROCEDURE — 71045 X-RAY EXAM CHEST 1 VIEW: CPT

## 2020-05-04 PROCEDURE — 99222 1ST HOSP IP/OBS MODERATE 55: CPT | Performed by: PODIATRIST

## 2020-05-04 PROCEDURE — 1036F TOBACCO NON-USER: CPT | Performed by: PODIATRIST

## 2020-05-04 PROCEDURE — 93925 LOWER EXTREMITY STUDY: CPT

## 2020-05-04 PROCEDURE — 3066F NEPHROPATHY DOC TX: CPT | Performed by: INTERNAL MEDICINE

## 2020-05-04 PROCEDURE — 3077F SYST BP >= 140 MM HG: CPT | Performed by: PODIATRIST

## 2020-05-04 PROCEDURE — 3052F HG A1C>EQUAL 8.0%<EQUAL 9.0%: CPT | Performed by: PODIATRIST

## 2020-05-04 PROCEDURE — 93922 UPR/L XTREMITY ART 2 LEVELS: CPT | Performed by: SURGERY

## 2020-05-04 PROCEDURE — 93923 UPR/LXTR ART STDY 3+ LVLS: CPT

## 2020-05-04 PROCEDURE — 73630 X-RAY EXAM OF FOOT: CPT

## 2020-05-04 PROCEDURE — 3066F NEPHROPATHY DOC TX: CPT | Performed by: NURSE PRACTITIONER

## 2020-05-04 PROCEDURE — 80053 COMPREHEN METABOLIC PANEL: CPT | Performed by: PODIATRIST

## 2020-05-04 PROCEDURE — 99214 OFFICE O/P EST MOD 30 MIN: CPT | Performed by: PODIATRIST

## 2020-05-04 PROCEDURE — 82948 REAGENT STRIP/BLOOD GLUCOSE: CPT

## 2020-05-04 PROCEDURE — 3066F NEPHROPATHY DOC TX: CPT | Performed by: PODIATRIST

## 2020-05-04 PROCEDURE — 85025 COMPLETE CBC W/AUTO DIFF WBC: CPT | Performed by: PODIATRIST

## 2020-05-04 PROCEDURE — ND001 PR NO DOCUMENTATION: Performed by: SURGERY

## 2020-05-04 RX ORDER — ATORVASTATIN CALCIUM 20 MG/1
20 TABLET, FILM COATED ORAL DAILY
Status: DISCONTINUED | OUTPATIENT
Start: 2020-05-05 | End: 2020-05-09 | Stop reason: HOSPADM

## 2020-05-04 RX ORDER — CEFAZOLIN SODIUM 1 G/50ML
1000 SOLUTION INTRAVENOUS EVERY 8 HOURS
Status: DISCONTINUED | OUTPATIENT
Start: 2020-05-04 | End: 2020-05-09 | Stop reason: HOSPADM

## 2020-05-04 RX ORDER — ACETAMINOPHEN 325 MG/1
650 TABLET ORAL EVERY 6 HOURS SCHEDULED
Status: DISCONTINUED | OUTPATIENT
Start: 2020-05-04 | End: 2020-05-09 | Stop reason: HOSPADM

## 2020-05-04 RX ORDER — LISINOPRIL 5 MG/1
5 TABLET ORAL DAILY
Status: DISCONTINUED | OUTPATIENT
Start: 2020-05-05 | End: 2020-05-05

## 2020-05-04 RX ORDER — POLYETHYLENE GLYCOL 3350 17 G/17G
17 POWDER, FOR SOLUTION ORAL DAILY
Status: DISCONTINUED | OUTPATIENT
Start: 2020-05-04 | End: 2020-05-09 | Stop reason: HOSPADM

## 2020-05-04 RX ORDER — FERROUS SULFATE 325(65) MG
325 TABLET ORAL
Status: DISCONTINUED | OUTPATIENT
Start: 2020-05-05 | End: 2020-05-04 | Stop reason: DRUGHIGH

## 2020-05-04 RX ORDER — FERROUS SULFATE 325(65) MG
325 TABLET ORAL 2 TIMES DAILY WITH MEALS
Status: DISCONTINUED | OUTPATIENT
Start: 2020-05-04 | End: 2020-05-09 | Stop reason: HOSPADM

## 2020-05-04 RX ORDER — METRONIDAZOLE 500 MG/1
500 TABLET ORAL EVERY 8 HOURS SCHEDULED
Status: DISCONTINUED | OUTPATIENT
Start: 2020-05-04 | End: 2020-05-09 | Stop reason: HOSPADM

## 2020-05-04 RX ORDER — OXYCODONE HYDROCHLORIDE 5 MG/1
5 TABLET ORAL EVERY 4 HOURS PRN
Status: DISCONTINUED | OUTPATIENT
Start: 2020-05-04 | End: 2020-05-09 | Stop reason: HOSPADM

## 2020-05-04 RX ORDER — HEPARIN SODIUM 5000 [USP'U]/ML
5000 INJECTION, SOLUTION INTRAVENOUS; SUBCUTANEOUS EVERY 8 HOURS SCHEDULED
Status: DISCONTINUED | OUTPATIENT
Start: 2020-05-04 | End: 2020-05-09 | Stop reason: HOSPADM

## 2020-05-04 RX ORDER — ASPIRIN 81 MG/1
81 TABLET, CHEWABLE ORAL DAILY
Status: DISCONTINUED | OUTPATIENT
Start: 2020-05-05 | End: 2020-05-09 | Stop reason: HOSPADM

## 2020-05-04 RX ORDER — CLOPIDOGREL BISULFATE 75 MG/1
75 TABLET ORAL DAILY
Status: DISCONTINUED | OUTPATIENT
Start: 2020-05-05 | End: 2020-05-09 | Stop reason: HOSPADM

## 2020-05-04 RX ORDER — CEFAZOLIN SODIUM 2 G/50ML
2000 SOLUTION INTRAVENOUS EVERY 8 HOURS
Status: DISCONTINUED | OUTPATIENT
Start: 2020-05-04 | End: 2020-05-04 | Stop reason: DRUGHIGH

## 2020-05-04 RX ORDER — INSULIN GLARGINE 100 [IU]/ML
30 INJECTION, SOLUTION SUBCUTANEOUS
Status: DISCONTINUED | OUTPATIENT
Start: 2020-05-04 | End: 2020-05-05

## 2020-05-04 RX ORDER — TRAMADOL HYDROCHLORIDE 50 MG/1
50 TABLET ORAL EVERY 6 HOURS PRN
Status: DISCONTINUED | OUTPATIENT
Start: 2020-05-04 | End: 2020-05-09 | Stop reason: HOSPADM

## 2020-05-04 RX ORDER — SENNOSIDES 8.6 MG
1 TABLET ORAL DAILY
Status: DISCONTINUED | OUTPATIENT
Start: 2020-05-04 | End: 2020-05-09 | Stop reason: HOSPADM

## 2020-05-04 RX ADMIN — OXYCODONE HYDROCHLORIDE 5 MG: 5 TABLET ORAL at 22:51

## 2020-05-04 RX ADMIN — METOPROLOL TARTRATE 25 MG: 25 TABLET, FILM COATED ORAL at 21:09

## 2020-05-04 RX ADMIN — METRONIDAZOLE 500 MG: 500 TABLET, FILM COATED ORAL at 21:08

## 2020-05-04 RX ADMIN — HEPARIN SODIUM 5000 UNITS: 5000 INJECTION INTRAVENOUS; SUBCUTANEOUS at 21:09

## 2020-05-04 RX ADMIN — CEFAZOLIN SODIUM 1000 MG: 1 SOLUTION INTRAVENOUS at 21:09

## 2020-05-04 RX ADMIN — ACETAMINOPHEN 650 MG: 325 TABLET ORAL at 17:42

## 2020-05-04 RX ADMIN — INSULIN GLARGINE 30 UNITS: 100 INJECTION, SOLUTION SUBCUTANEOUS at 21:08

## 2020-05-04 RX ADMIN — HEPARIN SODIUM 5000 UNITS: 5000 INJECTION INTRAVENOUS; SUBCUTANEOUS at 13:54

## 2020-05-04 RX ADMIN — ACETAMINOPHEN 650 MG: 325 TABLET ORAL at 23:00

## 2020-05-04 RX ADMIN — CEFAZOLIN SODIUM 1000 MG: 1 SOLUTION INTRAVENOUS at 14:00

## 2020-05-04 RX ADMIN — FERROUS SULFATE TAB 325 MG (65 MG ELEMENTAL FE) 325 MG: 325 (65 FE) TAB at 17:42

## 2020-05-05 ENCOUNTER — APPOINTMENT (INPATIENT)
Dept: RADIOLOGY | Facility: HOSPITAL | Age: 55
DRG: 305 | End: 2020-05-05
Payer: COMMERCIAL

## 2020-05-05 LAB
ANION GAP SERPL CALCULATED.3IONS-SCNC: 8 MMOL/L (ref 4–13)
APTT PPP: 29 SECONDS (ref 23–37)
BUN SERPL-MCNC: 15 MG/DL (ref 5–25)
CALCIUM SERPL-MCNC: 8.3 MG/DL (ref 8.3–10.1)
CHLORIDE SERPL-SCNC: 111 MMOL/L (ref 100–108)
CO2 SERPL-SCNC: 23 MMOL/L (ref 21–32)
CREAT SERPL-MCNC: 0.8 MG/DL (ref 0.6–1.3)
ERYTHROCYTE [DISTWIDTH] IN BLOOD BY AUTOMATED COUNT: 16.5 % (ref 11.6–15.1)
EST. AVERAGE GLUCOSE BLD GHB EST-MCNC: 192 MG/DL
GFR SERPL CREATININE-BSD FRML MDRD: 84 ML/MIN/1.73SQ M
GLUCOSE SERPL-MCNC: 125 MG/DL (ref 65–140)
GLUCOSE SERPL-MCNC: 223 MG/DL (ref 65–140)
GLUCOSE SERPL-MCNC: 60 MG/DL (ref 65–140)
GLUCOSE SERPL-MCNC: 79 MG/DL (ref 65–140)
GLUCOSE SERPL-MCNC: 83 MG/DL (ref 65–140)
GLUCOSE SERPL-MCNC: 89 MG/DL (ref 65–140)
HBA1C MFR BLD: 8.3 %
HCT VFR BLD AUTO: 33.9 % (ref 34.8–46.1)
HGB BLD-MCNC: 10.1 G/DL (ref 11.5–15.4)
INR PPP: 1.01 (ref 0.84–1.19)
MCH RBC QN AUTO: 21.8 PG (ref 26.8–34.3)
MCHC RBC AUTO-ENTMCNC: 29.8 G/DL (ref 31.4–37.4)
MCV RBC AUTO: 73 FL (ref 82–98)
PLATELET # BLD AUTO: 293 THOUSANDS/UL (ref 149–390)
PMV BLD AUTO: 11.2 FL (ref 8.9–12.7)
POTASSIUM SERPL-SCNC: 3.6 MMOL/L (ref 3.5–5.3)
PROTHROMBIN TIME: 12.9 SECONDS (ref 11.6–14.5)
RBC # BLD AUTO: 4.63 MILLION/UL (ref 3.81–5.12)
SODIUM SERPL-SCNC: 142 MMOL/L (ref 136–145)
WBC # BLD AUTO: 10.03 THOUSAND/UL (ref 4.31–10.16)

## 2020-05-05 PROCEDURE — 75625 CONTRAST EXAM ABDOMINL AORTA: CPT

## 2020-05-05 PROCEDURE — 99152 MOD SED SAME PHYS/QHP 5/>YRS: CPT

## 2020-05-05 PROCEDURE — 83519 RIA NONANTIBODY: CPT | Performed by: INTERNAL MEDICINE

## 2020-05-05 PROCEDURE — 82948 REAGENT STRIP/BLOOD GLUCOSE: CPT

## 2020-05-05 PROCEDURE — C1894 INTRO/SHEATH, NON-LASER: HCPCS

## 2020-05-05 PROCEDURE — 83036 HEMOGLOBIN GLYCOSYLATED A1C: CPT | Performed by: PHYSICIAN ASSISTANT

## 2020-05-05 PROCEDURE — 047K3ZZ DILATION OF RIGHT FEMORAL ARTERY, PERCUTANEOUS APPROACH: ICD-10-PCS | Performed by: RADIOLOGY

## 2020-05-05 PROCEDURE — 37224 HB FEM/POPL REVAS W/TLA: CPT

## 2020-05-05 PROCEDURE — 047T3ZZ DILATION OF RIGHT PERONEAL ARTERY, PERCUTANEOUS APPROACH: ICD-10-PCS | Performed by: RADIOLOGY

## 2020-05-05 PROCEDURE — 3052F HG A1C>EQUAL 8.0%<EQUAL 9.0%: CPT | Performed by: INTERNAL MEDICINE

## 2020-05-05 PROCEDURE — B41DYZZ FLUOROSCOPY OF AORTA AND BILATERAL LOWER EXTREMITY ARTERIES USING OTHER CONTRAST: ICD-10-PCS | Performed by: RADIOLOGY

## 2020-05-05 PROCEDURE — C1760 CLOSURE DEV, VASC: HCPCS

## 2020-05-05 PROCEDURE — 85610 PROTHROMBIN TIME: CPT | Performed by: PHYSICIAN ASSISTANT

## 2020-05-05 PROCEDURE — C1725 CATH, TRANSLUMIN NON-LASER: HCPCS

## 2020-05-05 PROCEDURE — 99222 1ST HOSP IP/OBS MODERATE 55: CPT | Performed by: FAMILY MEDICINE

## 2020-05-05 PROCEDURE — 3052F HG A1C>EQUAL 8.0%<EQUAL 9.0%: CPT | Performed by: NURSE PRACTITIONER

## 2020-05-05 PROCEDURE — C2623 CATH, TRANSLUMIN, DRUG-COAT: HCPCS

## 2020-05-05 PROCEDURE — NC001 PR NO CHARGE: Performed by: FAMILY MEDICINE

## 2020-05-05 PROCEDURE — 80048 BASIC METABOLIC PNL TOTAL CA: CPT | Performed by: PODIATRIST

## 2020-05-05 PROCEDURE — 85027 COMPLETE CBC AUTOMATED: CPT | Performed by: PODIATRIST

## 2020-05-05 PROCEDURE — 73718 MRI LOWER EXTREMITY W/O DYE: CPT

## 2020-05-05 PROCEDURE — 37228 HB TIB/PER REVASC W/TLA: CPT

## 2020-05-05 PROCEDURE — 99253 IP/OBS CNSLTJ NEW/EST LOW 45: CPT | Performed by: SURGERY

## 2020-05-05 PROCEDURE — 99255 IP/OBS CONSLTJ NEW/EST HI 80: CPT | Performed by: INTERNAL MEDICINE

## 2020-05-05 PROCEDURE — 99153 MOD SED SAME PHYS/QHP EA: CPT

## 2020-05-05 PROCEDURE — 86341 ISLET CELL ANTIBODY: CPT | Performed by: INTERNAL MEDICINE

## 2020-05-05 PROCEDURE — 99232 SBSQ HOSP IP/OBS MODERATE 35: CPT | Performed by: PODIATRIST

## 2020-05-05 PROCEDURE — 85730 THROMBOPLASTIN TIME PARTIAL: CPT | Performed by: PHYSICIAN ASSISTANT

## 2020-05-05 PROCEDURE — 75710 ARTERY X-RAYS ARM/LEG: CPT

## 2020-05-05 PROCEDURE — C1769 GUIDE WIRE: HCPCS

## 2020-05-05 RX ORDER — INSULIN GLARGINE 100 [IU]/ML
15 INJECTION, SOLUTION SUBCUTANEOUS
Status: DISCONTINUED | OUTPATIENT
Start: 2020-05-05 | End: 2020-05-05

## 2020-05-05 RX ORDER — HEPARIN SODIUM 1000 [USP'U]/ML
INJECTION, SOLUTION INTRAVENOUS; SUBCUTANEOUS CODE/TRAUMA/SEDATION MEDICATION
Status: COMPLETED | OUTPATIENT
Start: 2020-05-05 | End: 2020-05-05

## 2020-05-05 RX ORDER — DEXTROSE MONOHYDRATE 25 G/50ML
25 INJECTION, SOLUTION INTRAVENOUS ONCE
Status: DISCONTINUED | OUTPATIENT
Start: 2020-05-05 | End: 2020-05-05

## 2020-05-05 RX ORDER — INSULIN GLARGINE 100 [IU]/ML
20 INJECTION, SOLUTION SUBCUTANEOUS
Status: DISCONTINUED | OUTPATIENT
Start: 2020-05-05 | End: 2020-05-09 | Stop reason: HOSPADM

## 2020-05-05 RX ORDER — SODIUM CHLORIDE 9 MG/ML
75 INJECTION, SOLUTION INTRAVENOUS CONTINUOUS
Status: DISCONTINUED | OUTPATIENT
Start: 2020-05-05 | End: 2020-05-06

## 2020-05-05 RX ORDER — DEXTROSE MONOHYDRATE 25 G/50ML
25 INJECTION, SOLUTION INTRAVENOUS ONCE
Status: COMPLETED | OUTPATIENT
Start: 2020-05-05 | End: 2020-05-05

## 2020-05-05 RX ORDER — MIDAZOLAM HYDROCHLORIDE 2 MG/2ML
INJECTION, SOLUTION INTRAMUSCULAR; INTRAVENOUS CODE/TRAUMA/SEDATION MEDICATION
Status: COMPLETED | OUTPATIENT
Start: 2020-05-05 | End: 2020-05-05

## 2020-05-05 RX ORDER — FENTANYL CITRATE 50 UG/ML
INJECTION, SOLUTION INTRAMUSCULAR; INTRAVENOUS CODE/TRAUMA/SEDATION MEDICATION
Status: COMPLETED | OUTPATIENT
Start: 2020-05-05 | End: 2020-05-05

## 2020-05-05 RX ADMIN — MIDAZOLAM 1 MG: 1 INJECTION INTRAMUSCULAR; INTRAVENOUS at 16:21

## 2020-05-05 RX ADMIN — ASPIRIN 81 MG 81 MG: 81 TABLET ORAL at 08:05

## 2020-05-05 RX ADMIN — FERROUS SULFATE TAB 325 MG (65 MG ELEMENTAL FE) 325 MG: 325 (65 FE) TAB at 08:03

## 2020-05-05 RX ADMIN — ACETAMINOPHEN 650 MG: 325 TABLET ORAL at 17:20

## 2020-05-05 RX ADMIN — ACETAMINOPHEN 650 MG: 325 TABLET ORAL at 11:44

## 2020-05-05 RX ADMIN — HEPARIN SODIUM 5000 UNITS: 5000 INJECTION INTRAVENOUS; SUBCUTANEOUS at 13:00

## 2020-05-05 RX ADMIN — OXYCODONE HYDROCHLORIDE 5 MG: 5 TABLET ORAL at 13:00

## 2020-05-05 RX ADMIN — METRONIDAZOLE 500 MG: 500 TABLET, FILM COATED ORAL at 13:00

## 2020-05-05 RX ADMIN — CLOPIDOGREL BISULFATE 75 MG: 75 TABLET ORAL at 08:05

## 2020-05-05 RX ADMIN — CEFAZOLIN SODIUM 1000 MG: 1 SOLUTION INTRAVENOUS at 21:42

## 2020-05-05 RX ADMIN — FENTANYL CITRATE 50 MCG: 50 INJECTION, SOLUTION INTRAMUSCULAR; INTRAVENOUS at 14:54

## 2020-05-05 RX ADMIN — INSULIN LISPRO 1 UNITS: 100 INJECTION, SOLUTION INTRAVENOUS; SUBCUTANEOUS at 21:43

## 2020-05-05 RX ADMIN — SODIUM CHLORIDE 75 ML/HR: 0.9 INJECTION, SOLUTION INTRAVENOUS at 08:03

## 2020-05-05 RX ADMIN — HEPARIN SODIUM 5000 UNITS: 5000 INJECTION INTRAVENOUS; SUBCUTANEOUS at 05:21

## 2020-05-05 RX ADMIN — HEPARIN SODIUM 5000 UNITS: 5000 INJECTION INTRAVENOUS; SUBCUTANEOUS at 21:43

## 2020-05-05 RX ADMIN — ACETAMINOPHEN 650 MG: 325 TABLET ORAL at 23:32

## 2020-05-05 RX ADMIN — INSULIN GLARGINE 20 UNITS: 100 INJECTION, SOLUTION SUBCUTANEOUS at 21:42

## 2020-05-05 RX ADMIN — CEFAZOLIN SODIUM 1000 MG: 1 SOLUTION INTRAVENOUS at 05:22

## 2020-05-05 RX ADMIN — METRONIDAZOLE 500 MG: 500 TABLET, FILM COATED ORAL at 05:21

## 2020-05-05 RX ADMIN — MIDAZOLAM 1 MG: 1 INJECTION INTRAMUSCULAR; INTRAVENOUS at 14:54

## 2020-05-05 RX ADMIN — HEPARIN SODIUM 3000 UNITS: 1000 INJECTION INTRAVENOUS; SUBCUTANEOUS at 15:35

## 2020-05-05 RX ADMIN — HEPARIN SODIUM 5000 UNITS: 1000 INJECTION INTRAVENOUS; SUBCUTANEOUS at 16:13

## 2020-05-05 RX ADMIN — FERROUS SULFATE TAB 325 MG (65 MG ELEMENTAL FE) 325 MG: 325 (65 FE) TAB at 17:20

## 2020-05-05 RX ADMIN — METRONIDAZOLE 500 MG: 500 TABLET, FILM COATED ORAL at 23:32

## 2020-05-05 RX ADMIN — DEXTROSE MONOHYDRATE 25 ML: 25 INJECTION, SOLUTION INTRAVENOUS at 11:44

## 2020-05-05 RX ADMIN — IODIXANOL 90 ML: 320 INJECTION, SOLUTION INTRAVASCULAR at 17:52

## 2020-05-05 RX ADMIN — HEPARIN SODIUM 2000 UNITS: 1000 INJECTION INTRAVENOUS; SUBCUTANEOUS at 15:41

## 2020-05-05 RX ADMIN — CEFAZOLIN SODIUM 1000 MG: 1 SOLUTION INTRAVENOUS at 13:00

## 2020-05-05 RX ADMIN — OXYCODONE HYDROCHLORIDE 5 MG: 5 TABLET ORAL at 03:40

## 2020-05-05 RX ADMIN — ACETAMINOPHEN 650 MG: 325 TABLET ORAL at 05:21

## 2020-05-05 RX ADMIN — ATORVASTATIN CALCIUM 20 MG: 20 TABLET, FILM COATED ORAL at 08:05

## 2020-05-05 RX ADMIN — FENTANYL CITRATE 50 MCG: 50 INJECTION, SOLUTION INTRAMUSCULAR; INTRAVENOUS at 16:21

## 2020-05-05 RX ADMIN — METOPROLOL TARTRATE 25 MG: 25 TABLET, FILM COATED ORAL at 08:03

## 2020-05-05 RX ADMIN — METOPROLOL TARTRATE 25 MG: 25 TABLET, FILM COATED ORAL at 21:42

## 2020-05-06 ENCOUNTER — ANESTHESIA EVENT (INPATIENT)
Dept: PERIOP | Facility: HOSPITAL | Age: 55
DRG: 305 | End: 2020-05-06
Payer: COMMERCIAL

## 2020-05-06 LAB
ANION GAP SERPL CALCULATED.3IONS-SCNC: 8 MMOL/L (ref 4–13)
BUN SERPL-MCNC: 11 MG/DL (ref 5–25)
CALCIUM SERPL-MCNC: 8 MG/DL (ref 8.3–10.1)
CHLORIDE SERPL-SCNC: 109 MMOL/L (ref 100–108)
CO2 SERPL-SCNC: 23 MMOL/L (ref 21–32)
CREAT SERPL-MCNC: 0.79 MG/DL (ref 0.6–1.3)
ERYTHROCYTE [DISTWIDTH] IN BLOOD BY AUTOMATED COUNT: 16.3 % (ref 11.6–15.1)
FERRITIN SERPL-MCNC: 91 NG/ML (ref 8–388)
GFR SERPL CREATININE-BSD FRML MDRD: 85 ML/MIN/1.73SQ M
GLUCOSE SERPL-MCNC: 116 MG/DL (ref 65–140)
GLUCOSE SERPL-MCNC: 130 MG/DL (ref 65–140)
GLUCOSE SERPL-MCNC: 171 MG/DL (ref 65–140)
GLUCOSE SERPL-MCNC: 198 MG/DL (ref 65–140)
GLUCOSE SERPL-MCNC: 305 MG/DL (ref 65–140)
HCT VFR BLD AUTO: 33.1 % (ref 34.8–46.1)
HGB BLD-MCNC: 9.9 G/DL (ref 11.5–15.4)
IRON SATN MFR SERPL: 8 %
IRON SERPL-MCNC: 24 UG/DL (ref 50–170)
MCH RBC QN AUTO: 21.5 PG (ref 26.8–34.3)
MCHC RBC AUTO-ENTMCNC: 29.9 G/DL (ref 31.4–37.4)
MCV RBC AUTO: 72 FL (ref 82–98)
PLATELET # BLD AUTO: 293 THOUSANDS/UL (ref 149–390)
PMV BLD AUTO: 10.5 FL (ref 8.9–12.7)
POTASSIUM SERPL-SCNC: 3.6 MMOL/L (ref 3.5–5.3)
RBC # BLD AUTO: 4.6 MILLION/UL (ref 3.81–5.12)
SODIUM SERPL-SCNC: 140 MMOL/L (ref 136–145)
T4 FREE SERPL-MCNC: 1.17 NG/DL (ref 0.76–1.46)
TIBC SERPL-MCNC: 306 UG/DL (ref 250–450)
TSH SERPL DL<=0.05 MIU/L-ACNC: 2.18 UIU/ML (ref 0.36–3.74)
WBC # BLD AUTO: 10.15 THOUSAND/UL (ref 4.31–10.16)

## 2020-05-06 PROCEDURE — 99232 SBSQ HOSP IP/OBS MODERATE 35: CPT | Performed by: FAMILY MEDICINE

## 2020-05-06 PROCEDURE — 84439 ASSAY OF FREE THYROXINE: CPT | Performed by: INTERNAL MEDICINE

## 2020-05-06 PROCEDURE — 83550 IRON BINDING TEST: CPT | Performed by: INTERNAL MEDICINE

## 2020-05-06 PROCEDURE — 99232 SBSQ HOSP IP/OBS MODERATE 35: CPT | Performed by: PODIATRIST

## 2020-05-06 PROCEDURE — 82948 REAGENT STRIP/BLOOD GLUCOSE: CPT

## 2020-05-06 PROCEDURE — 84443 ASSAY THYROID STIM HORMONE: CPT | Performed by: INTERNAL MEDICINE

## 2020-05-06 PROCEDURE — 83540 ASSAY OF IRON: CPT | Performed by: INTERNAL MEDICINE

## 2020-05-06 PROCEDURE — 99231 SBSQ HOSP IP/OBS SF/LOW 25: CPT | Performed by: SURGERY

## 2020-05-06 PROCEDURE — 85027 COMPLETE CBC AUTOMATED: CPT | Performed by: PODIATRIST

## 2020-05-06 PROCEDURE — 82728 ASSAY OF FERRITIN: CPT | Performed by: INTERNAL MEDICINE

## 2020-05-06 PROCEDURE — 80048 BASIC METABOLIC PNL TOTAL CA: CPT | Performed by: PODIATRIST

## 2020-05-06 PROCEDURE — 99232 SBSQ HOSP IP/OBS MODERATE 35: CPT | Performed by: INTERNAL MEDICINE

## 2020-05-06 RX ORDER — SODIUM CHLORIDE, SODIUM LACTATE, POTASSIUM CHLORIDE, CALCIUM CHLORIDE 600; 310; 30; 20 MG/100ML; MG/100ML; MG/100ML; MG/100ML
50 INJECTION, SOLUTION INTRAVENOUS CONTINUOUS
Status: DISCONTINUED | OUTPATIENT
Start: 2020-05-07 | End: 2020-05-07

## 2020-05-06 RX ADMIN — ASPIRIN 81 MG 81 MG: 81 TABLET ORAL at 08:22

## 2020-05-06 RX ADMIN — METRONIDAZOLE 500 MG: 500 TABLET, FILM COATED ORAL at 21:21

## 2020-05-06 RX ADMIN — INSULIN LISPRO 1 UNITS: 100 INJECTION, SOLUTION INTRAVENOUS; SUBCUTANEOUS at 12:59

## 2020-05-06 RX ADMIN — CEFAZOLIN SODIUM 1000 MG: 1 SOLUTION INTRAVENOUS at 05:23

## 2020-05-06 RX ADMIN — ACETAMINOPHEN 650 MG: 325 TABLET ORAL at 17:49

## 2020-05-06 RX ADMIN — METOPROLOL TARTRATE 25 MG: 25 TABLET, FILM COATED ORAL at 21:21

## 2020-05-06 RX ADMIN — CEFAZOLIN SODIUM 1000 MG: 1 SOLUTION INTRAVENOUS at 21:20

## 2020-05-06 RX ADMIN — HEPARIN SODIUM 5000 UNITS: 5000 INJECTION INTRAVENOUS; SUBCUTANEOUS at 05:23

## 2020-05-06 RX ADMIN — SODIUM CHLORIDE 75 ML/HR: 0.9 INJECTION, SOLUTION INTRAVENOUS at 05:26

## 2020-05-06 RX ADMIN — INSULIN LISPRO 1 UNITS: 100 INJECTION, SOLUTION INTRAVENOUS; SUBCUTANEOUS at 17:49

## 2020-05-06 RX ADMIN — INSULIN LISPRO 3 UNITS: 100 INJECTION, SOLUTION INTRAVENOUS; SUBCUTANEOUS at 21:20

## 2020-05-06 RX ADMIN — METRONIDAZOLE 500 MG: 500 TABLET, FILM COATED ORAL at 05:23

## 2020-05-06 RX ADMIN — FERROUS SULFATE TAB 325 MG (65 MG ELEMENTAL FE) 325 MG: 325 (65 FE) TAB at 17:48

## 2020-05-06 RX ADMIN — CEFAZOLIN SODIUM 1000 MG: 1 SOLUTION INTRAVENOUS at 13:01

## 2020-05-06 RX ADMIN — ACETAMINOPHEN 650 MG: 325 TABLET ORAL at 23:37

## 2020-05-06 RX ADMIN — INSULIN LISPRO 5 UNITS: 100 INJECTION, SOLUTION INTRAVENOUS; SUBCUTANEOUS at 17:49

## 2020-05-06 RX ADMIN — ACETAMINOPHEN 650 MG: 325 TABLET ORAL at 05:23

## 2020-05-06 RX ADMIN — SODIUM CHLORIDE, SODIUM LACTATE, POTASSIUM CHLORIDE, AND CALCIUM CHLORIDE 50 ML/HR: .6; .31; .03; .02 INJECTION, SOLUTION INTRAVENOUS at 23:37

## 2020-05-06 RX ADMIN — METOPROLOL TARTRATE 25 MG: 25 TABLET, FILM COATED ORAL at 08:22

## 2020-05-06 RX ADMIN — INSULIN GLARGINE 20 UNITS: 100 INJECTION, SOLUTION SUBCUTANEOUS at 21:20

## 2020-05-06 RX ADMIN — ATORVASTATIN CALCIUM 20 MG: 20 TABLET, FILM COATED ORAL at 08:22

## 2020-05-06 RX ADMIN — CLOPIDOGREL BISULFATE 75 MG: 75 TABLET ORAL at 08:22

## 2020-05-06 RX ADMIN — HEPARIN SODIUM 5000 UNITS: 5000 INJECTION INTRAVENOUS; SUBCUTANEOUS at 13:01

## 2020-05-06 RX ADMIN — FERROUS SULFATE TAB 325 MG (65 MG ELEMENTAL FE) 325 MG: 325 (65 FE) TAB at 08:22

## 2020-05-06 RX ADMIN — HEPARIN SODIUM 5000 UNITS: 5000 INJECTION INTRAVENOUS; SUBCUTANEOUS at 21:20

## 2020-05-06 RX ADMIN — ACETAMINOPHEN 650 MG: 325 TABLET ORAL at 11:16

## 2020-05-07 ENCOUNTER — ANESTHESIA (INPATIENT)
Dept: PERIOP | Facility: HOSPITAL | Age: 55
DRG: 305 | End: 2020-05-07
Payer: COMMERCIAL

## 2020-05-07 ENCOUNTER — APPOINTMENT (INPATIENT)
Dept: RADIOLOGY | Facility: HOSPITAL | Age: 55
DRG: 305 | End: 2020-05-07
Payer: COMMERCIAL

## 2020-05-07 ENCOUNTER — APPOINTMENT (INPATIENT)
Dept: NON INVASIVE DIAGNOSTICS | Facility: HOSPITAL | Age: 55
DRG: 305 | End: 2020-05-07
Payer: COMMERCIAL

## 2020-05-07 LAB
ANION GAP SERPL CALCULATED.3IONS-SCNC: 5 MMOL/L (ref 4–13)
BUN SERPL-MCNC: 16 MG/DL (ref 5–25)
CALCIUM SERPL-MCNC: 8 MG/DL (ref 8.3–10.1)
CHLORIDE SERPL-SCNC: 111 MMOL/L (ref 100–108)
CO2 SERPL-SCNC: 25 MMOL/L (ref 21–32)
CREAT SERPL-MCNC: 1.03 MG/DL (ref 0.6–1.3)
ERYTHROCYTE [DISTWIDTH] IN BLOOD BY AUTOMATED COUNT: 16.1 % (ref 11.6–15.1)
GAD65 AB SER-ACNC: <5 U/ML (ref 0–5)
GFR SERPL CREATININE-BSD FRML MDRD: 62 ML/MIN/1.73SQ M
GLUCOSE SERPL-MCNC: 135 MG/DL (ref 65–140)
GLUCOSE SERPL-MCNC: 140 MG/DL (ref 65–140)
GLUCOSE SERPL-MCNC: 143 MG/DL (ref 65–140)
GLUCOSE SERPL-MCNC: 154 MG/DL (ref 65–140)
GLUCOSE SERPL-MCNC: 254 MG/DL (ref 65–140)
GLUCOSE SERPL-MCNC: 88 MG/DL (ref 65–140)
GLUCOSE SERPL-MCNC: 90 MG/DL (ref 65–140)
HCT VFR BLD AUTO: 30.7 % (ref 34.8–46.1)
HGB BLD-MCNC: 9 G/DL (ref 11.5–15.4)
MCH RBC QN AUTO: 21.1 PG (ref 26.8–34.3)
MCHC RBC AUTO-ENTMCNC: 29.3 G/DL (ref 31.4–37.4)
MCV RBC AUTO: 72 FL (ref 82–98)
PLATELET # BLD AUTO: 256 THOUSANDS/UL (ref 149–390)
PMV BLD AUTO: 10.5 FL (ref 8.9–12.7)
POTASSIUM SERPL-SCNC: 3.9 MMOL/L (ref 3.5–5.3)
RBC # BLD AUTO: 4.27 MILLION/UL (ref 3.81–5.12)
SODIUM SERPL-SCNC: 141 MMOL/L (ref 136–145)
WBC # BLD AUTO: 9.76 THOUSAND/UL (ref 4.31–10.16)

## 2020-05-07 PROCEDURE — 0Y6M0ZB DETACHMENT AT RIGHT FOOT, PARTIAL 2ND RAY, OPEN APPROACH: ICD-10-PCS | Performed by: PODIATRIST

## 2020-05-07 PROCEDURE — 73630 X-RAY EXAM OF FOOT: CPT

## 2020-05-07 PROCEDURE — 99232 SBSQ HOSP IP/OBS MODERATE 35: CPT | Performed by: INTERNAL MEDICINE

## 2020-05-07 PROCEDURE — 88311 DECALCIFY TISSUE: CPT | Performed by: PATHOLOGY

## 2020-05-07 PROCEDURE — 93923 UPR/LXTR ART STDY 3+ LVLS: CPT

## 2020-05-07 PROCEDURE — 87070 CULTURE OTHR SPECIMN AEROBIC: CPT | Performed by: PODIATRIST

## 2020-05-07 PROCEDURE — 82948 REAGENT STRIP/BLOOD GLUCOSE: CPT

## 2020-05-07 PROCEDURE — 80048 BASIC METABOLIC PNL TOTAL CA: CPT | Performed by: PODIATRIST

## 2020-05-07 PROCEDURE — 28805 AMPUTATION THRU METATARSAL: CPT | Performed by: PODIATRIST

## 2020-05-07 PROCEDURE — 0Y6M0ZD DETACHMENT AT RIGHT FOOT, PARTIAL 4TH RAY, OPEN APPROACH: ICD-10-PCS | Performed by: PODIATRIST

## 2020-05-07 PROCEDURE — 0Y6M0ZF DETACHMENT AT RIGHT FOOT, PARTIAL 5TH RAY, OPEN APPROACH: ICD-10-PCS | Performed by: PODIATRIST

## 2020-05-07 PROCEDURE — 93925 LOWER EXTREMITY STUDY: CPT | Performed by: SURGERY

## 2020-05-07 PROCEDURE — 87077 CULTURE AEROBIC IDENTIFY: CPT | Performed by: PODIATRIST

## 2020-05-07 PROCEDURE — 87205 SMEAR GRAM STAIN: CPT | Performed by: PODIATRIST

## 2020-05-07 PROCEDURE — 88305 TISSUE EXAM BY PATHOLOGIST: CPT | Performed by: PATHOLOGY

## 2020-05-07 PROCEDURE — 0Y6M0ZC DETACHMENT AT RIGHT FOOT, PARTIAL 3RD RAY, OPEN APPROACH: ICD-10-PCS | Performed by: PODIATRIST

## 2020-05-07 PROCEDURE — NC001 PR NO CHARGE: Performed by: PODIATRIST

## 2020-05-07 PROCEDURE — 93925 LOWER EXTREMITY STUDY: CPT

## 2020-05-07 PROCEDURE — 93922 UPR/L XTREMITY ART 2 LEVELS: CPT | Performed by: SURGERY

## 2020-05-07 PROCEDURE — 87186 SC STD MICRODIL/AGAR DIL: CPT | Performed by: PODIATRIST

## 2020-05-07 PROCEDURE — 85027 COMPLETE CBC AUTOMATED: CPT | Performed by: PODIATRIST

## 2020-05-07 RX ORDER — MIDAZOLAM HYDROCHLORIDE 2 MG/2ML
INJECTION, SOLUTION INTRAMUSCULAR; INTRAVENOUS AS NEEDED
Status: DISCONTINUED | OUTPATIENT
Start: 2020-05-07 | End: 2020-05-07 | Stop reason: SURG

## 2020-05-07 RX ORDER — SUCCINYLCHOLINE/SOD CL,ISO/PF 100 MG/5ML
SYRINGE (ML) INTRAVENOUS AS NEEDED
Status: DISCONTINUED | OUTPATIENT
Start: 2020-05-07 | End: 2020-05-07 | Stop reason: SURG

## 2020-05-07 RX ORDER — LABETALOL 20 MG/4 ML (5 MG/ML) INTRAVENOUS SYRINGE
10
Status: DISCONTINUED | OUTPATIENT
Start: 2020-05-07 | End: 2020-05-07 | Stop reason: HOSPADM

## 2020-05-07 RX ORDER — PROPOFOL 10 MG/ML
INJECTION, EMULSION INTRAVENOUS AS NEEDED
Status: DISCONTINUED | OUTPATIENT
Start: 2020-05-07 | End: 2020-05-07 | Stop reason: SURG

## 2020-05-07 RX ORDER — METOCLOPRAMIDE HYDROCHLORIDE 5 MG/ML
10 INJECTION INTRAMUSCULAR; INTRAVENOUS ONCE AS NEEDED
Status: DISCONTINUED | OUTPATIENT
Start: 2020-05-07 | End: 2020-05-07 | Stop reason: HOSPADM

## 2020-05-07 RX ORDER — DEXTROSE MONOHYDRATE 25 G/50ML
INJECTION, SOLUTION INTRAVENOUS
Status: COMPLETED
Start: 2020-05-07 | End: 2020-05-07

## 2020-05-07 RX ORDER — ALBUTEROL SULFATE 2.5 MG/3ML
2.5 SOLUTION RESPIRATORY (INHALATION) ONCE AS NEEDED
Status: DISCONTINUED | OUTPATIENT
Start: 2020-05-07 | End: 2020-05-07 | Stop reason: HOSPADM

## 2020-05-07 RX ORDER — HYDRALAZINE HYDROCHLORIDE 20 MG/ML
5 INJECTION INTRAMUSCULAR; INTRAVENOUS
Status: DISCONTINUED | OUTPATIENT
Start: 2020-05-07 | End: 2020-05-07 | Stop reason: HOSPADM

## 2020-05-07 RX ORDER — MAGNESIUM HYDROXIDE 1200 MG/15ML
LIQUID ORAL AS NEEDED
Status: DISCONTINUED | OUTPATIENT
Start: 2020-05-07 | End: 2020-05-07 | Stop reason: HOSPADM

## 2020-05-07 RX ORDER — CEFAZOLIN SODIUM 1 G/3ML
INJECTION, POWDER, FOR SOLUTION INTRAMUSCULAR; INTRAVENOUS AS NEEDED
Status: DISCONTINUED | OUTPATIENT
Start: 2020-05-07 | End: 2020-05-07 | Stop reason: SURG

## 2020-05-07 RX ORDER — FENTANYL CITRATE 50 UG/ML
INJECTION, SOLUTION INTRAMUSCULAR; INTRAVENOUS AS NEEDED
Status: DISCONTINUED | OUTPATIENT
Start: 2020-05-07 | End: 2020-05-07 | Stop reason: SURG

## 2020-05-07 RX ORDER — PROMETHAZINE HYDROCHLORIDE 25 MG/ML
12.5 INJECTION, SOLUTION INTRAMUSCULAR; INTRAVENOUS ONCE AS NEEDED
Status: DISCONTINUED | OUTPATIENT
Start: 2020-05-07 | End: 2020-05-07 | Stop reason: HOSPADM

## 2020-05-07 RX ORDER — SODIUM CHLORIDE, SODIUM LACTATE, POTASSIUM CHLORIDE, CALCIUM CHLORIDE 600; 310; 30; 20 MG/100ML; MG/100ML; MG/100ML; MG/100ML
125 INJECTION, SOLUTION INTRAVENOUS CONTINUOUS
Status: DISCONTINUED | OUTPATIENT
Start: 2020-05-07 | End: 2020-05-08

## 2020-05-07 RX ORDER — EPHEDRINE SULFATE 50 MG/ML
INJECTION INTRAVENOUS AS NEEDED
Status: DISCONTINUED | OUTPATIENT
Start: 2020-05-07 | End: 2020-05-07 | Stop reason: SURG

## 2020-05-07 RX ORDER — LIDOCAINE HYDROCHLORIDE 10 MG/ML
INJECTION, SOLUTION EPIDURAL; INFILTRATION; INTRACAUDAL; PERINEURAL AS NEEDED
Status: DISCONTINUED | OUTPATIENT
Start: 2020-05-07 | End: 2020-05-07 | Stop reason: SURG

## 2020-05-07 RX ORDER — HYDROMORPHONE HCL/PF 1 MG/ML
0.2 SYRINGE (ML) INJECTION
Status: DISCONTINUED | OUTPATIENT
Start: 2020-05-07 | End: 2020-05-07 | Stop reason: HOSPADM

## 2020-05-07 RX ORDER — FENTANYL CITRATE/PF 50 MCG/ML
25 SYRINGE (ML) INJECTION
Status: DISCONTINUED | OUTPATIENT
Start: 2020-05-07 | End: 2020-05-07 | Stop reason: HOSPADM

## 2020-05-07 RX ORDER — ONDANSETRON 2 MG/ML
4 INJECTION INTRAMUSCULAR; INTRAVENOUS ONCE AS NEEDED
Status: DISCONTINUED | OUTPATIENT
Start: 2020-05-07 | End: 2020-05-07 | Stop reason: HOSPADM

## 2020-05-07 RX ORDER — HYDROMORPHONE HCL/PF 1 MG/ML
0.5 SYRINGE (ML) INJECTION
Status: DISCONTINUED | OUTPATIENT
Start: 2020-05-07 | End: 2020-05-07 | Stop reason: HOSPADM

## 2020-05-07 RX ADMIN — ASPIRIN 81 MG 81 MG: 81 TABLET ORAL at 08:48

## 2020-05-07 RX ADMIN — DEXTROSE MONOHYDRATE: 25 INJECTION, SOLUTION INTRAVENOUS at 12:48

## 2020-05-07 RX ADMIN — HYDRALAZINE HYDROCHLORIDE 5 MG: 20 INJECTION INTRAMUSCULAR; INTRAVENOUS at 15:54

## 2020-05-07 RX ADMIN — OXYCODONE HYDROCHLORIDE 5 MG: 5 TABLET ORAL at 21:42

## 2020-05-07 RX ADMIN — LIDOCAINE HYDROCHLORIDE 50 MG: 10 INJECTION, SOLUTION EPIDURAL; INFILTRATION; INTRACAUDAL; PERINEURAL at 13:37

## 2020-05-07 RX ADMIN — FENTANYL CITRATE 100 MCG: 50 INJECTION, SOLUTION INTRAMUSCULAR; INTRAVENOUS at 13:37

## 2020-05-07 RX ADMIN — PHENYLEPHRINE HYDROCHLORIDE 100 MCG: 10 INJECTION INTRAVENOUS at 13:55

## 2020-05-07 RX ADMIN — METOPROLOL TARTRATE 25 MG: 25 TABLET, FILM COATED ORAL at 08:48

## 2020-05-07 RX ADMIN — MIDAZOLAM 2 MG: 1 INJECTION INTRAMUSCULAR; INTRAVENOUS at 13:31

## 2020-05-07 RX ADMIN — HEPARIN SODIUM 5000 UNITS: 5000 INJECTION INTRAVENOUS; SUBCUTANEOUS at 21:30

## 2020-05-07 RX ADMIN — CLOPIDOGREL BISULFATE 75 MG: 75 TABLET ORAL at 08:48

## 2020-05-07 RX ADMIN — CEFAZOLIN SODIUM 1000 MG: 1 SOLUTION INTRAVENOUS at 21:35

## 2020-05-07 RX ADMIN — Medication 100 MG: at 13:37

## 2020-05-07 RX ADMIN — FERROUS SULFATE TAB 325 MG (65 MG ELEMENTAL FE) 325 MG: 325 (65 FE) TAB at 08:48

## 2020-05-07 RX ADMIN — SENNOSIDES 8.6 MG: 8.6 TABLET, FILM COATED ORAL at 08:48

## 2020-05-07 RX ADMIN — FENTANYL CITRATE 25 MCG: 50 INJECTION INTRAMUSCULAR; INTRAVENOUS at 15:30

## 2020-05-07 RX ADMIN — CEFAZOLIN 1000 MG: 1 INJECTION, POWDER, FOR SOLUTION INTRAVENOUS at 13:44

## 2020-05-07 RX ADMIN — OXYCODONE HYDROCHLORIDE 5 MG: 5 TABLET ORAL at 17:40

## 2020-05-07 RX ADMIN — ACETAMINOPHEN 650 MG: 325 TABLET ORAL at 05:25

## 2020-05-07 RX ADMIN — ACETAMINOPHEN 650 MG: 325 TABLET ORAL at 17:33

## 2020-05-07 RX ADMIN — HYDRALAZINE HYDROCHLORIDE 5 MG: 20 INJECTION INTRAMUSCULAR; INTRAVENOUS at 15:33

## 2020-05-07 RX ADMIN — INSULIN LISPRO 2 UNITS: 100 INJECTION, SOLUTION INTRAVENOUS; SUBCUTANEOUS at 21:25

## 2020-05-07 RX ADMIN — INSULIN LISPRO 5 UNITS: 100 INJECTION, SOLUTION INTRAVENOUS; SUBCUTANEOUS at 17:36

## 2020-05-07 RX ADMIN — PHENYLEPHRINE HYDROCHLORIDE 100 MCG: 10 INJECTION INTRAVENOUS at 13:43

## 2020-05-07 RX ADMIN — ATORVASTATIN CALCIUM 20 MG: 20 TABLET, FILM COATED ORAL at 08:48

## 2020-05-07 RX ADMIN — PROPOFOL 150 MG: 10 INJECTION, EMULSION INTRAVENOUS at 13:37

## 2020-05-07 RX ADMIN — CEFAZOLIN SODIUM 1000 MG: 1 SOLUTION INTRAVENOUS at 05:25

## 2020-05-07 RX ADMIN — EPHEDRINE SULFATE 10 MG: 50 INJECTION, SOLUTION INTRAVENOUS at 13:43

## 2020-05-07 RX ADMIN — FERROUS SULFATE TAB 325 MG (65 MG ELEMENTAL FE) 325 MG: 325 (65 FE) TAB at 17:33

## 2020-05-07 RX ADMIN — HEPARIN SODIUM 5000 UNITS: 5000 INJECTION INTRAVENOUS; SUBCUTANEOUS at 05:25

## 2020-05-07 RX ADMIN — METRONIDAZOLE 500 MG: 500 TABLET, FILM COATED ORAL at 05:25

## 2020-05-07 RX ADMIN — ACETAMINOPHEN 650 MG: 325 TABLET ORAL at 11:22

## 2020-05-07 RX ADMIN — METRONIDAZOLE 500 MG: 500 TABLET, FILM COATED ORAL at 21:25

## 2020-05-07 RX ADMIN — SODIUM CHLORIDE, SODIUM LACTATE, POTASSIUM CHLORIDE, AND CALCIUM CHLORIDE 125 ML/HR: .6; .31; .03; .02 INJECTION, SOLUTION INTRAVENOUS at 16:30

## 2020-05-07 RX ADMIN — FENTANYL CITRATE 25 MCG: 50 INJECTION INTRAMUSCULAR; INTRAVENOUS at 15:35

## 2020-05-07 RX ADMIN — METOPROLOL TARTRATE 25 MG: 25 TABLET, FILM COATED ORAL at 21:30

## 2020-05-07 RX ADMIN — INSULIN GLARGINE 20 UNITS: 100 INJECTION, SOLUTION SUBCUTANEOUS at 21:29

## 2020-05-08 PROBLEM — T81.31XA DEHISCENCE OF CLOSURE OF SKIN: Status: RESOLVED | Noted: 2020-05-04 | Resolved: 2020-05-08

## 2020-05-08 PROBLEM — L97.404 DIABETIC ULCER OF MIDFOOT ASSOCIATED WITH TYPE 2 DIABETES MELLITUS, WITH NECROSIS OF BONE (HCC): Status: ACTIVE | Noted: 2020-05-08

## 2020-05-08 PROBLEM — E11.621 DIABETIC ULCER OF MIDFOOT ASSOCIATED WITH TYPE 2 DIABETES MELLITUS, WITH NECROSIS OF BONE (HCC): Status: ACTIVE | Noted: 2020-05-08

## 2020-05-08 LAB
GLUCOSE SERPL-MCNC: 173 MG/DL (ref 65–140)
GLUCOSE SERPL-MCNC: 194 MG/DL (ref 65–140)
GLUCOSE SERPL-MCNC: 226 MG/DL (ref 65–140)
GLUCOSE SERPL-MCNC: 233 MG/DL (ref 65–140)

## 2020-05-08 PROCEDURE — 82948 REAGENT STRIP/BLOOD GLUCOSE: CPT

## 2020-05-08 PROCEDURE — 97163 PT EVAL HIGH COMPLEX 45 MIN: CPT

## 2020-05-08 PROCEDURE — 99231 SBSQ HOSP IP/OBS SF/LOW 25: CPT | Performed by: FAMILY MEDICINE

## 2020-05-08 PROCEDURE — 99024 POSTOP FOLLOW-UP VISIT: CPT | Performed by: PODIATRIST

## 2020-05-08 PROCEDURE — 97167 OT EVAL HIGH COMPLEX 60 MIN: CPT

## 2020-05-08 PROCEDURE — 99232 SBSQ HOSP IP/OBS MODERATE 35: CPT | Performed by: INTERNAL MEDICINE

## 2020-05-08 RX ORDER — ONDANSETRON 2 MG/ML
4 INJECTION INTRAMUSCULAR; INTRAVENOUS EVERY 6 HOURS PRN
Status: DISCONTINUED | OUTPATIENT
Start: 2020-05-08 | End: 2020-05-09 | Stop reason: HOSPADM

## 2020-05-08 RX ADMIN — INSULIN GLARGINE 20 UNITS: 100 INJECTION, SOLUTION SUBCUTANEOUS at 23:14

## 2020-05-08 RX ADMIN — CLOPIDOGREL BISULFATE 75 MG: 75 TABLET ORAL at 08:52

## 2020-05-08 RX ADMIN — HEPARIN SODIUM 5000 UNITS: 5000 INJECTION INTRAVENOUS; SUBCUTANEOUS at 14:21

## 2020-05-08 RX ADMIN — ONDANSETRON 4 MG: 2 INJECTION INTRAMUSCULAR; INTRAVENOUS at 05:13

## 2020-05-08 RX ADMIN — INSULIN LISPRO 5 UNITS: 100 INJECTION, SOLUTION INTRAVENOUS; SUBCUTANEOUS at 08:26

## 2020-05-08 RX ADMIN — CEFAZOLIN SODIUM 1000 MG: 1 SOLUTION INTRAVENOUS at 14:20

## 2020-05-08 RX ADMIN — METOPROLOL TARTRATE 25 MG: 25 TABLET, FILM COATED ORAL at 08:52

## 2020-05-08 RX ADMIN — CEFAZOLIN SODIUM 1000 MG: 1 SOLUTION INTRAVENOUS at 23:12

## 2020-05-08 RX ADMIN — ATORVASTATIN CALCIUM 20 MG: 20 TABLET, FILM COATED ORAL at 08:52

## 2020-05-08 RX ADMIN — POLYETHYLENE GLYCOL 3350 17 G: 17 POWDER, FOR SOLUTION ORAL at 08:52

## 2020-05-08 RX ADMIN — INSULIN LISPRO 1 UNITS: 100 INJECTION, SOLUTION INTRAVENOUS; SUBCUTANEOUS at 12:16

## 2020-05-08 RX ADMIN — INSULIN LISPRO 1 UNITS: 100 INJECTION, SOLUTION INTRAVENOUS; SUBCUTANEOUS at 07:13

## 2020-05-08 RX ADMIN — ACETAMINOPHEN 650 MG: 325 TABLET ORAL at 05:14

## 2020-05-08 RX ADMIN — FERROUS SULFATE TAB 325 MG (65 MG ELEMENTAL FE) 325 MG: 325 (65 FE) TAB at 07:45

## 2020-05-08 RX ADMIN — METRONIDAZOLE 500 MG: 500 TABLET, FILM COATED ORAL at 05:23

## 2020-05-08 RX ADMIN — ONDANSETRON 4 MG: 2 INJECTION INTRAMUSCULAR; INTRAVENOUS at 12:05

## 2020-05-08 RX ADMIN — METRONIDAZOLE 500 MG: 500 TABLET, FILM COATED ORAL at 23:11

## 2020-05-08 RX ADMIN — OXYCODONE HYDROCHLORIDE 5 MG: 5 TABLET ORAL at 16:03

## 2020-05-08 RX ADMIN — ACETAMINOPHEN 650 MG: 325 TABLET ORAL at 17:25

## 2020-05-08 RX ADMIN — ACETAMINOPHEN 650 MG: 325 TABLET ORAL at 23:11

## 2020-05-08 RX ADMIN — ASPIRIN 81 MG 81 MG: 81 TABLET ORAL at 08:52

## 2020-05-08 RX ADMIN — INSULIN LISPRO 2 UNITS: 100 INJECTION, SOLUTION INTRAVENOUS; SUBCUTANEOUS at 23:11

## 2020-05-08 RX ADMIN — INSULIN LISPRO 2 UNITS: 100 INJECTION, SOLUTION INTRAVENOUS; SUBCUTANEOUS at 17:26

## 2020-05-08 RX ADMIN — HEPARIN SODIUM 5000 UNITS: 5000 INJECTION INTRAVENOUS; SUBCUTANEOUS at 23:11

## 2020-05-08 RX ADMIN — ONDANSETRON 4 MG: 2 INJECTION INTRAMUSCULAR; INTRAVENOUS at 12:04

## 2020-05-08 RX ADMIN — ACETAMINOPHEN 650 MG: 325 TABLET ORAL at 12:06

## 2020-05-08 RX ADMIN — METOPROLOL TARTRATE 25 MG: 25 TABLET, FILM COATED ORAL at 23:11

## 2020-05-08 RX ADMIN — HEPARIN SODIUM 5000 UNITS: 5000 INJECTION INTRAVENOUS; SUBCUTANEOUS at 05:19

## 2020-05-08 RX ADMIN — SENNOSIDES 8.6 MG: 8.6 TABLET, FILM COATED ORAL at 08:52

## 2020-05-08 RX ADMIN — CEFAZOLIN SODIUM 1000 MG: 1 SOLUTION INTRAVENOUS at 05:55

## 2020-05-08 RX ADMIN — FERROUS SULFATE TAB 325 MG (65 MG ELEMENTAL FE) 325 MG: 325 (65 FE) TAB at 16:03

## 2020-05-08 RX ADMIN — TRAMADOL HYDROCHLORIDE 50 MG: 50 TABLET, FILM COATED ORAL at 05:13

## 2020-05-08 RX ADMIN — METRONIDAZOLE 500 MG: 500 TABLET, FILM COATED ORAL at 14:24

## 2020-05-09 VITALS
BODY MASS INDEX: 36.64 KG/M2 | HEIGHT: 60 IN | TEMPERATURE: 98.5 F | RESPIRATION RATE: 21 BRPM | OXYGEN SATURATION: 95 % | SYSTOLIC BLOOD PRESSURE: 126 MMHG | HEART RATE: 75 BPM | DIASTOLIC BLOOD PRESSURE: 63 MMHG

## 2020-05-09 LAB
GLUCOSE SERPL-MCNC: 183 MG/DL (ref 65–140)
GLUCOSE SERPL-MCNC: 215 MG/DL (ref 65–140)
ISLET CELL512 AB SER-ACNC: <7.5 U/ML

## 2020-05-09 PROCEDURE — 82948 REAGENT STRIP/BLOOD GLUCOSE: CPT

## 2020-05-09 PROCEDURE — NC001 PR NO CHARGE: Performed by: PODIATRIST

## 2020-05-09 PROCEDURE — 99232 SBSQ HOSP IP/OBS MODERATE 35: CPT | Performed by: INTERNAL MEDICINE

## 2020-05-09 PROCEDURE — 99024 POSTOP FOLLOW-UP VISIT: CPT | Performed by: PODIATRIST

## 2020-05-09 RX ORDER — CEPHALEXIN 500 MG/1
500 CAPSULE ORAL EVERY 6 HOURS SCHEDULED
Qty: 12 CAPSULE | Refills: 0 | Status: SHIPPED | OUTPATIENT
Start: 2020-05-09 | End: 2020-05-12

## 2020-05-09 RX ORDER — METRONIDAZOLE 500 MG/1
500 TABLET ORAL EVERY 8 HOURS SCHEDULED
Qty: 9 TABLET | Refills: 0 | Status: SHIPPED | OUTPATIENT
Start: 2020-05-09 | End: 2020-05-12

## 2020-05-09 RX ADMIN — ASPIRIN 81 MG 81 MG: 81 TABLET ORAL at 08:26

## 2020-05-09 RX ADMIN — SENNOSIDES 8.6 MG: 8.6 TABLET, FILM COATED ORAL at 08:26

## 2020-05-09 RX ADMIN — CLOPIDOGREL BISULFATE 75 MG: 75 TABLET ORAL at 08:26

## 2020-05-09 RX ADMIN — INSULIN LISPRO 1 UNITS: 100 INJECTION, SOLUTION INTRAVENOUS; SUBCUTANEOUS at 08:25

## 2020-05-09 RX ADMIN — FERROUS SULFATE TAB 325 MG (65 MG ELEMENTAL FE) 325 MG: 325 (65 FE) TAB at 08:26

## 2020-05-09 RX ADMIN — ACETAMINOPHEN 650 MG: 325 TABLET ORAL at 06:05

## 2020-05-09 RX ADMIN — METOPROLOL TARTRATE 25 MG: 25 TABLET, FILM COATED ORAL at 08:26

## 2020-05-09 RX ADMIN — CEFAZOLIN SODIUM 1000 MG: 1 SOLUTION INTRAVENOUS at 06:05

## 2020-05-09 RX ADMIN — ATORVASTATIN CALCIUM 20 MG: 20 TABLET, FILM COATED ORAL at 08:26

## 2020-05-09 RX ADMIN — HEPARIN SODIUM 5000 UNITS: 5000 INJECTION INTRAVENOUS; SUBCUTANEOUS at 06:05

## 2020-05-09 RX ADMIN — METRONIDAZOLE 500 MG: 500 TABLET, FILM COATED ORAL at 06:04

## 2020-05-10 ENCOUNTER — DOCUMENTATION (OUTPATIENT)
Dept: OTHER | Facility: HOSPITAL | Age: 55
End: 2020-05-10

## 2020-05-10 LAB
BACTERIA WND AEROBE CULT: ABNORMAL
GRAM STN SPEC: ABNORMAL

## 2020-05-11 ENCOUNTER — TRANSITIONAL CARE MANAGEMENT (OUTPATIENT)
Dept: FAMILY MEDICINE CLINIC | Facility: CLINIC | Age: 55
End: 2020-05-11

## 2020-05-11 ENCOUNTER — OFFICE VISIT (OUTPATIENT)
Dept: MULTI SPECIALTY CLINIC | Facility: CLINIC | Age: 55
End: 2020-05-11

## 2020-05-11 ENCOUNTER — TELEPHONE (OUTPATIENT)
Dept: FAMILY MEDICINE CLINIC | Facility: CLINIC | Age: 55
End: 2020-05-11

## 2020-05-11 VITALS — TEMPERATURE: 98.7 F | SYSTOLIC BLOOD PRESSURE: 130 MMHG | DIASTOLIC BLOOD PRESSURE: 90 MMHG

## 2020-05-11 DIAGNOSIS — Z09 POSTOP CHECK: Primary | ICD-10-CM

## 2020-05-11 DIAGNOSIS — Z89.431 S/P TRANSMETATARSAL AMPUTATION OF FOOT, RIGHT (HCC): ICD-10-CM

## 2020-05-11 PROCEDURE — 99213 OFFICE O/P EST LOW 20 MIN: CPT | Performed by: PODIATRIST

## 2020-05-11 PROCEDURE — 1036F TOBACCO NON-USER: CPT | Performed by: PODIATRIST

## 2020-05-11 PROCEDURE — 3052F HG A1C>EQUAL 8.0%<EQUAL 9.0%: CPT | Performed by: PODIATRIST

## 2020-05-11 PROCEDURE — 3080F DIAST BP >= 90 MM HG: CPT | Performed by: PODIATRIST

## 2020-05-11 PROCEDURE — 3075F SYST BP GE 130 - 139MM HG: CPT | Performed by: PODIATRIST

## 2020-05-11 PROCEDURE — 3066F NEPHROPATHY DOC TX: CPT | Performed by: PODIATRIST

## 2020-05-11 PROCEDURE — 1111F DSCHRG MED/CURRENT MED MERGE: CPT | Performed by: PODIATRIST

## 2020-05-14 ENCOUNTER — TELEMEDICINE (OUTPATIENT)
Dept: FAMILY MEDICINE CLINIC | Facility: CLINIC | Age: 55
End: 2020-05-14

## 2020-05-14 DIAGNOSIS — Z89.431 HISTORY OF TRANSMETATARSAL AMPUTATION OF RIGHT FOOT (HCC): ICD-10-CM

## 2020-05-14 DIAGNOSIS — Z79.4 TYPE 2 DIABETES MELLITUS WITH OTHER CIRCULATORY COMPLICATION, WITH LONG-TERM CURRENT USE OF INSULIN (HCC): Primary | ICD-10-CM

## 2020-05-14 DIAGNOSIS — E11.59 TYPE 2 DIABETES MELLITUS WITH OTHER CIRCULATORY COMPLICATION, WITH LONG-TERM CURRENT USE OF INSULIN (HCC): Primary | ICD-10-CM

## 2020-05-14 DIAGNOSIS — G47.33 OBSTRUCTIVE SLEEP APNEA SYNDROME: ICD-10-CM

## 2020-05-14 PROCEDURE — 99213 OFFICE O/P EST LOW 20 MIN: CPT | Performed by: FAMILY MEDICINE

## 2020-05-14 RX ORDER — OXYCODONE HYDROCHLORIDE 5 MG/1
5 TABLET ORAL EVERY 4 HOURS PRN
Qty: 30 TABLET | Refills: 0 | Status: SHIPPED | OUTPATIENT
Start: 2020-05-14 | End: 2020-08-26 | Stop reason: ALTCHOICE

## 2020-05-15 ENCOUNTER — TELEPHONE (OUTPATIENT)
Dept: FAMILY MEDICINE CLINIC | Facility: CLINIC | Age: 55
End: 2020-05-15

## 2020-05-21 ENCOUNTER — TELEMEDICINE (OUTPATIENT)
Dept: ENDOCRINOLOGY | Facility: CLINIC | Age: 55
End: 2020-05-21
Payer: COMMERCIAL

## 2020-05-21 DIAGNOSIS — I73.9 PAD (PERIPHERAL ARTERY DISEASE) (HCC): ICD-10-CM

## 2020-05-21 DIAGNOSIS — Z79.4 TYPE 2 DIABETES MELLITUS WITHOUT COMPLICATION, WITH LONG-TERM CURRENT USE OF INSULIN (HCC): ICD-10-CM

## 2020-05-21 DIAGNOSIS — E78.5 HYPERLIPIDEMIA, UNSPECIFIED HYPERLIPIDEMIA TYPE: ICD-10-CM

## 2020-05-21 DIAGNOSIS — Z79.4 TYPE 2 DIABETES MELLITUS WITH OTHER CIRCULATORY COMPLICATION, WITH LONG-TERM CURRENT USE OF INSULIN (HCC): Primary | ICD-10-CM

## 2020-05-21 DIAGNOSIS — E11.59 TYPE 2 DIABETES MELLITUS WITH OTHER CIRCULATORY COMPLICATION, WITH LONG-TERM CURRENT USE OF INSULIN (HCC): Primary | ICD-10-CM

## 2020-05-21 DIAGNOSIS — Z79.4 CURRENT USE OF INSULIN (HCC): ICD-10-CM

## 2020-05-21 DIAGNOSIS — E11.59 TYPE 2 DIABETES MELLITUS WITH OTHER CIRCULATORY COMPLICATION, WITH LONG-TERM CURRENT USE OF INSULIN (HCC): ICD-10-CM

## 2020-05-21 DIAGNOSIS — E11.610 CHARCOT FOOT DUE TO DIABETES MELLITUS (HCC): ICD-10-CM

## 2020-05-21 DIAGNOSIS — E11.9 TYPE 2 DIABETES MELLITUS WITHOUT COMPLICATION, WITH LONG-TERM CURRENT USE OF INSULIN (HCC): ICD-10-CM

## 2020-05-21 DIAGNOSIS — Z89.439 HISTORY OF TRANSMETATARSAL AMPUTATION OF FOOT (HCC): ICD-10-CM

## 2020-05-21 DIAGNOSIS — Z79.4 TYPE 2 DIABETES MELLITUS WITH OTHER CIRCULATORY COMPLICATION, WITH LONG-TERM CURRENT USE OF INSULIN (HCC): ICD-10-CM

## 2020-05-21 PROBLEM — I96 GANGRENE OF TOE OF RIGHT FOOT (HCC): Status: RESOLVED | Noted: 2019-10-30 | Resolved: 2020-05-21

## 2020-05-21 PROCEDURE — 1111F DSCHRG MED/CURRENT MED MERGE: CPT | Performed by: INTERNAL MEDICINE

## 2020-05-21 PROCEDURE — 99213 OFFICE O/P EST LOW 20 MIN: CPT | Performed by: INTERNAL MEDICINE

## 2020-05-21 RX ORDER — INSULIN ASPART 100 [IU]/ML
INJECTION, SOLUTION INTRAVENOUS; SUBCUTANEOUS
Qty: 5 PEN | Refills: 3 | Status: SHIPPED | OUTPATIENT
Start: 2020-05-21 | End: 2021-03-31 | Stop reason: ALTCHOICE

## 2020-05-27 ENCOUNTER — TELEMEDICINE (OUTPATIENT)
Dept: VASCULAR SURGERY | Facility: CLINIC | Age: 55
End: 2020-05-27
Payer: COMMERCIAL

## 2020-05-27 VITALS — HEIGHT: 60 IN | WEIGHT: 170 LBS | BODY MASS INDEX: 33.38 KG/M2

## 2020-05-27 DIAGNOSIS — N18.2 CKD STAGE 2 DUE TO TYPE 2 DIABETES MELLITUS (HCC): Primary | ICD-10-CM

## 2020-05-27 DIAGNOSIS — E11.22 CKD STAGE 2 DUE TO TYPE 2 DIABETES MELLITUS (HCC): Primary | ICD-10-CM

## 2020-05-27 DIAGNOSIS — L97.414 DIABETIC ULCER OF RIGHT MIDFOOT ASSOCIATED WITH TYPE 2 DIABETES MELLITUS, WITH NECROSIS OF BONE (HCC): ICD-10-CM

## 2020-05-27 DIAGNOSIS — I73.9 PAD (PERIPHERAL ARTERY DISEASE) (HCC): ICD-10-CM

## 2020-05-27 DIAGNOSIS — E11.621 DIABETIC ULCER OF RIGHT MIDFOOT ASSOCIATED WITH TYPE 2 DIABETES MELLITUS, WITH NECROSIS OF BONE (HCC): ICD-10-CM

## 2020-05-27 PROCEDURE — 99214 OFFICE O/P EST MOD 30 MIN: CPT | Performed by: SURGERY

## 2020-05-27 RX ORDER — ASPIRIN 81 MG/1
81 TABLET, CHEWABLE ORAL DAILY
COMMUNITY
Start: 2020-05-27

## 2020-05-27 RX ORDER — CLOPIDOGREL BISULFATE 75 MG/1
75 TABLET ORAL DAILY
Qty: 90 TABLET | Refills: 1 | Status: SHIPPED | OUTPATIENT
Start: 2020-05-27 | End: 2020-09-08 | Stop reason: SDUPTHER

## 2020-05-28 ENCOUNTER — TELEPHONE (OUTPATIENT)
Dept: NEPHROLOGY | Facility: CLINIC | Age: 55
End: 2020-05-28

## 2020-06-15 ENCOUNTER — OFFICE VISIT (OUTPATIENT)
Dept: FAMILY MEDICINE CLINIC | Facility: CLINIC | Age: 55
End: 2020-06-15

## 2020-06-15 VITALS
OXYGEN SATURATION: 97 % | SYSTOLIC BLOOD PRESSURE: 98 MMHG | HEART RATE: 68 BPM | DIASTOLIC BLOOD PRESSURE: 60 MMHG | TEMPERATURE: 97.2 F | RESPIRATION RATE: 16 BRPM

## 2020-06-15 DIAGNOSIS — E11.59 TYPE 2 DIABETES MELLITUS WITH OTHER CIRCULATORY COMPLICATION, WITH LONG-TERM CURRENT USE OF INSULIN (HCC): Primary | ICD-10-CM

## 2020-06-15 DIAGNOSIS — Z79.4 TYPE 2 DIABETES MELLITUS WITH OTHER CIRCULATORY COMPLICATION, WITH LONG-TERM CURRENT USE OF INSULIN (HCC): Primary | ICD-10-CM

## 2020-06-15 DIAGNOSIS — Z98.890 POST-OPERATIVE STATE: ICD-10-CM

## 2020-06-15 PROCEDURE — 99213 OFFICE O/P EST LOW 20 MIN: CPT

## 2020-06-15 PROCEDURE — 1111F DSCHRG MED/CURRENT MED MERGE: CPT

## 2020-06-15 PROCEDURE — 1036F TOBACCO NON-USER: CPT

## 2020-06-15 PROCEDURE — 3066F NEPHROPATHY DOC TX: CPT

## 2020-06-15 PROCEDURE — 3052F HG A1C>EQUAL 8.0%<EQUAL 9.0%: CPT

## 2020-06-15 PROCEDURE — 3078F DIAST BP <80 MM HG: CPT

## 2020-06-15 PROCEDURE — 3074F SYST BP LT 130 MM HG: CPT

## 2020-06-22 ENCOUNTER — OFFICE VISIT (OUTPATIENT)
Dept: FAMILY MEDICINE CLINIC | Facility: CLINIC | Age: 55
End: 2020-06-22

## 2020-06-22 VITALS
DIASTOLIC BLOOD PRESSURE: 80 MMHG | SYSTOLIC BLOOD PRESSURE: 112 MMHG | RESPIRATION RATE: 18 BRPM | TEMPERATURE: 98.4 F | OXYGEN SATURATION: 97 % | HEART RATE: 76 BPM

## 2020-06-22 DIAGNOSIS — E11.59 TYPE 2 DIABETES MELLITUS WITH OTHER CIRCULATORY COMPLICATION, WITH LONG-TERM CURRENT USE OF INSULIN (HCC): Primary | ICD-10-CM

## 2020-06-22 DIAGNOSIS — Z98.890 POST-OPERATIVE STATE: ICD-10-CM

## 2020-06-22 DIAGNOSIS — Z79.4 TYPE 2 DIABETES MELLITUS WITH OTHER CIRCULATORY COMPLICATION, WITH LONG-TERM CURRENT USE OF INSULIN (HCC): Primary | ICD-10-CM

## 2020-06-22 DIAGNOSIS — T81.31XD DISRUPTION OR DEHISCENCE OF CLOSURE OF SKIN, SUBSEQUENT ENCOUNTER: ICD-10-CM

## 2020-06-22 PROCEDURE — 1111F DSCHRG MED/CURRENT MED MERGE: CPT

## 2020-06-22 PROCEDURE — 3079F DIAST BP 80-89 MM HG: CPT

## 2020-06-22 PROCEDURE — 3066F NEPHROPATHY DOC TX: CPT

## 2020-06-22 PROCEDURE — 3074F SYST BP LT 130 MM HG: CPT

## 2020-06-22 PROCEDURE — 3052F HG A1C>EQUAL 8.0%<EQUAL 9.0%: CPT

## 2020-06-22 PROCEDURE — 99213 OFFICE O/P EST LOW 20 MIN: CPT

## 2020-06-22 PROCEDURE — 1036F TOBACCO NON-USER: CPT

## 2020-06-29 ENCOUNTER — OFFICE VISIT (OUTPATIENT)
Dept: FAMILY MEDICINE CLINIC | Facility: CLINIC | Age: 55
End: 2020-06-29

## 2020-06-29 VITALS
HEART RATE: 62 BPM | SYSTOLIC BLOOD PRESSURE: 124 MMHG | TEMPERATURE: 97.2 F | DIASTOLIC BLOOD PRESSURE: 70 MMHG | OXYGEN SATURATION: 90 % | RESPIRATION RATE: 20 BRPM

## 2020-06-29 DIAGNOSIS — T81.31XD DISRUPTION OR DEHISCENCE OF CLOSURE OF SKIN, SUBSEQUENT ENCOUNTER: ICD-10-CM

## 2020-06-29 DIAGNOSIS — Z79.4 TYPE 2 DIABETES MELLITUS WITH OTHER CIRCULATORY COMPLICATION, WITH LONG-TERM CURRENT USE OF INSULIN (HCC): Primary | ICD-10-CM

## 2020-06-29 DIAGNOSIS — Z98.890 POST-OPERATIVE STATE: ICD-10-CM

## 2020-06-29 DIAGNOSIS — E11.59 TYPE 2 DIABETES MELLITUS WITH OTHER CIRCULATORY COMPLICATION, WITH LONG-TERM CURRENT USE OF INSULIN (HCC): Primary | ICD-10-CM

## 2020-06-29 PROCEDURE — 3078F DIAST BP <80 MM HG: CPT

## 2020-06-29 PROCEDURE — 99212 OFFICE O/P EST SF 10 MIN: CPT

## 2020-06-29 PROCEDURE — 3052F HG A1C>EQUAL 8.0%<EQUAL 9.0%: CPT

## 2020-06-29 PROCEDURE — 3066F NEPHROPATHY DOC TX: CPT

## 2020-06-29 PROCEDURE — 1111F DSCHRG MED/CURRENT MED MERGE: CPT

## 2020-06-29 PROCEDURE — 3074F SYST BP LT 130 MM HG: CPT

## 2020-06-29 PROCEDURE — 1036F TOBACCO NON-USER: CPT

## 2020-07-06 ENCOUNTER — TELEPHONE (OUTPATIENT)
Dept: VASCULAR SURGERY | Facility: CLINIC | Age: 55
End: 2020-07-06

## 2020-07-07 ENCOUNTER — APPOINTMENT (OUTPATIENT)
Dept: WOUND CARE | Facility: HOSPITAL | Age: 55
End: 2020-07-07
Payer: COMMERCIAL

## 2020-07-07 PROCEDURE — 11042 DBRDMT SUBQ TIS 1ST 20SQCM/<: CPT

## 2020-07-07 PROCEDURE — 99213 OFFICE O/P EST LOW 20 MIN: CPT

## 2020-07-13 ENCOUNTER — HOSPITAL ENCOUNTER (OUTPATIENT)
Dept: RADIOLOGY | Facility: HOSPITAL | Age: 55
Discharge: HOME | End: 2020-07-13
Attending: PODIATRIST
Payer: COMMERCIAL

## 2020-07-13 ENCOUNTER — TRANSCRIBE ORDERS (OUTPATIENT)
Dept: ADMINISTRATIVE | Facility: HOSPITAL | Age: 55
End: 2020-07-13

## 2020-07-13 DIAGNOSIS — L97.519 NON-PRESSURE CHRONIC ULCER OF OTHER PART OF RIGHT FOOT WITH UNSPECIFIED SEVERITY (HCC): Primary | ICD-10-CM

## 2020-07-13 DIAGNOSIS — L97.519 NON-PRESSURE CHRONIC ULCER OF OTHER PART OF RIGHT FOOT WITH UNSPECIFIED SEVERITY (HCC): ICD-10-CM

## 2020-07-13 PROCEDURE — 73630 X-RAY EXAM OF FOOT: CPT

## 2020-07-14 ENCOUNTER — TELEPHONE (OUTPATIENT)
Dept: VASCULAR SURGERY | Facility: CLINIC | Age: 55
End: 2020-07-14

## 2020-07-14 ENCOUNTER — APPOINTMENT (OUTPATIENT)
Dept: WOUND CARE | Facility: HOSPITAL | Age: 55
End: 2020-07-14
Payer: COMMERCIAL

## 2020-07-14 PROCEDURE — 11042 DBRDMT SUBQ TIS 1ST 20SQCM/<: CPT

## 2020-07-14 NOTE — TELEPHONE ENCOUNTER

## 2020-07-15 ENCOUNTER — OFFICE VISIT (OUTPATIENT)
Dept: VASCULAR SURGERY | Facility: CLINIC | Age: 55
End: 2020-07-15
Payer: COMMERCIAL

## 2020-07-15 VITALS
HEART RATE: 66 BPM | BODY MASS INDEX: 34.95 KG/M2 | HEIGHT: 60 IN | DIASTOLIC BLOOD PRESSURE: 80 MMHG | WEIGHT: 178 LBS | SYSTOLIC BLOOD PRESSURE: 128 MMHG | TEMPERATURE: 97.5 F

## 2020-07-15 DIAGNOSIS — I73.9 PAD (PERIPHERAL ARTERY DISEASE) (HCC): Primary | ICD-10-CM

## 2020-07-15 PROCEDURE — 3079F DIAST BP 80-89 MM HG: CPT | Performed by: SURGERY

## 2020-07-15 PROCEDURE — 3052F HG A1C>EQUAL 8.0%<EQUAL 9.0%: CPT | Performed by: SURGERY

## 2020-07-15 PROCEDURE — 99214 OFFICE O/P EST MOD 30 MIN: CPT | Performed by: SURGERY

## 2020-07-15 PROCEDURE — 1036F TOBACCO NON-USER: CPT | Performed by: SURGERY

## 2020-07-15 PROCEDURE — 3008F BODY MASS INDEX DOCD: CPT | Performed by: NURSE PRACTITIONER

## 2020-07-15 PROCEDURE — 3008F BODY MASS INDEX DOCD: CPT | Performed by: SURGERY

## 2020-07-15 PROCEDURE — 3066F NEPHROPATHY DOC TX: CPT | Performed by: SURGERY

## 2020-07-15 PROCEDURE — 3074F SYST BP LT 130 MM HG: CPT | Performed by: SURGERY

## 2020-07-15 PROCEDURE — 3008F BODY MASS INDEX DOCD: CPT | Performed by: INTERNAL MEDICINE

## 2020-07-15 NOTE — ASSESSMENT & PLAN NOTE
Santos Louis is a 60-year-old with longstanding diabetes/peripheral arterial disease  Most recently she underwent right lower extremity arteriogram with recanalization of occluded SFA stent and short segment peroneal occlusion  She has severe tibioperoneal disease  She has a nonhealing right TMA site  I have offered her a repeat right lower extremity arteriogram with possible tibial intervention  At this time she is reluctant to proceed  She has requested return office visit in 1 month to reassess her wounds  She understands she is at high risk for major limb loss

## 2020-07-15 NOTE — PATIENT INSTRUCTIONS
As we discussed today in the office I have recommended repeat right lower extremity arteriogram with possible intervention in effort to salvage right leg  Instead you expressed that you would like to hold off on intervention and continue with local wound care and return to the office in 1 month to reassess wound  You understand that you are at significant for major limb loss

## 2020-07-15 NOTE — PROGRESS NOTES
Assessment/Plan:    PAD (peripheral artery disease) ([facility] )  Luc Guy is a 51-year-old with longstanding diabetes/peripheral arterial disease  Most recently she underwent right lower extremity arteriogram with recanalization of occluded SFA stent and short segment peroneal occlusion  She has severe tibioperoneal disease  She has a nonhealing right TMA site  I have offered her a repeat right lower extremity arteriogram with possible tibial intervention  At this time she is reluctant to proceed  She has as for return office visit in 1 month to reassess her wounds  She understands she is at high risk for major limb loss  Diagnoses and all orders for this visit:    PAD (peripheral artery disease) ([facility] [address] )          Subjective:      Patient ID: Robin Ballard is a 47 y o  female  Patient is here for 4 week FU on the R TMA site that was opened  Patient goes to wound care every Tuesday and Patient has a VNA that comes th,S to change the bandage  Patient currently has the Calcium alginate then ABD pads then kerlix  Patient has some bloody drainage coming from the wound  Patient states the drainage is very minium  Patient denies having any pain in the R foot  Patient does not ambulate at all, she is wheel chair bound  Patient currently takes ASA 81mg, Atorvastatin, Plavix  Nitza Churchill is a 51-year-old poorly controlled diabetic with significant peripheral arterial disease who was recently underwent right lower extremity arteriogram with SFA/peroneal intervention back in May  She has a nonhealing right TMA site  The following portions of the patient's history were reviewed and updated as appropriate: allergies, current medications, past family history, past medical history, past social history, past surgical history and problem list     Review of Systems   Constitutional: Negative  Negative for chills and fever  HENT: Negative  Eyes: Negative  Respiratory: Negative    Negative for chest tightness and shortness of breath  Cardiovascular: Negative  Negative for chest pain  Gastrointestinal: Negative  Endocrine: Negative  Genitourinary: Negative  Musculoskeletal: Positive for gait problem (due to R TMA)  Skin: Positive for wound (R TMA open site )  Bloody drainage from R TMA site    Allergic/Immunologic: Negative  Hematological: Negative  Psychiatric/Behavioral: Negative  I have personally reviewed the ROS entered by MA and agree as documented  Objective:      /80 (BP Location: Right arm, Patient Position: Sitting, Cuff Size: Standard)   Pulse 66   Temp 97 5 °F (36 4 °C) (Tympanic)   Ht 5' (1 524 m)   Wt 80 7 kg (178 lb)   LMP  (LMP Unknown)   BMI 34 76 kg/m²          Physical Exam   Constitutional: She is oriented to person, place, and time  She appears well-developed and well-nourished  No distress  HENT:   Head: Normocephalic and atraumatic  Eyes: Conjunctivae and EOM are normal  No scleral icterus  Neck: Normal range of motion  Neck supple  No tracheal deviation present  Cardiovascular: Normal rate, regular rhythm and normal heart sounds  Pulses:       Dorsalis pedis pulses are 0 on the right side  Posterior tibial pulses are 0 on the right side  Monophasic right DP Doppler signal   Pulmonary/Chest: Effort normal and breath sounds normal    Abdominal: Soft  She exhibits no distension and no mass (no appreciable aortic pulsation/aneurysm)  There is no tenderness  There is no rebound and no guarding  Musculoskeletal: Normal range of motion  Neurological: She is alert and oriented to person, place, and time  Skin: Skin is warm and dry  Psychiatric: She has a normal mood and affect   Her behavior is normal

## 2020-07-21 ENCOUNTER — TELEPHONE (OUTPATIENT)
Dept: NEPHROLOGY | Facility: CLINIC | Age: 55
End: 2020-07-21

## 2020-07-22 NOTE — PROGRESS NOTES
FAMILY PRACTICE PRE-OPERATIVE EVALUATION  [facility] PHYSICIAN GROUP - [facility] PRACTICE ALEXSANDER    NAME: Vu Lyon  AGE: 47 y o  SEX: female  : 1965     DATE: 2020    Family Practice Pre-Operative Evaluation      Chief Complaint: Pre-operative Evaluation     Surgery: multiple tooth extractions   Anticipated Date of Surgery: not scheduled yet      History of Present Illness:     Sendy Melendez is a 47 y o  female with past medical history of diabetes (last HgA1C 8 3 on 2020) and extensive peripheral arterial disease status post multiple endovascular interventions to the bilateral lower extremities  She recently underwent repeat RLE arteriogram w/ recanalization of occluded SFA stent and right TMA revision on 2020  The right TMA wound is noted to be non-healing  The patient is going to the wound center once per week for wound care and has a visiting nurse come to the home for dressing changes 2 x/week  She previously has had left TMA in 2019  She follows with vascular surgery, nephrology, endocrinology, and podiatry  She presents today for medical clearance for a dental procedure involving multiple extractions  She is accompanied by her daughter  She does not have any paperwork specifying what surgery or what anesthesia, if any, will be required  Chronic conditions, medications and allergies were reviewed  Assessment of Cardiac Risk:  ·  SFA PTA/stent placed on 2019  · No decompensated heart failure (e g  New onset heart failure, NYHA functional class IV heart failure, or worsening existing heart failure) in past 3 mos    · No severe heart valve disease including aortic stenosis or symptomatic mitral stenosis  · Current EKG in office today unchanged from previous- NSR w/ evidence of age indeterminate septal infarct: consistent with EKG in 2019      Exercise Capacity:  · Currently wheelchair bound 2/2 NWB RLE s/p right TMA   · Denies dyspnea with transfers or at rest     NO Prior Anesthesia Reactions  No prolonged steroid use in past 6 mos  P A T  If done reviewed  Review of Systems:     Review of Systems   Constitutional: Negative for activity change, appetite change, chills, fatigue, fever and unexpected weight change  HENT: Negative for congestion, hearing loss, nosebleeds, sinus pain, sneezing, sore throat and trouble swallowing  Eyes: Negative for photophobia and visual disturbance  Respiratory: Negative for cough, chest tightness, shortness of breath and wheezing  Cardiovascular: Negative for chest pain, palpitations and leg swelling  Gastrointestinal: Negative for abdominal pain, constipation, nausea and vomiting  Genitourinary: Negative for decreased urine volume, difficulty urinating, dysuria, flank pain, genital sores, hematuria and urgency  Musculoskeletal: Positive for gait problem  Negative for back pain  Skin: Positive for wound  Negative for pallor and rash  Neurological: Negative for dizziness, seizures, syncope, weakness, numbness and headaches  Hematological: Negative for adenopathy  Does not bruise/bleed easily         Current Problem List:     Patient Active Problem List   Diagnosis    Type 2 diabetes mellitus with circulatory disorder, with long-term current use of insulin ([facility] )    Essential hypertension    HLD (hyperlipidemia)    [facility]per[facility]) [facility]    CKD stage 2 due to type 2 diabetes mellitus (HCC)    Microcytic anemia    Leukocytosis    Toe amputation status, left    Critical lower limb ischemia    Polyneuropathy associated with underlying disease ([facility] )    Charcot foot due to diabetes mellitus ([facility] )    History of transmetatarsal amputation of foot ([facility] )    Class 2 severe obesity due to excess calories with serious comorbidity and body mass index (BMI) of 36 0 to 36 9 in adult [facility])    Cellulitis of right lower extremity    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, with necrosis of bone (HCC)       Allergies:     No Known Allergies    Current Medications:       Current Outpatient Medications:     acetaminophen (TYLENOL) 325 mg tablet, Take 2 tablets (650 mg total) by mouth every 6 (six) hours as needed for mild pain, headaches or fever, Disp: , Rfl:     aspirin 81 mg chewable tablet, Chew 1 tablet (81 mg total) daily, Disp: , Rfl:     atorvastatin (LIPITOR) 20 mg tablet, Take 1 tablet (20 mg total) by mouth daily, Disp: 90 tablet, Rfl: 1    cetirizine (ZyrTEC) 10 mg tablet, Take 1 tablet (10 mg total) by mouth daily, Disp: 30 tablet, Rfl: 0    clopidogrel (PLAVIX) 75 mg tablet, Take 1 tablet (75 mg total) by mouth daily, Disp: 90 tablet, Rfl: 1    ferrous sulfate 324 (65 Fe) mg, Take 1 tablet (324 mg total) by mouth 2 (two) times a day before meals, Disp: 180 tablet, Rfl: 3    fluticasone (FLONASE) 50 mcg/act nasal spray, 1 spray into each nostril daily, Disp: 1 Bottle, Rfl: 0    glucose blood (ONE TOUCH ULTRA TEST) test strip, Use to test 3x daily, Disp: 90 each, Rfl: 5    Insulin Aspart FlexPen (NovoLOG FlexPen) 100 UNIT/ML SOPN, Inject 5units subcut up to 3 times daily just before a meal, Disp: 5 pen, Rfl: 3    insulin glargine (Lantus SoloStar) 100 units/mL injection pen, Inject 20 Units under the skin daily at bedtime, Disp: 5 pen, Rfl: 1    Insulin Pen Needle 32G X 4 MM MISC, Use 4 times daily with insulin pens, Disp: 120 each, Rfl: 5    metFORMIN (GLUCOPHAGE) 1000 MG tablet, Take 1,000 mg by mouth 2 (two) times a day with meals, Disp: , Rfl:     metoprolol tartrate (LOPRESSOR) 25 mg tablet, Take 1 tablet (25 mg total) by mouth every 12 (twelve) hours, Disp: 180 tablet, Rfl: 1    ONETOUCH DELICA LANCETS FINE MISC, Testing Three times a day, Disp: 100 each, Rfl: 5    oxyCODONE (ROXICODONE) 5 mg immediate release tablet, Take 1 tablet (5 mg total) by mouth every 4 (four) hours as needed for moderate painMax Daily Amount: 30 mg, Disp: 30 tablet, Rfl: 0    Past Medical History:       Past Medical History:   Diagnosis Date    Cellulitis of toe 2019    CKD (chronic kidney disease)     Diabetes mellitus [facility] )     Gangrene of toe of right foot ([facility] ) 10/30/2019    Added automatically from request for surgery [id number]    Hypertension         Past Surgical History:   Procedure Laterality Date    IR ABDOMINAL ANGIOGRAPHY  2019    IR ABDOMINAL ANGIOGRAPHY / INTERVENTION  2020    IR ARTERIAL LYSIS  2019    IR LOWER EXTREMITY / INTERVENTION  2019    MN AMPUTATION FOOT,TRANSMETATARSAL Right 2019    Procedure: AMPUTATION TRANSMETATARSAL (TMA) WITH ACHILLES TENDON LENGTHING;  Surgeon: Ambrocio Monsivais DPM;  Location[facility];  Service: Podiatry    MN AMPUTATION [facility] Right 2020    Procedure: AMPUTATION TRANSMETATARSAL (TMA);  Surgeon: Coty Suggs DPM;  [facility];  Service: Podiatry    TOE AMPUTATION Left 2019    Procedure: AMPUTATION 5th TOE;  Surgeon: Ambrocio Monsivais DPM;  [facility];  Service: Podiatry    TOE AMPUTATION Right 2019    Procedure: AMPUTATION RIGHT 3RD AND 4TH TOE;  Surgeon: Ambrocio Monsivais DPM;  [facility];  Service: Podiatry        Family History   Problem Relation Age of Onset    Diabetes Mother     No Known Problems Father         Social History     Socioeconomic History    Marital status: Single     Spouse name: Not on file    Number of children: Not on file    Years of education: Not on file    Highest education level: Not on file   Occupational History    Not on file   Social Needs    Financial resource strain: Not on file    Food insecurity:     Worry: Not on file     Inability: Not on file    Transportation needs:     Medical: No     Non-medical: No   Tobacco Use    Smoking status: Former Smoker     Types: Cigarettes     Last attempt to quit:      Years since quittin 5    Smokeless tobacco: Former User Substance and Sexual Activity    Alcohol use: Not Currently    Drug use: No    Sexual activity: Not on file   Lifestyle    Physical activity:     Days per week: Not on file     Minutes per session: Not on file    Stress: Not on file   Relationships    Social connections:     Talks on phone: Not on file     Gets together: Not on file     Attends [employer] service: Not on file     Active member of club or organization: Not on file     Attends meetings of clubs or organizations: Not on file     Relationship status: Not on file    Intimate partner violence:     Fear of current or ex partner: Not on file     Emotionally abused: Not on file     Physically abused: Not on file     Forced sexual activity: Not on file   Other Topics Concern    Not on file   Social History Narrative    Not on file     Contains abnormal data Basic metabolic panel   7/7//5143  Order: [id number]   Status:  Final result    Ref Range & Units 5/7/20  6:07 AM   Sodium 136 - 145 mmol/L 141    Potassium 3 5 - 5 3 mmol/L 3 9    Chloride 100 - 108 mmol/L 111High     CO2 21 - 32 mmol/L 25    ANION GAP 4 - 13 mmol/L 5    BUN 5 - 25 mg/dL 16    Creatinine 0 60 - 1 30 mg/dL 1 03    Comment: Standardized to IDMS reference method   Glucose 65 - 140 mg/dL 143High     Comment:    If the patient is fasting, the ADA then defines impaired fasting glucose as > 100 mg/dL and diabetes as > or equal to 123 mg/dL  Specimen collection should occur prior to Sulfasalazine administration due to the potential for falsely depressed results  Specimen collection should occur prior to Sulfapyridine administration due to the potential for falsely elevated results     Calcium 8 3 - 10 1 mg/dL 8 0Low     eGFR ml/min/1 73sq m 62              Contains abnormal data CBC (With Platelets)   5/0/9111  Order: [id number]   Status:  Final result    Ref Range & Units 5/7/20  6:07 AM   WBC 4 31 - 10 16 Thousand/uL 9 76    RBC 3 81 - 5 12 Million/uL 4 27    Hemoglobin 11 5 - 15 4 g/dL 9 0Low     Hematocrit 34 8 - 46 1 % 30 7Low     MCV 82 - 98 fL 72Low     MCH 26 8 - 34 3 pg 21 1Low     MCHC 31 4 - 37 4 g/dL 29 3Low     RDW 11 6 - 15 1 % 16 1High     Platelets 701 - 880 Thousands/uL 256    MPV 8 9 - 12 7 fL 10 5          Specimen Collected: 05/07/20  6:07 AM   Last Resulted: 05/07/20  6:45 AM                    Physical Exam:     /88 (BP Location: Right arm, Patient Position: Sitting, Cuff Size: Standard)   Pulse 64   Temp (!) 96 4 °F (35 8 °C) (Temporal)   Resp 18   Ht 5' (1 524 m)   LMP  (LMP Unknown)   SpO2 98%   BMI 34 76 kg/m²     Physical Exam   Constitutional: She is oriented to person, place, and time  She has a sickly appearance  No distress  Patient in a wheelchair in office 2/2 to NWB status to RLE    HENT:   Head: Normocephalic and atraumatic  Right Ear: External ear normal    Left Ear: External ear normal    Mouth/Throat: Oropharynx is clear and moist    Eyes: Pupils are equal, round, and reactive to light  Conjunctivae are normal  Right eye exhibits no discharge  Left eye exhibits no discharge  Neck: Normal range of motion  Neck supple  Cardiovascular: Normal rate, regular rhythm and normal heart sounds  Pulses:       Radial pulses are 2+ on the right side, and 2+ on the left side  Dorsalis pedis pulses are 0 on the right side, and 0 on the left side  Posterior tibial pulses are 1+ on the right side, and 1+ on the left side  Pulmonary/Chest: Effort normal and breath sounds normal  No respiratory distress  She has no wheezes  Abdominal: Soft  Bowel sounds are normal  She exhibits no distension  There is no tenderness  Musculoskeletal: Normal range of motion  She exhibits edema (+1 BLLE)  Right foot: There is deformity (s/p TMA- currently w/ intact dressing and tubigrip over, no drainage noted through dressing )  Lymphadenopathy:     She has no cervical adenopathy     Neurological: She is alert and oriented to person, place, and time    Skin: Skin is warm and dry  Capillary refill takes less than 2 seconds  No rash noted  She is not diaphoretic  Psychiatric: She has a normal mood and affect  Nursing note and vitals reviewed  Assessment & Recommendations:       If the patient does have the procedure would recommend continuing DAPT  Surgical Procedure risk category: unable to be determined as anesthesia requirements for procedure have not been specified     Patient specific operative risk categegory: High risk 2/2 severe PAD w/ critical lower limb ischemia, historically controlled diabetes, CKD, and hypertension         Katy Meza  07/23/20

## 2020-07-23 ENCOUNTER — CONSULT (OUTPATIENT)
Dept: FAMILY MEDICINE CLINIC | Facility: CLINIC | Age: 55
End: 2020-07-23

## 2020-07-23 VITALS
RESPIRATION RATE: 18 BRPM | OXYGEN SATURATION: 98 % | DIASTOLIC BLOOD PRESSURE: 88 MMHG | SYSTOLIC BLOOD PRESSURE: 130 MMHG | TEMPERATURE: 96.4 F | HEART RATE: 64 BPM | BODY MASS INDEX: 34.76 KG/M2 | HEIGHT: 60 IN

## 2020-07-23 DIAGNOSIS — I73.9 PAD (PERIPHERAL ARTERY DISEASE) (HCC): ICD-10-CM

## 2020-07-23 DIAGNOSIS — K02.9 DENTAL CARIES: ICD-10-CM

## 2020-07-23 DIAGNOSIS — Z01.818 PRE-OPERATIVE CLEARANCE: Primary | ICD-10-CM

## 2020-07-23 DIAGNOSIS — J30.9 ALLERGIC RHINITIS, UNSPECIFIED SEASONALITY, UNSPECIFIED TRIGGER: ICD-10-CM

## 2020-07-23 PROCEDURE — 99243 OFF/OP CNSLTJ NEW/EST LOW 30: CPT | Performed by: NURSE PRACTITIONER

## 2020-07-23 PROCEDURE — 93000 ELECTROCARDIOGRAM COMPLETE: CPT | Performed by: NURSE PRACTITIONER

## 2020-07-23 PROCEDURE — 3052F HG A1C>EQUAL 8.0%<EQUAL 9.0%: CPT | Performed by: NURSE PRACTITIONER

## 2020-07-23 RX ORDER — FLUTICASONE PROPIONATE 50 MCG
1 SPRAY, SUSPENSION (ML) NASAL DAILY
Qty: 1 BOTTLE | Refills: 0 | Status: SHIPPED | OUTPATIENT
Start: 2020-07-23 | End: 2020-09-08 | Stop reason: SDUPTHER

## 2020-07-23 RX ORDER — CETIRIZINE HYDROCHLORIDE 10 MG/1
10 TABLET ORAL DAILY
Qty: 30 TABLET | Refills: 0 | Status: SHIPPED | OUTPATIENT
Start: 2020-07-23 | End: 2020-09-08

## 2020-07-23 NOTE — PATIENT INSTRUCTIONS
Tooth Extraction   AMBULATORY CARE:   What do I need to know about a tooth extraction? A tooth extraction is a procedure to remove 1 or more teeth  Your healthcare provider will talk to you about how to prepare for this procedure  He will tell you what medicines to take or not take before the procedure, the day of, and after the procedure  What will happen during a tooth extraction? Your dentist or oral surgeon may use medicine to numb the gum around the tooth and dull the pain  You may still feel pressure or pushing during the procedure  He may also give you medicine to keep you asleep and free from pain during the procedure  Your surgeon may first use a tool to cut the ligament that holds your tooth in place  He will grasp your tooth with forceps and move the tooth until it loosens  Once the tooth is loose enough, he will pull the tooth out  He may rinse the site with a sterile solution  He will apply gauze on the extraction site and ask you to bite down to help control bleeding  What are the risks of a tooth extraction? · You may bleed more than expected or get an infection  You may have trouble fully opening your mouth for a longer period of time than expected  You may develop dry socket  Dry socket is a condition in which a blood clot does not form on the extraction site as it should or it gets dislodged  Dry socket can cause severe pain  A part of the bone that holds your tooth in place may be broken during the tooth extraction  This can cause your upper and lower teeth to become misaligned  It can also lead to an infection or tingling or numbness  · The top part of your tooth may break while it is being pulled  Your surgeon may need to cut your gum or part of your bone to remove the rest of the tooth  The nerve near the root of your tooth may be injured during this surgery    Seek care immediately if:   · You have bleeding that has not decreased within 12 hours after your tooth extraction     Contact your dentist or oral surgeon if:   · You have a fever  · You have severe, throbbing pain and swelling that do not improve with treatment  · You have a foul taste or drainage coming from the extraction site  · You feel like your teeth have moved or that your teeth are not aligned  · You have numbness or tingling in your mouth  · You still cannot fully open your mouth 1 week after your tooth extraction  · You have questions or concerns about your condition or care  Self-care:   · Keep your gauze in place for 2 hours after your procedure  This helps to control bleeding by forming a blood clot  · Avoid exercise as directed  Exercise can increase your blood pressure and make your extraction site bleed  · Do not smoke or drink from a straw for 3 days  You may develop a dry socket if you smoke or use a straw  · Do not rinse your mouth for 24 hours  This helps decrease your risk for dry socket  Dry socket is a condition in which a blood clot does not form on the extraction site as it should or it gets dislodged  Dry socket can cause severe pain  · Eat only soft foods and liquids for 24 hours  This helps control pain  · Apply ice on any swollen areas of your face for 15 to 20 minutes every hour or as directed  Use an ice pack, or put crushed ice in a plastic bag  Cover it with a towel  Ice helps prevent tissue damage and decreases swelling and pain  · To help decrease swelling, sleep with your head elevated  Avoid sleeping on your side  Follow up with your dentist or oral surgeon as directed:  Write down your questions so you remember to ask them during your visits  © 2017 [employer] Information is for End User's use only and may not be sold, redistributed or otherwise used for commercial purposes  All illustrations and images included in CareNotes® are the copyrighted property of [employer]  or [employer]    The above information is an  only  It is not intended as medical advice for individual conditions or treatments  Talk to your doctor, nurse or pharmacist before following any medical regimen to see if it is safe and effective for you

## 2020-07-27 ENCOUNTER — APPOINTMENT (OUTPATIENT)
Dept: WOUND CARE | Facility: HOSPITAL | Age: 55
End: 2020-07-27
Payer: COMMERCIAL

## 2020-07-27 ENCOUNTER — TELEPHONE (OUTPATIENT)
Dept: FAMILY MEDICINE CLINIC | Facility: CLINIC | Age: 55
End: 2020-07-27

## 2020-07-27 PROCEDURE — 11042 DBRDMT SUBQ TIS 1ST 20SQCM/<: CPT

## 2020-08-04 ENCOUNTER — APPOINTMENT (OUTPATIENT)
Dept: WOUND CARE | Facility: HOSPITAL | Age: 55
End: 2020-08-04
Payer: COMMERCIAL

## 2020-08-04 ENCOUNTER — TELEPHONE (OUTPATIENT)
Dept: FAMILY MEDICINE CLINIC | Facility: CLINIC | Age: 55
End: 2020-08-04

## 2020-08-04 PROCEDURE — 11042 DBRDMT SUBQ TIS 1ST 20SQCM/<: CPT

## 2020-08-04 NOTE — TELEPHONE ENCOUNTER
Brittany Rahman from [facility] asking for two referrals one for facility and 1 for doctor  Facility ID number is [id number] and doctors ID number [id number]  Fax number [phone]   Please advise

## 2020-08-04 NOTE — TELEPHONE ENCOUNTER
Not sure if this is with you or me  Please let me know if there is something I should do about above message

## 2020-08-11 ENCOUNTER — OFFICE VISIT (OUTPATIENT)
Dept: WOUND CARE | Facility: HOSPITAL | Age: 55
End: 2020-08-11
Payer: COMMERCIAL

## 2020-08-11 VITALS
SYSTOLIC BLOOD PRESSURE: 122 MMHG | HEART RATE: 76 BPM | RESPIRATION RATE: 20 BRPM | TEMPERATURE: 96.3 F | DIASTOLIC BLOOD PRESSURE: 72 MMHG

## 2020-08-11 DIAGNOSIS — E11.621 DIABETIC ULCER OF RIGHT MIDFOOT ASSOCIATED WITH TYPE 2 DIABETES MELLITUS, WITH NECROSIS OF BONE (HCC): Primary | ICD-10-CM

## 2020-08-11 DIAGNOSIS — L97.414 DIABETIC ULCER OF RIGHT MIDFOOT ASSOCIATED WITH TYPE 2 DIABETES MELLITUS, WITH NECROSIS OF BONE (HCC): Primary | ICD-10-CM

## 2020-08-11 PROCEDURE — 11042 DBRDMT SUBQ TIS 1ST 20SQCM/<: CPT | Performed by: PODIATRIST

## 2020-08-11 NOTE — PROGRESS NOTES
Right Lateral foot wound  Wound cleansed with normal saline  Felt offloading pad cut into a horshoe shap applied to periwound skin  Acticoat 3 applied to wound bed  Covered with 2x2, 4x4, and abd and secured with rolled gauze, paper tape and tubifast blue

## 2020-08-11 NOTE — PATIENT INSTRUCTIONS
Orders Placed This Encounter   Procedures    Debridement     This order was created via procedure documentation    Wound cleansing and dressings     Right lateral foot wound  Wash your hands with soap and water  Remove old dressing, discard into plastic bag and place in trash  Cleanse the wound with normal saline prior to applying a clean dressing  Do not use tissue or cotton balls  Do not scrub the wound  Pat dry using gauze  Shower no Unless you do not get the dressing wet  Apply felt offloading pad in horseshoe shape to skin surrounding wound  May leave this intact if it is clean  Apply acticoat 3 to the right lateral foot wound  Cover with 4x4, abd  Secure with rolled gauze and tape  Change dressing three times a week     Standing Status:   Future     Standing Expiration Date:   8/11/2021    Wound home care     Continue visiting nurses three times a week for dressing changes  May skip visits on the day that the patient comes to wound care  Standing Status:   Future     Standing Expiration Date:   8/11/2021    Wound off loading     Continue no weight bearing to right foot  If you need to bear weight for balance only use the heel       Standing Status:   Future     Standing Expiration Date:   8/11/2021

## 2020-08-11 NOTE — PROGRESS NOTES
Patient ID: Porter Roe is a 47 y o  female Date of Birth 1965     Chief Complaint:  Right foot wound    Allergies  Patient has no known allergies  Assessment:       Diagnoses and all orders for this visit:    Diabetic ulcer of right midfoot associated with type 2 diabetes mellitus, with necrosis of bone ([address] )  -     Wound cleansing and dressings; Future  -     Wound home care; Future  -     Wound off loading; Future    Other orders  -     Debridement            Debridement   Consent obtained? verbal  Consent given by: patient  Risks discussed? procedural risks discussed  Performed by: physician  Debridement type: surgical  Level of debridement: subcutaneous tissue  Pain control: lidocaine 4%  Pre-debridement measurements  Length (cm): 2  Width (cm): 1  Depth (cm): 0 4  Surface Area (cm^2): 2  Volume (cm^3): 0 8    Post-debridement measurements  Length (cm): 2 3  Width (cm): 1 2  Depth (cm): 0 6  Percent debrided: 100%  Surface Area (cm^2): 2 76  Area debrided (cm^2): 2 76  Volume (cm^3): 1 66  Tissue and other material debrided: adipose, dermis, epidermis and subcutaneous tissue  Devitalized tissue debrided: biofilm, exudate, fibrin, necrotic debris and slough  Instrument(s) utilized: blade  Bleeding: small  Hemostasis obtained with: pressure  Procedural pain (0-10): 0  Post-procedural pain: 0   Response to treatment: procedure was tolerated well          Plan:  Continues heel debridement and local wound care  Wound is slowly improving in appearance and size  Wound Foot Right;Lateral (Active)       Subjective:        Patient arrives to wound Care for care of her nonhealing transmetatarsal amputation wound  She states the wound looks good since we changed the dressings last week  She has been nonweightbearing        The following portions of the patient's history were reviewed and updated as appropriate: allergies, current medications, past family history, past medical history, past social history, past surgical history and problem list     Review of Systems   Constitutional: Negative  Objective:     Wound Foot Right;Lateral (Active)   Wound Image Images linked 08/11/20 0932   Wound Description Granulation tissue;Slough 08/11/20 0938   Sharon-wound Assessment Dry; Intact 08/11/20 0938   Wound Length (cm) 2 cm 08/11/20 0938   Wound Width (cm) 1 cm 08/11/20 0938   Wound Depth (cm) 0 4 cm 08/11/20 0938   Wound Surface Area (cm^2) 2 cm^2 08/11/20 0938   Wound Volume (cm^3) 0 8 cm^3 08/11/20 0938   Calculated Wound Volume (cm^3) 0 8 cm^3 08/11/20 0938   Drainage Amount Small 08/11/20 0938   Drainage Description Serosanguineous 08/11/20 0938   Non-staged Wound Description Full thickness 08/11/20 0938   Treatments Cleansed 08/11/20 0938   Dressing Changed Changed 08/11/20 0938   Patient Tolerance Tolerated well 08/11/20 0938   Dressing Status Removed 08/11/20 0938         /72 (BP Location: Left arm, Patient Position: Sitting, Cuff Size: Standard)   Pulse 76   Temp (!) 96 3 °F (35 7 °C)   Resp 20   LMP  (LMP Unknown)     Physical Exam  Feet:      Right foot:      Amputation: (Transmetatarsal amputation)     Skin integrity: Ulcer (Ulcer right lateral transmetatarsal amputation measuring 2 x 1 x 0 4 cm  No probe to bone  No sign of infection  Wound bed is granular with fibrotic slough  Wound edges are rolled   ) present  Wound Instructions:  Orders Placed This Encounter   Procedures    Debridement     This order was created via procedure documentation    Wound cleansing and dressings     Right lateral foot wound  Wash your hands with soap and water  Remove old dressing, discard into plastic bag and place in trash  Cleanse the wound with normal saline prior to applying a clean dressing  Do not use tissue or cotton balls  Do not scrub the wound  Pat dry using gauze    Shower no Unless you do not get the dressing wet  Apply felt offloading pad in horseshoe shape to skin surrounding wound  May leave this intact if it is clean  Apply acticoat 3 to the right lateral foot wound  Cover with 4x4, abd  Secure with rolled gauze and tape  Change dressing three times a week     Standing Status:   Future     Standing Expiration Date:   8/11/2021    Wound home care     Continue visiting nurses three times a week for dressing changes  May skip visits on the day that the patient comes to wound care  Standing Status:   Future     Standing Expiration Date:   8/11/2021    Wound off loading     Continue no weight bearing to right foot  If you need to bear weight for balance only use the heel       Standing Status:   Future     Standing Expiration Date:   8/11/2021

## 2020-08-25 ENCOUNTER — TELEPHONE (OUTPATIENT)
Dept: VASCULAR SURGERY | Facility: CLINIC | Age: 55
End: 2020-08-25

## 2020-08-25 ENCOUNTER — OFFICE VISIT (OUTPATIENT)
Dept: WOUND CARE | Facility: HOSPITAL | Age: 55
End: 2020-08-25
Payer: COMMERCIAL

## 2020-08-25 VITALS
SYSTOLIC BLOOD PRESSURE: 122 MMHG | DIASTOLIC BLOOD PRESSURE: 80 MMHG | RESPIRATION RATE: 18 BRPM | TEMPERATURE: 97 F | HEART RATE: 76 BPM

## 2020-08-25 DIAGNOSIS — E11.621 DIABETIC ULCER OF RIGHT MIDFOOT ASSOCIATED WITH TYPE 2 DIABETES MELLITUS, WITH NECROSIS OF BONE (HCC): Primary | ICD-10-CM

## 2020-08-25 DIAGNOSIS — L97.414 DIABETIC ULCER OF RIGHT MIDFOOT ASSOCIATED WITH TYPE 2 DIABETES MELLITUS, WITH NECROSIS OF BONE (HCC): Primary | ICD-10-CM

## 2020-08-25 PROCEDURE — 11042 DBRDMT SUBQ TIS 1ST 20SQCM/<: CPT | Performed by: PODIATRIST

## 2020-08-25 RX ORDER — LIDOCAINE 40 MG/G
CREAM TOPICAL ONCE
Status: COMPLETED | OUTPATIENT
Start: 2020-08-25 | End: 2020-08-25

## 2020-08-25 RX ADMIN — LIDOCAINE: 40 CREAM TOPICAL at 11:47

## 2020-08-25 NOTE — PROGRESS NOTES
Patient ID: Alyson Purdy is a 47 y o  female Date of Birth 1965     Chief Complaint  Chief Complaint   Patient presents with    Follow Up Wound Care Visit     Follow up right foot wound       Allergies  Patient has no known allergies  Assessment:    No problem-specific Assessment & Plan notes found for this encounter  Diagnoses and all orders for this visit:    Diabetic ulcer of right midfoot associated with type 2 diabetes mellitus, with necrosis of bone (HCC)  -     lidocaine (LMX) 4 % cream  -     Wound cleansing and dressings; Future  -     Debridement              Debridement   Consent obtained? verbal  Consent given by: patient  Risks discussed? procedural risks discussed  Immediately prior to the procedure a time out was called  Performed by: physician  Debridement type: surgical  Level of debridement: subcutaneous tissue  Pain control: lidocaine 4%  Post-debridement measurements  Length (cm): 3  Width (cm): 2  Depth (cm): 0 3  Percent debrided: 100%  Surface Area (cm^2): 6  Area debrided (cm^2): 6  Volume (cm^3): 1 8  Tissue and other material debrided: subcutaneous tissue  Devitalized tissue debrided: biofilm, callus, fibrin, necrotic debris and slough  Instrument(s) utilized: blade  Bleeding: medium  Hemostasis obtained with: pressure and silver nitrate  Procedural pain (0-10): 0  Post-procedural pain: 0   Response to treatment: procedure was tolerated well          Plan:  Continue offloading and local wound care  Will add and a form to graft  If no significant improvement consider placental graft  Wound was debrided today  Wound Foot Right;Lateral (Active)       Subjective:        Patient arrives for care of chronic right foot transmetatarsal amputation wound  She feels well        The following portions of the patient's history were reviewed and updated as appropriate: allergies, current medications, past family history, past medical history, past social history, past surgical history and problem list     Review of Systems   Constitutional: Negative  Skin: Positive for wound  Objective:            /80   Pulse 76   Temp (!) 97 °F (36 1 °C)   Resp 18   LMP  (LMP Unknown)     Physical Exam                  Wound Instructions:  Orders Placed This Encounter   Procedures    Wound cleansing and dressings     Right foot wound  Wash your hands with soap and water  Remove old dressing, discard into plastic bag and place in trash  Cleanse the wound with normal saline or mild soap and water prior to applying a clean dressing  Do not use tissue or cotton balls  Do not scrub the wound  Pat dry using gauze  Shower yes do not get dressing wet  Apply skin prep to skin surrounding wound  Apply endoform and dermagran to the right  wound  Cover with 4x4  Secure with rolled gauze  Change dressing three times a week   This treatment was applied in the office today     Continue with VNA services three times a week for dressing changes  May skip on the days the patient comes to the wound care center    Continue to offload right foot as much as possible      Follow up in one week     Standing Status:   Future     Standing Expiration Date:   8/25/2021    Debridement     This order was created via procedure documentation

## 2020-08-25 NOTE — PATIENT INSTRUCTIONS
Orders Placed This Encounter   Procedures    Wound cleansing and dressings     Right foot wound  Wash your hands with soap and water  Remove old dressing, discard into plastic bag and place in trash  Cleanse the wound with normal saline or mild soap and water prior to applying a clean dressing  Do not use tissue or cotton balls  Do not scrub the wound  Pat dry using gauze  Shower yes do not get dressing wet  Apply skin prep to skin surrounding wound  Apply endoform and dermagran to the right  wound  Cover with 4x4  Secure with rolled gauze  Change dressing three times a week   This treatment was applied in the office today     Continue with VNA services three times a week for dressing changes  May skip on the days the patient comes to the wound care center    Continue to offload right foot as much as possible      Follow up in one week     Standing Status:   Future     Standing Expiration Date:   8/25/2021

## 2020-08-26 ENCOUNTER — TELEPHONE (OUTPATIENT)
Dept: NEPHROLOGY | Facility: CLINIC | Age: 55
End: 2020-08-26

## 2020-08-26 ENCOUNTER — OFFICE VISIT (OUTPATIENT)
Dept: VASCULAR SURGERY | Facility: CLINIC | Age: 55
End: 2020-08-26
Payer: COMMERCIAL

## 2020-08-26 VITALS
BODY MASS INDEX: 34.76 KG/M2 | HEART RATE: 80 BPM | TEMPERATURE: 98.7 F | DIASTOLIC BLOOD PRESSURE: 78 MMHG | SYSTOLIC BLOOD PRESSURE: 124 MMHG | HEIGHT: 60 IN | RESPIRATION RATE: 18 BRPM

## 2020-08-26 DIAGNOSIS — I70.229 CRITICAL LOWER LIMB ISCHEMIA (HCC): ICD-10-CM

## 2020-08-26 DIAGNOSIS — E11.59 TYPE 2 DIABETES MELLITUS WITH OTHER CIRCULATORY COMPLICATION, WITH LONG-TERM CURRENT USE OF INSULIN (HCC): ICD-10-CM

## 2020-08-26 DIAGNOSIS — Z89.431 S/P TRANSMETATARSAL AMPUTATION OF FOOT, RIGHT (HCC): ICD-10-CM

## 2020-08-26 DIAGNOSIS — I73.9 PAD (PERIPHERAL ARTERY DISEASE) (HCC): Primary | ICD-10-CM

## 2020-08-26 DIAGNOSIS — Z79.4 TYPE 2 DIABETES MELLITUS WITH OTHER CIRCULATORY COMPLICATION, WITH LONG-TERM CURRENT USE OF INSULIN (HCC): ICD-10-CM

## 2020-08-26 PROCEDURE — 3066F NEPHROPATHY DOC TX: CPT | Performed by: SURGERY

## 2020-08-26 PROCEDURE — 3052F HG A1C>EQUAL 8.0%<EQUAL 9.0%: CPT | Performed by: SURGERY

## 2020-08-26 PROCEDURE — 3074F SYST BP LT 130 MM HG: CPT | Performed by: SURGERY

## 2020-08-26 PROCEDURE — 3078F DIAST BP <80 MM HG: CPT | Performed by: SURGERY

## 2020-08-26 PROCEDURE — 1036F TOBACCO NON-USER: CPT | Performed by: SURGERY

## 2020-08-26 PROCEDURE — 99214 OFFICE O/P EST MOD 30 MIN: CPT | Performed by: SURGERY

## 2020-08-26 NOTE — ASSESSMENT & PLAN NOTE
Patient with significant peripheral arterial disease status post right lower extremity are repeat arteriogram with recanalization of occluded right SFA/in stent occlusion back in May  Patient subsequently had a right TMA  Patient continues to go to wound clinic for local wound care  There is a small area that remains open  Images from Dr [place] noted  Dressing not removed today in the office  Patient with severe tibioperoneal disease  If wound healing is believed to have become stagnant and or wounds deteriorate please call our office to schedule for repeat right lower extremity arteriogram with possible intervention if indicated  Will obtain a 3 month lower extremity arterial duplex

## 2020-08-26 NOTE — PROGRESS NOTES
Assessment/Plan:    PAD (peripheral artery disease) ([facility] [address] )  Patient with significant peripheral arterial disease status post right lower extremity are repeat arteriogram with recanalization of occluded right SFA/in stent occlusion back in May  Patient subsequently had a right TMA  Patient continues to go to wound clinic for local wound care  There is a small area that remains open  Images from Dr Greg Campos noted  Dressing not removed today in the office  If wound healing is believed to have become stagnant and or wounds deteriorate please call our office to schedule for repeat right lower extremity arteriogram with possible intervention if indicated  Will obtain a 3 month lower extremity arterial duplex  Diagnoses and all orders for this visit:    PAD (peripheral artery disease) ([facility])  -     VAS lower limb arterial duplex, complete bilateral; Future    Critical lower limb ischemia    Type 2 diabetes mellitus with other circulatory complication, with long-term current use of insulin ([facility])    S/P transmetatarsal amputation of foot, right ([facility])          Subjective:      Patient ID: Symone Parker is a 47 y o  female  Patient presents for 1 month follow up of R TMA site  Patient reports she sees wounds care weekly at [facility]  Patient denies any pain, numbness, tingling or burning  Patient denies any fever or chills  Patient is taking aspirin, atorvastatin, and plavix  Ani Jackson returns to the office for a wound check (nonhealing TMA)  Patient status post repeat right lower extremity arteriogram with reintervention SFA and popliteal arteries  Patient reports that she believes that the wound is healing  At last office visit I had offered her a review right lower extremity arteriogram with possible tibial intervention in attempt to optimize distal perfusion and augment/facilitate healing  At that time and now prefers to continue local wound care        The following portions of the patient's history were reviewed and updated as appropriate: allergies, current medications, past family history, past medical history, past social history, past surgical history and problem list     Review of Systems   Constitutional: Negative  Negative for chills and fever  HENT: Negative  Negative for sinus pain, sneezing and sore throat  Eyes: Negative  Negative for pain  Respiratory: Negative  Negative for cough, chest tightness and shortness of breath  Cardiovascular: Negative  Negative for leg swelling  Gastrointestinal: Negative  Negative for diarrhea, nausea and vomiting  Endocrine: Negative  Genitourinary: Negative  Negative for difficulty urinating  Musculoskeletal: Positive for gait problem (ambulates in wheelchair)  Skin: Positive for wound (R foot)  Allergic/Immunologic: Negative  Neurological: Negative for headaches  Hematological: Negative  Psychiatric/Behavioral: Negative  Negative for self-injury  I have personally reviewed the ROS entered by MA and agree as documented  Objective:      /78 (BP Location: Left arm, Patient Position: Sitting, Cuff Size: Adult)   Pulse 80   Temp 98 7 °F (37 1 °C) (Tympanic)   Resp 18   Ht 5' (1 524 m)   LMP  (LMP Unknown)   BMI 34 76 kg/m²          Physical Exam  Constitutional:       General: She is not in acute distress  Appearance: She is well-developed  HENT:      Head: Normocephalic and atraumatic  Eyes:      General: No scleral icterus  Conjunctiva/sclera: Conjunctivae normal    Neck:      Musculoskeletal: Normal range of motion and neck supple  Trachea: No tracheal deviation  Cardiovascular:      Rate and Rhythm: Normal rate and regular rhythm  Heart sounds: Normal heart sounds  Comments: Unable to assess pulses/signals RLE due to dressing  Pulmonary:      Effort: Pulmonary effort is normal       Breath sounds: Normal breath sounds     Abdominal: General: There is no distension  Palpations: Abdomen is soft  There is no mass (no appreciable aortic pulsation/aneurysm)  Tenderness: There is no abdominal tenderness  There is no guarding or rebound  Musculoskeletal: Normal range of motion  Skin:     General: Skin is warm and dry  Neurological:      Mental Status: She is alert and oriented to person, place, and time  Psychiatric:         Mood and Affect: Mood normal          Behavior: Behavior normal          Thought Content:  Thought content normal          Judgment: Judgment normal

## 2020-08-26 NOTE — TELEPHONE ENCOUNTER
Called and spoke with pt in regards to blood work that is due prior to her appt with us next week, she agreed to have it done prior

## 2020-08-27 ENCOUNTER — TELEMEDICINE (OUTPATIENT)
Dept: ENDOCRINOLOGY | Facility: CLINIC | Age: 55
End: 2020-08-27
Payer: COMMERCIAL

## 2020-08-27 DIAGNOSIS — E78.5 HYPERLIPIDEMIA, UNSPECIFIED HYPERLIPIDEMIA TYPE: ICD-10-CM

## 2020-08-27 DIAGNOSIS — N18.2 CKD STAGE 2 DUE TO TYPE 2 DIABETES MELLITUS (HCC): ICD-10-CM

## 2020-08-27 DIAGNOSIS — E11.22 CKD STAGE 2 DUE TO TYPE 2 DIABETES MELLITUS (HCC): ICD-10-CM

## 2020-08-27 DIAGNOSIS — Z79.4 CURRENT USE OF INSULIN (HCC): ICD-10-CM

## 2020-08-27 DIAGNOSIS — E11.610 CHARCOT FOOT DUE TO DIABETES MELLITUS (HCC): ICD-10-CM

## 2020-08-27 DIAGNOSIS — I10 ESSENTIAL HYPERTENSION: ICD-10-CM

## 2020-08-27 DIAGNOSIS — E66.9 OBESITY (BMI 30.0-34.9): ICD-10-CM

## 2020-08-27 DIAGNOSIS — E11.59 TYPE 2 DIABETES MELLITUS WITH OTHER CIRCULATORY COMPLICATION, WITH LONG-TERM CURRENT USE OF INSULIN (HCC): Primary | ICD-10-CM

## 2020-08-27 DIAGNOSIS — Z79.4 TYPE 2 DIABETES MELLITUS WITH OTHER CIRCULATORY COMPLICATION, WITH LONG-TERM CURRENT USE OF INSULIN (HCC): Primary | ICD-10-CM

## 2020-08-27 PROCEDURE — 1036F TOBACCO NON-USER: CPT | Performed by: INTERNAL MEDICINE

## 2020-08-27 PROCEDURE — 99214 OFFICE O/P EST MOD 30 MIN: CPT | Performed by: INTERNAL MEDICINE

## 2020-08-27 NOTE — PROGRESS NOTES
Virtual Brief Visit    Assessment/Plan:    Problem List Items Addressed This Visit        Endocrine    Type 2 diabetes mellitus with circulatory disorder, with long-term current use of insulin ([address] ) - Primary    Relevant Medications    metFORMIN (GLUCOPHAGE) 1000 MG tablet    glucose blood (ONE TOUCH ULTRA TEST) test strip    Other Relevant Orders    Hemoglobin Q0W    Basic metabolic panel Lab Collect    Ambulatory referral to Weight Management      Other Visit Diagnoses     Current use of insulin (HCC)        Relevant Medications    metFORMIN (GLUCOPHAGE) 1000 MG tablet    glucose blood (ONE TOUCH ULTRA TEST) test strip    Obesity (BMI 30 0-34  9)        Relevant Orders    Ambulatory referral to Weight Management                Reason for visit is T2DM     Chief Complaint   Patient presents with    Virtual Brief Visit        Encounter provider Salvador Rios MD    Provider located at [address] [facility] [address]  [address] [place] [id number]    Recent Visits  No visits were found meeting these conditions  Showing recent visits within past 7 days and meeting all other requirements     Today's Visits  Date Type Provider Dept   08/27/20 Telemedicine Salvador Rios MD [facility] [address] today's visits and meeting all other requirements     Future Appointments  No visits were found meeting these conditions  Showing future appointments within next 150 days and meeting all other requirements        After connecting through telephone, the patient was identified by name and date of birth  Mayco Gilbert was informed that this is a telemedicine visit and that the visit is being conducted through telephone  My office door was closed  No one else was in the room  She acknowledged consent and understanding of privacy and security of the platform   The patient has agreed to participate and understands she can discontinue the visit at any time  Patient is aware this is a billable service  Subjective    Evonne Avila is a 47 y o  female with T2DM, f/u  She was last seen by Telemed in May 2020  HPI     Ms Leonel Crowder was diagnosed with T2DM ~35yrs ago, and has mutliple complications  She has neuropathy the has led to Charcot foot, and amputations and gangrene of her toes  Most recently, she I s/p TMA of the right foot and then readmitted  She was [facility] by [facility] end[facility]inology during her May 2020  She was discharged on Lantus pens 20units once daily and Novolog 5units with meals, as well as metformin 1g twice daily  She states she had an "allergy" to Levemir stating it makes her "itchy" and dizzy  She states she is on 2 different insulins now, and these are Lantus solostar 20 units once daily and Novolog (aspart) vial 7units       She has a right TMA (5/2020) and left toe amputation (Feb 2020)     Ms Leonel Crowder is checking FSBGs, recalled:  Bf 119, 120  L 130-150  Dinner 201  Cromwell Hui tries to keep it <140     Diabetes education: denies having been to DM classes  Activity: Her activity is limited due to her foot surgery  She is still not walking fully      Eyes: approx 2019, but does not recall having a dilated exam  Pod: seeing regularly  Dentist: most teeth removed, has an appt scheduled  Flu: receives yearly     CKD: eGFR is preserved  Only on metoprolol at this time  She is seeing Dr María Rudolph Sept 3rd      Hyperlipidemia is treated atorvastatin and denies myalgias    She is concerned about weight gain       Past Medical History:   Diagnosis Date    Cellulitis of toe 1/28/2019    CKD (chronic kidney disease)     Diabetes mellitus ([address] )     Gangrene of toe of right foot ([address] ) 10/30/2019    Added automatically from request for surgery [id number]    Hypertension        Past Surgical History:   Procedure Laterality Date    IR ABDOMINAL AORTAGRAM  2/1/2019    IR AORTAGRAM WITH RUN-OFF  5/5/2020  IR ARTERIAL LYSIS  4/30/2019    IR LOWER EXTREMITY / INTERVENTION  11/1/2019    RI AMPUTATION FOOT,TRANSMETATARSAL Right 11/7/2019    Procedure: AMPUTATION TRANSMETATARSAL (TMA) WITH ACHILLES TENDON LENGTHING;  Surgeon: Lacie Daniels DPM;  Location: [facility];  Service: Podiatry    [address] [place] [address] Right 5/7/2020    Procedure: AMPUTATION TRANSMETATARSAL (TMA);  Surgeon: Marcos Daniels DPM;  [facility];  Service: Podiatry    TOE AMPUTATION Left 2/4/2019    Procedure: AMPUTATION 5th TOE;  Surgeon: Lacie Daniels DPM;  [facility]: [facility];  Service: Podiatry    TOE AMPUTATION Right 11/4/2019    Procedure: AMPUTATION RIGHT 3RD AND 4TH TOE;  Surgeon: Lacie Daniels DPM;  Location: [facility];  Service: Podiatry       Current Outpatient Medications   Medication Sig Dispense Refill    acetaminophen (TYLENOL) 325 mg tablet Take 2 tablets (650 mg total) by mouth every 6 (six) hours as needed for mild pain, headaches or fever (Patient not taking: Reported on 8/26/2020)      aspirin 81 mg chewable tablet Chew 1 tablet (81 mg total) daily      atorvastatin (LIPITOR) 20 mg tablet Take 1 tablet (20 mg total) by mouth daily 90 tablet 1    cetirizine (ZyrTEC) 10 mg tablet Take 1 tablet (10 mg total) by mouth daily 30 tablet 0    clopidogrel (PLAVIX) 75 mg tablet Take 1 tablet (75 mg total) by mouth daily 90 tablet 1    ferrous sulfate 324 (65 Fe) mg Take 1 tablet (324 mg total) by mouth 2 (two) times a day before meals 180 tablet 3    fluticasone (FLONASE) 50 mcg/act nasal spray 1 spray into each nostril daily 1 Bottle 0    glucose blood (ONE TOUCH ULTRA TEST) test strip Use to test 3x daily 90 each 5    Insulin Aspart FlexPen (NovoLOG FlexPen) 100 UNIT/ML SOPN Inject 5units subcut up to 3 times daily just before a meal 5 pen 3    insulin glargine (Lantus SoloStar) 100 units/mL injection pen Inject 20 Units under the skin daily at bedtime 5 pen 1    Insulin Pen Needle 32G X 4 MM MISC Use 4 times daily with insulin pens 120 each 5    metFORMIN (GLUCOPHAGE) 1000 MG tablet Take 1 tablet (1,000 mg total) by mouth 2 (two) times a day with meals 60 tablet 3    metoprolol tartrate (LOPRESSOR) 25 mg tablet Take 1 tablet (25 mg total) by mouth every 12 (twelve) hours 180 tablet 1    ONETOUCH DELICA LANCETS FINE MISC Testing Three times a day 100 each 5     No current facility-administered medications for this visit  No Known Allergies    Review of Systems   Constitutional: Positive for unexpected weight change  Eyes: Negative for visual disturbance  Gastrointestinal: Negative for diarrhea and nausea  Psychiatric/Behavioral: The patient is not nervous/anxious  see also HPI    There were no vitals filed for this visit  1  T2DM, uncontrolled with amputations, uncontrolled on insulin: She will continue her current regimena nd get labs including A1c   metformin 1000mg twice daily  Lantus pens at 20units once daily  Novolog pen to 5units with meals  She is concerned about weight gain, and based on her labs she may be a candidate for a more weight neutral regimen  I did not discuss this in detail with her  I also ordered a referral to weight management         2  CKD: Stage 2 with preserved eGFR  Labs will be done tomorrow  Defer ACEI use to GIOVANNA Gilmore     3  Hyperlipidemia: on statin without issues    I spent 15 minutes with patient today in which greater than 50% of the time was spent in counseling/coordination of care regarding T2DM    VIRTUAL VISIT DISCLAIMER    Jerrod Dayana acknowledges that she has consented to an online visit or consultation  She understands that the online visit is based solely on information provided by her, and that, in the absence of a face-to-face physical evaluation by the physician, the diagnosis she receives is both limited and provisional in terms of accuracy and completeness   This is not intended to replace a full medical face-to-face evaluation by the physician  Shmuel Lyon understands and accepts these terms

## 2020-09-01 ENCOUNTER — APPOINTMENT (OUTPATIENT)
Dept: LAB | Facility: HOSPITAL | Age: 55
End: 2020-09-01
Attending: INTERNAL MEDICINE
Payer: COMMERCIAL

## 2020-09-01 ENCOUNTER — OFFICE VISIT (OUTPATIENT)
Dept: WOUND CARE | Facility: HOSPITAL | Age: 55
End: 2020-09-01
Payer: COMMERCIAL

## 2020-09-01 VITALS
DIASTOLIC BLOOD PRESSURE: 80 MMHG | HEART RATE: 71 BPM | TEMPERATURE: 97.6 F | SYSTOLIC BLOOD PRESSURE: 142 MMHG | RESPIRATION RATE: 18 BRPM

## 2020-09-01 DIAGNOSIS — E11.22 CKD STAGE 2 DUE TO TYPE 2 DIABETES MELLITUS (HCC): ICD-10-CM

## 2020-09-01 DIAGNOSIS — D50.9 MICROCYTIC ANEMIA: ICD-10-CM

## 2020-09-01 DIAGNOSIS — I10 ESSENTIAL HYPERTENSION: ICD-10-CM

## 2020-09-01 DIAGNOSIS — N18.2 CKD STAGE 2 DUE TO TYPE 2 DIABETES MELLITUS (HCC): ICD-10-CM

## 2020-09-01 DIAGNOSIS — Z79.4 TYPE 2 DIABETES MELLITUS WITH OTHER CIRCULATORY COMPLICATION, WITH LONG-TERM CURRENT USE OF INSULIN (HCC): ICD-10-CM

## 2020-09-01 DIAGNOSIS — L97.414 DIABETIC ULCER OF RIGHT MIDFOOT ASSOCIATED WITH TYPE 2 DIABETES MELLITUS, WITH NECROSIS OF BONE (HCC): Primary | ICD-10-CM

## 2020-09-01 DIAGNOSIS — E11.59 TYPE 2 DIABETES MELLITUS WITH OTHER CIRCULATORY COMPLICATION, WITH LONG-TERM CURRENT USE OF INSULIN (HCC): ICD-10-CM

## 2020-09-01 DIAGNOSIS — E11.621 DIABETIC ULCER OF RIGHT MIDFOOT ASSOCIATED WITH TYPE 2 DIABETES MELLITUS, WITH NECROSIS OF BONE (HCC): Primary | ICD-10-CM

## 2020-09-01 LAB
25(OH)D3 SERPL-MCNC: 12.6 NG/ML (ref 30–100)
ALBUMIN SERPL BCP-MCNC: 3.2 G/DL (ref 3.5–5)
ANION GAP SERPL CALCULATED.3IONS-SCNC: 11 MMOL/L (ref 4–13)
BUN SERPL-MCNC: 21 MG/DL (ref 5–25)
CALCIUM SERPL-MCNC: 8.5 MG/DL (ref 8.3–10.1)
CHLORIDE SERPL-SCNC: 108 MMOL/L (ref 100–108)
CO2 SERPL-SCNC: 24 MMOL/L (ref 21–32)
CREAT SERPL-MCNC: 1 MG/DL (ref 0.6–1.3)
CREAT UR-MCNC: 56.2 MG/DL
ERYTHROCYTE [DISTWIDTH] IN BLOOD BY AUTOMATED COUNT: 17.2 % (ref 11.6–15.1)
EST. AVERAGE GLUCOSE BLD GHB EST-MCNC: 192 MG/DL
GFR SERPL CREATININE-BSD FRML MDRD: 64 ML/MIN/1.73SQ M
GLUCOSE P FAST SERPL-MCNC: 189 MG/DL (ref 65–99)
HBA1C MFR BLD: 8.3 %
HCT VFR BLD AUTO: 39.4 % (ref 34.8–46.1)
HGB BLD-MCNC: 11.5 G/DL (ref 11.5–15.4)
MAGNESIUM SERPL-MCNC: 1.6 MG/DL (ref 1.6–2.6)
MCH RBC QN AUTO: 21.8 PG (ref 26.8–34.3)
MCHC RBC AUTO-ENTMCNC: 29.2 G/DL (ref 31.4–37.4)
MCV RBC AUTO: 75 FL (ref 82–98)
MICROALBUMIN UR-MCNC: 457 MG/L (ref 0–20)
MICROALBUMIN/CREAT 24H UR: 813 MG/G CREATININE (ref 0–30)
PHOSPHATE SERPL-MCNC: 3.3 MG/DL (ref 2.7–4.5)
PLATELET # BLD AUTO: 252 THOUSANDS/UL (ref 149–390)
PMV BLD AUTO: 11.1 FL (ref 8.9–12.7)
POTASSIUM SERPL-SCNC: 4.6 MMOL/L (ref 3.5–5.3)
RBC # BLD AUTO: 5.28 MILLION/UL (ref 3.81–5.12)
SODIUM SERPL-SCNC: 143 MMOL/L (ref 136–145)
WBC # BLD AUTO: 7.95 THOUSAND/UL (ref 4.31–10.16)

## 2020-09-01 PROCEDURE — 11042 DBRDMT SUBQ TIS 1ST 20SQCM/<: CPT | Performed by: PODIATRIST

## 2020-09-01 PROCEDURE — 3066F NEPHROPATHY DOC TX: CPT | Performed by: FAMILY MEDICINE

## 2020-09-01 PROCEDURE — 3052F HG A1C>EQUAL 8.0%<EQUAL 9.0%: CPT | Performed by: FAMILY MEDICINE

## 2020-09-01 PROCEDURE — 85027 COMPLETE CBC AUTOMATED: CPT

## 2020-09-01 PROCEDURE — 83036 HEMOGLOBIN GLYCOSYLATED A1C: CPT

## 2020-09-01 PROCEDURE — 83735 ASSAY OF MAGNESIUM: CPT

## 2020-09-01 PROCEDURE — 36415 COLL VENOUS BLD VENIPUNCTURE: CPT

## 2020-09-01 PROCEDURE — 82306 VITAMIN D 25 HYDROXY: CPT

## 2020-09-01 PROCEDURE — 82043 UR ALBUMIN QUANTITATIVE: CPT

## 2020-09-01 PROCEDURE — 80069 RENAL FUNCTION PANEL: CPT

## 2020-09-01 PROCEDURE — 3062F POS MACROALBUMINURIA REV: CPT | Performed by: FAMILY MEDICINE

## 2020-09-01 PROCEDURE — 82570 ASSAY OF URINE CREATININE: CPT

## 2020-09-01 RX ORDER — LIDOCAINE 40 MG/G
CREAM TOPICAL ONCE
Status: COMPLETED | OUTPATIENT
Start: 2020-09-01 | End: 2020-09-01

## 2020-09-01 RX ADMIN — LIDOCAINE 1 APPLICATION: 40 CREAM TOPICAL at 11:24

## 2020-09-01 NOTE — PROGRESS NOTES
Patient ID: Ruth Yang is a 47 y o  female Date of Birth 1965     Chief Complaint  Chief Complaint   Patient presents with    Follow Up Wound Care Visit     right foot wound       Allergies  Patient has no known allergies  Assessment:     Diagnoses and all orders for this visit:    Diabetic ulcer of right midfoot associated with type 2 diabetes mellitus, with necrosis of bone ([address] )  -     Wound cleansing and dressings; Future  -     lidocaine (LMX) 4 % cream  -     Debridement              Debridement   Wound Foot Right;Lateral    Consent obtained? verbal  Consent given by: patient  Risks discussed? procedural risks discussed  Immediately prior to the procedure a time out was called  Performed by: physician  Debridement type: surgical  Level of debridement: subcutaneous tissue  Pain control: lidocaine 4%  Post-debridement measurements  Length (cm): 1 2  Width (cm): 1 8  Depth (cm): 0 4  Percent debrided: 100%  Surface Area (cm^2): 2 16  Area debrided (cm^2): 2 16  Volume (cm^3): 0 86  Tissue and other material debrided: subcutaneous tissue  Devitalized tissue debrided: biofilm, callus, exudate, fibrin, necrotic debris and slough  Instrument(s) utilized: curette  Bleeding: medium  Hemostasis obtained with: pressure  Procedural pain (0-10): 0  Post-procedural pain: 0   Response to treatment: procedure was tolerated well          Plan:  Patient's wound has not progressed over the last month  It seems to have stalled despite compliance with instructions and normal wound care  I would like 2 pre approve a grafix placental graft    If this is denied by her insurance I would consider cellular tome epidermal graft     Wound Foot Right;Lateral (Active)   Wound Description Granulation tissue;Slough (50/50) 09/01/20 1109   Sharon-wound Assessment Intact 09/01/20 1109   Wound Length (cm) 0 9 cm 09/01/20 1109   Wound Width (cm) 1 6 cm 09/01/20 1109   Wound Depth (cm) 0 2 cm 09/01/20 1109   Wound Surface Area (cm^2) 1 44 cm^2 09/01/20 1109   Wound Volume (cm^3) 0 29 cm^3 09/01/20 1109   Calculated Wound Volume (cm^3) 0 29 cm^3 09/01/20 1109   Change in Wound Size % 63 75 09/01/20 1109   Drainage Amount Moderate 09/01/20 1109   Drainage Description Serosanguineous 09/01/20 1109   Non-staged Wound Description Full thickness 09/01/20 1109   Treatments Cleansed 09/01/20 1109   Dressing Changed Changed 09/01/20 1109   Patient Tolerance Tolerated well 09/01/20 1109   Dressing Status Clean;Dry; Intact 09/01/20 1109       Subjective:        Patient returns for care of right transmetatarsal amputation ulceration  She has not noticed much improvement  The following portions of the patient's history were reviewed and updated as appropriate: allergies, current medications, past family history, past medical history, past social history, past surgical history and problem list     Review of Systems      Objective:       Wound Foot Right;Lateral (Active)   Wound Description Granulation tissue;Slough (50/50) 09/01/20 1109   Sharon-wound Assessment Intact 09/01/20 1109   Wound Length (cm) 0 9 cm 09/01/20 1109   Wound Width (cm) 1 6 cm 09/01/20 1109   Wound Depth (cm) 0 2 cm 09/01/20 1109   Wound Surface Area (cm^2) 1 44 cm^2 09/01/20 1109   Wound Volume (cm^3) 0 29 cm^3 09/01/20 1109   Calculated Wound Volume (cm^3) 0 29 cm^3 09/01/20 1109   Change in Wound Size % 63 75 09/01/20 1109   Drainage Amount Moderate 09/01/20 1109   Drainage Description Serosanguineous 09/01/20 1109   Non-staged Wound Description Full thickness 09/01/20 1109   Treatments Cleansed 09/01/20 1109   Dressing Changed Changed 09/01/20 1109   Patient Tolerance Tolerated well 09/01/20 1109   Dressing Status Clean;Dry; Intact 09/01/20 1109       /80 (BP Location: Left arm, Patient Position: Sitting)   Pulse 71   Temp 97 6 °F (36 4 °C) (Temporal)   Resp 18   LMP  (LMP Unknown)     Physical Exam  Vitals signs reviewed     Constitutional: Appearance: She is obese  She is not ill-appearing or diaphoretic  Feet:      Right foot:      Skin integrity: Ulcer (Ulcer distal lateral aspect transmetatarsal amputation with fiber granular wound bed  No pus cellulitis or probe to bone ) present  Neurological:      Mental Status: She is alert  Wound Instructions:  Orders Placed This Encounter   Procedures    Wound cleansing and dressings     Right foot wound  Wash your hands with soap and water  Remove old dressing, discard into plastic bag and place in trash  Cleanse the wound with normal saline or mild soap and water prior to applying a clean dressing  Do not use tissue or cotton balls  Do not scrub the wound  Pat dry using gauze  Shower yes do not get dressing wet  Apply skin prep to skin surrounding wound  Apply endoform and dermagran to the right  wound  Cover with 4x4  Secure with rolled gauze and tubi fast blue  Change dressing three times a week   This treatment was applied in the office today      Continue with VNA services three times a week for dressing changes    May skip on the days the patient comes to the wound care center     Continue to offload right foot as much as possible      Follow up in one week     Standing Status:   Future     Standing Expiration Date:   9/1/2021    Debridement     This order was created via procedure documentation

## 2020-09-01 NOTE — PATIENT INSTRUCTIONS
Orders Placed This Encounter   Procedures    Wound cleansing and dressings     Right foot wound  Wash your hands with soap and water  Remove old dressing, discard into plastic bag and place in trash  Cleanse the wound with normal saline or mild soap and water prior to applying a clean dressing  Do not use tissue or cotton balls  Do not scrub the wound  Pat dry using gauze  Shower yes do not get dressing wet  Apply skin prep to skin surrounding wound  Apply endoform and dermagran to the right  wound  Cover with 4x4  Secure with rolled gauze and tubi fast blue  Change dressing three times a week   This treatment was applied in the office today      Continue with VNA services three times a week for dressing changes    May skip on the days the patient comes to the wound care center     Continue to offload right foot as much as possible      Follow up in one week     Standing Status:   Future     Standing Expiration Date:   9/1/2021

## 2020-09-03 ENCOUNTER — OFFICE VISIT (OUTPATIENT)
Dept: NEPHROLOGY | Facility: CLINIC | Age: 55
End: 2020-09-03
Payer: COMMERCIAL

## 2020-09-03 VITALS
TEMPERATURE: 98.3 F | DIASTOLIC BLOOD PRESSURE: 70 MMHG | BODY MASS INDEX: 34.76 KG/M2 | HEART RATE: 70 BPM | HEIGHT: 60 IN | RESPIRATION RATE: 18 BRPM | SYSTOLIC BLOOD PRESSURE: 130 MMHG

## 2020-09-03 DIAGNOSIS — N18.2 CKD STAGE 2 DUE TO TYPE 2 DIABETES MELLITUS (HCC): Primary | ICD-10-CM

## 2020-09-03 DIAGNOSIS — I10 ESSENTIAL HYPERTENSION: ICD-10-CM

## 2020-09-03 DIAGNOSIS — E55.9 VITAMIN D DEFICIENCY: ICD-10-CM

## 2020-09-03 DIAGNOSIS — E66.01 CLASS 2 SEVERE OBESITY DUE TO EXCESS CALORIES WITH SERIOUS COMORBIDITY AND BODY MASS INDEX (BMI) OF 36.0 TO 36.9 IN ADULT (HCC): ICD-10-CM

## 2020-09-03 DIAGNOSIS — Z79.4 TYPE 2 DIABETES MELLITUS WITH OTHER CIRCULATORY COMPLICATION, WITH LONG-TERM CURRENT USE OF INSULIN (HCC): ICD-10-CM

## 2020-09-03 DIAGNOSIS — E11.22 CKD STAGE 2 DUE TO TYPE 2 DIABETES MELLITUS (HCC): Primary | ICD-10-CM

## 2020-09-03 DIAGNOSIS — E78.5 HYPERLIPIDEMIA, UNSPECIFIED HYPERLIPIDEMIA TYPE: ICD-10-CM

## 2020-09-03 DIAGNOSIS — E11.59 TYPE 2 DIABETES MELLITUS WITH OTHER CIRCULATORY COMPLICATION, WITH LONG-TERM CURRENT USE OF INSULIN (HCC): ICD-10-CM

## 2020-09-03 DIAGNOSIS — R80.1 PERSISTENT PROTEINURIA: ICD-10-CM

## 2020-09-03 PROCEDURE — 99213 OFFICE O/P EST LOW 20 MIN: CPT | Performed by: INTERNAL MEDICINE

## 2020-09-03 RX ORDER — LISINOPRIL 5 MG/1
5 TABLET ORAL DAILY
Qty: 90 TABLET | Refills: 3 | Status: SHIPPED | OUTPATIENT
Start: 2020-09-03 | End: 2020-09-08 | Stop reason: SDUPTHER

## 2020-09-03 RX ORDER — ERGOCALCIFEROL (VITAMIN D2) 1250 MCG
50000 CAPSULE ORAL WEEKLY
Qty: 12 CAPSULE | Refills: 1 | Status: SHIPPED | OUTPATIENT
Start: 2020-09-03 | End: 2021-10-05

## 2020-09-03 NOTE — PATIENT INSTRUCTIONS
- start lisinopril 5 mg p o  Q day  - get blood work in 2 weeks and call with updates  - start ergocalciferol 62779 units once a week for 24 weeks then over-the-counter vitamin-D 2000 units daily    - Please call me in 10 days after having your blood work done to review the results if you do not hear back from me or my office, as I may have not received the results  - please remember to perform blood work prior to the next visit  - Please call if the blood pressure top number is greater than 150 or less than 110 consistently  - Please call if you are gaining more than 2lbs in 2 days for adjustment of water pills   ~ Please AVOID the following pain medications  LIST OF NSAIDS (NONSTEROIDAL ANTI-INFLAMMATORY DRUGS) AND BOBO-2 INHIBITORS    DIFLUNISAL (DOLOBID)  IBUPROFEN (MOTRIN, ADVIL)  FLURBIPROFEN (ANSAID)  KETOPROFEN (ORUDIS, ORUVAIL)  FENOPROFEN (NALFON)  NABUMETONE (RELAFEN)  PIROXICAM (FELDENE)  NAPROXEN (ALEVE, NAPROSYN, NAPRELAN, ANAPROX)  DICLOFENAC (VOLTAREN, CATAFLAM)  INDOMETHACIN (INDOCIN)  SULINDAC (CLINORIL)  TOLMETIN (TOLETIN)  ETODOLAC (LODINE)  MELOXICAM (MOBIC)  KETOROLAC (TORADOL)  OXAPROZIN (DAYPRO)  CELECOXIB (CELEBREX)    Phosphorus diet  Follow a low phosphorus diet      Avoid these higher phosphorus foods: Choose these lower phosphorus foods:   Milk, pudding or yogurt (from animals and from many soy varieties) Rice milk (unfortified), nondairy creamer (if it doesn't have terms in the ingredients list that contain the letters "phos")   Hard cheeses, ricotta or cottage cheese, fat-free cream cheese Regular and low-fat cream cheese   Ice cream or frozen yogurt Sherbet or frozen fruit pops   Soups made with higher phosphorus ingredients (milk, dried peas, beans, lentils) Soups made with lower phosphorus ingredients (broth- or water-based with other lower phosphorus ingredients)   Whole grains, including whole-grain breads, crackers, cereal, rice and pasta Refined grains, including white bread, crackers, cereals, rice and pasta   Quick breads, biscuits, cornbread, muffins, pancakes or waffles Homemade refined (white) dinner rolls, bagels or English muffins   Dried peas (split, black-eyed), beans (black, garbanzo, lima, kidney, navy, esteban) or lentils Green peas (canned, frozen), green beans or wax beans   Organ meats, walleye, pollock or sardines Lean beef, pork, lamb, poultry or other fish   Nuts and seeds Popcorn   Peanut butter and other nut butters Jam, jelly or honey   Chocolate, including chocolate drinks Carob (chocolate-flavored) candy, hard candy or gumdrops   Regino and pepper-type sodas, flavored schmid, bottled teas (if a term in the ingredients list contains the letters "phos") Lemon-lime soda, ginger ale or root beer, plain water     Follow a moderate potassium diet

## 2020-09-03 NOTE — PROGRESS NOTES
Nephrology Follow up Consultation  Harmony Lyon 47 y o  female MRN: [id number]            BACKGROUND:  Jerrod Anne is a 47 y  o female who was referred by Carlos Mcneil MD for evaluation of Follow-up    ASSESSMENT / PLAN:   47 y o   female with pmh of multiple co-morbidities including hypertension, diabetes, PAD and CKD stage 2 status post recent hospitalization at which time she was noted to have acute kidney injury presented to the office for routine follow-up  1  CKD stage II:  - Patient has a baseline creatinine of 0 9-1 1mg/dL  Most recent labs show a Creatinine of 1 00 mg/dL on 09/01/2020  Renal function remains stable  - s/p episode of acute kidney injury on 04/30/2019 likely due to prerenal azotemia  Peak creatinine at that time was 1 58 mg/dL  - patient likely has underlying CKD secondary to diabetic glomerular nodular sclerosis plus hypertensive nephrosclerosis plus renal vascular disease plus age-related nephron loss  - SPEP from June 20, 2019 within normal limits  - renal ultrasound from July 9, 2019 shows right kidney 10 8 cm left kidney 10 5 cm  No hydronephrosis no masses  - Proteinuria - most recent protein creatinine ratio of 813 mg as of September 2020  - Acid base and lytes stable  - Clinically the patient appears to be euvolemic  - Recommend to avoid use of NSAIDs, nephrotoxins  Caution advised with regards to exposure to IV contrast dye    - Discussed with the patient in depth her renal status, including the possible etiologies for CKD  - Advised the patient that when her GFR is close to 20mL/min then will start discussing about RRT(renal replacement therapy) options such as renal transplant, peritoneal dialysis and hemodialysis  - advised patient that she is at increased risk for CKD progression due to her episode of acute kidney injury  - Informed the patient about the various options for Renal Replacement therapy    - Discussed with the patient how we need to work together to delay the progression of CKD with optimal BP control based on their age and co-morbidities, optimal BS control with HbA1c of <7% and trying to reduce proteinuria by the use of anti-proteinuric agents  2  Hypertension:  - Patient is on  Lopressor 25 mg p o  B i d  - restart lisinopril 5 mg p o  Q day check BMP in 2 weeks  - Goal BP of < 140/90 based on age and comorbidities  - Instructed to follow low sodium (2gm)diet   - Advised to hold ACEI/ARBs if patient suffers from dehydration due to gastrointestinal losses due to risk of DEMETRIUS secondary to failure to autoregulate  3  Hemoglobin:  - Goal Hb of 10-12 g/dL  - Most recent labs suggestive of 11 5 grams/deciliter  - no role for IV iron at this time  - start p o  Ferrous sulfate b i d  recheck CBC prior to next visit    4  CKD-MBD(Mineral Bone Disease): - Based on patients CKD stage following is the goal of therapy  - Maintain calcium phosphorus product of < 55   - start ergocalciferol 85711 units p o  Q weekly for 24 weeks then over-the-counter vitamin-D 2000 units p o  Q day  - most recent vitamin-D level of 12 6 as of September 2020  - prior to next visit check vitamin-D levels,     5  Lipids:  - goal LDL less than 70  - management as per PCP  - on lipitor    6  DM:  - management as per Primary team  - most recent A1c of 8 3% as of September 2020 improved from prior  - advised patient for tighter glycemic control   - on lnsulin and metformin  - readvised patient that if her renal parameters continued to deteriorate then she will need to be taken off the metformin  7  PAD:  - Management as per primary team  - follow-up with vascular  - status post right SFA occlusion with right lower extremity arteriogram on 04/30/2019  - status post right TMA on 11/07/2019     8  Nutrition obesity:  - Encouraged patient to follow a renal diet comprising of moderate potassium, low phosphorus and protein restriction to 0 8gm/kg  Also advised patient to restrict fluid to 1 8 L per day  - Will check serum albumin with next blood work  Advised of dietary habits and weight loss    9  Followup:  - Patient is to follow-up in 12 months, with lab work to be performed in 2 weeks and then again in a few days prior to the next visit  Advised patient to call me after she has blood work done  Nieves Graham MD, [facility], 9/3/2020, 1:12 PM             SUBJECTIVE: 47 y o  female presents to the office for routine follow-up  Presents on a wheelchair presents with her daughter overall feels well has no complaints was hospitalized in May for ulcer diabetic  Unsure why they had stopped her lisinopril at that time  Agreeable to restarting lisinopril at this time  Sugars are improved  Denies any cough or any adverse effects due to lisinopril  No issues with edema no NSAID use  Thankful for all the information  Review of Systems   Constitutional: Negative for appetite change, chills, fatigue and fever  HENT: Negative for congestion, postnasal drip and sore throat  Respiratory: Negative for cough, shortness of breath and wheezing  Cardiovascular: Negative for leg swelling  Gastrointestinal: Negative for abdominal pain, constipation, diarrhea, nausea and vomiting  Genitourinary: Negative for difficulty urinating and dysuria  Musculoskeletal: Negative for back pain  Skin: Negative for rash and wound  Neurological: Negative for dizziness, light-headedness and headaches  Psychiatric/Behavioral: Negative for agitation and confusion  All other systems reviewed and are negative        PAST MEDICAL HISTORY:  Past Medical History:   Diagnosis Date    Cellulitis of toe 1/28/2019    CKD (chronic kidney disease)     Diabetes mellitus [facility] [address] )     Gangrene of toe of right foot ([facility] ) 10/30/2019    Added automatically from request for surgery [id number]    Hypertension        PROBLEM LIST    Patient Active Problem List   Diagnosis    Type 2 diabetes mellitus with circulatory disorder, with long-term current use of insulin ([facility])    Essential hypertension    HLD (hyperlipidemia)    PAD [facility]peripheral artery disease) [facility]    CKD stage 2 due to type 2 diabetes mellitus ([facility])    Microcytic anemia    Leukocytosis    Toe amputation status, left    Critical lower limb ischemia    Polyneuropathy associated with underlying disease ([facility] )    Charcot foot due to diabetes mellitus ([facility] )    History of transmetatarsal amputation of foot ([place] [address] )    Class 2 severe obesity due to excess calories with serious comorbidity and body mass index (BMI) of 36 0 to 36 9 in adult [facility])    Cellulitis of right lower extremity    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, with necrosis of bone ([place] [address] )    S/P transmetatarsal amputation of foot, right ([facility])    Current use of insulin ([facility] )    Persistent proteinuria    Vitamin D deficiency       PAST SURGICAL HISTORY:  Past Surgical History:   Procedure Laterality Date    IR ABDOMINAL AORTAGRAM  2/1/2019    IR AORTAGRAM WITH RUN-OFF  5/5/2020    IR ARTERIAL LYSIS  4/30/2019    IR LOWER EXTREMITY / INTERVENTION  11/1/2019    WA AMPUTATION FOOT,TRANSMETATARSAL Right 11/7/2019    Procedure: AMPUTATION TRANSMETATARSAL (TMA) WITH ACHILLES TENDON LENGTHING;  Surgeon: Johanna Zamudio DPM;  Location: [facility];  Service: Podiatry    WA AMPUTATION [facility] Right 5/7/2020    Procedure: AMPUTATION TRANSMETATARSAL (TMA);  Surgeon: Rc Brunson DPM;  [facility];  Service: Podiatry    TOE AMPUTATION Left 2/4/2019    Procedure: AMPUTATION 5th TOE;  Surgeon: Johanna Zamudio DPM;  [facility];  Service: Podiatry    TOE AMPUTATION Right 11/4/2019    Procedure: AMPUTATION RIGHT 3RD AND 4TH TOE;  Surgeon: Johanna Zamudio DPM;  [facility];  Service: Podiatry       SOCIAL HISTORY :   reports that she quit smoking about 7 years ago  Her smoking use included cigarettes   She has quit using smokeless tobacco  She reports previous alcohol use  She reports that she does not use drugs  FAMILY HISTORY:  Family History   Problem Relation Age of Onset    Diabetes Mother     No Known Problems Father        ALLERGIES:  No Known Allergies        PHYSICAL EXAM:  Vitals:    09/03/20 1252   BP: 130/70   BP Location: Right arm   Patient Position: Sitting   Cuff Size: Large   Pulse: 70   Resp: 18   Temp: 98 3 °F (36 8 °C)   TempSrc: Oral   Height: 5' (1 524 m)     Body mass index is 34 76 kg/m²  Physical Exam  Vitals signs reviewed  Constitutional:       General: She is not in acute distress  Appearance: Normal appearance  She is obese  She is not ill-appearing, toxic-appearing or diaphoretic  HENT:      Head: Normocephalic and atraumatic  Mouth/Throat:      Mouth: Mucous membranes are moist       Pharynx: Oropharynx is clear  No oropharyngeal exudate or posterior oropharyngeal erythema  Eyes:      General: No scleral icterus  Conjunctiva/sclera: Conjunctivae normal    Neck:      Musculoskeletal: Normal range of motion and neck supple  Cardiovascular:      Rate and Rhythm: Normal rate  Heart sounds: Normal heart sounds  No friction rub  Pulmonary:      Effort: Pulmonary effort is normal  No respiratory distress  Breath sounds: Normal breath sounds  No wheezing or rales  Abdominal:      General: Bowel sounds are normal  There is no distension  Palpations: Abdomen is soft  There is no mass  Tenderness: There is no abdominal tenderness  Musculoskeletal:         General: No swelling  Skin:     Coloration: Skin is not jaundiced or pale  Neurological:      General: No focal deficit present  Mental Status: She is alert and oriented to person, place, and time     Psychiatric:         Mood and Affect: Mood normal          Behavior: Behavior normal          LABORATORY DATA:     Results from last 6 Months   Lab Units 09/01/20  1153 05/07/20  0626 05/06/20  0445   WBC Thousand/uL 7 95 9 76 10 15   HEMOGLOBIN g/dL 11 5 9 0* 9 9*   HEMATOCRIT % 39 4 30 7* 33 1*   PLATELETS Thousands/uL 252 256 293   POTASSIUM mmol/L 4 6 3 9 3 6   CHLORIDE mmol/L 108 111* 109*   CO2 mmol/L 24 25 23   BUN mg/dL 21 16 11   CREATININE mg/dL 1 00 1 03 0 79   CALCIUM mg/dL 8 5 8 0* 8 0*   MAGNESIUM mg/dL 1 6  --   --    PHOSPHORUS mg/dL 3 3  --   --    IRON ug/dL  --   --  24*   FERRITIN ng/mL  --   --  91        rest all reviewed    RADIOLOGY:  No orders to display     Rest all reviewed        MEDICATIONS:    Current Outpatient Medications:     aspirin 81 mg chewable tablet, Chew 1 tablet (81 mg total) daily, Disp: , Rfl:     atorvastatin (LIPITOR) 20 mg tablet, Take 1 tablet (20 mg total) by mouth daily, Disp: 90 tablet, Rfl: 1    cetirizine (ZyrTEC) 10 mg tablet, Take 1 tablet (10 mg total) by mouth daily, Disp: 30 tablet, Rfl: 0    clopidogrel (PLAVIX) 75 mg tablet, Take 1 tablet (75 mg total) by mouth daily, Disp: 90 tablet, Rfl: 1    ferrous sulfate 324 (65 Fe) mg, Take 1 tablet (324 mg total) by mouth 2 (two) times a day before meals, Disp: 180 tablet, Rfl: 3    fluticasone (FLONASE) 50 mcg/act nasal spray, 1 spray into each nostril daily, Disp: 1 Bottle, Rfl: 0    glucose blood (ONE TOUCH ULTRA TEST) test strip, Use to test 3x daily, Disp: 90 each, Rfl: 5    Insulin Aspart FlexPen (NovoLOG FlexPen) 100 UNIT/ML SOPN, Inject 5units subcut up to 3 times daily just before a meal, Disp: 5 pen, Rfl: 3    insulin glargine (Lantus SoloStar) 100 units/mL injection pen, Inject 20 Units under the skin daily at bedtime, Disp: 5 pen, Rfl: 1    Insulin Pen Needle 32G X 4 MM MISC, Use 4 times daily with insulin pens, Disp: 120 each, Rfl: 5    metFORMIN (GLUCOPHAGE) 1000 MG tablet, Take 1 tablet (1,000 mg total) by mouth 2 (two) times a day with meals, Disp: 60 tablet, Rfl: 3    metoprolol tartrate (LOPRESSOR) 25 mg tablet, Take 1 tablet (25 mg total) by mouth every 12 (twelve) hours, Disp: 180 tablet, Rfl: 1    ONETOUCH DELICA LANCETS FINE MISC, Testing Three times a day, Disp: 100 each, Rfl: 5    ergocalciferol (ERGOCALCIFEROL) 1 25 MG (39626 UT) capsule, Take 1 capsule (50,000 Units total) by mouth once a week, Disp: 12 capsule, Rfl: 1    lisinopril (ZESTRIL) 5 mg tablet, Take 1 tablet (5 mg total) by mouth daily, Disp: 90 tablet, Rfl: 3          Portions of the record may have been created with voice recognition software  Occasional wrong word or "sound a like" substitutions may have occurred due to the inherent limitations of voice recognition software  Read the chart carefully and recognize, using context, where substitutions have occurred  If you have any questions, please contact the dictating provider

## 2020-09-08 ENCOUNTER — OFFICE VISIT (OUTPATIENT)
Dept: FAMILY MEDICINE CLINIC | Facility: CLINIC | Age: 55
End: 2020-09-08

## 2020-09-08 VITALS
OXYGEN SATURATION: 98 % | TEMPERATURE: 98 F | DIASTOLIC BLOOD PRESSURE: 70 MMHG | RESPIRATION RATE: 18 BRPM | HEART RATE: 63 BPM | SYSTOLIC BLOOD PRESSURE: 130 MMHG

## 2020-09-08 DIAGNOSIS — R80.1 PERSISTENT PROTEINURIA: ICD-10-CM

## 2020-09-08 DIAGNOSIS — I10 ESSENTIAL HYPERTENSION: ICD-10-CM

## 2020-09-08 DIAGNOSIS — Z79.4 TYPE 2 DIABETES MELLITUS WITH OTHER CIRCULATORY COMPLICATION, WITH LONG-TERM CURRENT USE OF INSULIN (HCC): ICD-10-CM

## 2020-09-08 DIAGNOSIS — N18.2 CKD STAGE 2 DUE TO TYPE 2 DIABETES MELLITUS (HCC): ICD-10-CM

## 2020-09-08 DIAGNOSIS — J30.9 ALLERGIC RHINITIS, UNSPECIFIED SEASONALITY, UNSPECIFIED TRIGGER: ICD-10-CM

## 2020-09-08 DIAGNOSIS — E11.59 TYPE 2 DIABETES MELLITUS WITH OTHER CIRCULATORY COMPLICATION, WITH LONG-TERM CURRENT USE OF INSULIN (HCC): ICD-10-CM

## 2020-09-08 DIAGNOSIS — E11.22 CKD STAGE 2 DUE TO TYPE 2 DIABETES MELLITUS (HCC): ICD-10-CM

## 2020-09-08 DIAGNOSIS — Z11.59 ENCOUNTER FOR HEPATITIS C SCREENING TEST FOR LOW RISK PATIENT: ICD-10-CM

## 2020-09-08 DIAGNOSIS — E55.9 VITAMIN D DEFICIENCY: ICD-10-CM

## 2020-09-08 DIAGNOSIS — I73.9 PAD (PERIPHERAL ARTERY DISEASE) (HCC): ICD-10-CM

## 2020-09-08 DIAGNOSIS — Z11.4 SCREENING FOR HIV (HUMAN IMMUNODEFICIENCY VIRUS): Primary | ICD-10-CM

## 2020-09-08 PROCEDURE — 99214 OFFICE O/P EST MOD 30 MIN: CPT | Performed by: FAMILY MEDICINE

## 2020-09-08 PROCEDURE — 4010F ACE/ARB THERAPY RXD/TAKEN: CPT | Performed by: FAMILY MEDICINE

## 2020-09-08 PROCEDURE — 1036F TOBACCO NON-USER: CPT | Performed by: FAMILY MEDICINE

## 2020-09-08 RX ORDER — INSULIN GLARGINE 100 [IU]/ML
22 INJECTION, SOLUTION SUBCUTANEOUS
Qty: 5 PEN | Refills: 1 | Status: SHIPPED | OUTPATIENT
Start: 2020-09-08 | End: 2021-01-18 | Stop reason: SDUPTHER

## 2020-09-08 RX ORDER — CLOPIDOGREL BISULFATE 75 MG/1
75 TABLET ORAL DAILY
Qty: 90 TABLET | Refills: 1 | Status: SHIPPED | OUTPATIENT
Start: 2020-09-08 | End: 2021-01-05 | Stop reason: SDUPTHER

## 2020-09-08 RX ORDER — CETIRIZINE HYDROCHLORIDE 10 MG/1
10 TABLET ORAL DAILY
Qty: 90 TABLET | Refills: 1 | Status: SHIPPED | OUTPATIENT
Start: 2020-09-08

## 2020-09-08 RX ORDER — LISINOPRIL 5 MG/1
5 TABLET ORAL DAILY
Qty: 90 TABLET | Refills: 3 | Status: SHIPPED | OUTPATIENT
Start: 2020-09-08 | End: 2021-09-21 | Stop reason: SDUPTHER

## 2020-09-08 RX ORDER — FLUTICASONE PROPIONATE 50 MCG
1 SPRAY, SUSPENSION (ML) NASAL DAILY
Qty: 3 BOTTLE | Refills: 1 | Status: SHIPPED | OUTPATIENT
Start: 2020-09-08 | End: 2022-01-22 | Stop reason: HOSPADM

## 2020-09-08 NOTE — ASSESSMENT & PLAN NOTE
Lab Results   Component Value Date    HGBA1C 8 3 (H) 09/01/2020   HgA1c unchanged from prior  Increased Lantus to 22  If no improvement of BG at home I have urged her to call Endo for a sooner appointment, before her scheduled appointment in December

## 2020-09-08 NOTE — PROGRESS NOTES
Assessment/Plan:    Type 2 diabetes mellitus with circulatory disorder, with long-term current use of insulin ([facility])    Lab Results   Component Value Date    HGBA1C 8 3 (H) 09/01/2020   HgA1c unchanged from prior  Increased Lantus to 22  If no improvement of BG at home I have urged her to call Endo for a sooner appointment, before her scheduled appointment in December        Diagnoses and all orders for this visit:    Screening for HIV (human immunodeficiency virus)  -     HIV 1/2 Antigen/Antibody (4th Generation) w Reflex [facility]; Future    Type 2 diabetes mellitus with other circulatory complication, with long-term current use of insulin ([facility])  -     insulin glargine (Lantus SoloStar) 100 units/mL injection pen; Inject 22 Units under the skin daily at bedtime  -     Lipid panel; Future    Allergic rhinitis, unspecified seasonality, unspecified trigger  -     fluticasone (FLONASE) 50 mcg/act nasal spray; 1 spray into each nostril daily  -     cetirizine (ZyrTEC) 10 mg tablet; Take 1 tablet (10 mg total) by mouth daily    CKD stage 2 due to type 2 diabetes mellitus (HCC)  -     lisinopril (ZESTRIL) 5 mg tablet; Take 1 tablet (5 mg total) by mouth daily    Essential hypertension  -     lisinopril (ZESTRIL) 5 mg tablet; Take 1 tablet (5 mg total) by mouth daily  -     metoprolol tartrate (LOPRESSOR) 25 mg tablet; Take 1 tablet (25 mg total) by mouth every 12 (twelve) hours  -     Lipid panel; Future    Persistent proteinuria  -     lisinopril (ZESTRIL) 5 mg tablet; Take 1 tablet (5 mg total) by mouth daily    Vitamin D deficiency  -     lisinopril (ZESTRIL) 5 mg tablet; Take 1 tablet (5 mg total) by mouth daily    PAD (peripheral artery disease) (HCC)  -     clopidogrel (PLAVIX) 75 mg tablet; Take 1 tablet (75 mg total) by mouth daily    Encounter for hepatitis C screening test for low risk patient  -     Hepatitis C antibody; Future          Subjective:      Patient ID: Bobie Goldberg is a 47 y  o  female  47 y o  female with past medical history of diabetes (HgA1C 8 3 on 5/7/2020 and again today) and extensive peripheral arterial disease status post multiple endovascular interventions to the bilateral lower extremities  She recently underwent repeat RLE arteriogram w/ recanalization of occluded SFA stent and right TMA revision on 5/7/2020  The right TMA wound is noted to be non-healing  The patient is going to the wound center once per week for wound care and has a visiting nurse come to the home for dressing changes 2 x/week  She previously has had left TMA in 11/2019  She follows with vascular surgery, nephrology, endocrinology, and podiatry  Currently has no new concerns  States she has been using her insulin regularly, however admits she has trouble following low carb diet  Denies symptoms of hypoglycemia  The following portions of the patient's history were reviewed and updated as appropriate: She  has a past medical history of Cellulitis of toe (1/28/2019), CKD (chronic kidney disease), Diabetes mellitus ([facility] ), Gangrene of toe of right foot ([facility] ) (10/30/2019), and Hypertension    She   Patient Active Problem List    Diagnosis Date Noted    Persistent proteinuria 09/03/2020    Vitamin D deficiency 09/03/2020    Current use of insulin ([facility] ) 08/27/2020    S/P transmetatarsal amputation of foot, right ([facility] ) 08/26/2020    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, with necrosis of bone ([facility] ) 05/08/2020    Cellulitis of right lower extremity 05/04/2020    Class 2 severe obesity due to excess calories with serious comorbidity and body mass index (BMI) of 36 0 to 36 9 in adult [facility]) 01/27/2020    History of transmetatarsal amputation of foot ([facility] ) 11/20/2019    Charcot foot due to diabetes mellitus ([facility] ) 08/05/2019    Polyneuropathy associated with underlying disease ([facility] ) 07/08/2019    Critical lower limb ischemia 04/30/2019    Toe amputation status, left 02/11/2019    Leukocytosis 02/05/2019    Microcytic anemia 02/01/2019    CKD stage 2 due to type 2 diabetes mellitus ([place] [address] ) 01/31/2019    HLD (hyperlipidemia) 01/28/2019    PAD (peripheral artery disease) ([place] [address] ) 01/28/2019    Type 2 diabetes mellitus with circulatory disorder, with long-term current use of insulin ([place] [address] ) 03/08/2018    Essential hypertension 03/08/2018     She  has a past surgical history that includes IR abdominal aortagram (2/1/2019); Toe amputation (Left, 2/4/2019); IR arterial lysis (4/30/2019); IR lower extremity / intervention (11/1/2019); Toe amputation (Right, 11/4/2019); pr amputation foot,transmetatarsal (Right, 11/7/2019); IR aortagram with run-off (5/5/2020); and pr amputation foot,transmetatarsal (Right, 5/7/2020)  Her family history includes Diabetes in her mother; No Known Problems in her father  She  reports that she quit smoking about 7 years ago  Her smoking use included cigarettes  She has quit using smokeless tobacco  She reports previous alcohol use  She reports that she does not use drugs    Current Outpatient Medications   Medication Sig Dispense Refill    aspirin 81 mg chewable tablet Chew 1 tablet (81 mg total) daily      atorvastatin (LIPITOR) 20 mg tablet Take 1 tablet (20 mg total) by mouth daily 90 tablet 1    cetirizine (ZyrTEC) 10 mg tablet Take 1 tablet (10 mg total) by mouth daily 90 tablet 1    clopidogrel (PLAVIX) 75 mg tablet Take 1 tablet (75 mg total) by mouth daily 90 tablet 1    ergocalciferol (ERGOCALCIFEROL) 1 25 MG (46539 UT) capsule Take 1 capsule (50,000 Units total) by mouth once a week 12 capsule 1    ferrous sulfate 324 (65 Fe) mg Take 1 tablet (324 mg total) by mouth 2 (two) times a day before meals 180 tablet 3    fluticasone (FLONASE) 50 mcg/act nasal spray 1 spray into each nostril daily 3 Bottle 1    glucose blood (ONE TOUCH ULTRA TEST) test strip Use to test 3x daily 90 each 5    Insulin Aspart FlexPen (NovoLOG FlexPen) 100 UNIT/ML SOPN Inject 5units subcut up to 3 times daily just before a meal 5 pen 3    insulin glargine (Lantus SoloStar) 100 units/mL injection pen Inject 22 Units under the skin daily at bedtime 5 pen 1    Insulin Pen Needle 32G X 4 MM MISC Use 4 times daily with insulin pens 120 each 5    lisinopril (ZESTRIL) 5 mg tablet Take 1 tablet (5 mg total) by mouth daily 90 tablet 3    metFORMIN (GLUCOPHAGE) 1000 MG tablet Take 1 tablet (1,000 mg total) by mouth 2 (two) times a day with meals 60 tablet 3    metoprolol tartrate (LOPRESSOR) 25 mg tablet Take 1 tablet (25 mg total) by mouth every 12 (twelve) hours 180 tablet 1    ONETOUCH DELICA LANCETS FINE MISC Testing Three times a day 100 each 5     No current facility-administered medications for this visit        Current Outpatient Medications on File Prior to Visit   Medication Sig    aspirin 81 mg chewable tablet Chew 1 tablet (81 mg total) daily    atorvastatin (LIPITOR) 20 mg tablet Take 1 tablet (20 mg total) by mouth daily    ergocalciferol (ERGOCALCIFEROL) 1 25 MG (17296 UT) capsule Take 1 capsule (50,000 Units total) by mouth once a week    ferrous sulfate 324 (65 Fe) mg Take 1 tablet (324 mg total) by mouth 2 (two) times a day before meals    glucose blood (ONE TOUCH ULTRA TEST) test strip Use to test 3x daily    Insulin Aspart FlexPen (NovoLOG FlexPen) 100 UNIT/ML SOPN Inject 5units subcut up to 3 times daily just before a meal    Insulin Pen Needle 32G X 4 MM MISC Use 4 times daily with insulin pens    metFORMIN (GLUCOPHAGE) 1000 MG tablet Take 1 tablet (1,000 mg total) by mouth 2 (two) times a day with meals    ONETOUCH DELICA LANCETS FINE MISC Testing Three times a day    [DISCONTINUED] cetirizine (ZyrTEC) 10 mg tablet Take 1 tablet (10 mg total) by mouth daily    [DISCONTINUED] clopidogrel (PLAVIX) 75 mg tablet Take 1 tablet (75 mg total) by mouth daily    [DISCONTINUED] fluticasone (FLONASE) 50 mcg/act nasal spray 1 spray into each nostril daily  [DISCONTINUED] insulin glargine (Lantus SoloStar) 100 units/mL injection pen Inject 20 Units under the skin daily at bedtime    [DISCONTINUED] lisinopril (ZESTRIL) 5 mg tablet Take 1 tablet (5 mg total) by mouth daily    [DISCONTINUED] metoprolol tartrate (LOPRESSOR) 25 mg tablet Take 1 tablet (25 mg total) by mouth every 12 (twelve) hours     No current facility-administered medications on file prior to visit       Review of Systems   Musculoskeletal: Positive for arthralgias and back pain  All other systems reviewed and are negative  Objective:      /70 (BP Location: Right arm, Patient Position: Sitting, Cuff Size: Large)   Pulse 63   Temp 98 °F (36 7 °C) (Temporal)   Resp 18   LMP  (LMP Unknown)   SpO2 98%          Physical Exam  Vitals signs and nursing note reviewed  Constitutional:       Appearance: She is well-developed  HENT:      Head: Normocephalic  Right Ear: External ear normal       Left Ear: External ear normal       Nose: Nose normal    Eyes:      Conjunctiva/sclera: Conjunctivae normal       Pupils: Pupils are equal, round, and reactive to light  Neck:      Musculoskeletal: Normal range of motion and neck supple  Thyroid: No thyromegaly  Cardiovascular:      Rate and Rhythm: Normal rate and regular rhythm  Heart sounds: Normal heart sounds  Pulmonary:      Effort: Pulmonary effort is normal       Breath sounds: Normal breath sounds  Abdominal:      Palpations: Abdomen is soft  Tenderness: There is no abdominal tenderness  There is no guarding or rebound  Skin:     General: Skin is dry  Neurological:      Mental Status: She is alert and oriented to person, place, and time  Deep Tendon Reflexes: Reflexes are normal and symmetric

## 2020-09-09 ENCOUNTER — TELEPHONE (OUTPATIENT)
Dept: FAMILY MEDICINE CLINIC | Facility: CLINIC | Age: 55
End: 2020-09-09

## 2020-09-09 NOTE — TELEPHONE ENCOUNTER
Gray Heimlich called from [facility] to inform you that pt started on her increase dose of lantus last night and 3 hours after she had a BG reading of 60 symptoms of headaches and feeling weak  States pt ate a fruit cup  When pt woke up this AM her sugar was at 109

## 2020-09-15 ENCOUNTER — OFFICE VISIT (OUTPATIENT)
Dept: WOUND CARE | Facility: HOSPITAL | Age: 55
End: 2020-09-15
Payer: COMMERCIAL

## 2020-09-15 VITALS
RESPIRATION RATE: 18 BRPM | DIASTOLIC BLOOD PRESSURE: 60 MMHG | HEART RATE: 82 BPM | TEMPERATURE: 96.5 F | SYSTOLIC BLOOD PRESSURE: 100 MMHG

## 2020-09-15 DIAGNOSIS — E11.621 DIABETIC ULCER OF RIGHT MIDFOOT ASSOCIATED WITH TYPE 2 DIABETES MELLITUS, WITH NECROSIS OF BONE (HCC): Primary | ICD-10-CM

## 2020-09-15 DIAGNOSIS — L97.414 DIABETIC ULCER OF RIGHT MIDFOOT ASSOCIATED WITH TYPE 2 DIABETES MELLITUS, WITH NECROSIS OF BONE (HCC): Primary | ICD-10-CM

## 2020-09-15 PROCEDURE — 11042 DBRDMT SUBQ TIS 1ST 20SQCM/<: CPT | Performed by: PODIATRIST

## 2020-09-15 RX ORDER — LIDOCAINE 40 MG/G
CREAM TOPICAL ONCE
Status: COMPLETED | OUTPATIENT
Start: 2020-09-15 | End: 2020-09-15

## 2020-09-15 RX ADMIN — LIDOCAINE 1 APPLICATION: 40 CREAM TOPICAL at 11:11

## 2020-09-15 NOTE — PROGRESS NOTES
Patient ID: Clair Gerardo is a 47 y o  female Date of Birth 1965     Chief Complaint  Chief Complaint   Patient presents with    Follow Up Wound Care Visit     right foot wound        Allergies  Patient has no known allergies  Assessment:       Diagnoses and all orders for this visit:    Diabetic ulcer of right midfoot associated with type 2 diabetes mellitus, with necrosis of bone ([facility] )  -     Wound cleansing and dressings; Future  -     lidocaine (LMX) 4 % cream  -     Debridement        Plan:  Patient's insurance would not cover Grafix graft  We will pre approved cellutome for harvest and application of epidermal graft in 2 weeks  Wound was debrided today  Debridement   Consent obtained? verbal  Consent given by: patient  Risks discussed? procedural risks discussed  Immediately prior to the procedure a time out was called  Performed by: physician  Debridement type: surgical  Level of debridement: subcutaneous tissue  Pain control: lidocaine 4%  Post-debridement measurements  Length (cm): 1 7  Width (cm): 0 6  Depth (cm): 0 4  Percent debrided: 100%  Surface Area (cm^2): 1 02  Area debrided (cm^2): 1 02  Volume (cm^3): 0 41  Tissue and other material debrided: subcutaneous tissue  Devitalized tissue debrided: biofilm, fibrin, necrotic debris and slough  Instrument(s) utilized: curette and blade  Bleeding: medium  Hemostasis obtained with: pressure  Procedural pain (0-10): 0  Post-procedural pain: 0   Response to treatment: procedure was tolerated well                       Wound Foot Right;Lateral (Active)   Wound Image Images linked 09/15/20 1112   Wound Description Granulation tissue;Slough (50/50) 09/15/20 1058   Sharon-wound Assessment Clean;Dry; Intact 09/15/20 1058   Wound Length (cm) 1 5 cm 09/15/20 1058   Wound Width (cm) 0 4 cm 09/15/20 1058   Wound Depth (cm) 0 2 cm 09/15/20 1058   Wound Surface Area (cm^2) 0 6 cm^2 09/15/20 1058   Wound Volume (cm^3) 0 12 cm^3 09/15/20 1058 Calculated Wound Volume (cm^3) 0 12 cm^3 09/15/20 1058   Change in Wound Size % 85 09/15/20 1058   Drainage Amount Moderate 09/15/20 1058   Drainage Description Serosanguineous 09/15/20 1058   Non-staged Wound Description Full thickness 09/15/20 1058   Treatments Cleansed 09/15/20 1058   Dressing Changed Changed 09/15/20 1058   Patient Tolerance Tolerated well 09/15/20 1058   Dressing Status Clean;Dry; Intact 09/15/20 1058       Subjective:        Patient returns for right foot ulcer  I did attempt 2 pre approved a graphix placental graft but was denied by her insurance  The wound has not improved but does not appear infected  The following portions of the patient's history were reviewed and updated as appropriate: allergies, current medications, past family history, past medical history, past social history, past surgical history and problem list     Review of Systems   Constitutional: Negative  Skin: Positive for wound  Neurological: Positive for numbness  Objective:       Wound Foot Right;Lateral (Active)   Wound Image Images linked 09/15/20 1112   Wound Description Granulation tissue;Slough (50/50) 09/15/20 1058   Sharon-wound Assessment Clean;Dry; Intact 09/15/20 1058   Wound Length (cm) 1 5 cm 09/15/20 1058   Wound Width (cm) 0 4 cm 09/15/20 1058   Wound Depth (cm) 0 2 cm 09/15/20 1058   Wound Surface Area (cm^2) 0 6 cm^2 09/15/20 1058   Wound Volume (cm^3) 0 12 cm^3 09/15/20 1058   Calculated Wound Volume (cm^3) 0 12 cm^3 09/15/20 1058   Change in Wound Size % 85 09/15/20 1058   Drainage Amount Moderate 09/15/20 1058   Drainage Description Serosanguineous 09/15/20 1058   Non-staged Wound Description Full thickness 09/15/20 1058   Treatments Cleansed 09/15/20 1058   Dressing Changed Changed 09/15/20 1058   Patient Tolerance Tolerated well 09/15/20 1058   Dressing Status Clean;Dry; Intact 09/15/20 1058       /60 (BP Location: Left arm, Patient Position: Sitting)   Pulse 82   Temp (!) 96 5 °F (35 8 °C) (Temporal)   Resp 18   LMP  (LMP Unknown)     Physical Exam  Vitals signs reviewed  Constitutional:       Appearance: She is obese  She is not ill-appearing or diaphoretic  Feet:      Right foot:      Skin integrity: Ulcer (Fibrotic ulcer unchanged dimensions  No probe to bone pus or cellulitis ) present  Wound Instructions:  Orders Placed This Encounter   Procedures    Wound cleansing and dressings     Wound cleansing and dressings       Right foot wound  Wash your hands with soap and water  Remove old dressing, discard into plastic bag and place in trash  Cleanse the wound with normal saline or mild soap and water prior to applying a clean dressing  Do not use tissue or cotton balls  Do not scrub the wound  Pat dry using gauze  Shower yes do not get dressing wet  Apply skin prep to skin surrounding wound  Apply endoform and dermagran to the right  wound  Cover with 4x4  Secure with rolled gauze and tubi fast blue  Change dressing three times a week   This treatment was applied in the office today      Continue with VNA services three times a week for dressing changes    May skip on the days the patient comes to the wound care center     Continue to offload right foot as much as possible      Follow up in two weeks     Standing Status:   Future     Standing Expiration Date:   9/15/2021    Debridement     This order was created via procedure documentation

## 2020-09-15 NOTE — PATIENT INSTRUCTIONS
Orders Placed This Encounter   Procedures    Wound cleansing and dressings     Wound cleansing and dressings       Right foot wound  Wash your hands with soap and water  Remove old dressing, discard into plastic bag and place in trash  Cleanse the wound with normal saline or mild soap and water prior to applying a clean dressing  Do not use tissue or cotton balls  Do not scrub the wound  Pat dry using gauze  Shower yes do not get dressing wet  Apply skin prep to skin surrounding wound  Apply endoform and dermagran to the right  wound  Cover with 4x4  Secure with rolled gauze and tubi fast blue  Change dressing three times a week   This treatment was applied in the office today      Continue with VNA services three times a week for dressing changes    May skip on the days the patient comes to the wound care center     Continue to offload right foot as much as possible      Follow up in two weeks     Standing Status:   Future     Standing Expiration Date:   9/15/2021

## 2020-09-23 NOTE — PROGRESS NOTES
Assessment/Plan:      Diagnoses and all orders for this visit:    Class 2 severe obesity due to excess calories with serious comorbidity and body mass index (BMI) of 36 0 to 36 9 in adult [facility])  -     Ambulatory referral to Sleep Medicine; Future    Type 2 diabetes mellitus with other circulatory complication, with long-term current use of insulin ([facility] )  -     Ambulatory referral to Weight Management    Obesity (BMI 30 0-34 9)  -     Ambulatory referral to Weight Management    CKD stage 2 due to type 2 diabetes mellitus ([facility] )    Hyperlipidemia, unspecified hyperlipidemia type    Suspected sleep apnea  -     Ambulatory referral to Sleep Medicine; Future    PAD (peripheral artery disease) (HCC)    Diabetic ulcer of right midfoot associated with type 2 diabetes mellitus, with necrosis of bone ([facility] )      -Discussed options of Healthy[facility]-Intensive Lifestyle Intervention Program, Very Low Calorie Diet-VLCD and Conservative Program and the role of weight loss medications   -Initial weight loss goal of 5-10% weight loss for improved health  -Screening labs: 9/1/20  -Patient is interested in pursuing Francesco-En-Y Gastric Bypass and Vertical Sleeve Gastrectomy    Goals:  Food log (ie ) www myfitnesspal com,sparkpeople  com,loseit com,calorieking  com,etc  baritastic  No sugary beverages  At least 64oz of water daily  Increase physical activity by 10 minutes daily  Gradually increase physical activity to a goal of 5 days per week for 30 minutes of MODERATE intensity PLUS 2 days per week of FULL BODY resistance training  45 minute visit, >50% face-to-face time spent counseling patient on surgical and nonsurgical interventions for the treatment of excess weight  Discussed the advantages and long-term outcomes with regards to bariatric surgery    Discussed in detail nonsurgical options including intensive lifestyle intervention program, very low-calorie diet program and conservative program   Discussed the role of weight loss medications  Counseled patient on diet behavior and exercise modification for weight loss  Follow up in approximately 1 month with Surgical Dietician, Surgical  and Surgical   Subjective:   Chief Complaint   Patient presents with    Consult     mwm consult       Patient ID: Mindy Solis  is a 47 y o  female with excess weight/obesity here to pursue weight management  Past Medical History:   Diagnosis Date    Cellulitis of toe 2019    CKD (chronic kidney disease)     Diabetes mellitus ([facility] )     Gangrene of toe of right foot ([facility] ) 10/30/2019    Added automatically from request for surgery [id number]    Hypertension        HPI:  Obesity/Excess Weight:  Severity: class 2  Onset: Many years    Modifiers: Diet and Exercise  Contributing factors: Poor Food Choices, Insufficient Physical Activity and Stress/Emotional Eating  Associated symptoms: comorbid conditions, DM2, CKD    Goals: 140-150lbs   Hydration:  Alcohol: no  Smoking: no  Exercise: no   Sleep: 7hr  STOP ban/8    Social:  Lives with her 3 kids  On disability  Bf- cup of black coffee, 2 plain bagels   L- ham and cheese sandwich in [employer] bread, diet ginger ale   D- sausage meatballs in sub w french fries, with water  Water- 2-3 bottles a day    DM2- last a1c 8 3    The following portions of the patient's history were reviewed and updated as appropriate: allergies, current medications, past family history, past medical history, past social history, past surgical history and problem list     Review of Systems   Constitutional: Negative for activity change and appetite change  Respiratory: Negative  Cardiovascular: Negative  Gastrointestinal: Negative  Genitourinary: Negative  Musculoskeletal: Positive for gait problem  Negative for arthralgias  Skin: Negative for rash  Psychiatric/Behavioral: Negative          Objective:    /80 (BP Location: Left arm, Patient Position: Sitting, Cuff Size: Large)   Pulse 75   Temp (!) 97 3 °F (36 3 °C)   Ht 5' (1 524 m)   Wt 87 5 kg (193 lb)   LMP  (LMP Unknown)   BMI 37 69 kg/m²     Physical Exam     Constitutional   General appearance: Abnormal   well developed and obese  Eyes No conjunctival pallor  Ears, Nose, Mouth, and Throat Oral mucosa moist    Pulmonary   Respiratory effort: No increased work of breathing or signs of respiratory distress  Auscultation of lungs: Clear to auscultation, equal breath sounds bilaterally, no wheezes, no rales, no rhonci  Cardiovascular   Auscultation of heart: Normal rate and rhythm, normal S1 and S2, without murmurs  Examination of extremities for edema and/or varicosities: Normal   no edema  Abdomen   Abdomen: Abnormal   The abdomen was obese  Bowel sounds were normal  The abdomen was soft and nontender     Musculoskeletal   Gait and station: Normal     Psychiatric   Orientation to person, place and time: Normal     Affect: appropriate

## 2020-09-24 ENCOUNTER — CONSULT (OUTPATIENT)
Dept: BARIATRICS | Facility: CLINIC | Age: 55
End: 2020-09-24
Payer: COMMERCIAL

## 2020-09-24 VITALS
HEIGHT: 60 IN | WEIGHT: 193 LBS | TEMPERATURE: 97.3 F | BODY MASS INDEX: 37.89 KG/M2 | SYSTOLIC BLOOD PRESSURE: 152 MMHG | DIASTOLIC BLOOD PRESSURE: 80 MMHG | HEART RATE: 75 BPM

## 2020-09-24 DIAGNOSIS — R29.818 SUSPECTED SLEEP APNEA: ICD-10-CM

## 2020-09-24 DIAGNOSIS — E11.22 CKD STAGE 2 DUE TO TYPE 2 DIABETES MELLITUS (HCC): ICD-10-CM

## 2020-09-24 DIAGNOSIS — N18.2 CKD STAGE 2 DUE TO TYPE 2 DIABETES MELLITUS (HCC): ICD-10-CM

## 2020-09-24 DIAGNOSIS — E66.9 OBESITY (BMI 30.0-34.9): ICD-10-CM

## 2020-09-24 DIAGNOSIS — E11.621 DIABETIC ULCER OF RIGHT MIDFOOT ASSOCIATED WITH TYPE 2 DIABETES MELLITUS, WITH NECROSIS OF BONE (HCC): ICD-10-CM

## 2020-09-24 DIAGNOSIS — L97.414 DIABETIC ULCER OF RIGHT MIDFOOT ASSOCIATED WITH TYPE 2 DIABETES MELLITUS, WITH NECROSIS OF BONE (HCC): ICD-10-CM

## 2020-09-24 DIAGNOSIS — Z79.4 TYPE 2 DIABETES MELLITUS WITH OTHER CIRCULATORY COMPLICATION, WITH LONG-TERM CURRENT USE OF INSULIN (HCC): ICD-10-CM

## 2020-09-24 DIAGNOSIS — E11.59 TYPE 2 DIABETES MELLITUS WITH OTHER CIRCULATORY COMPLICATION, WITH LONG-TERM CURRENT USE OF INSULIN (HCC): ICD-10-CM

## 2020-09-24 DIAGNOSIS — E66.01 CLASS 2 SEVERE OBESITY DUE TO EXCESS CALORIES WITH SERIOUS COMORBIDITY AND BODY MASS INDEX (BMI) OF 36.0 TO 36.9 IN ADULT (HCC): Primary | ICD-10-CM

## 2020-09-24 DIAGNOSIS — E78.5 HYPERLIPIDEMIA, UNSPECIFIED HYPERLIPIDEMIA TYPE: ICD-10-CM

## 2020-09-24 DIAGNOSIS — I73.9 PAD (PERIPHERAL ARTERY DISEASE) (HCC): ICD-10-CM

## 2020-09-24 PROCEDURE — 99214 OFFICE O/P EST MOD 30 MIN: CPT | Performed by: FAMILY MEDICINE

## 2020-09-24 PROCEDURE — 3008F BODY MASS INDEX DOCD: CPT | Performed by: FAMILY MEDICINE

## 2020-09-24 PROCEDURE — 1036F TOBACCO NON-USER: CPT | Performed by: FAMILY MEDICINE

## 2020-09-29 ENCOUNTER — OFFICE VISIT (OUTPATIENT)
Dept: WOUND CARE | Facility: HOSPITAL | Age: 55
End: 2020-09-29
Payer: COMMERCIAL

## 2020-09-29 VITALS
TEMPERATURE: 98 F | SYSTOLIC BLOOD PRESSURE: 140 MMHG | DIASTOLIC BLOOD PRESSURE: 78 MMHG | HEART RATE: 84 BPM | RESPIRATION RATE: 18 BRPM

## 2020-09-29 DIAGNOSIS — L97.414 DIABETIC ULCER OF RIGHT MIDFOOT ASSOCIATED WITH TYPE 2 DIABETES MELLITUS, WITH NECROSIS OF BONE (HCC): Primary | ICD-10-CM

## 2020-09-29 DIAGNOSIS — E11.621 DIABETIC ULCER OF RIGHT MIDFOOT ASSOCIATED WITH TYPE 2 DIABETES MELLITUS, WITH NECROSIS OF BONE (HCC): Primary | ICD-10-CM

## 2020-09-29 PROCEDURE — 11042 DBRDMT SUBQ TIS 1ST 20SQCM/<: CPT | Performed by: PODIATRIST

## 2020-09-29 RX ORDER — LIDOCAINE 40 MG/G
CREAM TOPICAL ONCE
Status: COMPLETED | OUTPATIENT
Start: 2020-09-29 | End: 2020-09-29

## 2020-09-29 RX ADMIN — LIDOCAINE: 40 CREAM TOPICAL at 09:42

## 2020-09-29 NOTE — PATIENT INSTRUCTIONS
Orders Placed This Encounter   Procedures    Debridement     This order was created via procedure documentation    Wound cleansing and dressings     R foot wound:  Wash your hands with soap and water  Remove old dressing, discard into plastic bag and place in trash  Cleanse the wound with normal saline prior to applying a clean dressing  Do not use tissue or cotton balls  Do not scrub the wound  Pat dry using gauze  Shower yes; do not get dressing wet   Apply skin prep to skin surrounding wound  Stop endoform and dermagran  Begin polymem cut to fit the wound bed  (polymem sent with patient)  Cover with gauze  Secure with hilda and paper tape  Change dressing 3x per week (skip day patient comes to wound center)    Continue visiting nurses 3 x per week skipping day patient comes to wound center  Continue at least 3 servings protein per day  Continue to reduce carbohydrate intake  Plan Cellutome next week  Today's wound treatment note:  R foot wound redressed as ordered today       Standing Status:   Future     Standing Expiration Date:   9/29/2021

## 2020-09-29 NOTE — PROGRESS NOTES
Patient ID: Chyna Woods is a 47 y o  female Date of Birth 1965     Chief Complaint  Chief Complaint   Patient presents with    Follow Up Wound Care Visit     R foot wound  Allergies  Patient has no known allergies  Assessment:     Diagnoses and all orders for this visit:    Diabetic ulcer of right midfoot associated with type 2 diabetes mellitus, with necrosis of bone (HCC)  -     Debridement  -     lidocaine (LMX) 4 % cream  -     Wound cleansing and dressings; Future        Plan: We are still awaiting final approval for cellular [place] epidermal graft  Continue local wound care for now  Debridement   Wound Foot Right;Lateral    Consent obtained? verbal  Consent given by: patient  Risks discussed? procedural risks discussed  Immediately prior to the procedure a time out was called  Performed by: resident  Debridement type: surgical  Level of debridement: subcutaneous tissue  Pain control: lidocaine 4%  Post-debridement measurements  Length (cm): 1 8  Width (cm): 0 8  Depth (cm): 0 4  Percent debrided: 100%  Surface Area (cm^2): 1 44  Area debrided (cm^2): 1 44  Volume (cm^3): 0 58  Tissue and other material debrided: subcutaneous tissue  Devitalized tissue debrided: biofilm, fibrin, necrotic debris and slough  Instrument(s) utilized: blade  Bleeding: medium  Hemostasis obtained with: pressure  Procedural pain (0-10): 0  Post-procedural pain: 0   Response to treatment: procedure was tolerated well               Wound Foot Right;Lateral (Active)   Wound Image Images linked 09/29/20 0945   Wound Description Granulation tissue;Slough; Other (Comment) (SOLIS GRADE 3) 09/29/20 0923   Sharon-wound Assessment Clean;Dry; Intact 09/29/20 0923   Wound Length (cm) 1 5 cm 09/29/20 0923   Wound Width (cm) 0 5 cm 09/29/20 0923   Wound Depth (cm) 0 3 cm 09/29/20 0923   Wound Surface Area (cm^2) 0 75 cm^2 09/29/20 0923   Wound Volume (cm^3) 0 23 cm^3 09/29/20 0923   Calculated Wound Volume (cm^3) 0 23 cm^3 09/29/20 0923   Change in Wound Size % 71 25 09/29/20 0923   Drainage Amount Small 09/29/20 0923   Drainage Description Serosanguineous 09/29/20 0923   Non-staged Wound Description Full thickness 09/29/20 0923   Dressing Changed Changed 09/29/20 0923       Subjective:        Patient returns for nonhealing amputation right foot  We have not heard back from her insurance company yet as to whether the cellutome epidermal graft is covered      The following portions of the patient's history were reviewed and updated as appropriate: allergies, current medications, past family history, past medical history, past social history, past surgical history and problem list     Review of Systems      Objective:       Wound Foot Right;Lateral (Active)   Wound Image Images linked 09/29/20 0945   Wound Description Granulation tissue;Slough; Other (Comment) (SOLIS GRADE 3) 09/29/20 0923   Sharon-wound Assessment Clean;Dry; Intact 09/29/20 0923   Wound Length (cm) 1 5 cm 09/29/20 0923   Wound Width (cm) 0 5 cm 09/29/20 0923   Wound Depth (cm) 0 3 cm 09/29/20 0923   Wound Surface Area (cm^2) 0 75 cm^2 09/29/20 0923   Wound Volume (cm^3) 0 23 cm^3 09/29/20 0923   Calculated Wound Volume (cm^3) 0 23 cm^3 09/29/20 0923   Change in Wound Size % 71 25 09/29/20 0923   Drainage Amount Small 09/29/20 0923   Drainage Description Serosanguineous 09/29/20 0923   Non-staged Wound Description Full thickness 09/29/20 0923   Dressing Changed Changed 09/29/20 0923       /78   Pulse 84   Temp 98 °F (36 7 °C)   Resp 18   LMP  (LMP Unknown)     Physical Exam  Vitals signs reviewed  Constitutional:       General: She is not in acute distress  Appearance: She is obese  She is not toxic-appearing  Feet:      Right foot:      Skin integrity: Ulcer (Ulcer right lateral forefoot is heavily fibrotic with minimal to no drainage  Wound is dry  No sign of infection  No pus malodor or probe to bone  No cellulitis ) present  Neurological:      Mental Status: She is alert  Wound Instructions:  Orders Placed This Encounter   Procedures    Debridement     This order was created via procedure documentation    Wound cleansing and dressings     R foot wound:  Wash your hands with soap and water  Remove old dressing, discard into plastic bag and place in trash  Cleanse the wound with normal saline prior to applying a clean dressing  Do not use tissue or cotton balls  Do not scrub the wound  Pat dry using gauze  Shower yes; do not get dressing wet   Apply skin prep to skin surrounding wound  Stop endoform and dermagran  Begin polymem cut to fit the wound bed  (polymem sent with patient)  Cover with gauze  Secure with hilda and paper tape  Change dressing 3x per week (skip day patient comes to wound center)    Continue visiting nurses 3 x per week skipping day patient comes to wound center  Continue at least 3 servings protein per day  Continue to reduce carbohydrate intake  Plan Cellutome next week  Today's wound treatment note:  R foot wound redressed as ordered today       Standing Status:   Future     Standing Expiration Date:   9/29/2021

## 2020-10-13 ENCOUNTER — OFFICE VISIT (OUTPATIENT)
Dept: WOUND CARE | Facility: HOSPITAL | Age: 55
End: 2020-10-13
Payer: COMMERCIAL

## 2020-10-13 VITALS
HEART RATE: 76 BPM | DIASTOLIC BLOOD PRESSURE: 78 MMHG | RESPIRATION RATE: 18 BRPM | TEMPERATURE: 97 F | SYSTOLIC BLOOD PRESSURE: 126 MMHG

## 2020-10-13 DIAGNOSIS — E11.621 DIABETIC ULCER OF RIGHT MIDFOOT ASSOCIATED WITH TYPE 2 DIABETES MELLITUS, WITH NECROSIS OF BONE (HCC): Primary | ICD-10-CM

## 2020-10-13 DIAGNOSIS — L97.414 DIABETIC ULCER OF RIGHT MIDFOOT ASSOCIATED WITH TYPE 2 DIABETES MELLITUS, WITH NECROSIS OF BONE (HCC): Primary | ICD-10-CM

## 2020-10-13 DIAGNOSIS — S81.801A OPEN WOUND OF RIGHT LOWER LEG, INITIAL ENCOUNTER: ICD-10-CM

## 2020-10-13 PROCEDURE — 15115 EPDRM AGRFT F/S/N/H/F/G/M 1: CPT | Performed by: PODIATRIST

## 2020-10-13 PROCEDURE — 99213 OFFICE O/P EST LOW 20 MIN: CPT | Performed by: PODIATRIST

## 2020-10-13 RX ORDER — LIDOCAINE 40 MG/G
CREAM TOPICAL ONCE
Status: COMPLETED | OUTPATIENT
Start: 2020-10-13 | End: 2020-10-13

## 2020-10-13 RX ADMIN — LIDOCAINE: 40 CREAM TOPICAL at 10:47

## 2020-10-20 ENCOUNTER — TELEPHONE (OUTPATIENT)
Dept: FAMILY MEDICINE CLINIC | Facility: CLINIC | Age: 55
End: 2020-10-20

## 2020-10-20 ENCOUNTER — OFFICE VISIT (OUTPATIENT)
Dept: WOUND CARE | Facility: HOSPITAL | Age: 55
End: 2020-10-20
Payer: COMMERCIAL

## 2020-10-20 VITALS
HEART RATE: 76 BPM | RESPIRATION RATE: 18 BRPM | TEMPERATURE: 97.1 F | DIASTOLIC BLOOD PRESSURE: 78 MMHG | SYSTOLIC BLOOD PRESSURE: 122 MMHG

## 2020-10-20 DIAGNOSIS — E11.621 DIABETIC ULCER OF RIGHT MIDFOOT ASSOCIATED WITH TYPE 2 DIABETES MELLITUS, WITH NECROSIS OF BONE (HCC): Primary | ICD-10-CM

## 2020-10-20 DIAGNOSIS — L97.414 DIABETIC ULCER OF RIGHT MIDFOOT ASSOCIATED WITH TYPE 2 DIABETES MELLITUS, WITH NECROSIS OF BONE (HCC): Primary | ICD-10-CM

## 2020-10-20 PROCEDURE — 99024 POSTOP FOLLOW-UP VISIT: CPT | Performed by: PODIATRIST

## 2020-10-20 PROCEDURE — 99213 OFFICE O/P EST LOW 20 MIN: CPT | Performed by: PODIATRIST

## 2020-10-27 ENCOUNTER — OFFICE VISIT (OUTPATIENT)
Dept: WOUND CARE | Facility: HOSPITAL | Age: 55
End: 2020-10-27
Payer: COMMERCIAL

## 2020-10-27 VITALS
TEMPERATURE: 97.3 F | RESPIRATION RATE: 18 BRPM | DIASTOLIC BLOOD PRESSURE: 88 MMHG | HEART RATE: 72 BPM | SYSTOLIC BLOOD PRESSURE: 124 MMHG

## 2020-10-27 DIAGNOSIS — E11.621 DIABETIC ULCER OF RIGHT MIDFOOT ASSOCIATED WITH TYPE 2 DIABETES MELLITUS, WITH NECROSIS OF BONE (HCC): Primary | ICD-10-CM

## 2020-10-27 DIAGNOSIS — L97.414 DIABETIC ULCER OF RIGHT MIDFOOT ASSOCIATED WITH TYPE 2 DIABETES MELLITUS, WITH NECROSIS OF BONE (HCC): Primary | ICD-10-CM

## 2020-10-27 PROCEDURE — 97597 DBRDMT OPN WND 1ST 20 CM/<: CPT | Performed by: PODIATRIST

## 2020-10-27 RX ORDER — LIDOCAINE HYDROCHLORIDE 40 MG/ML
5 SOLUTION TOPICAL ONCE
Status: COMPLETED | OUTPATIENT
Start: 2020-10-27 | End: 2020-10-27

## 2020-10-27 RX ADMIN — LIDOCAINE HYDROCHLORIDE 5 ML: 40 SOLUTION TOPICAL at 10:36

## 2020-10-30 ENCOUNTER — OFFICE VISIT (OUTPATIENT)
Dept: SLEEP CENTER | Facility: CLINIC | Age: 55
End: 2020-10-30
Payer: COMMERCIAL

## 2020-10-30 VITALS
HEIGHT: 60 IN | HEART RATE: 75 BPM | SYSTOLIC BLOOD PRESSURE: 128 MMHG | OXYGEN SATURATION: 99 % | DIASTOLIC BLOOD PRESSURE: 82 MMHG | BODY MASS INDEX: 37.89 KG/M2 | WEIGHT: 193 LBS

## 2020-10-30 DIAGNOSIS — E66.01 CLASS 2 SEVERE OBESITY DUE TO EXCESS CALORIES WITH SERIOUS COMORBIDITY AND BODY MASS INDEX (BMI) OF 37.0 TO 37.9 IN ADULT (HCC): ICD-10-CM

## 2020-10-30 DIAGNOSIS — G47.33 OBSTRUCTIVE SLEEP APNEA: Primary | ICD-10-CM

## 2020-10-30 PROCEDURE — 99244 OFF/OP CNSLTJ NEW/EST MOD 40: CPT | Performed by: NURSE PRACTITIONER

## 2020-11-04 ENCOUNTER — TELEPHONE (OUTPATIENT)
Dept: FAMILY MEDICINE CLINIC | Facility: CLINIC | Age: 55
End: 2020-11-04

## 2020-11-10 ENCOUNTER — OFFICE VISIT (OUTPATIENT)
Dept: WOUND CARE | Facility: HOSPITAL | Age: 55
End: 2020-11-10
Payer: COMMERCIAL

## 2020-11-10 VITALS
HEART RATE: 66 BPM | RESPIRATION RATE: 18 BRPM | DIASTOLIC BLOOD PRESSURE: 60 MMHG | TEMPERATURE: 98 F | SYSTOLIC BLOOD PRESSURE: 106 MMHG

## 2020-11-10 DIAGNOSIS — Z89.431 S/P TRANSMETATARSAL AMPUTATION OF FOOT, RIGHT (HCC): ICD-10-CM

## 2020-11-10 DIAGNOSIS — E11.621 DIABETIC ULCER OF RIGHT MIDFOOT ASSOCIATED WITH TYPE 2 DIABETES MELLITUS, WITH NECROSIS OF BONE (HCC): Primary | ICD-10-CM

## 2020-11-10 DIAGNOSIS — L97.414 DIABETIC ULCER OF RIGHT MIDFOOT ASSOCIATED WITH TYPE 2 DIABETES MELLITUS, WITH NECROSIS OF BONE (HCC): Primary | ICD-10-CM

## 2020-11-10 PROCEDURE — 99024 POSTOP FOLLOW-UP VISIT: CPT | Performed by: PODIATRIST

## 2020-11-10 PROCEDURE — 99212 OFFICE O/P EST SF 10 MIN: CPT | Performed by: PODIATRIST

## 2020-11-11 ENCOUNTER — TELEPHONE (OUTPATIENT)
Dept: SLEEP CENTER | Facility: CLINIC | Age: 55
End: 2020-11-11

## 2020-11-11 DIAGNOSIS — Z20.822 ENCOUNTER FOR PREPROCEDURE SCREENING LABORATORY TESTING FOR COVID-19: Primary | ICD-10-CM

## 2020-11-11 DIAGNOSIS — Z01.812 ENCOUNTER FOR PREPROCEDURE SCREENING LABORATORY TESTING FOR COVID-19: Primary | ICD-10-CM

## 2020-11-16 DIAGNOSIS — Z79.4 TYPE 2 DIABETES MELLITUS WITHOUT COMPLICATION, WITH LONG-TERM CURRENT USE OF INSULIN (HCC): ICD-10-CM

## 2020-11-16 DIAGNOSIS — E11.9 TYPE 2 DIABETES MELLITUS WITHOUT COMPLICATION, WITH LONG-TERM CURRENT USE OF INSULIN (HCC): ICD-10-CM

## 2020-11-16 DIAGNOSIS — I10 ESSENTIAL HYPERTENSION: ICD-10-CM

## 2020-11-16 RX ORDER — ATORVASTATIN CALCIUM 20 MG/1
20 TABLET, FILM COATED ORAL DAILY
Qty: 90 TABLET | Refills: 1 | Status: SHIPPED | OUTPATIENT
Start: 2020-11-16 | End: 2021-05-18 | Stop reason: SDUPTHER

## 2020-11-16 NOTE — PROGRESS NOTES
RIKI URIAS contacted patient to follow up on status of prescription assistance application  Patient states there have been no updates  RIKI URIAS informed patient she should be receiving a letter and/or text message informing her if the application was approved or denied  RIKI URIAS agreed to contact company for follow up on the status of the application after next week if there are no updates  Patient was agreeable  RIKI URIAS will remain available for additional assistance/support as needed  She needs to call her insurance to figure out why they are not covered. I do not see prior auth requests.

## 2020-11-17 ENCOUNTER — CONSULT (OUTPATIENT)
Dept: FAMILY MEDICINE CLINIC | Facility: CLINIC | Age: 55
End: 2020-11-17

## 2020-11-17 VITALS
TEMPERATURE: 97.6 F | DIASTOLIC BLOOD PRESSURE: 90 MMHG | WEIGHT: 192 LBS | BODY MASS INDEX: 37.69 KG/M2 | RESPIRATION RATE: 18 BRPM | HEIGHT: 60 IN | OXYGEN SATURATION: 99 % | HEART RATE: 66 BPM | SYSTOLIC BLOOD PRESSURE: 138 MMHG

## 2020-11-17 DIAGNOSIS — Z01.818 PREOP EXAMINATION: Primary | ICD-10-CM

## 2020-11-17 DIAGNOSIS — Z23 NEED FOR VACCINATION: ICD-10-CM

## 2020-11-17 PROCEDURE — 90682 RIV4 VACC RECOMBINANT DNA IM: CPT

## 2020-11-17 PROCEDURE — 3080F DIAST BP >= 90 MM HG: CPT | Performed by: FAMILY MEDICINE

## 2020-11-17 PROCEDURE — 3075F SYST BP GE 130 - 139MM HG: CPT | Performed by: FAMILY MEDICINE

## 2020-11-17 PROCEDURE — 99215 OFFICE O/P EST HI 40 MIN: CPT | Performed by: FAMILY MEDICINE

## 2020-11-17 PROCEDURE — 3008F BODY MASS INDEX DOCD: CPT | Performed by: NURSE PRACTITIONER

## 2020-11-17 PROCEDURE — 3725F SCREEN DEPRESSION PERFORMED: CPT | Performed by: FAMILY MEDICINE

## 2020-11-17 PROCEDURE — 90471 IMMUNIZATION ADMIN: CPT

## 2020-11-17 PROCEDURE — 3008F BODY MASS INDEX DOCD: CPT | Performed by: FAMILY MEDICINE

## 2020-11-17 PROCEDURE — 1036F TOBACCO NON-USER: CPT | Performed by: FAMILY MEDICINE

## 2020-12-01 ENCOUNTER — HOSPITAL ENCOUNTER (OUTPATIENT)
Dept: NON INVASIVE DIAGNOSTICS | Facility: CLINIC | Age: 55
Discharge: HOME | End: 2020-12-01
Payer: COMMERCIAL

## 2020-12-01 DIAGNOSIS — I73.9 PAD (PERIPHERAL ARTERY DISEASE) (HCC): ICD-10-CM

## 2020-12-01 PROCEDURE — 93925 LOWER EXTREMITY STUDY: CPT | Performed by: SURGERY

## 2020-12-01 PROCEDURE — 93922 UPR/L XTREMITY ART 2 LEVELS: CPT | Performed by: SURGERY

## 2020-12-01 PROCEDURE — 93923 UPR/LXTR ART STDY 3+ LVLS: CPT

## 2020-12-01 PROCEDURE — 93925 LOWER EXTREMITY STUDY: CPT

## 2020-12-03 ENCOUNTER — OFFICE VISIT (OUTPATIENT)
Dept: ENDOCRINOLOGY | Facility: CLINIC | Age: 55
End: 2020-12-03
Payer: COMMERCIAL

## 2020-12-03 VITALS
DIASTOLIC BLOOD PRESSURE: 62 MMHG | HEART RATE: 68 BPM | HEIGHT: 60 IN | WEIGHT: 199 LBS | BODY MASS INDEX: 39.07 KG/M2 | SYSTOLIC BLOOD PRESSURE: 122 MMHG

## 2020-12-03 DIAGNOSIS — N18.2 CKD STAGE 2 DUE TO TYPE 2 DIABETES MELLITUS (HCC): ICD-10-CM

## 2020-12-03 DIAGNOSIS — E11.22 CKD STAGE 2 DUE TO TYPE 2 DIABETES MELLITUS (HCC): ICD-10-CM

## 2020-12-03 DIAGNOSIS — E78.5 HYPERLIPIDEMIA, UNSPECIFIED HYPERLIPIDEMIA TYPE: ICD-10-CM

## 2020-12-03 DIAGNOSIS — E11.610 CHARCOT FOOT DUE TO DIABETES MELLITUS (HCC): ICD-10-CM

## 2020-12-03 DIAGNOSIS — E11.621 DIABETIC ULCER OF RIGHT MIDFOOT ASSOCIATED WITH TYPE 2 DIABETES MELLITUS, WITH NECROSIS OF BONE (HCC): ICD-10-CM

## 2020-12-03 DIAGNOSIS — I10 ESSENTIAL HYPERTENSION: ICD-10-CM

## 2020-12-03 DIAGNOSIS — Z79.4 CURRENT USE OF INSULIN (HCC): ICD-10-CM

## 2020-12-03 DIAGNOSIS — L97.414 DIABETIC ULCER OF RIGHT MIDFOOT ASSOCIATED WITH TYPE 2 DIABETES MELLITUS, WITH NECROSIS OF BONE (HCC): ICD-10-CM

## 2020-12-03 DIAGNOSIS — Z12.11 COLON CANCER SCREENING: Primary | ICD-10-CM

## 2020-12-03 PROCEDURE — 99214 OFFICE O/P EST MOD 30 MIN: CPT | Performed by: INTERNAL MEDICINE

## 2020-12-03 PROCEDURE — 3008F BODY MASS INDEX DOCD: CPT | Performed by: FAMILY MEDICINE

## 2020-12-03 PROCEDURE — 3066F NEPHROPATHY DOC TX: CPT | Performed by: INTERNAL MEDICINE

## 2020-12-03 PROCEDURE — 3008F BODY MASS INDEX DOCD: CPT | Performed by: INTERNAL MEDICINE

## 2020-12-03 PROCEDURE — 3078F DIAST BP <80 MM HG: CPT | Performed by: INTERNAL MEDICINE

## 2020-12-03 PROCEDURE — 3074F SYST BP LT 130 MM HG: CPT | Performed by: INTERNAL MEDICINE

## 2020-12-03 PROCEDURE — 3066F NEPHROPATHY DOC TX: CPT | Performed by: FAMILY MEDICINE

## 2020-12-03 PROCEDURE — 1036F TOBACCO NON-USER: CPT | Performed by: INTERNAL MEDICINE

## 2020-12-07 ENCOUNTER — TELEPHONE (OUTPATIENT)
Dept: ENDOCRINOLOGY | Facility: CLINIC | Age: 55
End: 2020-12-07

## 2020-12-09 ENCOUNTER — EVALUATION (OUTPATIENT)
Dept: PHYSICAL THERAPY | Facility: REHABILITATION | Age: 55
End: 2020-12-09
Payer: COMMERCIAL

## 2020-12-09 DIAGNOSIS — E11.621 DIABETIC ULCER OF RIGHT MIDFOOT ASSOCIATED WITH TYPE 2 DIABETES MELLITUS, WITH NECROSIS OF BONE (HCC): ICD-10-CM

## 2020-12-09 DIAGNOSIS — E11.9 TYPE 2 DIABETES MELLITUS WITHOUT COMPLICATION, WITH LONG-TERM CURRENT USE OF INSULIN (HCC): Primary | ICD-10-CM

## 2020-12-09 DIAGNOSIS — Z89.431 S/P TRANSMETATARSAL AMPUTATION OF FOOT, RIGHT (HCC): Primary | ICD-10-CM

## 2020-12-09 DIAGNOSIS — Z79.4 TYPE 2 DIABETES MELLITUS WITHOUT COMPLICATION, WITH LONG-TERM CURRENT USE OF INSULIN (HCC): Primary | ICD-10-CM

## 2020-12-09 DIAGNOSIS — L97.414 DIABETIC ULCER OF RIGHT MIDFOOT ASSOCIATED WITH TYPE 2 DIABETES MELLITUS, WITH NECROSIS OF BONE (HCC): ICD-10-CM

## 2020-12-09 PROCEDURE — 97110 THERAPEUTIC EXERCISES: CPT | Performed by: PHYSICAL THERAPIST

## 2020-12-09 PROCEDURE — 97162 PT EVAL MOD COMPLEX 30 MIN: CPT | Performed by: PHYSICAL THERAPIST

## 2020-12-10 ENCOUNTER — TELEPHONE (OUTPATIENT)
Dept: ENDOCRINOLOGY | Facility: CLINIC | Age: 55
End: 2020-12-10

## 2020-12-15 ENCOUNTER — OFFICE VISIT (OUTPATIENT)
Dept: PHYSICAL THERAPY | Facility: REHABILITATION | Age: 55
End: 2020-12-15
Payer: COMMERCIAL

## 2020-12-15 DIAGNOSIS — E11.621 DIABETIC ULCER OF RIGHT MIDFOOT ASSOCIATED WITH TYPE 2 DIABETES MELLITUS, WITH NECROSIS OF BONE (HCC): ICD-10-CM

## 2020-12-15 DIAGNOSIS — Z89.431 S/P TRANSMETATARSAL AMPUTATION OF FOOT, RIGHT (HCC): Primary | ICD-10-CM

## 2020-12-15 DIAGNOSIS — L97.414 DIABETIC ULCER OF RIGHT MIDFOOT ASSOCIATED WITH TYPE 2 DIABETES MELLITUS, WITH NECROSIS OF BONE (HCC): ICD-10-CM

## 2020-12-15 PROCEDURE — 97110 THERAPEUTIC EXERCISES: CPT | Performed by: PHYSICAL THERAPIST

## 2020-12-15 PROCEDURE — 97112 NEUROMUSCULAR REEDUCATION: CPT | Performed by: PHYSICAL THERAPIST

## 2020-12-17 ENCOUNTER — APPOINTMENT (OUTPATIENT)
Dept: PHYSICAL THERAPY | Facility: REHABILITATION | Age: 55
End: 2020-12-17
Payer: COMMERCIAL

## 2020-12-21 ENCOUNTER — HOSPITAL ENCOUNTER (OUTPATIENT)
Dept: SLEEP CENTER | Facility: CLINIC | Age: 55
Discharge: HOME | End: 2020-12-21
Payer: COMMERCIAL

## 2020-12-21 DIAGNOSIS — G47.33 OBSTRUCTIVE SLEEP APNEA: ICD-10-CM

## 2020-12-21 DIAGNOSIS — E66.01 CLASS 2 SEVERE OBESITY DUE TO EXCESS CALORIES WITH SERIOUS COMORBIDITY AND BODY MASS INDEX (BMI) OF 37.0 TO 37.9 IN ADULT (HCC): ICD-10-CM

## 2020-12-21 PROCEDURE — 95810 POLYSOM 6/> YRS 4/> PARAM: CPT

## 2020-12-23 ENCOUNTER — TELEPHONE (OUTPATIENT)
Dept: SLEEP CENTER | Facility: CLINIC | Age: 55
End: 2020-12-23

## 2020-12-23 DIAGNOSIS — Z20.822 ENCOUNTER FOR PREPROCEDURE SCREENING LABORATORY TESTING FOR COVID-19: Primary | ICD-10-CM

## 2020-12-23 DIAGNOSIS — Z01.812 ENCOUNTER FOR PREPROCEDURE SCREENING LABORATORY TESTING FOR COVID-19: Primary | ICD-10-CM

## 2020-12-24 ENCOUNTER — APPOINTMENT (OUTPATIENT)
Dept: PHYSICAL THERAPY | Facility: REHABILITATION | Age: 55
End: 2020-12-24
Payer: COMMERCIAL

## 2020-12-29 ENCOUNTER — OFFICE VISIT (OUTPATIENT)
Dept: PHYSICAL THERAPY | Facility: REHABILITATION | Age: 55
End: 2020-12-29
Payer: COMMERCIAL

## 2020-12-29 DIAGNOSIS — L97.414 DIABETIC ULCER OF RIGHT MIDFOOT ASSOCIATED WITH TYPE 2 DIABETES MELLITUS, WITH NECROSIS OF BONE (HCC): ICD-10-CM

## 2020-12-29 DIAGNOSIS — Z89.431 S/P TRANSMETATARSAL AMPUTATION OF FOOT, RIGHT (HCC): Primary | ICD-10-CM

## 2020-12-29 DIAGNOSIS — E11.621 DIABETIC ULCER OF RIGHT MIDFOOT ASSOCIATED WITH TYPE 2 DIABETES MELLITUS, WITH NECROSIS OF BONE (HCC): ICD-10-CM

## 2020-12-29 PROCEDURE — 97110 THERAPEUTIC EXERCISES: CPT | Performed by: PHYSICAL THERAPIST

## 2021-01-05 ENCOUNTER — OFFICE VISIT (OUTPATIENT)
Dept: FAMILY MEDICINE CLINIC | Facility: CLINIC | Age: 56
End: 2021-01-05

## 2021-01-05 VITALS
HEIGHT: 60 IN | OXYGEN SATURATION: 98 % | WEIGHT: 195.2 LBS | SYSTOLIC BLOOD PRESSURE: 130 MMHG | TEMPERATURE: 97.7 F | BODY MASS INDEX: 38.32 KG/M2 | HEART RATE: 74 BPM | RESPIRATION RATE: 18 BRPM | DIASTOLIC BLOOD PRESSURE: 80 MMHG

## 2021-01-05 DIAGNOSIS — I73.9 PAD (PERIPHERAL ARTERY DISEASE) (HCC): ICD-10-CM

## 2021-01-05 DIAGNOSIS — Z79.4 TYPE 2 DIABETES MELLITUS WITHOUT COMPLICATION, WITH LONG-TERM CURRENT USE OF INSULIN (HCC): Primary | ICD-10-CM

## 2021-01-05 DIAGNOSIS — E11.9 TYPE 2 DIABETES MELLITUS WITHOUT COMPLICATION, WITH LONG-TERM CURRENT USE OF INSULIN (HCC): Primary | ICD-10-CM

## 2021-01-05 DIAGNOSIS — I10 ESSENTIAL HYPERTENSION: ICD-10-CM

## 2021-01-05 LAB — SL AMB POCT HEMOGLOBIN AIC: 7.6 (ref ?–6.5)

## 2021-01-05 PROCEDURE — 83036 HEMOGLOBIN GLYCOSYLATED A1C: CPT | Performed by: FAMILY MEDICINE

## 2021-01-05 PROCEDURE — 1036F TOBACCO NON-USER: CPT | Performed by: FAMILY MEDICINE

## 2021-01-05 PROCEDURE — 3051F HG A1C>EQUAL 7.0%<8.0%: CPT | Performed by: FAMILY MEDICINE

## 2021-01-05 PROCEDURE — 99214 OFFICE O/P EST MOD 30 MIN: CPT | Performed by: FAMILY MEDICINE

## 2021-01-05 RX ORDER — CLOPIDOGREL BISULFATE 75 MG/1
75 TABLET ORAL DAILY
Qty: 90 TABLET | Refills: 1 | Status: SHIPPED | OUTPATIENT
Start: 2021-01-05 | End: 2021-06-28 | Stop reason: SDUPTHER

## 2021-01-05 NOTE — PROGRESS NOTES
Assessment/Plan:    No problem-specific Assessment & Plan notes found for this encounter  Diagnoses and all orders for this visit:    Type 2 diabetes mellitus without complication, with long-term current use of insulin ([facility])  -     POCT hemoglobin A1c    Essential hypertension  -     metoprolol tartrate (LOPRESSOR) 25 mg tablet; Take 1 tablet (25 mg total) by mouth every 12 (twelve) hours    PAD (peripheral artery disease) (HCC)  -     clopidogrel (PLAVIX) 75 mg tablet; Take 1 tablet (75 mg total) by mouth daily        DM: improving comapred to 3 months ago, currently HgA1c at 7 6, continue follow up with Endocrinology, Metformin 1000 mg BID, Lantus 22 units qHs and weekly Dulaglutide   Subjective:      Patient ID: Iris Cherry is a 54 y o  female  47 y o  female with past medical history of diabetes (HgA1C 8 4 on 9/2/2020) and extensive peripheral arterial disease status post multiple endovascular interventions to the bilateral lower extremities  She recently underwent repeat RLE arteriogram w/ recanalization of occluded SFA stent and right TMA revision on 5/7/2020  The right TMA wound is noted to be non-healing  The patient is going to the wound center once per week for wound care and has a visiting nurse come to the home for dressing changes 2 x/week  She previously has had left TMA in 11/2019  She follows with vascular surgery, nephrology, endocrinology, and podiatry  Currently has no new concerns  States she has been using her insulin regularly and has been doing her best to follow a low carb diet  Denies symptoms of hypoglycemia  The following portions of the patient's history were reviewed and updated as appropriate: She  has a past medical history of Cellulitis of toe (1/28/2019), CKD (chronic kidney disease), Diabetes mellitus ([address] ), Gangrene of toe of right foot ([facility] ) (10/30/2019), and Hypertension    She   Patient Active Problem List    Diagnosis Date Noted    Obstructive sleep apnea 10/30/2020    Persistent proteinuria 09/03/2020    Vitamin D deficiency 09/03/2020    Current use of insulin ([place] [address] ) 08/27/2020    S/P transmetatarsal amputation of foot, right ([place] [address] ) 08/26/2020    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, with necrosis of bone ([place] [address] ) 05/08/2020    Cellulitis of right lower extremity 05/04/2020    Class 2 severe obesity due to excess calories with serious comorbidity and body mass index (BMI) of 36 0 to 36 9 in adult [facility]) 01/27/2020    History of transmetatarsal amputation of foot ([place] [address] ) 11/20/2019    Charcot foot due to diabetes mellitus ([place] [address] ) 08/05/2019    Polyneuropathy associated with underlying disease ([place] [address] ) 07/08/2019    Critical lower limb ischemia 04/30/2019    Toe amputation status, left 02/11/2019    Leukocytosis 02/05/2019    Microcytic anemia 02/01/2019    CKD stage 2 due to type 2 diabetes mellitus ([place] [address] ) 01/31/2019    HLD (hyperlipidemia) 01/28/2019    PAD (peripheral artery disease) ([place] [address] ) 01/28/2019    Type 2 diabetes mellitus without complication ([place] [address] ) 54/88/4976    Essential hypertension 10/27/2016     She  has a past surgical history that includes IR abdominal aortagram (2/1/2019); Toe amputation (Left, 2/4/2019); IR arterial lysis (4/30/2019); IR lower extremity / intervention (11/1/2019); Toe amputation (Right, 11/4/2019); pr amputation foot,transmetatarsal (Right, 11/7/2019); IR aortagram with run-off (5/5/2020); and pr amputation foot,transmetatarsal (Right, 5/7/2020)  Her family history includes Diabetes in her mother; No Known Problems in her father  She  reports that she quit smoking about 8 years ago  Her smoking use included cigarettes  She has never used smokeless tobacco  She reports previous alcohol use  She reports that she does not use drugs    Current Outpatient Medications   Medication Sig Dispense Refill    aspirin 81 mg chewable tablet Chew 1 tablet (81 mg total) daily      atorvastatin (LIPITOR) 20 mg tablet Take 1 tablet (20 mg total) by mouth daily 90 tablet 1    cetirizine (ZyrTEC) 10 mg tablet Take 1 tablet (10 mg total) by mouth daily (Patient taking differently: Take 10 mg by mouth daily as needed ) 90 tablet 1    clopidogrel (PLAVIX) 75 mg tablet Take 1 tablet (75 mg total) by mouth daily 90 tablet 1    Dulaglutide 0 75 MG/0 5ML SOPN Inject 0 5 mL (0 75 mg total) under the skin once a week 4 pen 3    ergocalciferol (ERGOCALCIFEROL) 1 25 MG (26215 UT) capsule Take 1 capsule (50,000 Units total) by mouth once a week 12 capsule 1    ferrous sulfate 324 (65 Fe) mg Take 1 tablet (324 mg total) by mouth 2 (two) times a day before meals 180 tablet 3    fluticasone (FLONASE) 50 mcg/act nasal spray 1 spray into each nostril daily 3 Bottle 1    glucose blood (ONE TOUCH ULTRA TEST) test strip Use to test 3x daily 90 each 5    Insulin Aspart FlexPen (NovoLOG FlexPen) 100 UNIT/ML SOPN Inject 5units subcut up to 3 times daily just before a meal 5 pen 3    insulin glargine (Lantus SoloStar) 100 units/mL injection pen Inject 22 Units under the skin daily at bedtime 5 pen 1    Insulin Pen Needle 32G X 4 MM MISC Use 4 times daily with insulin pens 120 each 5    lisinopril (ZESTRIL) 5 mg tablet Take 1 tablet (5 mg total) by mouth daily 90 tablet 3    metFORMIN (GLUCOPHAGE) 1000 MG tablet Take 1 tablet (1,000 mg total) by mouth 2 (two) times a day with meals 60 tablet 3    metoprolol tartrate (LOPRESSOR) 25 mg tablet Take 1 tablet (25 mg total) by mouth every 12 (twelve) hours 180 tablet 1    ONETOUCH DELICA LANCETS FINE MISC Testing Three times a day 100 each 5     No current facility-administered medications for this visit        Current Outpatient Medications on File Prior to Visit   Medication Sig    aspirin 81 mg chewable tablet Chew 1 tablet (81 mg total) daily    atorvastatin (LIPITOR) 20 mg tablet Take 1 tablet (20 mg total) by mouth daily    cetirizine (ZyrTEC) 10 mg tablet Take 1 tablet (10 mg total) by mouth daily (Patient taking differently: Take 10 mg by mouth daily as needed )    Dulaglutide 0 75 MG/0 5ML SOPN Inject 0 5 mL (0 75 mg total) under the skin once a week    ergocalciferol (ERGOCALCIFEROL) 1 25 MG (40305 UT) capsule Take 1 capsule (50,000 Units total) by mouth once a week    ferrous sulfate 324 (65 Fe) mg Take 1 tablet (324 mg total) by mouth 2 (two) times a day before meals    fluticasone (FLONASE) 50 mcg/act nasal spray 1 spray into each nostril daily    glucose blood (ONE TOUCH ULTRA TEST) test strip Use to test 3x daily    Insulin Aspart FlexPen (NovoLOG FlexPen) 100 UNIT/ML SOPN Inject 5units subcut up to 3 times daily just before a meal    insulin glargine (Lantus SoloStar) 100 units/mL injection pen Inject 22 Units under the skin daily at bedtime    Insulin Pen Needle 32G X 4 MM MISC Use 4 times daily with insulin pens    lisinopril (ZESTRIL) 5 mg tablet Take 1 tablet (5 mg total) by mouth daily    metFORMIN (GLUCOPHAGE) 1000 MG tablet Take 1 tablet (1,000 mg total) by mouth 2 (two) times a day with meals    ONETOUCH DELICA LANCETS FINE MISC Testing Three times a day    [DISCONTINUED] clopidogrel (PLAVIX) 75 mg tablet Take 1 tablet (75 mg total) by mouth daily    [DISCONTINUED] metoprolol tartrate (LOPRESSOR) 25 mg tablet Take 1 tablet (25 mg total) by mouth every 12 (twelve) hours     No current facility-administered medications on file prior to visit       Review of Systems   All other systems reviewed and are negative  Objective:      /80 (BP Location: Left arm, Patient Position: Sitting, Cuff Size: Adult)   Pulse 74   Temp 97 7 °F (36 5 °C) (Temporal)   Resp 18   Ht 5' (1 524 m)   Wt 88 5 kg (195 lb 3 2 oz)   LMP  (LMP Unknown)   SpO2 98%   BMI 38 12 kg/m²          Physical Exam  Vitals signs and nursing note reviewed  Constitutional:       Appearance: She is well-developed  HENT:      Head: Normocephalic        Right Ear: External ear normal       Left Ear: External ear normal       Nose: Nose normal    Eyes:      Conjunctiva/sclera: Conjunctivae normal       Pupils: Pupils are equal, round, and reactive to light  Neck:      Musculoskeletal: Normal range of motion and neck supple  Thyroid: No thyromegaly  Cardiovascular:      Rate and Rhythm: Normal rate and regular rhythm  Heart sounds: Normal heart sounds  Pulmonary:      Effort: Pulmonary effort is normal       Breath sounds: Normal breath sounds  Abdominal:      Palpations: Abdomen is soft  Tenderness: There is no abdominal tenderness  There is no guarding or rebound  Musculoskeletal: Normal range of motion  Skin:     General: Skin is dry  Neurological:      Mental Status: She is alert and oriented to person, place, and time  Deep Tendon Reflexes: Reflexes are normal and symmetric

## 2021-01-13 ENCOUNTER — OFFICE VISIT (OUTPATIENT)
Dept: PODIATRY | Facility: CLINIC | Age: 56
End: 2021-01-13
Payer: COMMERCIAL

## 2021-01-13 VITALS
WEIGHT: 198 LBS | HEIGHT: 60 IN | DIASTOLIC BLOOD PRESSURE: 77 MMHG | BODY MASS INDEX: 38.87 KG/M2 | SYSTOLIC BLOOD PRESSURE: 134 MMHG

## 2021-01-13 DIAGNOSIS — I73.9 PAD (PERIPHERAL ARTERY DISEASE) (HCC): ICD-10-CM

## 2021-01-13 DIAGNOSIS — E11.621 DIABETIC ULCER OF RIGHT HEEL ASSOCIATED WITH TYPE 2 DIABETES MELLITUS, WITH FAT LAYER EXPOSED (HCC): Primary | ICD-10-CM

## 2021-01-13 DIAGNOSIS — L97.412 DIABETIC ULCER OF RIGHT HEEL ASSOCIATED WITH TYPE 2 DIABETES MELLITUS, WITH FAT LAYER EXPOSED (HCC): Primary | ICD-10-CM

## 2021-01-13 DIAGNOSIS — Z89.431 S/P TRANSMETATARSAL AMPUTATION OF FOOT, RIGHT (HCC): ICD-10-CM

## 2021-01-13 PROCEDURE — 3008F BODY MASS INDEX DOCD: CPT | Performed by: FAMILY MEDICINE

## 2021-01-13 PROCEDURE — 99213 OFFICE O/P EST LOW 20 MIN: CPT | Performed by: PODIATRIST

## 2021-01-13 NOTE — PROGRESS NOTES
Assessment/Plan:         Diagnoses and all orders for this visit:    Diabetic ulcer of right heel associated with type 2 diabetes mellitus, with fat layer exposed ([facility] )  -     Ambulatory referral to Wound Care; Future    S/P transmetatarsal amputation of foot, right ([facility] )    PAD (peripheral artery disease) ([facility] )      patient has new ulceration to the right heel  This need strict offloading local wound care  I refer back to the wound management center for debridement and possible grafting  In the meantime she should keep all pressure off the back of her heel  She will need to get a global ped a shoe with an open heel  Educated the patient on the importance of putting pillows under the calf so the heel floats while in bed  Patient's daughters do smoke and they were told they were not allowed to smoke in the house with their mother  She fortunately does not smoke  I would like to see in the next 2 weeks at the [address] [place] [address]  Patient is extremely high risk for infection and limb loss given her severe arterial disease and history of poor healing wounds  At the moment today there is no evidence of any infection or deep probe of the wound  Subjective:      Patient ID: Aquiles Xie is a 54 y o  female  arrives for her 1st outpatient visit following discharge from [facility]  but unfortunately has a new wound to the right foot  She spent close to a year getting a right transmetatarsal amputation to heal   She was discharged a few weeks ago after epidermal graft caused it to finally heal   She states she was in physical therapy and wore an old pair of sneakers and developed a small blister on the back of her heel  It then developed into a small wound  They have been covering with a Band-Aid  Her daughter's report minimal drainage or redness  She feels well overall      Patient has complicated medical history significant for diabetic neuropathy type 2, severe peripheral arterial disease, morbid obesity  She does not smoke  The following portions of the patient's history were reviewed and updated as appropriate: allergies, current medications, past family history, past medical history, past social history, past surgical history and problem list     Review of Systems   Constitutional: Negative  Gastrointestinal: Negative for diarrhea, nausea and vomiting  Musculoskeletal: Positive for gait problem  Negative for arthralgias  Skin: Positive for color change and wound  Negative for rash  Neurological: Positive for weakness and numbness  Objective:      LMP  (LMP Unknown)          Physical Exam  Cardiovascular:      Pulses: Pulses are weak  Dorsalis pedis pulses are 0 on the right side and 0 on the left side  Posterior tibial pulses are 0 on the right side and 0 on the left side  Feet:      Right foot: amputated     Skin integrity: Ulcer present  No skin breakdown or erythema  Left foot:      Skin integrity: No ulcer, skin breakdown, erythema or callus  Vitals reviewed    Constitutional: Patient is not distressed  Patient is well developed  Patient is obese  Vascular: Dorsalis pedis and posterior tibial pulses NONpalpable  Capillary refill time within normal limits to all digits  No erythema  No edema  No significant varicosities  Dermatology: There is a 0 8 x 0 7 x 0 3 cm ulceration to the posterior aspect of the right heel  Wound bed is fibrotic  Minimal drainage  There is no probe to bone or tendon  There is no pus undermining or cellulitis  Small black eschar to the distal lateral aspect of the transmetatarsal amputation on the right stable without open wound  Musculoskeletal:  Right transmetatarsal amputation is healed  Normal ankle range of motion  Neurological: Monofilament sensation is absent  Vibratory sensation is absent     Achilles reflex is normal        Diabetic Foot Exam    Patient's shoes and socks removed  Right Foot/Ankle   Right Foot Inspection  Skin Exam: ulcer skin not intact, no erythema, no maceration and no abnormal color                        Amputation: amputation right foot (Comments: Transmetatarsal amputation)  Toe Exam: right toe deformityno swelling, no tenderness and erythema  Sensory   Vibration: absent  Proprioception: absent   Monofilament testing: absent  Vascular  Capillary refills: < 3 seconds  The right DP pulse is 0  The right PT pulse is 0  Left Foot/Ankle  Left Foot Inspection  Skin Exam: skin intactno erythema, no maceration, normal color, no ulcer and no callus                         Toe Exam: no swelling, no tenderness and no erythema                   Sensory   Vibration: absent  Proprioception: absent  Monofilament: absent  Vascular  Capillary refills: < 3 seconds  The left DP pulse is 0  The left PT pulse is 0  Assign Risk Category:  Deformity present;  Loss of protective sensation; Weak pulses       Risk: 3

## 2021-01-18 DIAGNOSIS — E11.59 TYPE 2 DIABETES MELLITUS WITH OTHER CIRCULATORY COMPLICATION, WITH LONG-TERM CURRENT USE OF INSULIN (HCC): ICD-10-CM

## 2021-01-18 DIAGNOSIS — Z79.4 CURRENT USE OF INSULIN (HCC): ICD-10-CM

## 2021-01-18 DIAGNOSIS — Z79.4 TYPE 2 DIABETES MELLITUS WITH OTHER CIRCULATORY COMPLICATION, WITH LONG-TERM CURRENT USE OF INSULIN (HCC): ICD-10-CM

## 2021-01-18 DIAGNOSIS — Z79.4 TYPE 2 DIABETES MELLITUS WITHOUT COMPLICATION, WITH LONG-TERM CURRENT USE OF INSULIN (HCC): ICD-10-CM

## 2021-01-18 DIAGNOSIS — E11.9 TYPE 2 DIABETES MELLITUS WITHOUT COMPLICATION, WITH LONG-TERM CURRENT USE OF INSULIN (HCC): ICD-10-CM

## 2021-01-18 RX ORDER — INSULIN GLARGINE 100 [IU]/ML
22 INJECTION, SOLUTION SUBCUTANEOUS
Qty: 5 PEN | Refills: 1 | Status: SHIPPED | OUTPATIENT
Start: 2021-01-18 | End: 2021-03-31 | Stop reason: SDUPTHER

## 2021-01-18 RX ORDER — LANCETS 33 GAUGE
EACH MISCELLANEOUS
Qty: 300 EACH | Refills: 2 | Status: SHIPPED | OUTPATIENT
Start: 2021-01-18

## 2021-01-22 DIAGNOSIS — Z01.812 ENCOUNTER FOR PREPROCEDURE SCREENING LABORATORY TESTING FOR COVID-19: ICD-10-CM

## 2021-01-22 DIAGNOSIS — Z20.822 ENCOUNTER FOR PREPROCEDURE SCREENING LABORATORY TESTING FOR COVID-19: ICD-10-CM

## 2021-01-22 PROCEDURE — U0003 INFECTIOUS AGENT DETECTION BY NUCLEIC ACID (DNA OR RNA); SEVERE ACUTE RESPIRATORY SYNDROME CORONAVIRUS 2 (SARS-COV-2) (CORONAVIRUS DISEASE [COVID-19]), AMPLIFIED PROBE TECHNIQUE, MAKING USE OF HIGH THROUGHPUT TECHNOLOGIES AS DESCRIBED BY CMS-2020-01-R: HCPCS

## 2021-01-22 PROCEDURE — U0005 INFEC AGEN DETEC AMPLI PROBE: HCPCS

## 2021-01-23 LAB — SARS-COV-2 RNA RESP QL NAA+PROBE: NEGATIVE

## 2021-01-26 ENCOUNTER — OFFICE VISIT (OUTPATIENT)
Dept: WOUND CARE | Facility: HOSPITAL | Age: 56
End: 2021-01-26
Payer: COMMERCIAL

## 2021-01-26 VITALS
TEMPERATURE: 96.8 F | DIASTOLIC BLOOD PRESSURE: 68 MMHG | SYSTOLIC BLOOD PRESSURE: 110 MMHG | RESPIRATION RATE: 18 BRPM | HEART RATE: 80 BPM

## 2021-01-26 DIAGNOSIS — E08.621 DIABETIC ULCER OF RIGHT HEEL ASSOCIATED WITH DIABETES MELLITUS DUE TO UNDERLYING CONDITION, LIMITED TO BREAKDOWN OF SKIN (HCC): Primary | ICD-10-CM

## 2021-01-26 DIAGNOSIS — E11.610 CHARCOT FOOT DUE TO DIABETES MELLITUS (HCC): ICD-10-CM

## 2021-01-26 DIAGNOSIS — L97.411 DIABETIC ULCER OF RIGHT HEEL ASSOCIATED WITH DIABETES MELLITUS DUE TO UNDERLYING CONDITION, LIMITED TO BREAKDOWN OF SKIN (HCC): Primary | ICD-10-CM

## 2021-01-26 PROCEDURE — 99213 OFFICE O/P EST LOW 20 MIN: CPT | Performed by: PODIATRIST

## 2021-01-26 NOTE — PATIENT INSTRUCTIONS
Orders Placed This Encounter   Procedures    Wound cleansing and dressings     Right heel wound    Shower normally  Wash gently with mild soap and water  Stay out of that shoe that caused the blister  Continue to keep the wound covered for another two week with a small bandaid  Follow up with Dr Sophia Rice for routine foot in 2 months  You may bear weight on that foot now  You do not need to return to the wound care center at this time       Standing Status:   Future     Standing Expiration Date:   1/26/2022

## 2021-01-26 NOTE — PROGRESS NOTES
Patient ID: Jeannie Arreola is a 54 y o  female Date of Birth 1965     Diagnosis:  1  Diabetic ulcer of right heel associated with diabetes mellitus due to underlying condition, limited to breakdown of skin ([facility])  -     Wound cleansing and dressings; Future    2  Charcot foot due to diabetes mellitus ([facility] )  -     Wound cleansing and dressings; Future        Diagnosis ICD-10-CM Associated Orders   1  Diabetic ulcer of right heel associated with diabetes mellitus due to underlying condition, limited to breakdown of skin ([facility] )  B58 114 Wound cleansing and dressings    L97 411    2  Charcot foot due to diabetes mellitus ([facility] )  E11 610 Wound cleansing and dressings          Assessment & Plan:  Ulcer right heel is a small dry eschar at this point  No wound necessary  Protect with cause more poor dressing  I would like to see in my office for at-risk foot care  Patient still has not gotten her diabetic shoes or inserts but has the prescriptions  I asked her to please call today to make the appointment which she promised she would do  I reviewed recent blood work of A1c in the sevens  Discussed blood sugar control   -Educated on DM risk to lower extremities, proper shoe gear, and daily monitoring of feet    -Educated on A1C and the risks of poorly controlled Diabetes   -Discussed weight loss and suitable exercise regiment  Patient is high risk for complications given history of amputation, diabetic neuropathy, peripheral vascular disease  Chief Complaint   Patient presents with   [facility] Patient Visit     Patient here for a new wound to her right heel  She was a former patient  She reports the wound opened about a month ago  Patient is applying dry dressing at this time  Subjective:   Patient was referred to the [address] [place], [place] [address] from my private office  She previously had chronic wound to a nonhealing transmetatarsal amputation of the right foot    She was discharged a few months ago and told to follow up in my office  When she was seen in the office she stated during therapy the back of her heel was rubbed raw and she had a large ulcer on the posterior aspect of the heel  At that time the wound was cleaned and dressings were ordered but she was referred back to the wound center  Since that time she has offload the heel and worn an open heel shoe and states the wound has closed and is now a scab  Patient has medical history significant for diabetes type 2 with neuropathy, chronic kidney disease, Charcot foot deformity, right transmetatarsal amputation  Her hemoglobin A1c is 7 6        The following portions of the patient's history were reviewed and updated as appropriate:   Patient Active Problem List   Diagnosis    Type 2 diabetes mellitus without complication ([facility] )    Essential hypertension    HLD (hyperlipidemia)    [facility]    CKD stage 2 due to type 2 diabetes mellitus ([facility] )    Microcytic anemia    Leukocytosis    Toe amputation status, left    Critical lower limb ischemia    Polyneuropathy associated with underlying disease ([facility] )    Charcot foot due to diabetes mellitus [facility]    History of transmetatarsal amputation of foot ([facility] )    Class 2 severe obesity due to excess calories with serious comorbidity and body mass index (BMI) of 36 0 to 36 9 in adult [facility])    Cellulitis of right lower extremity    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, with necrosis of bone ([address] )    S/P transmetatarsal amputation of foot, right (HCC)    Current use of insulin ([facility] )    Persistent proteinuria    Vitamin D deficiency    Obstructive sleep apnea     Past Medical History:   Diagnosis Date    Cellulitis of toe 1/28/2019    CKD (chronic kidney disease)     Diabetes mellitus ([facility] )     Gangrene of toe of right foot ([facility] ) 10/30/2019    Added automatically from request for surgery [id number]    Hypertension      Past Surgical History: Procedure Laterality Date    IR ABDOMINAL AORTAGRAM  2019    IR AORTAGRAM WITH RUN-OFF  2020    IR ARTERIAL LYSIS  2019    IR LOWER EXTREMITY / INTERVENTION  2019    AK AMPUTATION FOOT,TRANSMETATARSAL Right 2019    Procedure: AMPUTATION TRANSMETATARSAL (TMA) WITH ACHILLES TENDON LENGTHING;  Surgeon: Keyana Alfaro DPM;  [facility];  Service: Podiatry    [facility] Right 2020    Procedure: AMPUTATION TRANSMETATARSAL (TMA);  Surgeon: Nick Zapata DPM;  [facility];  Service: Podiatry    TOE AMPUTATION Left 2019    Procedure: AMPUTATION 5th TOE;  Surgeon: Keyana Alfaro DPM;  [facility];  Service: Podiatry    TOE AMPUTATION Right 2019    Procedure: AMPUTATION RIGHT 3RD AND 4TH TOE;  Surgeon: Keyana Alfaro DPM;  [facility];  Service: Podiatry     Social History     Socioeconomic History    Marital status: Single     Spouse name: Not on file    Number of children: Not on file    Years of education: Not on file    Highest education level: Not on file   Occupational History    Not on file   Social Needs    Financial resource strain: Not on file    Food insecurity     Worry: Not on file     Inability: Not on file    Transportation needs     Medical: No     Non-medical: No   Tobacco Use    Smoking status: Former Smoker     Types: Cigarettes     Quit date: 2013     Years since quittin 0    Smokeless tobacco: Never Used   Substance and Sexual Activity    Alcohol use: Not Currently    Drug use: No    Sexual activity: Not on file   Lifestyle    Physical activity     Days per week: Not on file     Minutes per session: Not on file    Stress: Not on file   Relationships    Social connections     Talks on phone: Not on file     Gets together: Not on file     Attends Voodoo service: Not on file     Active member of club or organization: Not on file     Attends meetings of clubs or organizations: Not on file     Relationship status: Not on file    Intimate partner violence     Fear of current or ex partner: Not on file     Emotionally abused: Not on file     Physically abused: Not on file     Forced sexual activity: Not on file   Other Topics Concern    Not on file   Social History Narrative    Not on file        Current Outpatient Medications:     aspirin 81 mg chewable tablet, Chew 1 tablet (81 mg total) daily, Disp: , Rfl:     atorvastatin (LIPITOR) 20 mg tablet, Take 1 tablet (20 mg total) by mouth daily, Disp: 90 tablet, Rfl: 1    cetirizine (ZyrTEC) 10 mg tablet, Take 1 tablet (10 mg total) by mouth daily (Patient taking differently: Take 10 mg by mouth daily as needed ), Disp: 90 tablet, Rfl: 1    clopidogrel (PLAVIX) 75 mg tablet, Take 1 tablet (75 mg total) by mouth daily, Disp: 90 tablet, Rfl: 1    Dulaglutide 0 75 MG/0 5ML SOPN, Inject 0 5 mL (0 75 mg total) under the skin once a week, Disp: 4 pen, Rfl: 3    ergocalciferol (ERGOCALCIFEROL) 1 25 MG (00334 UT) capsule, Take 1 capsule (50,000 Units total) by mouth once a week, Disp: 12 capsule, Rfl: 1    ferrous sulfate 324 (65 Fe) mg, Take 1 tablet (324 mg total) by mouth 2 (two) times a day before meals, Disp: 180 tablet, Rfl: 3    fluticasone (FLONASE) 50 mcg/act nasal spray, 1 spray into each nostril daily, Disp: 3 Bottle, Rfl: 1    glucose blood (ONE TOUCH ULTRA TEST) test strip, Use to test 3x daily, Disp: 300 each, Rfl: 1    Insulin Aspart FlexPen (NovoLOG FlexPen) 100 UNIT/ML SOPN, Inject 5units subcut up to 3 times daily just before a meal, Disp: 5 pen, Rfl: 3    insulin glargine (Lantus SoloStar) 100 units/mL injection pen, Inject 22 Units under the skin daily at bedtime, Disp: 5 pen, Rfl: 1    Insulin Pen Needle 32G X 4 MM MISC, Use 4 times daily with insulin pens, Disp: 120 each, Rfl: 5    lisinopril (ZESTRIL) 5 mg tablet, Take 1 tablet (5 mg total) by mouth daily, Disp: 90 tablet, Rfl: 3    metFORMIN (GLUCOPHAGE) 1000 MG tablet, Take 1 tablet (1,000 mg total) by mouth 2 (two) times a day with meals, Disp: 60 tablet, Rfl: 3    metoprolol tartrate (LOPRESSOR) 25 mg tablet, Take 1 tablet (25 mg total) by mouth every 12 (twelve) hours, Disp: 180 tablet, Rfl: 1    OneTouch Delica Lancets 03S MISC, Use to test 3x daily, Disp: 300 each, Rfl: 2  Family History   Problem Relation Age of Onset    Diabetes Mother     No Known Problems Father       Review of Systems   Constitutional: Negative  HENT: Negative for sinus pressure and sinus pain  Respiratory: Negative for cough and shortness of breath  Cardiovascular: Negative for chest pain and leg swelling  Gastrointestinal: Negative for diarrhea, nausea and vomiting  Musculoskeletal: Positive for gait problem  Negative for arthralgias  Skin: Positive for color change (Scab)  Negative for wound  Neurological: Positive for numbness  Negative for weakness  Allergies  Levemir [insulin detemir]    Objective:  /68   Pulse 80   Temp (!) 96 8 °F (36 °C)   Resp 18   LMP  (LMP Unknown)     Physical Exam  Vitals signs reviewed  Constitutional:       Appearance: She is not ill-appearing or diaphoretic  Cardiovascular:      Rate and Rhythm: Normal rate  Pulses:           Dorsalis pedis pulses are 1+ on the right side  Posterior tibial pulses are 1+ on the right side  Pulmonary:      Effort: Pulmonary effort is normal  No respiratory distress  Musculoskeletal:      Right foot: Deformity (Transmetatarsal amputation) present  Feet:      Right foot:      Protective Sensation: 6 sites tested  0 sites sensed  Skin integrity: Skin breakdown ( small eschar posterior heel) present  No ulcer  Left foot:      Protective Sensation: 6 sites tested  0 sites sensed  Neurological:      Mental Status: She is alert               Wound 01/26/21 Diabetic Ulcer Heel Right;Medial (Active)   Wound Image   01/26/21 0940   Wound Description Epithelialization 01/26/21 0941   Sharon-wound Assessment Clean;Dry; Intact 01/26/21 0941   Wound Length (cm) 0 5 cm 01/26/21 0941   Wound Width (cm) 0 5 cm 01/26/21 0941   Wound Depth (cm) 0 cm 01/26/21 0941   Wound Surface Area (cm^2) 0 25 cm^2 01/26/21 0941   Wound Volume (cm^3) 0 cm^3 01/26/21 0941   Calculated Wound Volume (cm^3) 0 cm^3 01/26/21 0941   Drainage Amount None 01/26/21 0941   Non-staged Wound Description Not applicable 53/07/73 2212   Dressing Changed Changed 01/26/21 0941   Patient Tolerance Tolerated well 01/26/21 0941   Dressing Status Removed 01/26/21 0941                             Procedures   None        Wound Instructions:  Orders Placed This Encounter   Procedures    Wound cleansing and dressings     Right heel wound    Shower normally  Wash gently with mild soap and water  Stay out of that shoe that caused the blister  Continue to keep the wound covered for another two week with a small bandaid  Follow up with Dr Ally Decker for routine foot in 2 months  You may bear weight on that foot now  You do not need to return to the wound care center at this time  Standing Status:   Future     Standing Expiration Date:   1/26/2022         Carlos Manuel Jhonson DPM    Portions of the record may have been created with voice recognition software  Occasional wrong word or "sound a like" substitutions may have occurred due to the inherent limitations of voice recognition software  Read the chart carefully and recognize, using context, where substitutions have occurred

## 2021-01-29 ENCOUNTER — TELEPHONE (OUTPATIENT)
Dept: SLEEP CENTER | Facility: CLINIC | Age: 56
End: 2021-01-29

## 2021-02-02 NOTE — DISCHARGE SUMMARY
Airway  Date/Time: 2/2/2021 9:22 AM  Urgency: Elective    Airway not difficult    General Information and Staff    Patient location during procedure: OR  Anesthesiologist: Elsa Merritt MD  Resident/CRNA: Francine Cerna CRNA    Indications and [facility]  Discharge- [address] 1965, 47 y o  female MRN: [id number]    Unit/Bed#: E5 -01 Encounter: [id number]    Primary Care Provider: Rob Gastelum MD   Date and time admitted to hospital: 10/30/2019  6:28 PM        * Gangrenous toe (HCC)resolved as of 11/11/2019  Assessment & Plan  59-year-old female history of diabetes mellitus who was unable to take her insulin over the last several months due to lack of insurance  Also with hypertension, hyperlipidemia, peripheral arterial disease admitted for gangrenous toe  · Angiogram with intervention performed on 11/1, continues to have below threshold for wound healing  · Status post right 3rd and 4th toe amputations on 11/4  · Status post right foot TMA on 11/07/2019, postop day 5  · Podiatry follow-up appreciated  · Patient remains high risk for limb loss due to significant PAD and may eventually require a BKA  · No further abx needed as per discussion with Podiatry with surgical cure, blood cultures negative  · Wound with no dehiscence, Podiatry has cleared patient for discharge  To remain NWB    · Outpatient follow up   · Seen by PT / OT, recommend HH vs  STR, patient prefers home with home health RN /PT, Case Management to arrange      PAD (peripheral artery disease) ([email or web address]  Assessment & Plan  · Status post right SFA PTA/stent 04/30/2019  · Status post right lower extremity Agram on 11/01/2019 with successful R SFA PTA/stent  · Vascular surgery input appreciated  · Continue with aspirin, Plavix, statin    Type 2 diabetes mellitus with circulatory disorder, with long-term current use of insulin [facility])  Assessment & Plan  Lab Results   Component Value Date    HGBA1C 13 7 (A) 10/03/2019       Recent Labs     11/11/19  1110 11/11/19  1601 11/11/19  2119 11/12/19  0834   POCGLU 117 221* 216* 115       Blood Sugar Average: Last 72 hrs:  · (P) 328 [id number]   · Uncontrolled with very high A1c  · Maintained on Metformin and Lantus 78 units q HS however patient was not taking insulin as she could not afford it  She was then placed on Glipizide until prescription assistance was in place  · Now on Lantus to 30 units b i d  and insulin lispro 5 units t i d  with meals  · Monitor FSBS ac / hs and cover with SSI  · Will discharge on Lantus 30 units BID and continue Metformin  Discussed with Case Management who will arrange for patient to get insulin prior to discharge  Lactic acidosisresolved as of 11/1/2019  Assessment & Plan  · Initial lactic acid of 2 5  · Does not meet other SIRS criteria aside from leukocytosis - also on Metformin as an outpatient  · Received IVF, now resolved    Cellulitisresolved as of 11/1/2019  Assessment & Plan  · Appears to have superimposed cellulitis of the right foot - reports blister that ruptured with associated erythema  · Leukocytosis, now resolved  Afebrile  Blood cultures negative  · Initiated on Cefepime and Vancomycin in the ED  No history of MRSA  · Transitioned to Ancef and Flagyl  · Resolved    CKD stage 2 due to type 2 diabetes mellitus [facility])  Assessment & Plan  · Avoid nephrotoxins / hypotension  Lab Results   Component Value Date    CREATININE 0 82 11/11/2019    CREATININE 0 85 11/10/2019    CREATININE 0 81 11/09/2019         HLD (hyperlipidemia)  Assessment & Plan  · Maintained on statin    Essential hypertension  Assessment & Plan  · BP stable  · Continue Metoprolol 25 mg b i d      Hypokalemiaresolved as of 11/6/2019  Assessment & Plan  · Repleted      Hyponatremiaresolved as of 11/1/2019  Assessment & Plan  · Pseudohyponatremia of 133 in setting of hyperglycemia  · Corrects to 136          Discharging Physician / Practitioner: Clarence Dean  PCP: Marie Amador MD  Admission Date:   Admission Orders (From admission, onward)     Ordered        10/30/19 2241  Inpatient Admission  Once                   Discharge Date: 11/12/19    Resolved Problems  Date Reviewed: 11/12/2019          Resolved    * (Principal) Gangrenous toe ([address] ) 11/11/2019     Resolved by  Jasmeet Harris, CRNP    Cellulitis 11/1/2019     Resolved by  Jasmeet Harris, CRNP    Lactic acidosis 11/1/2019     Resolved by  Jasmeet Harris, CRNP    Hyponatremia 11/1/2019     Resolved by  Jasmeet Harris, CRNP    Hypokalemia 11/6/2019     Resolved by  Magdalena Ortega MD          Consultations During Hospital Stay:  · Podiatry  · Vascular Surgery    Procedures Performed:   XR foot right 3+ views   Final Result by Corbin Rodrigues MD (11/08 9246)      Status post right-sided transmetatarsal amputation as discussed  Workstation performed: [id number]         XR foot right 3+ views   Final Result by Adelaide Kaplan MD (11/05 8811)      Status post amputation of the 3rd and 4th digits  Workstation performed: [id number]         VAS [place] & waveform analysis, multiple levels   Final Result by Michelle Garcia MD (11/01 0835)      IR lower extremity / intervention   Final Result by Rene León MD (11/01 1651)   Impression: Recurrent high-grade distal superficial femoral artery stenosis treated with angioplasty and stent  Focal 80% tibial peroneal trunk stenosis treated with a scoring balloon  Workstation performed: [id number]         VAS lower limb arterial duplex, complete bilateral   Final Result by Angi Poole DO (10/31 1113)      XR foot 3+ views RIGHT   Final Result by Clover Arzola MD (10/31 8058)      No acute osseous abnormality  Workstation performed: [id number]             Significant Findings / Test Results:   · Gangrene of right 4th toe, s/p angiogram, failed healing of right 3rd and 4th toe amputations, subsequent TMA  · Elevated A1c   · Negative blood cultures    Incidental Findings:   · None     Test Results Pending at Discharge (will require follow up):    · None     Outpatient Tests Requested:  · Repeat A1c in 3 months    Complications:  Poor wound healing    Reason for Admission: Gangrenous toe    Hospital Course:     Kunal Ortega is a 47 y o  female patient who originally presented to the hospital on 10/30/2019 due to toe pain  Patient noted that her right 4th toe had turned black in color  She was experiencing burning  Her history is pertinent for diabetes mellitus, hypertension, hyperlipidemia, PVD with previous revascularization, neuropathy, previous amputation on left foot  She was evaluated in the ER with the above findings and admitted for further evaluation and management  Upon admission, IV antibiotics were administered with Ancef and Flagyl  Consultations were requested with Vascular Surgery and Podiatry  Patient underwent angiogram with intervention and then right 3rd and 4th toe amputations  Her wounds were monitored post-operatively but were not healing  Patient underwent subsequent right TMA on 11/7  This wound was again monitored and was healing well  Her antibiotics were stopped assuming surgical cure of infection  Patient was cleared by Podiatry for discharge  She is non-weight bearing and home health services have been arranged  For her diabetes, she was recently prescribed insulin however could not afford it  Her blood sugars were controlled with prandial and basal insulin while here  Upon discharge, a prescription for her basal insulin was secured  She was instructed to follow up promptly with her PCP for further monitoring  Please see above list of diagnoses and related plan for additional information  Condition at Discharge: stable     Discharge Day Visit / Exam:     Subjective:  Patient denies pain  She is eager to go home    Vitals: Blood Pressure: (!) 171/71 (11/12/19 0748)  Pulse: 81 (11/12/19 0748)  Temperature: 97 8 °F (36 6 °C) (11/12/19 0748)  Temp Source: Temporal (11/12/19 0748)  Respirations: 18 (11/12/19 0748)  Height: 5' (152 4 cm) (10/31/19 0009)  Weight - Scale: 85 1 kg (187 lb 9 8 oz) (10/31/19 0009)  SpO2: 97 % (11/12/19 0766)  Exam:   Physical Exam   Constitutional: She is oriented to person, place, and time  She appears well-developed and well-nourished  No distress  HENT:   Head: Normocephalic and atraumatic  Eyes: Conjunctivae are normal  No scleral icterus  Cardiovascular: Normal rate, regular rhythm and normal heart sounds  No murmur heard  Pulmonary/Chest: Effort normal and breath sounds normal  No respiratory distress  She has no wheezes  She has no rales  Abdominal: Soft  Bowel sounds are normal  She exhibits no distension  There is no tenderness  Musculoskeletal: Normal range of motion  She exhibits no edema or tenderness  Neurological: She is alert and oriented to person, place, and time  Skin: Skin is warm and dry  She is not diaphoretic  There is pallor  Dressing intact on right foot  Psychiatric: She has a normal mood and affect  Her behavior is normal    Nursing note and vitals reviewed  Discussion with Family: Plan of care with patient and daughter at bedside  Discharge instructions/Information to patient and family:   See after visit summary for information provided to patient and family  Provisions for Follow-Up Care:  See after visit summary for information related to follow-up care and any pertinent home health orders  Disposition:     Home with VNA Services (Reminder: Complete face to face encounter)    For Discharges to [address] [facility]:   · Not Applicable to this Patient - Not Applicable to this Patient    Planned Readmission: None     Discharge Statement:  I spent 60 minutes discharging the patient  This time was spent on the day of discharge  I had direct contact with the patient on the day of discharge  Greater than 50% of the total time was spent examining patient, answering all patient questions, arranging and discussing plan of care with patient as well as directly providing post-discharge instructions  Additional time then spent on discharge activities  Discharge Medications:  See after visit summary for reconciled discharge medications provided to patient and family        ** Please Note: This note has been constructed using a voice recognition system **

## 2021-02-05 ENCOUNTER — TELEPHONE (OUTPATIENT)
Dept: SLEEP CENTER | Facility: CLINIC | Age: 56
End: 2021-02-05

## 2021-02-08 ENCOUNTER — TELEPHONE (OUTPATIENT)
Dept: SLEEP CENTER | Facility: CLINIC | Age: 56
End: 2021-02-08

## 2021-02-08 DIAGNOSIS — Z01.812 ENCOUNTER FOR PREPROCEDURE SCREENING LABORATORY TESTING FOR COVID-19: Primary | ICD-10-CM

## 2021-02-08 DIAGNOSIS — Z20.822 ENCOUNTER FOR PREPROCEDURE SCREENING LABORATORY TESTING FOR COVID-19: Primary | ICD-10-CM

## 2021-02-10 ENCOUNTER — ANNUAL EXAM (OUTPATIENT)
Dept: FAMILY MEDICINE CLINIC | Facility: CLINIC | Age: 56
End: 2021-02-10

## 2021-02-10 VITALS
RESPIRATION RATE: 18 BRPM | HEART RATE: 87 BPM | DIASTOLIC BLOOD PRESSURE: 88 MMHG | WEIGHT: 194.4 LBS | HEIGHT: 60 IN | OXYGEN SATURATION: 99 % | SYSTOLIC BLOOD PRESSURE: 154 MMHG | BODY MASS INDEX: 38.17 KG/M2 | TEMPERATURE: 98.1 F

## 2021-02-10 DIAGNOSIS — B37.3 VAGINAL CANDIDA: ICD-10-CM

## 2021-02-10 DIAGNOSIS — Z01.419 WELL WOMAN EXAM: Primary | ICD-10-CM

## 2021-02-10 PROBLEM — B37.31 VAGINAL CANDIDA: Status: ACTIVE | Noted: 2021-02-10

## 2021-02-10 PROCEDURE — S0612 ANNUAL GYNECOLOGICAL EXAMINA: HCPCS | Performed by: FAMILY MEDICINE

## 2021-02-10 PROCEDURE — 88175 CYTOPATH C/V AUTO FLUID REDO: CPT | Performed by: FAMILY MEDICINE

## 2021-02-10 RX ORDER — FLUCONAZOLE 150 MG/1
150 TABLET ORAL ONCE
Qty: 1 TABLET | Refills: 0 | Status: SHIPPED | OUTPATIENT
Start: 2021-02-10 | End: 2021-02-10

## 2021-02-10 NOTE — PROGRESS NOTES
No bleeding, no discomfort, a while the  last one    OBGYN: Dipti Umaña---  last one,   No peiord- 2 yrs ago; not  Hot flashes, no meds  Still has some residual menstraul periods  No hysterectomynot at the time, no sx active at this time     No bresat pain, no discharge    Originally from [place]- had [place] in [place], no records    many years ago    Bi manueal exam normal     brst exam normal-- cousing breast cancer hx

## 2021-02-11 NOTE — PROGRESS NOTES
Assessment/Plan:    Well woman exam  -Pap smear exam performed today, collected samples and sent for evaluation  -Bi manual exam -cervical exam normal   -breast exam normal  -routine pap smear pending to be done       Diagnoses and all orders for this visit:    Well woman exam  -     Liquid-based pap, diagnostic    Vaginal candida  -     fluconazole (DIFLUCAN) 150 mg tablet; Take 1 tablet (150 mg total) by mouth once for 1 dose    Other orders  -     Cancel: Mammo screening bilateral w cad; Future          Subjective:      Patient ID: Antonia Huynh is a 54 y o  female  Case of 53 y/o female who came today for her well exam      No vaginal bleeding, no discomfort, states to have been a while since the last Pap smear  OBGYN: [id number]---  last one,   No period- since 2 yrs ago; not  Hot flashes, no meds for estrogen replacement so far  Still has some residual menstrual periods  No hysterectomy-not at the time, no sexually active at this time   No breast pain, no discharge  Originally from [place]- [place] paps in [place], no records    many years ago          The following portions of the patient's history were reviewed and updated as appropriate: allergies, current medications, past family history, past medical history, past social history, past surgical history and problem list Procedures    Review of Systems   Constitutional: Negative for fever  Eyes: Negative for visual disturbance  Respiratory: Negative for cough, shortness of breath and wheezing  Cardiovascular: Negative for chest pain, palpitations and leg swelling  Gastrointestinal: Negative for abdominal pain, diarrhea, nausea and vomiting  Musculoskeletal: Negative for back pain  Skin: Negative for color change, pallor, rash and wound  Neurological: Negative for headaches  Psychiatric/Behavioral: The patient is not nervous/anxious            Objective:      /88 (BP Location: Left arm, Patient Position: Sitting, Cuff Size: Standard)   Pulse 87   Temp 98 1 °F (36 7 °C) (Temporal)   Resp 18   Ht 5' (1 524 m)   Wt 88 2 kg (194 lb 6 4 oz)   LMP  (LMP Unknown)   SpO2 99%   BMI 37 97 kg/m²          Physical Exam  Vitals signs reviewed  Constitutional:       General: She is not in acute distress  Appearance: Normal appearance  She is not ill-appearing, toxic-appearing or diaphoretic  Eyes:      Extraocular Movements: Extraocular movements intact  Neck:      Musculoskeletal: Normal range of motion  Cardiovascular:      Pulses: Normal pulses  Heart sounds: Normal heart sounds  No murmur  Pulmonary:      Effort: Pulmonary effort is normal    Abdominal:      General: Abdomen is flat  Bowel sounds are normal  There is no distension  Palpations: Abdomen is soft  Tenderness: There is no abdominal tenderness  Genitourinary:     General: Normal vulva  Vagina: Vaginal discharge ( white vaginal discharge) present  Comments: Pap smear performed, Dr Maggie Nieto as chapperone  Musculoskeletal: Normal range of motion  General: No swelling, tenderness, deformity or signs of injury  Right lower leg: No edema  Left lower leg: No edema  Skin:     General: Skin is warm  Coloration: Skin is not jaundiced  Neurological:      General: No focal deficit present  Mental Status: She is alert and oriented to person, place, and time     Psychiatric:         Mood and Affect: Mood normal

## 2021-02-11 NOTE — ASSESSMENT & PLAN NOTE
-Pap smear exam performed today, collected samples and sent for evaluation  -Bi manual exam -cervical exam normal   -breast exam normal  -routine pap smear pending to be done

## 2021-02-11 NOTE — ASSESSMENT & PLAN NOTE
-white vaginal discharge noted on pelvic exam  -sent order for fluconazole to take once for treatment of suggestive Candida infection

## 2021-02-16 ENCOUNTER — CLINICAL SUPPORT (OUTPATIENT)
Dept: BARIATRICS | Facility: CLINIC | Age: 56
End: 2021-02-16

## 2021-02-16 VITALS — BODY MASS INDEX: 37.07 KG/M2 | WEIGHT: 188.8 LBS | HEIGHT: 60 IN

## 2021-02-16 DIAGNOSIS — E66.9 OBESITY, UNSPECIFIED CLASSIFICATION, UNSPECIFIED OBESITY TYPE, UNSPECIFIED WHETHER SERIOUS COMORBIDITY PRESENT: Primary | ICD-10-CM

## 2021-02-16 DIAGNOSIS — E66.01 CLASS 2 SEVERE OBESITY DUE TO EXCESS CALORIES WITH SERIOUS COMORBIDITY AND BODY MASS INDEX (BMI) OF 36.0 TO 36.9 IN ADULT (HCC): Primary | ICD-10-CM

## 2021-02-16 DIAGNOSIS — E66.9 DIABETES MELLITUS TYPE 2 IN OBESE (HCC): ICD-10-CM

## 2021-02-16 DIAGNOSIS — E11.69 DIABETES MELLITUS TYPE 2 IN OBESE (HCC): ICD-10-CM

## 2021-02-16 LAB
LAB AP GYN PRIMARY INTERPRETATION: NORMAL
Lab: NORMAL

## 2021-02-16 PROCEDURE — RECHECK: Performed by: SURGERY

## 2021-02-16 NOTE — PROGRESS NOTES
Bariatric Behavioral Health Evaluation      i  Name was verified by patient stating name? Yes  ii   verified by patient stating? Yes  iii  You verified the patient is alone? Yes  iv  I would like to verify that you are consenting to a virtual visit? Yes  v  This visit is free         Presenting Problem:   Rakesh Saldana  is a 54 y o    adult    :  1965   Patient presented with overall concerns of obesity  Stated that weight has impacted quality of life and concerned with lack of mobility, chronic pain, and overall health  Has attempted various weight loss plans in the past including self created and exercise  Patient is Interested in exploring bariatric surgery  as an option for  weight loss goals  Is the patient seeking Bariatric Surgery Eval? Yes    Her Doctor discussed bariatric surgery in January and she decided to follow his recommendation  Ex  had bariatric surgery a long time ago,     Realizes Post- Op Requirements? Yes : had a virtual visit with dietitian and reviewed the manual:  Will continue with nutritional education through her pre surgery program      Pre-morbid level of function and history of present illness:   Diagnosed with type 2 diabetes with insulin  Psychiatric/Psychological Treatment Diagnosis:   Nothing in chart and she confirmed     Outpatient Counselor:   Denied     Psychiatrist :  Denied     Have you had Inpatient Treatment? Denied     Drug and/or Alcohol treatment history:  Denied     Tobacco History:  Quit in      Family Constellation (include relationship with each and Psych/Med HX)    Mother  obesity and 2DM and  and Siblings  obesity, tobacco use and Sister did have RYGB and lost weight  Domestic Violence No    Abuse History:  none noted at time of the evaulation      Additional comments/stressors related to family/relationships/peer support: lives with her older daughter    Has a son and another daughter in the area        Stress:    Her ongoing health  I looking for another place to live  Stress in finding another place  Food is her stress relief     Physical/Psychological Assessment:     Appearance: appropriate  Sociability: average  Affect: appropriate  Mood: calm  Thought Process: coherent  Speech: normal  Content: no impairment  Orientation: person  Yes , place  Yes , time  Yes , normal attention span  Yes , normal memory  Yes   and normal judgement  Yes   Insight: emotional  good    Risk Assessment:     none    Recommendations: Recommended for surgery  yes    Risk of Harm to Self or Others:   none noted during evaluation     Observation:     Interviews :  this interview only    Access to weapons : not reported     Based on the previous information, the client presents the following risk of harm to self or others: low     Note :  Patient presented for behavioral health evaluation for the bariatric program    [address]  Denied history of therapy and Psychiatry  Denied history of Drug and/or Alcohol abuse or treatment  History of tobacco use:  Quit year 2013  Patient educated regarding the impact of nicotine and alcohol on the post surgery bariatric patient  Patient has a positive family history of tobacco and obesity  Patient meets criteria for surgery at this time and is referred to the bariatric surgeon  [facility], [facility]  _____________________________      BARIATRIC SURGERY EDUCATION CHECKLIST     I have received education related to my bariatric surgery process and understand:     Patients may be required to complete a psychiatric evaluation and receive clearance for surgery from their psychiatrist      Patients who undergo weight loss surgery are at higher risk of increased mental health concerns and suicide attempts  Patients may be required to complete a full substance abuse evaluation and then complete all treatment recommendations prior to surgery  If diagnosis of abuse/dependence results, patient may be required to remain sober for one (1) year before having bariatric surgery  Patients on psychiatric medications should check with their provider to discuss psychiatric medications and the changes in absorption  Patient should discuss all time release medications with provider and take all medications as prescribed  The recommendation is that there is no use of  any tobacco products, Hookah or  vapes for the bariatric post-operation patient  Bariatric surgery patients should not consume alcohol as a post-operative patient as it may increase risk of numerous health conditions including but not limited to alcohol abuse and ulcers  There is a possibility of weight regain if patient does not follow all program guidelines and recommendations  Bariatric surgery patients should exercise thirty (30) to sixty (60) minutes per day to maintain post-surgical weight loss  Research indicates that bariatric patients are more successful when they see a therapist for up to two (2) years post-op  Patients will follow all medical and dietary recommendations provided  Patient will keep all scheduled appointments and follow up with their physician for a minimum of five (5) years  Patient will take all vitamins as recommended  Post-operative vitamins are life-long  Patient reviewed Bariatric Surgery Education Checklist and agrees they have received education on these issues

## 2021-02-16 NOTE — PROGRESS NOTES
Bariatric Nutrition Assessment Note    Type of surgery    Preop  Surgery Date: TBD- patient has 6 months of weight check   Surgeon: Dr Luz Marina Wolfe  54 y o   adult     [place] with BMI of 25:127 3  Pre-Op Excess Wt: 67 1#  LMP  (LMP Unknown)    Ht 5' (1 524 m)   Wt 85 6 kg (188 lb 12 8 oz) Comment: per pt due to virtual visit  LMP  (LMP Unknown)   BMI 36 87 kg/m²      Wt Readings from Last 3 Encounters:   02/10/21 88 2 kg (194 lb 6 4 oz)   01/13/21 89 8 kg (198 lb)   01/05/21 88 5 kg (195 lb 3 2 oz)       Logan-   Bryan Equation:  1462  Estimated calories for weight loss 0845-9915  ( 1/2# to 1#  per wk wt loss - sedentary )  Estimated protein needs 46-58 grams (  8-1 0 grams /kg IBW with CKD 2)   Estimated fluid needs 2025ml  (35 ml/kg IBW )  68 ounces per day     Weight History   Onset of Obesity: Adult after pregnancies 33 years ago, 32 & 24   Family history of obesity: Yes  Wt Loss Attempts: Exercise  Self Created Diets (Portion Control, Healthy Food Choices, etc )  Gym - treadmill, used to ride her bike to work OTC  Patient has tried the above for 6 months or more with insufficient weight loss or weight regain, which is why patient has requested to be evaluated for weight loss surgery today  Maximum Wt Lost: lost about 25 pounds but then regained the weight back due to emotional eating       Review of History and Medications   Past Medical History:   Diagnosis Date    Cellulitis of toe 1/28/2019    CKD (chronic kidney disease)     Diabetes mellitus ([address] )     Gangrene of toe of right foot ([address] ) 10/30/2019    Added automatically from request for surgery [id number]    Hypertension      Past Surgical History:   Procedure Laterality Date    IR ABDOMINAL AORTAGRAM  2/1/2019    IR AORTAGRAM WITH RUN-OFF  5/5/2020    IR ARTERIAL LYSIS  4/30/2019    IR LOWER EXTREMITY / INTERVENTION  11/1/2019    DE AMPUTATION FOOT,TRANSMETATARSAL Right 11/7/2019 Procedure: AMPUTATION TRANSMETATARSAL (TMA) WITH ACHILLES TENDON LENGTHING;  Surgeon: Edenilson Cuellar DPM;  [facility];  Service: Podiatry    [address] [place] [address] Right 2020    Procedure: AMPUTATION TRANSMETATARSAL (TMA);  Surgeon: Rodriguez Sena DPM;  [facility];  Service: Podiatry    TOE AMPUTATION Left 2019    Procedure: AMPUTATION 5th TOE;  Surgeon: Edenilson Cuellar DPM;  [facility];  Service: Podiatry    TOE AMPUTATION Right 2019    Procedure: AMPUTATION RIGHT 3RD AND 4TH TOE;  Surgeon: Edenilson Cuellar DPM;  [facility];  Service: Podiatry     Social History     Socioeconomic History    Marital status: Single     Spouse name: Not on file    Number of children: Not on file    Years of education: Not on file    Highest education level: Not on file   Occupational History    Not on file   Social Needs    Financial resource strain: Not on file    Food insecurity     Worry: Not on file     Inability: Not on file    Transportation needs     Medical: No     Non-medical: No   Tobacco Use    Smoking status: Former Smoker     Types: Cigarettes     Quit date:      Years since quittin 1    Smokeless tobacco: Never Used   Substance and Sexual Activity    Alcohol use: Not Currently    Drug use: No    Sexual activity: Not on file   Lifestyle    Physical activity     Days per week: Not on file     Minutes per session: Not on file    Stress: Not on file   Relationships    Social connections     Talks on phone: Not on file     Gets together: Not on file     Attends Latter-day service: Not on file     Active member of club or organization: Not on file     Attends meetings of clubs or organizations: Not on file     Relationship status: Not on file    Intimate partner violence     Fear of current or ex partner: Not on file     Emotionally abused: Not on file     Physically abused: Not on file     Forced sexual activity: Not on file   Other Topics Concern    Not on file   Social History Narrative    Not on file       Current Outpatient Medications:     aspirin 81 mg chewable tablet, Chew 1 tablet (81 mg total) daily, Disp: , Rfl:     atorvastatin (LIPITOR) 20 mg tablet, Take 1 tablet (20 mg total) by mouth daily, Disp: 90 tablet, Rfl: 1    cetirizine (ZyrTEC) 10 mg tablet, Take 1 tablet (10 mg total) by mouth daily (Patient taking differently: Take 10 mg by mouth daily as needed ), Disp: 90 tablet, Rfl: 1    clopidogrel (PLAVIX) 75 mg tablet, Take 1 tablet (75 mg total) by mouth daily, Disp: 90 tablet, Rfl: 1    Dulaglutide 0 75 MG/0 5ML SOPN, Inject 0 5 mL (0 75 mg total) under the skin once a week, Disp: 4 pen, Rfl: 3    ergocalciferol (ERGOCALCIFEROL) 1 25 MG (34840 UT) capsule, Take 1 capsule (50,000 Units total) by mouth once a week, Disp: 12 capsule, Rfl: 1    ferrous sulfate 324 (65 Fe) mg, Take 1 tablet (324 mg total) by mouth 2 (two) times a day before meals, Disp: 180 tablet, Rfl: 3    fluticasone (FLONASE) 50 mcg/act nasal spray, 1 spray into each nostril daily, Disp: 3 Bottle, Rfl: 1    glucose blood (ONE TOUCH ULTRA TEST) test strip, Use to test 3x daily, Disp: 300 each, Rfl: 1    Insulin Aspart FlexPen (NovoLOG FlexPen) 100 UNIT/ML SOPN, Inject 5units subcut up to 3 times daily just before a meal, Disp: 5 pen, Rfl: 3    insulin glargine (Lantus SoloStar) 100 units/mL injection pen, Inject 22 Units under the skin daily at bedtime, Disp: 5 pen, Rfl: 1    Insulin Pen Needle 32G X 4 MM MISC, Use 4 times daily with insulin pens, Disp: 120 each, Rfl: 5    lisinopril (ZESTRIL) 5 mg tablet, Take 1 tablet (5 mg total) by mouth daily, Disp: 90 tablet, Rfl: 3    metFORMIN (GLUCOPHAGE) 1000 MG tablet, Take 1 tablet (1,000 mg total) by mouth 2 (two) times a day with meals, Disp: 60 tablet, Rfl: 3    metoprolol tartrate (LOPRESSOR) 25 mg tablet, Take 1 tablet (25 mg total) by mouth every 12 (twelve) hours, Disp: 180 tablet, Rfl: 1   AngelaTouch Delica Lancets 87T MISC, Use to test 3x daily, Disp: 300 each, Rfl: 2  Food Intake and Lifestyle Assessment   Food Intake Assessment completed via usual diet recall  Breakfast: 9 am - black coffee with toast( 1 )  and a boiled egg   Snack: 0   Lunch: 12:30 - sandwich - ham and cheese ( on toast ) sugar free fruit cup   Snack: 0  Dinner: 6 pm : Vegetables, sometimes chicken or fish - sometimes will eat starch, rice or pasta but not every night   Snack: vanilla pudding or fruit   Beverage intake: water and diet soda( gingergale gianna )   Protein supplement: none   Estimated protein intake per day: 50-60 grams ( she was told to limit with CKD 2)  Estimated fluid intake per day: 2 bottles of water per day and one can of soda per day   Meals eaten away from home: twice a month   Typical meal pattern: 3 meals per day and 0-1 snacks per day  Eating Behaviors: Mindless eating, Emotional eating, Craves sweet foods and Craves salty foods  Food allergies or intolerances: N/A  Allergies   Allergen Reactions    Levemir [Insulin Detemir] Itching     Cultural or Mandaeism considerations:      Physical Assessment    Lab Results   Component Value Date    HGBA1C 7 6 (A) 01/05/2021     Physical Activity  Types of exercise: Walking  Walking in the home , leg exercises   Current physical limitations: amputation of toes     Psychosocial Assessment   Support systems: children- adult daughter   Socioeconomic factors: lives with daughter     Nutrition Diagnosis  Diagnosis: Overweight / Obesity (NC-3 3)  Related to: Physical inactivity and Excessive energy intake  As Evidenced by: BMI >25     Nutrition Prescription: Recommend the following diet  Low fat, Low sugar and 60 grams protein ( CKD) and consistent carb     Interventions and Teaching   Discussed pre-op and post-op nutrition guidelines  Patient educated and handouts provided    Surgical changes to stomach / GI  Capacity of post-surgery stomach  Diet progression  Adequate hydration  Sugar and fat restriction to decrease "dumping syndrome"  Fat restriction to decrease steatorrhea  Expected weight loss  Weight loss plateaus/ possibility of weight regain  Exercise  Suggestions for pre-op diet  Nutrition considerations after surgery  Protein supplements  Meal planning and preparation  Appropriate carbohydrate, protein, and fat intake, and food/fluid choices to maximize safe weight loss, nutrient intake, and tolerance   Dietary and lifestyle changes  Possible problems with poor eating habits  Intuitive eating  Techniques for self monitoring and keeping daily food journal  Potential for food intolerance after surgery, and ways to deal with them including: lactose intolerance, nausea, reflux, vomiting, diarrhea, food intolerance, appetite changes, gas  Vitamin / Mineral supplementation of Multivitamin with minerals, Calcium, Vitamin B12, Iron, Fat Soluble vitamins and Vitamin D    Patient is not currently pregnant and doesn't desire to become pregnant a minimum of one year post-op    Education provided to: patient- daughter present and supportive  Barriers to learning: No barriers identified    Readiness to change: preparation    Prior research on procedure: discussed with provider and friends or family    Comprehension: needs reinforcement and verbalizes understanding     Expected Compliance: good  Recommendations  Pt is an appropriate candidate for surgery   Yes    Evaluation / Monitoring  Dietitian to Monitor: Eating pattern as discussed Tolerance of nutrition prescription Body weight Lab values Physical activity Bowel pattern    Goals  Food journal, Complete lession plans 1-6, Eat 3 meals per day and and one planned snack   Food journal either on baritasitc or on books provided ( mailed)  Increase water intake by one 16 ounce bottle per day for a total of 48 ounces of water per day   Continue to take an adult multivitamin and mineral supplement and weekly vitamin D2 as prescribed   Eat off an 8-9 inch plate to decrease portion sizes  Complete lesson plans 1 & 2 prior to next dietitian appointment     Time Spent:   45 Minutes

## 2021-02-16 NOTE — PATIENT INSTRUCTIONS
Goals  Food journal, Complete lession plans 1-6, Eat 3 meals per day and and one planned snack   Food journal either on baritasitc or on books provided ( mailed)  Increase water intake by one 16 ounce bottle per day for a total of 48 ounces of water per day   Continue to take an adult multivitamin and mineral supplement and weekly vitamin D2 as prescribed   Eat off an 8-9 inch plate to decrease portion sizes  Complete lesson plans 1 & 2 prior to next dietitian appointment

## 2021-02-24 ENCOUNTER — TELEPHONE (OUTPATIENT)
Dept: URGENT CARE | Facility: MEDICAL CENTER | Age: 56
End: 2021-02-24

## 2021-02-24 DIAGNOSIS — Z20.822 ENCOUNTER FOR PREPROCEDURE SCREENING LABORATORY TESTING FOR COVID-19: ICD-10-CM

## 2021-02-24 DIAGNOSIS — Z01.812 ENCOUNTER FOR PREPROCEDURE SCREENING LABORATORY TESTING FOR COVID-19: ICD-10-CM

## 2021-02-24 NOTE — TELEPHONE ENCOUNTER
Patients daughter called, states they are at [facility] to get COVID test and she doesn't want patient to wait, asking to reschedule sleep study    Rescheduled for 3/24/2021    Advised will need to go for COVID test 3/15/2021    [facility] [address] letter sent    New COVID testing order placed    Follow up appointment will [facility] rescheduled for 4/7/2021

## 2021-02-24 NOTE — TELEPHONE ENCOUNTER
Patient was here today to have a COVID-19 test done at [facility] since there was an order in [address] [place] [address] Rd from PCP  Patient called because she was not outside by [facility] to test performed  Patient states she left  Patient waited 10 minutes and left without having COVID-19 done  Please note since label was printed and patient left a new order needs to be placed in Epic  Will forward note to Renuka Cedeno, [address]

## 2021-02-25 ENCOUNTER — TELEPHONE (OUTPATIENT)
Dept: SLEEP CENTER | Facility: CLINIC | Age: 56
End: 2021-02-25

## 2021-02-25 NOTE — TELEPHONE ENCOUNTER
Yes, I spoke with the daughter yesterday and she wanted to reschedule study, I did put a new order in for COVID test    (all documented on 2/8/2021 note)

## 2021-03-02 DIAGNOSIS — Z79.4 TYPE 2 DIABETES MELLITUS WITH OTHER CIRCULATORY COMPLICATION, WITH LONG-TERM CURRENT USE OF INSULIN (HCC): ICD-10-CM

## 2021-03-02 DIAGNOSIS — E11.59 TYPE 2 DIABETES MELLITUS WITH OTHER CIRCULATORY COMPLICATION, WITH LONG-TERM CURRENT USE OF INSULIN (HCC): ICD-10-CM

## 2021-03-02 DIAGNOSIS — Z79.4 CURRENT USE OF INSULIN (HCC): ICD-10-CM

## 2021-03-19 ENCOUNTER — TELEPHONE (OUTPATIENT)
Dept: SLEEP CENTER | Facility: CLINIC | Age: 56
End: 2021-03-19

## 2021-03-19 DIAGNOSIS — Z20.822 ENCOUNTER FOR PREPROCEDURE SCREENING LABORATORY TESTING FOR COVID-19: ICD-10-CM

## 2021-03-19 DIAGNOSIS — Z01.812 ENCOUNTER FOR PREPROCEDURE SCREENING LABORATORY TESTING FOR COVID-19: ICD-10-CM

## 2021-03-19 PROCEDURE — U0003 INFECTIOUS AGENT DETECTION BY NUCLEIC ACID (DNA OR RNA); SEVERE ACUTE RESPIRATORY SYNDROME CORONAVIRUS 2 (SARS-COV-2) (CORONAVIRUS DISEASE [COVID-19]), AMPLIFIED PROBE TECHNIQUE, MAKING USE OF HIGH THROUGHPUT TECHNOLOGIES AS DESCRIBED BY CMS-2020-01-R: HCPCS | Performed by: NURSE PRACTITIONER

## 2021-03-19 PROCEDURE — U0005 INFEC AGEN DETEC AMPLI PROBE: HCPCS | Performed by: NURSE PRACTITIONER

## 2021-03-20 LAB — SARS-COV-2 RNA RESP QL NAA+PROBE: NEGATIVE

## 2021-03-22 NOTE — TELEPHONE ENCOUNTER
Left message for the patient to call back for COVID test results     Test is negative will confirm appointment when the patient calls back

## 2021-03-24 ENCOUNTER — HOSPITAL ENCOUNTER (OUTPATIENT)
Dept: SLEEP CENTER | Facility: CLINIC | Age: 56
Discharge: HOME | End: 2021-03-24
Payer: COMMERCIAL

## 2021-03-24 DIAGNOSIS — G47.33 OBSTRUCTIVE SLEEP APNEA: ICD-10-CM

## 2021-03-24 DIAGNOSIS — E66.01 CLASS 2 SEVERE OBESITY DUE TO EXCESS CALORIES WITH SERIOUS COMORBIDITY AND BODY MASS INDEX (BMI) OF 37.0 TO 37.9 IN ADULT (HCC): ICD-10-CM

## 2021-03-24 PROCEDURE — 95811 POLYSOM 6/>YRS CPAP 4/> PARM: CPT

## 2021-03-24 NOTE — TELEPHONE ENCOUNTER
Received call from patient  Advised COVID result negative  Confirmed appointment for sleep study for tonight 3/24/21

## 2021-03-25 DIAGNOSIS — G47.33 OBSTRUCTIVE SLEEP APNEA: Primary | ICD-10-CM

## 2021-03-25 NOTE — PROGRESS NOTES
CPAP titration study completed with titration to CPAP at 5cm, using nasal mask  CPAP DME ordered  Patient to schedule set up and compliance appointment [phone] days later

## 2021-03-25 NOTE — PROGRESS NOTES
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 [facility]  Sleep Study: Treatment   Optimal PAP pressure:  5 cm H2O  Leak:Small  Snore:Eliminated  REM Obtained:yes  Supplemental O2: no    Minimum SaO2  89 %  Baseline SaO2  98 8 %  PAP mask tried (list all) High leak with ResMed AirFit N20 Medium Nasal Mask  Vaneg[employer] Eson 2 Small Nasal Mask with no humidity  PAP mask choice (final) Van[employer] Eson 2 Small Nasal Mask  PAP mask type:nasal  PAP pressure at which snoring was eliminated  5 cm H2O  Minimum SaO2 at final PAP pressure  89 %  Mode of Therapy:CPAP  ETCO2:No  CPAP changed to BiPAP:No    Mode of Therapy:CPAP    EKG abnormalities: no     EEG abnormalities: yes:  ECG artifact throughout study  Averaged Ms  Sleep Study Recorded < 2 hours:  N/A    Sleep Study Recorded > 2 hours but incomplete study: N/A    Sleep Study Recorded 6 hours but no sleep obtained: NO          Post-Sleep Study    Medication used at bedtime or during sleep study:NO    Patient reports time it took to fall asleep:30 to 60 minutes    Patient reports waking up during study:Denied    Patient reports sleeping more than 8 hours without dreaming  Patient reports sleep during study:better than usual    Patient rated sleepiness: Very sleepy or tired    PAP treatment:yes: Post PAP treatment patient reports feeling better and  would wear PAP mask at home

## 2021-03-26 ENCOUNTER — TELEPHONE (OUTPATIENT)
Dept: SLEEP CENTER | Facility: CLINIC | Age: 56
End: 2021-03-26

## 2021-03-26 NOTE — TELEPHONE ENCOUNTER
----- Message from Pranav Waddell, [address] sent at 3/25/2021  4:27 PM EDT -----  CPAP titration study completed with titration to CPAP at 5cm, using nasal mask  CPAP DME ordered  Patient to schedule set up and compliance appointment [phone] days later

## 2021-03-26 NOTE — TELEPHONE ENCOUNTER
Left message for patient to call office    Patient has follow up with Constance Araya 4/7/2021, can reschedule that as compliance  Will need to schedule DME set up

## 2021-03-26 NOTE — TELEPHONE ENCOUNTER
Spoke with patient, advised above      Scheduled DME set up 4/7/2021 in [place] representative aware    Compliance follow up scheduled 5/26/2021 with Emma Mcmillan

## 2021-03-31 ENCOUNTER — OFFICE VISIT (OUTPATIENT)
Dept: ENDOCRINOLOGY | Facility: CLINIC | Age: 56
End: 2021-03-31
Payer: COMMERCIAL

## 2021-03-31 VITALS
HEIGHT: 60 IN | HEART RATE: 92 BPM | DIASTOLIC BLOOD PRESSURE: 80 MMHG | BODY MASS INDEX: 37.92 KG/M2 | SYSTOLIC BLOOD PRESSURE: 132 MMHG | WEIGHT: 193.13 LBS

## 2021-03-31 DIAGNOSIS — E78.5 HYPERLIPIDEMIA, UNSPECIFIED HYPERLIPIDEMIA TYPE: ICD-10-CM

## 2021-03-31 DIAGNOSIS — Z79.4 TYPE 2 DIABETES MELLITUS WITHOUT COMPLICATION, WITH LONG-TERM CURRENT USE OF INSULIN (HCC): Primary | ICD-10-CM

## 2021-03-31 DIAGNOSIS — E11.9 TYPE 2 DIABETES MELLITUS WITHOUT COMPLICATION, WITH LONG-TERM CURRENT USE OF INSULIN (HCC): Primary | ICD-10-CM

## 2021-03-31 DIAGNOSIS — E11.59 TYPE 2 DIABETES MELLITUS WITH OTHER CIRCULATORY COMPLICATION, WITH LONG-TERM CURRENT USE OF INSULIN (HCC): ICD-10-CM

## 2021-03-31 DIAGNOSIS — E11.621 DIABETIC ULCER OF RIGHT MIDFOOT ASSOCIATED WITH TYPE 2 DIABETES MELLITUS, WITH NECROSIS OF BONE (HCC): ICD-10-CM

## 2021-03-31 DIAGNOSIS — L97.414 DIABETIC ULCER OF RIGHT MIDFOOT ASSOCIATED WITH TYPE 2 DIABETES MELLITUS, WITH NECROSIS OF BONE (HCC): ICD-10-CM

## 2021-03-31 DIAGNOSIS — E11.22 CKD STAGE 2 DUE TO TYPE 2 DIABETES MELLITUS (HCC): ICD-10-CM

## 2021-03-31 DIAGNOSIS — Z79.4 CURRENT USE OF INSULIN (HCC): ICD-10-CM

## 2021-03-31 DIAGNOSIS — Z79.4 TYPE 2 DIABETES MELLITUS WITH OTHER CIRCULATORY COMPLICATION, WITH LONG-TERM CURRENT USE OF INSULIN (HCC): ICD-10-CM

## 2021-03-31 DIAGNOSIS — N18.2 CKD STAGE 2 DUE TO TYPE 2 DIABETES MELLITUS (HCC): ICD-10-CM

## 2021-03-31 PROCEDURE — 1036F TOBACCO NON-USER: CPT | Performed by: INTERNAL MEDICINE

## 2021-03-31 PROCEDURE — 99214 OFFICE O/P EST MOD 30 MIN: CPT | Performed by: INTERNAL MEDICINE

## 2021-03-31 PROCEDURE — 3066F NEPHROPATHY DOC TX: CPT | Performed by: FAMILY MEDICINE

## 2021-03-31 PROCEDURE — 3066F NEPHROPATHY DOC TX: CPT | Performed by: INTERNAL MEDICINE

## 2021-03-31 PROCEDURE — 3008F BODY MASS INDEX DOCD: CPT | Performed by: FAMILY MEDICINE

## 2021-03-31 PROCEDURE — 3008F BODY MASS INDEX DOCD: CPT | Performed by: INTERNAL MEDICINE

## 2021-03-31 RX ORDER — INSULIN GLARGINE 100 [IU]/ML
INJECTION, SOLUTION SUBCUTANEOUS
Qty: 1500 ML | Refills: 3 | Status: SHIPPED | OUTPATIENT
Start: 2021-03-31 | End: 2021-04-01 | Stop reason: SDUPTHER

## 2021-03-31 NOTE — PROGRESS NOTES
[facility] Patient Progress Note      Chief Complaint   Patient presents with    Diabetes Type 2      Referring Provider  Beatriz Ahn Md  [address]  [address] [facility],  [address] [place] [address]     History of Present Illness:   Deepthi Avila is a 54 y o  adult with a history of type 2 diabetes with long term use of insulin   Last seen in the office in Dec 2020  DM hx: Ms Lacie Ramirez was diagnosed with T2DM ~35yrs ago, and has mutliple complications  She has neuropathy the has led to Charcot foot, and amputations and gangrene of her toes  Most recently, she I s/p TMA of the right foot and then readmitted  She was seen by [facility] endocrinology during her May 2020       She takes Lantus pens 02URQOD once daily, Trulicity 3 85VS on Mondays, metformin 1g twice daily  She is adherent to all meds and does not miss doses  She is tolerating the insulin and metformin  She states she had an "allergy" to Levemir stating it makes her "itchy" and dizzy      She has a right TMA (5/2020) and left toe amputation (Feb 2020)     Ms Lacie Ramirez is checking FSBGs, which are reviewed  She is checking 3x daily  Lowest is this morning is 90  Fasting   Pre-savage 130-189  Pre-dinner 130-212  Denies hypoglycemia    Diabetes education: denies having been to DM classes  Activity: Her activity is limited due to her foot surgery  She is done with PT  Her current activity is walking in the house or to the store      Eyes: approx 2019  Still has not scheduled  Pod: seeing regularly for foot  Hx of Charcot  Dentist: most teeth removed, has an appt scheduled  Flu: received 20-21  She does not think she is listed for a COVID vaccine       CKD: eGFR is preserved  Saw Dr Johnson Cao Sept 3rd  There are no changes per the note    Hyperlipidemia is treated atorvastatin which she is tolerating  She denies hx of pancreatitis, personal or famil hx of MTC or MEN  She denies alcohol use, gallbladder issues, or hx of elevated triglycerides       Patient Active Problem List   Diagnosis    Type 2 diabetes mellitus without complication ([place] [address] )    Essential hypertension    HLD (hyperlipidemia)    PAD (peripheral artery disease) ([place] [address] )    CKD stage 2 due to type 2 diabetes mellitus ([place] [address] )    Microcytic anemia    Leukocytosis    Toe amputation status, left    Critical lower limb ischemia    Polyneuropathy associated with underlying disease ([place] [address] )    Charcot foot due to diabetes mellitus ([place] [address] )    History of transmetatarsal amputation of foot ([place] [address] )    Class 2 severe obesity due to excess calories with serious comorbidity and body mass index (BMI) of 36 0 to 36 9 in [facility])    Cellulitis of right lower extremity    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, with necrosis of bone ([place] [address] )    S/P transmetatarsal amputation of foot, right (HCC)    Current use of insulin ([place] [address] )    Persistent proteinuria    Vitamin D deficiency    Obstructive sleep apnea    Well woman exam    Vaginal candida      Past Medical History:   Diagnosis Date    Cellulitis of toe 1/28/2019    CKD (chronic kidney disease)     Diabetes mellitus ([place] [address] )     Gangrene of toe of right foot ([place] [address] ) 10/30/2019    Added automatically from request for surgery [id number]    Hypertension       Past Surgical History:   Procedure Laterality Date    IR ABDOMINAL AORTAGRAM  2/1/2019    IR AORTAGRAM WITH RUN-OFF  5/5/2020    IR ARTERIAL LYSIS  4/30/2019    IR LOWER EXTREMITY / INTERVENTION  11/1/2019    WV AMPUTATION FOOT,TRANSMETATARSAL Right 11/7/2019    Procedure: AMPUTATION TRANSMETATARSAL (TMA) WITH ACHILLES TENDON LENGTHING;  Surgeon: Beatriz Johnston DPM;  [facility];  Service: Podiatry    W[place] AMPUTATION [facility] Right 5/7/2020    Procedure: AMPUTATION TRANSMETATARSAL (TMA);  Surgeon: Wagner Pedraza DPM;  Location:  MAIN OR;  Service: Podiatry    TOE AMPUTATION Left 2/4/2019    Procedure: AMPUTATION 5th TOE;  Surgeon: Uriel Blankenship Trace Cardona DPM;  [facility];  Service: Podiatry    TOE AMPUTATION Right 2019    Procedure: AMPUTATION RIGHT 3RD AND 4TH TOE;  Surgeon: Kanu Neville DPM;  [facility];  Service: Podiatry      Family History   Problem Relation Age of Onset    Diabetes Mother     No Known Problems Father      Social History     Tobacco Use    Smoking status: Former Smoker     Types: Cigarettes     Quit date:      Years since quittin 2    Smokeless tobacco: Never Used   Substance Use Topics    Alcohol use: Not Currently     Allergies   Allergen Reactions    Levemir [Insulin Detemir] Itching         Current Outpatient Medications:     aspirin 81 mg chewable tablet, Chew 1 tablet (81 mg total) daily, Disp: , Rfl:     atorvastatin (LIPITOR) 20 mg tablet, Take 1 tablet (20 mg total) by mouth daily, Disp: 90 tablet, Rfl: 1    cetirizine (ZyrTEC) 10 mg tablet, Take 1 tablet (10 mg total) by mouth daily (Patient taking differently: Take 10 mg by mouth daily as needed ), Disp: 90 tablet, Rfl: 1    clopidogrel (PLAVIX) 75 mg tablet, Take 1 tablet (75 mg total) by mouth daily, Disp: 90 tablet, Rfl: 1    ergocalciferol (ERGOCALCIFEROL) 1 25 MG (79837 UT) capsule, Take 1 capsule (50,000 Units total) by mouth once a week, Disp: 12 capsule, Rfl: 1    ferrous sulfate 324 (65 Fe) mg, Take 1 tablet (324 mg total) by mouth 2 (two) times a day before meals, Disp: 180 tablet, Rfl: 3    fluticasone (FLONASE) 50 mcg/act nasal spray, 1 spray into each nostril daily, Disp: 3 Bottle, Rfl: 1    glucose blood (ONE TOUCH ULTRA TEST) test strip, Use to test 3x daily, Disp: 300 each, Rfl: 1    insulin glargine (Lantus SoloStar) 100 units/mL injection pen, Use 20units subcut once daily at 9pm, Disp: 1500 mL, Rfl: 3    Insulin Pen Needle 32G X 4 MM MISC, Use 4 times daily with insulin pens, Disp: 120 each, Rfl: 5    lisinopril (ZESTRIL) 5 mg tablet, Take 1 tablet (5 mg total) by mouth daily, Disp: 90 tablet, Rfl: 3   metFORMIN (GLUCOPHAGE) 1000 MG tablet, Take 1 tablet (1,000 mg total) by mouth 2 (two) times a day with meals, Disp: 180 tablet, Rfl: 3    metoprolol tartrate (LOPRESSOR) 25 mg tablet, Take 1 tablet (25 mg total) by mouth every 12 (twelve) hours, Disp: 180 tablet, Rfl: 1    OneTouch Delica Lancets 62M MISC, Use to test 3x daily, Disp: 300 each, Rfl: 2    Dulaglutide 1 5 MG/0 5ML SOPN, Inject 0 5 mL (1 5 mg total) under the skin once a week, Disp: 4 pen, Rfl: 4  Review of Systems   Constitutional: Negative for unexpected weight change  HENT: Negative for hearing loss, trouble swallowing and voice change  Eyes: Negative for visual disturbance  Gastrointestinal: Negative for abdominal pain, diarrhea and nausea  Musculoskeletal: Positive for gait problem  Neurological: Negative for tremors  Psychiatric/Behavioral: The patient is not nervous/anxious (most at dentist)  see HPI    Physical Exam:  Body mass index is 37 72 kg/m²  /80 (BP Location: Left arm, Patient Position: Sitting, Cuff Size: Large)   Pulse 92   Ht 5' (1 524 m)   Wt 87 6 kg (193 lb 2 oz)   LMP  (LMP Unknown)   BMI 37 72 kg/m²    Wt Readings from Last 3 Encounters:   03/31/21 87 6 kg (193 lb 2 oz)   02/16/21 85 6 kg (188 lb 12 8 oz)   02/10/21 88 2 kg (194 lb 6 4 oz)         Physical Exam   Gen: appears well-developed and well-nourished  No apparent distress  Head: Normocephalic and atraumatic  Eyes: no stare or proptosis, no periorbital edema  E/N/M mask in place, hearing grossly intact  Neck: range of motion nl  Pulmonary/Chest: breathing  comfortably, no accessory muscle use, effort normal    Musculoskeletal: moves all extremities, ambulates without assistance  Neurological: alert and oriented to person, place, and time   No upper ext tremor appreciated  Skin: does not appear diaphoretic  Psychiatric: normal mood and affect; behavior is normal; no gross lapses in memory, answer questions appropriately    Ext: deferred, regular Podiatry care  Labs:     Lab Results   Component Value Date    HGBA1C 7 6 (A) 01/05/2021         Lab Results   Component Value Date    CREATININE 1 00 09/01/2020    CREATININE 1 03 05/07/2020    CREATININE 0 79 05/06/2020    BUN 21 09/01/2020    K 4 6 09/01/2020     09/01/2020    CO2 24 09/01/2020     eGFR   Date Value Ref Range Status   09/01/2020 64 ml/min/1 73sq m Final     No components found for: [facility]    Lab Results   Component Value Date    HDL 35 (L) 01/31/2019    TRIG 164 (H) 01/31/2019       Lab Results   Component Value Date    ALT 13 05/04/2020    AST 18 05/04/2020    ALKPHOS 99 05/04/2020       Lab Results   Component Value Date    FREET4 1 17 05/06/2020       Impression:  1  Type 2 diabetes mellitus without complication, with long-term current use of insulin ([facility] )    2  Type 2 diabetes mellitus with other circulatory complication, with long-term current use of insulin ([facility])    3  Current use of insulin ([facility])    4  CKD stage 2 due to type 2 diabetes mellitus ([facility] )    5  Diabetic ulcer of right midfoot associated with type 2 diabetes mellitus, with necrosis of bone ([facility] )    6  Hyperlipidemia, unspecified hyperlipidemia type           Plan:    Virgil Wick was seen today for diabetes type 2  Diagnoses and all orders for this visit:    Type 2 diabetes mellitus without complication, with long-term current use of insulin ([facility] )  -     Ambulatory referral to Ophthalmology; Future    Type 2 diabetes mellitus with other circulatory complication, with long-term current use of insulin ([facility])  -     Dulaglutide 1 5 MG/0 5ML SOPN; Inject 0 5 mL (1 5 mg total) under the skin once a week  -     Insulin Pen Needle 32G X 4 MM MISC; Use 4 times daily with insulin pens  -     metFORMIN (GLUCOPHAGE) 1000 MG tablet;  Take 1 tablet (1,000 mg total) by mouth 2 (two) times a day with meals  -     insulin glargine (Lantus SoloStar) 100 units/mL injection pen; Use 20units subcut once daily at 9pm  -     Hemoglobin A1C; Future  -     Microalbumin / creatinine urine ratio; Future  -     Basic metabolic panel Lab Collect; Future    Current use of insulin (HCC)  -     Insulin Pen Needle 32G X 4 MM MISC; Use 4 times daily with insulin pens  -     metFORMIN (GLUCOPHAGE) 1000 MG tablet; Take 1 tablet (1,000 mg total) by mouth 2 (two) times a day with meals  -     insulin glargine (Lantus SoloStar) 100 units/mL injection pen; Use 20units subcut once daily at 9pm  -     Hemoglobin A1C; Future  -     Microalbumin / creatinine urine ratio; Future  -     Basic metabolic panel Lab Collect; Future    CKD stage 2 due to type 2 diabetes mellitus ([facility] )    Diabetic ulcer of right midfoot associated with type 2 diabetes mellitus, with necrosis of bone (HCC)    Hyperlipidemia, unspecified hyperlipidemia type      1  Colon cancer screening     - Ambulatory referral to Gastroenterology; Future    1  T2DM, uncontrolled, complicated by CKD and Charcot foot:  She is doing well with the Trulicity  I recommend increasing to the 1 5mg weekly dose, and continuing Lantus 20units once daily and metformin 1g twice daily  If her Bgs begin to decrease in the morning, I asked her to call the office as her Lantus dose may need an adjustment  Order provided for A1c next month  2  CKD, HTN: saw Dr Maria M Gonzalez  Some proteinuria is seen  I would not use SGLT2s at this time with her PAD and foot issues  Order provided for a repeat urine MAC  3  Hyperlipidemia: Taking and tolerating statin well  Discussed with the patient and all questioned fully answered  Aakash Munguia will call me if any problems arise      Counseled patient on diagnostic results, prognosis, risk and benefit of treatment options, instruction for management, importance of treatment compliance, Risk  factor reduction and impressions      Pooja Plata MD

## 2021-03-31 NOTE — PATIENT INSTRUCTIONS
You can increase to Trulicuty 8 7AO once a week  Keep taking the Lantus and the metformin  If you see the blood sugars going down, especially in the morning, call the office   We may be able to lower the lantus

## 2021-04-01 DIAGNOSIS — Z79.4 CURRENT USE OF INSULIN (HCC): ICD-10-CM

## 2021-04-01 DIAGNOSIS — Z79.4 TYPE 2 DIABETES MELLITUS WITH OTHER CIRCULATORY COMPLICATION, WITH LONG-TERM CURRENT USE OF INSULIN (HCC): ICD-10-CM

## 2021-04-01 DIAGNOSIS — E11.59 TYPE 2 DIABETES MELLITUS WITH OTHER CIRCULATORY COMPLICATION, WITH LONG-TERM CURRENT USE OF INSULIN (HCC): ICD-10-CM

## 2021-04-01 RX ORDER — INSULIN GLARGINE 100 [IU]/ML
INJECTION, SOLUTION SUBCUTANEOUS
Qty: 20 ML | Refills: 1 | Status: SHIPPED | OUTPATIENT
Start: 2021-04-01 | End: 2021-05-11 | Stop reason: SDUPTHER

## 2021-04-01 NOTE — TELEPHONE ENCOUNTER
Please resent RX for Lantus, previous RX was sent for 1,500 ML , pt needs 20 ML for 90 days supply  Thank you

## 2021-04-07 ENCOUNTER — TELEPHONE (OUTPATIENT)
Dept: SLEEP CENTER | Facility: CLINIC | Age: 56
End: 2021-04-07

## 2021-04-07 NOTE — TELEPHONE ENCOUNTER
Pt  was set up today in [facility] on Auto CPAP 5cm c-flex 2 and given a P-10 nasal pillow mask

## 2021-04-14 ENCOUNTER — OFFICE VISIT (OUTPATIENT)
Dept: BARIATRICS | Facility: CLINIC | Age: 56
End: 2021-04-14

## 2021-04-14 VITALS — HEIGHT: 60 IN | WEIGHT: 190.1 LBS | BODY MASS INDEX: 37.32 KG/M2

## 2021-04-14 DIAGNOSIS — Z79.4 CURRENT USE OF INSULIN (HCC): ICD-10-CM

## 2021-04-14 DIAGNOSIS — G47.33 OBSTRUCTIVE SLEEP APNEA: ICD-10-CM

## 2021-04-14 DIAGNOSIS — Z01.818 PRE-OPERATIVE CLEARANCE: Primary | ICD-10-CM

## 2021-04-14 DIAGNOSIS — E66.01 CLASS 2 SEVERE OBESITY DUE TO EXCESS CALORIES WITH SERIOUS COMORBIDITY AND BODY MASS INDEX (BMI) OF 36.0 TO 36.9 IN ADULT (HCC): ICD-10-CM

## 2021-04-14 DIAGNOSIS — I10 ESSENTIAL HYPERTENSION: ICD-10-CM

## 2021-04-14 DIAGNOSIS — E11.22 CKD STAGE 2 DUE TO TYPE 2 DIABETES MELLITUS (HCC): ICD-10-CM

## 2021-04-14 DIAGNOSIS — I73.9 PAD (PERIPHERAL ARTERY DISEASE) (HCC): ICD-10-CM

## 2021-04-14 DIAGNOSIS — E66.01 CLASS 2 SEVERE OBESITY DUE TO EXCESS CALORIES WITH SERIOUS COMORBIDITY AND BODY MASS INDEX (BMI) OF 36.0 TO 36.9 IN ADULT (HCC): Primary | ICD-10-CM

## 2021-04-14 DIAGNOSIS — N18.2 CKD STAGE 2 DUE TO TYPE 2 DIABETES MELLITUS (HCC): ICD-10-CM

## 2021-04-14 PROCEDURE — RECHECK

## 2021-04-14 PROCEDURE — RECHECK: Performed by: DIETITIAN, REGISTERED

## 2021-04-14 PROCEDURE — 3008F BODY MASS INDEX DOCD: CPT | Performed by: INTERNAL MEDICINE

## 2021-04-14 NOTE — PROGRESS NOTES
Bariatric Nutrition Assessment Note    Type of surgery    Preop  Surgery Date: TBD- patient has 6 months of weight check   Surgeon: Dr Claudean Jubilee  Today is 1/6 of her program requirement     Nutrition Assessment   Harmony Lyon  54 y o   adult     Wt with BMI of 25:127 3  Pre-Op Excess Wt: 67 1#     Ht 5' (1 524 m)   LMP  (LMP Unknown)   BMI 37 72 kg/m²    Patient has maintained her weight within 2# since starting the program     Wt Readings from Last 3 Encounters:   03/31/21 87 6 kg (193 lb 2 oz)   02/16/21 85 6 kg (188 lb 12 8 oz)   02/10/21 88 2 kg (194 lb 6 4 oz)       [place] Equation:  1462  Estimated calories for weight loss 7917-2682  ( 1/2# to 1#  per wk wt loss - sedentary )  Estimated protein needs 46-58 grams (  8-1 0 grams /kg IBW with CKD 2)   Estimated fluid needs 2025ml  (35 ml/kg IBW )  68 ounces per day     Weight History   Onset of Obesity: Adult after pregnancies 33 years ago, 32 & 24   Family history of obesity: Yes  Wt Loss Attempts: Exercise  Self Created Diets (Portion Control, Healthy Food Choices, etc )  Gym - treadmill, used to ride her bike to work OTC  Patient has tried the above for 6 months or more with insufficient weight loss or weight regain, which is why patient has requested to be evaluated for weight loss surgery today  Maximum Wt Lost: lost about 25 pounds but then regained the weight back due to emotional eating       Review of History and Medications   Past Medical History:   Diagnosis Date    Cellulitis of toe 1/28/2019    CKD (chronic kidney disease)     Diabetes mellitus ([address] )     Gangrene of toe of right foot ([address] ) 10/30/2019    Added automatically from request for surgery [id number]    Hypertension      Past Surgical History:   Procedure Laterality Date    IR ABDOMINAL AORTAGRAM  2/1/2019    IR AORTAGRAM WITH RUN-OFF  5/5/2020    IR ARTERIAL LYSIS  4/30/2019    IR LOWER EXTREMITY / INTERVENTION  11/1/2019    WV AMPUTATION FOOT,TRANSMETATARSAL Right 2019    Procedure: AMPUTATION TRANSMETATARSAL (TMA) WITH ACHILLES TENDON LENGTHING;  Surgeon: Lugenia Gilford, DPM;  [facility];  Service: Podiatry    [address] [place] [address] Right 2020    Procedure: AMPUTATION TRANSMETATARSAL (TMA);  Surgeon: Andrés Arguelles DPM;  [facility];  Service: Podiatry    TOE AMPUTATION Left 2019    Procedure: AMPUTATION 5th TOE;  Surgeon: Lugenia Gilford, DPM;  [facility];  Service: Podiatry    TOE AMPUTATION Right 2019    Procedure: AMPUTATION RIGHT 3RD AND 4TH TOE;  Surgeon: Lugenia Gilford, DPM;  [facility];  Service: Podiatry     Social History     Socioeconomic History    Marital status: Single     Spouse name: Not on file    Number of children: Not on file    Years of education: Not on file    Highest education level: Not on file   Occupational History    Not on file   Social Needs    Financial resource strain: Not on file    Food insecurity     Worry: Not on file     Inability: Not on file    Transportation needs     Medical: No     Non-medical: No   Tobacco Use    Smoking status: Former Smoker     Types: Cigarettes     Quit date:      Years since quittin 2    Smokeless tobacco: Never Used   Substance and Sexual Activity    Alcohol use: Not Currently    Drug use: No    Sexual activity: Not on file   Lifestyle    Physical activity     Days per week: Not on file     Minutes per session: Not on file    Stress: Not on file   Relationships    Social connections     Talks on phone: Not on file     Gets together: Not on file     Attends Jain service: Not on file     Active member of club or organization: Not on file     Attends meetings of clubs or organizations: Not on file     Relationship status: Not on file    Intimate partner violence     Fear of current or ex partner: Not on file     Emotionally abused: Not on file     Physically abused: Not on file     Forced sexual activity: Not on file   Other Topics Concern    Not on file   Social History Narrative    Not on file       Current Outpatient Medications:     aspirin 81 mg chewable tablet, Chew 1 tablet (81 mg total) daily, Disp: , Rfl:     atorvastatin (LIPITOR) 20 mg tablet, Take 1 tablet (20 mg total) by mouth daily, Disp: 90 tablet, Rfl: 1    cetirizine (ZyrTEC) 10 mg tablet, Take 1 tablet (10 mg total) by mouth daily (Patient taking differently: Take 10 mg by mouth daily as needed ), Disp: 90 tablet, Rfl: 1    clopidogrel (PLAVIX) 75 mg tablet, Take 1 tablet (75 mg total) by mouth daily, Disp: 90 tablet, Rfl: 1    Dulaglutide 1 5 MG/0 5ML SOPN, Inject 0 5 mL (1 5 mg total) under the skin once a week, Disp: 4 pen, Rfl: 4    ergocalciferol (ERGOCALCIFEROL) 1 25 MG (26877 UT) capsule, Take 1 capsule (50,000 Units total) by mouth once a week, Disp: 12 capsule, Rfl: 1    ferrous sulfate 324 (65 Fe) mg, Take 1 tablet (324 mg total) by mouth 2 (two) times a day before meals, Disp: 180 tablet, Rfl: 3    fluticasone (FLONASE) 50 mcg/act nasal spray, 1 spray into each nostril daily, Disp: 3 Bottle, Rfl: 1    glucose blood (ONE TOUCH ULTRA TEST) test strip, Use to test 3x daily, Disp: 300 each, Rfl: 1    insulin glargine (Lantus SoloStar) 100 units/mL injection pen, Use 20units subcut once daily at 9pm, Disp: 20 mL, Rfl: 1    Insulin Pen Needle 32G X 4 MM MISC, Use 4 times daily with insulin pens, Disp: 120 each, Rfl: 5    lisinopril (ZESTRIL) 5 mg tablet, Take 1 tablet (5 mg total) by mouth daily, Disp: 90 tablet, Rfl: 3    metFORMIN (GLUCOPHAGE) 1000 MG tablet, Take 1 tablet (1,000 mg total) by mouth 2 (two) times a day with meals, Disp: 180 tablet, Rfl: 3    metoprolol tartrate (LOPRESSOR) 25 mg tablet, Take 1 tablet (25 mg total) by mouth every 12 (twelve) hours, Disp: 180 tablet, Rfl: 1    OneTouch Delica Lancets 95M MISC, Use to test 3x daily, Disp: 300 each, Rfl: 2  Food Intake and Lifestyle Assessment Food Intake Assessment completed via usual diet recall- continues to be the same   Breakfast: 9 am - black coffee with toast( 1 )  and a boiled egg   Snack: 0   Lunch: 12:30 - sandwich - ham and cheese ( on toast ) sugar free fruit cup   Snack: 0  Dinner: 6 pm : Vegetables, sometimes chicken or fish - sometimes will eat starch, rice or pasta but not every night   Snack: vanilla pudding or fruit   Beverage intake: water and diet soda( gingergale gianna )   Protein supplement: none   Estimated protein intake per day: 50-60 grams ( she was told to limit with CKD 2)  Estimated fluid intake per day: 2 bottles of water per day and one can of soda per day   Meals eaten away from home: twice a month   Typical meal pattern: 3 meals per day and 0-1 snacks per day  Eating Behaviors: Mindless eating, Emotional eating, Craves sweet foods and Craves salty foods  Food allergies or intolerances: N/A  Allergies   Allergen Reactions    Levemir [Insulin Detemir] Itching     Cultural or Yazidi considerations:      Physical Assessment    Lab Results   Component Value Date    HGBA1C 7 6 (A) 01/05/2021     Physical Activity  Types of exercise: Walking  Walking in the home , leg exercises   Current physical limitations: amputation of toes     Psychosocial Assessment   Support systems: children- adult daughter   Socioeconomic factors: lives with daughter     Nutrition Diagnosis( continued )   Diagnosis: Overweight / Obesity (NC-3 3)  Related to: Physical inactivity and Excessive energy intake  As Evidenced by: BMI >25     Nutrition Prescription: Recommend the following diet  Low fat, Low sugar and 60 grams protein ( CKD) and consistent carb     Interventions and Teaching   Discussed pre-op and post-op nutrition guidelines  Patient educated and handouts provided    Surgical changes to stomach / GI  Capacity of post-surgery stomach  Diet progression  Adequate hydration  Sugar and fat restriction to decrease "dumping syndrome"  Fat restriction to decrease steatorrhea  Expected weight loss  Weight loss plateaus/ possibility of weight regain  Exercise  Suggestions for pre-op diet  Nutrition considerations after surgery  Protein supplements  Meal planning and preparation  Appropriate carbohydrate, protein, and fat intake, and food/fluid choices to maximize safe weight loss, nutrient intake, and tolerance   Dietary and lifestyle changes  Possible problems with poor eating habits  Intuitive eating  Techniques for self monitoring and keeping daily food journal  Potential for food intolerance after surgery, and ways to deal with them including: lactose intolerance, nausea, reflux, vomiting, diarrhea, food intolerance, appetite changes, gas  Vitamin / Mineral supplementation of Multivitamin with minerals, Calcium, Vitamin B12, Iron, Fat Soluble vitamins and Vitamin D  Patient is not currently pregnant and doesn't desire to become pregnant a minimum of one year post-op    Provided with samples of Premier protein     Education provided to: patient- daughter present and supportive  Barriers to learning: No barriers identified    Readiness to change: preparation    Prior research on procedure: discussed with provider and friends or family    Comprehension: needs reinforcement and verbalizes understanding     Expected Compliance: good  Workflow:   PCP Letter: 5/17/2021   Support Group: provided with printed copy of quiz   6 Month Pre-Operative Program: 1/6   Bloodwork: lab slip provided   Cardiac Risk Assessment: referral placed    Sleep Studies: has cpap machine , will f/u    EGD needs surgeon appointment - next month    Weight Loss: ongoing    Psych/D+A/Nicotine? N/a     Recommendations  Pt is an appropriate candidate for surgery   Yes    Evaluation / Monitoring  Dietitian to Monitor: Eating pattern as discussed Tolerance of nutrition prescription Body weight Lab values Physical activity Bowel pattern  Patient has been drinking 4 16 ounce bottles of water per day   She is eating off a smaller plate to decrease her portion sizes  She forgot to keep her food log and and forgot her book    Completed lesson plan one Has not been consistent with movement, limited with only walking indoors     Goals  Food journal, Complete lession plans 1-6, Eat 3 meals per day and and one planned snack   Food journal either on baritas[facility] or on books provided  Weekly vitamin D2 as prescribed   2000 IU of vitamin D3 per day for maintenance   Complete lesson plans 2 & 3  prior to next dietitian appointment   Try Elida Galvan or Get fit with Sarah Cornell walking indoors     Time Spent:   30 Minutes

## 2021-04-14 NOTE — PROGRESS NOTES
1/6 Weight Check:       Improved  water intake to 4 bottles of 16 oz    watching portion sizes  Just obtained a paper journal and will start tracking her food  Is in todays appointment with , her daughter, Ruben Horowitz     Movement is reported to be improved: using resistance bands and 3#dumb bells for arms  Eating three meals a day  Today had an Ensure shake for MR / breakfast    Missed the appointment with the surgeon last month   fell asleep and missed it by accident; said it was her fault    suggested to work on  some behavioral changes:  slowing down while eating, chewing more, and eating in order: plating  she is still drinking diet ginger ale  Discussed carbonated beverages post surgery  Daughter continues to be supportive

## 2021-04-22 ENCOUNTER — APPOINTMENT (OUTPATIENT)
Dept: LAB | Facility: HOSPITAL | Age: 56
End: 2021-04-22
Attending: INTERNAL MEDICINE
Payer: COMMERCIAL

## 2021-04-22 DIAGNOSIS — I10 ESSENTIAL HYPERTENSION: ICD-10-CM

## 2021-04-22 DIAGNOSIS — N18.2 CKD STAGE 2 DUE TO TYPE 2 DIABETES MELLITUS (HCC): ICD-10-CM

## 2021-04-22 DIAGNOSIS — Z11.4 SCREENING FOR HIV (HUMAN IMMUNODEFICIENCY VIRUS): ICD-10-CM

## 2021-04-22 DIAGNOSIS — Z01.818 PRE-OPERATIVE CLEARANCE: ICD-10-CM

## 2021-04-22 DIAGNOSIS — Z79.4 TYPE 2 DIABETES MELLITUS WITH OTHER CIRCULATORY COMPLICATION, WITH LONG-TERM CURRENT USE OF INSULIN (HCC): ICD-10-CM

## 2021-04-22 DIAGNOSIS — E11.59 TYPE 2 DIABETES MELLITUS WITH OTHER CIRCULATORY COMPLICATION, WITH LONG-TERM CURRENT USE OF INSULIN (HCC): ICD-10-CM

## 2021-04-22 DIAGNOSIS — I73.9 PAD (PERIPHERAL ARTERY DISEASE) (HCC): ICD-10-CM

## 2021-04-22 DIAGNOSIS — E11.22 CKD STAGE 2 DUE TO TYPE 2 DIABETES MELLITUS (HCC): ICD-10-CM

## 2021-04-22 DIAGNOSIS — Z11.59 ENCOUNTER FOR HEPATITIS C SCREENING TEST FOR LOW RISK PATIENT: ICD-10-CM

## 2021-04-22 DIAGNOSIS — E66.01 CLASS 2 SEVERE OBESITY DUE TO EXCESS CALORIES WITH SERIOUS COMORBIDITY AND BODY MASS INDEX (BMI) OF 36.0 TO 36.9 IN ADULT (HCC): ICD-10-CM

## 2021-04-22 DIAGNOSIS — R80.1 PERSISTENT PROTEINURIA: ICD-10-CM

## 2021-04-22 DIAGNOSIS — Z79.4 CURRENT USE OF INSULIN (HCC): ICD-10-CM

## 2021-04-22 DIAGNOSIS — E55.9 VITAMIN D DEFICIENCY: ICD-10-CM

## 2021-04-22 DIAGNOSIS — G47.33 OBSTRUCTIVE SLEEP APNEA: ICD-10-CM

## 2021-04-22 LAB
ALBUMIN SERPL BCP-MCNC: 3.6 G/DL (ref 3.5–5)
ALP SERPL-CCNC: 90 U/L (ref 46–116)
ALT SERPL W P-5'-P-CCNC: 23 U/L (ref 12–78)
ANION GAP SERPL CALCULATED.3IONS-SCNC: 15 MMOL/L (ref 4–13)
AST SERPL W P-5'-P-CCNC: 19 U/L (ref 5–45)
BILIRUB SERPL-MCNC: 0.18 MG/DL (ref 0.2–1)
BUN SERPL-MCNC: 37 MG/DL (ref 5–25)
CALCIUM SERPL-MCNC: 9.1 MG/DL (ref 8.3–10.1)
CHLORIDE SERPL-SCNC: 105 MMOL/L (ref 100–108)
CHOLEST SERPL-MCNC: 114 MG/DL (ref 50–200)
CO2 SERPL-SCNC: 22 MMOL/L (ref 21–32)
CREAT SERPL-MCNC: 1.26 MG/DL (ref 0.6–1.3)
CREAT UR-MCNC: 90.7 MG/DL
ERYTHROCYTE [DISTWIDTH] IN BLOOD BY AUTOMATED COUNT: 15.6 % (ref 11.6–15.1)
EST. AVERAGE GLUCOSE BLD GHB EST-MCNC: 160 MG/DL
GLUCOSE P FAST SERPL-MCNC: 91 MG/DL (ref 65–99)
HBA1C MFR BLD: 7.2 %
HCT VFR BLD AUTO: 39.2 % (ref 36.5–46.1)
HCV AB SER QL: NORMAL
HDLC SERPL-MCNC: 41 MG/DL
HGB BLD-MCNC: 11.5 G/DL (ref 12–15.4)
LDLC SERPL CALC-MCNC: 48 MG/DL (ref 0–100)
MCH RBC QN AUTO: 22.5 PG (ref 26.8–34.3)
MCHC RBC AUTO-ENTMCNC: 29.3 G/DL (ref 31.4–37.4)
MCV RBC AUTO: 77 FL (ref 82–98)
MICROALBUMIN UR-MCNC: 13.3 MG/L (ref 0–20)
MICROALBUMIN/CREAT 24H UR: 15 MG/G CREATININE (ref 0–30)
NONHDLC SERPL-MCNC: 73 MG/DL
PLATELET # BLD AUTO: 245 THOUSANDS/UL (ref 149–390)
PMV BLD AUTO: 11.5 FL (ref 8.9–12.7)
POTASSIUM SERPL-SCNC: 5 MMOL/L (ref 3.5–5.3)
PROT SERPL-MCNC: 6.9 G/DL (ref 6.4–8.2)
RBC # BLD AUTO: 5.12 MILLION/UL (ref 3.88–5.12)
SODIUM SERPL-SCNC: 142 MMOL/L (ref 136–145)
TRIGL SERPL-MCNC: 127 MG/DL
TSH SERPL DL<=0.05 MIU/L-ACNC: 3.01 UIU/ML (ref 0.36–3.74)
WBC # BLD AUTO: 9.59 THOUSAND/UL (ref 4.31–10.16)

## 2021-04-22 PROCEDURE — 82043 UR ALBUMIN QUANTITATIVE: CPT

## 2021-04-22 PROCEDURE — 83036 HEMOGLOBIN GLYCOSYLATED A1C: CPT

## 2021-04-22 PROCEDURE — 87389 HIV-1 AG W/HIV-1&-2 AB AG IA: CPT

## 2021-04-22 PROCEDURE — 3061F NEG MICROALBUMINURIA REV: CPT | Performed by: INTERNAL MEDICINE

## 2021-04-22 PROCEDURE — 36415 COLL VENOUS BLD VENIPUNCTURE: CPT

## 2021-04-22 PROCEDURE — 85027 COMPLETE CBC AUTOMATED: CPT

## 2021-04-22 PROCEDURE — 80061 LIPID PANEL: CPT

## 2021-04-22 PROCEDURE — 3051F HG A1C>EQUAL 7.0%<8.0%: CPT | Performed by: INTERNAL MEDICINE

## 2021-04-22 PROCEDURE — 82570 ASSAY OF URINE CREATININE: CPT

## 2021-04-22 PROCEDURE — 80053 COMPREHEN METABOLIC PANEL: CPT

## 2021-04-22 PROCEDURE — 84443 ASSAY THYROID STIM HORMONE: CPT

## 2021-04-22 PROCEDURE — 86803 HEPATITIS C AB TEST: CPT

## 2021-04-23 LAB — HIV 1+2 AB+HIV1 P24 AG SERPL QL IA: NORMAL

## 2021-04-28 ENCOUNTER — IMMUNIZATIONS (OUTPATIENT)
Dept: FAMILY MEDICINE CLINIC | Facility: HOSPITAL | Age: 56
End: 2021-04-28

## 2021-04-28 DIAGNOSIS — Z23 ENCOUNTER FOR IMMUNIZATION: Primary | ICD-10-CM

## 2021-04-28 PROCEDURE — 91301 SARS-COV-2 / COVID-19 MRNA VACCINE (MODERNA) 100 MCG: CPT

## 2021-04-28 PROCEDURE — 0011A SARS-COV-2 / COVID-19 MRNA VACCINE (MODERNA) 100 MCG: CPT

## 2021-05-11 DIAGNOSIS — Z79.4 TYPE 2 DIABETES MELLITUS WITH OTHER CIRCULATORY COMPLICATION, WITH LONG-TERM CURRENT USE OF INSULIN (HCC): ICD-10-CM

## 2021-05-11 DIAGNOSIS — Z79.4 CURRENT USE OF INSULIN (HCC): ICD-10-CM

## 2021-05-11 DIAGNOSIS — E11.59 TYPE 2 DIABETES MELLITUS WITH OTHER CIRCULATORY COMPLICATION, WITH LONG-TERM CURRENT USE OF INSULIN (HCC): ICD-10-CM

## 2021-05-11 RX ORDER — INSULIN GLARGINE 100 [IU]/ML
INJECTION, SOLUTION SUBCUTANEOUS
Qty: 20 ML | Refills: 0 | Status: SHIPPED | OUTPATIENT
Start: 2021-05-11 | End: 2021-07-22 | Stop reason: SDUPTHER

## 2021-05-13 ENCOUNTER — CONSULT (OUTPATIENT)
Dept: BARIATRICS | Facility: CLINIC | Age: 56
End: 2021-05-13
Payer: COMMERCIAL

## 2021-05-13 VITALS
BODY MASS INDEX: 37.11 KG/M2 | HEIGHT: 60 IN | SYSTOLIC BLOOD PRESSURE: 132 MMHG | HEART RATE: 91 BPM | RESPIRATION RATE: 20 BRPM | TEMPERATURE: 97 F | WEIGHT: 189 LBS | DIASTOLIC BLOOD PRESSURE: 86 MMHG

## 2021-05-13 DIAGNOSIS — I70.229 CRITICAL LOWER LIMB ISCHEMIA (HCC): ICD-10-CM

## 2021-05-13 DIAGNOSIS — E66.9 OBESITY, CLASS II, BMI 35-39.9: Primary | ICD-10-CM

## 2021-05-13 DIAGNOSIS — E78.5 HYPERLIPIDEMIA, UNSPECIFIED HYPERLIPIDEMIA TYPE: ICD-10-CM

## 2021-05-13 DIAGNOSIS — Z79.4 TYPE 2 DIABETES MELLITUS WITHOUT COMPLICATION, WITH LONG-TERM CURRENT USE OF INSULIN (HCC): ICD-10-CM

## 2021-05-13 DIAGNOSIS — G47.33 OBSTRUCTIVE SLEEP APNEA: ICD-10-CM

## 2021-05-13 DIAGNOSIS — I73.9 PAD (PERIPHERAL ARTERY DISEASE) (HCC): ICD-10-CM

## 2021-05-13 DIAGNOSIS — E11.9 TYPE 2 DIABETES MELLITUS WITHOUT COMPLICATION, WITH LONG-TERM CURRENT USE OF INSULIN (HCC): ICD-10-CM

## 2021-05-13 DIAGNOSIS — L97.414 DIABETIC ULCER OF RIGHT MIDFOOT ASSOCIATED WITH TYPE 2 DIABETES MELLITUS, WITH NECROSIS OF BONE (HCC): ICD-10-CM

## 2021-05-13 DIAGNOSIS — E11.610 CHARCOT FOOT DUE TO DIABETES MELLITUS (HCC): ICD-10-CM

## 2021-05-13 DIAGNOSIS — I10 ESSENTIAL HYPERTENSION: ICD-10-CM

## 2021-05-13 DIAGNOSIS — E11.22 CKD STAGE 2 DUE TO TYPE 2 DIABETES MELLITUS (HCC): ICD-10-CM

## 2021-05-13 DIAGNOSIS — E11.621 DIABETIC ULCER OF RIGHT MIDFOOT ASSOCIATED WITH TYPE 2 DIABETES MELLITUS, WITH NECROSIS OF BONE (HCC): ICD-10-CM

## 2021-05-13 DIAGNOSIS — N18.2 CKD STAGE 2 DUE TO TYPE 2 DIABETES MELLITUS (HCC): ICD-10-CM

## 2021-05-13 PROCEDURE — 3066F NEPHROPATHY DOC TX: CPT | Performed by: PHYSICIAN ASSISTANT

## 2021-05-13 PROCEDURE — 3066F NEPHROPATHY DOC TX: CPT | Performed by: FAMILY MEDICINE

## 2021-05-13 PROCEDURE — 99214 OFFICE O/P EST MOD 30 MIN: CPT | Performed by: SURGERY

## 2021-05-13 NOTE — PROGRESS NOTES
BARIATRIC INITIAL CONSULT - [address] [place] 54 y o  adult MRN: [id number]  Unit/Bed#:  Encounter: [id number]      HPI:  Nayely Hand is a 54 y o  adult who presents with a longstanding history of morbid obesity and inability to sustain a meaningful weight loss  Here today to discuss bariatric options  Visit type: initial visit    Symptoms: excess weight and inability to loss weight    Associated Symptoms: depressed mood and anxiety    Associated Conditions: hyperlipidemia, sleep apnea, abdominal obesity and hypergycemia  Disease Complications: diabetes, hypertension, sleep apnea and peripheral vascular disease  Weight Loss Interest: high  Previous Diet Trials: low calorie     Exercise Frequency:infrequency  Types of Exercise: walking        Review of Systems   All other systems reviewed and are negative        Historical Information   Past Medical History:   Diagnosis Date    Cellulitis of toe 1/28/2019    CKD (chronic kidney disease)     [facility] [address] [facility]     Gangrene of toe of right foot ([facility] [address] ) 10/30/2019    Added automatically from request for surgery [id number]    Hypertension      Past Surgical History:   Procedure Laterality Date    IR ABDOMINAL AORTAGRAM  2/1/2019    IR AORTAGRAM WITH RUN-OFF  5/5/2020    IR ARTERIAL LYSIS  4/30/2019    IR LOWER EXTREMITY / INTERVENTION  11/1/2019    TN AMPUTATION FOOT,TRANSMETATARSAL Right 11/7/2019    Procedure: AMPUTATION TRANSMETATARSAL (TMA) WITH ACHILLES TENDON LENGTHING;  Surgeon: Evelio Orta DPM;  [facility];  Service: Podiatry    TN AMPUTATION [facility] Right 5/7/2020    Procedure: AMPUTATION TRANSMETATARSAL (TMA);  Surgeon: Arley Chávez DPM;  [facility];  Service: Podiatry    TOE AMPUTATION Left 2/4/2019    Procedure: AMPUTATION 5th TOE;  Surgeon: Evelio Orta DPM;  [facility];  Service: Podiatry    TOE AMPUTATION Right 11/4/2019    Procedure: AMPUTATION RIGHT 3RD AND 4TH TOE;  Surgeon: Daria Ríos DPM;  Location: [facility];  Service: Podiatry     Social History   Social History     Substance and Sexual Activity   Alcohol Use Not Currently     Social History     Substance and Sexual Activity   Drug Use No     Social History     Tobacco Use   Smoking Status Former Smoker    Types: Cigarettes    Quit date:     Years since quittin 3   Smokeless Tobacco Never Used     Family History: non-contributory    Meds/Allergies   all medications and allergies reviewed  Allergies   Allergen Reactions    Levemir [Insulin Detemir] Itching       Objective       Current Vitals:   Blood Pressure: 132/86 (21 1039)  Pulse: 91 (21 103)  Temperature: (!) 97 °F (36 1 °C) (21)  Temp Source: Tympanic (21)  Respirations: 20 (21)  Height: 5' (152 4 cm) (21)  Weight - Scale: 85 7 kg (189 lb) (21)        Invasive Devices     None                 Physical Exam  Vitals signs reviewed  Constitutional:       General: Taran Darby is not in acute distress  Appearance: Taran Darby is well-developed  Taran Darby is not diaphoretic  HENT:      Head: Normocephalic and atraumatic  Right Ear: External ear normal       Left Ear: External ear normal       Nose: Nose normal    Eyes:      General: No scleral icterus  Right eye: No discharge  Left eye: No discharge  Conjunctiva/sclera: Conjunctivae normal    Neurological:      Mental Status: Taran Darby is alert and oriented to person, place, and time  Psychiatric:         Behavior: Behavior normal          Thought Content: Thought content normal          Judgment: Judgment normal          Lab Results: I have personally reviewed pertinent lab results  Imaging: I have personally reviewed pertinent reports      EKG, Pathology, and Other Studies: I have personally reviewed pertinent reports  Assessment/PLAN:    54 y o  female with a long standing h/o of obesity and inability to sustain any meaningful weight loss on her own despite several attempts  She is interested in the Laparoscopic  Francesco-en-Y gastric bypass possible sleeve gastrectomy  Given her multiple comorbid conditions and her relatively low BMI, I think she is a better candidate for the sleeve gastrectomy  As a part of her pre op evaluation, she will be referred to a cardiologist   We need recommendations from her nephrology doctor as well  She has ABHINAV and wears a CPAP machine  She needs an EGD to evaluate the anatomy of her GI tract prior to the operation  I have spent over 45 minutes with her face to face in the office today discussing her options and details of the surgery  We have seen an animation of the surgery on the computer that illustrates how the operation is done and how the anatomy will be altered with the procedure  Over 50% of this was coordinating care  I have used the [facility] bariatric surgical risk/benefit calculator and we have discussed the results as part of the preop education  She was given the opportunity to ask questions and I have answered all of them  I have discussed and educated the patient with regards to the components of our multidisciplinary program and the importance of compliance and follow up in the post operative period  The patient was also instructed with regards to the importance of behavior modification, nutritional counseling, support meeting attendance and lifestyle changes that are important to ensure success  Although there is a great statistical chance of improvement or even resolution of most of her associated comorbidities, the results vary from patient to patient and they largely depend on her commitment and compliance  She needs to lose  5  lbs prior to the operation          Brian Theodore MD  5/13/2021  10:48 AM

## 2021-05-14 ENCOUNTER — PREP FOR PROCEDURE (OUTPATIENT)
Dept: BARIATRICS | Facility: CLINIC | Age: 56
End: 2021-05-14

## 2021-05-14 DIAGNOSIS — E66.9 OBESITY, CLASS II, BMI 35-39.9: Primary | ICD-10-CM

## 2021-05-18 ENCOUNTER — OFFICE VISIT (OUTPATIENT)
Dept: FAMILY MEDICINE CLINIC | Facility: CLINIC | Age: 56
End: 2021-05-18

## 2021-05-18 VITALS
RESPIRATION RATE: 18 BRPM | DIASTOLIC BLOOD PRESSURE: 72 MMHG | TEMPERATURE: 98 F | WEIGHT: 186.6 LBS | OXYGEN SATURATION: 99 % | HEIGHT: 60 IN | BODY MASS INDEX: 36.63 KG/M2 | HEART RATE: 72 BPM | SYSTOLIC BLOOD PRESSURE: 120 MMHG

## 2021-05-18 DIAGNOSIS — E11.9 TYPE 2 DIABETES MELLITUS WITHOUT COMPLICATION, WITH LONG-TERM CURRENT USE OF INSULIN (HCC): ICD-10-CM

## 2021-05-18 DIAGNOSIS — I10 ESSENTIAL HYPERTENSION: ICD-10-CM

## 2021-05-18 DIAGNOSIS — Z12.31 ENCOUNTER FOR SCREENING MAMMOGRAM FOR MALIGNANT NEOPLASM OF BREAST: Primary | ICD-10-CM

## 2021-05-18 DIAGNOSIS — Z00.00 ANNUAL PHYSICAL EXAM: ICD-10-CM

## 2021-05-18 DIAGNOSIS — Z79.4 TYPE 2 DIABETES MELLITUS WITHOUT COMPLICATION, WITH LONG-TERM CURRENT USE OF INSULIN (HCC): ICD-10-CM

## 2021-05-18 PROCEDURE — 99396 PREV VISIT EST AGE 40-64: CPT | Performed by: FAMILY MEDICINE

## 2021-05-18 PROCEDURE — 1036F TOBACCO NON-USER: CPT | Performed by: FAMILY MEDICINE

## 2021-05-18 RX ORDER — ATORVASTATIN CALCIUM 20 MG/1
20 TABLET, FILM COATED ORAL DAILY
Qty: 90 TABLET | Refills: 1 | Status: SHIPPED | OUTPATIENT
Start: 2021-05-18 | End: 2021-09-21 | Stop reason: SDUPTHER

## 2021-05-18 NOTE — PROGRESS NOTES
[address] [facility]    NAME: Christopher Lyon  AGE: 54 y o  SEX: adult  : 1965     DATE: 2021     Assessment and Plan:     Problem List Items Addressed This Visit        Endocrine    Type 2 diabetes mellitus without complication (HCC)    Relevant Medications    atorvastatin (LIPITOR) 20 mg tablet       Cardiovascular and Mediastinum    Essential hypertension    Relevant Medications    atorvastatin (LIPITOR) 20 mg tablet    metoprolol tartrate (LOPRESSOR) 25 mg tablet      Other Visit Diagnoses     Encounter for screening mammogram for malignant neoplasm of breast    -  Primary    Relevant Orders    Mammo screening bilateral w cad          Immunizations and preventive care screenings were discussed with patient today  Appropriate education was printed on patient's after visit summary  Counseling:  · Dental Health: discussed importance of regular tooth brushing, flossing, and dental visits  No follow-ups on file  Chief Complaint:     Chief Complaint   Patient presents with    Physical Exam     55 y/o       History of Present Illness:     Adult Annual Physical   Patient here for a comprehensive physical exam  The patient reports no problems  Diet and Physical Activity  · Diet/Nutrition: diabetic diet  · Exercise: no formal exercise  Depression Screening  PHQ-9 Depression Screening    PHQ-9:   Frequency of the following problems over the past two weeks:           General Health  · Sleep: sleeps well and gets 7-8 hours of sleep on average  · Hearing: normal - bilateral   · Vision: most recent eye exam >1 year ago and wears glasses  · Dental: no dental visits for >1 year  /GYN Health  · Patient is: postmenopausal  · Last menstrual period:   · Contraceptive method: postmenopasue       Review of Systems:     Review of Systems   Past Medical History:     Past Medical History: Diagnosis Date    Cellulitis of toe 2019    CKD (chronic kidney disease)     Diabetes mellitus ([facility] [address]     Gangrene of toe of right foot ([facility] [address] ) 10/30/2019    Added automatically from request for surgery [id number]    Hypertension       Past Surgical History:     Past Surgical History:   Procedure Laterality Date    IR ABDOMINAL AORTAGRAM  2019    IR AORTAGRAM WITH RUN-OFF  2020    IR ARTERIAL LYSIS  2019    IR LOWER EXTREMITY / INTERVENTION  2019    SD AMPUTATION FOOT,TRANSMETATARSAL Right 2019    Procedure: AMPUTATION TRANSMETATARSAL (TMA) WITH ACHILLES TENDON LENGTHING;  Surgeon: Wai Mendez DPM;  [facility];  Service: Podiatry    [address] [place] [address] Right 2020    Procedure: AMPUTATION TRANSMETATARSAL (TMA);  Surgeon: Sangeeta Bustillo DPM;  [facility];  Service: Podiatry    TOE AMPUTATION Left 2019    Procedure: AMPUTATION 5th TOE;  Surgeon: Wai Mendez DPM;  [facility];  Service: Podiatry    TOE AMPUTATION Right 2019    Procedure: AMPUTATION RIGHT 3RD AND 4TH TOE;  Surgeon: Wai Mendez DPM;  [facility];  Service: Podiatry      Social History:     E-Cigarette/Vaping    E-Cigarette Use Never User      E-Cigarette/Vaping Substances    Nicotine No     THC No     CBD No     Flavoring No     Other No     Unknown No      Social History     Socioeconomic History    Marital status: Single     Spouse name: None    Number of children: None    Years of education: None    Highest education level: None   Occupational History    None   Social Needs    Financial resource strain: None    Food insecurity     Worry: None     Inability: None    Transportation needs     Medical: No     Non-medical: No   Tobacco Use    Smoking status: Former Smoker     Types: Cigarettes     Quit date:      Years since quittin 3    Smokeless tobacco: Never Used   Substance and Sexual Activity    Alcohol use: Not Currently    Drug use: No    Sexual activity: None   Lifestyle    Physical activity     Days per week: None     Minutes per session: None    Stress: None   Relationships    Social connections     Talks on phone: None     Gets together: None     Attends Protestant service: None     Active member of club or organization: None     Attends meetings of clubs or organizations: None     Relationship status: None    Intimate partner violence     Fear of current or ex partner: None     Emotionally abused: None     Physically abused: None     Forced sexual activity: None   Other Topics Concern    None   Social History Narrative    None      Family History:     Family History   Problem Relation Age of Onset    Diabetes Mother     No Known Problems Father       Current Medications:     Current Outpatient Medications   Medication Sig Dispense Refill    aspirin 81 mg chewable tablet Chew 1 tablet (81 mg total) daily      atorvastatin (LIPITOR) 20 mg tablet Take 1 tablet (20 mg total) by mouth daily 90 tablet 1    cetirizine (ZyrTEC) 10 mg tablet Take 1 tablet (10 mg total) by mouth daily (Patient taking differently: Take 10 mg by mouth daily as needed ) 90 tablet 1    clopidogrel (PLAVIX) 75 mg tablet Take 1 tablet (75 mg total) by mouth daily 90 tablet 1    Dulaglutide 1 5 MG/0 5ML SOPN Inject 0 5 mL (1 5 mg total) under the skin once a week 4 pen 4    ergocalciferol (ERGOCALCIFEROL) 1 25 MG (64057 UT) capsule Take 1 capsule (50,000 Units total) by mouth once a week 12 capsule 1    ferrous sulfate 324 (65 Fe) mg Take 1 tablet (324 mg total) by mouth 2 (two) times a day before meals 180 tablet 3    fluticasone (FLONASE) 50 mcg/act nasal spray 1 spray into each nostril daily 3 Bottle 1    insulin glargine (Lantus SoloStar) 100 units/mL injection pen Use 20units subcut once daily at 9pm 20 mL 0    lisinopril (ZESTRIL) 5 mg tablet Take 1 tablet (5 mg total) by mouth daily 90 tablet 3    metFORMIN (GLUCOPHAGE) 1000 MG tablet Take 1 tablet (1,000 mg total) by mouth 2 (two) times a day with meals 180 tablet 3    metoprolol tartrate (LOPRESSOR) 25 mg tablet Take 1 tablet (25 mg total) by mouth every 12 (twelve) hours 180 tablet 1    glucose blood (ONE TOUCH ULTRA TEST) test strip Use to test 3x daily 300 each 1    Insulin Pen Needle 32G X 4 MM MISC Use 4 times daily with insulin pens 120 each 5    OneTouch Delica Lancets 86F MISC Use to test 3x daily 300 each 2     No current facility-administered medications for this visit  Allergies: Allergies   Allergen Reactions    Levemir [Insulin Detemir] Itching      Physical Exam:     /72 (BP Location: Right arm, Patient Position: Sitting, Cuff Size: Adult)   Pulse 72   Temp 98 °F (36 7 °C) (Temporal)   Resp 18   Ht 5' (1 524 m)   Wt 84 6 kg (186 lb 9 6 oz)   LMP  (LMP Unknown)   SpO2 99%   BMI 36 44 kg/m²     Physical Exam  Vitals signs and nursing note reviewed  Constitutional:       General: Clair Gerardo is not in acute distress  Appearance: Clair Gerardo is well-developed  HENT:      Head: Normocephalic and atraumatic  Eyes:      Conjunctiva/sclera: Conjunctivae normal    Neck:      Musculoskeletal: Neck supple  Cardiovascular:      Rate and Rhythm: Normal rate and regular rhythm  Heart sounds: No murmur  Pulmonary:      Effort: Pulmonary effort is normal  No respiratory distress  Breath sounds: Normal breath sounds  Abdominal:      Palpations: Abdomen is soft  Tenderness: There is no abdominal tenderness  Skin:     General: Skin is warm and dry  Neurological:      Mental Status: Clair Gerardo is alert            MD Leonie Clay

## 2021-05-18 NOTE — PATIENT INSTRUCTIONS
Wellness Visit for Adults   AMBULATORY CARE:   A wellness visit  is when you see your healthcare provider to get screened for health problems  Your healthcare provider will also give you advice on how to stay healthy  Write down your questions so you remember to ask them  Ask your healthcare provider how often you should have a wellness visit  What happens at a wellness visit:  Your healthcare provider will ask about your health, and your family history of health problems  This includes high blood pressure, heart disease, and cancer  He or she will ask if you have symptoms that concern you, if you smoke, and about your mood  You may also be asked about your intake of medicines, supplements, food, and alcohol  Any of the following may be done:  · Your weight  will be checked  Your height may also be checked so your body mass index (BMI) can be calculated  Your BMI shows if you are at a healthy weight  · Your blood pressure  and heart rate will be checked  Your temperature may also be checked  · Blood and urine tests  may be done  Blood tests may be done to check your cholesterol levels  Abnormal cholesterol levels increase your risk for heart disease and stroke  You may also need a blood or urine test to check for diabetes if you are at increased risk  Urine tests may be done to look for signs of an infection or kidney disease  · A physical exam  includes checking your heartbeat and lungs with a stethoscope  Your healthcare provider may also check your skin to look for sun damage  · Screening tests  may be recommended  A screening test is done to check for diseases that may not cause symptoms  The screening tests you may need depend on your age, gender, family history, and lifestyle habits  For example, colorectal screening may be recommended if you are 48years old or older  Screening tests you need if you are a woman:   · A Pap smear  is used to screen for cervical cancer   Pap smears are usually done every 3 to 5 years depending on your age  You may need them more often if you have had abnormal Pap smear test results in the past  Ask your healthcare provider how often you should have a Pap smear  · A mammogram  is an x-ray of your breasts to screen for breast cancer  Experts recommend mammograms every 2 years starting at age 48 years  You may need a mammogram at age 52 years or younger if you have an increased risk for breast cancer  Talk to your healthcare provider about when you should start having mammograms and how often you need them  Vaccines you may need:   · Get an influenza vaccine  every year  The influenza vaccine protects you from the flu  Several types of viruses cause the flu  The viruses change over time, so new vaccines are made each year  · Get a tetanus-diphtheria (Td) booster vaccine  every 10 years  This vaccine protects you against tetanus and diphtheria  Tetanus is a severe infection that may cause painful muscle spasms and lockjaw  Diphtheria is a severe bacterial infection that causes a thick covering in the back of your mouth and throat  · Get a human papillomavirus (HPV) vaccine  if you are female and aged 23 to 32 or male 23 to 24 and never received it  This vaccine protects you from HPV infection  HPV is the most common infection spread by sexual contact  HPV may also cause vaginal, penile, and anal cancers  · Get a pneumococcal vaccine  if you are aged 72 years or older  The pneumococcal vaccine is an injection given to protect you from pneumococcal disease  Pneumococcal disease is an infection caused by pneumococcal bacteria  The infection may cause pneumonia, meningitis, or an ear infection  · Get a shingles vaccine  if you are 60 or older, even if you have had shingles before  The shingles vaccine is an injection to protect you from the varicella-zoster virus  This is the same virus that causes chickenpox   Shingles is a painful rash that develops in people who had chickenpox or have been exposed to the virus  How to eat healthy:  My Plate is a model for planning healthy meals  It shows the types and amounts of foods that should go on your plate  Fruits and vegetables make up about half of your plate, and grains and protein make up the other half  A serving of dairy is included on the side of your plate  The amount of calories and serving sizes you need depends on your age, gender, weight, and height  Examples of healthy foods are listed below:  · Eat a variety of vegetables  such as dark green, red, and orange vegetables  You can also include canned vegetables low in sodium (salt) and frozen vegetables without added butter or sauces  · Eat a variety of fresh fruits , canned fruit in 100% juice, frozen fruit, and dried fruit  · Include whole grains  At least half of the grains you eat should be whole grains  Examples include whole-wheat bread, wheat pasta, brown rice, and whole-grain cereals such as oatmeal     · Eat a variety of protein foods such as seafood (fish and shellfish), lean meat, and poultry without skin (turkey and chicken)  Examples of lean meats include pork leg, shoulder, or tenderloin, and beef round, sirloin, tenderloin, and extra lean ground beef  Other protein foods include eggs and egg substitutes, beans, peas, soy products, nuts, and seeds  · Choose low-fat dairy products such as skim or 1% milk or low-fat yogurt, cheese, and cottage cheese  · Limit unhealthy fats  such as butter, hard margarine, and shortening  Exercise:  Exercise at least 30 minutes per day on most days of the week  Some examples of exercise include walking, biking, dancing, and swimming  You can also fit in more physical activity by taking the stairs instead of the elevator or parking farther away from stores  Include muscle strengthening activities 2 days each week  Regular exercise provides many health benefits   It helps you manage your weight, and decreases your risk for type 2 diabetes, heart disease, stroke, and high blood pressure  Exercise can also help improve your mood  Ask your healthcare provider about the best exercise plan for you  General health and safety guidelines:   · Do not smoke  Nicotine and other chemicals in cigarettes and cigars can cause lung damage  Ask your healthcare provider for information if you currently smoke and need help to quit  E-cigarettes or smokeless tobacco still contain nicotine  Talk to your healthcare provider before you use these products  · Limit alcohol  A drink of alcohol is 12 ounces of beer, 5 ounces of wine, or 1½ ounces of liquor  · Lose weight, if needed  Being overweight increases your risk of certain health conditions  These include heart disease, high blood pressure, type 2 diabetes, and certain types of cancer  · Protect your skin  Do not sunbathe or use tanning beds  Use sunscreen with a SPF 15 or higher  Apply sunscreen at least 15 minutes before you go outside  Reapply sunscreen every 2 hours  Wear protective clothing, hats, and sunglasses when you are outside  · Drive safely  Always wear your seatbelt  Make sure everyone in your car wears a seatbelt  A seatbelt can save your life if you are in an accident  Do not use your cell phone when you are driving  This could distract you and cause an accident  Pull over if you need to make a call or send a text message  · Practice safe sex  Use latex condoms if are sexually active and have more than one partner  Your healthcare provider may recommend screening tests for sexually transmitted infections (STIs)  · Wear helmets, lifejackets, and protective gear  Always wear a helmet when you ride a bike or motorcycle, go skiing, or play sports that could cause a head injury  Wear protective equipment when you play sports  Wear a lifejacket when you are on a boat or doing water sports      © Copyright [address] 2020 Information is for End User's use only and may not be sold, redistributed or otherwise used for commercial purposes  All illustrations and images included in CareNotes® are the copyrighted property of [employer]  or [employer]  The above information is an  only  It is not intended as medical advice for individual conditions or treatments  Talk to your doctor, nurse or pharmacist before following any medical regimen to see if it is safe and effective for you  Weight Management   AMBULATORY CARE:   Why it is important to manage your weight:  Being overweight increases your risk of health conditions such as heart disease, high blood pressure, type 2 diabetes, and certain types of cancer  It can also increase your risk for osteoarthritis, sleep apnea, and other respiratory problems  Aim for a slow, steady weight loss  Even a small amount of weight loss can lower your risk of health problems  How to lose weight safely:  A safe and healthy way to lose weight is to eat fewer calories and get regular exercise  · You can lose up about 1 pound a week by decreasing the number of calories you eat by 500 calories each day  You can decrease calories by eating smaller portion sizes or by cutting out high-calorie foods  Read labels to find out how many calories are in the foods you eat  · You can also burn calories with exercise such as walking, swimming, or biking  You will be more likely to keep weight off if you make these changes part of your lifestyle  Exercise at least 30 minutes per day on most days of the week  You can also fit in more physical activity by taking the stairs instead of the elevator or parking farther away from stores  Ask your healthcare provider about the best exercise plan for you  Healthy meal plan for weight management:  A healthy meal plan includes a variety of foods, contains fewer calories, and helps you stay healthy   A healthy meal plan includes the following:     · Eat whole-grain foods more often  A healthy meal plan should contain fiber  Fiber is the part of grains, fruits, and vegetables that is not broken down by your body  Whole-grain foods are healthy and provide extra fiber in your diet  Some examples of whole-grain foods are whole-wheat breads and pastas, oatmeal, brown rice, and bulgur  · Eat a variety of vegetables every day  Include dark, leafy greens such as spinach, kale, jeremiah greens, and mustard greens  Eat yellow and orange vegetables such as carrots, sweet potatoes, and winter squash  · Eat a variety of fruits every day  Choose fresh or canned fruit (canned in its own juice or light syrup) instead of juice  Fruit juice has very little or no fiber  · Eat low-fat dairy foods  Drink fat-free (skim) milk or 1% milk  Eat fat-free yogurt and low-fat cottage cheese  Try low-fat cheeses such as mozzarella and other reduced-fat cheeses  · Choose meat and other protein foods that are low in fat  Choose beans or other legumes such as split peas or lentils  Choose fish, skinless poultry (chicken or turkey), or lean cuts of red meat (beef or pork)  Before you cook meat or poultry, cut off any visible fat  · Use less fat and oil  Try baking foods instead of frying them  Add less fat, such as margarine, sour cream, regular salad dressing and mayonnaise to foods  Eat fewer high-fat foods  Some examples of high-fat foods include french fries, doughnuts, ice cream, and cakes  · Eat fewer sweets  Limit foods and drinks that are high in sugar  This includes candy, cookies, regular soda, and sweetened drinks  Ways to decrease calories:   · Eat smaller portions  ? Use a small plate with smaller servings  ? Do not eat second helpings  ? When you eat at a restaurant, ask for a box and place half of your meal in the box before you eat  ? Share an entrée with someone else  · Replace high-calorie snacks with healthy, low-calorie snacks  ? Choose fresh fruit, vegetables, fat-free rice cakes, or air-popped popcorn instead of potato chips, nuts, or chocolate  ? Choose water or calorie-free drinks instead of soda or sweetened drinks  · Do not shop for groceries when you are hungry  You may be more likely to make unhealthy food choices  Take a grocery list of healthy foods and shop after you have eaten  · Eat regular meals  Do not skip meals  Skipping meals can lead to overeating later in the day  This can make it harder for you to lose weight  Eat a healthy snack in place of a meal if you do not have time to eat a regular meal  Talk with a dietitian to help you create a meal plan and schedule that is right for you  Other things to consider as you try to lose weight:   · Be aware of situations that may give you the urge to overeat, such as eating while watching television  Find ways to avoid these situations  For example, read a book, go for a walk, or do crafts  · Meet with a weight loss support group or friends who are also trying to lose weight  This may help you stay motivated to continue working on your weight loss goals  © Copyright [address] Information is for End User's use only and may not be sold, redistributed or otherwise used for commercial purposes  All illustrations and images included in CareNotes® are the copyrighted property of [employer]  or Christ Veliz  The above information is an  only  It is not intended as medical advice for individual conditions or treatments  Talk to your doctor, nurse or pharmacist before following any medical regimen to see if it is safe and effective for you

## 2021-05-27 ENCOUNTER — IMMUNIZATIONS (OUTPATIENT)
Dept: FAMILY MEDICINE CLINIC | Facility: HOSPITAL | Age: 56
End: 2021-05-27

## 2021-05-27 DIAGNOSIS — Z23 ENCOUNTER FOR IMMUNIZATION: Primary | ICD-10-CM

## 2021-05-27 PROCEDURE — 0012A SARS-COV-2 / COVID-19 MRNA VACCINE (MODERNA) 100 MCG: CPT

## 2021-05-27 PROCEDURE — 91301 SARS-COV-2 / COVID-19 MRNA VACCINE (MODERNA) 100 MCG: CPT

## 2021-06-08 ENCOUNTER — TELEPHONE (OUTPATIENT)
Dept: SLEEP CENTER | Facility: CLINIC | Age: 56
End: 2021-06-08

## 2021-06-08 ENCOUNTER — OFFICE VISIT (OUTPATIENT)
Dept: SLEEP CENTER | Facility: CLINIC | Age: 56
End: 2021-06-08
Payer: COMMERCIAL

## 2021-06-08 VITALS
DIASTOLIC BLOOD PRESSURE: 64 MMHG | HEIGHT: 60 IN | SYSTOLIC BLOOD PRESSURE: 112 MMHG | WEIGHT: 188 LBS | BODY MASS INDEX: 36.91 KG/M2

## 2021-06-08 DIAGNOSIS — G47.33 OSA (OBSTRUCTIVE SLEEP APNEA): Primary | ICD-10-CM

## 2021-06-08 PROCEDURE — 3008F BODY MASS INDEX DOCD: CPT | Performed by: PHYSICIAN ASSISTANT

## 2021-06-08 PROCEDURE — 99213 OFFICE O/P EST LOW 20 MIN: CPT | Performed by: PHYSICIAN ASSISTANT

## 2021-06-08 PROCEDURE — 3074F SYST BP LT 130 MM HG: CPT | Performed by: PHYSICIAN ASSISTANT

## 2021-06-08 PROCEDURE — 3078F DIAST BP <80 MM HG: CPT | Performed by: PHYSICIAN ASSISTANT

## 2021-06-08 PROCEDURE — 3008F BODY MASS INDEX DOCD: CPT | Performed by: FAMILY MEDICINE

## 2021-06-08 PROCEDURE — 1036F TOBACCO NON-USER: CPT | Performed by: PHYSICIAN ASSISTANT

## 2021-06-08 NOTE — PROGRESS NOTES
Progress Note - Sleep Medicine  Maxim BraxtonChevere 54 y o  adult MRN: [id number]       Impression & Plan:     Obstructive sleep apnea  1  Continue CPAP for hours asleep  2  Patient does have a residual AHI of 5 7  I will change her CPAP to auto CPAP with pressure ranges from 5 to 20 and would like to see her back in 3 months  I will see if this will help with her residual AHI  3  Patient is also being worked up for bariatric surgery and does plan to undergo this procedure in the future  4  She is also been having some issues with her nasal pillows and is interested in a new mask which we will coordinate today  She is also having some burning which could possibly be from increased heating pressure/setting  Will discuss with [facility] about adjusting this  Problem List Items Addressed This Visit     None      Visit Diagnoses     ABHINAV (obstructive sleep apnea)    -  Primary    Relevant Orders    PAP DME Pressure Change    PAP DME Resupply/Reorder            ______________________________________________________________________    HPI:    Ruth Yang presents today for follow-up of ABHINAV  She has been using her CPAP for approximately 3 months  She reports that her nose is burning in having some irritation which she thinks is possibly from the mask however she did say that her heat setting was also at 4  I suspect that this could possibly be due her nasal pillows in conjunction with a high heat setting  She otherwise offers no complaints and has been compliant with the CPAP device  She denied is any worsening of her symptoms and in fact reports that she is having some improved daytime fatigue and snoring  She is hoping to undergo bariatric surgery in the future and starting her CPAP a part of this process  I answered all of her questions  I would like to see her in 3 months          Review of Systems:  Review of Systems   Constitutional: Negative for activity change, appetite change, chills and diaphoresis  HENT: Positive for nosebleeds, postnasal drip and rhinorrhea  Negative for sinus pain  Respiratory: Negative for apnea, cough, chest tightness, shortness of breath and wheezing  Cardiovascular: Negative for chest pain, palpitations and leg swelling  Gastrointestinal: Negative for abdominal distention, abdominal pain, blood in stool, constipation and diarrhea  Skin: Negative for rash  Allergic/Immunologic: Negative for immunocompromised state  Hematological: Negative for adenopathy  Psychiatric/Behavioral: Negative for agitation  All other systems reviewed and are negative          Social history updates:  Social History     Tobacco Use   Smoking Status Former Smoker    Types: Cigarettes    Quit date:     Years since quittin 4   Smokeless Tobacco Never Used     Social History     Socioeconomic History    Marital status: Single     Spouse name: Not on file    Number of children: Not on file    Years of education: Not on file    Highest education level: Not on file   Occupational History    Not on file   Social Needs    Financial resource strain: Not on file    Food insecurity     Worry: Not on file     Inability: Not on file    Transportation needs     Medical: No     Non-medical: No   Tobacco Use    Smoking status: Former Smoker     Types: Cigarettes     Quit date:      Years since quittin 4    Smokeless tobacco: Never Used   Substance and Sexual Activity    Alcohol use: Not Currently    Drug use: No    Sexual activity: Not on file   Lifestyle    Physical activity     Days per week: Not on file     Minutes per session: Not on file    Stress: Not on file   Relationships    Social connections     Talks on phone: Not on file     Gets together: Not on file     Attends Religion service: Not on file     Active member of club or organization: Not on file     Attends meetings of clubs or organizations: Not on file     Relationship status: Not on file    Intimate partner violence     Fear of current or ex partner: Not on file     Emotionally abused: Not on file     Physically abused: Not on file     Forced sexual activity: Not on file   Other Topics Concern    Not on file   Social History Narrative    Not on file       PhysicalExamination:  Vitals:   /64   Ht 5' (1 524 m)   Wt 85 3 kg (188 lb)   LMP  (LMP Unknown)   BMI 36 72 kg/m²     Physical Exam  General Appearance:   Alert, cooperative, no distress, appears stated age, obese      Head:   Normocephalic, without obvious abnormality, atraumatic      Eyes:   PERRL, conjunctiva/corneas clear, EOM's intact          Nose:  Nares normal, septum midline, mucosa normal, no drainage or sinus tenderness            Throat:  Lips and gums normal; teeth missing and poor dentition, tongue normal size and  shape and midline in position; mucosa moist and redundant bilaterally, uvula normal, tonsils not visualized, Mallampati class 4        Neck:  Supple, symmetrical, trachea midline, no adenopathy; Thyroid: No enlargement, tenderness or nodules; no carotid bruit or JVD      Lungs:      Clear to auscultation bilaterally, respirations unlabored      Heart:   Regular rate and rhythm, S1 and S2 normal, no murmur, rub or gallop         Extremities:  Right foot with dry dressing intact, left LE appears normal, atraumatic, no cyanosis and trace edema in right lower extremity, non-weight bearing and seated in a wheelchair         Skin:  Skin color, texture, turgor normal, no rashes or lesions         Neurologic:  No focal deficits noted            Diagnostic Data:  Labs:   I personally reviewed the most recent laboratory data pertinent to today's visit  Appointment on 04/22/2021   Component Date Value    HIV-1/HIV-2 Ab 04/22/2021 Non-Reactive     Hepatitis C Ab 04/22/2021 Non-reactive    Lab on 04/22/2021   Component Date Value    Hemoglobin A1C 04/22/2021 7 2*    EAG 04/22/2021 160     Creatinine, Ur 04/22/2021 90 7     Microalbum  ,U,Random 04/22/2021 13 3     Microalb Creat Ratio 04/22/2021 15     WBC 04/22/2021 9 59     RBC 04/22/2021 5 12     Hemoglobin 04/22/2021 11 5*    Hematocrit 04/22/2021 39 2     MCV 04/22/2021 77*    MCH 04/22/2021 22 5*    MCHC 04/22/2021 29 3*    RDW 04/22/2021 15 6*    Platelets 82/85/0558 245     MPV 04/22/2021 11 5     Sodium 04/22/2021 142     Potassium 04/22/2021 5 0     Chloride 04/22/2021 105     CO2 04/22/2021 22     ANION GAP 04/22/2021 15*    BUN 04/22/2021 37*    Creatinine 04/22/2021 1 26     Glucose, Fasting 04/22/2021 91     Calcium 04/22/2021 9 1     AST 04/22/2021 19     ALT 04/22/2021 23     Alkaline Phosphatase 04/22/2021 90     Total Protein 04/22/2021 6 9     Albumin 04/22/2021 3 6     Total Bilirubin 04/22/2021 0 18*    Cholesterol 04/22/2021 114     Triglycerides 04/22/2021 127     HDL, Direct 04/22/2021 41     LDL Calculated 04/22/2021 48     Non-HDL-Chol (CHOL-HDL) 04/22/2021 73     TSH 3RD GENERATON 04/22/2021 3 013        I have personally reviewed pertinent lab results  , ABG: No results found for: PHART, EEI4MHI, PO2ART, VXG1MMW, X0ACVHEZ, BEART, SOURCE, BNP: No results found for: BNP, CBC: No results found for: WBC, HGB, HCT, MCV, PLT, ADJUSTEDWBC, MCH, MCHC, RDW, MPV, NRBC, CMP: No results found for: SODIUM, K, CL, CO2, ANIONGAP, BUN, CREATININE, GLUCOSE, CALCIUM, AST, ALT, ALKPHOS, PROT, BILITOT, EGFR, PT/INR: No results found for: PT, INR, Troponin: No results found for: TROPONINI  Lab Results   Component Value Date    WBC 9 59 04/22/2021    HGB 11 5 (L) 04/22/2021    HCT 39 2 04/22/2021    MCV 77 (L) 04/22/2021     04/22/2021     Lab Results   Component Value Date    CALCIUM 9 1 04/22/2021    K 5 0 04/22/2021    CO2 22 04/22/2021     04/22/2021    BUN 37 (H) 04/22/2021    CREATININE 1 26 04/22/2021     No results found for: IGE  Lab Results   Component Value Date    ALT 23 04/22/2021    AST 19 04/22/2021    ALKPHOS 90 04/22/2021     Lab Results   Component Value Date    IRON 24 (L) 05/06/2020    TIBC 306 05/06/2020    FERRITIN 91 05/06/2020     No results found for: Wilmertiffany Szymanski  No results found for: FOLATE        Sleep studies:  10/30/2020  Diagnostic:  Patient had sleep study which revealed an AHI of 21 8 revealing moderate ABHINAV and mild periodic limb movement      Compliance Data:  Type of CPAP:  CPAP of 5                                   Percent usage:  Patient use for approximately 70% of the time                                   Average time used:   On average she used for 4 hours and 49 minutes                                  Time in large leak:  53 seconds                                   Residual AHI:  5 7                                         Izabel Martin PA-C

## 2021-06-08 NOTE — ASSESSMENT & PLAN NOTE
1  Continue CPAP for hours asleep  2  Patient does have a residual AHI of 5 7  I will change her CPAP to auto CPAP with pressure ranges from 5 to 20 and would like to see her back in 3 months  I will see if this will help with her residual AHI  3  Patient is also being worked up for bariatric surgery and does plan to undergo this procedure in the future  4  She is also been having some issues with her nasal pillows and is interested in a new mask which we will coordinate today  She is also having some burning which could possibly be from increased heating pressure/setting  Will discuss with [facility] about adjusting this

## 2021-06-23 ENCOUNTER — OFFICE VISIT (OUTPATIENT)
Dept: BARIATRICS | Facility: CLINIC | Age: 56
End: 2021-06-23

## 2021-06-23 VITALS — WEIGHT: 188.8 LBS | BODY MASS INDEX: 36.87 KG/M2

## 2021-06-23 DIAGNOSIS — E66.01 CLASS 2 SEVERE OBESITY DUE TO EXCESS CALORIES WITH SERIOUS COMORBIDITY AND BODY MASS INDEX (BMI) OF 36.0 TO 36.9 IN ADULT (HCC): Primary | ICD-10-CM

## 2021-06-23 PROCEDURE — RECHECK

## 2021-06-23 NOTE — PROGRESS NOTES
Behavioral Health Follow Up Note:      3 / 6  Weight Check  Starting weight 188 8  #  Today's weight 188 8  #    Presented today for her weight check  Daughter also present  Reported less intake of soda  Chidlren are drinking more and taking the soda away from her  And more intake of water  Presented her food log (hand written),  Eating three meals a day, for the most part  Protein is from chicken and fish  Hydrating through water  Most days is enough hydration   Aware she can improve other days  Working on slowing down and chewing more  This is a continual practice  Working on 30/60  Trying to gradually increase the time after eating that she starts to drink  Feels the drinking after the meal is a habit she is trying to break not a need  Stated she purchased ankle weights (3#) and she marches in place in the mornings  Trying to increase the muscle mass  Is able to put on her arms too  Also still using her 3# weights  Has a stepper block to increase her steps in place  Daughter is  her food so she eats in order and uses a smaller plate  Provided Bibi Kim with more journals to continue tracking her food intake  Daughter helps with tracking       [address]

## 2021-06-28 DIAGNOSIS — I73.9 PAD (PERIPHERAL ARTERY DISEASE) (HCC): ICD-10-CM

## 2021-06-28 RX ORDER — CLOPIDOGREL BISULFATE 75 MG/1
75 TABLET ORAL DAILY
Qty: 90 TABLET | Refills: 1 | Status: SHIPPED | OUTPATIENT
Start: 2021-06-28 | End: 2021-09-21 | Stop reason: SDUPTHER

## 2021-06-29 ENCOUNTER — TELEPHONE (OUTPATIENT)
Dept: GASTROENTEROLOGY | Facility: HOSPITAL | Age: 56
End: 2021-06-29

## 2021-06-29 ENCOUNTER — OFFICE VISIT (OUTPATIENT)
Dept: ENDOCRINOLOGY | Facility: CLINIC | Age: 56
End: 2021-06-29
Payer: COMMERCIAL

## 2021-06-29 VITALS
BODY MASS INDEX: 37.11 KG/M2 | WEIGHT: 189 LBS | HEIGHT: 60 IN | HEART RATE: 74 BPM | SYSTOLIC BLOOD PRESSURE: 122 MMHG | DIASTOLIC BLOOD PRESSURE: 96 MMHG

## 2021-06-29 DIAGNOSIS — E11.621 DIABETIC ULCER OF RIGHT MIDFOOT ASSOCIATED WITH TYPE 2 DIABETES MELLITUS, WITH NECROSIS OF BONE (HCC): ICD-10-CM

## 2021-06-29 DIAGNOSIS — Z79.4 TYPE 2 DIABETES MELLITUS WITHOUT COMPLICATION, WITH LONG-TERM CURRENT USE OF INSULIN (HCC): Primary | ICD-10-CM

## 2021-06-29 DIAGNOSIS — E11.610 CHARCOT FOOT DUE TO DIABETES MELLITUS (HCC): ICD-10-CM

## 2021-06-29 DIAGNOSIS — E78.5 HYPERLIPIDEMIA, UNSPECIFIED HYPERLIPIDEMIA TYPE: ICD-10-CM

## 2021-06-29 DIAGNOSIS — L97.414 DIABETIC ULCER OF RIGHT MIDFOOT ASSOCIATED WITH TYPE 2 DIABETES MELLITUS, WITH NECROSIS OF BONE (HCC): ICD-10-CM

## 2021-06-29 DIAGNOSIS — E11.22 DIABETES MELLITUS WITH STAGE 2 CHRONIC KIDNEY DISEASE (HCC): ICD-10-CM

## 2021-06-29 DIAGNOSIS — Z79.4 CURRENT USE OF INSULIN (HCC): ICD-10-CM

## 2021-06-29 DIAGNOSIS — N18.2 DIABETES MELLITUS WITH STAGE 2 CHRONIC KIDNEY DISEASE (HCC): ICD-10-CM

## 2021-06-29 DIAGNOSIS — I10 ESSENTIAL HYPERTENSION: ICD-10-CM

## 2021-06-29 DIAGNOSIS — E11.9 TYPE 2 DIABETES MELLITUS WITHOUT COMPLICATION, WITH LONG-TERM CURRENT USE OF INSULIN (HCC): Primary | ICD-10-CM

## 2021-06-29 PROCEDURE — 3008F BODY MASS INDEX DOCD: CPT | Performed by: INTERNAL MEDICINE

## 2021-06-29 PROCEDURE — 3080F DIAST BP >= 90 MM HG: CPT | Performed by: INTERNAL MEDICINE

## 2021-06-29 PROCEDURE — 99214 OFFICE O/P EST MOD 30 MIN: CPT | Performed by: INTERNAL MEDICINE

## 2021-06-29 PROCEDURE — 1036F TOBACCO NON-USER: CPT | Performed by: INTERNAL MEDICINE

## 2021-06-29 PROCEDURE — 3066F NEPHROPATHY DOC TX: CPT | Performed by: INTERNAL MEDICINE

## 2021-06-29 PROCEDURE — 3074F SYST BP LT 130 MM HG: CPT | Performed by: INTERNAL MEDICINE

## 2021-06-29 NOTE — PROGRESS NOTES
[facility] Patient Progress Note      Chief Complaint   Patient presents with    Diabetes Type 2      Referring Provider  Elvira Hopkins Md  [address]  [address] [facility],  [address] [place] [address]     History of Present Illness:   Wily Mims is a 54 y o  adult with a history of type 2 diabetes with long term use of insulin   Last seen in the office in March 2021  DM hx: Ms Travon Carballo was diagnosed with T2DM ~35yrs ago, and has mutliple complications  She has neuropathy the has led to Charcot foot, and amputations and gangrene of her toes  S/p TMA of the right foot      She takes Lantus pens 31ETLWW once daily, Trulicity 3 5KZ on Mondays, metformin 1g twice daily  She is adherent to all meds and does not miss doses  She is tolerating the insulin and metformin  She states she had an "allergy" to Levemir stating it makes her "itchy" and dizzy      She has a right TMA (5/2020) and left toe amputation (Feb 2020)     Ms Travon Carballo is checking FSBGs, which are reviewed  She is checking 3x daily  Lowest is this morning is 90  Fasting   Pre-lunch 121-219  Pre-dinner 125-230 (one 248)  Denies hypoglycemia    Diabetes education: No past DM classes  Activity: She is walking around the inside of the house, no walking      Eyes: approx 2019  Still has not scheduled  Pod: seeing regularly for foot  Hx of Charcot  Dentist: most teeth removed, has an appt scheduled  Flu: received 20-21  Received both COVID vaccine       CKD: eGFR is preserved  Saw Dr Bre Martin Sept 3, 2021  Hyperlipidemia is treated atorvastatin which she is tolerating  She denies hx of pancreatitis, personal or family hx of MTC or MEN   She denies alcohol use, gallbladder issues, or hx of elevated triglycerides         Patient Active Problem List   Diagnosis    Essential hypertension    HLD (hyperlipidemia)    PAD (peripheral artery disease) ([facility] )    Diabetes mellitus with stage 2 chronic kidney disease (HCC)    Microcytic anemia    Leukocytosis    Toe amputation status, left    Critical lower limb ischemia (HCC)    Polyneuropathy associated with underlying disease ([facility] )    Charcot foot due to diabetes mellitus ([facility] )    History of transmetatarsal amputation of foot ([facility] )    Class 2 severe obesity due to excess calories with serious comorbidity and body mass index (BMI) of 36 0 to 36 9 in adult [facility])    Cellulitis of right lower extremity    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, with necrosis of bone ([facility] )    S/P transmetatarsal amputation of foot, right (HCC)    Current use of insulin ([facility] )    Persistent proteinuria    Vitamin D deficiency    Obstructive sleep apnea    Well woman exam    Vaginal candida      Past Medical History:   Diagnosis Date    Cellulitis of toe 1/28/2019    CKD (chronic kidney disease)     Diabetes mellitus ([facility] )     Gangrene of toe of right foot ([facility] ) 10/30/2019    Added automatically from request for surgery [id number]    Hypertension       Past Surgical History:   Procedure Laterality Date    IR ABDOMINAL AORTAGRAM  2/1/2019    IR AORTAGRAM WITH RUN-OFF  5/5/2020    IR ARTERIAL LYSIS  4/30/2019    IR LOWER EXTREMITY / INTERVENTION  11/1/2019    SC AMPUTATION FOOT,TRANSMETATARSAL Right 11/7/2019    Procedure: AMPUTATION TRANSMETATARSAL (TMA) WITH ACHILLES TENDON LENGTHING;  Surgeon: Renard Hoyos DPM;  Location: [facility];  Service: Podiatry    SC AMPUTATION [facility] Right 5/7/2020    Procedure: AMPUTATION TRANSMETATARSAL (TMA);  Surgeon: Hernando Colon DPM;  [facility];  Service: Podiatry    TOE AMPUTATION Left 2/4/2019    Procedure: AMPUTATION 5th TOE;  Surgeon: Renard Hoyos DPM;  [facility];  Service: Podiatry    TOE AMPUTATION Right 11/4/2019    Procedure: AMPUTATION RIGHT 3RD AND 4TH TOE;  Surgeon: Renard Hoyos DPM;  [facility];  Service: Podiatry      Family History   Problem Relation Age of Onset    Diabetes Mother     No Known Problems Father      Social History     Tobacco Use    Smoking status: Former Smoker     Types: Cigarettes     Quit date:      Years since quittin 4    Smokeless tobacco: Never Used   Substance Use Topics    Alcohol use: Not Currently     Allergies   Allergen Reactions    Levemir [Insulin Detemir] Itching         Current Outpatient Medications:     aspirin 81 mg chewable tablet, Chew 1 tablet (81 mg total) daily, Disp: , Rfl:     atorvastatin (LIPITOR) 20 mg tablet, Take 1 tablet (20 mg total) by mouth daily, Disp: 90 tablet, Rfl: 1    cetirizine (ZyrTEC) 10 mg tablet, Take 1 tablet (10 mg total) by mouth daily (Patient taking differently: Take 10 mg by mouth daily as needed ), Disp: 90 tablet, Rfl: 1    clopidogrel (PLAVIX) 75 mg tablet, Take 1 tablet (75 mg total) by mouth daily, Disp: 90 tablet, Rfl: 1    Dulaglutide 1 5 MG/0 5ML SOPN, Inject 0 5 mL (1 5 mg total) under the skin once a week, Disp: 4 pen, Rfl: 4    ergocalciferol (ERGOCALCIFEROL) 1 25 MG (71000 UT) capsule, Take 1 capsule (50,000 Units total) by mouth once a week, Disp: 12 capsule, Rfl: 1    ferrous sulfate 324 (65 Fe) mg, Take 1 tablet (324 mg total) by mouth 2 (two) times a day before meals, Disp: 180 tablet, Rfl: 3    fluticasone (FLONASE) 50 mcg/act nasal spray, 1 spray into each nostril daily, Disp: 3 Bottle, Rfl: 1    glucose blood (ONE TOUCH ULTRA TEST) test strip, Use to test 3x daily, Disp: 300 each, Rfl: 1    insulin glargine (Lantus SoloStar) 100 units/mL injection pen, Use 20units subcut once daily at 9pm, Disp: 20 mL, Rfl: 0    Insulin Pen Needle 32G X 4 MM MISC, Use 4 times daily with insulin pens, Disp: 120 each, Rfl: 5    lisinopril (ZESTRIL) 5 mg tablet, Take 1 tablet (5 mg total) by mouth daily, Disp: 90 tablet, Rfl: 3    metFORMIN (GLUCOPHAGE) 1000 MG tablet, Take 1 tablet (1,000 mg total) by mouth 2 (two) times a day with meals, Disp: 180 tablet, Rfl: 3    metoprolol tartrate (LOPRESSOR) 25 mg tablet, Take 1 tablet (25 mg total) by mouth every 12 (twelve) hours, Disp: 180 tablet, Rfl: 1    OneTouch Delica Lancets 78N MISC, Use to test 3x daily, Disp: 300 each, Rfl: 2  Review of Systems   Constitutional: Negative for unexpected weight change  HENT: Negative for trouble swallowing and voice change  Eyes: Negative for visual disturbance  Respiratory: Negative for shortness of breath  Gastrointestinal: Negative for abdominal pain, diarrhea and nausea  Musculoskeletal: Positive for gait problem  Neurological: Negative for tremors  Psychiatric/Behavioral: The patient is not nervous/anxious (most at dentist)  see HPI    Physical Exam:  Body mass index is 36 91 kg/m²  /96 (BP Location: Left arm, Patient Position: Sitting, Cuff Size: Standard)   Pulse 74   Ht 5' (1 524 m)   Wt 85 7 kg (189 lb)   LMP  (LMP Unknown)   BMI 36 91 kg/m²    Wt Readings from Last 3 Encounters:   06/29/21 85 7 kg (189 lb)   06/23/21 85 6 kg (188 lb 12 8 oz)   06/08/21 85 3 kg (188 lb)         Physical Exam   Gen: appears well-developed and well-nourished  No apparent distress  Head: Normocephalic and atraumatic  Eyes: no stare or proptosis, no periorbital edema  E/N/M mask in place, hearing grossly intact  Neck: range of motion nl  Pulmonary/Chest: breathing  comfortably, no accessory muscle use, effort normal    Musculoskeletal: moves all extremities, ambulates without assistance  Neurological: alert and oriented to person, place, and time  No upper ext tremor appreciated  Skin: does not appear diaphoretic  Psychiatric: normal mood and affect; behavior is normal; no gross lapses in memory, answer questions appropriately    Ext: deferred, regular Podiatry care      Labs:     Lab Results   Component Value Date    HGBA1C 7 2 (H) 04/22/2021         Lab Results   Component Value Date    CREATININE 1 26 04/22/2021    CREATININE 1 00 09/01/2020    CREATININE 1 03 05/07/2020    BUN 37 (H) 04/22/2021    K 5 0 04/22/2021     04/22/2021    CO2 22 04/22/2021     eGFR   Date Value Ref Range Status   09/01/2020 64 ml/min/1 73sq m Final     No components found for: [facility]    Lab Results   Component Value Date    HDL 41 04/22/2021    TRIG 127 04/22/2021       Lab Results   Component Value Date    ALT 23 04/22/2021    AST 19 04/22/2021    ALKPHOS 90 04/22/2021       Lab Results   Component Value Date    FREET4 1 17 05/06/2020       Impression:  1  Type 2 diabetes mellitus without complication, with long-term current use of insulin ([address] )    2  Charcot foot due to diabetes mellitus ([address] )    3  Diabetic ulcer of right midfoot associated with type 2 diabetes mellitus, with necrosis of bone ([address] )    4  Essential hypertension    5  Hyperlipidemia, unspecified hyperlipidemia type    6  Diabetes mellitus with stage 2 chronic kidney disease (HCC)    7  Current use of insulin ([address] )           Plan:    Trista Palacios was seen today for diabetes type 2  Diagnoses and all orders for this visit:    Type 2 diabetes mellitus without complication, with long-term current use of insulin ([address] )  -     Ambulatory referral to Ophthalmology; Future    Charcot foot due to diabetes mellitus ([address] )  -     Hemoglobin A1C; Future    Diabetic ulcer of right midfoot associated with type 2 diabetes mellitus, with necrosis of bone ([address] )    Essential hypertension    Hyperlipidemia, unspecified hyperlipidemia type    Diabetes mellitus with stage 2 chronic kidney disease (HCC)  -     Hemoglobin A1C; Future    Current use of insulin ([address] )          1  U3GY, complicated by CKD and Charcot foot:  She is doing well with the Trulicity  A1c improved to 7 2% in April 2021  I recommend continuing to the 1 5mg weekly dose, Lantus 20units once daily and metformin 1g twice daily  Order provided for A1c next month and we can consider increasing the Trulicity to 3mg at that time as it may help with weight loss  2  CKD, HTN: saw Dr María Rudolph   Some proteinuria is seen  I would not use SGLT2s at this time with her PAD and foot issues  Repeat urine MAC was improved  3  Hyperlipidemia: Taking and tolerating atorvastatin well  Discussed with the patient and all questioned fully answered  Augie Sorto will call me if any problems arise      Counseled patient on diagnostic results, prognosis, risk and benefit of treatment options, instruction for management, importance of treatment compliance, Risk  factor reduction and impressions      Apolonia Da Silva MD

## 2021-06-30 ENCOUNTER — HOSPITAL ENCOUNTER (OUTPATIENT)
Dept: GASTROENTEROLOGY | Facility: HOSPITAL | Age: 56
Setting detail: OUTPATIENT SURGERY
Discharge: HOME | End: 2021-06-30
Attending: SURGERY | Admitting: SURGERY
Payer: COMMERCIAL

## 2021-06-30 ENCOUNTER — ANESTHESIA EVENT (OUTPATIENT)
Dept: GASTROENTEROLOGY | Facility: HOSPITAL | Age: 56
End: 2021-06-30

## 2021-06-30 ENCOUNTER — ANESTHESIA (OUTPATIENT)
Dept: GASTROENTEROLOGY | Facility: HOSPITAL | Age: 56
End: 2021-06-30

## 2021-06-30 VITALS
OXYGEN SATURATION: 98 % | HEART RATE: 83 BPM | RESPIRATION RATE: 20 BRPM | HEIGHT: 60 IN | DIASTOLIC BLOOD PRESSURE: 71 MMHG | WEIGHT: 189 LBS | TEMPERATURE: 97.9 F | BODY MASS INDEX: 37.11 KG/M2 | SYSTOLIC BLOOD PRESSURE: 134 MMHG

## 2021-06-30 DIAGNOSIS — E66.9 OBESITY, CLASS II, BMI 35-39.9: ICD-10-CM

## 2021-06-30 LAB — GLUCOSE SERPL-MCNC: 97 MG/DL (ref 65–140)

## 2021-06-30 PROCEDURE — 88305 TISSUE EXAM BY PATHOLOGIST: CPT | Performed by: PATHOLOGY

## 2021-06-30 PROCEDURE — 82948 REAGENT STRIP/BLOOD GLUCOSE: CPT

## 2021-06-30 PROCEDURE — 43239 EGD BIOPSY SINGLE/MULTIPLE: CPT | Performed by: SURGERY

## 2021-06-30 RX ORDER — PROPOFOL 10 MG/ML
INJECTION, EMULSION INTRAVENOUS AS NEEDED
Status: DISCONTINUED | OUTPATIENT
Start: 2021-06-30 | End: 2021-06-30

## 2021-06-30 RX ORDER — SODIUM CHLORIDE 9 MG/ML
125 INJECTION, SOLUTION INTRAVENOUS CONTINUOUS
Status: DISCONTINUED | OUTPATIENT
Start: 2021-06-30 | End: 2021-07-04 | Stop reason: HOSPADM

## 2021-06-30 RX ADMIN — SODIUM CHLORIDE: 0.9 INJECTION, SOLUTION INTRAVENOUS at 11:46

## 2021-06-30 RX ADMIN — PROPOFOL 50 MG: 10 INJECTION, EMULSION INTRAVENOUS at 11:53

## 2021-06-30 RX ADMIN — PROPOFOL 150 MG: 10 INJECTION, EMULSION INTRAVENOUS at 11:49

## 2021-06-30 NOTE — H&P
This is a 54 y o  adult with a history of morbid obesity and Body mass index is 36 91 kg/m²  Here for an EGD to evaluate the anatomy of the GI tract  Physical Exam    /70   Pulse 85   Temp 97 9 °F (36 6 °C) (Temporal)   Resp 16   Ht 5' (1 524 m)   Wt 85 7 kg (189 lb)   LMP  (LMP Unknown)   SpO2 98%   BMI 36 91 kg/m²    AAOx3  RRR  CTA B  Abdomen obese  Benign  A/P:    This is a 54 y o  adult with a history of morbid obesity and Body mass index is 36 91 kg/m²       Will proceed with the EGD and biopsies        Chhaya Rod MD  06/30/21  11:23 AM No

## 2021-06-30 NOTE — DISCHARGE INSTRUCTIONS
Upper Endoscopy   WHAT YOU NEED TO KNOW:   An upper endoscopy is also called an upper gastrointestinal (GI) endoscopy, or an esophagogastroduodenoscopy (EGD)  You may feel bloated, gassy, or have some abdominal discomfort after your procedure  Your throat may be sore for 24 to 36 hours  You may burp or pass gas from air that is still inside your body  DISCHARGE INSTRUCTIONS:   Call 911 if:   · You have sudden chest pain or trouble breathing  Seek care immediately if:   · You feel dizzy or faint  · You have trouble swallowing  · You have severe throat pain  · Your bowel movements are very dark or black  · Your abdomen is hard and firm and you have severe pain  · You vomit blood  Contact your healthcare provider if:   · You feel full or bloated and cannot burp or pass gas  · You have not had a bowel movement for 3 days after your procedure  · You have neck pain  · You have a fever or chills  · You have nausea or are vomiting  · You have a rash or hives  · You have questions or concerns about your endoscopy  Relieve a sore throat:  Suck on throat lozenges or crushed ice  Gargle with a small amount of warm salt water  Mix 1 teaspoon of salt and 1 cup of warm water to make salt water  Relieve gas and discomfort from bloating:  Lie on your right side with a heating pad on your abdomen  Take short walks to help pass gas  Eat small meals until bloating is relieved  Rest after your procedure:  Do not drive or make important decisions until the day after your procedure  Return to your normal activity as directed  You can usually return to work the day after your procedure  Follow up with your healthcare provider as directed:  Write down your questions so you remember to ask them during your visits  © Copyright [address] Information is for End User's use only and may not be sold, redistributed or otherwise used for commercial purposes   All illustrations and images included in CareNotes® are the copyrighted property of A D A M , Inc  or Christ Gonzáles   The above information is an  only  It is not intended as medical advice for individual conditions or treatments  Talk to your doctor, nurse or pharmacist before following any medical regimen to see if it is safe and effective for you  Gastritis   WHAT YOU NEED TO KNOW:   Gastritis is inflammation or irritation of the lining of your stomach  DISCHARGE INSTRUCTIONS:   Call 911 for any of the following:   · You develop chest pain or shortness of breath  Seek care immediately if:   · You vomit blood  · You have black or bloody bowel movements  · You have severe stomach or back pain  Contact your healthcare provider if:   · You have a fever  · You have new or worsening symptoms, even after treatment  · You have questions or concerns about your condition or care  Medicines:   · Medicines  may be given to help treat a bacterial infection or decrease stomach acid  · Take your medicine as directed  Contact your healthcare provider if you think your medicine is not helping or if you have side effects  Tell him or her if you are allergic to any medicine  Keep a list of the medicines, vitamins, and herbs you take  Include the amounts, and when and why you take them  Bring the list or the pill bottles to follow-up visits  Carry your medicine list with you in case of an emergency  Manage or prevent gastritis:   · Do not smoke  Nicotine and other chemicals in cigarettes and cigars can make your symptoms worse and cause lung damage  Ask your healthcare provider for information if you currently smoke and need help to quit  E-cigarettes or smokeless tobacco still contain nicotine  Talk to your healthcare provider before you use these products  · Do not drink alcohol  Alcohol can prevent healing and make your gastritis worse   Talk to your healthcare provider if you need help to stop drinking  · Do not take NSAIDs or aspirin unless directed  These and similar medicines can cause irritation  If your healthcare provider says it is okay to take NSAIDs, take them with food  · Do not eat foods that cause irritation  Foods such as oranges and salsa can cause burning or pain  Eat a variety of healthy foods  Examples include fruits (not citrus), vegetables, low-fat dairy products, beans, whole-grain breads, and lean meats and fish  Try to eat small meals, and drink water with your meals  Do not eat for at least 3 hours before you go to bed  · Find ways to relax and decrease stress  Stress can increase stomach acid and make gastritis worse  Activities such as yoga, meditation, or listening to music can help you relax  Spend time with friends, or do things you enjoy  Follow up with your healthcare provider as directed: You may need ongoing tests or treatment, or referral to a gastroenterologist  Write down your questions so you remember to ask them during your visits  © Copyright [address] Information is for End User's use only and may not be sold, redistributed or otherwise used for commercial purposes  All illustrations and images included in CareNotes® are the copyrighted property of A [employer]  or [address] [place], [place] [address]buck   The above information is an  only  It is not intended as medical advice for individual conditions or treatments  Talk to your doctor, nurse or pharmacist before following any medical regimen to see if it is safe and effective for you

## 2021-06-30 NOTE — ANESTHESIA PREPROCEDURE EVALUATION
Procedure:  EGD    Relevant Problems   CARDIO   (+) Critical lower limb ischemia (HCC)   (+) Essential hypertension   (+) HLD (hyperlipidemia)      HEMATOLOGY   (+) Microcytic anemia      MUSCULOSKELETAL   (+) Charcot foot due to diabetes mellitus (HCC)      PULMONARY   (+) Obstructive sleep apnea      Other   (+) Class 2 severe obesity due to excess calories with serious comorbidity and body mass index (BMI) of 36 0 to 36 9 in adult [facility])   (+) Current use of insulin (HCC)   (+) Diabetes mellitus with stage 2 chronic kidney disease (HCC)   (+) Polyneuropathy associated with underlying disease (HCC)   (+) S/P transmetatarsal amputation of foot, right (HCC)        Physical Exam    Airway    Mallampati score: II  TM Distance: >3 FB  Neck ROM: full     Dental   No notable dental hx     Cardiovascular  Rhythm: regular, Rate: normal, Cardiovascular exam normal    Pulmonary  Pulmonary exam normal Breath sounds clear to auscultation,     Other Findings        Anesthesia Plan  ASA Score- 3     Anesthesia Type- IV sedation with anesthesia and general with ASA Monitors  Additional Monitors:   Airway Plan:           Plan Factors-    Chart reviewed  Patient summary reviewed  Patient is not a current smoker  Patient instructed to abstain from smoking on day of procedure  Patient did not smoke on day of surgery  Induction- intravenous  Postoperative Plan-     Informed Consent- Anesthetic plan and risks discussed with patient and daughter

## 2021-07-14 ENCOUNTER — TELEPHONE (OUTPATIENT)
Dept: NEPHROLOGY | Facility: CLINIC | Age: 56
End: 2021-07-14

## 2021-07-22 ENCOUNTER — TELEPHONE (OUTPATIENT)
Dept: NEPHROLOGY | Facility: CLINIC | Age: 56
End: 2021-07-22

## 2021-07-22 DIAGNOSIS — Z79.4 TYPE 2 DIABETES MELLITUS WITH OTHER CIRCULATORY COMPLICATION, WITH LONG-TERM CURRENT USE OF INSULIN (HCC): ICD-10-CM

## 2021-07-22 DIAGNOSIS — Z79.4 CURRENT USE OF INSULIN (HCC): ICD-10-CM

## 2021-07-22 DIAGNOSIS — E11.59 TYPE 2 DIABETES MELLITUS WITH OTHER CIRCULATORY COMPLICATION, WITH LONG-TERM CURRENT USE OF INSULIN (HCC): ICD-10-CM

## 2021-07-22 RX ORDER — INSULIN GLARGINE 100 [IU]/ML
INJECTION, SOLUTION SUBCUTANEOUS
Qty: 20 ML | Refills: 4 | Status: SHIPPED | OUTPATIENT
Start: 2021-07-22 | End: 2021-11-01

## 2021-07-22 NOTE — TELEPHONE ENCOUNTER
Patient called the office and stated she is  having weight loss surgery done and was informed she needed a letter from Dr Derk Severe stating she is cleared for surgery  Would you like to see this patient before clearing her for surgery?

## 2021-07-23 ENCOUNTER — OFFICE VISIT (OUTPATIENT)
Dept: BARIATRICS | Facility: CLINIC | Age: 56
End: 2021-07-23

## 2021-07-23 VITALS — WEIGHT: 184.2 LBS | HEIGHT: 60 IN | BODY MASS INDEX: 36.16 KG/M2

## 2021-07-23 DIAGNOSIS — E66.01 CLASS 2 SEVERE OBESITY DUE TO EXCESS CALORIES WITH SERIOUS COMORBIDITY AND BODY MASS INDEX (BMI) OF 36.0 TO 36.9 IN ADULT (HCC): ICD-10-CM

## 2021-07-23 DIAGNOSIS — E66.9 OBESITY, CLASS II, BMI 35-39.9: Primary | ICD-10-CM

## 2021-07-23 PROCEDURE — 3008F BODY MASS INDEX DOCD: CPT | Performed by: INTERNAL MEDICINE

## 2021-07-23 PROCEDURE — RECHECK: Performed by: DIETITIAN, REGISTERED

## 2021-07-23 NOTE — PATIENT INSTRUCTIONS
Goals   Continue to food log in books provided  After prescription vitamin D is completed, take 2000 IU of vitamin D for  Maintenance  Get blood work completed prior to next appointment ( A1c)  Schedule cardiology risk assessment   Complete lesson plans 3-6 by next dietitian appointment in October  ** Please bring bariatric book to appointment with dietitian in October

## 2021-07-23 NOTE — PROGRESS NOTES
Bariatric Nutrition Assessment Note    Type of surgery    Preop  Surgery Date: TBD- patient has 6 months of weight check   Surgeon: Dr Benson Staff  Today is 4/6 of her program requirement     Nutrition Assessment   Praful Lyon  54 y o   adult     Wt with BMI of 25:127 3  Pre-Op Excess Wt: 67 1#     Ht 5' (1 524 m)   Wt 83 6 kg (184 lb 3 2 oz)   LMP  (LMP Unknown)   BMI 35 97 kg/m²    Patient has lost 8 8#  since starting the program (5%) of her body weight     Wt Readings from Last 3 Encounters:   06/30/21 85 7 kg (189 lb)   06/29/21 85 7 kg (189 lb)   06/23/21 85 6 kg (188 lb 12 8 oz)       [place] [facility] Equation:  1462  Estimated calories for weight loss 2619-4619  ( 1/2# to 1#  per wk wt loss - sedentary )  Estimated protein needs 46-58 grams (  8-1 0 grams /kg IBW with CKD 2)   Estimated fluid needs 2025ml  (35 ml/kg IBW )  68 ounces per day     Weight History   Onset of Obesity: Adult after pregnancies 33 years ago, 32 & 24   Family history of obesity: Yes  Wt Loss Attempts: Exercise  Self Created Diets (Portion Control, Healthy Food Choices, etc )  Gym - treadmill, used to ride her bike to work OTC  Patient has tried the above for 6 months or more with insufficient weight loss or weight regain, which is why patient has requested to be evaluated for weight loss surgery today  Maximum Wt Lost: lost about 25 pounds but then regained the weight back due to emotional eating       Review of History and Medications   Past Medical History:   Diagnosis Date    Cellulitis of toe 1/28/2019    CKD (chronic kidney disease)     Diabetes mellitus ([address] )     Gangrene of toe of right foot ([address] ) 10/30/2019    Added automatically from request for surgery [id number]    Hypertension      Past Surgical History:   Procedure Laterality Date    IR ABDOMINAL AORTAGRAM  2/1/2019    IR AORTAGRAM WITH RUN-OFF  5/5/2020    IR ARTERIAL LYSIS  4/30/2019    IR LOWER EXTREMITY / INTERVENTION  11/1/2019    IA AMPUTATION FOOT,TRANSMETATARSAL Right 2019    Procedure: AMPUTATION TRANSMETATARSAL (TMA) WITH ACHILLES TENDON LENGTHING;  Surgeon: Bill Whitman DPM;  [facility];  Service: Podiatry    [address] [place] [address] Right 2020    Procedure: AMPUTATION TRANSMETATARSAL (TMA);  Surgeon: Roel Stauffer DPM;  [facility];  Service: Podiatry    TOE AMPUTATION Left 2019    Procedure: AMPUTATION 5th TOE;  Surgeon: Bill Whitman DPM;  [facility];  Service: Podiatry    TOE AMPUTATION Right 2019    Procedure: AMPUTATION RIGHT 3RD AND 4TH TOE;  Surgeon: Bill Whitman DPM;  [facility];  Service: Podiatry     Social History     Socioeconomic History    Marital status: Single     Spouse name: Not on file    Number of children: Not on file    Years of education: Not on file    Highest education level: Not on file   Occupational History    Not on file   Tobacco Use    Smoking status: Former Smoker     Types: Cigarettes     Quit date:      Years since quittin 5    Smokeless tobacco: Never Used   Vaping Use    Vaping Use: Never used   Substance and Sexual Activity    Alcohol use: Not Currently    Drug use: No    Sexual activity: Not on file   Other Topics Concern    Not on file   Social History Narrative    Not on file     Social Determinants of Health     Financial Resource Strain:     Difficulty of Paying Living Expenses:    Food Insecurity:     Worried About Running Out of Food in the Last Year:     [address] in the Last Year:    Transportation Needs: No Transportation Needs    Lack of Transportation (Medical): No    Lack of Transportation (Non-Medical):  No   Physical Activity:     Days of Exercise per Week:     Minutes of Exercise per Session:    Stress:     Feeling of Stress :    Social Connections:     Frequency of Communication with Friends and Family:     Frequency of Social Gatherings with Friends and Family:     Attends Rastafarian Services:     Active Member of Clubs or Organizations:     Attends Club or Organization Meetings:     Marital Status:    Intimate Partner Violence:     Fear of Current or Ex-Partner:     Emotionally Abused:     Physically Abused:     Sexually Abused:        Current Outpatient Medications:     aspirin 81 mg chewable tablet, Chew 1 tablet (81 mg total) daily, Disp: , Rfl:     atorvastatin (LIPITOR) 20 mg tablet, Take 1 tablet (20 mg total) by mouth daily, Disp: 90 tablet, Rfl: 1    cetirizine (ZyrTEC) 10 mg tablet, Take 1 tablet (10 mg total) by mouth daily (Patient taking differently: Take 10 mg by mouth daily as needed ), Disp: 90 tablet, Rfl: 1    clopidogrel (PLAVIX) 75 mg tablet, Take 1 tablet (75 mg total) by mouth daily, Disp: 90 tablet, Rfl: 1    Dulaglutide 1 5 MG/0 5ML SOPN, Inject 0 5 mL (1 5 mg total) under the skin once a week, Disp: 2 mL, Rfl: 5    ergocalciferol (ERGOCALCIFEROL) 1 25 MG (32962 UT) capsule, Take 1 capsule (50,000 Units total) by mouth once a week, Disp: 12 capsule, Rfl: 1    ferrous sulfate 324 (65 Fe) mg, Take 1 tablet (324 mg total) by mouth 2 (two) times a day before meals, Disp: 180 tablet, Rfl: 3    fluticasone (FLONASE) 50 mcg/act nasal spray, 1 spray into each nostril daily, Disp: 3 Bottle, Rfl: 1    glucose blood (ONE TOUCH ULTRA TEST) test strip, Use to test 3x daily, Disp: 300 each, Rfl: 1    insulin glargine (Lantus SoloStar) 100 units/mL injection pen, Use 20units subcut once daily at 9pm, Disp: 20 mL, Rfl: 4    Insulin Pen Needle 32G X 4 MM MISC, Use 4 times daily with insulin pens, Disp: 120 each, Rfl: 5    lisinopril (ZESTRIL) 5 mg tablet, Take 1 tablet (5 mg total) by mouth daily, Disp: 90 tablet, Rfl: 3    metFORMIN (GLUCOPHAGE) 1000 MG tablet, Take 1 tablet (1,000 mg total) by mouth 2 (two) times a day with meals, Disp: 180 tablet, Rfl: 3    metoprolol tartrate (LOPRESSOR) 25 mg tablet, Take 1 tablet (25 mg total) by mouth every 12 (twelve) hours, Disp: 180 tablet, Rfl: 1    OneTouch Delica Lancets 12V MISC, Use to test 3x daily, Disp: 300 each, Rfl: 2  Food Intake and Lifestyle Assessment   Food Intake Assessment completed via usual diet recall- continues to be the same   Breakfast: 9 am - black coffee with toast( 1 )  and a boiled egg   Snack: 0   Lunch: 12:30 - sandwich - ham and cheese ( on toast ) sugar free fruit cup   Snack: 0  Dinner: 6 pm : Vegetables, sometimes chicken or fish - sometimes will eat starch, rice or pasta but not every night   Snack: vanilla pudding or fruit   Beverage intake: water and diet soda( gingergale gianna )   Protein supplement: none   Estimated protein intake per day: 50-60 grams ( she was told to limit with CKD 2)  Estimated fluid intake per day: 2 bottles of water per day and one can of soda per day   Meals eaten away from home: twice a month   Typical meal pattern: 3 meals per day and 0-1 snacks per day  Eating Behaviors: Mindless eating, Emotional eating, Craves sweet foods and Craves salty foods  Food allergies or intolerances: N/A  Allergies   Allergen Reactions    Levemir [Insulin Detemir] Itching     Cultural or Adventism considerations:      Physical Assessment    Lab Results   Component Value Date    HGBA1C 7 2 (H) 04/22/2021     Physical Activity  Types of exercise: Walking  Walking in the home , leg exercises   Current physical limitations: amputation of toes     Psychosocial Assessment   Support systems: children- adult daughter   Socioeconomic factors: lives with daughter     Nutrition Diagnosis( continued )   Diagnosis: Overweight / Obesity (NC-3 3)  Related to: Physical inactivity and Excessive energy intake  As Evidenced by: BMI >25     Nutrition Prescription: Recommend the following diet  Low fat, Low sugar and 60 grams protein ( CKD) and consistent carb     Interventions and Teaching   Discussed pre-op and post-op nutrition guidelines         Patient educated and handouts provided  Surgical changes to stomach / GI  Capacity of post-surgery stomach  Diet progression  Adequate hydration  Sugar and fat restriction to decrease "dumping syndrome"  Fat restriction to decrease steatorrhea  Expected weight loss  Weight loss plateaus/ possibility of weight regain  Exercise  Suggestions for pre-op diet  Nutrition considerations after surgery  Protein supplements  Meal planning and preparation  Appropriate carbohydrate, protein, and fat intake, and food/fluid choices to maximize safe weight loss, nutrient intake, and tolerance   Dietary and lifestyle changes  Possible problems with poor eating habits  Intuitive eating  Techniques for self monitoring and keeping daily food journal  Potential for food intolerance after surgery, and ways to deal with them including: lactose intolerance, nausea, reflux, vomiting, diarrhea, food intolerance, appetite changes, gas  Vitamin / Mineral supplementation of Multivitamin with minerals, Calcium, Vitamin B12, Iron, Fat Soluble vitamins and Vitamin D  Patient is not currently pregnant and doesn't desire to become pregnant a minimum of one year post-op    Provided with samples of Premier protein     Education provided to: patient- daughter present and supportive  Barriers to learning: No barriers identified    Readiness to change: preparation    Prior research on procedure: discussed with provider and friends or family    Comprehension: needs reinforcement and verbalizes understanding     Expected Compliance: good  Workflow:   PCP Letter: no    Support Group: no    6 Month Pre-Operative Program: 4/6   Bloodwork: lab slip provided   Cardiac Risk Assessment: referral placed    Sleep Studies: has cpap machine , w   EGD needs surgeon appointment - done    Weight Loss: ongoing    Psych/D+A/Nicotine? N/a     Recommendations  Pt is an appropriate candidate for surgery   Yes    Evaluation / Monitoring  Dietitian to Monitor: Eating pattern as discussed Tolerance of nutrition prescription Body weight Lab values Physical activity Bowel pattern  Patient has been drinking 4 16 ounce bottles of water per day   She has stopped drinking all soda  She is eating off a smaller plate to decrease her portion sizes  She forgot to keep her food log but is keeping a written food log at home  She is more mindful concerning her food choices and is chewing her food more  She includes protein at each meal   She generally eats 3 meals per day , but sometimes will eat 2 meals and one protein shake ( Premier)  Trying to practice the 30/30 to 30/60 rule  Reviewed lesson plans in her book She has been more consistent with movement, walks in place daily in the morning  She is taking her vitamins and minerals as recommended   Instructed to increase her iron to twice per day MWF and then daily after one to two weeks due to her chronic anemia ( could also be related to CKD)    Goals  Food journal, Complete lession plans 1-6, Eat 3 meals per day and and one planned snack   Food journal  on books provided  Weekly vitamin D2 as prescribed   2000 IU of vitamin D3 per day for maintenance   Gradually increase iron to twice per day   Complete lesson plans 4-6  prior to next dietitian appointment   Continue walking indoors   Get A1c level completed prior to next RD appointment     Time Spent:   30 Minutes

## 2021-07-23 NOTE — TELEPHONE ENCOUNTER
I left message for patient to call the office to schedule a surgery clearance appointment per Dr Jacinto Centeno would also like to know when the surgery is schedule for

## 2021-07-26 ENCOUNTER — TELEPHONE (OUTPATIENT)
Dept: BARIATRICS | Facility: CLINIC | Age: 56
End: 2021-07-26

## 2021-07-26 DIAGNOSIS — N28.9 KIDNEY DISEASE: Primary | ICD-10-CM

## 2021-07-26 NOTE — TELEPHONE ENCOUNTER
I left message for patient to call the office to schedule a surgery clearance appointment  Please ask patient if she has a surgery appointment already scheduled

## 2021-07-26 NOTE — TELEPHONE ENCOUNTER
I called her left a message I received notice her insurance changed to [facility] she must call to verify she has coverage on this policy for Bariatric surgery  If she does she must complete everything I gave her to do on her check list once completed she can be submitted for surgery

## 2021-07-29 NOTE — TELEPHONE ENCOUNTER
I left message for patient's emergency contact to find out the best number to reach the patient at  We received a call back from the [phone] stating that we had the wrong number  However that number is also the emergency contact number so I left message for the emergency to call us back with an updated number for the patient

## 2021-07-30 ENCOUNTER — TELEPHONE (OUTPATIENT)
Dept: NEPHROLOGY | Facility: CLINIC | Age: 56
End: 2021-07-30

## 2021-07-30 NOTE — TELEPHONE ENCOUNTER
Patient's daughter called back and spoke to Annika Dillon  Patient's daughter stated the appointment that they schedule for October was for the surgery clearance

## 2021-07-30 NOTE — TELEPHONE ENCOUNTER
7/30 Niesha's daughter Eloisa Begum called back and stated that her number is the best contact number and that her mother has an appointment scheduled with Dr Alcides Alberts on 10/5/21 in  for her surgical clearance

## 2021-08-20 ENCOUNTER — TELEPHONE (OUTPATIENT)
Dept: BARIATRICS | Facility: CLINIC | Age: 56
End: 2021-08-20

## 2021-08-20 NOTE — TELEPHONE ENCOUNTER
I left message for patient to call to review her check list she started the process in Feb and has still not completed much of what is required to submit her case for an approval

## 2021-08-25 ENCOUNTER — APPOINTMENT (OUTPATIENT)
Dept: LAB | Facility: HOSPITAL | Age: 56
End: 2021-08-25
Attending: INTERNAL MEDICINE
Payer: COMMERCIAL

## 2021-08-25 ENCOUNTER — OFFICE VISIT (OUTPATIENT)
Dept: PODIATRY | Facility: CLINIC | Age: 56
End: 2021-08-25
Payer: COMMERCIAL

## 2021-08-25 ENCOUNTER — HOSPITAL ENCOUNTER (OUTPATIENT)
Dept: RADIOLOGY | Facility: HOSPITAL | Age: 56
Discharge: HOME | End: 2021-08-25
Attending: PODIATRIST
Payer: COMMERCIAL

## 2021-08-25 VITALS
BODY MASS INDEX: 36.52 KG/M2 | HEIGHT: 60 IN | DIASTOLIC BLOOD PRESSURE: 90 MMHG | WEIGHT: 186 LBS | SYSTOLIC BLOOD PRESSURE: 170 MMHG

## 2021-08-25 DIAGNOSIS — I73.9 PAD (PERIPHERAL ARTERY DISEASE) (HCC): ICD-10-CM

## 2021-08-25 DIAGNOSIS — L97.524 DIABETIC ULCER OF TOE OF LEFT FOOT ASSOCIATED WITH TYPE 2 DIABETES MELLITUS, WITH NECROSIS OF BONE (HCC): ICD-10-CM

## 2021-08-25 DIAGNOSIS — L97.524 DIABETIC ULCER OF TOE OF LEFT FOOT ASSOCIATED WITH TYPE 2 DIABETES MELLITUS, WITH NECROSIS OF BONE (HCC): Primary | ICD-10-CM

## 2021-08-25 DIAGNOSIS — E11.621 DIABETIC ULCER OF TOE OF LEFT FOOT ASSOCIATED WITH TYPE 2 DIABETES MELLITUS, WITH NECROSIS OF BONE (HCC): ICD-10-CM

## 2021-08-25 DIAGNOSIS — E11.22 DIABETES MELLITUS WITH STAGE 2 CHRONIC KIDNEY DISEASE (HCC): ICD-10-CM

## 2021-08-25 DIAGNOSIS — N18.2 DIABETES MELLITUS WITH STAGE 2 CHRONIC KIDNEY DISEASE (HCC): ICD-10-CM

## 2021-08-25 DIAGNOSIS — Z89.431 S/P TRANSMETATARSAL AMPUTATION OF FOOT, RIGHT (HCC): ICD-10-CM

## 2021-08-25 DIAGNOSIS — L03.032 CELLULITIS OF FOURTH TOE OF LEFT FOOT: ICD-10-CM

## 2021-08-25 DIAGNOSIS — E11.621 DIABETIC ULCER OF TOE OF LEFT FOOT ASSOCIATED WITH TYPE 2 DIABETES MELLITUS, WITH NECROSIS OF BONE (HCC): Primary | ICD-10-CM

## 2021-08-25 DIAGNOSIS — E11.610 CHARCOT FOOT DUE TO DIABETES MELLITUS (HCC): ICD-10-CM

## 2021-08-25 LAB
EST. AVERAGE GLUCOSE BLD GHB EST-MCNC: 148 MG/DL
HBA1C MFR BLD: 6.8 %

## 2021-08-25 PROCEDURE — 99214 OFFICE O/P EST MOD 30 MIN: CPT | Performed by: PODIATRIST

## 2021-08-25 PROCEDURE — 3044F HG A1C LEVEL LT 7.0%: CPT | Performed by: PODIATRIST

## 2021-08-25 PROCEDURE — 3066F NEPHROPATHY DOC TX: CPT | Performed by: PODIATRIST

## 2021-08-25 PROCEDURE — 1036F TOBACCO NON-USER: CPT | Performed by: PODIATRIST

## 2021-08-25 PROCEDURE — 73630 X-RAY EXAM OF FOOT: CPT

## 2021-08-25 PROCEDURE — 83036 HEMOGLOBIN GLYCOSYLATED A1C: CPT

## 2021-08-25 PROCEDURE — 36415 COLL VENOUS BLD VENIPUNCTURE: CPT

## 2021-08-25 RX ORDER — CEPHALEXIN 500 MG/1
500 CAPSULE ORAL EVERY 6 HOURS SCHEDULED
Qty: 28 CAPSULE | Refills: 0 | Status: SHIPPED | OUTPATIENT
Start: 2021-08-25 | End: 2021-09-01 | Stop reason: SDUPTHER

## 2021-08-25 NOTE — PROGRESS NOTES
Assessment/Plan:         Diagnoses and all orders for this visit:    Diabetic ulcer of toe of left foot associated with type 2 diabetes mellitus, with necrosis of bone ([facility] )  -     X-ray foot left 3+ views; Future  -     cephalexin (KEFLEX) 500 mg capsule; Take 1 capsule (500 mg total) by mouth every 6 (six) hours for 7 days    Cellulitis of fourth toe of left foot  -     cephalexin (KEFLEX) 500 mg capsule; Take 1 capsule (500 mg total) by mouth every 6 (six) hours for 7 days    S/P transmetatarsal amputation of foot, right (HCC)    PAD (peripheral artery disease) ([facility] )        Diagnosis and options discussed  Patient has acute cellulitis of her toe from a diabetic foot ulcer and I highly suspect osteomyelitis  She is being sent for x-rays and placed on an oral antibiotic  I told her if the redness does not improve over the next 3 days she should go to the emergency room  Patient has complicated history of peripheral arterial disease diabetic neuropathy previous amputations  She is at high risk for amputation of this digit or worse  Her last arterial Dopplers showed poor perfusion to the left forefoot  Depending on the results of her x-ray and may recommend she go to the hospital if there is erosion of the bone  Patient was told if she develops any nausea, fever, vomiting, chills to go to the hospital immediately  If the toe gets worse or redness begin streaking onto her foot she should go to the hospital immediately  Wound care instructions given to patient  Reviewed last arterial doppler 12/2020: LLE perfusion showed [place] 0 57, GTP 44, met pressure 60    Reviewed A1C 7 2 from 4/22/21    Subjective:      Patient ID: Jodi Kapadia is a 54 y o  adult  Patient reports a cyst or skin color changes to her left 4th toe  It has been getting worse per month  The toe was red and swollen  She seems unaware that the tip of the toe is black with an open boot    She has history of diabetic neuropathy, foot deformity, peripheral vascular disease  The following portions of the patient's history were reviewed and updated as appropriate: Maggi Gutierres  has a past medical history of Cellulitis of toe (1/28/2019), CKD (chronic kidney disease), Diabetes mellitus ([facility] ), Gangrene of toe of right foot ([facility] ) (10/30/2019), and Hypertension  Tresa Tejadaison Camron   Patient Active Problem List    Diagnosis Date Noted    Well woman exam 02/10/2021    Vaginal candida 02/10/2021    Obstructive sleep apnea 10/30/2020    Persistent proteinuria 09/03/2020    Vitamin D deficiency 09/03/2020    Current use of insulin ([facility] ) 08/27/2020    S/P transmetatarsal amputation of foot, right ([facility] ) 08/26/2020    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, with necrosis of bone ([facility] ) 05/08/2020    Cellulitis of right lower extremity 05/04/2020    Class 2 severe obesity due to excess calories with serious comorbidity and body mass index (BMI) of 36 0 to 36 9 in adult [facility]) 01/27/2020    History of transmetatarsal amputation of foot ([facility] ) 11/20/2019    Charcot foot due to diabetes mellitus ([facility] ) 08/05/2019    Polyneuropathy associated with underlying disease ([facility] ) 07/08/2019    Critical lower limb ischemia ([facility] ) 04/30/2019    Toe amputation status, left 02/11/2019    Leukocytosis 02/05/2019    Microcytic anemia 02/01/2019    Diabetes mellitus with stage 2 chronic kidney disease ([facility] ) 01/31/2019    HLD (hyperlipidemia) 01/28/2019    PAD (peripheral artery disease) ([facility] ) 01/28/2019    Essential hypertension 10/27/2016     Maggi Gutierres  has a past surgical history that includes IR abdominal aortagram (2/1/2019); Toe amputation (Left, 2/4/2019); IR arterial lysis (4/30/2019); IR lower extremity / intervention (11/1/2019); Toe amputation (Right, 11/4/2019); pr amputation foot,transmetatarsal (Right, 11/7/2019);  IR aortagram with run-off (5/5/2020); and pr amputation foot,transmetatarsal (Right, 5/7/2020)  Marlys Lyon's family history includes Diabetes in Quinton Lyon's mother; No Known Problems in Quinton Lyon's father  Marlys Lyon  reports that Quinton Lyon quit smoking about 8 years ago  Senait Lyon's smoking use included cigarettes  Senait Lyon has never used smokeless tobacco  Senait Lyon reports previous alcohol use  Senait Lyon reports that Quinton Lyon does not use drugs    Current Outpatient Medications   Medication Sig Dispense Refill    aspirin 81 mg chewable tablet Chew 1 tablet (81 mg total) daily      atorvastatin (LIPITOR) 20 mg tablet Take 1 tablet (20 mg total) by mouth daily 90 tablet 1    cetirizine (ZyrTEC) 10 mg tablet Take 1 tablet (10 mg total) by mouth daily (Patient taking differently: Take 10 mg by mouth daily as needed ) 90 tablet 1    clopidogrel (PLAVIX) 75 mg tablet Take 1 tablet (75 mg total) by mouth daily 90 tablet 1    Dulaglutide 1 5 MG/0 5ML SOPN Inject 0 5 mL (1 5 mg total) under the skin once a week 2 mL 5    ergocalciferol (ERGOCALCIFEROL) 1 25 MG (16625 UT) capsule Take 1 capsule (50,000 Units total) by mouth once a week 12 capsule 1    ferrous sulfate 324 (65 Fe) mg Take 1 tablet (324 mg total) by mouth 2 (two) times a day before meals 180 tablet 3    fluticasone (FLONASE) 50 mcg/act nasal spray 1 spray into each nostril daily 3 Bottle 1    glucose blood (ONE TOUCH ULTRA TEST) test strip Use to test 3x daily 300 each 1    insulin glargine (Lantus SoloStar) 100 units/mL injection pen Use 20units subcut once daily at 9pm 20 mL 4    Insulin Pen Needle 32G X 4 MM MISC Use 4 times daily with insulin pens 120 each 5    lisinopril (ZESTRIL) 5 mg tablet Take 1 tablet (5 mg total) by mouth daily 90 tablet 3    metFORMIN (GLUCOPHAGE) 1000 MG tablet Take 1 tablet (1,000 mg total) by mouth 2 (two) times a day with meals 180 tablet 3    metoprolol tartrate (LOPRESSOR) 25 mg tablet Take 1 tablet (25 mg total) by mouth every 12 (twelve) hours 180 tablet 1    OneTouch Delica Lancets 50M MISC Use to test 3x daily 300 each 2    cephalexin (KEFLEX) 500 mg capsule Take 1 capsule (500 mg total) by mouth every 6 (six) hours for 7 days 28 capsule 0     No current facility-administered medications for this visit  Current Outpatient Medications on File Prior to Visit   Medication Sig    aspirin 81 mg chewable tablet Chew 1 tablet (81 mg total) daily    atorvastatin (LIPITOR) 20 mg tablet Take 1 tablet (20 mg total) by mouth daily    cetirizine (ZyrTEC) 10 mg tablet Take 1 tablet (10 mg total) by mouth daily (Patient taking differently: Take 10 mg by mouth daily as needed )    clopidogrel (PLAVIX) 75 mg tablet Take 1 tablet (75 mg total) by mouth daily    Dulaglutide 1 5 MG/0 5ML SOPN Inject 0 5 mL (1 5 mg total) under the skin once a week    ergocalciferol (ERGOCALCIFEROL) 1 25 MG (39320 UT) capsule Take 1 capsule (50,000 Units total) by mouth once a week    ferrous sulfate 324 (65 Fe) mg Take 1 tablet (324 mg total) by mouth 2 (two) times a day before meals    fluticasone (FLONASE) 50 mcg/act nasal spray 1 spray into each nostril daily    glucose blood (ONE TOUCH ULTRA TEST) test strip Use to test 3x daily    insulin glargine (Lantus SoloStar) 100 units/mL injection pen Use 20units subcut once daily at 9pm    Insulin Pen Needle 32G X 4 MM MISC Use 4 times daily with insulin pens    lisinopril (ZESTRIL) 5 mg tablet Take 1 tablet (5 mg total) by mouth daily    metFORMIN (GLUCOPHAGE) 1000 MG tablet Take 1 tablet (1,000 mg total) by mouth 2 (two) times a day with meals    metoprolol tartrate (LOPRESSOR) 25 mg tablet Take 1 tablet (25 mg total) by mouth every 12 (twelve) hours    OneTouch Delica Lancets 69X MISC Use to test 3x daily     No current facility-administered medications on file prior to visit       Paul Pickering Camron is allergic to levemir [insulin detemir]       Review of Systems   Constitutional: Negative  HENT: Negative  Respiratory: Negative  Cardiovascular: Negative  Gastrointestinal: Negative  Musculoskeletal: Positive for arthralgias and joint swelling  Skin: Positive for color change and wound  Neurological: Positive for weakness and numbness  Psychiatric/Behavioral: The patient is not nervous/anxious  Objective:      /90   Ht 5' (1 524 m)   Wt 84 4 kg (186 lb)   LMP  (LMP Unknown)   BMI 36 33 kg/m²          Physical Exam  Vitals reviewed  Constitutional:       Appearance: Jason Rodriguez is obese  Jason Rodriguez is not ill-appearing or diaphoretic  HENT:      Nose: No congestion or rhinorrhea  Cardiovascular:      Rate and Rhythm: Normal rate  Pulses:           Dorsalis pedis pulses are detected w/ Doppler on the left side  Posterior tibial pulses are detected w/ Doppler on the left side  Pulmonary:      Effort: Pulmonary effort is normal  No respiratory distress  Abdominal:      General: There is no distension  Tenderness: There is no abdominal tenderness  Musculoskeletal:      Right foot: Deformity (TMA) present  Left foot: Deformity (5th toe is amputated) present  Feet:      Left foot:      Protective Sensation: 9 sites tested  0 sites sensed  Skin integrity: Ulcer (tip left 4th toe probes to bone) and erythema present  Neurological:      Mental Status: Jason Rodriguez is alert and oriented to person, place, and time  Sensory: Sensory deficit present

## 2021-08-27 ENCOUNTER — TELEPHONE (OUTPATIENT)
Dept: BARIATRICS | Facility: CLINIC | Age: 56
End: 2021-08-27

## 2021-08-27 NOTE — TELEPHONE ENCOUNTER
Left message about her cancelling appointment again and what is remaining on her check list that if she cannot commit to program at this point to complete remaining tasks maybe she would like with [facility] at this time asked her to return call   Cu

## 2021-08-31 ENCOUNTER — TELEPHONE (OUTPATIENT)
Dept: PODIATRY | Facility: CLINIC | Age: 56
End: 2021-08-31

## 2021-09-01 ENCOUNTER — OFFICE VISIT (OUTPATIENT)
Dept: PODIATRY | Facility: CLINIC | Age: 56
End: 2021-09-01
Payer: COMMERCIAL

## 2021-09-01 VITALS
HEART RATE: 78 BPM | SYSTOLIC BLOOD PRESSURE: 177 MMHG | HEIGHT: 60 IN | WEIGHT: 182 LBS | DIASTOLIC BLOOD PRESSURE: 101 MMHG | BODY MASS INDEX: 35.73 KG/M2

## 2021-09-01 DIAGNOSIS — L03.032 CELLULITIS OF FOURTH TOE OF LEFT FOOT: ICD-10-CM

## 2021-09-01 DIAGNOSIS — L97.524 DIABETIC ULCER OF TOE OF LEFT FOOT ASSOCIATED WITH TYPE 2 DIABETES MELLITUS, WITH NECROSIS OF BONE (HCC): Primary | ICD-10-CM

## 2021-09-01 DIAGNOSIS — E11.621 DIABETIC ULCER OF TOE OF LEFT FOOT ASSOCIATED WITH TYPE 2 DIABETES MELLITUS, WITH NECROSIS OF BONE (HCC): Primary | ICD-10-CM

## 2021-09-01 PROCEDURE — 28160 PARTIAL REMOVAL OF TOE: CPT | Performed by: PODIATRIST

## 2021-09-01 PROCEDURE — 3008F BODY MASS INDEX DOCD: CPT | Performed by: PODIATRIST

## 2021-09-01 RX ORDER — LIDOCAINE HYDROCHLORIDE 10 MG/ML
3 INJECTION, SOLUTION INFILTRATION; PERINEURAL ONCE
Status: COMPLETED | OUTPATIENT
Start: 2021-09-01 | End: 2021-09-01

## 2021-09-01 RX ORDER — CEPHALEXIN 500 MG/1
500 CAPSULE ORAL EVERY 6 HOURS SCHEDULED
Qty: 28 CAPSULE | Refills: 0 | Status: SHIPPED | OUTPATIENT
Start: 2021-09-01 | End: 2021-09-08

## 2021-09-01 RX ADMIN — LIDOCAINE HYDROCHLORIDE 3 ML: 10 INJECTION, SOLUTION INFILTRATION; PERINEURAL at 14:54

## 2021-09-01 NOTE — PROGRESS NOTES
Procedures    Procedure:  Amputation partial left 4th toe amputation at the proximal interphalangeal joint (CPT: 91769)  Surgeon:  Dr Maribel Herrera DPM   Preop diagnosis:  Osteomyelitis distal phalanx left 4th toe with diabetic foot ulcer  Postop diagnosis:  Same   Anesthesia:  3 cc 1% lidocaine plain   Estimated blood loss:  Less than 5 mL   Material: 4-0 nylon  Complications: None    Consent was reviewed and signed for partial versus total amputation left 4th toe  X-ray was reviewed with the patient showing erosion of the distal phalanx  The option of having this performed under local anesthesia versus admission to the hospital was discussed with patient  Alternatives, risks, complications were discussed with patient  The toe was marked and a time-out was performed and witnessed by office staff  Attention was directed to the left foot  Three cc of 1% lidocaine plain was infiltrated in a digit block fashion  The foot was then scrubbed, prepped, draped in the usual aseptic manner  A time-out was again performed with the patient and office staff in presence  Using a 15 blade the toe was disarticulated at the proximal interphalangeal joint and discarded  There was no sign of infection at the amputation margin  It was rinsed with normal sterile saline  The skin was repaired with 4-0 nylon in simple interrupted suture  The foot was cleaned and dressed with dry sterile dressing and compression wrap  At no point during the procedure or afterwards did the patient experience any pain or difficulty  Vitals are stable following the procedure  A surgical shoe was applied

## 2021-09-01 NOTE — PATIENT INSTRUCTIONS
Care for your left foot  1  Leave the dressing clean dry and intact for 48 hours do not get wet  2  Gently remove the dressing and clean with wound wash or sterile saline water  3  Dry well and swab incision with Betadine  4  Cover incision with dry sterile gauze dressing  5  Wear surgical shoe at all times if your foot is on the floor  6    A refill of your antibiotic as at the pharmacy just continue to finish the script to have and then take the 2nd prescription until it is done

## 2021-09-14 ENCOUNTER — RA CDI HCC (OUTPATIENT)
Dept: OTHER | Facility: HOSPITAL | Age: 56
End: 2021-09-14

## 2021-09-14 ENCOUNTER — TELEPHONE (OUTPATIENT)
Dept: FAMILY MEDICINE CLINIC | Facility: CLINIC | Age: 56
End: 2021-09-14

## 2021-09-14 NOTE — TELEPHONE ENCOUNTER
Form received on 09- and placed in POD 3   folder to be completed by PCP  Forms to be delivered at assigned time  Bill MONREAL FORM

## 2021-09-14 NOTE — PROGRESS NOTES
[facility]  coding opportunities             Chart Reviewed * (Number of) Inbasket suggestions sent to Provider: 1     DX: [id number] [phone] Type 2 diabetes mellitus with diabetic peripheral angiopathy without gangrene            Patients insurance company: [employer] (Medicare Advantage only)           Provider never responded to [place] [id number]  coding request, and DX: [phone] was not used

## 2021-09-15 ENCOUNTER — OFFICE VISIT (OUTPATIENT)
Dept: PODIATRY | Facility: CLINIC | Age: 56
End: 2021-09-15

## 2021-09-15 VITALS
BODY MASS INDEX: 36.12 KG/M2 | HEART RATE: 79 BPM | SYSTOLIC BLOOD PRESSURE: 151 MMHG | HEIGHT: 60 IN | WEIGHT: 184 LBS | DIASTOLIC BLOOD PRESSURE: 83 MMHG

## 2021-09-15 DIAGNOSIS — L03.032 CELLULITIS OF FOURTH TOE OF LEFT FOOT: ICD-10-CM

## 2021-09-15 DIAGNOSIS — E11.621 DIABETIC ULCER OF TOE OF LEFT FOOT ASSOCIATED WITH TYPE 2 DIABETES MELLITUS, WITH NECROSIS OF BONE (HCC): Primary | ICD-10-CM

## 2021-09-15 DIAGNOSIS — L97.524 DIABETIC ULCER OF TOE OF LEFT FOOT ASSOCIATED WITH TYPE 2 DIABETES MELLITUS, WITH NECROSIS OF BONE (HCC): Primary | ICD-10-CM

## 2021-09-15 DIAGNOSIS — Z89.431 S/P TRANSMETATARSAL AMPUTATION OF FOOT, RIGHT (HCC): ICD-10-CM

## 2021-09-15 PROCEDURE — 99024 POSTOP FOLLOW-UP VISIT: CPT | Performed by: PODIATRIST

## 2021-09-15 NOTE — PROGRESS NOTES
Assessment/Plan:      Diagnoses and all orders for this visit:    Diabetic ulcer of toe of left foot associated with type 2 diabetes mellitus, with necrosis of bone (HCC)  -     Diabetic Shoe    Cellulitis of fourth toe of left foot    S/P transmetatarsal amputation of foot, right ([facility] [address] )  -     Diabetic Shoe      Patient is stable postop 2 weeks    Incision: healed, sutures removed    Dressing instructions given to patient  Rest the foot as much as possible and elevate/ice  if swollen  Ambulatory status: WBAT, advance from surgical shoe to sneaker with DM inserts    Images reviewed today: none    RTC: 4 weeks      Subjective:     Patient ID: Brown Bain is a 54 y o  adult  DOS:9/1/21     Procedure: left 4th toe partial amputartion     Condition: Dressing C/D/I  She denies pain  She is wearing surgical shoe  She has been changing her dressings at home  Review of Systems      Objective:     Physical Exam      LEft 4th toe PIPJ amputation is healed  No drainage or cellulitis  CRT brisk to flap

## 2021-09-16 NOTE — PROGRESS NOTES
Bariatric Nutrition Assessment Note    Type of surgery    Preop  Surgery Date: TBD- patient has 6 months of weight check   Surgeon: Dr Cameron Pinon has completed her 6 month program requirement  She is here today for pre op weight check and dietary counseling  Nutrition Assessment   Ty Saint PerezChevere  54 y o   adult     [place] with BMI of 25:127 3  Pre-Op Excess Wt: 67 1#     Ht 5' (1 524 m)   Wt 83 9 kg (185 lb)   LMP  (LMP Unknown)   BMI 36 13 kg/m²   Patient has lost 8  #  since starting the program (4%) of her body weight     Wt Readings from Last 3 Encounters:   09/15/21 83 5 kg (184 lb)   09/01/21 82 6 kg (182 lb)   08/25/21 84 4 kg (186 lb)       [place]- [place] Equation:  1462  Estimated calories for weight loss 2338-1358  ( 1/2# to 1#  per wk wt loss - sedentary )  Estimated protein needs 46-58 grams (  8-1 0 grams /kg IBW with CKD 2)   Estimated fluid needs 2025ml  (35 ml/kg IBW )  68 ounces per day     Weight History   Onset of Obesity: Adult after pregnancies 33 years ago, 32 & 24   Family history of obesity: Yes  Wt Loss Attempts: Exercise  Self Created Diets (Portion Control, Healthy Food Choices, etc )  Gym - treadmill, used to ride her bike to work OTC  Patient has tried the above for 6 months or more with insufficient weight loss or weight regain, which is why patient has requested to be evaluated for weight loss surgery today  Maximum Wt Lost: lost about 25 pounds but then regained the weight back due to emotional eating       Review of History and Medications   Past Medical History:   Diagnosis Date    Cellulitis of toe 1/28/2019    CKD (chronic kidney disease)     Diabetes mellitus ([address] )     Gangrene of toe of right foot ([address] ) 10/30/2019    Added automatically from request for surgery [id number]    Hypertension      Past Surgical History:   Procedure Laterality Date    IR ABDOMINAL AORTAGRAM  2/1/2019    IR AORTAGRAM WITH RUN-OFF  5/5/2020    IR ARTERIAL LYSIS  4/30/2019  IR LOWER EXTREMITY / INTERVENTION  2019    MD AMPUTATION FOOT,TRANSMETATARSAL Right 2019    Procedure: AMPUTATION TRANSMETATARSAL (TMA) WITH ACHILLES TENDON LENGTHING;  Surgeon: Bill Whitman DPM;  [facility];  Service: Podiatry    [address] [place] [address] Right 2020    Procedure: AMPUTATION TRANSMETATARSAL (TMA);  Surgeon: Roel Stauffer DPM;  [facility];  Service: Podiatry    TOE AMPUTATION Left 2019    Procedure: AMPUTATION 5th TOE;  Surgeon: Bill Whitman DPM;  [facility];  Service: Podiatry    TOE AMPUTATION Right 2019    Procedure: AMPUTATION RIGHT 3RD AND 4TH TOE;  Surgeon: Bill Whitman DPM;  [facility];  Service: Podiatry     Social History     Socioeconomic History    Marital status: Single     Spouse name: Not on file    Number of children: Not on file    Years of education: Not on file    Highest education level: Not on file   Occupational History    Not on file   Tobacco Use    Smoking status: Former Smoker     Types: Cigarettes     Quit date:      Years since quittin 7    Smokeless tobacco: Never Used   Vaping Use    Vaping Use: Never used   Substance and Sexual Activity    Alcohol use: Not Currently    Drug use: No    Sexual activity: Not on file   Other Topics Concern    Not on file   Social History Narrative    Not on file     Social Determinants of Health     Financial Resource Strain:     Difficulty of Paying Living Expenses:    Food Insecurity:     Worried About Running Out of Food in the Last Year:     [address] in the Last Year:    Transportation Needs: No Transportation Needs    Lack of Transportation (Medical): No    Lack of Transportation (Non-Medical):  No   Physical Activity:     Days of Exercise per Week:     Minutes of Exercise per Session:    Stress:     Feeling of Stress :    Social Connections:     Frequency of Communication with Friends and Family:     Frequency of Social Gatherings with Friends and Family:     Attends Buddhist Services:     Active Member of Clubs or Organizations:     Attends Club or Organization Meetings:     Marital Status:    Intimate Partner Violence:     Fear of Current or Ex-Partner:     Emotionally Abused:     Physically Abused:     Sexually Abused:        Current Outpatient Medications:     aspirin 81 mg chewable tablet, Chew 1 tablet (81 mg total) daily, Disp: , Rfl:     atorvastatin (LIPITOR) 20 mg tablet, Take 1 tablet (20 mg total) by mouth daily, Disp: 90 tablet, Rfl: 1    cetirizine (ZyrTEC) 10 mg tablet, Take 1 tablet (10 mg total) by mouth daily (Patient taking differently: Take 10 mg by mouth daily as needed ), Disp: 90 tablet, Rfl: 1    clopidogrel (PLAVIX) 75 mg tablet, Take 1 tablet (75 mg total) by mouth daily, Disp: 90 tablet, Rfl: 1    Dulaglutide 1 5 MG/0 5ML SOPN, Inject 0 5 mL (1 5 mg total) under the skin once a week, Disp: 2 mL, Rfl: 5    ergocalciferol (ERGOCALCIFEROL) 1 25 MG (27431 UT) capsule, Take 1 capsule (50,000 Units total) by mouth once a week, Disp: 12 capsule, Rfl: 1    ferrous sulfate 324 (65 Fe) mg, Take 1 tablet (324 mg total) by mouth 2 (two) times a day before meals, Disp: 180 tablet, Rfl: 3    fluticasone (FLONASE) 50 mcg/act nasal spray, 1 spray into each nostril daily, Disp: 3 Bottle, Rfl: 1    glucose blood (ONE TOUCH ULTRA TEST) test strip, Use to test 3x daily, Disp: 300 each, Rfl: 1    insulin glargine (Lantus SoloStar) 100 units/mL injection pen, Use 20units subcut once daily at 9pm, Disp: 20 mL, Rfl: 4    Insulin Pen Needle 32G X 4 MM MISC, Use 4 times daily with insulin pens, Disp: 120 each, Rfl: 5    lisinopril (ZESTRIL) 5 mg tablet, Take 1 tablet (5 mg total) by mouth daily, Disp: 90 tablet, Rfl: 3    metFORMIN (GLUCOPHAGE) 1000 MG tablet, Take 1 tablet (1,000 mg total) by mouth 2 (two) times a day with meals, Disp: 180 tablet, Rfl: 3    metoprolol tartrate (LOPRESSOR) 25 mg tablet, Take 1 tablet (25 mg total) by mouth every 12 (twelve) hours, Disp: 180 tablet, Rfl: 1    OneTouch Delica Lancets 25Z MISC, Use to test 3x daily, Disp: 300 each, Rfl: 2  Food Intake and Lifestyle Assessment   Food Intake Assessment completed via usual diet recall- continues to be the same   Breakfast: 9 am - black coffee with toast( 1 )  and a boiled egg   Snack: 0   Lunch: 12:30 - sandwich - ham and cheese ( on toast ) sugar free fruit cup   Snack: 0  Dinner: 6 pm : Vegetables, sometimes chicken or fish - sometimes will eat starch, rice or pasta but not every night   Snack: vanilla pudding or fruit   Beverage intake: water and diet soda( gingergale gianna )   Protein supplement: none   Estimated protein intake per day: 50-60 grams ( she was told to limit with CKD 2)  Estimated fluid intake per day: 2 bottles of water per day and one can of soda per day   Meals eaten away from home: twice a month   Typical meal pattern: 3 meals per day and 0-1 snacks per day  Eating Behaviors: Mindless eating, Emotional eating, Craves sweet foods and Craves salty foods  Food allergies or intolerances: N/A  Allergies   Allergen Reactions    Levemir [Insulin Detemir] Itching     Cultural or Nondenominational considerations:      Physical Assessment    Lab Results   Component Value Date    HGBA1C 6 8 (H) 08/25/2021     Physical Activity  Types of exercise: Walking  Walking in the home , leg exercises   Current physical limitations: amputation of toes     Psychosocial Assessment   Support systems: children- adult daughter   Socioeconomic factors: lives with daughter     Nutrition Diagnosis( continued )   Diagnosis: Overweight / Obesity (NC-3 3)  Related to: Physical inactivity and Excessive energy intake  As Evidenced by: BMI >25     Nutrition Prescription: Recommend the following diet  Low fat, Low sugar and 60 grams protein ( CKD) and consistent carb     Interventions and Teaching   Discussed pre-op and post-op nutrition guidelines  Patient educated and handouts provided  Surgical changes to stomach / GI  Capacity of post-surgery stomach  Diet progression  Adequate hydration  Sugar and fat restriction to decrease "dumping syndrome"  Fat restriction to decrease steatorrhea  Expected weight loss  Weight loss plateaus/ possibility of weight regain  Exercise  Suggestions for pre-op diet  Nutrition considerations after surgery  Protein supplements  Meal planning and preparation  Appropriate carbohydrate, protein, and fat intake, and food/fluid choices to maximize safe weight loss, nutrient intake, and tolerance   Dietary and lifestyle changes  Possible problems with poor eating habits  Intuitive eating  Techniques for self monitoring and keeping daily food journal  Potential for food intolerance after surgery, and ways to deal with them including: lactose intolerance, nausea, reflux, vomiting, diarrhea, food intolerance, appetite changes, gas  Vitamin / Mineral supplementation of Multivitamin with minerals, Calcium, Vitamin B12, Iron, Fat Soluble vitamins and Vitamin D  Patient is not currently pregnant and doesn't desire to become pregnant a minimum of one year post-op    Provided with samples of Premier protein     Education provided to: patient- daughter present and supportive  Barriers to learning: No barriers identified    Readiness to change: preparation    Prior research on procedure: discussed with provider and friends or family    Comprehension: needs reinforcement and verbalizes understanding     Expected Compliance: good  Workflow:   PCP Letter: 9/21/2021   Support Group: no    6 Month Pre-Operative Program: done   Bloodwork: lab slip provided   Cardiac Risk Assessment: referral placed    Nephrology assessment L 10/5/2021   Sleep Studies: has cpap machine ,    EGD needs surgeon appointment - done    Weight Loss: ongoing    Psych/D+A/Nicotine?  N/a     Recommendations  Pt is an appropriate candidate for surgery  Yes    Evaluation / Monitoring  Dietitian to Monitor: Eating pattern as discussed Tolerance of nutrition prescription Body weight Lab values Physical activity Bowel pattern  Patient had recent toe amputation and is still healing  Limited on walking currently but does walk with a cane  Patient has been drinking 4 16 ounce bottles of water per day   She has stopped drinking all soda  She is eating off a smaller plate to decrease her portion sizes  She includes protein at each meal and is able to verbalize sources of protein  She generally eats 3 meals per day , but sometimes will eat 2 meals and one protein shake ( Premier)  Trying to practice the 30/30 to 30/60 rule  She  is taking her vitamins and minerals as recommended and has increased her iron to twice per day  She limits her coffee intake to 16 ounces or less per day  She forgot to keep her food log   Her last A1c shows good bs control     Goals  Food journal, Complete lession plans 1-6, Eat 3 meals per day and and one planned snack   Complete lesson plans 4-6  prior to next dietitian appointment   Schedule cardiology appointment       Time Spent:   30 Minutes

## 2021-09-17 ENCOUNTER — OFFICE VISIT (OUTPATIENT)
Dept: BARIATRICS | Facility: CLINIC | Age: 56
End: 2021-09-17

## 2021-09-17 VITALS — HEIGHT: 60 IN | BODY MASS INDEX: 36.32 KG/M2 | WEIGHT: 185 LBS

## 2021-09-17 DIAGNOSIS — E78.5 HYPERLIPIDEMIA, UNSPECIFIED HYPERLIPIDEMIA TYPE: ICD-10-CM

## 2021-09-17 DIAGNOSIS — Z01.818 PRE-OPERATIVE CLEARANCE: Primary | ICD-10-CM

## 2021-09-17 DIAGNOSIS — E66.9 OBESITY, CLASS II, BMI 35-39.9: ICD-10-CM

## 2021-09-17 DIAGNOSIS — E11.9 TYPE 2 DIABETES MELLITUS WITHOUT COMPLICATION, WITH LONG-TERM CURRENT USE OF INSULIN (HCC): ICD-10-CM

## 2021-09-17 DIAGNOSIS — G47.33 OBSTRUCTIVE SLEEP APNEA: ICD-10-CM

## 2021-09-17 DIAGNOSIS — N18.2 CKD STAGE 2 DUE TO TYPE 2 DIABETES MELLITUS (HCC): ICD-10-CM

## 2021-09-17 DIAGNOSIS — I10 ESSENTIAL HYPERTENSION: ICD-10-CM

## 2021-09-17 DIAGNOSIS — E11.22 CKD STAGE 2 DUE TO TYPE 2 DIABETES MELLITUS (HCC): ICD-10-CM

## 2021-09-17 DIAGNOSIS — Z79.4 TYPE 2 DIABETES MELLITUS WITHOUT COMPLICATION, WITH LONG-TERM CURRENT USE OF INSULIN (HCC): ICD-10-CM

## 2021-09-17 PROCEDURE — 3066F NEPHROPATHY DOC TX: CPT | Performed by: FAMILY MEDICINE

## 2021-09-17 PROCEDURE — RECHECK: Performed by: DIETITIAN, REGISTERED

## 2021-09-21 ENCOUNTER — OFFICE VISIT (OUTPATIENT)
Dept: FAMILY MEDICINE CLINIC | Facility: CLINIC | Age: 56
End: 2021-09-21

## 2021-09-21 VITALS
SYSTOLIC BLOOD PRESSURE: 128 MMHG | HEIGHT: 60 IN | OXYGEN SATURATION: 99 % | DIASTOLIC BLOOD PRESSURE: 70 MMHG | HEART RATE: 67 BPM | WEIGHT: 182.6 LBS | BODY MASS INDEX: 35.85 KG/M2 | TEMPERATURE: 97.1 F | RESPIRATION RATE: 18 BRPM

## 2021-09-21 DIAGNOSIS — I73.9 PAD (PERIPHERAL ARTERY DISEASE) (HCC): ICD-10-CM

## 2021-09-21 DIAGNOSIS — N18.2 CKD STAGE 2 DUE TO TYPE 2 DIABETES MELLITUS (HCC): ICD-10-CM

## 2021-09-21 DIAGNOSIS — Z79.4 TYPE 2 DIABETES MELLITUS WITHOUT COMPLICATION, WITH LONG-TERM CURRENT USE OF INSULIN (HCC): ICD-10-CM

## 2021-09-21 DIAGNOSIS — I10 ESSENTIAL HYPERTENSION: ICD-10-CM

## 2021-09-21 DIAGNOSIS — E11.9 TYPE 2 DIABETES MELLITUS WITHOUT COMPLICATION, WITH LONG-TERM CURRENT USE OF INSULIN (HCC): ICD-10-CM

## 2021-09-21 DIAGNOSIS — Z23 ENCOUNTER FOR VACCINATION: Primary | ICD-10-CM

## 2021-09-21 DIAGNOSIS — E11.22 CKD STAGE 2 DUE TO TYPE 2 DIABETES MELLITUS (HCC): ICD-10-CM

## 2021-09-21 DIAGNOSIS — G63 POLYNEUROPATHY ASSOCIATED WITH UNDERLYING DISEASE (HCC): ICD-10-CM

## 2021-09-21 DIAGNOSIS — E55.9 VITAMIN D DEFICIENCY: ICD-10-CM

## 2021-09-21 DIAGNOSIS — R80.1 PERSISTENT PROTEINURIA: ICD-10-CM

## 2021-09-21 PROCEDURE — 99214 OFFICE O/P EST MOD 30 MIN: CPT | Performed by: FAMILY MEDICINE

## 2021-09-21 PROCEDURE — G0008 ADMIN INFLUENZA VIRUS VAC: HCPCS | Performed by: FAMILY MEDICINE

## 2021-09-21 PROCEDURE — 3008F BODY MASS INDEX DOCD: CPT | Performed by: PODIATRIST

## 2021-09-21 PROCEDURE — 3008F BODY MASS INDEX DOCD: CPT | Performed by: FAMILY MEDICINE

## 2021-09-21 PROCEDURE — 1036F TOBACCO NON-USER: CPT | Performed by: FAMILY MEDICINE

## 2021-09-21 PROCEDURE — 4010F ACE/ARB THERAPY RXD/TAKEN: CPT | Performed by: PODIATRIST

## 2021-09-21 PROCEDURE — 90682 RIV4 VACC RECOMBINANT DNA IM: CPT | Performed by: FAMILY MEDICINE

## 2021-09-21 PROCEDURE — 4010F ACE/ARB THERAPY RXD/TAKEN: CPT | Performed by: FAMILY MEDICINE

## 2021-09-21 RX ORDER — ATORVASTATIN CALCIUM 20 MG/1
20 TABLET, FILM COATED ORAL DAILY
Qty: 90 TABLET | Refills: 1 | Status: SHIPPED | OUTPATIENT
Start: 2021-09-21 | End: 2021-11-16

## 2021-09-21 RX ORDER — CLOPIDOGREL BISULFATE 75 MG/1
75 TABLET ORAL DAILY
Qty: 90 TABLET | Refills: 1 | Status: SHIPPED | OUTPATIENT
Start: 2021-09-21 | End: 2021-11-16

## 2021-09-21 RX ORDER — LISINOPRIL 5 MG/1
5 TABLET ORAL DAILY
Qty: 90 TABLET | Refills: 3 | Status: SHIPPED | OUTPATIENT
Start: 2021-09-21 | End: 2022-03-01 | Stop reason: HOSPADM

## 2021-09-21 NOTE — PROGRESS NOTES
Assessment/Plan:    No problem-specific Assessment & Plan notes found for this encounter  Diagnoses and all orders for this visit:    Encounter for vaccination  -     influenza vaccine, quadrivalent, recombinant, PF, 0 5 mL, for patients 18 yr+ (FLUBLOK)    CKD stage 2 due to type 2 diabetes mellitus (HCC)  -     lisinopril (ZESTRIL) 5 mg tablet; Take 1 tablet (5 mg total) by mouth daily    Essential hypertension  -     lisinopril (ZESTRIL) 5 mg tablet; Take 1 tablet (5 mg total) by mouth daily  -     metoprolol tartrate (LOPRESSOR) 25 mg tablet; Take 1 tablet (25 mg total) by mouth every 12 (twelve) hours  -     atorvastatin (LIPITOR) 20 mg tablet; Take 1 tablet (20 mg total) by mouth daily    Persistent proteinuria  -     lisinopril (ZESTRIL) 5 mg tablet; Take 1 tablet (5 mg total) by mouth daily    Vitamin D deficiency  -     lisinopril (ZESTRIL) 5 mg tablet; Take 1 tablet (5 mg total) by mouth daily    Type 2 diabetes mellitus without complication, with long-term current use of insulin ([facility])  -     atorvastatin (LIPITOR) 20 mg tablet; Take 1 tablet (20 mg total) by mouth daily    PAD (peripheral artery disease) ([facility])  -     clopidogrel (PLAVIX) 75 mg tablet; Take 1 tablet (75 mg total) by mouth daily    Polyneuropathy associated with underlying disease ([facility] )        Letter in support of weight loss surgery written   Subjective:      Patient ID: Tomie Frankel is a 54 y o  adult  53 yo female with known DM followed by Endocrinology, s/p transmetatarsal amputation of foot 3 weeks ago, doing well  Patient is following with Bariatric surgery and has completed the 6 month weight check requirement, requesting a letter to support weight loss surgery     No new complains today       The following portions of the patient's history were reviewed and updated as appropriate:   Tomie Frankel  has a past medical history of Cellulitis of toe (1/28/2019), CKD (chronic kidney disease), Diabetes mellitus ([facility] ), Gangrene of toe of right foot ([facility] ) (10/30/2019), and Hypertension  Derrick Lyon   Patient Active Problem List    Diagnosis Date Noted    Well woman exam 02/10/2021    Vaginal candida 02/10/2021    Obstructive sleep apnea 10/30/2020    Persistent proteinuria 09/03/2020    Vitamin D deficiency 09/03/2020    Current use of insulin ([facility] ) 08/27/2020    S/P transmetatarsal amputation of foot, right ([facility] ) 08/26/2020    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, with necrosis of bone ([facility] ) 05/08/2020    Cellulitis of right lower extremity 05/04/2020    Class 2 severe obesity due to excess calories with serious comorbidity and body mass index (BMI) of 36 0 to 36 9 in adult [facility]) 01/27/2020    History of transmetatarsal amputation of foot ([facility] ) 11/20/2019    Charcot foot due to diabetes mellitus ([facility] ) 08/05/2019    Polyneuropathy associated with underlying disease ([facility] ) 07/08/2019    Critical lower limb ischemia ([facility] ) 04/30/2019    Toe amputation status, left 02/11/2019    Leukocytosis 02/05/2019    Microcytic anemia 02/01/2019    Diabetes mellitus with stage 2 chronic kidney disease ([facility] ) 01/31/2019    HLD (hyperlipidemia) 01/28/2019    PAD (peripheral artery disease) ([facility] ) 01/28/2019    Essential hypertension 10/27/2016     Ciara Vitaly  has a past surgical history that includes IR abdominal aortagram (2/1/2019); Toe amputation (Left, 2/4/2019); IR arterial lysis (4/30/2019); IR lower extremity / intervention (11/1/2019); Toe amputation (Right, 11/4/2019); pr amputation foot,transmetatarsal (Right, 11/7/2019); IR aortagram with run-off (5/5/2020); and pr amputation foot,transmetatarsal (Right, 5/7/2020)  Derrick Lyon's family history includes Diabetes in Zbeatriz Lyon's mother; No Known Problems in Bran Lyon's father  Derrick Lyon  reports that Bran Lyon quit smoking about 8 years ago  Deysiwarren Saavedra  Camron's smoking use included cigarettes  Deysi Saavedra  Camron has never used smokeless tobacco  Deysi Lyon reports previous alcohol use  Deysi Quentin Lyon reports that Uriah Townsend I  Elizabethleatha does not use drugs  Current Outpatient Medications   Medication Sig Dispense Refill    aspirin 81 mg chewable tablet Chew 1 tablet (81 mg total) daily      atorvastatin (LIPITOR) 20 mg tablet Take 1 tablet (20 mg total) by mouth daily 90 tablet 1    cetirizine (ZyrTEC) 10 mg tablet Take 1 tablet (10 mg total) by mouth daily (Patient taking differently: Take 10 mg by mouth daily as needed ) 90 tablet 1    clopidogrel (PLAVIX) 75 mg tablet Take 1 tablet (75 mg total) by mouth daily 90 tablet 1    Dulaglutide 1 5 MG/0 5ML SOPN Inject 0 5 mL (1 5 mg total) under the skin once a week 2 mL 5    ergocalciferol (ERGOCALCIFEROL) 1 25 MG (09107 UT) capsule Take 1 capsule (50,000 Units total) by mouth once a week 12 capsule 1    ferrous sulfate 324 (65 Fe) mg Take 1 tablet (324 mg total) by mouth 2 (two) times a day before meals 180 tablet 3    fluticasone (FLONASE) 50 mcg/act nasal spray 1 spray into each nostril daily 3 Bottle 1    insulin glargine (Lantus SoloStar) 100 units/mL injection pen Use 20units subcut once daily at 9pm 20 mL 4    lisinopril (ZESTRIL) 5 mg tablet Take 1 tablet (5 mg total) by mouth daily 90 tablet 3    metFORMIN (GLUCOPHAGE) 1000 MG tablet Take 1 tablet (1,000 mg total) by mouth 2 (two) times a day with meals 180 tablet 3    metoprolol tartrate (LOPRESSOR) 25 mg tablet Take 1 tablet (25 mg total) by mouth every 12 (twelve) hours 180 tablet 1    glucose blood (ONE TOUCH ULTRA TEST) test strip Use to test 3x daily 300 each 1    Insulin Pen Needle 32G X 4 MM MISC Use 4 times daily with insulin pens 120 each 5    OneTouch Delica Lancets 02L MISC Use to test 3x daily 300 each 2     No current facility-administered medications for this visit       Current Outpatient Medications on File Prior to Visit   Medication Sig    aspirin 81 mg chewable tablet Chew 1 tablet (81 mg total) daily    cetirizine (ZyrTEC) 10 mg tablet Take 1 tablet (10 mg total) by mouth daily (Patient taking differently: Take 10 mg by mouth daily as needed )    Dulaglutide 1 5 MG/0 5ML SOPN Inject 0 5 mL (1 5 mg total) under the skin once a week    ergocalciferol (ERGOCALCIFEROL) 1 25 MG (30501 UT) capsule Take 1 capsule (50,000 Units total) by mouth once a week    ferrous sulfate 324 (65 Fe) mg Take 1 tablet (324 mg total) by mouth 2 (two) times a day before meals    fluticasone (FLONASE) 50 mcg/act nasal spray 1 spray into each nostril daily    insulin glargine (Lantus SoloStar) 100 units/mL injection pen Use 20units subcut once daily at 9pm    metFORMIN (GLUCOPHAGE) 1000 MG tablet Take 1 tablet (1,000 mg total) by mouth 2 (two) times a day with meals    [DISCONTINUED] atorvastatin (LIPITOR) 20 mg tablet Take 1 tablet (20 mg total) by mouth daily    [DISCONTINUED] clopidogrel (PLAVIX) 75 mg tablet Take 1 tablet (75 mg total) by mouth daily    [DISCONTINUED] lisinopril (ZESTRIL) 5 mg tablet Take 1 tablet (5 mg total) by mouth daily    [DISCONTINUED] metoprolol tartrate (LOPRESSOR) 25 mg tablet Take 1 tablet (25 mg total) by mouth every 12 (twelve) hours    glucose blood (ONE TOUCH ULTRA TEST) test strip Use to test 3x daily    Insulin Pen Needle 32G X 4 MM MISC Use 4 times daily with insulin pens    OneTouch Delica Lancets 20K MISC Use to test 3x daily     No current facility-administered medications on file prior to visit       Review of Systems   All other systems reviewed and are negative          Objective:      /70 (BP Location: Left arm, Patient Position: Sitting, Cuff Size: Standard)   Pulse 67   Temp (!) 97 1 °F (36 2 °C) (Temporal)   Resp 18   Ht 5' (1 524 m)   Wt 82 8 kg (182 lb 9 6 oz)   LMP  (LMP Unknown)   SpO2 99%   BMI 35 66 kg/m²          Physical Exam  Vitals and nursing note reviewed  Constitutional:       Appearance: Sandrata Masker is well-developed  HENT:      Head: Normocephalic  Right Ear: External ear normal       Left Ear: External ear normal       Nose: Nose normal    Eyes:      Conjunctiva/sclera: Conjunctivae normal       Pupils: Pupils are equal, round, and reactive to light  Neck:      Thyroid: No thyromegaly  Cardiovascular:      Rate and Rhythm: Normal rate and regular rhythm  Heart sounds: Normal heart sounds  Pulmonary:      Effort: Pulmonary effort is normal       Breath sounds: Normal breath sounds  Abdominal:      Palpations: Abdomen is soft  Tenderness: There is no abdominal tenderness  There is no guarding or rebound  Musculoskeletal:         General: Normal range of motion  Cervical back: Normal range of motion and neck supple  Skin:     General: Skin is dry  Neurological:      Mental Status: Danielle Masker is alert and oriented to person, place, and time  Deep Tendon Reflexes: Reflexes are normal and symmetric

## 2021-09-21 NOTE — LETTER
Re:  Eden Lyon  1965      To whom it may concern,     Kathryn Cason is a longstanding patient in our office  She has multiple medical problems including but not limited to Diabetes mellitus with chronic kidney disease, Essential Hypertension, Charcot foot due to Diabetes Mellitus among others  Patient would greatly benefit from weight loss surgery, which would help control her chronic medical problems and thus provide a better quality of life  Please let me know if I can be of any further assistance      Dez Boston MD

## 2021-09-28 ENCOUNTER — TELEPHONE (OUTPATIENT)
Dept: NEPHROLOGY | Facility: CLINIC | Age: 56
End: 2021-09-28

## 2021-10-04 ENCOUNTER — APPOINTMENT (OUTPATIENT)
Dept: LAB | Facility: HOSPITAL | Age: 56
End: 2021-10-04
Attending: SURGERY
Payer: COMMERCIAL

## 2021-10-04 DIAGNOSIS — Z01.818 PRE-OPERATIVE CLEARANCE: ICD-10-CM

## 2021-10-04 DIAGNOSIS — Z79.4 TYPE 2 DIABETES MELLITUS WITHOUT COMPLICATION, WITH LONG-TERM CURRENT USE OF INSULIN (HCC): ICD-10-CM

## 2021-10-04 DIAGNOSIS — E11.22 CKD STAGE 2 DUE TO TYPE 2 DIABETES MELLITUS (HCC): ICD-10-CM

## 2021-10-04 DIAGNOSIS — G47.33 OBSTRUCTIVE SLEEP APNEA: ICD-10-CM

## 2021-10-04 DIAGNOSIS — E66.9 OBESITY, CLASS II, BMI 35-39.9: ICD-10-CM

## 2021-10-04 DIAGNOSIS — N18.2 CKD STAGE 2 DUE TO TYPE 2 DIABETES MELLITUS (HCC): ICD-10-CM

## 2021-10-04 DIAGNOSIS — E78.5 HYPERLIPIDEMIA, UNSPECIFIED HYPERLIPIDEMIA TYPE: ICD-10-CM

## 2021-10-04 DIAGNOSIS — I10 ESSENTIAL HYPERTENSION: ICD-10-CM

## 2021-10-04 DIAGNOSIS — E11.9 TYPE 2 DIABETES MELLITUS WITHOUT COMPLICATION, WITH LONG-TERM CURRENT USE OF INSULIN (HCC): ICD-10-CM

## 2021-10-04 LAB
ALBUMIN SERPL BCP-MCNC: 3.5 G/DL (ref 3.5–5)
ALP SERPL-CCNC: 85 U/L (ref 46–116)
ALT SERPL W P-5'-P-CCNC: 23 U/L (ref 12–78)
ANION GAP SERPL CALCULATED.3IONS-SCNC: 11 MMOL/L (ref 4–13)
AST SERPL W P-5'-P-CCNC: 30 U/L (ref 5–45)
BILIRUB SERPL-MCNC: 0.36 MG/DL (ref 0.2–1)
BUN SERPL-MCNC: 22 MG/DL (ref 5–25)
CALCIUM SERPL-MCNC: 9.5 MG/DL (ref 8.3–10.1)
CHLORIDE SERPL-SCNC: 108 MMOL/L (ref 100–108)
CO2 SERPL-SCNC: 27 MMOL/L (ref 21–32)
CREAT SERPL-MCNC: 0.91 MG/DL (ref 0.6–1.3)
ERYTHROCYTE [DISTWIDTH] IN BLOOD BY AUTOMATED COUNT: 15.7 % (ref 11.6–15.1)
GLUCOSE P FAST SERPL-MCNC: 122 MG/DL (ref 65–99)
HCT VFR BLD AUTO: 39 % (ref 36.5–46.1)
HGB BLD-MCNC: 11.4 G/DL (ref 12–15.4)
MCH RBC QN AUTO: 22.6 PG (ref 26.8–34.3)
MCHC RBC AUTO-ENTMCNC: 29.2 G/DL (ref 31.4–37.4)
MCV RBC AUTO: 77 FL (ref 82–98)
PLATELET # BLD AUTO: 225 THOUSANDS/UL (ref 149–390)
PMV BLD AUTO: 11.6 FL (ref 8.9–12.7)
POTASSIUM SERPL-SCNC: 4.8 MMOL/L (ref 3.5–5.3)
PROT SERPL-MCNC: 7.6 G/DL (ref 6.4–8.2)
RBC # BLD AUTO: 5.04 MILLION/UL (ref 3.88–5.12)
SODIUM SERPL-SCNC: 146 MMOL/L (ref 136–145)
WBC # BLD AUTO: 7.58 THOUSAND/UL (ref 4.31–10.16)

## 2021-10-04 PROCEDURE — 85027 COMPLETE CBC AUTOMATED: CPT

## 2021-10-04 PROCEDURE — 80053 COMPREHEN METABOLIC PANEL: CPT

## 2021-10-04 PROCEDURE — 36415 COLL VENOUS BLD VENIPUNCTURE: CPT

## 2021-10-04 PROCEDURE — 3066F NEPHROPATHY DOC TX: CPT | Performed by: INTERNAL MEDICINE

## 2021-10-05 ENCOUNTER — OFFICE VISIT (OUTPATIENT)
Dept: NEPHROLOGY | Facility: CLINIC | Age: 56
End: 2021-10-05
Payer: COMMERCIAL

## 2021-10-05 VITALS
HEIGHT: 60 IN | BODY MASS INDEX: 35.73 KG/M2 | WEIGHT: 182 LBS | DIASTOLIC BLOOD PRESSURE: 63 MMHG | HEART RATE: 78 BPM | SYSTOLIC BLOOD PRESSURE: 110 MMHG

## 2021-10-05 DIAGNOSIS — E11.22 CKD STAGE 2 DUE TO TYPE 2 DIABETES MELLITUS (HCC): ICD-10-CM

## 2021-10-05 DIAGNOSIS — I10 ESSENTIAL HYPERTENSION: ICD-10-CM

## 2021-10-05 DIAGNOSIS — N18.2 CKD (CHRONIC KIDNEY DISEASE) STAGE 2, GFR 60-89 ML/MIN: Primary | ICD-10-CM

## 2021-10-05 DIAGNOSIS — I73.9 PAD (PERIPHERAL ARTERY DISEASE) (HCC): ICD-10-CM

## 2021-10-05 DIAGNOSIS — E55.9 VITAMIN D DEFICIENCY: ICD-10-CM

## 2021-10-05 DIAGNOSIS — N28.9 KIDNEY DISEASE: ICD-10-CM

## 2021-10-05 DIAGNOSIS — R80.1 PERSISTENT PROTEINURIA: ICD-10-CM

## 2021-10-05 DIAGNOSIS — N18.2 CKD STAGE 2 DUE TO TYPE 2 DIABETES MELLITUS (HCC): ICD-10-CM

## 2021-10-05 PROCEDURE — 99214 OFFICE O/P EST MOD 30 MIN: CPT | Performed by: INTERNAL MEDICINE

## 2021-10-05 PROCEDURE — 1036F TOBACCO NON-USER: CPT | Performed by: INTERNAL MEDICINE

## 2021-10-05 RX ORDER — ERGOCALCIFEROL (VITAMIN D2) 1250 MCG
50000 CAPSULE ORAL WEEKLY
Qty: 12 CAPSULE | Refills: 3 | Status: SHIPPED | OUTPATIENT
Start: 2021-10-05

## 2021-10-13 ENCOUNTER — OFFICE VISIT (OUTPATIENT)
Dept: PODIATRY | Facility: CLINIC | Age: 56
End: 2021-10-13
Payer: COMMERCIAL

## 2021-10-13 VITALS
SYSTOLIC BLOOD PRESSURE: 177 MMHG | WEIGHT: 186 LBS | BODY MASS INDEX: 36.52 KG/M2 | DIASTOLIC BLOOD PRESSURE: 98 MMHG | HEIGHT: 60 IN | HEART RATE: 79 BPM

## 2021-10-13 DIAGNOSIS — I73.9 PAD (PERIPHERAL ARTERY DISEASE) (HCC): Primary | ICD-10-CM

## 2021-10-13 DIAGNOSIS — E11.42 DIABETIC POLYNEUROPATHY ASSOCIATED WITH TYPE 2 DIABETES MELLITUS (HCC): ICD-10-CM

## 2021-10-13 DIAGNOSIS — B35.1 TINEA UNGUIUM: ICD-10-CM

## 2021-10-13 PROCEDURE — 3008F BODY MASS INDEX DOCD: CPT | Performed by: FAMILY MEDICINE

## 2021-10-13 PROCEDURE — 11720 DEBRIDE NAIL 1-5: CPT | Performed by: PODIATRIST

## 2021-10-13 PROCEDURE — 3008F BODY MASS INDEX DOCD: CPT | Performed by: INTERNAL MEDICINE

## 2021-10-19 NOTE — TELEPHONE ENCOUNTER
Patient daughter called wanting to know the status on form since her employer stated they didn't receive the form  (Daughter will be the care taker)

## 2021-10-22 ENCOUNTER — OFFICE VISIT (OUTPATIENT)
Dept: BARIATRICS | Facility: CLINIC | Age: 56
End: 2021-10-22

## 2021-10-22 VITALS — BODY MASS INDEX: 36.48 KG/M2 | WEIGHT: 186.8 LBS

## 2021-10-22 DIAGNOSIS — E66.01 CLASS 2 SEVERE OBESITY DUE TO EXCESS CALORIES WITH SERIOUS COMORBIDITY AND BODY MASS INDEX (BMI) OF 36.0 TO 36.9 IN ADULT (HCC): Primary | ICD-10-CM

## 2021-10-22 PROCEDURE — RECHECK

## 2021-10-28 NOTE — TELEPHONE ENCOUNTER
EXTRA COPY MADE BY FRONT STAFF,  placed in PCP folder  Forms to be delivered to PCP mailbox at assigned time

## 2021-10-28 NOTE — TELEPHONE ENCOUNTER
pcp does not have forms and daughter is calling for this form mom is having surgery and needs to be out of work

## 2021-10-29 NOTE — TELEPHONE ENCOUNTER
Form completed by provider on 10/29/21    Faxed to [phone]  Confirmation received    Patient called and advised ready for

## 2021-11-01 ENCOUNTER — OFFICE VISIT (OUTPATIENT)
Dept: ENDOCRINOLOGY | Facility: CLINIC | Age: 56
End: 2021-11-01
Payer: COMMERCIAL

## 2021-11-01 VITALS
HEIGHT: 60 IN | BODY MASS INDEX: 37.4 KG/M2 | DIASTOLIC BLOOD PRESSURE: 70 MMHG | SYSTOLIC BLOOD PRESSURE: 142 MMHG | WEIGHT: 190.5 LBS | HEART RATE: 74 BPM

## 2021-11-01 DIAGNOSIS — E55.9 VITAMIN D DEFICIENCY: ICD-10-CM

## 2021-11-01 DIAGNOSIS — E78.5 HYPERLIPIDEMIA, UNSPECIFIED HYPERLIPIDEMIA TYPE: ICD-10-CM

## 2021-11-01 DIAGNOSIS — Z79.4 CURRENT USE OF INSULIN (HCC): ICD-10-CM

## 2021-11-01 DIAGNOSIS — I10 ESSENTIAL HYPERTENSION: ICD-10-CM

## 2021-11-01 DIAGNOSIS — E66.01 CLASS 2 SEVERE OBESITY DUE TO EXCESS CALORIES WITH SERIOUS COMORBIDITY AND BODY MASS INDEX (BMI) OF 36.0 TO 36.9 IN ADULT (HCC): ICD-10-CM

## 2021-11-01 DIAGNOSIS — N18.2 DIABETES MELLITUS WITH STAGE 2 CHRONIC KIDNEY DISEASE (HCC): Primary | ICD-10-CM

## 2021-11-01 DIAGNOSIS — N18.2 CKD (CHRONIC KIDNEY DISEASE) STAGE 2, GFR 60-89 ML/MIN: ICD-10-CM

## 2021-11-01 DIAGNOSIS — E11.22 DIABETES MELLITUS WITH STAGE 2 CHRONIC KIDNEY DISEASE (HCC): Primary | ICD-10-CM

## 2021-11-01 DIAGNOSIS — Z79.4 TYPE 2 DIABETES MELLITUS WITH OTHER CIRCULATORY COMPLICATION, WITH LONG-TERM CURRENT USE OF INSULIN (HCC): ICD-10-CM

## 2021-11-01 DIAGNOSIS — E11.59 TYPE 2 DIABETES MELLITUS WITH OTHER CIRCULATORY COMPLICATION, WITH LONG-TERM CURRENT USE OF INSULIN (HCC): ICD-10-CM

## 2021-11-01 PROCEDURE — 3066F NEPHROPATHY DOC TX: CPT | Performed by: INTERNAL MEDICINE

## 2021-11-01 PROCEDURE — 99214 OFFICE O/P EST MOD 30 MIN: CPT | Performed by: INTERNAL MEDICINE

## 2021-11-01 RX ORDER — INSULIN GLARGINE 100 [IU]/ML
INJECTION, SOLUTION SUBCUTANEOUS
Qty: 20 ML | Refills: 4 | Status: SHIPPED | OUTPATIENT
Start: 2021-11-01 | End: 2022-03-01 | Stop reason: HOSPADM

## 2021-11-01 RX ORDER — DULAGLUTIDE 3 MG/.5ML
3 INJECTION, SOLUTION SUBCUTANEOUS WEEKLY
Qty: 2 ML | Refills: 6 | Status: SHIPPED | OUTPATIENT
Start: 2021-11-01 | End: 2022-03-01 | Stop reason: HOSPADM

## 2021-11-01 RX ORDER — BLOOD-GLUCOSE METER
KIT MISCELLANEOUS
Qty: 1 KIT | Refills: 2 | Status: SHIPPED | OUTPATIENT
Start: 2021-11-01

## 2021-11-10 ENCOUNTER — CONSULT (OUTPATIENT)
Dept: CARDIOLOGY CLINIC | Facility: CLINIC | Age: 56
End: 2021-11-10
Payer: COMMERCIAL

## 2021-11-10 VITALS
HEIGHT: 60 IN | DIASTOLIC BLOOD PRESSURE: 74 MMHG | RESPIRATION RATE: 16 BRPM | HEART RATE: 74 BPM | BODY MASS INDEX: 35.18 KG/M2 | WEIGHT: 179.2 LBS | SYSTOLIC BLOOD PRESSURE: 128 MMHG

## 2021-11-10 DIAGNOSIS — Z01.810 PREOP CARDIOVASCULAR EXAM: Primary | ICD-10-CM

## 2021-11-10 DIAGNOSIS — E66.9 OBESITY, UNSPECIFIED CLASSIFICATION, UNSPECIFIED OBESITY TYPE, UNSPECIFIED WHETHER SERIOUS COMORBIDITY PRESENT: ICD-10-CM

## 2021-11-10 PROCEDURE — 99214 OFFICE O/P EST MOD 30 MIN: CPT | Performed by: INTERNAL MEDICINE

## 2021-11-10 PROCEDURE — 93000 ELECTROCARDIOGRAM COMPLETE: CPT | Performed by: INTERNAL MEDICINE

## 2021-11-10 PROCEDURE — 3078F DIAST BP <80 MM HG: CPT | Performed by: INTERNAL MEDICINE

## 2021-11-10 PROCEDURE — 1036F TOBACCO NON-USER: CPT | Performed by: INTERNAL MEDICINE

## 2021-11-10 PROCEDURE — 3074F SYST BP LT 130 MM HG: CPT | Performed by: INTERNAL MEDICINE

## 2021-11-15 DIAGNOSIS — Z79.4 TYPE 2 DIABETES MELLITUS WITHOUT COMPLICATION, WITH LONG-TERM CURRENT USE OF INSULIN (HCC): ICD-10-CM

## 2021-11-15 DIAGNOSIS — E11.9 TYPE 2 DIABETES MELLITUS WITHOUT COMPLICATION, WITH LONG-TERM CURRENT USE OF INSULIN (HCC): ICD-10-CM

## 2021-11-15 DIAGNOSIS — I10 ESSENTIAL HYPERTENSION: ICD-10-CM

## 2021-11-15 DIAGNOSIS — I73.9 PAD (PERIPHERAL ARTERY DISEASE) (HCC): ICD-10-CM

## 2021-11-16 RX ORDER — CLOPIDOGREL BISULFATE 75 MG/1
TABLET ORAL
Qty: 90 TABLET | Refills: 0 | Status: SHIPPED | OUTPATIENT
Start: 2021-11-16 | End: 2022-01-22 | Stop reason: HOSPADM

## 2021-11-16 RX ORDER — ATORVASTATIN CALCIUM 20 MG/1
TABLET, FILM COATED ORAL
Qty: 90 TABLET | Refills: 0 | Status: SHIPPED | OUTPATIENT
Start: 2021-11-16 | End: 2022-02-11 | Stop reason: SDUPTHER

## 2021-11-17 ENCOUNTER — OFFICE VISIT (OUTPATIENT)
Dept: BARIATRICS | Facility: CLINIC | Age: 56
End: 2021-11-17

## 2021-11-17 VITALS — HEIGHT: 60 IN | BODY MASS INDEX: 36.11 KG/M2 | WEIGHT: 183.9 LBS

## 2021-11-17 DIAGNOSIS — Z79.4 TYPE 2 DIABETES MELLITUS WITHOUT COMPLICATION, WITH LONG-TERM CURRENT USE OF INSULIN (HCC): ICD-10-CM

## 2021-11-17 DIAGNOSIS — E66.01 CLASS 2 SEVERE OBESITY DUE TO EXCESS CALORIES WITH SERIOUS COMORBIDITY AND BODY MASS INDEX (BMI) OF 36.0 TO 36.9 IN ADULT (HCC): Primary | ICD-10-CM

## 2021-11-17 DIAGNOSIS — E11.9 TYPE 2 DIABETES MELLITUS WITHOUT COMPLICATION, WITH LONG-TERM CURRENT USE OF INSULIN (HCC): ICD-10-CM

## 2021-11-17 PROCEDURE — RECHECK: Performed by: DIETITIAN, REGISTERED

## 2021-12-22 ENCOUNTER — OFFICE VISIT (OUTPATIENT)
Dept: PODIATRY | Facility: CLINIC | Age: 56
End: 2021-12-22
Payer: COMMERCIAL

## 2021-12-22 VITALS
HEART RATE: 82 BPM | DIASTOLIC BLOOD PRESSURE: 91 MMHG | BODY MASS INDEX: 36.32 KG/M2 | HEIGHT: 60 IN | WEIGHT: 185 LBS | SYSTOLIC BLOOD PRESSURE: 160 MMHG

## 2021-12-22 DIAGNOSIS — E11.42 DIABETIC POLYNEUROPATHY ASSOCIATED WITH TYPE 2 DIABETES MELLITUS (HCC): Primary | ICD-10-CM

## 2021-12-22 DIAGNOSIS — B35.1 TINEA UNGUIUM: ICD-10-CM

## 2021-12-22 DIAGNOSIS — I73.9 PAD (PERIPHERAL ARTERY DISEASE) (HCC): ICD-10-CM

## 2021-12-22 DIAGNOSIS — Z89.431 S/P TRANSMETATARSAL AMPUTATION OF FOOT, RIGHT (HCC): ICD-10-CM

## 2021-12-22 PROCEDURE — 3008F BODY MASS INDEX DOCD: CPT | Performed by: INTERNAL MEDICINE

## 2021-12-22 PROCEDURE — 11720 DEBRIDE NAIL 1-5: CPT | Performed by: PODIATRIST

## 2021-12-30 ENCOUNTER — OFFICE VISIT (OUTPATIENT)
Dept: BARIATRICS | Facility: CLINIC | Age: 56
End: 2021-12-30

## 2021-12-30 VITALS — WEIGHT: 182.7 LBS | BODY MASS INDEX: 35.68 KG/M2

## 2021-12-30 DIAGNOSIS — E66.01 CLASS 2 SEVERE OBESITY DUE TO EXCESS CALORIES WITH SERIOUS COMORBIDITY AND BODY MASS INDEX (BMI) OF 36.0 TO 36.9 IN ADULT (HCC): Primary | ICD-10-CM

## 2021-12-30 PROCEDURE — RECHECK

## 2022-01-03 ENCOUNTER — TELEPHONE (OUTPATIENT)
Dept: OBGYN CLINIC | Facility: HOSPITAL | Age: 57
End: 2022-01-03

## 2022-01-03 ENCOUNTER — TELEPHONE (OUTPATIENT)
Dept: BARIATRICS | Facility: CLINIC | Age: 57
End: 2022-01-03

## 2022-01-03 NOTE — TELEPHONE ENCOUNTER
Dr Gretchne Mckinnon    [phone]    Patient had surgery last year on her left foot  She states that it is very red on top and she is having 8/10 pain when she walks on it  Please advise

## 2022-01-04 ENCOUNTER — TELEPHONE (OUTPATIENT)
Dept: FAMILY MEDICINE CLINIC | Facility: CLINIC | Age: 57
End: 2022-01-04

## 2022-01-16 ENCOUNTER — APPOINTMENT (EMERGENCY)
Dept: RADIOLOGY | Facility: HOSPITAL | Age: 57
DRG: 254 | End: 2022-01-16
Payer: COMMERCIAL

## 2022-01-16 ENCOUNTER — HOSPITAL ENCOUNTER (INPATIENT)
Facility: HOSPITAL | Age: 57
LOS: 6 days | Discharge: HOME | DRG: 254 | End: 2022-01-22
Attending: EMERGENCY MEDICINE | Admitting: INTERNAL MEDICINE
Payer: COMMERCIAL

## 2022-01-16 DIAGNOSIS — I73.9 PAD (PERIPHERAL ARTERY DISEASE) (HCC): ICD-10-CM

## 2022-01-16 DIAGNOSIS — L03.115 CELLULITIS OF RIGHT FOOT: Primary | ICD-10-CM

## 2022-01-16 PROBLEM — M79.671 RIGHT FOOT PAIN: Status: ACTIVE | Noted: 2022-01-16

## 2022-01-16 LAB
ALBUMIN SERPL BCP-MCNC: 3.7 G/DL (ref 3.5–5)
ALP SERPL-CCNC: 87 U/L (ref 46–116)
ALT SERPL W P-5'-P-CCNC: 26 U/L (ref 12–78)
ANION GAP SERPL CALCULATED.3IONS-SCNC: 9 MMOL/L (ref 4–13)
AST SERPL W P-5'-P-CCNC: 22 U/L (ref 5–45)
BASOPHILS # BLD AUTO: 0.03 THOUSANDS/ΜL (ref 0–0.1)
BASOPHILS NFR BLD AUTO: 0 % (ref 0–1)
BILIRUB SERPL-MCNC: 0.34 MG/DL (ref 0.2–1)
BUN SERPL-MCNC: 16 MG/DL (ref 5–25)
CALCIUM SERPL-MCNC: 8.7 MG/DL (ref 8.3–10.1)
CHLORIDE SERPL-SCNC: 102 MMOL/L (ref 100–108)
CO2 SERPL-SCNC: 28 MMOL/L (ref 21–32)
CREAT SERPL-MCNC: 0.91 MG/DL (ref 0.6–1.3)
CRP SERPL QL: <3 MG/L
EOSINOPHIL # BLD AUTO: 0.14 THOUSAND/ΜL (ref 0–0.61)
EOSINOPHIL NFR BLD AUTO: 2 % (ref 0–6)
ERYTHROCYTE [DISTWIDTH] IN BLOOD BY AUTOMATED COUNT: 14.9 % (ref 11.6–15.1)
ERYTHROCYTE [SEDIMENTATION RATE] IN BLOOD: 37 MM/HOUR
GLUCOSE SERPL-MCNC: 132 MG/DL (ref 65–140)
GLUCOSE SERPL-MCNC: 193 MG/DL (ref 65–140)
HCT VFR BLD AUTO: 42 % (ref 36.5–46.1)
HGB BLD-MCNC: 12.8 G/DL (ref 12–15.4)
IMM GRANULOCYTES # BLD AUTO: 0.03 THOUSAND/UL (ref 0–0.2)
IMM GRANULOCYTES NFR BLD AUTO: 0 % (ref 0–2)
LYMPHOCYTES # BLD AUTO: 2.36 THOUSANDS/ΜL (ref 0.6–4.47)
LYMPHOCYTES NFR BLD AUTO: 32 % (ref 14–44)
MCH RBC QN AUTO: 22.9 PG (ref 26.8–34.3)
MCHC RBC AUTO-ENTMCNC: 30.5 G/DL (ref 31.4–37.4)
MCV RBC AUTO: 75 FL (ref 82–98)
MONOCYTES # BLD AUTO: 0.46 THOUSAND/ΜL (ref 0.17–1.22)
MONOCYTES NFR BLD AUTO: 6 % (ref 4–12)
NEUTROPHILS # BLD AUTO: 4.35 THOUSANDS/ΜL (ref 1.85–7.62)
NEUTS SEG NFR BLD AUTO: 60 % (ref 43–75)
NRBC BLD AUTO-RTO: 0 /100 WBCS
PLATELET # BLD AUTO: 220 THOUSANDS/UL (ref 149–390)
PMV BLD AUTO: 11.7 FL (ref 8.9–12.7)
POTASSIUM SERPL-SCNC: 3.8 MMOL/L (ref 3.5–5.3)
PROT SERPL-MCNC: 7.6 G/DL (ref 6.4–8.2)
RBC # BLD AUTO: 5.6 MILLION/UL (ref 3.88–5.12)
SODIUM SERPL-SCNC: 139 MMOL/L (ref 136–145)
WBC # BLD AUTO: 7.37 THOUSAND/UL (ref 4.31–10.16)

## 2022-01-16 PROCEDURE — 73630 X-RAY EXAM OF FOOT: CPT

## 2022-01-16 PROCEDURE — 87040 BLOOD CULTURE FOR BACTERIA: CPT | Performed by: EMERGENCY MEDICINE

## 2022-01-16 PROCEDURE — 36415 COLL VENOUS BLD VENIPUNCTURE: CPT | Performed by: EMERGENCY MEDICINE

## 2022-01-16 PROCEDURE — 99285 EMERGENCY DEPT VISIT HI MDM: CPT | Performed by: EMERGENCY MEDICINE

## 2022-01-16 PROCEDURE — 80053 COMPREHEN METABOLIC PANEL: CPT | Performed by: EMERGENCY MEDICINE

## 2022-01-16 PROCEDURE — 99285 EMERGENCY DEPT VISIT HI MDM: CPT

## 2022-01-16 PROCEDURE — 82948 REAGENT STRIP/BLOOD GLUCOSE: CPT

## 2022-01-16 PROCEDURE — 85652 RBC SED RATE AUTOMATED: CPT | Performed by: EMERGENCY MEDICINE

## 2022-01-16 PROCEDURE — 99223 1ST HOSP IP/OBS HIGH 75: CPT | Performed by: STUDENT IN AN ORGANIZED HEALTH CARE EDUCATION/TRAINING PROGRAM

## 2022-01-16 PROCEDURE — 86140 C-REACTIVE PROTEIN: CPT | Performed by: EMERGENCY MEDICINE

## 2022-01-16 PROCEDURE — 85025 COMPLETE CBC W/AUTO DIFF WBC: CPT | Performed by: EMERGENCY MEDICINE

## 2022-01-16 RX ORDER — ONDANSETRON 2 MG/ML
4 INJECTION INTRAMUSCULAR; INTRAVENOUS EVERY 6 HOURS PRN
Status: DISCONTINUED | OUTPATIENT
Start: 2022-01-16 | End: 2022-01-22 | Stop reason: HOSPADM

## 2022-01-16 RX ORDER — ACETAMINOPHEN 325 MG/1
650 TABLET ORAL EVERY 6 HOURS PRN
Status: DISCONTINUED | OUTPATIENT
Start: 2022-01-16 | End: 2022-01-22 | Stop reason: HOSPADM

## 2022-01-16 RX ORDER — INSULIN GLARGINE 100 [IU]/ML
10 INJECTION, SOLUTION SUBCUTANEOUS
Status: DISCONTINUED | OUTPATIENT
Start: 2022-01-16 | End: 2022-01-22 | Stop reason: HOSPADM

## 2022-01-16 RX ORDER — INSULIN GLARGINE 100 [IU]/ML
3 INJECTION, SOLUTION SUBCUTANEOUS ONCE
Status: COMPLETED | OUTPATIENT
Start: 2022-01-16 | End: 2022-01-16

## 2022-01-16 RX ORDER — MAGNESIUM HYDROXIDE/ALUMINUM HYDROXICE/SIMETHICONE 120; 1200; 1200 MG/30ML; MG/30ML; MG/30ML
30 SUSPENSION ORAL EVERY 6 HOURS PRN
Status: DISCONTINUED | OUTPATIENT
Start: 2022-01-16 | End: 2022-01-22 | Stop reason: HOSPADM

## 2022-01-16 RX ORDER — OXYCODONE HYDROCHLORIDE 5 MG/1
2.5 TABLET ORAL EVERY 4 HOURS PRN
Status: DISCONTINUED | OUTPATIENT
Start: 2022-01-16 | End: 2022-01-22 | Stop reason: HOSPADM

## 2022-01-16 RX ORDER — ATORVASTATIN CALCIUM 20 MG/1
20 TABLET, FILM COATED ORAL DAILY
Status: DISCONTINUED | OUTPATIENT
Start: 2022-01-17 | End: 2022-01-22 | Stop reason: HOSPADM

## 2022-01-16 RX ORDER — ASPIRIN 81 MG/1
81 TABLET, CHEWABLE ORAL DAILY
Status: DISCONTINUED | OUTPATIENT
Start: 2022-01-17 | End: 2022-01-22 | Stop reason: HOSPADM

## 2022-01-16 RX ORDER — LISINOPRIL 5 MG/1
5 TABLET ORAL DAILY
Status: DISCONTINUED | OUTPATIENT
Start: 2022-01-17 | End: 2022-01-22 | Stop reason: HOSPADM

## 2022-01-16 RX ADMIN — INSULIN GLARGINE 3 UNITS: 100 INJECTION, SOLUTION SUBCUTANEOUS at 23:13

## 2022-01-16 RX ADMIN — VANCOMYCIN HYDROCHLORIDE 1250 MG: 1 INJECTION, POWDER, LYOPHILIZED, FOR SOLUTION INTRAVENOUS at 20:48

## 2022-01-16 RX ADMIN — METOPROLOL TARTRATE 25 MG: 25 TABLET, FILM COATED ORAL at 22:10

## 2022-01-16 NOTE — ED PROVIDER NOTES
History  Chief Complaint   Patient presents with    Foot Pain     right foot redness pain when walking x1 week no recent injury       History provided by:  Patient and relative   used: No    Medical Problem  Location:  Right foot  Quality:  Right foot redness  Severity:  Moderate  Onset quality:  Gradual  Duration:  1 week  Timing:  Constant  Progression:  Unchanged  Chronicity:  New  Context:  Hx of Right forefoot amputation TMA 2019, also Hx of SFA Stent with stenosis in past  Relieved by:  Nothing  Worsened by:  Nothing  Ineffective treatments:  None  Associated symptoms: no abdominal pain, no chest pain, no cough, no diarrhea, no fever, no headaches, no loss of consciousness, no nausea, no rash, no shortness of breath, no sore throat and no vomiting    Risk factors:  DM      Prior to Admission Medications   Prescriptions Last Dose Informant Patient Reported? Taking?    Blood Glucose Monitoring Suppl (ONE TOUCH ULTRA MINI) w/Device KIT  Self No Yes   Sig: Use 4 times a day   Dulaglutide (Trulicity) 3 IL/2 4JM SOPN  Self No Yes   Sig: Inject 0 5 mL (3 mg total) under the skin once a week   Insulin Pen Needle 32G X 4 MM MISC  Self No Yes   Sig: Use 4 times daily with insulin pens   OneTouch Delica Lancets 44L MISC  Self No Yes   Sig: Use to test 3x daily   aspirin 81 mg chewable tablet  Self No Yes   Sig: Chew 1 tablet (81 mg total) daily   atorvastatin (LIPITOR) 20 mg tablet   No Yes   Sig: Take 1 tablet by mouth once daily   cetirizine (ZyrTEC) 10 mg tablet  Self No Yes   Sig: Take 1 tablet (10 mg total) by mouth daily   Patient taking differently: Take 10 mg by mouth daily as needed    clopidogrel (PLAVIX) 75 mg tablet   No Yes   Sig: Take 1 tablet by mouth once daily   ergocalciferol (ERGOCALCIFEROL) 1 25 MG (80697 UT) capsule  Self No Yes   Sig: Take 1 capsule (50,000 Units total) by mouth once a week   ferrous sulfate 324 (65 Fe) mg Not Taking at Unknown time Self No No   Sig: Take 1 tablet (324 mg total) by mouth 2 (two) times a day before meals   Patient not taking: Reported on 2022    fluticasone (FLONASE) 50 mcg/act nasal spray Not Taking at Unknown time Self No No   Si spray into each nostril daily   Patient not taking: Reported on 2022    glucose blood (ONE TOUCH ULTRA TEST) test strip  Self No Yes   Sig: Use to test 3x daily   insulin glargine (Lantus SoloStar) 100 units/mL injection pen  Self No Yes   Sig: Use 15 units subcut once daily at 9pm   lisinopril (ZESTRIL) 5 mg tablet  Self No Yes   Sig: Take 1 tablet (5 mg total) by mouth daily   metFORMIN (GLUCOPHAGE) 1000 MG tablet  Self No Yes   Sig: Take 1 tablet (1,000 mg total) by mouth 2 (two) times a day with meals   metoprolol tartrate (LOPRESSOR) 25 mg tablet  Self No Yes   Sig: Take 1 tablet (25 mg total) by mouth every 12 (twelve) hours      Facility-Administered Medications: None       Past Medical History:   Diagnosis Date    Cellulitis of toe 2019    CKD (chronic kidney disease)     Diabetes mellitus ([facility] [address] )     Gangrene of toe of right foot ([facility] [address] ) 10/30/2019    Added automatically from request for surgery [id number]    Hypertension        Past Surgical History:   Procedure Laterality Date    IR ABDOMINAL AORTAGRAM  2019    IR AORTAGRAM WITH RUN-OFF  2020    IR ARTERIAL LYSIS  2019    IR LOWER EXTREMITY / INTERVENTION  2019    [place] AMPUTATION FOOT,TRANSMETATARSAL Right 2019    Procedure: AMPUTATION TRANSMETATARSAL (TMA) WITH ACHILLES TENDON LENGTHING;  Surgeon: Rachell Manzo DPM;  [facility];  Service: Podiatry    TN AMPUTATION [facility] Right 2020    Procedure: AMPUTATION TRANSMETATARSAL (TMA);  Surgeon: Babatunde Zimmerman DPM;  [facility];  Service: Podiatry    TOE AMPUTATION Left 2019    Procedure: AMPUTATION 5th TOE;  Surgeon: Rachell Manzo DPM;  [facility];  Service: Podiatry    TOE AMPUTATION Right 2019    Procedure: AMPUTATION RIGHT 3RD AND 4TH TOE;  Surgeon: India Mcelroy DPM;  Location: [facility] OR;  Service: Podiatry       Family History   Problem Relation Age of Onset    Diabetes Mother     No Known Problems Father      I have reviewed and agree with the history as documented  E-Cigarette/Vaping    E-Cigarette Use Never User      E-Cigarette/Vaping Substances    Nicotine No     THC No     CBD No     Flavoring No     Other No     Unknown No      Social History     Tobacco Use    Smoking status: Former Smoker     Types: Cigarettes     Quit date:      Years since quittin 0    Smokeless tobacco: Never Used   Vaping Use    Vaping Use: Never used   Substance Use Topics    Alcohol use: Not Currently    Drug use: No       Review of Systems   Constitutional: Negative for chills and fever  HENT: Negative for facial swelling, sore throat and trouble swallowing  Eyes: Negative for pain and visual disturbance  Respiratory: Negative for cough, chest tightness and shortness of breath  Cardiovascular: Negative for chest pain and leg swelling  Gastrointestinal: Negative for abdominal pain, diarrhea, nausea and vomiting  Genitourinary: Negative for dysuria and flank pain  Musculoskeletal: Negative for back pain, neck pain and neck stiffness  Right foot erythema   Skin: Negative for pallor and rash  Allergic/Immunologic: Negative for environmental allergies and immunocompromised state  Neurological: Negative for dizziness, loss of consciousness and headaches  Hematological: Negative for adenopathy  Does not bruise/bleed easily  Psychiatric/Behavioral: Negative for agitation and behavioral problems  All other systems reviewed and are negative  Physical Exam  Physical Exam  Vitals and nursing note reviewed  Constitutional:       General:  [employer] [address] is not in acute distress  Appearance:  GoodData is well-developed     HENT:      Head: Normocephalic and atraumatic  Eyes:      Extraocular Movements: Extraocular movements intact  Cardiovascular:      Rate and Rhythm: Normal rate and regular rhythm  Pulmonary:      Effort: Pulmonary effort is normal  No respiratory distress  Abdominal:      Palpations: Abdomen is soft  Tenderness: There is no abdominal tenderness  There is no guarding or rebound  Musculoskeletal:         General: Normal range of motion  Cervical back: Normal range of motion and neck supple  Comments: Right foot erythema involving amputated stump, no fluctuance, appears warm, good cap refill, no palpable pulses   Skin:     General: Skin is warm and dry  Neurological:      General: No focal deficit present  Mental Status: Clair Gerardo is alert and oriented to person, place, and time     Psychiatric:         Mood and Affect: Mood normal          Behavior: Behavior normal          Vital Signs  ED Triage Vitals [01/16/22 1536]   Temperature Pulse Respirations Blood Pressure SpO2   97 8 °F (36 6 °C) 84 18 (!) 205/84 100 %      Temp Source Heart Rate Source Patient Position - Orthostatic VS BP Location FiO2 (%)   Oral Monitor Sitting Right arm --      Pain Score       No Pain           Vitals:    01/16/22 1536 01/16/22 1802 01/16/22 2015   BP: (!) 205/84 143/90    Pulse: 84 82 92   Patient Position - Orthostatic VS: Sitting Lying Lying         Visual Acuity      ED Medications  Medications   vancomycin (VANCOCIN) 1,250 mg in sodium chloride 0 9 % 250 mL IVPB (has no administration in time range)   insulin lispro (HumaLOG) 100 units/mL subcutaneous injection 1-5 Units (has no administration in time range)   acetaminophen (TYLENOL) tablet 650 mg (has no administration in time range)   ondansetron (ZOFRAN) injection 4 mg (has no administration in time range)   aluminum-magnesium hydroxide-simethicone (MYLANTA) oral suspension 30 mL (has no administration in time range)   enoxaparin (LOVENOX) subcutaneous injection 40 mg (has no administration in time range)   aspirin chewable tablet 81 mg (has no administration in time range)   atorvastatin (LIPITOR) tablet 20 mg (has no administration in time range)   metoprolol tartrate (LOPRESSOR) tablet 25 mg (has no administration in time range)   lisinopril (ZESTRIL) tablet 5 mg (has no administration in time range)   oxyCODONE (ROXICODONE) IR tablet 2 5 mg (has no administration in time range)   insulin glargine (LANTUS) subcutaneous injection 10 Units 0 1 mL (has no administration in time range)       Diagnostic Studies  Results Reviewed     Procedure Component Value Units Date/Time    Blood culture #1 [[id number]] Collected: 01/16/22 2013    Lab Status: In process Specimen: Blood from Hand, Right Updated: 01/16/22 2016    Blood culture #2 [[id number]] Collected: 01/16/22 2008    Lab Status: In process Specimen: Blood from Arm, Left Updated: 01/16/22 2016    Sedimentation rate, automated [[id number]] Collected: 01/16/22 1625    Lab Status: Final result Specimen: Blood from Arm, Right Updated: 01/16/22 1722     Sed Rate 37 mm/hour     C-reactive protein [[id number]]  (Normal) Collected: 01/16/22 1625    Lab Status: Final result Specimen: Blood from Arm, Right Updated: 01/16/22 1700     CRP <3 0 mg/L     Comprehensive metabolic panel [[id number]]  (Abnormal) Collected: 01/16/22 1625    Lab Status: Final result Specimen: Blood from Arm, Right Updated: 01/16/22 1645     Sodium 139 mmol/L      Potassium 3 8 mmol/L      Chloride 102 mmol/L      CO2 28 mmol/L      ANION GAP 9 mmol/L      BUN 16 mg/dL      Creatinine 0 91 mg/dL      Glucose 193 mg/dL      Calcium 8 7 mg/dL      AST 22 U/L      ALT 26 U/L      Alkaline Phosphatase 87 U/L      Total Protein 7 6 g/dL      Albumin 3 7 g/dL      Total Bilirubin 0 34 mg/dL      eGFR --    Narrative:      Notes:     1  eGFR calculation is only valid for adults 18 years and older    2  EGFR calculation cannot be performed for patients who are transgender, non-binary, or whose legal sex, sex at birth, and gender identity differ  CBC and differential [[id number]]  (Abnormal) Collected: 01/16/22 1625    Lab Status: Final result Specimen: Blood from Arm, Right Updated: 01/16/22 1632     WBC 7 37 Thousand/uL      RBC 5 60 Million/uL      Hemoglobin 12 8 g/dL      Hematocrit 42 0 %      MCV 75 fL      MCH 22 9 pg      MCHC 30 5 g/dL      RDW 14 9 %      MPV 11 7 fL      Platelets 773 Thousands/uL      nRBC 0 /100 WBCs      Neutrophils Relative 60 %      Immat GRANS % 0 %      Lymphocytes Relative 32 %      Monocytes Relative 6 %      Eosinophils Relative 2 %      Basophils Relative 0 %      Neutrophils Absolute 4 35 Thousands/µL      Immature Grans Absolute 0 03 Thousand/uL      Lymphocytes Absolute 2 36 Thousands/µL      Monocytes Absolute 0 46 Thousand/µL      Eosinophils Absolute 0 14 Thousand/µL      Basophils Absolute 0 03 Thousands/µL                  XR foot 3+ views RIGHT    (Results Pending)   VAS lower limb arterial duplex, complete bilateral    (Results Pending)              Procedures  Procedures         ED Course  ED Course as of 01/16/22 2027   Sun Jan 16, 2022   1638 WBC: 7 37   1638 Hemoglobin: 12 8   1638 Platelet Count: 470  CBC noted, no significant acute abnormality  1712 Sodium: 139   1713 Potassium: 3 8   1713 BUN: 16   1713 Creatinine: 0 91   1713 Glucose, Random(!): 193  CMP, CRP noted, no significant acute abnormality   1724 Sed Rate: 37  ESR normal   1802 Unable to find pulses with doppler, Podiatry informed, advised to get Vascular consult  1816 TT sent to Surgery resident covering Vascular  1844 Patient seen by Surgery Resident Jim Braxton), do no think the foot is ischemic, probable Cellulitis  5372 TT sent to Podiatry Heber Alan); ok with Discharge on Keflex from Podiatry standpoint, follow up with her [address] Patient being evaluated by Surgery Resident covering Vascular     1933 Surgery advised to admit for IV Abx for Cellulitis, under SLIM, LEAD in AM, will follow in consult  Patient informed, ok with getting admitted  1951 Case discussed with Dr Tuan Warner, ok with above plan and inpatient admission  SBIRT 20yo+      Most Recent Value   SBIRT (22 yo +)    In order to provide better care to our patients, we are screening all of our patients for alcohol and drug use  Would it be okay to ask you these screening questions? No Filed at: 01/16/2022 1804                    [facility]  Number of Diagnoses or Management Options  Cellulitis of right foot: new and requires workup  Diagnosis management comments: Patient is a 66-year-old female, history of diabetes on metformin and insulin, TMA right foot 2019, peripheral arterial disease, SFA stent with stenosis in the past; comes in with complaints of redness involving the right amputated foot stump area, going on for 1 week, denies fever chills  On exam patient is conscious, alert vital signs stable, no acute distress, erythema noted of the distal right foot amputated stump area, warm, cap refill intact  Impression:  Right foot cellulitis, rule out tube osteomyelitis, peripheral arterial disease, vascular insufficiency, will check labs, x-rays, contact Podiatry and vascular         Amount and/or Complexity of Data Reviewed  Clinical lab tests: ordered and reviewed  Tests in the radiology section of CPT®: ordered and reviewed  Tests in the medicine section of CPT®: ordered and reviewed  Discuss the patient with other providers: yes  Independent visualization of images, tracings, or specimens: yes        Disposition  Final diagnoses:   Cellulitis of right foot     Time reflects when diagnosis was documented in both MDM as applicable and the Disposition within this note     Time User Action Codes Description Comment    1/16/2022  4:28 PM Ghassan Francis Add [K08 [id number]] Cellulitis of right foot       ED Disposition     ED Disposition Condition Date/Time Comment Admit Stable Sun Jan 16, 2022  7:49 PM Case was discussed with Dr Neema Gresham and the patient's admission status was agreed to be Admission Status: inpatient status to the service of Dr Neema Gresham  Follow-up Information    None         Patient's Medications   Discharge Prescriptions    No medications on file       No discharge procedures on file      PDMP Review       Value Time User    PDMP Reviewed  Yes 5/14/2020 11:45 AM Anthony Landa MD          ED Provider  Electronically Signed by           Pili Olivarez MD  01/16/22 2027

## 2022-01-16 NOTE — ED NOTES
Vascular at bedside       Radha Rebolledo, SIGIFREDO  01/16/22 9614 [facility], RN  01/16/22 5514

## 2022-01-16 NOTE — CONSULTS
Consultation - Vascular Surgery   Maxim Lyon 64 y o  adult MRN: [id number]  Unit/Bed#: ED 32 Encounter: [id number]      Assessment/Plan      Assessment:  63 yo F with erythema of R TMA stump  She does not open wound  Stump is warm, good capillary refill  Motor/sensory function intact  No concern for acute limb ischemia  However, she might have progression in PAD, which could contribute to her symptoms/cellulitis    Plan:  No urgent intervention required from vascular surgery standpoint  Recommend SLIM admit and IV abx  Will obtain LEAD  Will decide further management depending on the result    History of Present Illness   Physician Requesting Consult: Servando Quiroz MD  Reason for Consult / Principal Problem:     HPI: Ruth Yang is a 64y o  year old adult who presents with history of DM, CLD, PAD s/p R TMA 2019 who presented to ED with erythema of R TMA stump  She also has history of PAD, s/p distal SFA stent on 2/1/2019 and recanalization of stent occlusion and peroneal artery on 5/5/2020  She noticed the symptom about a week ago  She denies pain, but has tenderness on the stump  She denies claudication on calf  Motor/sensory intact  Denies fever  Vital signs stable, WBC 7 4  Vascular surgery was consulted due to concern for ischemia    Consults    Review of Systems   Constitutional: Negative  HENT: Negative  Eyes: Negative  Respiratory: Negative  Cardiovascular: Negative  Gastrointestinal: Negative  Endocrine: Negative  Genitourinary: Negative  Musculoskeletal: Negative  Skin: Positive for color change  Allergic/Immunologic: Negative  Neurological: Negative  Hematological: Negative  Psychiatric/Behavioral: Negative  All other systems reviewed and are negative        Historical Information   Past Medical History:   Diagnosis Date    Cellulitis of toe 1/28/2019    CKD (chronic kidney disease)     Diabetes mellitus (HCC)     Gangrene of toe of right foot ([facility] [address] ) 10/30/2019    Added automatically from request for surgery [id number]    Hypertension      Past Surgical History:   Procedure Laterality Date    IR ABDOMINAL AORTAGRAM  2019    IR AORTAGRAM WITH RUN-OFF  2020    IR ARTERIAL LYSIS  2019    IR LOWER EXTREMITY / INTERVENTION  2019    [place] AMPUTATION FOOT,TRANSMETATARSAL Right 2019    Procedure: AMPUTATION TRANSMETATARSAL (TMA) WITH ACHILLES TENDON LENGTHING;  Surgeon: Wai Mendez DPM;  [facility];  Service: Podiatry    [address] [place] [address] [employer] Right 2020    Procedure: AMPUTATION TRANSMETATARSAL (TMA);  Surgeon: Sangeeta Bustillo DPM;  [facility];  Service: Podiatry    TOE AMPUTATION Left 2019    Procedure: AMPUTATION 5th TOE;  Surgeon: Wai Mendez DPM;  [facility];  Service: Podiatry    TOE AMPUTATION Right 2019    Procedure: AMPUTATION RIGHT 3RD AND 4TH TOE;  Surgeon: Wai Mendez DPM;  [facility];  Service: Podiatry     Social History   Social History     Substance and Sexual Activity   Alcohol Use Not Currently     Social History     Substance and Sexual Activity   Drug Use No     E-Cigarette/Vaping    E-Cigarette Use Never User      E-Cigarette/Vaping Substances    Nicotine No     THC No     CBD No     Flavoring No     Other No     Unknown No      Social History     Tobacco Use   Smoking Status Former Smoker    Types: Cigarettes    Quit date:     Years since quittin 0   Smokeless Tobacco Never Used     Family History: non-contributory}    Meds/Allergies   all current active meds have been reviewed  Allergies   Allergen Reactions    Levemir [Insulin Detemir] Itching       Objective   Vitals: Blood pressure 143/90, pulse 82, temperature 97 8 °F (36 6 °C), temperature source Oral, resp  rate 18, weight 82 6 kg (182 lb), SpO2 100 %, not currently breastfeeding  ,Body mass index is 35 54 kg/m²    No intake or output data in the 24 hours ending 01/16/22 1849  Invasive Devices  Report    Peripheral Intravenous Line            Peripheral IV 01/16/22 Right Antecubital <1 day                Physical Exam  Vitals and nursing note reviewed  Constitutional:       Appearance: Murtaza Hightower is normal weight  HENT:      Head: Normocephalic and atraumatic  Right Ear: External ear normal       Left Ear: External ear normal       Nose: Nose normal       Mouth/Throat:      Mouth: Mucous membranes are moist       Pharynx: Oropharynx is clear  Eyes:      Extraocular Movements: Extraocular movements intact  Conjunctiva/sclera: Conjunctivae normal       Pupils: Pupils are equal, round, and reactive to light  Cardiovascular:      Rate and Rhythm: Normal rate and regular rhythm  Heart sounds: Normal heart sounds  Comments: Doppler signal on R DP  Pulmonary:      Effort: Pulmonary effort is normal       Breath sounds: Normal breath sounds  Abdominal:      General: Abdomen is flat  Bowel sounds are normal       Palpations: Abdomen is soft  Musculoskeletal:         General: Normal range of motion  Cervical back: Normal range of motion and neck supple  Skin:     General: Skin is warm  Capillary Refill: Capillary refill takes less than 2 seconds  Findings: Erythema present  Comments: Mild erythema and mild tenderness on R TMA stump  No wound  Warm  Good capillary refill  Neurological:      General: No focal deficit present  Mental Status: Murtaza Hightower is alert and oriented to person, place, and time  Mental status is at baseline  Psychiatric:         Mood and Affect: Mood normal          Behavior: Behavior normal          Thought Content: Thought content normal          Judgment: Judgment normal          Lab Results: I have personally reviewed pertinent reports  Imaging Studies: I have personally reviewed pertinent reports      EKG, Pathology, and Other Studies: I have personally reviewed pertinent reports           Code Status: Prior  Advance Directive and Living Will:      Power of :    POLST:      Counseling / Coordination of Care  None

## 2022-01-17 ENCOUNTER — APPOINTMENT (INPATIENT)
Dept: NON INVASIVE DIAGNOSTICS | Facility: HOSPITAL | Age: 57
DRG: 254 | End: 2022-01-17
Payer: COMMERCIAL

## 2022-01-17 LAB
ANION GAP SERPL CALCULATED.3IONS-SCNC: 8 MMOL/L (ref 4–13)
BASOPHILS # BLD AUTO: 0.03 THOUSANDS/ΜL (ref 0–0.1)
BASOPHILS NFR BLD AUTO: 0 % (ref 0–1)
BUN SERPL-MCNC: 15 MG/DL (ref 5–25)
CALCIUM SERPL-MCNC: 8.4 MG/DL (ref 8.3–10.1)
CHLORIDE SERPL-SCNC: 106 MMOL/L (ref 100–108)
CO2 SERPL-SCNC: 25 MMOL/L (ref 21–32)
CREAT SERPL-MCNC: 0.77 MG/DL (ref 0.6–1.3)
EOSINOPHIL # BLD AUTO: 0.15 THOUSAND/ΜL (ref 0–0.61)
EOSINOPHIL NFR BLD AUTO: 2 % (ref 0–6)
ERYTHROCYTE [DISTWIDTH] IN BLOOD BY AUTOMATED COUNT: 15 % (ref 11.6–15.1)
GLUCOSE SERPL-MCNC: 104 MG/DL (ref 65–140)
GLUCOSE SERPL-MCNC: 140 MG/DL (ref 65–140)
GLUCOSE SERPL-MCNC: 240 MG/DL (ref 65–140)
GLUCOSE SERPL-MCNC: 90 MG/DL (ref 65–140)
HCT VFR BLD AUTO: 36.8 % (ref 36.5–46.1)
HGB BLD-MCNC: 11.3 G/DL (ref 12–15.4)
IMM GRANULOCYTES # BLD AUTO: 0.02 THOUSAND/UL (ref 0–0.2)
IMM GRANULOCYTES NFR BLD AUTO: 0 % (ref 0–2)
LYMPHOCYTES # BLD AUTO: 4.04 THOUSANDS/ΜL (ref 0.6–4.47)
LYMPHOCYTES NFR BLD AUTO: 48 % (ref 14–44)
MCH RBC QN AUTO: 23 PG (ref 26.8–34.3)
MCHC RBC AUTO-ENTMCNC: 30.7 G/DL (ref 31.4–37.4)
MCV RBC AUTO: 75 FL (ref 82–98)
MONOCYTES # BLD AUTO: 0.49 THOUSAND/ΜL (ref 0.17–1.22)
MONOCYTES NFR BLD AUTO: 6 % (ref 4–12)
NEUTROPHILS # BLD AUTO: 3.76 THOUSANDS/ΜL (ref 1.85–7.62)
NEUTS SEG NFR BLD AUTO: 44 % (ref 43–75)
NRBC BLD AUTO-RTO: 0 /100 WBCS
PLATELET # BLD AUTO: 189 THOUSANDS/UL (ref 149–390)
PMV BLD AUTO: 11.3 FL (ref 8.9–12.7)
POTASSIUM SERPL-SCNC: 4 MMOL/L (ref 3.5–5.3)
RBC # BLD AUTO: 4.92 MILLION/UL (ref 3.88–5.12)
SODIUM SERPL-SCNC: 139 MMOL/L (ref 136–145)
WBC # BLD AUTO: 8.49 THOUSAND/UL (ref 4.31–10.16)

## 2022-01-17 PROCEDURE — 80048 BASIC METABOLIC PNL TOTAL CA: CPT | Performed by: STUDENT IN AN ORGANIZED HEALTH CARE EDUCATION/TRAINING PROGRAM

## 2022-01-17 PROCEDURE — 99232 SBSQ HOSP IP/OBS MODERATE 35: CPT | Performed by: FAMILY MEDICINE

## 2022-01-17 PROCEDURE — 93925 LOWER EXTREMITY STUDY: CPT | Performed by: SURGERY

## 2022-01-17 PROCEDURE — 85025 COMPLETE CBC W/AUTO DIFF WBC: CPT | Performed by: STUDENT IN AN ORGANIZED HEALTH CARE EDUCATION/TRAINING PROGRAM

## 2022-01-17 PROCEDURE — NC001 PR NO CHARGE: Performed by: RADIOLOGY

## 2022-01-17 PROCEDURE — 82948 REAGENT STRIP/BLOOD GLUCOSE: CPT

## 2022-01-17 PROCEDURE — 93922 UPR/L XTREMITY ART 2 LEVELS: CPT | Performed by: SURGERY

## 2022-01-17 PROCEDURE — 99222 1ST HOSP IP/OBS MODERATE 55: CPT | Performed by: SURGERY

## 2022-01-17 PROCEDURE — 93923 UPR/LXTR ART STDY 3+ LVLS: CPT

## 2022-01-17 PROCEDURE — 93925 LOWER EXTREMITY STUDY: CPT

## 2022-01-17 RX ORDER — CLOPIDOGREL BISULFATE 75 MG/1
75 TABLET ORAL DAILY
Status: DISCONTINUED | OUTPATIENT
Start: 2022-01-17 | End: 2022-01-18

## 2022-01-17 RX ORDER — CEFAZOLIN SODIUM 2 G/50ML
2000 SOLUTION INTRAVENOUS EVERY 8 HOURS
Status: DISCONTINUED | OUTPATIENT
Start: 2022-01-17 | End: 2022-01-20

## 2022-01-17 RX ADMIN — CEFAZOLIN SODIUM 2000 MG: 2 SOLUTION INTRAVENOUS at 14:16

## 2022-01-17 RX ADMIN — ATORVASTATIN CALCIUM 20 MG: 20 TABLET, FILM COATED ORAL at 08:20

## 2022-01-17 RX ADMIN — METOPROLOL TARTRATE 25 MG: 25 TABLET, FILM COATED ORAL at 22:23

## 2022-01-17 RX ADMIN — METOPROLOL TARTRATE 25 MG: 25 TABLET, FILM COATED ORAL at 08:20

## 2022-01-17 RX ADMIN — ENOXAPARIN SODIUM 40 MG: 40 INJECTION SUBCUTANEOUS at 08:20

## 2022-01-17 RX ADMIN — INSULIN LISPRO 2 UNITS: 100 INJECTION, SOLUTION INTRAVENOUS; SUBCUTANEOUS at 17:32

## 2022-01-17 RX ADMIN — ASPIRIN 81 MG CHEWABLE TABLET 81 MG: 81 TABLET CHEWABLE at 08:20

## 2022-01-17 RX ADMIN — INSULIN GLARGINE 10 UNITS: 100 INJECTION, SOLUTION SUBCUTANEOUS at 22:24

## 2022-01-17 RX ADMIN — CLOPIDOGREL BISULFATE 75 MG: 75 TABLET ORAL at 14:16

## 2022-01-17 RX ADMIN — CEFAZOLIN SODIUM 2000 MG: 2 SOLUTION INTRAVENOUS at 22:24

## 2022-01-17 RX ADMIN — LISINOPRIL 5 MG: 5 TABLET ORAL at 08:20

## 2022-01-17 NOTE — ASSESSMENT & PLAN NOTE
Continue metoprolol and lisinopril at this time, blood pressure significantly elevated on admission now controlled

## 2022-01-17 NOTE — PLAN OF CARE
Problem: Potential for Falls  Goal: Patient will remain free of falls  Description: INTERVENTIONS:  - Educate patient/family on patient safety including physical limitations  - Instruct patient to call for assistance with activity   - Consult OT/PT to assist with strengthening/mobility   - Keep Call bell within reach  - Keep bed low and locked with side rails adjusted as appropriate  - Keep care items and personal belongings within reach  - Initiate and maintain comfort rounds  - Make Fall Risk Sign visible to staff  - Offer Toileting every 2 Hours, in advance of need  - Initiate/Maintain bed alarm  - Obtain necessary fall risk management equipment: bed alarm  - Apply yellow socks and bracelet for high fall risk patients  - Consider moving patient to room near nurses station  1/17/2022 0359 by Teresa Lopez RN  Outcome: Progressing  1/17/2022 0359 by Teresa Lopez RN  Outcome: Progressing     Problem: MOBILITY - ADULT  Goal: Maintain or return to baseline ADL function  Description: INTERVENTIONS:  -  Assess patient's ability to carry out ADLs; assess patient's baseline for ADL function and identify physical deficits which impact ability to perform ADLs (bathing, care of mouth/teeth, toileting, grooming, dressing, etc )  - Assess/evaluate cause of self-care deficits   - Assess range of motion  - Assess patient's mobility; develop plan if impaired  - Assess patient's need for assistive devices and provide as appropriate  - Encourage maximum independence but intervene and supervise when necessary  - Involve family in performance of ADLs  - Assess for home care needs following discharge   - Consider OT consult to assist with ADL evaluation and planning for discharge  - Provide patient education as appropriate  1/17/2022 0359 by Teresa Lopez RN  Outcome: Progressing  1/17/2022 0359 by Teresa Lopez RN  Outcome: Progressing  Goal: Maintains/Returns to pre admission functional level  Description: INTERVENTIONS:  - Perform BMAT or MOVE assessment daily    - Set and communicate daily mobility goal to care team and patient/family/caregiver  - Collaborate with rehabilitation services on mobility goals if consulted  - Perform Range of Motion 3 times a day  - Reposition patient every 2 hours    - Dangle patient 3 times a day  - Stand patient 3 times a day  - Ambulate patient 3 times a day  - Out of bed to chair 3 times a day   - Out of bed for meals 3 times a day  - Out of bed for toileting  - Record patient progress and toleration of activity level   1/17/2022 0359 by Denise Mendoza RN  Outcome: Progressing  1/17/2022 0359 by Denise Mendoza RN  Outcome: Progressing     Problem: Prexisting or High Potential for Compromised Skin Integrity  Goal: Skin integrity is maintained or improved  Description: INTERVENTIONS:  - Identify patients at risk for skin breakdown  - Assess and monitor skin integrity  - Assess and monitor nutrition and hydration status  - Monitor labs   - Assess for incontinence   - Turn and reposition patient  - Assist with mobility/ambulation  - Relieve pressure over bony prominences  - Avoid friction and shearing  - Provide appropriate hygiene as needed including keeping skin clean and dry  - Evaluate need for skin moisturizer/barrier cream  - Collaborate with interdisciplinary team   - Patient/family teaching  - Consider wound care consult   1/17/2022 0359 by Denise Mendoza RN  Outcome: Progressing  1/17/2022 0359 by Denise Mendoza RN  Outcome: Progressing

## 2022-01-17 NOTE — UTILIZATION REVIEW
Initial Clinical Review    Admission: Date/Time/Statement:   Admission Orders (From admission, onward)     Ordered        01/16/22 1950  Inpatient Admission  Once                      Orders Placed This Encounter   Procedures    Inpatient Admission     Standing Status:   Standing     Number of Occurrences:   1     Order Specific Question:   Level of Care     Answer:   Med Surg [16]     Order Specific Question:   Estimated length of stay     Answer:   More than 2 Midnights     Order Specific Question:   Certification     Answer:   I certify that inpatient services are medically necessary for this patient for a duration of greater than two midnights  See H&P and MD Progress Notes for additional information about the patient's course of treatment  ED Arrival Information     Expected Arrival Acuity    - 1/16/2022 15:22 Urgent         Means of arrival Escorted by Service Admission type    Wheelchair Family Member Hospitalist Urgent         Arrival complaint    foot injury        Chief Complaint   Patient presents with    Foot Pain     right foot redness pain when walking x1 week no recent injury       Initial Presentation: 64  Y O female presents to ED from home with right foot pain for past 1 week  Right foot warm to touch on exam, nontender but erythematous  PMH  Is  HTN,  PVD, and DM2, S/P  R  TMA  In 2019  KARTHIKEYAN  Recommended  Per vascular surgery  Admit  Ip with  Right foot pain, PAD and plan is   KARTHIKEYAN, monitor labs, blood  Cultures, LEADS   Study, vascular consult and continue home meds  Vascular consult  ( 1/16)     Acute on chronic PAD with right TMA cellulitis, mostly resolved with antibiotics  Suspect right foot discoloration and pain may be ischemia related with acute occlusion of SFA stents and poor distal arterial perfusion   [place] ALI stage I given intact pedal signal, motor/sensation intact with minimal symptoms at rest  Recommend right lower extremity angiogram with possible intervention with IR this week  On DAPT and statin  Date:   1/17        Day 2:   IR  Consult  Imaging notable for left common iliac lesion that will need to be crossed  Plan  RLE   Angiogram, tentatively  1/18  Can continue aspirin/plavix  Continue KARTHIKEYAN  LEADS  Study today    Has some improvement in  Left leg pain and erythema        ED Triage Vitals [01/16/22 1536]   Temperature Pulse Respirations Blood Pressure SpO2   97 8 °F (36 6 °C) 84 18 (!) 205/84 100 %      Temp Source Heart Rate Source Patient Position - Orthostatic VS BP Location FiO2 (%)   Oral Monitor Sitting Right arm --      Pain Score       No Pain          Wt Readings from Last 1 Encounters:   01/16/22 82 6 kg (182 lb)     Additional Vital Signs:   97 8 °F (36 6 °C) 77 18 141/64 100 % None (Room air) Lying    01/17/22 0820 -- 83 -- 141/75 -- -- --   01/16/22 2342 97 4 °F (36 3 °C) Abnormal  80 18 126/71 -- -- Sitting   01/16/22 2130 97 4 °F (36 3 °C) Abnormal  83 18 146/85 -- -- Lying   01/16/22 2015 -- 92 18 159/73 98 % None (Room air) Lying   01/16/22 1802 -- 82 -- 143/90 100 % None (Room air) Lying   01/16/22 1536 97 8 °F (36 6 °C) 84 18 205/84 Abnormal  100 % None (Room air) Sitting       Pertinent Labs/Diagnostic Test Results:   X ray  R  Foot ( 1/17)   No  abnormality     Post op changes       Results from last 7 days   Lab Units 01/17/22  0612 01/16/22  1625   WBC Thousand/uL 8 49 7 37   HEMOGLOBIN g/dL 11 3* 12 8   HEMATOCRIT % 36 8 42 0   PLATELETS Thousands/uL 189 220   NEUTROS ABS Thousands/µL 3 76 4 35         Results from last 7 days   Lab Units 01/17/22  0612 01/16/22  1625   SODIUM mmol/L 139 139   POTASSIUM mmol/L 4 0 3 8   CHLORIDE mmol/L 106 102   CO2 mmol/L 25 28   ANION GAP mmol/L 8 9   BUN mg/dL 15 16   CREATININE mg/dL 0 77 0 91   CALCIUM mg/dL 8 4 8 7     Results from last 7 days   Lab Units 01/16/22  1625   AST U/L 22   ALT U/L 26   ALK PHOS U/L 87   TOTAL PROTEIN g/dL 7 6   ALBUMIN g/dL 3 7   TOTAL BILIRUBIN mg/dL 0 34     Results from last 7 days   Lab Units 01/17/22  1111 01/17/22  0721 01/16/22  2205   POC GLUCOSE mg/dl 140 104 132     Results from last 7 days   Lab Units 01/17/22  0612 01/16/22  1625   GLUCOSE RANDOM mg/dL 90 193*               Results from last 7 days   Lab Units 01/16/22  1625   CRP mg/L <3 0   SED RATE mm/hour 37                       Results from last 7 days   Lab Units 01/16/22 2013 01/16/22 2008   BLOOD CULTURE  Received in Microbiology Lab  Culture in Progress  Received in Microbiology Lab  Culture in Progress  ED Treatment:   Medication Administration from 01/16/2022 1522 to 01/16/2022 2146       Date/Time Order Dose Route Action Comments     01/16/2022 2048 vancomycin (VANCOCIN) 1,250 mg in sodium chloride 0 9 % 250 mL IVPB 1,250 mg Intravenous New Bag         Present on Admission:   Essential hypertension   HLD (hyperlipidemia)   PAD (peripheral artery disease) ([facility] )   Diabetes mellitus with stage 2 chronic kidney disease ([facility] )      Admitting Diagnosis: Foot injury [S99 929A]  Cellulitis of right foot [L03 115]  Age/Sex: 64 y o  adult  Admission Orders:  Scheduled Medications:  aspirin, 81 mg, Oral, Daily  atorvastatin, 20 mg, Oral, Daily  cefazolin, 2,000 mg, Intravenous, Q8H  clopidogrel, 75 mg, Oral, Daily  enoxaparin, 40 mg, Subcutaneous, Daily  insulin glargine, 10 Units, Subcutaneous, HS  insulin lispro, 1-5 Units, Subcutaneous, TID AC  lisinopril, 5 mg, Oral, Daily  metoprolol tartrate, 25 mg, Oral, Q12H ARRON      Continuous IV Infusions:     PRN Meds:  acetaminophen, 650 mg, Oral, Q6H PRN  aluminum-magnesium hydroxide-simethicone, 30 mL, Oral, Q6H PRN  ondansetron, 4 mg, Intravenous, Q6H PRN  oxyCODONE, 2 5 mg, Oral, Q4H PRN        INPATIENT CONSULT TO     Network Utilization Review Department  ATTENTION: Please call with any questions or concerns to [phone] and carefully listen to the prompts so that you are directed to the right person   All voicemails are jessica Liang all requests for admission clinical reviews, approved or denied determinations and any other requests to dedicated fax number below belonging to the campus where the patient is receiving treatment   List of dedicated fax numbers for the Facilities:  [address] [place] [address] DENIALS (Administrative/Medical Necessity) [phone]   [address] (Maternity/NICU/Pediatrics) [phone] [address]  [address] [place] [address] [address] [facility] [place] [address] [phone]

## 2022-01-17 NOTE — QUICK NOTE
Notified by RN patient's blood glucose is 132 tonight  Due to WNL blood glucose level tonight will give a 1 time decreased Lantus 3 units dose tonight  Resume Lantus 10 units q h s   Tomorrow night

## 2022-01-17 NOTE — PROGRESS NOTES
[facility]  Progress Note - Roberta Marcelo 1965, 64 y o  adult MRN: [id number]  Unit/Bed#: E5 -01 Encounter: [id number]  Primary Care Provider: Jax Sage MD   Date and time admitted to hospital: 1/16/2022  3:38 PM    * Right foot pain  Assessment & Plan  68-year-old female with past history of PID, hypertension, hyperlipidemia and type 2 diabetes presented with worsening right foot pain  Surgical history with right TMA  Vascular surgery consulted, suspect likely underlying cellulitis, recommending IV antibiotics  Received Vancomycin, will proceed with cefazolin for now  Blood cultures pendings  LEADs report pending  Supportive management     Diabetes mellitus with stage 2 chronic kidney disease ([facility] )  Assessment & Plan  Lab Results   Component Value Date    HGBA1C 6 8 (H) 08/25/2021     Hold metformin and Trulicity  Home dose of Lantus 15 units bedtime, for now continue 10 units  Sliding scale and Accu-Cheks     PAD (peripheral artery disease) ([facility] )  Assessment & Plan  Continue aspirin, statin  Plavix was held pending vascular surgery evaluation - will resume with no plans for vascular intervention     HLD (hyperlipidemia)  Assessment & Plan  Continue statin    Essential hypertension  Assessment & Plan  Continue metoprolol and lisinopril at this time, blood pressure significantly elevated on admission now controlled          VTE Pharmacologic Prophylaxis:   Moderate Risk (Score 3-4) - Pharmacological DVT Prophylaxis Ordered: enoxaparin (Lovenox)  Patient Centered Rounds: I performed bedside rounds with nursing staff today  Discussions with Specialists or Other Care Team Provider: CM    Education and Discussions with Family / Patient: Patient   Time Spent for Care: 30 minutes  More than 50% of total time spent on counseling and coordination of care as described above      Current Length of Stay: 1 day(s)  Current Patient Status: Inpatient Certification Statement: The patient will continue to require additional inpatient hospital stay due to close monitoring, IV Abx   Discharge Plan: Anticipate discharge in 24-48 hrs to home  Code Status: Level 1 - Full Code    Subjective:   Patient seen and examined  She is undergoing LEADs study  She reports improvement in her left leg pain and erythema  Objective:     Vitals:   Temp (24hrs), Av 6 °F (36 4 °C), Min:97 4 °F (36 3 °C), Max:97 8 °F (36 6 °C)    Temp:  [97 4 °F (36 3 °C)-97 8 °F (36 6 °C)] 97 8 °F (36 6 °C)  HR:  [77-92] 77  Resp:  [18] 18  BP: (126-205)/(64-90) 141/64  SpO2:  [98 %-100 %] 100 %  Body mass index is 35 54 kg/m²  Input and Output Summary (last 24 hours):   No intake or output data in the 24 hours ending 22 1402    Physical Exam:   Physical Exam  Constitutional:       General: Bobie Goldberg is not in acute distress  HENT:      Head: Normocephalic and atraumatic  Nose: No congestion  Eyes:      Conjunctiva/sclera: Conjunctivae normal    Cardiovascular:      Rate and Rhythm: Normal rate and regular rhythm  Heart sounds: No murmur heard  Pulmonary:      Effort: No respiratory distress  Breath sounds: No wheezing or rales  Abdominal:      General: There is no distension  Tenderness: There is no abdominal tenderness  There is no guarding  Musculoskeletal:         General: Deformity (R TMA ) present  Skin:     Findings: Erythema (Mild R foot ) present  Neurological:      Mental Status: Bobie Goldberg is oriented to person, place, and time     Psychiatric:         Mood and Affect: Mood normal           Additional Data:     Labs:  Results from last 7 days   Lab Units 22  0612   WBC Thousand/uL 8 49   HEMOGLOBIN g/dL 11 3*   HEMATOCRIT % 36 8   PLATELETS Thousands/uL 189   NEUTROS PCT % 44   LYMPHS PCT % 48*   MONOS PCT % 6   EOS PCT % 2     Results from last 7 days   Lab Units 22  0612 22  1625 01/16/22  1625   SODIUM mmol/L 139   < > 139   POTASSIUM mmol/L 4 0   < > 3 8   CHLORIDE mmol/L 106   < > 102   CO2 mmol/L 25   < > 28   BUN mg/dL 15   < > 16   CREATININE mg/dL 0 77   < > 0 91   ANION GAP mmol/L 8   < > 9   CALCIUM mg/dL 8 4   < > 8 7   ALBUMIN g/dL  --   --  3 7   TOTAL BILIRUBIN mg/dL  --   --  0 34   ALK PHOS U/L  --   --  87   ALT U/L  --   --  26   AST U/L  --   --  22   GLUCOSE RANDOM mg/dL 90   < > 193*    < > = values in this interval not displayed  Results from last 7 days   Lab Units 01/17/22  1111 01/17/22  0721 01/16/22  2205   POC GLUCOSE mg/dl 140 104 132               Lines/Drains:  Invasive Devices  Report    Peripheral Intravenous Line            Peripheral IV 01/16/22 Right Antecubital <1 day                      Imaging: will review LEADs Study     Recent Cultures (last 7 days):   Results from last 7 days   Lab Units 01/16/22 2013 01/16/22 2008   BLOOD CULTURE  Received in Microbiology Lab  Culture in Progress  Received in Microbiology Lab  Culture in Progress         Last 24 Hours Medication List:   Current Facility-Administered Medications   Medication Dose Route Frequency Provider Last Rate    acetaminophen  650 mg Oral Q6H PRN Whitney Jeffries MD      aluminum-magnesium hydroxide-simethicone  30 mL Oral Q6H PRN Whitney Jeffries MD      aspirin  81 mg Oral Daily Whitney Jeffries MD      atorvastatin  20 mg Oral Daily Whitney Jeffries MD      cefazolin  2,000 mg Intravenous Q8H Marjorie Swanson MD      enoxaparin  40 mg Subcutaneous Daily Whitney Jeffries MD      insulin glargine  10 Units Subcutaneous HS Zi Sunshine PA-C      insulin lispro  1-5 Units Subcutaneous TID AC Whiteny Jeffries MD      lisinopril  5 mg Oral Daily Whitney Jeffries MD      metoprolol tartrate  25 mg Oral Q12H [address] Asael Montalvo MD      ondansetron  4 mg Intravenous Q6H PRN Whitney Jeffries MD      oxyCODONE  2 5 mg Oral Q4H PRN Whitney Jeffries MD          Today, Patient Was Seen By: Declan Marie MD    **Please Note: This note may have been constructed using a voice recognition system  **

## 2022-01-17 NOTE — ASSESSMENT & PLAN NOTE
Lab Results   Component Value Date    HGBA1C 6 8 (H) 08/25/2021     Hold the metformin and Trulicity  Home dose of Lantus 15 units bedtime, was started 10 units  As sliding scale and Accu-Chek

## 2022-01-17 NOTE — CONSULTS
Consultation - Interventional Radiology  Snyderzina BraxtonCharan 64 y o  adult MRN: [id number]  Unit/Bed#: E5 -01 Encounter: [id number]        Consults    ASSESSMENT/PLAN:    Chiquita Cummins with recurrent critical limb ischemia presenting with right foot pain    History notable for revascularization on the right with prior stents, most recent intervention with right lower extremity intervention in 2020 with recanalization of stent occlusion, drug coated balloon angioplasty, and 2 5 mm angioplasty of the sole right peroneal runoff vessel  This was performed with moderate sedation      Unfortunately this has reoccluded    Imaging notable for left common iliac lesion that will need to be crossed    Surgical history notable for right transmetatarsal amputation  Medical history notable for type 2 diabetes    Recommendations  - add on for right lower extremity angiogram  - timing to be determined by IR team  - tentative tomorrow 1/18  - NPO after midnight sips of meds okay  - continue aspirin Plavix  - await final arterial duplex report    Medical Problems             Problem List     * (Principal) Right foot pain    Essential hypertension    Overview Addendum 10/19/2020  8:32 AM by Adam Cruz DMD     Uncontrolled   States has been taking medications daily          HLD (hyperlipidemia)    PAD (peripheral artery disease) ([facility] )    Overview Addendum 10/31/2019  9:07 AM by ELZA Camacho-DOMINGA     -L SFA DCB PTA (IR) 2/1/19 followed by L 5th toe amputation  -R SFA/pop recanalization and PTA (IR) 4/30/2019 for acute on chronic ischemia and 4th toe tissue loss         Diabetes mellitus with stage 2 chronic kidney disease ([facility] )    Lab Results   Component Value Date    HGBA1C 6 8 (H) 08/25/2021       Recent Labs     01/16/22  2205 01/17/22  0721 01/17/22  1111   POCGLU 132 104 140       Blood Sugar Average: Last 72 hrs:  (P) 311 [id number]        Microcytic anemia    Leukocytosis    Toe amputation status, left Critical lower limb ischemia (HCC)    Overview Signed 5/28/2019 10:06 PM by JUAREZ Camacho SFA/pop recanalization, PTA (IR) 4/30/2019 for acute on chronic ischemia         Polyneuropathy associated with underlying disease ([facility] )    Charcot foot due to diabetes mellitus ([facility] )    Lab Results   Component Value Date    HGBA1C 6 8 (H) 08/25/2021       Recent Labs     01/16/22  2205 01/17/22  0721 01/17/22  1111   POCGLU 132 104 140       Blood Sugar Average: Last 72 hrs:  (P) 077 [id number]        History of transmetatarsal amputation of foot (HCC)    Class 2 severe obesity due to excess calories with serious comorbidity and body mass index (BMI) of 36 0 to 36 9 in [facility])    Cellulitis of right lower extremity    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, with necrosis of bone (HCC)    Lab Results   Component Value Date    HGBA1C 6 8 (H) 08/25/2021       Recent Labs     01/16/22  2205 01/17/22  0721 01/17/22  1111   POCGLU 132 104 140       Blood Sugar Average: Last 72 hrs:  (P) 647 [id number]        S/P transmetatarsal amputation of foot, right (HCC)    Current use of insulin (HCC)    Persistent proteinuria    Vitamin D deficiency    Obstructive sleep apnea (Chronic)    Well woman exam    Vaginal candida    CKD (chronic kidney disease) stage 2, GFR 60-89 ml/min    Lab Results   Component Value Date    EGFR 64 09/01/2020    EGFR 62 05/07/2020    EGFR 85 05/06/2020    CREATININE 0 77 01/17/2022    CREATININE 0 91 01/16/2022    CREATININE 0 91 10/04/2021                     Reason for Consult / Principal Problem:    HPI: Clair Gerardo is a 64y o  year old adult who presents with Right foot pain      Review of Systems      Past Medical History:  Past Medical History:   Diagnosis Date    Cellulitis of toe 1/28/2019    CKD (chronic kidney disease)     Diabetes mellitus ([facility] )     Gangrene of toe of right foot ([facility] ) 10/30/2019    Added automatically from request for surgery [id number]    Hypertension        Past Surgical History:  Past Surgical History:   Procedure Laterality Date    IR ABDOMINAL AORTAGRAM  2019    IR AORTAGRAM WITH RUN-OFF  2020    IR ARTERIAL LYSIS  2019    IR LOWER EXTREMITY / INTERVENTION  2019    CT AMPUTATION FOOT,TRANSMETATARSAL Right 2019    Procedure: AMPUTATION TRANSMETATARSAL (TMA) WITH ACHILLES TENDON LENGTHING;  Surgeon: Renard Hoyos DPM;  [facility];  Service: Podiatry    [address] [place] [address] Right 2020    Procedure: AMPUTATION TRANSMETATARSAL (TMA);  Surgeon: Hernando Colon DPM;  [facility];  Service: Podiatry    TOE AMPUTATION Left 2019    Procedure: AMPUTATION 5th TOE;  Surgeon: Renard Hoyos DPM;  [facility];  Service: Podiatry    TOE AMPUTATION Right 2019    Procedure: AMPUTATION RIGHT 3RD AND 4TH TOE;  Surgeon: Renard Hoyos DPM;  [facility];  Service: Podiatry       Social History:  Social History     Substance and Sexual Activity   Alcohol Use Not Currently     Social History     Substance and Sexual Activity   Drug Use No     Social History     Tobacco Use   Smoking Status Former Smoker    Types: Cigarettes    Quit date:     Years since quittin 0   Smokeless Tobacco Never Used       Family History:  Family History   Problem Relation Age of Onset    Diabetes Mother     No Known Problems Father        Allergies:   Allergies   Allergen Reactions    Levemir [Insulin Detemir] Itching       Medications:  Medications Prior to Admission   Medication    aspirin 81 mg chewable tablet    atorvastatin (LIPITOR) 20 mg tablet    clopidogrel (PLAVIX) 75 mg tablet    Dulaglutide (Trulicity) 3 NZ/2 8XM SOPN    ergocalciferol (ERGOCALCIFEROL) 1 25 MG (39526 UT) capsule    insulin glargine (Lantus SoloStar) 100 units/mL injection pen    lisinopril (ZESTRIL) 5 mg tablet    metFORMIN (GLUCOPHAGE) 1000 MG tablet    metoprolol tartrate (LOPRESSOR) 25 mg tablet    Blood Glucose Monitoring Suppl (ONE TOUCH ULTRA MINI) w/Device KIT    cetirizine (ZyrTEC) 10 mg tablet    ferrous sulfate 324 (65 Fe) mg    fluticasone (FLONASE) 50 mcg/act nasal spray    glucose blood (ONE TOUCH ULTRA TEST) test strip    Insulin Pen Needle 32G X 4 MM MISC    OneTouch Delica Lancets 86C MISC     Current Facility-Administered Medications   Medication Dose Route Frequency    acetaminophen (TYLENOL) tablet 650 mg  650 mg Oral Q6H PRN    aluminum-magnesium hydroxide-simethicone (MYLANTA) oral suspension 30 mL  30 mL Oral Q6H PRN    aspirin chewable tablet 81 mg  81 mg Oral Daily    atorvastatin (LIPITOR) tablet 20 mg  20 mg Oral Daily    ceFAZolin (ANCEF) IVPB (premix in dextrose) 2,000 mg 50 mL  2,000 mg Intravenous Q8H    clopidogrel (PLAVIX) tablet 75 mg  75 mg Oral Daily    enoxaparin (LOVENOX) subcutaneous injection 40 mg  40 mg Subcutaneous Daily    insulin glargine (LANTUS) subcutaneous injection 10 Units 0 1 mL  10 Units Subcutaneous HS    insulin lispro (HumaLOG) 100 units/mL subcutaneous injection 1-5 Units  1-5 Units Subcutaneous TID AC    lisinopril (ZESTRIL) tablet 5 mg  5 mg Oral Daily    metoprolol tartrate (LOPRESSOR) tablet 25 mg  25 mg Oral Q12H ARRON    ondansetron (ZOFRAN) injection 4 mg  4 mg Intravenous Q6H PRN    oxyCODONE (ROXICODONE) IR tablet 2 5 mg  2 5 mg Oral Q4H PRN       Vitals:  /64 (BP Location: Left arm)   Pulse 77   Temp 97 8 °F (36 6 °C) (Temporal)   Resp 18   Wt 82 6 kg (182 lb)   LMP  (LMP Unknown)   SpO2 100%   BMI 35 54 kg/m²   Body mass index is 35 54 kg/m²  Weight (last 2 days)     Date/Time Weight    01/16/22 1536 82 6 (182)          I/Os:  No intake or output data in the 24 hours ending 01/17/22 1526    PHYSICAL EXAM  None  Electronic consultation only      Lab Results and Cultures:   CBC with diff:   Lab Results   Component Value Date    WBC 8 49 01/17/2022    HGB 11 3 (L) 01/17/2022    HCT 36 8 01/17/2022    MCV 75 (L) 01/17/2022     01/17/2022    MCH 23 0 (L) 01/17/2022    MCHC 30 7 (L) 01/17/2022    RDW 15 0 01/17/2022    MPV 11 3 01/17/2022    NRBC 0 01/17/2022      BMP/CMP:  Lab Results   Component Value Date    K 4 0 01/17/2022     01/17/2022    CO2 25 01/17/2022    BUN 15 01/17/2022    CREATININE 0 77 01/17/2022    CALCIUM 8 4 01/17/2022    AST 22 01/16/2022    ALT 26 01/16/2022    ALKPHOS 87 01/16/2022    EGFR 64 09/01/2020   ,     Coags:   Lab Results   Component Value Date    PTT 29 05/05/2020    INR 1 01 05/05/2020   ,          Lipid Panel: No results found for: CHOL  Lab Results   Component Value Date    HDL 41 04/22/2021     Lab Results   Component Value Date    HDL 41 04/22/2021     Lab Results   Component Value Date    LDLCALC 48 04/22/2021     Lab Results   Component Value Date    TRIG 127 04/22/2021       HgbA1c:   Lab Results   Component Value Date    HGBA1C 6 8 (H) 08/25/2021    HGBA1C 7 2 (H) 04/22/2021    HGBA1C 7 6 (A) 01/05/2021       Blood Culture:   Lab Results   Component Value Date    BLOODCX Received in Microbiology Lab  Culture in Progress  01/16/2022   ,   Urinalysis:   Lab Results   Component Value Date    COLORU Yellow 10/30/2019    CLARITYU Slightly Cloudy 10/30/2019    SPECGRAV 1 020 10/30/2019    PHUR 6 0 10/30/2019    LEUKOCYTESUR Negative 10/30/2019    NITRITE Negative 10/30/2019    GLUCOSEU 250 (1/4%) (A) 10/30/2019    KETONESU 15 (1+) (A) 10/30/2019    BILIRUBINUR Negative 10/30/2019    BLOODU Moderate (A) 10/30/2019   ,   Urine Culture: No results found for: URINECX,   Wound Culure:    Lab Results   Component Value Date    WOUNDCULT 3+ Growth of Escherichia vulneris (A) 05/07/2020    WOUNDCULT 4+ Growth of Stenotrophomonas maltophilia (A) 05/07/2020    WOUNDCULT 3+ Growth of  05/07/2020       Imaging Studies: I have personally reviewed pertinent films in PACS    Counseling / Coordination of Care  Total time spent today  30 minutes    Electronic consultation only  Thank you for allowing me to participate in the care of [facility]  Please don't hesitate to contact us with any questions

## 2022-01-17 NOTE — H&P
[address] 1965, 64 y o  adult MRN: [id number]  Unit/Bed#: ED 32 Encounter: [id number]  Primary Care Provider: Mara Leos MD   Date and time admitted to hospital: 1/16/2022  3:38 PM    * Right foot pain  Assessment & Plan  66-year-old female with past history of PID, hypertension, hyperlipidemia and type 2 diabetes presented with worsening right foot pain  Surgical history with right TMA  Vascular surgery consulted, suspect likely underlying cellulitis, recommending IV antibiotics  Vancomycin, blood cultures pending  Leads study pending  P r n  Oxycodone for pain    Diabetes mellitus with stage 2 chronic kidney disease ([facility] )  Assessment & Plan  Lab Results   Component Value Date    HGBA1C 6 8 (H) 08/25/2021     Hold the metformin and Trulicity  Home dose of Lantus 15 units bedtime, was started 10 units  As sliding scale and Accu-Chek    PAD (peripheral artery disease) (HCC)  Assessment & Plan  Continue aspirin, statin  Hold Plavix pending vascular surgery evaluation    HLD (hyperlipidemia)  Assessment & Plan  Continue statin    Essential hypertension  Assessment & Plan  Continue metoprolol and lisinopril at this time, elevated blood pressure on admission      VTE Prophylaxis: Enoxaparin (Lovenox)  / sequential compression device   Code Status:  Full code  POLST: There is no POLST form on file for this patient (pre-hospital)  Discussion with family:  Patient, daughter bedside    Anticipated Length of Stay:  Patient will be admitted on an Inpatient basis with an anticipated length of stay of 2 midnights  Justification for Hospital Stay:  IV antibiotics, pending vascular surgery evaluation    Total Time for Visit, including Counseling / Coordination of Care: 45 minutes  Greater than 50% of this total time spent on direct patient counseling and coordination of care      Chief Complaint:   Right foot pain    History of Present Illness:    Mariusz Pop Mayco Mcelroy is a 64 y o  adult who presents with right foot pain  Past medical history of PVD, hypertension, hyperlipidemia and type 2 diabetes  Patient reports ongoing foot pain for the past week, she does have a history of right TMA done in 2019  On exam, foot is warm to touch, nontender but is erythematous  Vascular surgery recommending IV antibiotics and further evaluation with leads study  Patient denies any fevers, chills, nausea vomiting or change in bowel movements  Review of Systems:    Review of Systems   Constitutional: Negative for activity change, appetite change, chills and fever  HENT: Negative for congestion, sinus pressure and sore throat  Eyes: Negative for pain and discharge  Respiratory: Negative for cough, shortness of breath and wheezing  Cardiovascular: Negative for chest pain, palpitations and leg swelling  Gastrointestinal: Negative for abdominal distention, abdominal pain, blood in stool, diarrhea, nausea and vomiting  Endocrine: Negative for cold intolerance, heat intolerance, polydipsia, polyphagia and polyuria  Genitourinary: Negative for dysuria, frequency, hematuria and urgency  Musculoskeletal: Positive for joint swelling  Negative for arthralgias and back pain  Skin: Negative for color change and pallor  Neurological: Negative for seizures, syncope and weakness  Psychiatric/Behavioral: Negative for agitation and confusion         Past Medical and Surgical History:     Past Medical History:   Diagnosis Date    Cellulitis of toe 1/28/2019    CKD (chronic kidney disease)     Diabetes mellitus ([facility] )     Gangrene of toe of right foot ([facility] ) 10/30/2019    Added automatically from request for surgery [id number]    Hypertension        Past Surgical History:   Procedure Laterality Date    IR ABDOMINAL AORTAGRAM  2/1/2019    IR AORTAGRAM WITH RUN-OFF  5/5/2020    IR ARTERIAL LYSIS  4/30/2019    IR LOWER EXTREMITY / INTERVENTION  11/1/2019    OR AMPUTATION FOOT,TRANSMETATARSAL Right 11/7/2019    Procedure: AMPUTATION TRANSMETATARSAL (TMA) WITH ACHILLES TENDON LENGTHING;  Surgeon: Marion Patel DPM;  [facility];  Service: Podiatry    [address] [place] [address] Right 5/7/2020    Procedure: AMPUTATION TRANSMETATARSAL (TMA);  Surgeon: Verl Soulier, DPM;  [facility];  Service: Podiatry    TOE AMPUTATION Left 2/4/2019    Procedure: AMPUTATION 5th TOE;  Surgeon: Marion Patel DPM;  [facility];  Service: Podiatry    TOE AMPUTATION Right 11/4/2019    Procedure: AMPUTATION RIGHT 3RD AND 4TH TOE;  Surgeon: Marion Patel DPM;  [facility];  Service: Podiatry       Meds/Allergies:    Prior to Admission medications    Medication Sig Start Date End Date Taking?  Authorizing Provider   aspirin 81 mg chewable tablet Chew 1 tablet (81 mg total) daily 5/27/20  Yes [place]att Carlos,    atorvastatin (LIPITOR) 20 mg tablet Take 1 tablet by mouth once daily 11/16/21  Yes Merline Ervin MD   Blood Glucose Monitoring Suppl (ONE TOUCH ULTRA MINI) w/Device KIT Use 4 times a day 11/1/21  Yes Naehd Byers MD   cetirizine (ZyrTEC) 10 mg tablet Take 1 tablet (10 mg total) by mouth daily  Patient taking differently: Take 10 mg by mouth daily as needed  9/8/20  Yes Merline Ervin MD   clopidogrel (PLAVIX) 75 mg tablet Take 1 tablet by mouth once daily 11/16/21  Yes Merline Ervin MD   Dulaglutide (Trulicity) 3 QF/2 5GV SOPN Inject 0 5 mL (3 mg total) under the skin once a week 11/1/21  Yes Nahed Byers MD   ergocalciferol (ERGOCALCIFEROL) 1 25 MG (82338 UT) capsule Take 1 capsule (50,000 Units total) by mouth once a week 10/5/21  Yes Joshua Clark MD   glucose blood (ONE TOUCH ULTRA TEST) test strip Use to test 3x daily 1/18/21  Yes Mendy Vizcaino MD   insulin glargine (Lantus SoloStar) 100 units/mL injection pen Use 15 units subcut once daily at 9pm 11/1/21  Yes Nahed Byers MD   Insulin Pen Needle 32G X 4 MM MISC Use 4 times daily with insulin pens 3/31/21  Yes Dayanara Mendoza MD   lisinopril (ZESTRIL) 5 mg tablet Take 1 tablet (5 mg total) by mouth daily 21  Yes Raisa Veloz MD   metFORMIN (GLUCOPHAGE) 1000 MG tablet Take 1 tablet (1,000 mg total) by mouth 2 (two) times a day with meals 3/31/21  Yes Dayanara Mendoza MD   metoprolol tartrate (LOPRESSOR) 25 mg tablet Take 1 tablet (25 mg total) by mouth every 12 (twelve) hours 21  Yes Raisa Veloz MD   OneTouch Delica Lancets 21H MISC Use to test 3x daily 21  Yes Dayanara Mendoza MD   ferrous sulfate 324 (65 Fe) mg Take 1 tablet (324 mg total) by mouth 2 (two) times a day before meals  Patient not taking: Reported on 19   Dana Preston MD   fluticasone [facility]) 50 mcg/act nasal spray 1 spray into each nostril daily  Patient not taking: Reported on 2022   Raisa Veloz MD     I have reviewed home medications using allscripts  Allergies:    Allergies   Allergen Reactions    Levemir [Insulin Detemir] Itching       Social History:     Marital Status: Single   Occupation:   Patient Pre-hospital Living Situation:  Home  Patient Pre-hospital Level of Mobility:  Ambulatory with cane  Patient Pre-hospital Diet Restrictions:  Diabetic  Substance Use History:   Social History     Substance and Sexual Activity   Alcohol Use Not Currently     Social History     Tobacco Use   Smoking Status Former Smoker    Types: Cigarettes    Quit date:     Years since quittin 0   Smokeless Tobacco Never Used     Social History     Substance and Sexual Activity   Drug Use No       Family History:    Family History   Problem Relation Age of Onset    Diabetes Mother     No Known Problems Father        Physical Exam:     Vitals:   Blood Pressure: 143/90 (22 1802)  Pulse: 92 (22)  Temperature: 97 8 °F (36 6 °C) (22 153)  Temp Source: Oral (22)  Respirations: 18 (22)  Weight - Scale: 82 6 kg (182 lb) (01/16/22 1536)  SpO2: 98 % (01/16/22 2015)    Physical Exam  Vitals and nursing note reviewed  Constitutional:       Appearance: Normal appearance  Sendy Melendez is obese  HENT:      Head: Normocephalic and atraumatic  Eyes:      Conjunctiva/sclera: Conjunctivae normal    Cardiovascular:      Rate and Rhythm: Normal rate and regular rhythm  Heart sounds: Normal heart sounds  Pulmonary:      Breath sounds: Normal breath sounds  No wheezing or rhonchi  Abdominal:      General: Bowel sounds are normal       Palpations: Abdomen is soft  Musculoskeletal:         General: No swelling  Right lower leg: No edema  Left lower leg: No edema  Comments: Right lower extremity stump, nontender, warm to touch  Erythematous, dry and clean   Skin:     General: Skin is warm and dry  Neurological:      General: No focal deficit present  Mental Status: Sendy Melendez is alert  Mental status is at baseline  Additional Data:     Lab Results: I have personally reviewed pertinent reports  Results from last 7 days   Lab Units 01/16/22  1625   WBC Thousand/uL 7 37   HEMOGLOBIN g/dL 12 8   HEMATOCRIT % 42 0   PLATELETS Thousands/uL 220   NEUTROS PCT % 60   LYMPHS PCT % 32   MONOS PCT % 6   EOS PCT % 2     Results from last 7 days   Lab Units 01/16/22  1625   SODIUM mmol/L 139   POTASSIUM mmol/L 3 8   CHLORIDE mmol/L 102   CO2 mmol/L 28   BUN mg/dL 16   CREATININE mg/dL 0 91   ANION GAP mmol/L 9   CALCIUM mg/dL 8 7   ALBUMIN g/dL 3 7   TOTAL BILIRUBIN mg/dL 0 34   ALK PHOS U/L 87   ALT U/L 26   AST U/L 22   GLUCOSE RANDOM mg/dL 193*                       Imaging: I have personally reviewed pertinent reports        XR foot 3+ views RIGHT    (Results Pending)   VAS lower limb arterial duplex, complete bilateral    (Results Pending)       EKG, Pathology, and Other Studies Reviewed on Admission:   · EKG:  NSR    Allscripts / Epic Records Reviewed: Yes     ** Please Note: This note has been constructed using a voice recognition system   **

## 2022-01-17 NOTE — ASSESSMENT & PLAN NOTE
Continue aspirin, statin  Plavix was held pending vascular surgery evaluation - will resume with no plans for vascular intervention

## 2022-01-17 NOTE — ASSESSMENT & PLAN NOTE
59-year-old female with past history of PID, hypertension, hyperlipidemia and type 2 diabetes presented with worsening right foot pain  Surgical history with right TMA  Vascular surgery consulted, suspect likely underlying cellulitis, recommending IV antibiotics  Vancomycin, blood cultures pending  Leads study pending  P r n   Oxycodone for pain

## 2022-01-17 NOTE — ASSESSMENT & PLAN NOTE
77-year-old female with past history of PID, hypertension, hyperlipidemia and type 2 diabetes presented with worsening right foot pain  Surgical history with right TMA  Vascular surgery consulted, suspect likely underlying cellulitis, recommending IV antibiotics  Received Vancomycin, will proceed with cefazolin for now  Blood cultures pendings  LEADs report pending  Supportive management

## 2022-01-17 NOTE — ASSESSMENT & PLAN NOTE
Lab Results   Component Value Date    HGBA1C 6 8 (H) 08/25/2021     Hold metformin and Trulicity  Home dose of Lantus 15 units bedtime, for now continue 10 units  Sliding scale and Accu-Cheks

## 2022-01-18 LAB
ANION GAP SERPL CALCULATED.3IONS-SCNC: 8 MMOL/L (ref 4–13)
BASOPHILS # BLD AUTO: 0.03 THOUSANDS/ΜL (ref 0–0.1)
BASOPHILS NFR BLD AUTO: 0 % (ref 0–1)
BUN SERPL-MCNC: 27 MG/DL (ref 5–25)
CALCIUM SERPL-MCNC: 8.3 MG/DL (ref 8.3–10.1)
CHLORIDE SERPL-SCNC: 108 MMOL/L (ref 100–108)
CO2 SERPL-SCNC: 27 MMOL/L (ref 21–32)
CREAT SERPL-MCNC: 1.11 MG/DL (ref 0.6–1.3)
EOSINOPHIL # BLD AUTO: 0.12 THOUSAND/ΜL (ref 0–0.61)
EOSINOPHIL NFR BLD AUTO: 2 % (ref 0–6)
ERYTHROCYTE [DISTWIDTH] IN BLOOD BY AUTOMATED COUNT: 14.8 % (ref 11.6–15.1)
GLUCOSE SERPL-MCNC: 112 MG/DL (ref 65–140)
GLUCOSE SERPL-MCNC: 135 MG/DL (ref 65–140)
GLUCOSE SERPL-MCNC: 175 MG/DL (ref 65–140)
GLUCOSE SERPL-MCNC: 206 MG/DL (ref 65–140)
GLUCOSE SERPL-MCNC: 240 MG/DL (ref 65–140)
HCT VFR BLD AUTO: 35.2 % (ref 36.5–46.1)
HGB BLD-MCNC: 10.8 G/DL (ref 12–15.4)
IMM GRANULOCYTES # BLD AUTO: 0.01 THOUSAND/UL (ref 0–0.2)
IMM GRANULOCYTES NFR BLD AUTO: 0 % (ref 0–2)
LYMPHOCYTES # BLD AUTO: 3.18 THOUSANDS/ΜL (ref 0.6–4.47)
LYMPHOCYTES NFR BLD AUTO: 41 % (ref 14–44)
MCH RBC QN AUTO: 22.5 PG (ref 26.8–34.3)
MCHC RBC AUTO-ENTMCNC: 30.7 G/DL (ref 31.4–37.4)
MCV RBC AUTO: 74 FL (ref 82–98)
MONOCYTES # BLD AUTO: 0.55 THOUSAND/ΜL (ref 0.17–1.22)
MONOCYTES NFR BLD AUTO: 7 % (ref 4–12)
NEUTROPHILS # BLD AUTO: 3.9 THOUSANDS/ΜL (ref 1.85–7.62)
NEUTS SEG NFR BLD AUTO: 50 % (ref 43–75)
NRBC BLD AUTO-RTO: 0 /100 WBCS
PLATELET # BLD AUTO: 190 THOUSANDS/UL (ref 149–390)
PMV BLD AUTO: 11.3 FL (ref 8.9–12.7)
POTASSIUM SERPL-SCNC: 4.1 MMOL/L (ref 3.5–5.3)
RBC # BLD AUTO: 4.79 MILLION/UL (ref 3.88–5.12)
SODIUM SERPL-SCNC: 143 MMOL/L (ref 136–145)
WBC # BLD AUTO: 7.79 THOUSAND/UL (ref 4.31–10.16)

## 2022-01-18 PROCEDURE — 85025 COMPLETE CBC W/AUTO DIFF WBC: CPT | Performed by: FAMILY MEDICINE

## 2022-01-18 PROCEDURE — 99232 SBSQ HOSP IP/OBS MODERATE 35: CPT | Performed by: FAMILY MEDICINE

## 2022-01-18 PROCEDURE — 80048 BASIC METABOLIC PNL TOTAL CA: CPT | Performed by: FAMILY MEDICINE

## 2022-01-18 PROCEDURE — 82948 REAGENT STRIP/BLOOD GLUCOSE: CPT

## 2022-01-18 PROCEDURE — 99232 SBSQ HOSP IP/OBS MODERATE 35: CPT | Performed by: SURGERY

## 2022-01-18 RX ORDER — SODIUM CHLORIDE 9 MG/ML
100 INJECTION, SOLUTION INTRAVENOUS CONTINUOUS
Status: DISPENSED | OUTPATIENT
Start: 2022-01-18 | End: 2022-01-22

## 2022-01-18 RX ADMIN — CEFAZOLIN SODIUM 2000 MG: 2 SOLUTION INTRAVENOUS at 23:12

## 2022-01-18 RX ADMIN — CEFAZOLIN SODIUM 2000 MG: 2 SOLUTION INTRAVENOUS at 06:08

## 2022-01-18 RX ADMIN — METOPROLOL TARTRATE 25 MG: 25 TABLET, FILM COATED ORAL at 23:11

## 2022-01-18 RX ADMIN — SODIUM CHLORIDE 100 ML/HR: 9 INJECTION, SOLUTION INTRAVENOUS at 11:57

## 2022-01-18 RX ADMIN — ASPIRIN 81 MG CHEWABLE TABLET 81 MG: 81 TABLET CHEWABLE at 09:41

## 2022-01-18 RX ADMIN — ATORVASTATIN CALCIUM 20 MG: 20 TABLET, FILM COATED ORAL at 09:41

## 2022-01-18 RX ADMIN — CEFAZOLIN SODIUM 2000 MG: 2 SOLUTION INTRAVENOUS at 14:05

## 2022-01-18 NOTE — ASSESSMENT & PLAN NOTE
59-year-old female with past history of PID, hypertension, hyperlipidemia and type 2 diabetes presented with worsening right foot pain  Surgical history with right TMA  Vascular surgery consulted, input appreciated  LEADS revealed R superficial femoral artery occlusion with bilateral tibioperoneal disease   For arteriogram by IR today  Received Vancomycin, will proceed with cefazolin for now for treatment of possible cellulitis   Supportive management

## 2022-01-18 NOTE — PROGRESS NOTES
Progress Note - Vascular Surgery   Gianluca Lyon 64 y o  adult MRN: [id number]  Unit/Bed#: E5 -01 Encounter: [id number]    Assessment:  64 F p/w acute on chronic PAD, R TMA cellulitis, improving  LEADs showing acute R SFa occlusion,planned for IR intervention 1/18  Plan:  -NPO  -continue asa/statin  -tp IR for R arteriogram with intervention  -post-procedure LEADs  -additional care as per primary     Subjective/Objective       Subjective: no new overnight complaints     Objective:     Blood pressure 119/63, pulse 78, temperature 98 3 °F (36 8 °C), temperature source Oral, resp  rate 20, weight 82 6 kg (182 lb), SpO2 100 %, not currently breastfeeding  ,Body mass index is 35 54 kg/m²  No intake or output data in the 24 hours ending 01/18/22 0629    Invasive Devices  Report    Peripheral Intravenous Line            Peripheral IV 01/16/22 Right Antecubital 1 day                Physical Exam:       General: NAD  HEENT: normocephalic, atraumatic   Cardiovascular: RRR  Respiratory: no distress  Abdomen: soft, non-tedner  Extremities: R TMA with resolving cellulitis   Neuro: awake, alert   Skin: war,, dry       Lab, Imaging and other studies:  I have personally reviewed pertinent lab results    , CBC:   Lab Results   Component Value Date    WBC 7 79 01/18/2022    HGB 10 8 (L) 01/18/2022    HCT 35 2 (L) 01/18/2022    MCV 74 (L) 01/18/2022     01/18/2022    MCH 22 5 (L) 01/18/2022    MCHC 30 7 (L) 01/18/2022    RDW 14 8 01/18/2022    MPV 11 3 01/18/2022    NRBC 0 01/18/2022   , CMP: No results found for: SODIUM, K, CL, CO2, ANIONGAP, BUN, CREATININE, GLUCOSE, CALCIUM, AST, ALT, ALKPHOS, PROT, BILITOT, EGFR  VTE Pharmacologic Prophylaxis: Heparin  VTE Mechanical Prophylaxis: sequential compression device

## 2022-01-18 NOTE — PLAN OF CARE
Problem: Potential for Falls  Goal: Patient will remain free of falls  Description: INTERVENTIONS:  - Educate patient/family on patient safety including physical limitations  - Instruct patient to call for assistance with activity   - Consult OT/PT to assist with strengthening/mobility   - Keep Call bell within reach  - Keep bed low and locked with side rails adjusted as appropriate  - Keep care items and personal belongings within reach  - Initiate and maintain comfort rounds  - Make Fall Risk Sign visible to staff  - Offer Toileting every 2 Hours, in advance of need  - Initiate/Maintain bed alarm  - Obtain necessary fall risk management equipment: bed alarm   - Apply yellow socks and bracelet for high fall risk patients  - Consider moving patient to room near nurses station  Outcome: Progressing     Problem: MOBILITY - ADULT  Goal: Maintain or return to baseline ADL function  Description: INTERVENTIONS:  -  Assess patient's ability to carry out ADLs; assess patient's baseline for ADL function and identify physical deficits which impact ability to perform ADLs (bathing, care of mouth/teeth, toileting, grooming, dressing, etc )  - Assess/evaluate cause of self-care deficits   - Assess range of motion  - Assess patient's mobility; develop plan if impaired  - Assess patient's need for assistive devices and provide as appropriate  - Encourage maximum independence but intervene and supervise when necessary  - Involve family in performance of ADLs  - Assess for home care needs following discharge   - Consider OT consult to assist with ADL evaluation and planning for discharge  - Provide patient education as appropriate  Outcome: Progressing  Goal: Maintains/Returns to pre admission functional level  Description: INTERVENTIONS:  - Perform BMAT or MOVE assessment daily    - Set and communicate daily mobility goal to care team and patient/family/caregiver     - Collaborate with rehabilitation services on mobility goals if consulted  - Perform Range of Motion 4 times a day  - Reposition patient every 2 hours    - Dangle patient 3 times a day  - Stand patient 3 times a day  - Ambulate patient 3 times a day  - Out of bed to chair 3 times a day   - Out of bed for meals 3 times a day  - Out of bed for toileting  - Record patient progress and toleration of activity level   Outcome: Progressing     Problem: Prexisting or High Potential for Compromised Skin Integrity  Goal: Skin integrity is maintained or improved  Description: INTERVENTIONS:  - Identify patients at risk for skin breakdown  - Assess and monitor skin integrity  - Assess and monitor nutrition and hydration status  - Monitor labs   - Assess for incontinence   - Turn and reposition patient  - Assist with mobility/ambulation  - Relieve pressure over bony prominences  - Avoid friction and shearing  - Provide appropriate hygiene as needed including keeping skin clean and dry  - Evaluate need for skin moisturizer/barrier cream  - Collaborate with interdisciplinary team   - Patient/family teaching  - Consider wound care consult   Outcome: Progressing

## 2022-01-18 NOTE — ASSESSMENT & PLAN NOTE
LEADs revealed right superficial femoral artery occlusion with b/l tibiaperoneal disease  For agram by IR today  Continue aspirin, statin  Plavix on hold for procedure

## 2022-01-18 NOTE — PROGRESS NOTES
[address] [facility]  Progress Note - [facility] 1965, 64 y o  adult MRN: [id number]  Unit/Bed#: E5 -01 Encounter: [id number]  Primary Care Provider: Jax Sage MD   Date and time admitted to hospital: 1/16/2022  3:38 PM    * Right foot pain  Assessment & Plan  55-year-old female with past history of PID, hypertension, hyperlipidemia and type 2 diabetes presented with worsening right foot pain  Surgical history with right TMA  Vascular surgery consulted, input appreciated  LEADS revealed R superficial femoral artery occlusion with bilateral tibioperoneal disease  For arteriogram by IR today  Received Vancomycin, will proceed with cefazolin for now for treatment of possible cellulitis   Supportive management     Diabetes mellitus with stage 2 chronic kidney disease ([facility]  Assessment & Plan  Lab Results   Component Value Date    HGBA1C 6 8 (H) 08/25/2021     Hold metformin and Trulicity  Home dose of Lantus 15 units bedtime, for now continue 10 units  Sliding scale and Accu-Cheks     PAD (peripheral artery disease) ([facility] )  Assessment & Plan  LEADs revealed right superficial femoral artery occlusion with b/l tibiaperoneal disease  For agram by IR today  Continue aspirin, statin  Plavix on hold for procedure      HLD (hyperlipidemia)  Assessment & Plan  Continue statin    Essential hypertension  Assessment & Plan  Continue metoprolol and lisinopril at this time, blood pressure significantly elevated on admission now controlled            VTE Pharmacologic Prophylaxis:   Moderate Risk (Score 3-4) - Pharmacological DVT Prophylaxis Ordered: enoxaparin (Lovenox)  Patient Centered Rounds: I performed bedside rounds with nursing staff today  Discussions with Specialists or Other Care Team Provider:  Multidisciplinary meeting    Education and Discussions with Family / Patient: Patient  Time Spent for Care: 45 minutes   More than 50% of total time spent on counseling and coordination of care as described above  Current Length of Stay: 2 day(s)  Current Patient Status: Inpatient   Certification Statement: The patient will continue to require additional inpatient hospital stay due to Need for procedure and further vascular evaluation  Discharge Plan: Anticipate discharge in 48 hrs to home  Code Status: Level 1 - Full Code    Subjective:   Patient seen and examined  She is awaiting agram later today, NPO  Voices no complaints  Objective:     Vitals:   Temp (24hrs), Av °F (36 7 °C), Min:97 6 °F (36 4 °C), Max:98 3 °F (36 8 °C)    Temp:  [97 6 °F (36 4 °C)-98 3 °F (36 8 °C)] 97 6 °F (36 4 °C)  HR:  [74-78] 74  Resp:  [19-20] 19  BP: (100-119)/(49-63) 100/54  SpO2:  [95 %] 95 %  Body mass index is 35 54 kg/m²  Input and Output Summary (last 24 hours):   No intake or output data in the 24 hours ending 22 1139    Physical Exam:   Physical Exam  Constitutional:       Appearance: Bora Kwok is normal weight  HENT:      Head: Normocephalic and atraumatic  Nose: No congestion  Mouth/Throat:      Pharynx: Oropharynx is clear  Eyes:      Conjunctiva/sclera: Conjunctivae normal    Cardiovascular:      Rate and Rhythm: Normal rate and regular rhythm  Heart sounds: No murmur heard  Pulmonary:      Effort: No respiratory distress  Breath sounds: No wheezing or rales  Abdominal:      General: There is no distension  Tenderness: There is no abdominal tenderness  There is no guarding  Musculoskeletal:         General: Deformity (R TMA ) present  Skin:     General: Skin is warm and dry  Neurological:      Mental Status: Bora Kwok is oriented to person, place, and time     Psychiatric:         Mood and Affect: Mood normal           Additional Data:     Labs:  Results from last 7 days   Lab Units 22  0512   WBC Thousand/uL 7 79   HEMOGLOBIN g/dL 10 8*   HEMATOCRIT % 35 2*   PLATELETS Thousands/uL 190 NEUTROS PCT % 50   LYMPHS PCT % 41   MONOS PCT % 7   EOS PCT % 2     Results from last 7 days   Lab Units 01/18/22  0512 01/17/22  0612 01/16/22  1625   SODIUM mmol/L 143   < > 139   POTASSIUM mmol/L 4 1   < > 3 8   CHLORIDE mmol/L 108   < > 102   CO2 mmol/L 27   < > 28   BUN mg/dL 27*   < > 16   CREATININE mg/dL 1 11   < > 0 91   ANION GAP mmol/L 8   < > 9   CALCIUM mg/dL 8 3   < > 8 7   ALBUMIN g/dL  --   --  3 7   TOTAL BILIRUBIN mg/dL  --   --  0 34   ALK PHOS U/L  --   --  87   ALT U/L  --   --  26   AST U/L  --   --  22   GLUCOSE RANDOM mg/dL 206*   < > 193*    < > = values in this interval not displayed  Results from last 7 days   Lab Units 01/18/22  1124 01/18/22  0725 01/17/22  1657 01/17/22  1111 01/17/22  0721 01/16/22  2205   POC GLUCOSE mg/dl 135 175* 240* 140 104 132               Lines/Drains:  Invasive Devices  Report    Peripheral Intravenous Line            Peripheral IV 01/16/22 Right Antecubital 1 day                      Imaging: Reviewed radiology reports from this admission including: LEADs b/l LE     Recent Cultures (last 7 days):   Results from last 7 days   Lab Units 01/16/22 2013 01/16/22 2008   BLOOD CULTURE  No Growth at 24 hrs  No Growth at 24 hrs         Last 24 Hours Medication List:   Current Facility-Administered Medications   Medication Dose Route Frequency Provider Last Rate    acetaminophen  650 mg Oral Q6H PRN Meagan Chan MD      aluminum-magnesium hydroxide-simethicone  30 mL Oral Q6H PRN Meagan Chan MD      aspirin  81 mg Oral Daily Meagan Chan MD      atorvastatin  20 mg Oral Daily Meagan Chan MD      cefazolin  2,000 mg Intravenous Q8H Luan Judge MD 2,000 mg (01/18/22 9007)    enoxaparin  40 mg Subcutaneous Daily Meagan Chan MD      insulin glargine  10 Units Subcutaneous HS Leldon Balzarine, PA-C      insulin lispro  1-5 Units Subcutaneous TID AC Asael Espana MD      lisinopril  5 mg Oral Daily Meagan Chan MD      metoprolol tartrate  25 mg Oral Q12H [facility] Asael Georges MD      ondansetron  4 mg Intravenous Q6H PRN Willy Sanchez MD      oxyCODONE  2 5 mg Oral Q4H PRN Willy Sanchez MD      sodium chloride  100 mL/hr Intravenous Continuous Karla Hope MD          Today, Patient Was Seen By: Lisa Carrillo MD    **Please Note: This note may have been constructed using a voice recognition system  **

## 2022-01-18 NOTE — PLAN OF CARE
Problem: Potential for Falls  Goal: Patient will remain free of falls  Description: INTERVENTIONS:  - Educate patient/family on patient safety including physical limitations  - Instruct patient to call for assistance with activity   - Consult OT/PT to assist with strengthening/mobility   - Keep Call bell within reach  - Keep bed low and locked with side rails adjusted as appropriate  - Keep care items and personal belongings within reach  - Initiate and maintain comfort rounds  - Make Fall Risk Sign visible to staff  - Offer Toileting every hours, in advance of need  - Initiate/Maintain alarm  - Obtain necessary fall risk management equipment:   - Apply yellow socks and bracelet for high fall risk patients  - Consider moving patient to room near nurses station  Outcome: Progressing     Problem: MOBILITY - ADULT  Goal: Maintain or return to baseline ADL function  Description: INTERVENTIONS:  -  Assess patient's ability to carry out ADLs; assess patient's baseline for ADL function and identify physical deficits which impact ability to perform ADLs (bathing, care of mouth/teeth, toileting, grooming, dressing, etc )  - Assess/evaluate cause of self-care deficits   - Assess range of motion  - Assess patient's mobility; develop plan if impaired  - Assess patient's need for assistive devices and provide as appropriate  - Encourage maximum independence but intervene and supervise when necessary  - Involve family in performance of ADLs  - Assess for home care needs following discharge   - Consider OT consult to assist with ADL evaluation and planning for discharge  - Provide patient education as appropriate  Outcome: Progressing  Goal: Maintains/Returns to pre admission functional level  Description: INTERVENTIONS:  - Perform BMAT or MOVE assessment daily    - Set and communicate daily mobility goal to care team and patient/family/caregiver     - Collaborate with rehabilitation services on mobility goals if consulted  - Perform Range of Motion  times a day  - Reposition patient every  hours    - Dangle patient  times a day  - Stand patient  times a day  - Ambulate patient  times a day  - Out of bed to chair  times a day   - Out of bed for meals  times a day  - Out of bed for toileting  - Record patient progress and toleration of activity level   Outcome: Progressing     Problem: Prexisting or High Potential for Compromised Skin Integrity  Goal: Skin integrity is maintained or improved  Description: INTERVENTIONS:  - Identify patients at risk for skin breakdown  - Assess and monitor skin integrity  - Assess and monitor nutrition and hydration status  - Monitor labs   - Assess for incontinence   - Turn and reposition patient  - Assist with mobility/ambulation  - Relieve pressure over bony prominences  - Avoid friction and shearing  - Provide appropriate hygiene as needed including keeping skin clean and dry  - Evaluate need for skin moisturizer/barrier cream  - Collaborate with interdisciplinary team   - Patient/family teaching  - Consider wound care consult   Outcome: Progressing

## 2022-01-18 NOTE — UTILIZATION REVIEW
Inpatient Admission Authorization Request   NOTIFICATION OF INPATIENT ADMISSION/INPATIENT AUTHORIZATION REQUEST   SERVICING FACILITY:   [address] [place] [address], [place] [address] [address] [facility]  Tax ID: [id number]  NPI: [id number]  [facility] of [facility]patient [facility]  [address]  Place of Service Code: 24     ATTENDING PROVIDER:  Attending Name and NPI#: Karla Hope, [address] [[id number]]  Address: [address] [place] [address], [place] [address] [address] [facility]  Phone: [phone]     UTILIZATION REVIEW CONTACT:  Mariano Parkinson, Utilization   Network Utilization Review Department  Phone: [phone]  Fax: [phone]  Email: [email or web address]     PHYSICIAN ADVISORY SERVICES:  FOR [id number] REVIEW - MEDICAL NECESSITY DENIAL  Phone: [phone]  Fax: [phone]  Email: [email or web address]     TYPE OF REQUEST:  Inpatient Status     ADMISSION INFORMATION:  ADMISSION DATE/TIME: 1/16/22  7:50 PM  PATIENT DIAGNOSIS CODE/DESCRIPTION:  Foot injury [S99 929A]  Cellulitis of right foot [N45 096]  DISCHARGE DATE/TIME: No discharge date for patient encounter  DISCHARGE DISPOSITION (IF DISCHARGED): Home with Home Health Care     IMPORTANT INFORMATION:  Please contact the Mariano Parkinson directly with any questions or concerns regarding this request  Department voicemails are confidential     Send requests for admission clinical reviews, concurrent reviews, approvals, and administrative denials due to lack of clinical to fax [phone]

## 2022-01-19 ENCOUNTER — APPOINTMENT (INPATIENT)
Dept: CT IMAGING | Facility: HOSPITAL | Age: 57
DRG: 254 | End: 2022-01-19
Payer: COMMERCIAL

## 2022-01-19 ENCOUNTER — APPOINTMENT (INPATIENT)
Dept: RADIOLOGY | Facility: HOSPITAL | Age: 57
DRG: 254 | End: 2022-01-19
Attending: RADIOLOGY
Payer: COMMERCIAL

## 2022-01-19 LAB
ANION GAP SERPL CALCULATED.3IONS-SCNC: 9 MMOL/L (ref 4–13)
APTT PPP: 27 SECONDS (ref 23–37)
BASOPHILS # BLD AUTO: 0.03 THOUSANDS/ΜL (ref 0–0.1)
BASOPHILS NFR BLD AUTO: 0 % (ref 0–1)
BUN SERPL-MCNC: 15 MG/DL (ref 5–25)
CALCIUM SERPL-MCNC: 7.9 MG/DL (ref 8.3–10.1)
CHLORIDE SERPL-SCNC: 109 MMOL/L (ref 100–108)
CO2 SERPL-SCNC: 22 MMOL/L (ref 21–32)
CREAT SERPL-MCNC: 0.78 MG/DL (ref 0.6–1.3)
EOSINOPHIL # BLD AUTO: 0.18 THOUSAND/ΜL (ref 0–0.61)
EOSINOPHIL NFR BLD AUTO: 2 % (ref 0–6)
ERYTHROCYTE [DISTWIDTH] IN BLOOD BY AUTOMATED COUNT: 14.9 % (ref 11.6–15.1)
FIBRINOGEN PPP-MCNC: 400 MG/DL (ref 227–495)
GLUCOSE SERPL-MCNC: 111 MG/DL (ref 65–140)
GLUCOSE SERPL-MCNC: 123 MG/DL (ref 65–140)
GLUCOSE SERPL-MCNC: 126 MG/DL (ref 65–140)
GLUCOSE SERPL-MCNC: 329 MG/DL (ref 65–140)
HCT VFR BLD AUTO: 33.7 % (ref 36.5–46.1)
HGB BLD-MCNC: 10.3 G/DL (ref 12–15.4)
IMM GRANULOCYTES # BLD AUTO: 0.02 THOUSAND/UL (ref 0–0.2)
IMM GRANULOCYTES NFR BLD AUTO: 0 % (ref 0–2)
INR PPP: 1.09 (ref 0.84–1.19)
LYMPHOCYTES # BLD AUTO: 3.49 THOUSANDS/ΜL (ref 0.6–4.47)
LYMPHOCYTES NFR BLD AUTO: 47 % (ref 14–44)
MCH RBC QN AUTO: 22.5 PG (ref 26.8–34.3)
MCHC RBC AUTO-ENTMCNC: 30.6 G/DL (ref 31.4–37.4)
MCV RBC AUTO: 74 FL (ref 82–98)
MONOCYTES # BLD AUTO: 0.54 THOUSAND/ΜL (ref 0.17–1.22)
MONOCYTES NFR BLD AUTO: 7 % (ref 4–12)
NEUTROPHILS # BLD AUTO: 3.33 THOUSANDS/ΜL (ref 1.85–7.62)
NEUTS SEG NFR BLD AUTO: 44 % (ref 43–75)
NRBC BLD AUTO-RTO: 0 /100 WBCS
PLATELET # BLD AUTO: 188 THOUSANDS/UL (ref 149–390)
PMV BLD AUTO: 11.7 FL (ref 8.9–12.7)
POTASSIUM SERPL-SCNC: 4.2 MMOL/L (ref 3.5–5.3)
PROTHROMBIN TIME: 13.9 SECONDS (ref 11.6–14.5)
RBC # BLD AUTO: 4.58 MILLION/UL (ref 3.88–5.12)
SODIUM SERPL-SCNC: 140 MMOL/L (ref 136–145)
WBC # BLD AUTO: 7.59 THOUSAND/UL (ref 4.31–10.16)

## 2022-01-19 PROCEDURE — 75630 X-RAY AORTA LEG ARTERIES: CPT

## 2022-01-19 PROCEDURE — 3E05317 INTRODUCTION OF OTHER THROMBOLYTIC INTO PERIPHERAL ARTERY, PERCUTANEOUS APPROACH: ICD-10-PCS | Performed by: RADIOLOGY

## 2022-01-19 PROCEDURE — 37211 THROMBOLYTIC ART THERAPY: CPT | Performed by: RADIOLOGY

## 2022-01-19 PROCEDURE — 80048 BASIC METABOLIC PNL TOTAL CA: CPT | Performed by: FAMILY MEDICINE

## 2022-01-19 PROCEDURE — 85384 FIBRINOGEN ACTIVITY: CPT | Performed by: RADIOLOGY

## 2022-01-19 PROCEDURE — C1894 INTRO/SHEATH, NON-LASER: HCPCS

## 2022-01-19 PROCEDURE — 99232 SBSQ HOSP IP/OBS MODERATE 35: CPT | Performed by: SURGERY

## 2022-01-19 PROCEDURE — 99152 MOD SED SAME PHYS/QHP 5/>YRS: CPT | Performed by: RADIOLOGY

## 2022-01-19 PROCEDURE — C1887 CATHETER, GUIDING: HCPCS

## 2022-01-19 PROCEDURE — C1769 GUIDE WIRE: HCPCS

## 2022-01-19 PROCEDURE — 99152 MOD SED SAME PHYS/QHP 5/>YRS: CPT

## 2022-01-19 PROCEDURE — 85730 THROMBOPLASTIN TIME PARTIAL: CPT | Performed by: RADIOLOGY

## 2022-01-19 PROCEDURE — 76937 US GUIDE VASCULAR ACCESS: CPT | Performed by: RADIOLOGY

## 2022-01-19 PROCEDURE — 82948 REAGENT STRIP/BLOOD GLUCOSE: CPT

## 2022-01-19 PROCEDURE — 85025 COMPLETE CBC W/AUTO DIFF WBC: CPT | Performed by: FAMILY MEDICINE

## 2022-01-19 PROCEDURE — 85610 PROTHROMBIN TIME: CPT | Performed by: RADIOLOGY

## 2022-01-19 PROCEDURE — 75710 ARTERY X-RAYS ARM/LEG: CPT | Performed by: RADIOLOGY

## 2022-01-19 PROCEDURE — C1751 CATH, INF, PER/CENT/MIDLINE: HCPCS

## 2022-01-19 PROCEDURE — B41G1ZZ FLUOROSCOPY OF LEFT LOWER EXTREMITY ARTERIES USING LOW OSMOLAR CONTRAST: ICD-10-PCS | Performed by: RADIOLOGY

## 2022-01-19 PROCEDURE — B41F1ZZ FLUOROSCOPY OF RIGHT LOWER EXTREMITY ARTERIES USING LOW OSMOLAR CONTRAST: ICD-10-PCS | Performed by: RADIOLOGY

## 2022-01-19 PROCEDURE — 05HY33Z INSERTION OF INFUSION DEVICE INTO UPPER VEIN, PERCUTANEOUS APPROACH: ICD-10-PCS | Performed by: RADIOLOGY

## 2022-01-19 PROCEDURE — 37211 THROMBOLYTIC ART THERAPY: CPT

## 2022-01-19 PROCEDURE — 99232 SBSQ HOSP IP/OBS MODERATE 35: CPT | Performed by: FAMILY MEDICINE

## 2022-01-19 PROCEDURE — 99153 MOD SED SAME PHYS/QHP EA: CPT

## 2022-01-19 PROCEDURE — 70450 CT HEAD/BRAIN W/O DYE: CPT

## 2022-01-19 RX ORDER — FENTANYL CITRATE 50 UG/ML
INJECTION, SOLUTION INTRAMUSCULAR; INTRAVENOUS CODE/TRAUMA/SEDATION MEDICATION
Status: COMPLETED | OUTPATIENT
Start: 2022-01-19 | End: 2022-01-19

## 2022-01-19 RX ORDER — HEPARIN SODIUM 1000 [USP'U]/ML
INJECTION, SOLUTION INTRAVENOUS; SUBCUTANEOUS CODE/TRAUMA/SEDATION MEDICATION
Status: COMPLETED | OUTPATIENT
Start: 2022-01-19 | End: 2022-01-19

## 2022-01-19 RX ORDER — HEPARIN SODIUM (PORCINE) LOCK FLUSH IV SOLN 100 UNIT/ML 100 UNIT/ML
SOLUTION INTRAVENOUS CODE/TRAUMA/SEDATION MEDICATION
Status: COMPLETED | OUTPATIENT
Start: 2022-01-19 | End: 2022-01-19

## 2022-01-19 RX ORDER — HEPARIN SODIUM 200 [USP'U]/100ML
20 INJECTION, SOLUTION INTRAVENOUS CONTINUOUS
Status: DISCONTINUED | OUTPATIENT
Start: 2022-01-19 | End: 2022-01-20

## 2022-01-19 RX ORDER — IODIXANOL 320 MG/ML
200 INJECTION, SOLUTION INTRAVASCULAR
Status: COMPLETED | OUTPATIENT
Start: 2022-01-19 | End: 2022-01-19

## 2022-01-19 RX ORDER — MIDAZOLAM HYDROCHLORIDE 2 MG/2ML
INJECTION, SOLUTION INTRAMUSCULAR; INTRAVENOUS CODE/TRAUMA/SEDATION MEDICATION
Status: COMPLETED | OUTPATIENT
Start: 2022-01-19 | End: 2022-01-19

## 2022-01-19 RX ADMIN — FENTANYL CITRATE 25 MCG: 50 INJECTION INTRAMUSCULAR; INTRAVENOUS at 16:08

## 2022-01-19 RX ADMIN — HEPARIN SODIUM IN SODIUM CHLORIDE 20 UNITS/HR: 200 INJECTION INTRAVENOUS at 17:02

## 2022-01-19 RX ADMIN — HEPARIN SODIUM 400 UNITS/HR: 10000 INJECTION, SOLUTION INTRAVENOUS; SUBCUTANEOUS at 17:02

## 2022-01-19 RX ADMIN — METOPROLOL TARTRATE 25 MG: 25 TABLET, FILM COATED ORAL at 08:54

## 2022-01-19 RX ADMIN — METOPROLOL TARTRATE 25 MG: 25 TABLET, FILM COATED ORAL at 22:26

## 2022-01-19 RX ADMIN — ALTEPLASE 0.5 MG/HR: 2.2 INJECTION, POWDER, LYOPHILIZED, FOR SOLUTION INTRAVENOUS at 18:20

## 2022-01-19 RX ADMIN — ASPIRIN 81 MG CHEWABLE TABLET 81 MG: 81 TABLET CHEWABLE at 08:53

## 2022-01-19 RX ADMIN — FENTANYL CITRATE 25 MCG: 50 INJECTION INTRAMUSCULAR; INTRAVENOUS at 16:53

## 2022-01-19 RX ADMIN — INSULIN GLARGINE 10 UNITS: 100 INJECTION, SOLUTION SUBCUTANEOUS at 22:26

## 2022-01-19 RX ADMIN — MIDAZOLAM 0.5 MG: 1 INJECTION INTRAMUSCULAR; INTRAVENOUS at 16:52

## 2022-01-19 RX ADMIN — CEFAZOLIN SODIUM 2000 MG: 2 SOLUTION INTRAVENOUS at 22:27

## 2022-01-19 RX ADMIN — CEFAZOLIN SODIUM 2000 MG: 2 SOLUTION INTRAVENOUS at 08:06

## 2022-01-19 RX ADMIN — INSULIN GLARGINE 10 UNITS: 100 INJECTION, SOLUTION SUBCUTANEOUS at 00:08

## 2022-01-19 RX ADMIN — MIDAZOLAM 0.5 MG: 1 INJECTION INTRAMUSCULAR; INTRAVENOUS at 16:08

## 2022-01-19 RX ADMIN — MIDAZOLAM 1 MG: 1 INJECTION INTRAMUSCULAR; INTRAVENOUS at 15:28

## 2022-01-19 RX ADMIN — ATORVASTATIN CALCIUM 20 MG: 20 TABLET, FILM COATED ORAL at 08:53

## 2022-01-19 RX ADMIN — IODIXANOL 54 ML: 320 INJECTION, SOLUTION INTRAVASCULAR at 16:00

## 2022-01-19 RX ADMIN — FENTANYL CITRATE 50 MCG: 50 INJECTION INTRAMUSCULAR; INTRAVENOUS at 15:28

## 2022-01-19 NOTE — PROGRESS NOTES
Progress Note - Vascular Surgery   Irene Captain Lyon 64 y o  adult MRN: [id number]  Unit/Bed#: E5 -01 Encounter: [id number]    Assessment:  64 F p/w acute on chronic PAD, R TMA cellulitis, improving  LEADs showing acute R SFa occlusion, planned for IR intervention today    Afebrile, Stable VS on room air  Plan:  -NPO  -continue asa/statin  -IR for R arteriogram with intervention  -post-procedure LEADs  -additional care as per primary       Subjective/Objective     Subjective:   No acute events overnight  Continue pain R TMA  No fevers or chills  -N  -V      Objective:     Blood pressure 122/76, pulse 75, temperature 98 °F (36 7 °C), temperature source Oral, resp  rate 19, weight 82 6 kg (182 lb), SpO2 98 %, not currently breastfeeding  ,Body mass index is 35 54 kg/m²        Intake/Output Summary (Last 24 hours) at 1/18/2022 2235  Last data filed at 1/18/2022 1546  Gross per 24 hour   Intake --   Output 400 ml   Net -400 ml       Invasive Devices  Report    Peripheral Intravenous Line            Peripheral IV 01/16/22 Right Antecubital 2 days                Physical Exam:   Gen: NAD, Comfortable  Neuro: A&O, No focal deficits  Head: Normal Cephalic, Atraumatic  Eye: EOMI, PERRLA, No scleral icterus  Neck: Supple, No JVD, Midline trachea  CV: RRR, Cap refill <2 sec  Pulm: Normal work of breathing, no respiratory distress  Abd: Soft, Non-Distended, Non-Tender  Ext: skin changes superior TMA, tender  Skin: warm, dry, intact

## 2022-01-19 NOTE — ASSESSMENT & PLAN NOTE
51-year-old female with past history of PID, hypertension, hyperlipidemia and type 2 diabetes presented with worsening right foot pain  Surgical history with right TMA  Vascular surgery consulted, input appreciated  LEADS revealed R superficial femoral artery occlusion with bilateral tibioperoneal disease   For arteriogram by IR today  Received Vancomycin, will proceed with cefazolin for now for treatment of possible cellulitis   Supportive management

## 2022-01-19 NOTE — BRIEF OP NOTE (RAD/CATH)
INTERVENTIONAL RADIOLOGY PROCEDURE NOTE    Date: 1/19/2022    Procedure: IR ANGIOGRAPHY AND LYSIS    Preoperative diagnosis:   1  Cellulitis of right foot         Postoperative diagnosis: Same  Surgeon: Julianna Nixon MD     Assistant: None  No qualified resident was available  Blood loss: minimal    Specimens: blood specimen     Findings:     Severe disease in the proximal right superficial femoral artery proximal to the stent and distal disease in the right popliteal artery    Single vessel peroneal runoff (diseased)    Thrombosis of the prior stent    135 cm 30cm side-hole thrombolysis infusion catheter placed across the stent  Not placed in outflow due to distal disease    Left groin access 5 [place] sheath in place    Complications: None immediate  Anesthesia: conscious sedation     Plan:  Stat head CT  0 5 milligrams/hour tPA into the lysis catheter  Systemic non weight based heparin  Heparinized saline in the sheath    Vascular Quality Initiative - Peripheral Vascular Intervention     Urgency: Urgent    Functional Status:  Capable of only limited self-care, confined to bed or chair 50% or more of waking hours  Ambulation: Wheelchair = predominate means of motion, except very short distance    Leg Symptoms    Right: Ischemic Rest Pain  Left: Asymptomatic:  documented peripheral arterial disease without symptoms of claudication or ischemic pain      Treatment of Native Artery to Maintain Bypass Patency?:  No    Access   Number of Sites: 1     Access Site 1:     Side 1: Left    Site 1: Femoral Retrograde    Access Guidance 1:U/S    Largest Sheath Size 1: 5 Fr      Closure Device 1: Other NONE         Procedure  Fluoro Time: 9 8 minutes  Contrast Volume: Visipaque 54 ml  CO2: no  Anticoagulant: Heparin  Protamine: No  If Creatinine is > 1 2 or missing, DESHAUN Prophylaxis none     Treatment Details  Indication: Occlusive Disease,    Completion Assessment  Artery 1 treated: SFA+Pop   Right               Outflow: AT,PT,Peroneal: 2                       Segments treated: P1                      Was this Site previously treated?: Yes, Stent          TASC Grade: C          Total Treated Length: 30 cm          Total Occluded Length: 25 cm             Device 1  LYSIS CATHETER    Procedure Complications: No

## 2022-01-19 NOTE — PROCEDURES
PICC Line Insertion    Date/Time: 1/19/2022 5:53 PM  Performed by: Yadiel Ramos RN  Authorized by: Bin Singh MD     Patient location:  IR  Consent:     Consent obtained:  Written    Consent given by:  Patient  Universal protocol:     Patient identity confirmed:  Verbally with patient and arm band  Pre-procedure details:     Hand hygiene: Hand hygiene performed prior to insertion      Sterile barrier technique: All elements of maximal sterile technique followed      Skin preparation:  ChloraPrep  Indications:     PICC line indications: other (comment)      PICC line indications comment:  Arterial Lysis  Anesthesia (see MAR for exact dosages): Anesthesia method:  Local infiltration    Local anesthetic:  Lidocaine 1% w/o epi  Procedure details:     Location:  Brachial    Vessel type: vein      Laterality:  Left    Procedural supplies:  Double lumen    Catheter size:  5 Fr    Landmarks identified: yes      Ultrasound guidance: yes      Sterile ultrasound techniques: Sterile gel and sterile probe covers were used      Number of attempts:  1    Successful placement: yes      Vessel of catheter tip end:  Chest Xray needed to confirm placement    Total catheter length (cm):  43cm    Catheter out on skin (cm):  2cm    Max flow rate:  5mL/sec    Arm circumference:  32cm  Post-procedure details:     Post-procedure:  Dressing applied and securement device placed    Assessment:  Blood return through all ports    Post-procedure complications: none      Patient tolerance of procedure:   Tolerated well, no immediate complications  Comments:      LOT# [id number]

## 2022-01-19 NOTE — PLAN OF CARE
Problem: Potential for Falls  Goal: Patient will remain free of falls  Description: INTERVENTIONS:  - Educate patient/family on patient safety including physical limitations  - Instruct patient to call for assistance with activity   - Consult OT/PT to assist with strengthening/mobility   - Keep Call bell within reach  - Keep bed low and locked with side rails adjusted as appropriate  - Keep care items and personal belongings within reach  - Initiate and maintain comfort rounds  - Make Fall Risk Sign visible to staff  - Offer Toileting every 2 Hours, in advance of need  - Initiate/Maintain bed alarm  - Obtain necessary fall risk management equipment: socks   - Apply yellow socks and bracelet for high fall risk patients  - Consider moving patient to room near nurses station  Outcome: Progressing     Problem: MOBILITY - ADULT  Goal: Maintain or return to baseline ADL function  Description: INTERVENTIONS:  -  Assess patient's ability to carry out ADLs; assess patient's baseline for ADL function and identify physical deficits which impact ability to perform ADLs (bathing, care of mouth/teeth, toileting, grooming, dressing, etc )  - Assess/evaluate cause of self-care deficits   - Assess range of motion  - Assess patient's mobility; develop plan if impaired  - Assess patient's need for assistive devices and provide as appropriate  - Encourage maximum independence but intervene and supervise when necessary  - Involve family in performance of ADLs  - Assess for home care needs following discharge   - Consider OT consult to assist with ADL evaluation and planning for discharge  - Provide patient education as appropriate  Outcome: Progressing  Goal: Maintains/Returns to pre admission functional level  Description: INTERVENTIONS:  - Perform BMAT or MOVE assessment daily    - Set and communicate daily mobility goal to care team and patient/family/caregiver     - Collaborate with rehabilitation services on mobility goals if consulted  - Perform Range of Motion 4 times a day  - Reposition patient every 2 hours    - Dangle patient 3 times a day  - Stand patient 3 times a day  - Ambulate patient 3 times a day  - Out of bed to chair 3 times a day   - Out of bed for meals 3 times a day  - Out of bed for toileting  - Record patient progress and toleration of activity level   Outcome: Progressing     Problem: Prexisting or High Potential for Compromised Skin Integrity  Goal: Skin integrity is maintained or improved  Description: INTERVENTIONS:  - Identify patients at risk for skin breakdown  - Assess and monitor skin integrity  - Assess and monitor nutrition and hydration status  - Monitor labs   - Assess for incontinence   - Turn and reposition patient  - Assist with mobility/ambulation  - Relieve pressure over bony prominences  - Avoid friction and shearing  - Provide appropriate hygiene as needed including keeping skin clean and dry  - Evaluate need for skin moisturizer/barrier cream  - Collaborate with interdisciplinary team   - Patient/family teaching  - Consider wound care consult   Outcome: Progressing

## 2022-01-19 NOTE — INTERVAL H&P NOTE
Update: (This section must be completed if the H&P was completed greater than 24 hrs to procedure or admission)    H&P reviewed  After examining the patient, I find no changed to the H&P since it had been written  56F with right foot pain and prior endovascular procedures including SFA stent placement, TMA presenting with 1 week of pain found to have occluded right stents  Sudden onset is concerning for in situ thrombosis    Will plan for angiography intervention possible angioplasty possible thrombectomy possible stenting    Risks benefits alternatives reviewed including risk of access site complication, distal embolization    Mp2 ASA3 we will sedate    Patient re-evaluated   Accept as history and physical     Jacklyn Murray MD/January 19, 2022/3:21 PM

## 2022-01-19 NOTE — UTILIZATION REVIEW
Continued Stay Review    Date:   1/19  ADDITIONAL CLINICAL INFORMATION                          Current Patient Class:    Inpatient  Current Level of Care:   Med surg    HPI:56 y o  adult initially admitted on    1/16 with   Right foot pain     PAD    1/18   LEAD demonstrated re-occlusion of right SFA stents with poor distal perfusion  Plan for right lower extremity angiogram with possible intervention with IR    Assessment/Plan:   Waiting  IR procedure this pm    Will plan for angiography intervention possible angioplasty possible thrombectomy possible stenting    Continue  IV  Ancef  Has some improvement  In RLE pain  Continue current  Meds/treatment plan        Vital Signs:    -- 74 -- 141/71 96 100 % -- --   01/19/22 1525 -- 78 -- 184/76 Abnormal  99 100 % -- --   01/19/22 1520 -- 76 -- 182/66 Abnormal  94 100 % -- --   01/19/22 1515 -- 76 16 183/74 Abnormal  112 100 % -- --   01/19/22 1510 -- 77 16 195/70 Abnormal  103 99 % -- --   01/19/22 08:18:37 97 9 °F (36 6 °C) 69 -- 132/70 91 100 % None (Room air) --         Pertinent Labs/Diagnostic Results:       Results from last 7 days   Lab Units 01/19/22  0452 01/18/22  0512 01/17/22  0612 01/16/22  1625 01/16/22  1625   WBC Thousand/uL 7 59 7 79 8 49   < > 7 37   HEMOGLOBIN g/dL 10 3* 10 8* 11 3*  --  12 8   HEMATOCRIT % 33 7* 35 2* 36 8  --  42 0   PLATELETS Thousands/uL 188 190 189   < > 220   NEUTROS ABS Thousands/µL 3 33 3 90 3 76   < > 4 35    < > = values in this interval not displayed           Results from last 7 days   Lab Units 01/19/22  0452 01/18/22  0512 01/17/22  0612 01/16/22  1625   SODIUM mmol/L 140 143 139 139   POTASSIUM mmol/L 4 2 4 1 4 0 3 8   CHLORIDE mmol/L 109* 108 106 102   CO2 mmol/L 22 27 25 28   ANION GAP mmol/L 9 8 8 9   BUN mg/dL 15 27* 15 16   CREATININE mg/dL 0 78 1 11 0 77 0 91   CALCIUM mg/dL 7 9* 8 3 8 4 8 7     Results from last 7 days   Lab Units 01/16/22  1625   AST U/L 22   ALT U/L 26   ALK PHOS U/L 87   TOTAL PROTEIN g/dL 7 6   ALBUMIN g/dL 3 7   TOTAL BILIRUBIN mg/dL 0 34     Results from last 7 days   Lab Units 01/19/22  1107 01/19/22  0726 01/18/22  2047 01/18/22  1538 01/18/22  1124 01/18/22  0725 01/17/22  1657 01/17/22  1111 01/17/22  0721 01/16/22  2205   POC GLUCOSE mg/dl 111 123 240* 112 135 175* 240* 140 104 132     Results from last 7 days   Lab Units 01/19/22  0452 01/18/22  0512 01/17/22  0612 01/16/22  1625   GLUCOSE RANDOM mg/dL 126 206* 90 193*           Results from last 7 days   Lab Units 01/16/22  1625   CRP mg/L <3 0   SED RATE mm/hour 37       Results from last 7 days   Lab Units 01/16/22 2013 01/16/22 2008   BLOOD CULTURE  No Growth at 48 hrs  No Growth at 48 hrs  Medications:   Scheduled Medications:  aspirin, 81 mg, Oral, Daily  atorvastatin, 20 mg, Oral, Daily  cefazolin, 2,000 mg, Intravenous, Q8H  enoxaparin, 40 mg, Subcutaneous, Daily  insulin glargine, 10 Units, Subcutaneous, HS  insulin lispro, 1-5 Units, Subcutaneous, TID AC  lisinopril, 5 mg, Oral, Daily  metoprolol tartrate, 25 mg, Oral, Q12H ARRON      Continuous IV Infusions:  sodium chloride, 100 mL/hr, Intravenous, Continuous      PRN Meds:  acetaminophen, 650 mg, Oral, Q6H PRN  aluminum-magnesium hydroxide-simethicone, 30 mL, Oral, Q6H PRN  fentanyl citrate (PF), , Intravenous, Code/Trauma/Sedation Med  midazolam, , , Code/Trauma/Sedation Med  ondansetron, 4 mg, Intravenous, Q6H PRN  oxyCODONE, 2 5 mg, Oral, Q4H PRN        Discharge Plan:    TD    Network Utilization Review Department  ATTENTION: Please call with any questions or concerns to [phone] and carefully listen to the prompts so that you are directed to the right person  All voicemails are confidential   Michael Hemphill all requests for admission clinical reviews, approved or denied determinations and any other requests to dedicated fax number below belonging to the campus where the patient is receiving treatment   List of dedicated fax numbers for the Facilities:  Alonso Barrios NAME UR FAX NUMBER   ADMISSION DENIALS (Administrative/Medical Necessity) [phone]   PARENT CHILD HEALTH (Maternity/NICU/Pediatrics) [address] [facility],[address]  [phone]   Kirsten Díaz [address] [address] [address] Kurtis Tate 1481 [address] [facility] [address] [phone]

## 2022-01-19 NOTE — PROGRESS NOTES
[address] [facility]  Progress Note - [address] 1965, 64 y o  adult MRN: [id number]  Unit/Bed#: E5 -01 Encounter: [id number]  Primary Care Provider: Adina Pan MD   Date and time admitted to hospital: 1/16/2022  3:38 PM    * Right foot pain  Assessment & Plan  59-year-old female with past history of PID, hypertension, hyperlipidemia and type 2 diabetes presented with worsening right foot pain  Surgical history with right TMA  Vascular surgery consulted, input appreciated  LEADS revealed R superficial femoral artery occlusion with bilateral tibioperoneal disease  For arteriogram by IR today  Received Vancomycin, will proceed with cefazolin for now for treatment of possible cellulitis   Supportive management     Diabetes mellitus with stage 2 chronic kidney disease ([facility] [address] )  Assessment & Plan  Lab Results   Component Value Date    HGBA1C 6 8 (H) 08/25/2021     Hold metformin and Trulicity  Home dose of Lantus 15 units bedtime, for now continue 10 units  Sliding scale and Accu-Cheks     PAD (peripheral artery disease) ([facility] [address] )  Assessment & Plan  LEADs revealed right superficial femoral artery occlusion with b/l tibiaperoneal disease  For agram by IR today  Continue aspirin, statin  Plavix on hold for procedure      HLD (hyperlipidemia)  Assessment & Plan  Continue statin    Essential hypertension  Assessment & Plan  Continue metoprolol and lisinopril at this time, blood pressure significantly elevated on admission now controlled            VTE Pharmacologic Prophylaxis:   Moderate Risk (Score 3-4) - Pharmacological DVT Prophylaxis Ordered: enoxaparin (Lovenox)  Patient Centered Rounds: I performed bedside rounds with nursing staff today  Discussions with Specialists or Other Care Team Provider:  Case management    Education and Discussions with Family / Patient: Patient  Time Spent for Care: 30 minutes   More than 50% of total time spent on counseling and coordination of care as described above  Current Length of Stay: 3 day(s)  Current Patient Status: Inpatient   Certification Statement: The patient will continue to require additional inpatient hospital stay due to procedure, close monitoring   Discharge Plan: Anticipate discharge in 24-48 hrs to home  Code Status: Level 1 - Full Code    Subjective:   Patient seen and examined  She is awaiting IR procedure  She reports improvement in her light lower extremity pain    Objective:     Vitals:   Temp (24hrs), Av 9 °F (36 6 °C), Min:97 7 °F (36 5 °C), Max:98 °F (36 7 °C)    Temp:  [97 7 °F (36 5 °C)-98 °F (36 7 °C)] 97 9 °F (36 6 °C)  HR:  [69-79] 76  Resp:  [15-16] 16  BP: (131-195)/(63-74) 183/74  SpO2:  [99 %-100 %] 100 %  Body mass index is 35 54 kg/m²  Input and Output Summary (last 24 hours): Intake/Output Summary (Last 24 hours) at 2022 1529  Last data filed at 2022 1546  Gross per 24 hour   Intake --   Output 400 ml   Net -400 ml       Physical Exam:   Physical Exam  Constitutional:       General: Chyna Woods is not in acute distress  HENT:      Head: Normocephalic and atraumatic  Nose: No congestion  Mouth/Throat:      Pharynx: Oropharynx is clear  Eyes:      Conjunctiva/sclera: Conjunctivae normal    Cardiovascular:      Rate and Rhythm: Normal rate and regular rhythm  Heart sounds: No murmur heard  Pulmonary:      Effort: No respiratory distress  Breath sounds: No wheezing or rales  Abdominal:      General: There is no distension  Tenderness: There is no abdominal tenderness  There is no guarding  Musculoskeletal:         General: Deformity (R TMA ) present  Skin:     General: Skin is warm and dry  Neurological:      Mental Status: Chyna Woods is oriented to person, place, and time     Psychiatric:         Mood and Affect: Mood normal           Additional Data:     Labs:  Results from last 7 days   Lab Units 01/19/22  0452   WBC Thousand/uL 7 59   HEMOGLOBIN g/dL 10 3*   HEMATOCRIT % 33 7*   PLATELETS Thousands/uL 188   NEUTROS PCT % 44   LYMPHS PCT % 47*   MONOS PCT % 7   EOS PCT % 2     Results from last 7 days   Lab Units 01/19/22  0452 01/17/22  0612 01/16/22  1625   SODIUM mmol/L 140   < > 139   POTASSIUM mmol/L 4 2   < > 3 8   CHLORIDE mmol/L 109*   < > 102   CO2 mmol/L 22   < > 28   BUN mg/dL 15   < > 16   CREATININE mg/dL 0 78   < > 0 91   ANION GAP mmol/L 9   < > 9   CALCIUM mg/dL 7 9*   < > 8 7   ALBUMIN g/dL  --   --  3 7   TOTAL BILIRUBIN mg/dL  --   --  0 34   ALK PHOS U/L  --   --  87   ALT U/L  --   --  26   AST U/L  --   --  22   GLUCOSE RANDOM mg/dL 126   < > 193*    < > = values in this interval not displayed  Results from last 7 days   Lab Units 01/19/22  1107 01/19/22  0726 01/18/22  2047 01/18/22  1538 01/18/22  1124 01/18/22  0725 01/17/22  1657 01/17/22  1111 01/17/22  0721 01/16/22  2205   POC GLUCOSE mg/dl 111 123 240* 112 135 175* 240* 140 104 132               Lines/Drains:  Invasive Devices  Report    Peripheral Intravenous Line            Peripheral IV 01/19/22 Right Antecubital <1 day    Peripheral IV 01/19/22 Right Wrist <1 day                      Imaging: will review agram  Report     Recent Cultures (last 7 days):   Results from last 7 days   Lab Units 01/16/22 2013 01/16/22 2008   BLOOD CULTURE  No Growth at 48 hrs  No Growth at 48 hrs         Last 24 Hours Medication List:   Current Facility-Administered Medications   Medication Dose Route Frequency Provider Last Rate    acetaminophen  650 mg Oral Q6H PRN Loy Elliott MD      aluminum-magnesium hydroxide-simethicone  30 mL Oral Q6H PRN Antonietta Bailey MD      aspirin  81 mg Oral Daily Loy Elliott MD      atorvastatin  20 mg Oral Daily Loy Elliott MD      cefazolin  2,000 mg Intravenous Q8H Avani Martínez MD 2,000 mg (01/19/22 0806)    enoxaparin  40 mg Subcutaneous Daily Loy Elliott MD      fentanyl citrate (PF)   Intravenous Code/Trauma/Sedation Kirk Fermin MD      insulin glargine  10 Units Subcutaneous HS Zi Sunshine PA-C      insulin lispro  1-5 Units Subcutaneous TID AC Asael Montalvo MD      lisinopril  5 mg Oral Daily Whitney Jeffries MD      metoprolol tartrate  25 mg Oral Q12H [address] Asael Montalvo MD      midazolam    Code/Trauma/Sedation Kirk Fermin MD      ondansetron  4 mg Intravenous Q6H PRN Whitney Jeffries MD      oxyCODONE  2 5 mg Oral Q4H PRN Whitney Jeffries MD      sodium chloride  100 mL/hr Intravenous Continuous Marjorie Swanson  mL/hr (01/18/22 4335)        Today, Patient Was Seen By: Aldair Mitchell MD    **Please Note: This note may have been constructed using a voice recognition system  **

## 2022-01-20 ENCOUNTER — APPOINTMENT (INPATIENT)
Dept: RADIOLOGY | Facility: HOSPITAL | Age: 57
DRG: 254 | End: 2022-01-20
Attending: RADIOLOGY
Payer: COMMERCIAL

## 2022-01-20 ENCOUNTER — TELEPHONE (OUTPATIENT)
Dept: BARIATRICS | Facility: CLINIC | Age: 57
End: 2022-01-20

## 2022-01-20 PROBLEM — F17.201 NICOTINE DEPENDENCE IN REMISSION: Status: ACTIVE | Noted: 2022-01-20

## 2022-01-20 LAB
ANION GAP SERPL CALCULATED.3IONS-SCNC: 9 MMOL/L (ref 4–13)
APTT PPP: 32 SECONDS (ref 23–37)
APTT PPP: 45 SECONDS (ref 23–37)
APTT PPP: 60 SECONDS (ref 23–37)
BASOPHILS # BLD AUTO: 0.03 THOUSANDS/ΜL (ref 0–0.1)
BASOPHILS NFR BLD AUTO: 0 % (ref 0–1)
BUN SERPL-MCNC: 9 MG/DL (ref 5–25)
CALCIUM SERPL-MCNC: 7.6 MG/DL (ref 8.3–10.1)
CHLORIDE SERPL-SCNC: 109 MMOL/L (ref 100–108)
CO2 SERPL-SCNC: 26 MMOL/L (ref 21–32)
CREAT SERPL-MCNC: 0.71 MG/DL (ref 0.6–1.3)
EOSINOPHIL # BLD AUTO: 0.09 THOUSAND/ΜL (ref 0–0.61)
EOSINOPHIL NFR BLD AUTO: 1 % (ref 0–6)
ERYTHROCYTE [DISTWIDTH] IN BLOOD BY AUTOMATED COUNT: 14.7 % (ref 11.6–15.1)
ERYTHROCYTE [DISTWIDTH] IN BLOOD BY AUTOMATED COUNT: 14.8 % (ref 11.6–15.1)
ERYTHROCYTE [DISTWIDTH] IN BLOOD BY AUTOMATED COUNT: 14.8 % (ref 11.6–15.1)
FIBRINOGEN PPP-MCNC: 387 MG/DL (ref 227–495)
FIBRINOGEN PPP-MCNC: 401 MG/DL (ref 227–495)
FIBRINOGEN PPP-MCNC: 410 MG/DL (ref 227–495)
GLUCOSE SERPL-MCNC: 140 MG/DL (ref 65–140)
GLUCOSE SERPL-MCNC: 155 MG/DL (ref 65–140)
GLUCOSE SERPL-MCNC: 176 MG/DL (ref 65–140)
GLUCOSE SERPL-MCNC: 178 MG/DL (ref 65–140)
GLUCOSE SERPL-MCNC: 363 MG/DL (ref 65–140)
HCT VFR BLD AUTO: 32 % (ref 36.5–46.1)
HCT VFR BLD AUTO: 32.7 % (ref 36.5–46.1)
HCT VFR BLD AUTO: 34.3 % (ref 36.5–46.1)
HGB BLD-MCNC: 10.1 G/DL (ref 12–15.4)
HGB BLD-MCNC: 10.6 G/DL (ref 12–15.4)
HGB BLD-MCNC: 9.8 G/DL (ref 12–15.4)
IMM GRANULOCYTES # BLD AUTO: 0.02 THOUSAND/UL (ref 0–0.2)
IMM GRANULOCYTES NFR BLD AUTO: 0 % (ref 0–2)
LYMPHOCYTES # BLD AUTO: 2.59 THOUSANDS/ΜL (ref 0.6–4.47)
LYMPHOCYTES NFR BLD AUTO: 34 % (ref 14–44)
MCH RBC QN AUTO: 22.2 PG (ref 26.8–34.3)
MCH RBC QN AUTO: 23.1 PG (ref 26.8–34.3)
MCH RBC QN AUTO: 23.4 PG (ref 26.8–34.3)
MCHC RBC AUTO-ENTMCNC: 30 G/DL (ref 31.4–37.4)
MCHC RBC AUTO-ENTMCNC: 30.9 G/DL (ref 31.4–37.4)
MCHC RBC AUTO-ENTMCNC: 31.6 G/DL (ref 31.4–37.4)
MCV RBC AUTO: 74 FL (ref 82–98)
MCV RBC AUTO: 74 FL (ref 82–98)
MCV RBC AUTO: 75 FL (ref 82–98)
MONOCYTES # BLD AUTO: 0.5 THOUSAND/ΜL (ref 0.17–1.22)
MONOCYTES NFR BLD AUTO: 7 % (ref 4–12)
NEUTROPHILS # BLD AUTO: 4.38 THOUSANDS/ΜL (ref 1.85–7.62)
NEUTS SEG NFR BLD AUTO: 58 % (ref 43–75)
NRBC BLD AUTO-RTO: 0 /100 WBCS
PLATELET # BLD AUTO: 155 THOUSANDS/UL (ref 149–390)
PLATELET # BLD AUTO: 161 THOUSANDS/UL (ref 149–390)
PLATELET # BLD AUTO: 166 THOUSANDS/UL (ref 149–390)
PMV BLD AUTO: 11 FL (ref 8.9–12.7)
PMV BLD AUTO: 11.2 FL (ref 8.9–12.7)
PMV BLD AUTO: 11.5 FL (ref 8.9–12.7)
POTASSIUM SERPL-SCNC: 3.3 MMOL/L (ref 3.5–5.3)
RBC # BLD AUTO: 4.31 MILLION/UL (ref 3.88–5.12)
RBC # BLD AUTO: 4.41 MILLION/UL (ref 3.88–5.12)
RBC # BLD AUTO: 4.59 MILLION/UL (ref 3.88–5.12)
SODIUM SERPL-SCNC: 144 MMOL/L (ref 136–145)
WBC # BLD AUTO: 7.61 THOUSAND/UL (ref 4.31–10.16)
WBC # BLD AUTO: 8.14 THOUSAND/UL (ref 4.31–10.16)
WBC # BLD AUTO: 8.26 THOUSAND/UL (ref 4.31–10.16)

## 2022-01-20 PROCEDURE — C2623 CATH, TRANSLUMIN, DRUG-COAT: HCPCS

## 2022-01-20 PROCEDURE — 37214 CESSJ THERAPY CATH REMOVAL: CPT | Performed by: RADIOLOGY

## 2022-01-20 PROCEDURE — 99221 1ST HOSP IP/OBS SF/LOW 40: CPT | Performed by: INTERNAL MEDICINE

## 2022-01-20 PROCEDURE — 80048 BASIC METABOLIC PNL TOTAL CA: CPT | Performed by: NURSE PRACTITIONER

## 2022-01-20 PROCEDURE — C1725 CATH, TRANSLUMIN NON-LASER: HCPCS

## 2022-01-20 PROCEDURE — 99232 SBSQ HOSP IP/OBS MODERATE 35: CPT | Performed by: SURGERY

## 2022-01-20 PROCEDURE — C1887 CATHETER, GUIDING: HCPCS

## 2022-01-20 PROCEDURE — 37226 HB FEM/POPL REVASC W/STENT: CPT

## 2022-01-20 PROCEDURE — 85730 THROMBOPLASTIN TIME PARTIAL: CPT | Performed by: RADIOLOGY

## 2022-01-20 PROCEDURE — C1769 GUIDE WIRE: HCPCS

## 2022-01-20 PROCEDURE — 99152 MOD SED SAME PHYS/QHP 5/>YRS: CPT | Performed by: RADIOLOGY

## 2022-01-20 PROCEDURE — 85027 COMPLETE CBC AUTOMATED: CPT | Performed by: RADIOLOGY

## 2022-01-20 PROCEDURE — C1760 CLOSURE DEV, VASC: HCPCS

## 2022-01-20 PROCEDURE — 85384 FIBRINOGEN ACTIVITY: CPT | Performed by: RADIOLOGY

## 2022-01-20 PROCEDURE — 36140 INTRO NDL ICATH UPR/LXTR ART: CPT

## 2022-01-20 PROCEDURE — 82948 REAGENT STRIP/BLOOD GLUCOSE: CPT

## 2022-01-20 PROCEDURE — C1874 STENT, COATED/COV W/DEL SYS: HCPCS

## 2022-01-20 PROCEDURE — C1894 INTRO/SHEATH, NON-LASER: HCPCS

## 2022-01-20 PROCEDURE — 85025 COMPLETE CBC W/AUTO DIFF WBC: CPT | Performed by: NURSE PRACTITIONER

## 2022-01-20 PROCEDURE — 37226 PR REVASCULARIZE FEM/POP ARTERY,ANGIOPLASTY/STENT: CPT | Performed by: RADIOLOGY

## 2022-01-20 PROCEDURE — 85347 COAGULATION TIME ACTIVATED: CPT

## 2022-01-20 PROCEDURE — 047K341 DILATION OF RIGHT FEMORAL ARTERY WITH DRUG-ELUTING INTRALUMINAL DEVICE, USING DRUG-COATED BALLOON, PERCUTANEOUS APPROACH: ICD-10-PCS | Performed by: RADIOLOGY

## 2022-01-20 RX ORDER — MIDAZOLAM HYDROCHLORIDE 2 MG/2ML
INJECTION, SOLUTION INTRAMUSCULAR; INTRAVENOUS CODE/TRAUMA/SEDATION MEDICATION
Status: COMPLETED | OUTPATIENT
Start: 2022-01-20 | End: 2022-01-20

## 2022-01-20 RX ORDER — FENTANYL CITRATE 50 UG/ML
INJECTION, SOLUTION INTRAMUSCULAR; INTRAVENOUS CODE/TRAUMA/SEDATION MEDICATION
Status: COMPLETED | OUTPATIENT
Start: 2022-01-20 | End: 2022-01-20

## 2022-01-20 RX ORDER — POTASSIUM CHLORIDE 20 MEQ/1
40 TABLET, EXTENDED RELEASE ORAL 2 TIMES DAILY
Status: COMPLETED | OUTPATIENT
Start: 2022-01-20 | End: 2022-01-20

## 2022-01-20 RX ORDER — HEPARIN SODIUM 1000 [USP'U]/ML
INJECTION, SOLUTION INTRAVENOUS; SUBCUTANEOUS CODE/TRAUMA/SEDATION MEDICATION
Status: COMPLETED | OUTPATIENT
Start: 2022-01-20 | End: 2022-01-20

## 2022-01-20 RX ORDER — CEFAZOLIN SODIUM 2 G/50ML
SOLUTION INTRAVENOUS
Status: COMPLETED | OUTPATIENT
Start: 2022-01-20 | End: 2022-01-20

## 2022-01-20 RX ORDER — LIDOCAINE WITH 8.4% SOD BICARB 0.9%(10ML)
SYRINGE (ML) INJECTION CODE/TRAUMA/SEDATION MEDICATION
Status: COMPLETED | OUTPATIENT
Start: 2022-01-20 | End: 2022-01-20

## 2022-01-20 RX ORDER — CLOPIDOGREL BISULFATE 75 MG/1
75 TABLET ORAL DAILY
Status: DISCONTINUED | OUTPATIENT
Start: 2022-01-20 | End: 2022-01-22 | Stop reason: HOSPADM

## 2022-01-20 RX ORDER — IODIXANOL 320 MG/ML
200 INJECTION, SOLUTION INTRAVASCULAR
Status: COMPLETED | OUTPATIENT
Start: 2022-01-20 | End: 2022-01-20

## 2022-01-20 RX ADMIN — METOPROLOL TARTRATE 25 MG: 25 TABLET, FILM COATED ORAL at 21:29

## 2022-01-20 RX ADMIN — HEPARIN SODIUM 2000 UNITS: 1000 INJECTION INTRAVENOUS; SUBCUTANEOUS at 14:01

## 2022-01-20 RX ADMIN — LISINOPRIL 5 MG: 5 TABLET ORAL at 09:20

## 2022-01-20 RX ADMIN — POTASSIUM CHLORIDE 40 MEQ: 1500 TABLET, EXTENDED RELEASE ORAL at 09:20

## 2022-01-20 RX ADMIN — INSULIN LISPRO 1 UNITS: 100 INJECTION, SOLUTION INTRAVENOUS; SUBCUTANEOUS at 17:52

## 2022-01-20 RX ADMIN — FENTANYL CITRATE 25 MCG: 50 INJECTION INTRAMUSCULAR; INTRAVENOUS at 14:33

## 2022-01-20 RX ADMIN — FENTANYL CITRATE 25 MCG: 50 INJECTION INTRAMUSCULAR; INTRAVENOUS at 15:34

## 2022-01-20 RX ADMIN — CEFAZOLIN SODIUM 2000 MG: 2 SOLUTION INTRAVENOUS at 05:34

## 2022-01-20 RX ADMIN — Medication 10 ML: at 13:19

## 2022-01-20 RX ADMIN — ATORVASTATIN CALCIUM 20 MG: 20 TABLET, FILM COATED ORAL at 09:20

## 2022-01-20 RX ADMIN — INSULIN LISPRO 1 UNITS: 100 INJECTION, SOLUTION INTRAVENOUS; SUBCUTANEOUS at 08:00

## 2022-01-20 RX ADMIN — POTASSIUM CHLORIDE 40 MEQ: 1500 TABLET, EXTENDED RELEASE ORAL at 17:58

## 2022-01-20 RX ADMIN — INSULIN LISPRO 6 UNITS: 100 INJECTION, SOLUTION INTRAVENOUS; SUBCUTANEOUS at 21:27

## 2022-01-20 RX ADMIN — FENTANYL CITRATE 50 MCG: 50 INJECTION INTRAMUSCULAR; INTRAVENOUS at 13:38

## 2022-01-20 RX ADMIN — INSULIN GLARGINE 10 UNITS: 100 INJECTION, SOLUTION SUBCUTANEOUS at 21:28

## 2022-01-20 RX ADMIN — CLOPIDOGREL BISULFATE 75 MG: 75 TABLET ORAL at 17:00

## 2022-01-20 RX ADMIN — CEFAZOLIN SODIUM 2000 MG: 2 SOLUTION INTRAVENOUS at 13:50

## 2022-01-20 RX ADMIN — ASPIRIN 81 MG CHEWABLE TABLET 81 MG: 81 TABLET CHEWABLE at 09:20

## 2022-01-20 RX ADMIN — MIDAZOLAM 1 MG: 1 INJECTION INTRAMUSCULAR; INTRAVENOUS at 13:37

## 2022-01-20 RX ADMIN — MIDAZOLAM 0.5 MG: 1 INJECTION INTRAMUSCULAR; INTRAVENOUS at 15:03

## 2022-01-20 RX ADMIN — IODIXANOL 150 ML: 320 INJECTION, SOLUTION INTRAVASCULAR at 16:10

## 2022-01-20 RX ADMIN — METOPROLOL TARTRATE 25 MG: 25 TABLET, FILM COATED ORAL at 09:20

## 2022-01-20 RX ADMIN — OXYCODONE HYDROCHLORIDE 2.5 MG: 5 TABLET ORAL at 23:40

## 2022-01-20 NOTE — CASE MANAGEMENT
Case Management Assessment & Discharge Planning Note    Patient name Tim Smith [facility]  Location ICU 09/ICU 09 MRN [id number]  : 1965 Date 2022       Current Admission Date: 2022  Current Admission Diagnosis:Right foot pain   Patient Active Problem List    Diagnosis Date Noted    Right foot pain 2022    CKD (chronic kidney disease) stage 2, GFR 60-89 ml/min 10/05/2021    Well woman exam 02/10/2021    Vaginal candida 02/10/2021    Obstructive sleep apnea 10/30/2020    Persistent proteinuria 2020    Vitamin D deficiency 2020    Current use of insulin ([facility] ) 2020    S/P transmetatarsal amputation of foot, right ([facility] ) 2020    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, with necrosis of bone ([place] [address] ) 2020    Cellulitis of right lower extremity 2020    Class 2 severe obesity due to excess calories with serious comorbidity and body mass index (BMI) of 36 0 to 36 9 in adult [facility]) 2020    History of transmetatarsal amputation of foot ([facility] ) 2019    Charcot foot due to diabetes mellitus ([facility] ) 2019    Polyneuropathy associated with underlying disease ([facility] ) 2019    Critical lower limb ischemia ([facility] ) 2019    Toe amputation status, left 2019    Leukocytosis 2019    Microcytic anemia 2019    Diabetes mellitus with stage 2 chronic kidney disease ([facility] ) 2019    HLD (hyperlipidemia) 2019    PAD (peripheral artery disease) ([place] [address] ) 2019    Essential hypertension 10/27/2016      LOS (days): 4  Geometric Mean LOS (GMLOS) (days): 3 10  Days to GMLOS:-0 6     OBJECTIVE:    Risk of Unplanned Readmission Score: 14         Current admission status: Inpatient       Preferred Pharmacy:   [address] [place] [address] - [address]  [address]  [address] [place] [address]  Phone: [phone] Fax: [phone]    Primary Care Provider: Reginald Faustin, MD    Primary Insurance: [facility] REP  Secondary Insurance:     ASSESSMENT:    Patient Information  Admitted from[de-identified] Home  Mental Status: Alert  During Assessment patient was accompanied by: Not accompanied during assessment  Assessment information provided by[de-identified] Patient  Primary Caregiver: Self  Support Systems: [address] of Residence: [address] [facility] do you live in?: [address] [facility] entry access options  Select all that apply : No steps to enter home  Type of Current Residence: 2 story home  Living Arrangements: Lives w/ Daughter    Activities of Daily Living Prior to Admission  Functional Status: Independent  Completes ADLs independently?: Yes  Ambulates independently?: Yes  Does patient use assisted devices?: Yes  Assisted Devices (DME) used: Straight Cane  Does patient currently own DME?: Yes  What DME does the patient currently own?: Straight Cane    DISCHARGE DETAILS:    Discharge planning discussed with[de-identified] Patient  Freedom of Choice: Yes     CM contacted family/caregiver?: Yes (CM attemped to call patients daugther but was unsuccessful  CM left a voicemail )   Additional Comments: CM spoke to patient and patient was answering assessment questions  Patient reported being in pain and not being able to continue with the CM assessment

## 2022-01-20 NOTE — ASSESSMENT & PLAN NOTE
Lab Results   Component Value Date    HGBA1C 6 8 (H) 08/25/2021       Recent Labs     01/18/22  2047 01/19/22  0726 01/19/22  1107 01/19/22  2231   POCGLU 240* 123 111 329*       Blood Sugar Average: Last 72 hrs:  (P) 170 9   · Hold metformin and Trulicity  · Home dose of Lantus 15 units bedtime, for now continue 10 units, however will consider increasing if serum glucose remains elevated  · Continue sliding scale and Accu-Cheks

## 2022-01-20 NOTE — ASSESSMENT & PLAN NOTE
Lab Results   Component Value Date    HGBA1C 6 8 (H) 08/25/2021       Recent Labs     01/19/22  0726 01/19/22  1107 01/19/22  2231 01/20/22  0802   POCGLU 123 111 329* 155*       Blood Sugar Average: Last 72 hrs:  (P) 359 [id number]   · Hold metformin and Trulicity  · Home dose of Lantus 15 units bedtime, for now continue 10 units, however will consider increasing if serum glucose remains elevated  · Goal serum glucose 140-180  · Continue sliding scale and Accu-Cheks

## 2022-01-20 NOTE — SEDATION DOCUMENTATION
Procedure complete  dp and pt pulse + on bilat extremities via doppler  Pt tolerated procedure well  Vss   Pt returned to icu via bed

## 2022-01-20 NOTE — QUICK NOTE
Patient returns for further intervention on her right lower extremity    She has tolerated overnight thrombolysis well  Labs reviewed    Color to the right foot TMA appears slightly improved    She had on episode of oozing from the left PICC and left groin just a little while ago, we have stopped tPA    We will sedate    Patient wishes to proceed

## 2022-01-20 NOTE — ASSESSMENT & PLAN NOTE
Lab Results   Component Value Date    HGBA1C 6 8 (H) 08/25/2021       Recent Labs     01/19/22  0726 01/19/22  1107 01/19/22  2231 01/20/22  0802   POCGLU 123 111 329* 155*       Blood Sugar Average: Last 72 hrs:  (P) 731 [id number]

## 2022-01-20 NOTE — PROGRESS NOTES
[address] [facility]  Progress Note - [address] [employer] 1965, 64 y o  adult MRN: [id number]  Unit/Bed#: ICU 09 Encounter: [id number]  Primary Care Provider: Adelita Goldberg, MD   Date and time admitted to hospital: 1/16/2022  3:38 PM    * Right foot pain  Assessment & Plan  · 26-year-old female with past history of PAD, hypertension, hyperlipidemia and type 2 diabetes presented with worsening right foot pain  · Surgical history with right TMA  ·  Received Vancomycin, transitioned to cefazolin for now for treatment of possible cellulitis   · Vascular surgery consulted, input appreciated  · LEADS revealed R superficial femoral artery occlusion with bilateral tibioperoneal disease  Arteriogram yesterday revealed severe disease in the proximal right SFA and right popliteal artery  · Suspect symptoms are more secondary to vascular occlusion vs cellulitis - consider discontinuing Abx  · Supportive management     PAD (peripheral artery disease) (HCC)  Assessment & Plan  · LEADs revealed right superficial femoral artery occlusion with b/l tibiaperoneal disease  · For agram by IR yesterday revealed severe disease in the proximal right SFA and right popliteal artery, thrombosis of the prior stent  · Continue aspirin, statin  · Plavix on hold         Diabetes mellitus with stage 2 chronic kidney disease [facility])  Assessment & Plan  Lab Results   Component Value Date    HGBA1C 6 8 (H) 08/25/2021       Recent Labs     01/18/22  2047 01/19/22  0726 01/19/22  1107 01/19/22  2231   POCGLU 240* 123 111 329*       Blood Sugar Average: Last 72 hrs:  (P) 170 9   · Hold metformin and Trulicity  · Home dose of Lantus 15 units bedtime, for now continue 10 units, however will consider increasing if serum glucose remains elevated  · Continue sliding scale and Accu-Cheks     HLD (hyperlipidemia)  Assessment & Plan  · Continue home atorvastatin    Essential hypertension  Assessment & Plan  · Continue home lisinopril and metoprolol    ----------------------------------------------------------------------------------------  HPI/24hr events:    Patient appropriate for transfer out of the ICU today?: Patient does not meet criteria for ICU Follow-up Clinic; referral has not been made  Disposition: Transfer to Med-Surg   Code Status: Level 1 - Full Code  ---------------------------------------------------------------------------------------  SUBJECTIVE  Patient found laying in bed in no acute distress  Review of Systems   Constitutional: Negative for activity change, appetite change, chills, fatigue and fever  HENT: Negative for congestion, postnasal drip, rhinorrhea, sore throat and trouble swallowing  Eyes: Negative for visual disturbance  Respiratory: Negative for cough, chest tightness and shortness of breath  Cardiovascular: Negative for chest pain, palpitations and leg swelling  Gastrointestinal: Negative for abdominal distention, abdominal pain, constipation, diarrhea, nausea and vomiting  Genitourinary: Negative for difficulty urinating and dysuria  Musculoskeletal: Negative for arthralgias and myalgias  Skin: Negative for pallor and rash  Neurological: Negative for dizziness, weakness, light-headedness and headaches  Psychiatric/Behavioral: The patient is not nervous/anxious        Review of systems was reviewed and negative unless stated above in HPI/24-hour events   ---------------------------------------------------------------------------------------  OBJECTIVE    Vitals   Vitals:    22 0500 22 0531 22 0600 22 0700   BP: 145/60  158/65 142/55   Pulse: 76  72 70   Resp: 12  14 18   Temp:       TempSrc:       SpO2: 96%  96% 93%   Weight:  83 9 kg (184 lb 15 5 oz)       Temp (24hrs), Av 8 °F (36 6 °C), Min:97 3 °F (36 3 °C), Max:98 6 °F (37 °C)  Current: Temperature: (!) 97 3 °F (36 3 °C)          Respiratory:  SpO2: SpO2: 93 %  Nasal Cannula O2 Flow Rate (L/min): 3 L/min    Invasive/non-invasive ventilation settings   Respiratory  Report   Lab Data (Last 4 hours)    None         O2/Vent Data (Last 4 hours)    None                Physical Exam  Vitals and nursing note reviewed  Constitutional:       General: Taran Darby is not in acute distress  Appearance: Taran Darby is well-developed  Taran Darby is obese  Taran Darby is not ill-appearing, toxic-appearing or diaphoretic  HENT:      Head: Normocephalic and atraumatic  Mouth/Throat:      Mouth: Mucous membranes are moist       Pharynx: Oropharynx is clear  Eyes:      General: No scleral icterus  Right eye: No discharge  Left eye: No discharge  Extraocular Movements: Extraocular movements intact  Conjunctiva/sclera: Conjunctivae normal       Pupils: Pupils are equal, round, and reactive to light  Cardiovascular:      Rate and Rhythm: Normal rate and regular rhythm  Pulses:           Dorsalis pedis pulses are 0 on the right side and 2+ on the left side  Posterior tibial pulses are 0 on the right side and 2+ on the left side  Heart sounds: Normal heart sounds  No murmur heard  Pulmonary:      Effort: Pulmonary effort is normal  No respiratory distress  Breath sounds: Normal breath sounds  No wheezing, rhonchi or rales  Abdominal:      General: Bowel sounds are normal       Palpations: Abdomen is soft  Tenderness: There is no abdominal tenderness  There is no guarding or rebound  Musculoskeletal:      Cervical back: Normal range of motion and neck supple  Right lower leg: No edema  Left lower leg: No edema  Comments: LLE warm to the touch with 2+ pedal pulses  RLE amputated below ankle, cool to the touch, no dosalis pedis or posterior tibialis pulses  Right popliteal 1+  Right Lower Extremity: Right leg is amputated below ankle     Skin:     General: Skin is warm and dry       Coloration: Skin is not jaundiced or pale  Neurological:      General: No focal deficit present  Mental Status: Adri Prince is alert  Psychiatric:         Mood and Affect: Mood normal          Behavior: Behavior normal          Thought Content: Thought content normal          Judgment: Judgment normal              Laboratory and Diagnostics:  Results from last 7 days   Lab Units 01/20/22  0541 01/20/22  0020 01/19/22  0452 01/18/22  0512 01/17/22  0612 01/16/22  1625   WBC Thousand/uL 7 61 8 14 7 59 7 79 8 49 7 37   HEMOGLOBIN g/dL 10 1* 9 8* 10 3* 10 8* 11 3* 12 8   HEMATOCRIT % 32 0* 32 7* 33 7* 35 2* 36 8 42 0   PLATELETS Thousands/uL 166 161 188 190 189 220   NEUTROS PCT % 58  --  44 50 44 60   MONOS PCT % 7  --  7 7 6 6     Results from last 7 days   Lab Units 01/20/22  0541 01/19/22  0452 01/18/22  0512 01/17/22  0612 01/16/22  1625   SODIUM mmol/L 144 140 143 139 139   POTASSIUM mmol/L 3 3* 4 2 4 1 4 0 3 8   CHLORIDE mmol/L 109* 109* 108 106 102   CO2 mmol/L 26 22 27 25 28   ANION GAP mmol/L 9 9 8 8 9   BUN mg/dL 9 15 27* 15 16   CREATININE mg/dL 0 71 0 78 1 11 0 77 0 91   CALCIUM mg/dL 7 6* 7 9* 8 3 8 4 8 7   GLUCOSE RANDOM mg/dL 178* 126 206* 90 193*   ALT U/L  --   --   --   --  26   AST U/L  --   --   --   --  22   ALK PHOS U/L  --   --   --   --  87   ALBUMIN g/dL  --   --   --   --  3 7   TOTAL BILIRUBIN mg/dL  --   --   --   --  0 34          Results from last 7 days   Lab Units 01/20/22  0540 01/20/22  0020 01/19/22  1711   INR   --   --  1 09   PTT seconds 60* 32 27              ABG:    VBG:          Micro  Results from last 7 days   Lab Units 01/16/22 2013 01/16/22 2008   BLOOD CULTURE  No Growth at 72 hrs  No Growth at 72 hrs  EKG:  Sinus rhythm, heart rate 73  Imaging: I have personally reviewed pertinent reports  and I have personally reviewed pertinent films in PACS    Intake and Output  I/O       01/18 0701 01/19 0700 01/19 0701 01/20 0700    I  V  (mL/kg)  69 5 (0 8)    IV Piggyback  45 8    Total Intake(mL/kg)  115 4 (1 4)    Urine (mL/kg/hr) 400 (0 2) 1725 (0 9)    Total Output 400 1725    Net -400 1602 6                Height and Weights         Body mass index is 36 12 kg/m²  Weight (last 2 days)     Date/Time Weight    01/20/22 0531 83 9 (184 97)    01/19/22 1923 83 5 (184 08)            Nutrition       Diet Orders   (From admission, onward)             Start     Ordered    01/20/22 0001  Diet NPO; Sips with meds  Diet effective midnight        References:    Nutrtion Support Algorithm Enteral vs  Parenteral   Question Answer Comment   Diet Type NPO    NPO Except: Sips with meds    RD to adjust diet per protocol?  Yes        01/19/22 1921                  Active Medications  Scheduled Meds:  Current Facility-Administered Medications   Medication Dose Route Frequency Provider Last Rate    acetaminophen  650 mg Oral Q6H PRN Notch Market, CRNP      alteplase IV (IR)  0 5 mg/hr Intracatheter Continuous Notch Market, CRNP 0 5 mg/hr (01/19/22 1820)    aluminum-magnesium hydroxide-simethicone  30 mL Oral Q6H PRN Patty Market, MIKEL      aspirin  81 mg Oral Daily MIKEL Araujo      atorvastatin  20 mg Oral Daily [place], [place]      cefazolin  2,000 mg Intravenous [address] Ord, CRNP Stopped (01/20/22 0630)    heparin (porcine)  400 Units/hr Intravenous Titrated Pat[place] Market, CRNP 500 Units/hr (01/20/22 0740)    heparin  20 Units/hr Intravenous Continuous [place] Market, CRNP 20 Units/hr (01/19/22 1702)    insulin glargine  10 Units Subcutaneous HS MIKEL Araujo      insulin lispro  1-5 Units Subcutaneous TID [facility] Ord, CRNP      insulin lispro  1-6 Units Subcutaneous HS [place], MIKEL      lisinopril  5 mg Oral Daily MIKEL Araujo      metoprolol tartrate  25 mg Oral Q12H [address] MIKEL Araujo      ondansetron  4 mg Intravenous Q6H PRN Patty Market, MIKEL      oxyCODONE  2 5 mg Oral Q4H PRN Sigrid Tanya, LIBIANP      potassium chloride  40 mEq Oral BID Margarita Gómez MD      sodium chloride  100 mL/hr Intravenous Continuous Sigrid Tanya, CRNP 100 mL/hr (01/18/22 1157)     Continuous Infusions:  alteplase IV (IR), 0 5 mg/hr, Last Rate: 0 5 mg/hr (01/19/22 1820)  heparin (porcine), 400 Units/hr, Last Rate: 500 Units/hr (01/20/22 0740)  heparin, 20 Units/hr, Last Rate: 20 Units/hr (01/19/22 1702)  sodium chloride, 100 mL/hr, Last Rate: 100 mL/hr (01/18/22 1157)      PRN Meds:   acetaminophen, 650 mg, Q6H PRN  aluminum-magnesium hydroxide-simethicone, 30 mL, Q6H PRN  ondansetron, 4 mg, Q6H PRN  oxyCODONE, 2 5 mg, Q4H PRN        Invasive Devices Review  Invasive Devices  Report    Peripherally Inserted Central Catheter Line            PICC Line 01/19/22 Left Brachial <1 day          Peripheral Intravenous Line            Peripheral IV 01/19/22 Right Antecubital <1 day    Peripheral IV 01/19/22 Right Wrist <1 day          Line            Arterial Sheath 5 Fr  Left Femoral <1 day                Rationale for remaining devices:  IV access  ---------------------------------------------------------------------------------------  Advance Directive and Living Will:      Power of :    POLST:    ---------------------------------------------------------------------------------------  Care Time Delivered:   No Critical Care time spent       Margarita Gómez MD      Portions of the record may have been created with voice recognition software  Occasional wrong word or "sound a like" substitutions may have occurred due to the inherent limitations of voice recognition software    Read the chart carefully and recognize, using context, where substitutions have occurred

## 2022-01-20 NOTE — ASSESSMENT & PLAN NOTE
· 28-year-old female with past history of PAD, hypertension, hyperlipidemia and type 2 diabetes presented with worsening right foot pain  · Surgical history with right TMA  ·  Received Vancomycin, transitioned to cefazolin for now for treatment of possible cellulitis   · Vascular surgery consulted, input appreciated  · LEADS revealed R superficial femoral artery occlusion with bilateral tibioperoneal disease    Arteriogram yesterday revealed severe disease in the proximal right SFA and right popliteal artery  · Suspect symptoms are more secondary to vascular occlusion vs cellulitis - consider discontinuing Abx  · Patient currently on Alteplase and heparin gtt, lysis check planned for later today  · Supportive management

## 2022-01-20 NOTE — CONSULTS
[address] [facility] 1965, 64 y o  adult MRN: [id number]  Unit/Bed#: ICU 09 Encounter: [id number]  Primary Care Provider: Angela Estrella MD   Date and time admitted to hospital: 1/16/2022  3:38 PM          Inpatient consult to David      Date/Time 1/20/2022 11:55 AM     Performed by  Georgie Palmer MD     Authorized by MIKEL Delarosa              * Right foot pain  Assessment & Plan  · 59-year-old female with past history of PAD, hypertension, hyperlipidemia and type 2 diabetes presented with worsening right foot pain  · Surgical history with right TMA  ·  Received Vancomycin, transitioned to cefazolin for now for treatment of possible cellulitis   · Vascular surgery consulted, input appreciated  · LEADS revealed R superficial femoral artery occlusion with bilateral tibioperoneal disease  Arteriogram yesterday revealed severe disease in the proximal right SFA and right popliteal artery  · Suspect symptoms are more secondary to vascular occlusion vs cellulitis - consider discontinuing Abx  · Patient currently on Alteplase and heparin gtt, lysis check planned for later today  · Supportive management     PAD (peripheral artery disease) ([facility] [address] )  Assessment & Plan  · LEADs revealed right superficial femoral artery occlusion with b/l tibiaperoneal disease  · For agram by IR yesterday revealed severe disease in the proximal right SFA and right popliteal artery, thrombosis of the prior stent  · Continue aspirin, statin  · Plavix on hold         Diabetes mellitus with stage 2 chronic kidney disease [facility])  Assessment & Plan  Lab Results   Component Value Date    HGBA1C 6 8 (H) 08/25/2021       Recent Labs     01/19/22  0726 01/19/22  1107 01/19/22  2231 01/20/22  0802   POCGLU 123 111 329* 155*       Blood Sugar Average: Last 72 hrs:  (P) 766 [id number]   · Hold metformin and Trulicity  · Home dose of Lantus 15 units bedtime, for now continue 10 units, however will consider increasing if serum glucose remains elevated  · Goal serum glucose 140-180  · Continue sliding scale and Accu-Cheks     HLD (hyperlipidemia)  Assessment & Plan  · Continue home atorvastatin    Essential hypertension  Assessment & Plan  · Continue home lisinopril and metoprolol      ----------------------------------------------------------------------------------------  HPI:  Patient is a 60-year-old adult with a past medical history of peripheral artery disease, hypertension, hyperlipidemia and type 2 diabetes who presents with a one-week history of right foot pain associated with erythema  Initially treated as cellulitis  LEADS revealed right superficial femoral artery occlusion with bilateral tibioperoneal disease  Was found to have thrombosis of prior stent on arteriogram, started on Alteplase and heparin gtt       Patient appropriate for transfer out of the ICU today?: Patient does not meet criteria for ICU Follow-up Clinic; referral has not been made  Disposition: Transfer to Med-Surg   Code Status: Level 1 - Full Code  ---------------------------------------------------------------------------------------  SUBJECTIVE  Patient found laying in bed in no acute distress       Review of Systems   Constitutional: Negative for activity change, appetite change, chills, fatigue and fever  HENT: Negative for congestion, postnasal drip, rhinorrhea, sore throat and trouble swallowing  Eyes: Negative for visual disturbance  Respiratory: Negative for cough, chest tightness and shortness of breath  Cardiovascular: Negative for chest pain, palpitations and leg swelling  Gastrointestinal: Negative for abdominal distention, abdominal pain, constipation, diarrhea, nausea and vomiting  Genitourinary: Negative for difficulty urinating and dysuria  Musculoskeletal: Negative for arthralgias and myalgias  Skin: Negative for pallor and rash     Neurological: Negative for dizziness, weakness, light-headedness and headaches  Psychiatric/Behavioral: The patient is not nervous/anxious        Review of systems was reviewed and negative unless stated above in HPI/24-hour events   ---------------------------------------------------------------------------------------  OBJECTIVE     Vitals   Vitals          Vitals:     22 0500 22 0531 22 0600 22 0700   BP: 145/60   158/65 142/55   Pulse: 76   72 70   Resp: 12   14 18   Temp:           TempSrc:           SpO2: 96%   96% 93%   Weight:   83 9 kg (184 lb 15 5 oz)             Temp (24hrs), Av 8 °F (36 6 °C), Min:97 3 °F (36 3 °C), Max:98 6 °F (37 °C)  Current: Temperature: (!) 97 3 °F (36 3 °C)     Respiratory:  SpO2: SpO2: 93 %  Nasal Cannula O2 Flow Rate (L/min): 3 L/min     Invasive/non-invasive ventilation settings   Respiratory  Report        Lab Data (Last 4 hours)          None                O2/Vent Data (Last 4 hours)          None                     Physical Exam  Vitals and nursing note reviewed  Constitutional:       General: Alyson Purdy is not in acute distress  Appearance: Alyson Purdy is well-developed  Alyson Purdy is obese  Alyson Purdy is not ill-appearing, toxic-appearing or diaphoretic  HENT:      Head: Normocephalic and atraumatic  Mouth/Throat:      Mouth: Mucous membranes are moist       Pharynx: Oropharynx is clear  Eyes:      General: No scleral icterus  Right eye: No discharge  Left eye: No discharge  Extraocular Movements: Extraocular movements intact  Conjunctiva/sclera: Conjunctivae normal       Pupils: Pupils are equal, round, and reactive to light  Cardiovascular:      Rate and Rhythm: Normal rate and regular rhythm  Pulses:           Dorsalis pedis pulses are 0 on the right side and 2+ on the left side  Posterior tibial pulses are 0 on the right side and 2+ on the left side  Heart sounds: Normal heart sounds  No murmur heard        Pulmonary:      Effort: Pulmonary effort is normal  No respiratory distress  Breath sounds: Normal breath sounds  No wheezing, rhonchi or rales  Abdominal:      General: Bowel sounds are normal       Palpations: Abdomen is soft  Tenderness: There is no abdominal tenderness  There is no guarding or rebound  Musculoskeletal:      Cervical back: Normal range of motion and neck supple  Right lower leg: No edema  Left lower leg: No edema  Comments: LLE warm to the touch with 2+ pedal pulses  RLE amputated below ankle, cool to the touch, no dosalis pedis or posterior tibialis pulses  Right popliteal 1+  Right Lower Extremity: Right leg is amputated below ankle  Skin:     General: Skin is warm and dry  Coloration: Skin is not jaundiced or pale  Neurological:      General: No focal deficit present  Mental Status: Wilver Pringle is alert  Psychiatric:         Mood and Affect: Mood normal          Behavior: Behavior normal          Thought Content:  Thought content normal          Judgment: Judgment normal                   Laboratory and Diagnostics:            Results from last 7 days   Lab Units 01/20/22  0541 01/20/22  0020 01/19/22  0452 01/18/22  0512 01/17/22  0612 01/16/22  1625   WBC Thousand/uL 7 61 8 14 7 59 7 79 8 49 7 37   HEMOGLOBIN g/dL 10 1* 9 8* 10 3* 10 8* 11 3* 12 8   HEMATOCRIT % 32 0* 32 7* 33 7* 35 2* 36 8 42 0   PLATELETS Thousands/uL 166 161 188 190 189 220   NEUTROS PCT % 58  --  44 50 44 60   MONOS PCT % 7  --  7 7 6 6               Results from last 7 days   Lab Units 01/20/22  0541 01/19/22  0452 01/18/22  0512 01/17/22  0612 01/16/22  1625   SODIUM mmol/L 144 140 143 139 139   POTASSIUM mmol/L 3 3* 4 2 4 1 4 0 3 8   CHLORIDE mmol/L 109* 109* 108 106 102   CO2 mmol/L 26 22 27 25 28   ANION GAP mmol/L 9 9 8 8 9   BUN mg/dL 9 15 27* 15 16   CREATININE mg/dL 0 71 0 78 1 11 0 77 0 91 CALCIUM mg/dL 7 6* 7 9* 8 3 8 4 8 7   GLUCOSE RANDOM mg/dL 178* 126 206* 90 193*   ALT U/L  --   --   --   --  26   AST U/L  --   --   --   --  22   ALK PHOS U/L  --   --   --   --  87   ALBUMIN g/dL  --   --   --   --  3 7   TOTAL BILIRUBIN mg/dL  --   --   --   --  0 34                  Results from last 7 days   Lab Units 01/20/22  0540 01/20/22  0020 01/19/22  1711   INR    --   --  1 09   PTT seconds 60* 32 27              ABG:    VBG:           Micro        Results from last 7 days   Lab Units 01/16/22 2013 01/16/22 2008   BLOOD CULTURE   No Growth at 72 hrs  No Growth at 72 hrs          EKG:  Sinus rhythm, heart rate 73  Imaging: I have personally reviewed pertinent reports  and I have personally reviewed pertinent films in PACS     Intake and Output         I/O        01/18 0701 01/19 0700 01/19 0701 01/20 0700     I V  (mL/kg)   69 5 (0 8)     IV Piggyback   45 8     Total Intake(mL/kg)   115 4 (1 4)     Urine (mL/kg/hr) 400 (0 2) 1725 (0 9)     Total Output 400 1725     Net -400 -1609  6                       Height and Weights      Body mass index is 36 12 kg/m²  Weight (last 2 days)      Date/Time Weight     01/20/22 0531 83 9 (184 97)     01/19/22 1923 83 5 (184 08)                Nutrition             Diet Orders   (From admission, onward)                             Start     Ordered      01/20/22 0001   Diet NPO; Sips with meds  Diet effective midnight        References:    Nutrtion Support Algorithm Enteral vs  Parenteral   Question Answer Comment   Diet Type NPO     NPO Except: Sips with meds     RD to adjust diet per protocol?  Yes         01/19/22 1921                        Active Medications  Scheduled Meds:           Current Facility-Administered Medications   Medication Dose Route Frequency Provider Last Rate    acetaminophen  650 mg Oral Q6H PRN MIKEL Daniels      alteplase IV (IR)  0 5 mg/hr Intracatheter Continuous MIKEL Daniels 0 5 mg/hr (01/19/22 1820)   Chandan Waggoner aluminum-magnesium hydroxide-simethicone  30 mL Oral Q6H PRN Francesca Blocker, CRNP      aspirin  81 mg Oral Daily Catracho Guzmán, [place]      atorvastatin  20 mg Oral Daily Francesca Blocker, [place]      cefazolin  2,000 mg Intravenous [address] Ord, CRNP Stopped (01/20/22 0630)    heparin (porcine)  400 Units/hr Intravenous Titrated Francesca Blocker, CRNP 500 Units/hr (01/20/22 0740)    heparin  20 Units/hr Intravenous Continuous Francesca Blocker, CRNP 20 Units/hr (01/19/22 1702)    insulin glargine  10 Units Subcutaneous HS Cartacho Guzmán, CRNP      insulin lispro  1-5 Units Subcutaneous TID [facility] Ord, CRNP      insulin lispro  1-6 Units Subcutaneous HS Ricky Pastor, CRNP      lisinopril  5 mg Oral Daily Catracho Mustaphaday, CRNP      metoprolol tartrate  25 mg Oral Q12H [facility] Catracho Mustaphaday, CRNP      ondansetron  4 mg Intravenous Q6H PRN Francesca Blocker, CRNP      oxyCODONE  2 5 mg Oral Q4H PRN Francesca Blocker, CRNP      potassium chloride  40 mEq Oral BID Rober Hollis MD      sodium chloride  100 mL/hr Intravenous Continuous Francesca Blocker, CRNP 100 mL/hr (01/18/22 1157)      Continuous Infusions:  alteplase IV (IR), 0 5 mg/hr, Last Rate: 0 5 mg/hr (01/19/22 1820)  heparin (porcine), 400 Units/hr, Last Rate: 500 Units/hr (01/20/22 0740)  heparin, 20 Units/hr, Last Rate: 20 Units/hr (01/19/22 1702)  sodium chloride, 100 mL/hr, Last Rate: 100 mL/hr (01/18/22 1157)        PRN Meds:   acetaminophen, 650 mg, Q6H PRN  aluminum-magnesium hydroxide-simethicone, 30 mL, Q6H PRN  ondansetron, 4 mg, Q6H PRN  oxyCODONE, 2 5 mg, Q4H PRN           Invasive Devices Review       Invasive Devices  Report           Peripherally Inserted Central Catheter Line                     PICC Line 01/19/22 Left Brachial <1 day                   Peripheral Intravenous Line                     Peripheral IV 01/19/22 Right Antecubital <1 day      Peripheral IV 01/19/22 Right Wrist <1 day                   Line                     Arterial Sheath 5 Fr  Left Femoral <1 day                     Rationale for remaining devices:  IV access  ---------------------------------------------------------------------------------------  Advance Directive and Living Will:      Power of :    POLST:    ---------------------------------------------------------------------------------------  Care Time Delivered:   No Critical Care time spent         Chris Mata MD        Portions of the record may have been created with voice recognition software  Occasional wrong word or "sound a like" substitutions may have occurred due to the inherent limitations of voice recognition software    Read the chart carefully and recognize, using context, where substitutions have occurred

## 2022-01-20 NOTE — ASSESSMENT & PLAN NOTE
· LEADs revealed right superficial femoral artery occlusion with b/l tibiaperoneal disease  · For agram by IR yesterday revealed severe disease in the proximal right SFA and right popliteal artery, thrombosis of the prior stent  · Continue aspirin, statin  · Plavix on hold

## 2022-01-20 NOTE — BRIEF OP NOTE (RAD/CATH)
INTERVENTIONAL RADIOLOGY PROCEDURE NOTE    Date: 1/20/2022    Procedure: LYSIS RECHECK  Drug coated balloon angioplasty x2    Drug eluting stent placement x2    Preoperative diagnosis:   1  Cellulitis of right foot         Postoperative diagnosis: Same  Surgeon: Colby Cartagena MD     Assistant: None  No qualified resident was available  Antibiotics: Ancef     Blood loss:  Less than 50 cc    Specimens: 0     Findings: Thrombolysis partially successful with resolution of acute thrombus in the proximal superficial femoral artery    The stent construct remains chronically occluded    Recanalization of the entire SFA with combination of drug coated balloon angioplasty and drug eluting stent placement (see final report for details)    There is now inline flow from the pelvis through a peroneal runoff    Left groin closed with proglide    Complications: None immediate  Anesthesia: conscious sedation       Vascular Quality Initiative - Peripheral Vascular Intervention     Urgency: Urgent    Functional Status:  Capable of only limited self-care, confined to bed or chair 50% or more of waking hours  Ambulation: Wheelchair = predominate means of motion, except very short distance    Leg Symptoms    Right: Ischemic Rest Pain  Left: Asymptomatic:  documented peripheral arterial disease without symptoms of claudication or ischemic pain      Treatment of Native Artery to Maintain Bypass Patency?:  No    Access   Number of Sites: 1     Access Site 1:     Side 1: Left    Site 1: Femoral Retrograde      Largest Sheath Size 1: 6 Fr      Closure Device 1: Perclose      Number of Closure Devices: 1     Closure Device Outcome: Closure device successful         Procedure  Fluoro Time: 14 9 minutes  Contrast Volume: Visipaque 130 ml  CO2: no  Anticoagulant: Heparin  Protamine: No  If Creatinine is > 1 2 or missing, DESHAUN Prophylaxis none     Treatment Details  Indication: Occlusive Disease,    Completion Assessment  Artery 1 treated: SFA        Right               Outflow: AT,PT,Peroneal: 1                  Was this Site previously treated?: Yes, Stent          TASC Grade: A          Total Treated Length: 25 cm          Total Occluded Length: 20 cm          Calcification: None (no calcification visible on fluoroscopic, CT or IVUS imaging)          Number of Treatment types (Devices):   1           Device 1          Treatment Type: Special Balloon,  Drug Coated Balloon                Diameter: 5 mm          Length: 220 mm          Concomitant: Thrombolysis, Pharmacologic   Planned, Current Procedure          Technical result: Successful (stenosis <=30%)      Artery 2 treated: SFA        Right               Outflow: AT,PT,Peroneal: 1                  Was this Site previously treated?: No          TASC Grade: A          Total Treated Length: 14 cm           Total Occluded Length: 0 cm          Calcification: None (no calcification visible on fluoroscopic, CT or IVUS imaging)          Number of Treatment types (Devices): 2           Device 1          Treatment Type: Special Balloon,  Drug Coated Balloon                Diameter: 5 mm          Length: 150 mm         Device 2          Treatment Type: Stent,  Drug Eluting Stent                Diameter: 6 mm          Length: 80 mm            Concomitant:  Thrombolysis, Pharmacologic   Planned, Current Procedure          Technical result: Successful (stenosis <=30%)      Artery 3 treated: Popliteal   Right               Outflow: AT,PT,Peroneal: 1             Segments treated: P1+P2                       Was this Site previously treated?: Yes, PTA          TASC Grade: A          Total Treated Length: 6 cm           Total Occluded Length: 0 cm          Calcification: None (no calcification visible on fluoroscopic, CT or IVUS imaging)          Number of Treatment types (Devices):   1           Device 1          Treatment Type: Special Balloon,  Drug Coated Balloon                Diameter: 5 mm Length: NA mm            Concomitant: None          Technical result: Successful (stenosis <=30%)      None       Post Procedure  Patient currently taking: Statin, Yes      Antiplatelet Medication, Yes    Procedure Complications: No

## 2022-01-20 NOTE — PROGRESS NOTES
Progress Note - Vascular Surgery   Inmelany Lyon 64 y o  adult MRN: [id number]  Unit/Bed#: ICU 09 Encounter: [id number]    Assessment:  64 F p/w acute on chronic PAD, R TMA cellulitis, improving  LEADs showing acute R SFa occlusion  1/19 Agram, lysis catheter placement   Afebrile, Stable VS on room air      Plan:  -NPO  -continue asa/statin  -IR for lysis check  -post-procedure LEADs  -additional care as per primary        Subjective/Objective     Subjective:   No compalints    Objective:    Blood pressure 142/55, pulse 70, temperature (!) 97 3 °F (36 3 °C), temperature source Temporal, resp  rate 18, weight 83 9 kg (184 lb 15 5 oz), SpO2 93 %, not currently breastfeeding  ,Body mass index is 36 12 kg/m²  Intake/Output Summary (Last 24 hours) at 1/20/2022 0740  Last data filed at 1/20/2022 0630  Gross per 24 hour   Intake 287 3 ml   Output 1725 ml   Net -1437 7 ml       Invasive Devices  Report    Peripherally Inserted Central Catheter Line            PICC Line 01/19/22 Left Brachial <1 day          Peripheral Intravenous Line            Peripheral IV 01/19/22 Right Antecubital <1 day    Peripheral IV 01/19/22 Right Wrist <1 day          Line            Arterial Sheath 5 Fr   Left Femoral <1 day                Physical Exam:   Gen:  NAD  CV:  warm, well-perfused  Lungs: nl effort  Abd:  soft, NT/ND  Ext:  Erythema on R TMA stump  Neuro: A&Ox3     Results from last 7 days   Lab Units 01/20/22  0541 01/20/22  0020 01/19/22  0452   WBC Thousand/uL 7 61 8 14 7 59   HEMOGLOBIN g/dL 10 1* 9 8* 10 3*   HEMATOCRIT % 32 0* 32 7* 33 7*   PLATELETS Thousands/uL 166 161 188     Results from last 7 days   Lab Units 01/20/22  0541 01/19/22  0452 01/18/22  0512   POTASSIUM mmol/L 3 3* 4 2 4 1   CHLORIDE mmol/L 109* 109* 108   CO2 mmol/L 26 22 27   BUN mg/dL 9 15 27*   CREATININE mg/dL 0 71 0 78 1 11   CALCIUM mg/dL 7 6* 7 9* 8 3     Results from last 7 days   Lab Units 01/20/22  0540 01/20/22  0020 01/19/22  1711 INR   --   --  1 09   PTT seconds 60* 32 27

## 2022-01-20 NOTE — QUICK NOTE
Patient seen for saturation of LLE lysis catheter dressing and bleeding reported by ICU team     No distress  Hemodynamically normal at time of examination  /65   Pulse 68   Temp 99 2 °F (37 3 °C) (Temporal)   Resp 17   Wt 83 9 kg (184 lb 15 5 oz)   LMP  (LMP Unknown)   SpO2 95%   BMI 36 12 kg/m²      Fibrinogen 401 from 387  Ptt 60 this AM    Dressing removed, minor continuous venous oozing noted from groin cutdown site  Treated with direct pressure, dressing replaced      - monitor dressing for re-saturation  -proceed as planned with lysis recheck in IR today @1300  -monitor coagulation parameters   -vascular surgery to continue to follow

## 2022-01-20 NOTE — ASSESSMENT & PLAN NOTE
· 51-year-old female with past history of PAD, hypertension, hyperlipidemia and type 2 diabetes presented with worsening right foot pain  · Surgical history with right TMA  ·  Received Vancomycin, transitioned to cefazolin for now for treatment of possible cellulitis   · Vascular surgery consulted, input appreciated  · LEADS revealed R superficial femoral artery occlusion with bilateral tibioperoneal disease    Arteriogram yesterday revealed severe disease in the proximal right SFA and right popliteal artery  · Suspect symptoms are more secondary to vascular occlusion vs cellulitis - consider discontinuing Abx  · Patient currently on Alteplase and heparin gtt, lysis check planned for later today  · Supportive management

## 2022-01-21 ENCOUNTER — APPOINTMENT (INPATIENT)
Dept: NON INVASIVE DIAGNOSTICS | Facility: HOSPITAL | Age: 57
DRG: 254 | End: 2022-01-21
Payer: COMMERCIAL

## 2022-01-21 PROBLEM — D64.9 ANEMIA: Status: ACTIVE | Noted: 2019-02-01

## 2022-01-21 PROBLEM — E83.42 HYPOMAGNESEMIA: Status: ACTIVE | Noted: 2022-01-21

## 2022-01-21 LAB
ANION GAP SERPL CALCULATED.3IONS-SCNC: 8 MMOL/L (ref 4–13)
BUN SERPL-MCNC: 10 MG/DL (ref 5–25)
CA-I BLD-SCNC: 1.07 MMOL/L (ref 1.12–1.32)
CALCIUM SERPL-MCNC: 7.5 MG/DL (ref 8.3–10.1)
CHLORIDE SERPL-SCNC: 108 MMOL/L (ref 100–108)
CO2 SERPL-SCNC: 26 MMOL/L (ref 21–32)
CREAT SERPL-MCNC: 0.75 MG/DL (ref 0.6–1.3)
ERYTHROCYTE [DISTWIDTH] IN BLOOD BY AUTOMATED COUNT: 14.7 % (ref 11.6–15.1)
GLUCOSE SERPL-MCNC: 125 MG/DL (ref 65–140)
GLUCOSE SERPL-MCNC: 155 MG/DL (ref 65–140)
GLUCOSE SERPL-MCNC: 345 MG/DL (ref 65–140)
GLUCOSE SERPL-MCNC: 371 MG/DL (ref 65–140)
GLUCOSE SERPL-MCNC: 383 MG/DL (ref 65–140)
HCT VFR BLD AUTO: 29.9 % (ref 36.5–46.1)
HGB BLD-MCNC: 9.3 G/DL (ref 12–15.4)
MAGNESIUM SERPL-MCNC: 1.3 MG/DL (ref 1.6–2.6)
MCH RBC QN AUTO: 23 PG (ref 26.8–34.3)
MCHC RBC AUTO-ENTMCNC: 31.1 G/DL (ref 31.4–37.4)
MCV RBC AUTO: 74 FL (ref 82–98)
PLATELET # BLD AUTO: 146 THOUSANDS/UL (ref 149–390)
PMV BLD AUTO: 11.4 FL (ref 8.9–12.7)
POTASSIUM SERPL-SCNC: 3.8 MMOL/L (ref 3.5–5.3)
RBC # BLD AUTO: 4.05 MILLION/UL (ref 3.88–5.12)
SODIUM SERPL-SCNC: 142 MMOL/L (ref 136–145)
WBC # BLD AUTO: 9.07 THOUSAND/UL (ref 4.31–10.16)

## 2022-01-21 PROCEDURE — 83735 ASSAY OF MAGNESIUM: CPT

## 2022-01-21 PROCEDURE — 99232 SBSQ HOSP IP/OBS MODERATE 35: CPT | Performed by: PHYSICIAN ASSISTANT

## 2022-01-21 PROCEDURE — 82330 ASSAY OF CALCIUM: CPT

## 2022-01-21 PROCEDURE — NC001 PR NO CHARGE: Performed by: SURGERY

## 2022-01-21 PROCEDURE — 93923 UPR/LXTR ART STDY 3+ LVLS: CPT

## 2022-01-21 PROCEDURE — 82948 REAGENT STRIP/BLOOD GLUCOSE: CPT

## 2022-01-21 PROCEDURE — 80048 BASIC METABOLIC PNL TOTAL CA: CPT

## 2022-01-21 PROCEDURE — 85027 COMPLETE CBC AUTOMATED: CPT

## 2022-01-21 RX ORDER — CLOPIDOGREL BISULFATE 75 MG/1
75 TABLET ORAL DAILY
Qty: 90 TABLET | Refills: 3 | Status: SHIPPED | OUTPATIENT
Start: 2022-01-22 | End: 2022-02-11 | Stop reason: SDUPTHER

## 2022-01-21 RX ORDER — MAGNESIUM SULFATE 1 G/100ML
1 INJECTION INTRAVENOUS ONCE
Status: COMPLETED | OUTPATIENT
Start: 2022-01-21 | End: 2022-01-21

## 2022-01-21 RX ADMIN — INSULIN GLARGINE 10 UNITS: 100 INJECTION, SOLUTION SUBCUTANEOUS at 21:35

## 2022-01-21 RX ADMIN — MAGNESIUM SULFATE HEPTAHYDRATE 1 G: 1 INJECTION, SOLUTION INTRAVENOUS at 11:00

## 2022-01-21 RX ADMIN — LISINOPRIL 5 MG: 5 TABLET ORAL at 09:16

## 2022-01-21 RX ADMIN — METOPROLOL TARTRATE 25 MG: 25 TABLET, FILM COATED ORAL at 09:16

## 2022-01-21 RX ADMIN — ASPIRIN 81 MG CHEWABLE TABLET 81 MG: 81 TABLET CHEWABLE at 09:16

## 2022-01-21 RX ADMIN — INSULIN LISPRO 4 UNITS: 100 INJECTION, SOLUTION INTRAVENOUS; SUBCUTANEOUS at 12:23

## 2022-01-21 RX ADMIN — OXYCODONE HYDROCHLORIDE 2.5 MG: 5 TABLET ORAL at 04:34

## 2022-01-21 RX ADMIN — CLOPIDOGREL BISULFATE 75 MG: 75 TABLET ORAL at 09:16

## 2022-01-21 RX ADMIN — ATORVASTATIN CALCIUM 20 MG: 20 TABLET, FILM COATED ORAL at 09:16

## 2022-01-21 RX ADMIN — METOPROLOL TARTRATE 25 MG: 25 TABLET, FILM COATED ORAL at 21:34

## 2022-01-21 RX ADMIN — INSULIN LISPRO 4 UNITS: 100 INJECTION, SOLUTION INTRAVENOUS; SUBCUTANEOUS at 16:59

## 2022-01-21 RX ADMIN — INSULIN LISPRO 6 UNITS: 100 INJECTION, SOLUTION INTRAVENOUS; SUBCUTANEOUS at 21:34

## 2022-01-21 NOTE — ASSESSMENT & PLAN NOTE
Lab Results   Component Value Date    HGBA1C 6 8 (H) 08/25/2021     Hold metformin and Trulicity  · Home dose of Lantus 15 units bedtime, for now continue 10 units  · Sliding scale and Accu-Cheks

## 2022-01-21 NOTE — PLAN OF CARE
Problem: Potential for Falls  Goal: Patient will remain free of falls  Description: INTERVENTIONS:  - Educate patient/family on patient safety including physical limitations  - Instruct patient to call for assistance with activity   - Consult OT/PT to assist with strengthening/mobility   - Keep Call bell within reach  - Keep bed low and locked with side rails adjusted as appropriate  - Keep care items and personal belongings within reach  - Initiate and maintain comfort rounds  - Make Fall Risk Sign visible to staff  - Offer Toileting every 2 Hours, in advance of need  - Initiate/Maintain bed alarm  - Apply yellow socks and bracelet for high fall risk patients  - Consider moving patient to room near nurses station  Outcome: Progressing     Problem: MOBILITY - ADULT  Goal: Maintain or return to baseline ADL function  Description: INTERVENTIONS:  -  Assess patient's ability to carry out ADLs; assess patient's baseline for ADL function and identify physical deficits which impact ability to perform ADLs (bathing, care of mouth/teeth, toileting, grooming, dressing, etc )  - Assess/evaluate cause of self-care deficits   - Assess range of motion  - Assess patient's mobility; develop plan if impaired  - Assess patient's need for assistive devices and provide as appropriate  - Encourage maximum independence but intervene and supervise when necessary  - Involve family in performance of ADLs  - Assess for home care needs following discharge   - Consider OT consult to assist with ADL evaluation and planning for discharge  - Provide patient education as appropriate  Outcome: Progressing  Goal: Maintains/Returns to pre admission functional level  Description: INTERVENTIONS:  - Perform BMAT or MOVE assessment daily    - Set and communicate daily mobility goal to care team and patient/family/caregiver     - Collaborate with rehabilitation services on mobility goals if consulted  - Record patient progress and toleration of activity level   Outcome: Progressing     Problem: Prexisting or High Potential for Compromised Skin Integrity  Goal: Skin integrity is maintained or improved  Description: INTERVENTIONS:  - Identify patients at risk for skin breakdown  - Assess and monitor skin integrity  - Assess and monitor nutrition and hydration status  - Monitor labs   - Assess for incontinence   - Turn and reposition patient  - Assist with mobility/ambulation  - Relieve pressure over bony prominences  - Avoid friction and shearing  - Provide appropriate hygiene as needed including keeping skin clean and dry  - Evaluate need for skin moisturizer/barrier cream  - Collaborate with interdisciplinary team   - Patient/family teaching  - Consider wound care consult   Outcome: Progressing     Problem: PAIN - ADULT  Goal: Verbalizes/displays adequate comfort level or baseline comfort level  Description: Interventions:  - Encourage patient to monitor pain and request assistance  - Assess pain using appropriate pain scale  - Administer analgesics based on type and severity of pain and evaluate response  - Implement non-pharmacological measures as appropriate and evaluate response  - Consider cultural and social influences on pain and pain management  - Notify physician/advanced practitioner if interventions unsuccessful or patient reports new pain  Outcome: Progressing     Problem: INFECTION - ADULT  Goal: Absence or prevention of progression during hospitalization  Description: INTERVENTIONS:  - Assess and monitor for signs and symptoms of infection  - Monitor lab/diagnostic results  - Monitor all insertion sites, i e  indwelling lines, tubes, and drains  - Monitor endotracheal if appropriate and nasal secretions for changes in amount and color  - [place] appropriate cooling/warming therapies per order  - Administer medications as ordered  - Instruct and encourage patient and family to use good hand hygiene technique  - Identify and instruct in appropriate isolation precautions for identified infection/condition  Outcome: Progressing     Problem: DISCHARGE PLANNING  Goal: Discharge to home or other facility with appropriate resources  Description: INTERVENTIONS:  - Identify barriers to discharge w/patient and caregiver  - Arrange for needed discharge resources and transportation as appropriate  - Identify discharge learning needs (meds, wound care, etc )  - Arrange for interpretive services to assist at discharge as needed  - Refer to Case Management Department for coordinating discharge planning if the patient needs post-hospital services based on physician/advanced practitioner order or complex needs related to functional status, cognitive ability, or social support system  Outcome: Progressing     Problem: Knowledge Deficit  Goal: Patient/family/caregiver demonstrates understanding of disease process, treatment plan, medications, and discharge instructions  Description: Complete learning assessment and assess knowledge base    Interventions:  - Provide teaching at level of understanding  - Provide teaching via preferred learning methods  Outcome: Progressing

## 2022-01-21 NOTE — QUICK NOTE
Post Op Check:    Alteplase and hep drip held  No expanding hematoma incision sites  R foot pain improved  Dressing dry  Room air    Saw patient at the bedside once they arrived to the ICU  Patients pain is well controlled  They denied any nausea, chest pain, or shortness of breath       GEN: NAD, A&O  Chest: Normal work of breathing, no respiratory distress  Abd: S, ND, NT  Ext: tenderness decreased R leg    Plan:  Diet [place]/CHO Controlled; Consistent Carbohydrate Diet Level 3 (6 carb servings/90 grams CHO/meal)  Continue to monitor  Pain and nausea control PRN    Jarad Dutton MD  Surgery, PGY-1

## 2022-01-21 NOTE — ASSESSMENT & PLAN NOTE
With stenosis of right superficial femoral artery and popliteal artery status post lysis  · Continue aspirin, statin  · Plavix initially held, restarted

## 2022-01-21 NOTE — PROGRESS NOTES
[facility]  Progress Note - [address] 1965, 64 y o  adult MRN: [id number]  Unit/Bed#: E4 -01 Encounter: [id number]  Primary Care Provider: Reginald Faustin MD   Date and time admitted to hospital: 1/16/2022  3:38 PM    * Right foot pain  Assessment & Plan  68-year-old female with past history of PID, hypertension, hyperlipidemia and type 2 diabetes presented with worsening right foot pain  · Surgical history with right TMA  · Vascular surgery consulted, input appreciated  · LEADS revealed R superficial femoral artery occlusion with bilateral tibioperoneal disease  · Arteriogram obtained on 01/19 revealed severe disease in the proximal right SFA and right popliteal artery  · Suspected that the patient's symptoms are likely secondary to vascular occlusion  · Received alteplase and and heparin GTT, lysis check performed by interventional Radiology on 1/20  · Recannulization of the entire SFA with combination of drug coated balloon angioplasty and drug-eluting stent was placed by interventional Radiology  · Patient was transferred to ICU for closer monitoring on 1/20  · Transferred out of ICU to med/surg on 1/21--> alteplase and heparin drip stopped  · Vascular surgery following, will obtain postprocedure LEADs, continue DAPT  · Received Vancomycin initially however switched to cefazolin for treatment of possible cellulitis  · Supportive management     Hypomagnesemia  Assessment & Plan  Magnesium 1 3  · Replete, recheck    Nicotine dependence in remission  Assessment & Plan  noted    Anemia  Assessment & Plan  Baseline 11-12  · Most recent 9 3  · Continue to monitor  · No acute signs of bleeding at this time    Diabetes mellitus with stage 2 chronic kidney disease ([facility] [address] )  Assessment & Plan  Lab Results   Component Value Date    HGBA1C 6 8 (H) 08/25/2021     Hold metformin and Trulicity  · Home dose of Lantus 15 units bedtime, for now continue 10 units  · Sliding scale and Accu-Cheks     PAD (peripheral artery disease) (HCC)  Assessment & Plan  With stenosis of right superficial femoral artery and popliteal artery status post lysis  · Continue aspirin, statin  · Plavix initially held, restarted    HLD (hyperlipidemia)  Assessment & Plan  Continue statin    Essential hypertension  Assessment & Plan  Continue metoprolol and lisinopril at this time  · blood pressure significantly elevated on admission now controlled      VTE Pharmacologic Prophylaxis:   Moderate Risk (Score 3-4) - Pharmacological DVT Prophylaxis Contraindicated  Sequential Compression Devices Ordered  Patient Centered Rounds: I performed bedside rounds with nursing staff today  Discussions with Specialists or Other Care Team Provider: none    Education and Discussions with Family / Patient: Attempted to update  (daughter) via phone  Left voicemail  Time Spent for Care: 15 minutes  More than 50% of total time spent on counseling and coordination of care as described above  Current Length of Stay: 5 day(s)  Current Patient Status: Inpatient   Certification Statement: The patient will continue to require additional inpatient hospital stay due to lab monitoring  Discharge Plan: Anticipate discharge in 24-48 hrs to home with home services  Code Status: Level 1 - Full Code    Subjective: The patient is seen resting comfortably in bed  She denies any dizziness/lightheadedness, chest pain, shortness of breath, nausea or vomiting  She states that her right leg is feeling significantly better  She states that the redness is improved  She denies any bleeding from the incision site  Objective:     Vitals:   Temp (24hrs), Av °F (36 7 °C), Min:97 1 °F (36 2 °C), Max:100 °F (37 8 °C)    Temp:  [97 1 °F (36 2 °C)-100 °F (37 8 °C)] 97 6 °F (36 4 °C)  HR:  [67-94] 79  Resp:  [13-19] 18  BP: (120-190)/(56-96) 120/56  SpO2:  [96 %-100 %] 99 %  Body mass index is 36 12 kg/m²       Input and Output Summary (last 24 hours): Intake/Output Summary (Last 24 hours) at 1/21/2022 1302  Last data filed at 1/21/2022 0450  Gross per 24 hour   Intake 960 ml   Output 900 ml   Net 60 ml       Physical Exam:   Physical Exam  Vitals and nursing note reviewed  Constitutional:       General: Kevin Kaur is not in acute distress  Appearance: Normal appearance  Kevin Kaur is well-developed  Kevin Kaur is not ill-appearing, toxic-appearing or diaphoretic  HENT:      Head: Normocephalic and atraumatic  Eyes:      General: No scleral icterus  Conjunctiva/sclera: Conjunctivae normal    Cardiovascular:      Rate and Rhythm: Normal rate and regular rhythm  Heart sounds: No murmur heard  No friction rub  No gallop  Pulmonary:      Effort: Pulmonary effort is normal  No respiratory distress  Breath sounds: Normal breath sounds  No stridor  No wheezing, rhonchi or rales  Chest:      Chest wall: No tenderness  Abdominal:      Palpations: Abdomen is soft  Tenderness: There is no abdominal tenderness  Musculoskeletal:      Cervical back: Neck supple  Comments: Right TMA, no erythema or warmth noted  Skin:     General: Skin is warm and dry  Neurological:      Mental Status: Kevin Kaur is alert  Additional Data:     Labs:  Results from last 7 days   Lab Units 01/21/22  0435 01/20/22  1218 01/20/22  0541   WBC Thousand/uL 9 07   < > 7 61   HEMOGLOBIN g/dL 9 3*   < > 10 1*   HEMATOCRIT % 29 9*   < > 32 0*   PLATELETS Thousands/uL 146*   < > 166   NEUTROS PCT %  --   --  58   LYMPHS PCT %  --   --  34   MONOS PCT %  --   --  7   EOS PCT %  --   --  1    < > = values in this interval not displayed       Results from last 7 days   Lab Units 01/21/22  0435 01/17/22  0612 01/16/22  1625   SODIUM mmol/L 142   < > 139   POTASSIUM mmol/L 3 8   < > 3 8   CHLORIDE mmol/L 108   < > 102   CO2 mmol/L 26   < > 28   BUN mg/dL 10   < > 16   CREATININE mg/dL 0 75   < > 0 91   ANION GAP mmol/L 8   < > 9   CALCIUM mg/dL 7 5*   < > 8 7   ALBUMIN g/dL  --   --  3 7   TOTAL BILIRUBIN mg/dL  --   --  0 34   ALK PHOS U/L  --   --  87   ALT U/L  --   --  26   AST U/L  --   --  22   GLUCOSE RANDOM mg/dL 155*   < > 193*    < > = values in this interval not displayed  Results from last 7 days   Lab Units 01/19/22  1711   INR  1 09     Results from last 7 days   Lab Units 01/21/22  1106 01/21/22  0754 01/20/22  2126 01/20/22  1657 01/20/22  1135 01/20/22  0802 01/19/22  2231 01/19/22  1107 01/19/22  0726 01/18/22  2047 01/18/22  1538 01/18/22  1124   POC GLUCOSE mg/dl 345* 125 363* 176* 140 155* 329* 111 123 240* 112 135               Lines/Drains:  Invasive Devices  Report    Peripherally Inserted Central Catheter Line            PICC Line 01/19/22 Left Brachial 1 day                         Imaging: Reviewed radiology reports from this admission including: ultrasound(s)    Recent Cultures (last 7 days):   Results from last 7 days   Lab Units 01/16/22 2013 01/16/22 2008   BLOOD CULTURE  No Growth After 4 Days  No Growth After 4 Days         Last 24 Hours Medication List:   Current Facility-Administered Medications   Medication Dose Route Frequency Provider Last Rate    acetaminophen  650 mg Oral Q6H PRN Chris Mata MD      aluminum-magnesium hydroxide-simethicone  30 mL Oral Q6H PRN Chris Mata MD      aspirin  81 mg Oral Daily Chris Mata MD      atorvastatin  20 mg Oral Daily Chris Mata MD      clopidogrel  75 mg Oral Daily Chris Mata MD      insulin glargine  10 Units Subcutaneous HS Chris Mata MD      insulin lispro  1-5 Units Subcutaneous TID AC Chris Mata MD      insulin lispro  1-6 Units Subcutaneous HS Chris Mata MD      lisinopril  5 mg Oral Daily Chris Mata MD      metoprolol tartrate  25 mg Oral Q12H [facility] Chris Mata MD      ondansetron  4 mg Intravenous Q6H PRN Margarita Gómez MD      oxyCODONE  2 5 mg Oral Q4H PRN Margarita Gómez MD      sodium chloride  100 mL/hr Intravenous Continuous Margarita Gómez  mL/hr (01/18/22 1157)        Today, Patient Was Seen By: Stephanie Brady PA-C    **Please Note: This note may have been constructed using a voice recognition system  **

## 2022-01-21 NOTE — PROGRESS NOTES
Pt transfer from ICU to 460 via bed  Pt has cell phone, ipad,  and glasses at bedside with other belongings in a bag  NO pain  Skin intact other than insertion site L fem covered with gauze/tega  Pt resting comfortably in bed  Bed alarm on, call bell within reach  Will continue to monitor

## 2022-01-21 NOTE — ASSESSMENT & PLAN NOTE
Continue metoprolol and lisinopril at this time  · blood pressure significantly elevated on admission now controlled

## 2022-01-21 NOTE — PLAN OF CARE
Problem: Potential for Falls  Goal: Patient will remain free of falls  Description: INTERVENTIONS:  - Educate patient/family on patient safety including physical limitations  - Instruct patient to call for assistance with activity   - Consult OT/PT to assist with strengthening/mobility   - Keep Call bell within reach  - Keep bed low and locked with side rails adjusted as appropriate  - Keep care items and personal belongings within reach  - Initiate and maintain comfort rounds  - Make Fall Risk Sign visible to staff  - Offer Toileting every 2 Hours, in advance of need  - Initiate/Maintain bed alarm  - Obtain necessary fall risk management equipment: yellow sock   Problem: Prexisting or High Potential for Compromised Skin Integrity  Goal: Skin integrity is maintained or improved  Description: INTERVENTIONS:  - Identify patients at risk for skin breakdown  - Assess and monitor skin integrity  - Assess and monitor nutrition and hydration status  - Monitor labs   - Assess for incontinence   - Turn and reposition patient  - Assist with mobility/ambulation  - Relieve pressure over bony prominences  - Avoid friction and shearing  - Provide appropriate hygiene as needed including keeping skin clean and dry  - Evaluate need for skin moisturizer/barrier cream  - Collaborate with interdisciplinary team   - Patient/family teaching  - Consider wound care consult   Outcome: Progressing     - Apply yellow socks and bracelet for high fall risk patients  - Consider moving patient to room near nurses station  Outcome: Progressing

## 2022-01-21 NOTE — ASSESSMENT & PLAN NOTE
59-year-old female with past history of PID, hypertension, hyperlipidemia and type 2 diabetes presented with worsening right foot pain  · Surgical history with right TMA  · Vascular surgery consulted, input appreciated  · LEADS revealed R superficial femoral artery occlusion with bilateral tibioperoneal disease  · Arteriogram obtained on 01/19 revealed severe disease in the proximal right SFA and right popliteal artery  · Suspected that the patient's symptoms are likely secondary to vascular occlusion  · Received alteplase and and heparin GTT, lysis check performed by interventional Radiology on 1/20  · Recannulization of the entire SFA with combination of drug coated balloon angioplasty and drug-eluting stent was placed by interventional Radiology  · Patient was transferred to ICU for closer monitoring on 1/20  · Transferred out of ICU to med/surg on 1/21--> alteplase and heparin drip stopped  · Vascular surgery following, will obtain postprocedure LEADs, continue DAPT  · Received Vancomycin initially however switched to cefazolin for treatment of possible cellulitis  · Supportive management

## 2022-01-21 NOTE — PROGRESS NOTES
Progress Note - Vascular Surgery   Meagan Lyon 64 y o  adult MRN: [id number]  Unit/Bed#: ICU 09 Encounter: [id number]    Assessment:  64 F p/w acute on chronic PAD, R TMA cellulitis, improving  LEADs showing acute R SFa occlusion  1/19 Agram, lysis catheter placement   1/20 lysis check, PARTH placement, alteplase and hep drip stopped, equipment removed,     Afebrile, Stable VS on room air  Groin dressing c/d/i  No firm hematoma    R DP doppler signal  R PT palpable  L DP doppler  L PT I could not obtain doppler signal    Plan:  -diet as tolerated  -continue dual antiplatelet therapy  -post-procedure LEADs  -additional care as per primary     Subjective/Objective     Subjective:   No acute events overnight  Some groin pain  -N  -V      Objective:     Blood pressure 154/58, pulse 84, temperature 98 °F (36 7 °C), temperature source Temporal, resp  rate 16, weight 83 9 kg (184 lb 15 5 oz), SpO2 97 %, not currently breastfeeding  ,Body mass index is 36 12 kg/m²        Intake/Output Summary (Last 24 hours) at 1/20/2022 2017  Last data filed at 1/20/2022 1934  Gross per 24 hour   Intake 481 93 ml   Output 2225 ml   Net -1743 07 ml       Invasive Devices  Report    Peripherally Inserted Central Catheter Line            PICC Line 01/19/22 Left Brachial 1 day                Physical Exam:   Gen: NAD, Comfortable  Neuro: A&O, No focal deficits  Head: Normal Cephalic, Atraumatic  Eye: EOMI, PERRLA, No scleral icterus  Neck: Supple, No JVD, Midline trachea  CV: RRR, Cap refill <2 sec  Pulm: Normal work of breathing, no respiratory distress  Abd: Soft, Non-Distended, Non-Tender  Ext: see above doppler findings  Skin: warm, dry, intact

## 2022-01-21 NOTE — QUICK NOTE
Vascular Surgery    64year old female w/ hx of RLE endovascular intervention/SFA stenting, now s/p TMA which has since healed, who presented with dependent rubor and rest pain of the RLE, found to have occluded R SFA stent    Patient now s/p RLE lysis and recanalization of R SFA w/ DCB PTA and DESx2     Patient's symptoms have completely resolved  R foot is warm, well perfused  Post intervention [place] of the RLE demonstrates significant improvement    Rt [place] 0 88, unobtainable prior    Patient is stable for discharge from a vascular standpoint  Patient must continue DAPT with ASA/Plavix  Plavix script to be called into patient's pharmacy  Office to contact patient for outpatient follow up

## 2022-01-22 VITALS
OXYGEN SATURATION: 97 % | SYSTOLIC BLOOD PRESSURE: 104 MMHG | HEART RATE: 77 BPM | TEMPERATURE: 97.3 F | DIASTOLIC BLOOD PRESSURE: 58 MMHG | RESPIRATION RATE: 18 BRPM | BODY MASS INDEX: 36.12 KG/M2 | WEIGHT: 184.97 LBS

## 2022-01-22 PROBLEM — E83.42 HYPOMAGNESEMIA: Status: RESOLVED | Noted: 2022-01-21 | Resolved: 2022-01-22

## 2022-01-22 LAB
ANION GAP SERPL CALCULATED.3IONS-SCNC: 7 MMOL/L (ref 4–13)
BACTERIA BLD CULT: NORMAL
BACTERIA BLD CULT: NORMAL
BUN SERPL-MCNC: 12 MG/DL (ref 5–25)
CALCIUM SERPL-MCNC: 7.5 MG/DL (ref 8.3–10.1)
CHLORIDE SERPL-SCNC: 108 MMOL/L (ref 100–108)
CO2 SERPL-SCNC: 26 MMOL/L (ref 21–32)
CREAT SERPL-MCNC: 0.85 MG/DL (ref 0.6–1.3)
ERYTHROCYTE [DISTWIDTH] IN BLOOD BY AUTOMATED COUNT: 14.7 % (ref 11.6–15.1)
GLUCOSE SERPL-MCNC: 137 MG/DL (ref 65–140)
GLUCOSE SERPL-MCNC: 163 MG/DL (ref 65–140)
GLUCOSE SERPL-MCNC: 266 MG/DL (ref 65–140)
HCT VFR BLD AUTO: 28 % (ref 36.5–46.1)
HGB BLD-MCNC: 8.8 G/DL (ref 12–15.4)
MAGNESIUM SERPL-MCNC: 1.6 MG/DL (ref 1.6–2.6)
MCH RBC QN AUTO: 23.2 PG (ref 26.8–34.3)
MCHC RBC AUTO-ENTMCNC: 31.4 G/DL (ref 31.4–37.4)
MCV RBC AUTO: 74 FL (ref 82–98)
PLATELET # BLD AUTO: 140 THOUSANDS/UL (ref 149–390)
PMV BLD AUTO: 11.5 FL (ref 8.9–12.7)
POTASSIUM SERPL-SCNC: 3.7 MMOL/L (ref 3.5–5.3)
RBC # BLD AUTO: 3.79 MILLION/UL (ref 3.88–5.12)
SODIUM SERPL-SCNC: 141 MMOL/L (ref 136–145)
WBC # BLD AUTO: 9.17 THOUSAND/UL (ref 4.31–10.16)

## 2022-01-22 PROCEDURE — 97162 PT EVAL MOD COMPLEX 30 MIN: CPT

## 2022-01-22 PROCEDURE — 82948 REAGENT STRIP/BLOOD GLUCOSE: CPT

## 2022-01-22 PROCEDURE — 85027 COMPLETE CBC AUTOMATED: CPT | Performed by: PHYSICIAN ASSISTANT

## 2022-01-22 PROCEDURE — 83735 ASSAY OF MAGNESIUM: CPT | Performed by: PHYSICIAN ASSISTANT

## 2022-01-22 PROCEDURE — 99239 HOSP IP/OBS DSCHRG MGMT >30: CPT | Performed by: PHYSICIAN ASSISTANT

## 2022-01-22 PROCEDURE — 80048 BASIC METABOLIC PNL TOTAL CA: CPT | Performed by: PHYSICIAN ASSISTANT

## 2022-01-22 RX ADMIN — ATORVASTATIN CALCIUM 20 MG: 20 TABLET, FILM COATED ORAL at 08:44

## 2022-01-22 RX ADMIN — CLOPIDOGREL BISULFATE 75 MG: 75 TABLET ORAL at 08:44

## 2022-01-22 RX ADMIN — ASPIRIN 81 MG CHEWABLE TABLET 81 MG: 81 TABLET CHEWABLE at 08:44

## 2022-01-22 NOTE — ASSESSMENT & PLAN NOTE
59-year-old female with past history of PID, hypertension, hyperlipidemia and type 2 diabetes presented with worsening right foot pain  · Surgical history with right TMA  · Vascular surgery consulted, input appreciated  · LEADS revealed R superficial femoral artery occlusion with bilateral tibioperoneal disease  · Arteriogram obtained on 01/19 revealed severe disease in the proximal right SFA and right popliteal artery  · Suspected that the patient's symptoms are likely secondary to vascular occlusion  · Received alteplase and and heparin GTT, lysis check performed by interventional Radiology on 1/20  · Recannulization of the entire SFA with combination of drug coated balloon angioplasty and drug-eluting stent was placed by interventional Radiology  · Patient was transferred to ICU for closer monitoring on 1/20  · Transferred out of ICU to med/surg on 1/21--> alteplase and heparin drip stopped  · Vascular surgery following, will obtain postprocedure LEADs, continue DAPT  · Received Vancomycin initially however switched to cefazolin for treatment of possible cellulitis- antibiotic stopped on 01/20/2022  · Supportive management   · When he close follow-up with outpatient vascular surgery  · Continue DAPT

## 2022-01-22 NOTE — PLAN OF CARE
Problem: Potential for Falls  Goal: Patient will remain free of falls  Description: INTERVENTIONS:  - Educate patient/family on patient safety including physical limitations  - Instruct patient to call for assistance with activity   - Consult OT/PT to assist with strengthening/mobility   - Keep Call bell within reach  - Keep bed low and locked with side rails adjusted as appropriate  - Keep care items and personal belongings within reach  - Initiate and maintain comfort rounds  - Make Fall Risk Sign visible to staff  - Offer Toileting every 2 Hours, in advance of need  - Initiate/Maintain bed alarm  - Apply yellow socks and bracelet for high fall risk patients  - Consider moving patient to room near nurses station  Outcome: Progressing     Problem: MOBILITY - ADULT  Goal: Maintain or return to baseline ADL function  Description: INTERVENTIONS:  -  Assess patient's ability to carry out ADLs; assess patient's baseline for ADL function and identify physical deficits which impact ability to perform ADLs (bathing, care of mouth/teeth, toileting, grooming, dressing, etc )  - Assess/evaluate cause of self-care deficits   - Assess range of motion  - Assess patient's mobility; develop plan if impaired  - Assess patient's need for assistive devices and provide as appropriate  - Encourage maximum independence but intervene and supervise when necessary  - Involve family in performance of ADLs  - Assess for home care needs following discharge   - Consider OT consult to assist with ADL evaluation and planning for discharge  - Provide patient education as appropriate  Outcome: Progressing  Goal: Maintains/Returns to pre admission functional level  Description: INTERVENTIONS:  - Perform BMAT or MOVE assessment daily    - Set and communicate daily mobility goal to care team and patient/family/caregiver     - Collaborate with rehabilitation services on mobility goals if consulted  - Record patient progress and toleration of activity level   Outcome: Progressing     Problem: PAIN - ADULT  Goal: Verbalizes/displays adequate comfort level or baseline comfort level  Description: Interventions:  - Encourage patient to monitor pain and request assistance  - Assess pain using appropriate pain scale  - Administer analgesics based on type and severity of pain and evaluate response  - Implement non-pharmacological measures as appropriate and evaluate response  - Consider cultural and social influences on pain and pain management  - Notify physician/advanced practitioner if interventions unsuccessful or patient reports new pain  Outcome: Progressing

## 2022-01-22 NOTE — PHYSICAL THERAPY NOTE
PHYSICAL THERAPY EVALUATION  NAME:  Jacob Lyon  DATE: 01/22/22    AGE:   64 y o  Mrn:   [id number]  ADMIT DX:  Foot injury [S99 929A]  Cellulitis of right foot [U10 142]    Past Medical History:   Diagnosis Date    Cellulitis of toe 1/28/2019    CKD (chronic kidney disease)     Diabetes mellitus ([address] )     Gangrene of toe of right foot ([address] ) 10/30/2019    Added automatically from request for surgery [id number]    Hypertension      Length Of Stay: 6  Performed at least 2 patient identifiers during session: Name and Birthday    PHYSICAL THERAPY EVALUATION :    01/22/22 1009   PT Last Visit   PT Visit Date 01/22/22   Note Type   Note type Evaluation   Pain Assessment   Pain Assessment Tool 0-10   Pain Score No Pain   Restrictions/Precautions   Weight Bearing Precautions Per Order No   Other Precautions Pain; Fall Risk   Home Living   Type of [address] [place] [address] Two level; Able to live on main level with bedroom/bathroom  (No steps to enter)   Bathroom Shower/Tub Tub/shower unit   Bathroom Toilet Standard   Bathroom Accessibility Accessible   Home Equipment Wheelchair-manual;Cane;Walker; Other (Comment)  (Rolling walker)   Prior Function   Level of [place] Independent with ADLs and functional mobility   Lives With Daughter   Receives Help From Family   ADL Assistance Independent   IADLs Needs assistance   General   Additional Pertinent History 64year old female w/ hx of RLE endovascular intervention/SFA stenting, now s/p TMA which has since healed, who presented with dependent rubor and rest pain of the RLE, found to have occluded R SFA stent  S/p RLE lysis and recanalization of R SFA w/ DCB PTA and DESx2 during this admission  Patient's symptoms have completely resolved  R foot is warm, well perfused  Post intervention [place] of the RLE demonstrates significant improvement Rt [place] 0 88, unobtainable prior  Transferred from ICU to floor 1/21/22     Family/Caregiver Present No   Cognition Overall Cognitive Status WFL   Arousal/Participation Alert   Orientation Level Oriented X4   Memory Within functional limits   Following Commands Follows multistep commands without difficulty   Subjective   Subjective I would really like this IV out of my arm  RUE Assessment   RUE Assessment WFL   LUE Assessment   LUE Assessment WFL   RLE Assessment   RLE Assessment X   Strength RLE   RLE Overall Strength 4/5   R Hip Flexion 3/5   LLE Assessment   LLE Assessment X   Strength LLE   LLE Overall Strength 4/5   L Hip Flexion 3/5   Vision-Basic Assessment   Current Vision Wears glasses all the time   Coordination   Movements are Fluid and Coordinated 1   Timed Up and Go   TUG Trial 1 (Seconds) 15 Seconds   Bed Mobility   Rolling R 7  Independent   Rolling L 7  Independent   Supine to Sit 7  Independent   Sit to Supine 7  Independent   Transfers   Sit to Stand 7  Independent   Stand to Sit 7  Independent   Ambulation/Elevation   Gait pattern Wide SYED; Decreased foot clearance; Short stride   Gait Assistance 7  Independent   Assistive Device Rolling walker   Distance 50'   Stair Management Assistance Not tested   Ambulation/Elevation Additional Comments Patient ambulating without adaptive shoe for the R foot  Balance   Static Sitting Normal   Dynamic Sitting Normal   Static Standing Good   Dynamic Standing Good   Ambulatory Good   Endurance Deficit   Endurance Deficit No   Activity Tolerance   Activity Tolerance Patient tolerated treatment well   Nurse Made Aware Spoke with Saima Warner RN re performance   Assessment   Prognosis Excellent   Problem List Decreased strength; Impaired balance   Barriers to Discharge None   Goals   Patient Goals To get this IV removed     Recommendation   PT Discharge Recommendation No rehabilitation needs   Equipment Recommended Other (Comment)  (Should follow up with orthotist for adaptive shoe for R TMA)   AM-PAC Basic Mobility Inpatient   Turning in Bed Without Bedrails 4   Lying on Back to Sitting on Edge of Flat Bed 4   Moving Bed to Chair 4   Standing Up From Chair 4   Walk in Room 4   Climb 3-5 Stairs 3   Basic Mobility Inpatient Raw Score 23   Basic Mobility Standardized Score 50 88   Highest Level Of Mobility   JH-HLM Goal 7: Walk 25 feet or more   JH-HLM Highest Level of Mobility 7: Walk 25 feet or more   JH-HLM Goal Achieved Yes   End of Consult   Patient Position at End of Consult Seated edge of bed; All needs within reach       (Please find full objective findings from PT assessment regarding body systems outlined above)  Assessment: Pt is a 64 y o  adult seen for PT evaluation s/p admit to [facility] on 1/16/2022 w/ Right foot pain  Hx of RLE endovascular intervention/SFA stenting, now s/p TMA which has since healed, who presented with dependent rubor and rest pain of the RLE, found to have occluded R SFA stent  S/p RLE lysis and recanalization of R SFA w/ DCB PTA and DESx2 during this admission  Patient's symptoms have completely resolved  R foot is warm, well perfused  Post intervention [place] of the RLE demonstrates significant improvement Rt [place] 0 88, unobtainable prior  Transferred from ICU to floor 1/21/22  Order placed for PT  Prior to admission, pt was independent w/ all functional mobility w/ SPC, ambulated community distances and elevations, lived in multi-level home and lived with daughter  Upon evaluation: Pt requires no assistance for bed mobility, transfers, and ambulation with rolling walker 50'  Pt's clinical presentation is currently unstable/unpredictable given the functional mobility deficits above, especially weakness and gait deviations, coupled with fall risks including impaired balance, and combined with medical complications of abnormal H&H  The patient's AM-PAC Basic Mobility Inpatient Short Form Raw Score is 23  A score greater than 16 suggests the patient may benefit from discharge to home   From PT/mobility standpoint, recommendation at time of d/c would be anticipate no needs, home with family support, with rolling walker and with cane   The following objective measures were performed on IE:  Timed Up and Go: 15 seconds (moderate risk for falls) and AM-PAC 6-Clicks: 00/01  Comorbidities affecting pt's physical performance at time of assessment include: DM, HTN and CKD  Personal factors affecting pt at time of IE include: inability to navigate community distances       [place]ard[place], PT, DPT, GCS

## 2022-01-22 NOTE — PLAN OF CARE
Problem: Potential for Falls  Goal: Patient will remain free of falls  Description: INTERVENTIONS:  - Educate patient/family on patient safety including physical limitations  - Instruct patient to call for assistance with activity   - Consult OT/PT to assist with strengthening/mobility   - Keep Call bell within reach  - Keep bed low and locked with side rails adjusted as appropriate  - Keep care items and personal belongings within reach  - Initiate and maintain comfort rounds  - Make Fall Risk Sign visible to staff  - Offer Toileting every 2 Hours, in advance of need  - Initiate/Maintain bed alarm  - Apply yellow socks and bracelet for high fall risk patients  - Consider moving patient to room near nurses station  Outcome: Adequate for Discharge     Problem: MOBILITY - ADULT  Goal: Maintain or return to baseline ADL function  Description: INTERVENTIONS:  -  Assess patient's ability to carry out ADLs; assess patient's baseline for ADL function and identify physical deficits which impact ability to perform ADLs (bathing, care of mouth/teeth, toileting, grooming, dressing, etc )  - Assess/evaluate cause of self-care deficits   - Assess range of motion  - Assess patient's mobility; develop plan if impaired  - Assess patient's need for assistive devices and provide as appropriate  - Encourage maximum independence but intervene and supervise when necessary  - Involve family in performance of ADLs  - Assess for home care needs following discharge   - Consider OT consult to assist with ADL evaluation and planning for discharge  - Provide patient education as appropriate  Outcome: Adequate for Discharge  Goal: Maintains/Returns to pre admission functional level  Description: INTERVENTIONS:  - Perform BMAT or MOVE assessment daily    - Set and communicate daily mobility goal to care team and patient/family/caregiver     - Collaborate with rehabilitation services on mobility goals if consulted  - Record patient progress and toleration of activity level   Outcome: Adequate for Discharge     Problem: Prexisting or High Potential for Compromised Skin Integrity  Goal: Skin integrity is maintained or improved  Description: INTERVENTIONS:  - Identify patients at risk for skin breakdown  - Assess and monitor skin integrity  - Assess and monitor nutrition and hydration status  - Monitor labs   - Assess for incontinence   - Turn and reposition patient  - Assist with mobility/ambulation  - Relieve pressure over bony prominences  - Avoid friction and shearing  - Provide appropriate hygiene as needed including keeping skin clean and dry  - Evaluate need for skin moisturizer/barrier cream  - Collaborate with interdisciplinary team   - Patient/family teaching  - Consider wound care consult   Outcome: Adequate for Discharge     Problem: PAIN - ADULT  Goal: Verbalizes/displays adequate comfort level or baseline comfort level  Description: Interventions:  - Encourage patient to monitor pain and request assistance  - Assess pain using appropriate pain scale  - Administer analgesics based on type and severity of pain and evaluate response  - Implement non-pharmacological measures as appropriate and evaluate response  - Consider cultural and social influences on pain and pain management  - Notify physician/advanced practitioner if interventions unsuccessful or patient reports new pain  Outcome: Adequate for Discharge     Problem: INFECTION - ADULT  Goal: Absence or prevention of progression during hospitalization  Description: INTERVENTIONS:  - Assess and monitor for signs and symptoms of infection  - Monitor lab/diagnostic results  - Monitor all insertion sites, i e  indwelling lines, tubes, and drains  - Monitor endotracheal if appropriate and nasal secretions for changes in amount and color  - [place] appropriate cooling/warming therapies per order  - Administer medications as ordered  - Instruct and encourage patient and family to use good hand hygiene technique  - Identify and instruct in appropriate isolation precautions for identified infection/condition  Outcome: Adequate for Discharge     Problem: Knowledge Deficit  Goal: Patient/family/caregiver demonstrates understanding of disease process, treatment plan, medications, and discharge instructions  Description: Complete learning assessment and assess knowledge base    Interventions:  - Provide teaching at level of understanding  - Provide teaching via preferred learning methods  Outcome: Adequate for Discharge     Problem: DISCHARGE PLANNING  Goal: Discharge to home or other facility with appropriate resources  Description: INTERVENTIONS:  - Identify barriers to discharge w/patient and caregiver  - Arrange for needed discharge resources and transportation as appropriate  - Identify discharge learning needs (meds, wound care, etc )  - Arrange for interpretive services to assist at discharge as needed  - Refer to Case Management Department for coordinating discharge planning if the patient needs post-hospital services based on physician/advanced practitioner order or complex needs related to functional status, cognitive ability, or social support system  Outcome: Completed

## 2022-01-22 NOTE — ASSESSMENT & PLAN NOTE
Baseline 11-12  · Now stable, most recent 8 8  · Continue to monitor  · No acute signs of bleeding at this time

## 2022-01-22 NOTE — DISCHARGE SUMMARY
[address] [facility]  Discharge- [address] [place] [address] 1965, 64 y o  adult MRN: [id number]  Unit/Bed#: E4 -01 Encounter: [id number]  Primary Care Provider: Zahra Keen MD   Date and time admitted to hospital: 1/16/2022  3:38 PM    * Right foot pain  Assessment & Plan  59-year-old female with past history of PID, hypertension, hyperlipidemia and type 2 diabetes presented with worsening right foot pain  · Surgical history with right TMA  · Vascular surgery consulted, input appreciated  · LEADS revealed R superficial femoral artery occlusion with bilateral tibioperoneal disease  · Arteriogram obtained on 01/19 revealed severe disease in the proximal right SFA and right popliteal artery  · Suspected that the patient's symptoms are likely secondary to vascular occlusion  · Received alteplase and and heparin GTT, lysis check performed by interventional Radiology on 1/20  · Recannulization of the entire SFA with combination of drug coated balloon angioplasty and drug-eluting stent was placed by interventional Radiology  · Patient was transferred to ICU for closer monitoring on 1/20  · Transferred out of ICU to med/surg on 1/21--> alteplase and heparin drip stopped  · Vascular surgery following, will obtain postprocedure LEADs, continue DAPT  · Received Vancomycin initially however switched to cefazolin for treatment of possible cellulitis- antibiotic stopped on 01/20/2022  · Supportive management   · When he close follow-up with outpatient vascular surgery  · Continue DAPT    Nicotine dependence in remission  Assessment & Plan  noted    Anemia  Assessment & Plan  Baseline 11-12  · Now stable, most recent 8 8  · Continue to monitor  · No acute signs of bleeding at this time    Diabetes mellitus with stage 2 chronic kidney disease [facility])  Assessment & Plan  Lab Results   Component Value Date    HGBA1C 6 8 (H) 08/25/2021   Continue home diabetic medications as prescribed      PAD (peripheral artery disease) ([facility] [address] )  Assessment & Plan  With stenosis of right superficial femoral artery and popliteal artery status post lysis  · Continue aspirin, statin  · Plavix initially held, restarted    HLD (hyperlipidemia)  Assessment & Plan  Continue statin    Essential hypertension  Assessment & Plan  Continue metoprolol and lisinopril at this time  · blood pressure significantly elevated on admission now controlled    Hypomagnesemia-resolved as of 1/22/2022  Assessment & Plan  Magnesium 1 3 on 01/21, repleted, resolved      Medical Problems             Resolved Problems  Date Reviewed: 1/22/2022          Resolved    Hypomagnesemia 1/22/2022     Resolved by  JUAREZ Martinez              Discharging Physician / Practitioner: JUAREZ Martinez  PCP: Lizzy Winslow MD  Admission Date:   Admission Orders (From admission, onward)     Ordered        01/16/22 1950  Inpatient Admission  Once                      Discharge Date: 01/22/22    Consultations During Hospital Stay:  · Vascular surgery, Interventional Radiology    Procedures Performed:   · IR tPA lysis check, lower extremity angiogram    Significant Findings / Test Results:   RIGHT FOOT     INDICATION:   Erythema, hx of forefoot amputation      COMPARISON:  07/13/2020     VIEWS:  XR FOOT 3+ VW RIGHT   Images: 3     FINDINGS: Transmetatarsal amputations  No evidence of bony destruction      Calcaneal spur(s) noted      No lytic or blastic osseous lesion      Atherosclerotic calcifications  Soft tissue swelling distal to the amputation sites  No focal collections of air seen      IMPRESSION:     No acute osseous abnormality  Postoperative change  [facility] REPORT  CLINICAL:  Indications:  Right TMA pain    Operative History:  2020-05-07 Right TMA  2020-05-05 Right recanalization and PTA of SFA/stent and proximal peroneal  artery  2020-05-04 Right IR Abdominal Angiography  2019-11-07 Right transmetatarsal amputations  2019-11-01 Right PTA TPT w/ Distal SFA Stent  2019-04-30 Ir arterial lysis  2019-02-04 Toe amputation (Left)  2019-02-01 IR Abdominal Angiography  Risk Factors  The patient has history of Obesity, HTN, Diabetes, Hyperlipidemia, CKD, PAD and  previous smoking  Clinical  Right Pressure:  157/ mm Hg, Left Pressure:  145/ mm Hg  FINDINGS:     Segment                Right                        Left                                            Impression  PSV (cm/s)  EDV  Impression  PSV (cm/s)  EDV    Common Femoral Artery                      87   10                      96   13    Prox Profunda                             167   21                     134   10    Prox SFA               Occluded            46    0                     122   19    Mid SFA                Occluded             0    0  Occluded             0    0    Dist SFA                                                                40   15    Dist SFA - Stent       Occluded             0    0                                 Proximal Pop                               20   12                      44   15    Distal Pop                                 12    6                      35   14    Tibioperoneal                               9                           50         Dist Post Tibial                            0                            0    0    Dist  Ant  Tibial                           8    5                      24    7       CONCLUSION:  Impression:  RIGHT LOWER LIMB:  An occlusion of the proximal, mid, and distal superficial femoral artery with  minimal distal reconstitution  Findings suggests tibioperoneal disease  Ankle/Brachial index: Unobtainable secondary to indistinguishable Doppler and  flat lined PVR tracings at the ankle and metatarsal levels suggesting poor  distal perfusion, previous study 0 85  TMA     LEFT LOWER LIMB:  An occlusion of the mid superficial femoral artery with distal reconstitution    Findings suggests tibioperoneal disease  Ankle/Brachial index: 0 50, severe claudication range, previous study 0 57  PVR/ PPG tracings are dampened  Metatarsal pressure of 42 mm Hg  Great toe pressure of 45 mm Hg, below healing threshold for a diabetic,  previous study 44 mm Hg  In comparison to the study of 12/1/2020, there is a progression of disease  noted on the right and a mild progression in the disease process on the left  Pelvic aortogram  Left common iliac/external iliac artery angiography  Right lower extremity angiography  Crossing of the right superficial femoral artery stent occlusion  Insertion of thrombolysis catheter and initiation of thrombolysis  Ultrasound-guided access to the left common femoral artery     History: Multiple prior interventions in the setting of peripheral vascular disease including right lower extremity superficial femoral artery stents as well as prior balloon angioplasty of in-stent restenosis  Patient now with sudden onset right lower   extremity rest pain and duplex revealing in-stent stenosis  Given sudden onset of symptoms and concern for thrombosis      Contrast: 50cc omnipaque  Fluoro time: 9 8 minutes  Conscious sedation time: 2 hours     Technique: The patient was brought to the interventional radiology holding area and identified verbally and by wristband  After discussion of the procedure and its details, risks, benefits, and alternatives both verbal and written informed consent were   obtained  Specific risks related to thrombolysis were discussed  The patient was then brought to the angiography suite, placed supine on the table, and the left groin was prepped and draped in the usual sterile fashion  All elements of maximal sterile   barrier technique were followed (cap, mask, sterile gown, sterile gloves, large sterile sheet, hand hygiene and 2% chlorhexidine for cutaneous antisepsis)   Sterile ultrasound technique with sterile gel and sterile probe covers was also utilized      A preprocedure timeout was performed  Fluoroscopic imaging was used to identify the femoral head  Under ultrasound, the left common femoral artery was identified  Under ultrasound guidance, lidocaine was administered to the skin and underlying soft   tissues  A small skin incision was made  Under continuous ultrasound guidance, the left common femoral artery was punctured in retrograde fashion with return of pulsatile red blood  A static ultrasound image was saved  Blood return was suboptimal and   2 attempts were made at puncturing with a micropuncture set      I then elected to use a hollow 19-gauge needle  The puncture was performed and a static image was saved  Next, a Bentson wire was advanced into the abdominal aorta over which a 5 Western Livia vascular sheath was inserted and connected to a flush bag  Angiogram was performed through the sheath      A 4 Eritrean Omni Flush catheter was advanced to the distal aorta and a digital subtraction pelvic aortogram was performed  The catheter was then advanced over the Bentson wire into the right common femoral artery and right lower extremity arteriography   was performed to the foot      Decision was made to intervene  A long 5-Eritrean sheath was inserted over a Glidewire advantage  Using the Glidewire advantage and angled glide catheter access was obtained into the right popliteal artery    Over the wire a 5-Eritrean 135 cm unifuse   catheter with 30 cm of infusion sideholes was inserted and positioned with the tip in the distal right superficial femoral artery      Final injection and spot images were performed      The sheath was sutured to the skin and the catheter secured      Thrombolysis was initiated     The patient tolerated the procedure well and there were no immediate complications, she was transferred to the ICU for close observation during thrombolysis      Findings:      Patent aortic bifurcation, bilateral common iliac, external iliac, internal iliac arteries      Patent right common femoral artery  Severe stenosis at the proximal right superficial femoral artery  In-stent restenosis of the entire right superficial femoral artery stent system  Reconstitution of the popliteal artery at the knee which is small  Runoff via a peroneal artery which appears to be patent  Proximal anterior tibial is patent  Transmetatarsal amputation      Lysis catheter across the occluded segments      Patent left common femoral artery although difficult to access, suspected to the anterior plaque      IMPRESSION:  Impression:      Occluded right superficial femoral artery stents with peroneal runoff      Thrombolysis catheter placed from left groin access with initiation of arterial thrombolysis  CT BRAIN - WITHOUT CONTRAST     INDICATION:   pending arterial lysis      COMPARISON:  CT brain 4/30/2019     TECHNIQUE:  CT examination of the brain was performed  In addition to axial images, sagittal and coronal 2D reformatted images were created and submitted for interpretation      Radiation dose length product (DLP) for this visit:  991 mGy-cm   This examination, like all CT scans performed in the [facility], was performed utilizing techniques to minimize radiation dose exposure, including the use of iterative   reconstruction and automated exposure control        IMAGE QUALITY:  Diagnostic      FINDINGS:     PARENCHYMA:  No intracranial mass, mass effect or midline shift  No CT signs of acute infarction  No acute parenchymal hemorrhage      VENTRICLES AND EXTRA-AXIAL SPACES:  Normal for the patient's age      VISUALIZED ORBITS AND PARANASAL SINUSES:  Unremarkable      CALVARIUM AND EXTRACRANIAL SOFT TISSUES:  Normal      IMPRESSION:     No acute intracranial abnormality      [facility] REPORT  CLINICAL:  Indications:  Post R SFA angioplast and stenting 1/20  Operative History:  2020-05-07 Right TMA  2020-05-05 Right recanalization and PTA of SFA/stent and proximal peroneal  artery  2020-05-04 Right IR Abdominal Angiography  2019-11-07 Right transmetatarsal amputations  2019-11-01 Right PTA TPT w/ Distal SFA Stent  2019-04-30 Ir arterial lysis  2019-02-04 Toe amputation (Left)  2019-02-01 IR Abdominal Angiography  Risk Factors  The patient has history of Obesity, HTN, Diabetes (Yes), Hyperlipidemia, CKD,  PAD and previous smoking (quit 5-10yrs ago)  Clinical  Right Pressure:  128/ mm Hg, Left Pressure:  128/ mm Hg  FINDINGS:     Segment      Rig  Left                          P   P    Ant  Tibial  113  65    Ankle        113  65    Metatarsal        30    Great Toe         18       CONCLUSION:  RIGHT LOWER LIMB  Ankle/Brachial Index: 0 88 which is in the mild range  (Previous - Unobtainable  due to flattened status)  PVR Tracings is mildly dampened  Transmet amputation  Metatarsal and digit was not obtained  LEFT LOWER LIMB  Ankle/Brachial Index: 0 51  which is in the severe range  (Previous 0 50)  PPG/PVR Tracings are dampened  Metatarsal Pressure 30 mmHg  Great Toe Pressure:  18 mmHg, below the healing range  In comparison to the study of 1/17/2022 , there is significant improvement in  the right [place] s/p intervention  Incidental Findings:   · none     Test Results Pending at Discharge (will require follow up):   · none     Outpatient Tests Requested:  · None    Complications:  None    Reason for Admission:  Right foot pain    Hospital Course:   Jaziel Kevin is a 64 y o  adult patient with past medical history of PVD, hypertension, hyperlipidemia, type 2 diabetes, status post right TMA 2019 who originally presented to the hospital on 1/16/2022 due to right foot pain  On the day of admission, the patient is right TMA stump noted to be warm to touch and erythematous  Vascular surgery at that time recommending IV antibiotics for possible cellulitis and further evaluation with leads study      While admitted to the hospital, leads revealed free occlusion of the right SFA stents with poor distal perfusion  Patient was planned to undergo right lower extremity angiogram with potential IR intervention  Interventional radiology noted severe disease and placed thrombolysis infusion catheter across the stent  Following this, patient was placed on tPA infusion  Due to patient being placed on tPA infusion, was transferred to the intensive care unit for closer monitoring  She remained in the ICU for 1 day until completion of tPA  Following completion of tPA, interventional radiology performed lysis check and the patient was subsequently transferred back to the medical-surgical unit in stable condition  Vascular surgery re-evaluated the patient with repeat leads which revealed appropriate flow in the right leg and the patient was subsequently started back on dual anti-platelet therapy  She was stable for discharge on 01/22/2022    Please see above list of diagnoses and related plan for additional information  Condition at Discharge: stable    Discharge Day Visit / Exam:   Subjective: The patient states that she is feeling very well today  She states she worked with therapy and they did not say that she needed any additional assistance  She denies any chest pain, shortness of breath, nausea vomiting, diarrhea, constipation  Denies any bleeding, hematemesis, melena, hematochezia  Denies any lightheadedness/dizziness  Denies any pain in her legs  Vitals: Blood Pressure: 104/58 (01/22/22 0729)  Pulse: 77 (01/22/22 0729)  Temperature: (!) 97 3 °F (36 3 °C) (01/22/22 0729)  Temp Source: Temporal (01/22/22 0729)  Respirations: 18 (01/22/22 0729)  Weight - Scale: 83 9 kg (184 lb 15 5 oz) (01/20/22 0531)  SpO2: 97 % (01/22/22 0729)  Exam:   Physical Exam  Vitals and nursing note reviewed  Constitutional:       General: Taran Darby is not in acute distress  Appearance: Normal appearance   Taran Darby is not ill-appearing, toxic-appearing or diaphoretic  Eyes:      General: No scleral icterus  Conjunctiva/sclera: Conjunctivae normal    Cardiovascular:      Rate and Rhythm: Normal rate and regular rhythm  Heart sounds: No murmur heard  No friction rub  No gallop  Pulmonary:      Effort: Pulmonary effort is normal  No respiratory distress  Breath sounds: Normal breath sounds  No stridor  No wheezing, rhonchi or rales  Chest:      Chest wall: No tenderness  Musculoskeletal:      Cervical back: Normal range of motion  Comments: Right TMA noted  No erythema  Skin:     General: Skin is warm and dry  Capillary Refill: Capillary refill takes less than 2 seconds  Coloration: Skin is not jaundiced or pale  Neurological:      General: No focal deficit present  Mental Status: Natalya Cheatham is alert and oriented to person, place, and time  Mental status is at baseline  Discussion with Family: Attempted to update  (daughter) via phone  Left voicemail  Discharge instructions/Information to patient and family:   See after visit summary for information provided to patient and family  Provisions for Follow-Up Care:  See after visit summary for information related to follow-up care and any pertinent home health orders  Disposition:   Home    Planned Readmission: n/a     Discharge Statement:  I spent 35 minutes discharging the patient  This time was spent on the day of discharge  I had direct contact with the patient on the day of discharge  Greater than 50% of the total time was spent examining patient, answering all patient questions, arranging and discussing plan of care with patient as well as directly providing post-discharge instructions  Additional time then spent on discharge activities  Discharge Medications:  See after visit summary for reconciled discharge medications provided to patient and/or family        **Please Note: This note may have been constructed using a voice recognition system**

## 2022-01-22 NOTE — ASSESSMENT & PLAN NOTE
Lab Results   Component Value Date    HGBA1C 6 8 (H) 08/25/2021   Continue home diabetic medications as prescribed

## 2022-01-24 PROCEDURE — 93923 UPR/LXTR ART STDY 3+ LVLS: CPT | Performed by: SURGERY

## 2022-01-24 NOTE — UTILIZATION REVIEW
Notification of Discharge   This is a Notification of Discharge from our facility [address]  Please be advised that this patient has been discharge from our facility  Below you will find the admission and discharge date and time including the patients disposition  UTILIZATION REVIEW CONTACT:  Mariano Parkinson  Utilization   Network Utilization Review Department  Phone: [phone] x carefully listen to the prompts  All voicemails are confidential   Email: [email or web address]     PHYSICIAN ADVISORY SERVICES:  FOR [id number] REVIEW - MEDICAL NECESSITY DENIAL  Phone: [phone]  Fax: [phone]  Email: [email or web address]     PRESENTATION DATE: 1/16/2022  3:38 PM    INPATIENT ADMISSION DATE: 1/16/22  7:50 PM   DISCHARGE DATE: 1/22/2022  1:30 PM  DISPOSITION: Home/Self Care Home/Self Care      IMPORTANT INFORMATION:  Send all requests for admission clinical reviews, approved or denied determinations and any other requests to dedicated fax number below belonging to the campus where the patient is receiving treatment   List of dedicated fax numbers:  [address] DENIALS (Administrative/Medical Necessity) [phone]   [address] (Maternity/NICU/Pediatrics) [phone]   [facility] [phone]   [address] [place] [address] [phone]   [address] [place] [address] [phone]   2000 [facility] [address] [place] [address],[address] [phone]   [facility]  [phone]   220[address] [facility]  [address] [phone]

## 2022-01-25 ENCOUNTER — TRANSITIONAL CARE MANAGEMENT (OUTPATIENT)
Dept: FAMILY MEDICINE CLINIC | Facility: CLINIC | Age: 57
End: 2022-01-25

## 2022-01-26 ENCOUNTER — OFFICE VISIT (OUTPATIENT)
Dept: PODIATRY | Facility: CLINIC | Age: 57
End: 2022-01-26
Payer: COMMERCIAL

## 2022-01-26 VITALS
DIASTOLIC BLOOD PRESSURE: 79 MMHG | WEIGHT: 185 LBS | SYSTOLIC BLOOD PRESSURE: 158 MMHG | HEART RATE: 101 BPM | HEIGHT: 60 IN | BODY MASS INDEX: 36.32 KG/M2

## 2022-01-26 DIAGNOSIS — E11.42 DIABETIC POLYNEUROPATHY ASSOCIATED WITH TYPE 2 DIABETES MELLITUS (HCC): Primary | ICD-10-CM

## 2022-01-26 DIAGNOSIS — Z89.431 S/P TRANSMETATARSAL AMPUTATION OF FOOT, RIGHT (HCC): ICD-10-CM

## 2022-01-26 DIAGNOSIS — I73.9 PAD (PERIPHERAL ARTERY DISEASE) (HCC): ICD-10-CM

## 2022-01-26 DIAGNOSIS — S90.821A BLISTER OF RIGHT FOOT, INITIAL ENCOUNTER: ICD-10-CM

## 2022-01-26 PROCEDURE — 99214 OFFICE O/P EST MOD 30 MIN: CPT | Performed by: PODIATRIST

## 2022-01-26 PROCEDURE — 3008F BODY MASS INDEX DOCD: CPT | Performed by: PODIATRIST

## 2022-01-26 PROCEDURE — 1036F TOBACCO NON-USER: CPT | Performed by: PODIATRIST

## 2022-01-26 PROCEDURE — 3078F DIAST BP <80 MM HG: CPT | Performed by: PODIATRIST

## 2022-01-26 PROCEDURE — 3077F SYST BP >= 140 MM HG: CPT | Performed by: PODIATRIST

## 2022-01-26 PROCEDURE — 1111F DSCHRG MED/CURRENT MED MERGE: CPT | Performed by: PODIATRIST

## 2022-01-26 NOTE — PROGRESS NOTES
Assessment/Plan:         Diagnoses and all orders for this visit:    Diabetic polyneuropathy associated with type 2 diabetes mellitus ([facility] )  -     Post Op Shoe    PAD (peripheral artery disease) ([facility] )    Blister of right foot, initial encounter  -     Post Op Shoe    S/P transmetatarsal amputation of foot, right ([facility] )  -     Post Op Shoe      Diagnosis and options discussed with patient  Patient agreeable to the plan as stated below      I reviewed the patient's admission to the hospital discharge summary from her ischemic limb event last week  He does appear as if the acute occlusion was resolved with Interventional Radiology but she did get some superficial blistering of her right foot  On today's exam this is stable and dry  There is no acute sign of infection or gangrene  There is no need for imaging  Recommend she keep a light gauze dressing over the end of the  Midfoot amputation to refrain from any trauma and wear open-toed surgical shoe until it fully heals  The stents this shoe in the office today  I would like to see her foot in a few weeks to make sure that it is stable  She is at extremely high risk for infection and limb loss  Extensive chart review today  Subjective:      Patient ID: Sendy Melendez is a 64 y o  adult  Patient was hospitalized a week or 2 ago with an occluded right superficial femoral artery and ischemic changes to her right foot  She has previously had a proximal transmetatarsal amputation  There was some redness to the foot she was put on antibiotics  They were  Able to reopen her occluded stents but now there is some mild blistering to the right foot  Overall she feels well        The following portions of the patient's history were reviewed and updated as appropriate:   Sendy Melendez  has a past medical history of Cellulitis of toe (1/28/2019), CKD (chronic kidney disease), Diabetes mellitus ([facility] ), Gangrene of toe of right foot ([facility] ) (10/30/2019), and Hypertension  Christopher Lyon   Patient Active Problem List    Diagnosis Date Noted    Nicotine dependence in remission 01/20/2022    Right foot pain 01/16/2022    CKD (chronic kidney disease) stage 2, GFR 60-89 ml/min 10/05/2021    Well woman exam 02/10/2021    Vaginal candida 02/10/2021    Obstructive sleep apnea 10/30/2020    Persistent proteinuria 09/03/2020    Vitamin D deficiency 09/03/2020    [facility] use of insulin ([facility] ) 08/27/2020    S/P transmetatarsal amputation of foot, right ([facility] ) 08/26/2020    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, with necrosis of bone ([facility] ) 05/08/2020    Cellulitis of right lower extremity 05/04/2020    Class 2 severe obesity due to excess calories with serious comorbidity and body mass index (BMI) of 36 0 to 36 9 in adult [facility]) 01/27/2020    History of transmetatarsal amputation of foot ([facility] ) 11/20/2019    Charcot foot due to diabetes mellitus ([facility] ) 08/05/2019    Polyneuropathy associated with underlying disease ([facility] ) 07/08/2019    Critical lower limb ischemia ([facility] ) 04/30/2019    Toe amputation status, left 02/11/2019    Leukocytosis 02/05/2019    Anemia 02/01/2019    Diabetes mellitus with stage 2 chronic kidney disease ([facility] ) 01/31/2019    HLD (hyperlipidemia) 01/28/2019    PAD (peripheral artery disease) ([facility] ) 01/28/2019    Essential hypertension 10/27/2016     Parul Kwon  has a past surgical history that includes IR abdominal aortagram (2/1/2019); Toe amputation (Left, 2/4/2019); IR arterial lysis (4/30/2019); IR lower extremity / intervention (11/1/2019); Toe amputation (Right, 11/4/2019); pr amputation foot,transmetatarsal (Right, 11/7/2019); IR aortagram with run-off (5/5/2020); pr amputation foot,transmetatarsal (Right, 5/7/2020); IR lower extremity angiogram (1/19/2022); and IR TPA lysis check (1/20/2022)    Christopher Lyon's family history includes Diabetes in [facility]  Camron's mother; No Known Problems in [facility] AM[facility] Lyon's father  Harmony Lyon  reports that [facility] Lyon quit smoking about 9 years ago  Lawerancnegra Lyon's smoking use included cigarettes  Sierra Lyon has never used smokeless tobacco  Lawvernell Jose Lyon reports previous alcohol use  erancnegra Jose Lyon reports that [facility] AM[facility] Lyon does not use drugs  Current Outpatient Medications   Medication Sig Dispense Refill    aspirin 81 mg chewable tablet Chew 1 tablet (81 mg total) daily      atorvastatin (LIPITOR) 20 mg tablet Take 1 tablet by mouth once daily 90 tablet 0    Blood Glucose Monitoring Suppl (ONE TOUCH ULTRA MINI) w/Device KIT Use 4 times a day 1 kit 2    cetirizine (ZyrTEC) 10 mg tablet Take 1 tablet (10 mg total) by mouth daily (Patient taking differently: Take 10 mg by mouth daily as needed ) 90 tablet 1    clopidogrel (PLAVIX) 75 mg tablet Take 1 tablet (75 mg total) by mouth daily 90 tablet 3    Dulaglutide (Trulicity) 3 UH/3 4GY SOPN Inject 0 5 mL (3 mg total) under the skin once a week 2 mL 6    ergocalciferol (ERGOCALCIFEROL) 1 25 MG (40098 UT) capsule Take 1 capsule (50,000 Units total) by mouth once a week 12 capsule 3    insulin glargine (Lantus SoloStar) 100 units/mL injection pen Use 15 units subcut once daily at 9pm 20 mL 4    Insulin Pen Needle 32G X 4 MM MISC Use 4 times daily with insulin pens 120 each 5    lisinopril (ZESTRIL) 5 mg tablet Take 1 tablet (5 mg total) by mouth daily 90 tablet 3    metFORMIN (GLUCOPHAGE) 1000 MG tablet Take 1 tablet (1,000 mg total) by mouth 2 (two) times a day with meals 180 tablet 3    metoprolol tartrate (LOPRESSOR) 25 mg tablet Take 1 tablet (25 mg total) by mouth every 12 (twelve) hours 180 tablet 1    OneTouch Delica Lancets 56Q MISC Use to test 3x daily 300 each 2     No current facility-administered medications for this visit       Current Outpatient Medications on File Prior to Visit   Medication Sig    aspirin 81 mg chewable tablet Chew 1 tablet (81 mg total) daily    atorvastatin (LIPITOR) 20 mg tablet Take 1 tablet by mouth once daily    Blood Glucose Monitoring Suppl (ONE TOUCH ULTRA MINI) w/Device KIT Use 4 times a day    cetirizine (ZyrTEC) 10 mg tablet Take 1 tablet (10 mg total) by mouth daily (Patient taking differently: Take 10 mg by mouth daily as needed )    clopidogrel (PLAVIX) 75 mg tablet Take 1 tablet (75 mg total) by mouth daily    Dulaglutide (Trulicity) 3 WP/2 2EE SOPN Inject 0 5 mL (3 mg total) under the skin once a week    ergocalciferol (ERGOCALCIFEROL) 1 25 MG (09130 UT) capsule Take 1 capsule (50,000 Units total) by mouth once a week    insulin glargine (Lantus SoloStar) 100 units/mL injection pen Use 15 units subcut once daily at 9pm    Insulin Pen Needle 32G X 4 MM MISC Use 4 times daily with insulin pens    lisinopril (ZESTRIL) 5 mg tablet Take 1 tablet (5 mg total) by mouth daily    metFORMIN (GLUCOPHAGE) 1000 MG tablet Take 1 tablet (1,000 mg total) by mouth 2 (two) times a day with meals    metoprolol tartrate (LOPRESSOR) 25 mg tablet Take 1 tablet (25 mg total) by mouth every 12 (twelve) hours    OneTouch Delica Lancets 33V MISC Use to test 3x daily     No current facility-administered medications on file prior to visit  Alyson Purdy is allergic to levemir [insulin detemir]       Review of Systems   Constitutional: Negative  HENT: Negative  Respiratory: Negative  Cardiovascular: Positive for leg swelling  Negative for chest pain and palpitations  Gastrointestinal: Negative  Musculoskeletal: Positive for gait problem and joint swelling  Skin: Positive for color change (  Blister)  Negative for wound  Neurological: Positive for weakness and numbness  Psychiatric/Behavioral: The patient is not nervous/anxious            Objective:      /79   Pulse 101   Ht 5' (1 524 m) Comment: verbal  Wt 83 9 kg (185 lb) LMP  (LMP Unknown)   BMI 36 13 kg/m²          Physical Exam  Vitals reviewed  Constitutional:       Appearance: Wily Stager is obese  Wily Stager is not ill-appearing or diaphoretic  HENT:      Nose: No congestion or rhinorrhea  Cardiovascular:      Rate and Rhythm: Normal rate  Pulses:           Dorsalis pedis pulses are 0 on the right side and 1+ on the left side  Posterior tibial pulses are 0 on the right side and 0 on the left side  Pulmonary:      Effort: Pulmonary effort is normal  No respiratory distress  Abdominal:      General: There is no distension  Tenderness: There is no abdominal tenderness  Musculoskeletal:         General: Deformity ( midfoot amputation overall stable) present  Right lower leg: Edema present  Right foot: Decreased range of motion  Deformity present  Feet:      Right foot:      Protective Sensation: 3 sites tested  0 sites sensed  Skin integrity: Blister, skin breakdown and dry skin present  No ulcer, erythema, warmth or fissure  Toenail Condition: Right toenails are abnormally thick  Left foot:      Toenail Condition: Left toenails are abnormally thick  Skin:     Findings: Lesion ( there is a small blister to the anterior aspect of the right transmetatarsal amputation  There is no drainage or cellulitis  There is a small black eschar just dorsal to this blister ) present  Neurological:      Mental Status: Wily Stagefallon is alert and oriented to person, place, and time  Sensory: Sensory deficit present  Motor: Weakness present        Gait: Gait abnormal    Psychiatric:         Mood and Affect: Mood normal

## 2022-01-27 LAB
KCT BLD-ACNC: 144 SEC (ref 89–137)
KCT BLD-ACNC: 213 SEC (ref 89–137)
SPECIMEN SOURCE: ABNORMAL
SPECIMEN SOURCE: ABNORMAL

## 2022-02-02 ENCOUNTER — TELEPHONE (OUTPATIENT)
Dept: BARIATRICS | Facility: CLINIC | Age: 57
End: 2022-02-02

## 2022-02-03 ENCOUNTER — RA CDI HCC (OUTPATIENT)
Dept: OTHER | Facility: HOSPITAL | Age: 57
End: 2022-02-03

## 2022-02-03 NOTE — PROGRESS NOTES
[facility]  coding opportunities             Chart Reviewed * (Number of) Inbasket suggestions sent to Provider: 1     DX: [phone]             Patients insurance company: [employer] only)

## 2022-02-04 ENCOUNTER — PREP FOR PROCEDURE (OUTPATIENT)
Dept: BARIATRICS | Facility: CLINIC | Age: 57
End: 2022-02-04

## 2022-02-04 ENCOUNTER — OFFICE VISIT (OUTPATIENT)
Dept: BARIATRICS | Facility: CLINIC | Age: 57
End: 2022-02-04

## 2022-02-04 VITALS — BODY MASS INDEX: 35.39 KG/M2 | WEIGHT: 181.2 LBS

## 2022-02-04 DIAGNOSIS — Z99.89 OSA ON CPAP: ICD-10-CM

## 2022-02-04 DIAGNOSIS — E66.01 CLASS 2 SEVERE OBESITY DUE TO EXCESS CALORIES WITH SERIOUS COMORBIDITY AND BODY MASS INDEX (BMI) OF 36.0 TO 36.9 IN ADULT (HCC): Primary | ICD-10-CM

## 2022-02-04 DIAGNOSIS — I10 ESSENTIAL HYPERTENSION, BENIGN: ICD-10-CM

## 2022-02-04 DIAGNOSIS — E11.9 SEVERE DIABETES MELLITUS (HCC): ICD-10-CM

## 2022-02-04 DIAGNOSIS — G47.33 OSA ON CPAP: ICD-10-CM

## 2022-02-04 DIAGNOSIS — E66.9 OBESITY, UNSPECIFIED CLASSIFICATION, UNSPECIFIED OBESITY TYPE, UNSPECIFIED WHETHER SERIOUS COMORBIDITY PRESENT: Primary | ICD-10-CM

## 2022-02-04 PROCEDURE — 3066F NEPHROPATHY DOC TX: CPT | Performed by: SURGERY

## 2022-02-04 PROCEDURE — 3066F NEPHROPATHY DOC TX: CPT | Performed by: FAMILY MEDICINE

## 2022-02-04 PROCEDURE — RECHECK

## 2022-02-04 NOTE — PROGRESS NOTES
Behavioral Health Follow Up Note:       Weight Check  Starting weight 188 8  #  Today's weight 181 2  #    Mental health and wellness -  Was in the hospital last month  Stated her house was COLD and her leg had a blood clot  Asked her how cold her house was and she said it was 'freezing"  Doctors kept her in the hospital for one week to get the blood flowing appropriately  Purchased an electric blanket and is using that  House is in [place] [id number] does not work  Purchased an electric heater in the mean time  Plans on moving out  Strong supports with her family     Eating behaviors -    No changes since last appointment  Feels she is making mindful choices  Activity -  Limited  amputated toes (L)  Wearing a new shoe from the foot doctor        Progress toward program requirements : rudy   Approved for surgery      Goals:   Happy that her surgery becoming a reality

## 2022-02-07 ENCOUNTER — OFFICE VISIT (OUTPATIENT)
Dept: VASCULAR SURGERY | Facility: CLINIC | Age: 57
End: 2022-02-07
Payer: COMMERCIAL

## 2022-02-07 DIAGNOSIS — I73.9 PAD (PERIPHERAL ARTERY DISEASE) (HCC): Primary | ICD-10-CM

## 2022-02-07 PROCEDURE — 99214 OFFICE O/P EST MOD 30 MIN: CPT | Performed by: SURGERY

## 2022-02-07 NOTE — ASSESSMENT & PLAN NOTE
Vika Lin returns to the office following recent hospitalization where she underwent right lower extremity arteriogram with lysis of occluded SFA stents  She denies any pain today in the right leg  She does have history of right TMA  Clinically she has a monophasic R DP as well as posterior tibial Doppler signal   Continue current medical regimen with clopidogrel, aspirin and atorvastatin  Will obtain a lower extremity arterial duplex in 3 months      Of note she has known left SFA high-grade stenosis versus occlusion with an GEORGIA 0 57  At this time she denies any rest pain and/or claudication  I suspect she does not ambulate a significant distance is to elicit claudication  She does not have any tissue loss  She understands call the office with any acute changes in the interim

## 2022-02-07 NOTE — PROGRESS NOTES
Assessment/Plan:    PAD (peripheral artery disease) ([facility] )  Keesha Smith returns to the office following recent hospitalization where she underwent right lower extremity arteriogram with lysis of occluded SFA stents  She denies any pain today in the right leg  She does have history of right TMA  Clinically she has a monophasic R DP as well as posterior tibial Doppler signal   Continue current medical regimen with clopidogrel, aspirin and atorvastatin  Will obtain a lower extremity arterial duplex in 3 months      Of note she has known left SFA high-grade stenosis versus occlusion with an GEORGIA 0 57  At this time she denies any rest pain and/or claudication  I suspect she does not ambulate a significant distance is to elicit claudication  She does not have any tissue loss  She understands call the office with any acute changes in the interim  Diagnoses and all orders for this visit:    PAD (peripheral artery disease) ([facility])  -     VAS lower limb arterial duplex, complete bilateral; Future          Subjective:      Patient ID: Karen Rubio is a 64 y o  adult  Patient present to follow up  Patient denies any pain, cramping or sensation on her legs  Patient reports sone pealing of the skin on her R foot  Patient is currently taking ASA, Atorvastatin and Plavix  Keesha Smith returns to the office after recent right lower extremity arteriogram with lysis of right SFA occlusion/stent  She has history of right TMA  She had presented with increased pain at the right TMA site  She reports that the pain has completely resolved following the above-mentioned procedure  The following portions of the patient's history were reviewed and updated as appropriate: allergies, current medications, past family history, past medical history, past social history, past surgical history and problem list     Review of Systems   Constitutional: Negative  HENT: Negative  Eyes: Negative  Respiratory: Negative  Cardiovascular: Negative  Gastrointestinal: Negative  Endocrine: Negative  Genitourinary: Negative  Musculoskeletal: Negative  Skin: Negative  Allergic/Immunologic: Negative  Neurological: Negative  Hematological: Negative  Psychiatric/Behavioral: Negative  I have personally reviewed the ROS entered by MA and agree as documented  Objective:      LMP  (LMP Unknown)          Physical Exam  Constitutional:       General: Alyson Purdy is not in acute distress  Appearance: Alyson Purdy is well-developed  HENT:      Head: Normocephalic and atraumatic  Eyes:      General: No scleral icterus  Conjunctiva/sclera: Conjunctivae normal    Neck:      Trachea: No tracheal deviation  Cardiovascular:      Rate and Rhythm: Normal rate and regular rhythm  Pulses:           Dorsalis pedis pulses are detected w/ Doppler on the right side and detected w/ Doppler on the left side  Posterior tibial pulses are detected w/ Doppler on the right side and detected w/ Doppler on the left side  Heart sounds: Normal heart sounds  Pulmonary:      Effort: Pulmonary effort is normal       Breath sounds: Normal breath sounds  Abdominal:      General: There is no distension  Palpations: Abdomen is soft  There is no mass (no appreciable aortic pulsation/aneurysm)  Tenderness: There is no abdominal tenderness  There is no guarding or rebound  Musculoskeletal:         General: Normal range of motion  Cervical back: Normal range of motion and neck supple  Skin:     General: Skin is warm and dry  Neurological:      Mental Status: Alyson Purdy is alert and oriented to person, place, and time  Psychiatric:         Mood and Affect: Mood normal          Behavior: Behavior normal          Thought Content:  Thought content normal          Judgment: Judgment normal        post intervention GEORGIA (right) 0 88

## 2022-02-09 ENCOUNTER — OFFICE VISIT (OUTPATIENT)
Dept: ENDOCRINOLOGY | Facility: CLINIC | Age: 57
End: 2022-02-09
Payer: COMMERCIAL

## 2022-02-09 VITALS
SYSTOLIC BLOOD PRESSURE: 142 MMHG | HEIGHT: 60 IN | WEIGHT: 184.25 LBS | BODY MASS INDEX: 36.17 KG/M2 | HEART RATE: 86 BPM | DIASTOLIC BLOOD PRESSURE: 80 MMHG

## 2022-02-09 DIAGNOSIS — Z79.4 CURRENT USE OF INSULIN (HCC): ICD-10-CM

## 2022-02-09 DIAGNOSIS — N18.2 DIABETES MELLITUS WITH STAGE 2 CHRONIC KIDNEY DISEASE (HCC): Primary | ICD-10-CM

## 2022-02-09 DIAGNOSIS — I10 ESSENTIAL HYPERTENSION: ICD-10-CM

## 2022-02-09 DIAGNOSIS — E11.59 TYPE 2 DIABETES MELLITUS WITH OTHER CIRCULATORY COMPLICATION, WITH LONG-TERM CURRENT USE OF INSULIN (HCC): ICD-10-CM

## 2022-02-09 DIAGNOSIS — Z79.4 TYPE 2 DIABETES MELLITUS WITH OTHER CIRCULATORY COMPLICATION, WITH LONG-TERM CURRENT USE OF INSULIN (HCC): ICD-10-CM

## 2022-02-09 DIAGNOSIS — E78.5 HYPERLIPIDEMIA, UNSPECIFIED HYPERLIPIDEMIA TYPE: ICD-10-CM

## 2022-02-09 DIAGNOSIS — E11.22 DIABETES MELLITUS WITH STAGE 2 CHRONIC KIDNEY DISEASE (HCC): Primary | ICD-10-CM

## 2022-02-09 PROCEDURE — 99214 OFFICE O/P EST MOD 30 MIN: CPT | Performed by: INTERNAL MEDICINE

## 2022-02-09 RX ORDER — BLOOD SUGAR DIAGNOSTIC
STRIP MISCELLANEOUS
Qty: 100 STRIP | Refills: 5 | Status: SHIPPED | OUTPATIENT
Start: 2022-02-09

## 2022-02-09 NOTE — PROGRESS NOTES
[facility] Patient Progress Note      Chief Complaint   Patient presents with    Diabetes Type 2      Referring Provider  No referring provider defined for this encounter  History of Present Illness:   Deneen Albert is a 64 y o  adult with a history of type 2 diabetes with long term use of insulin   Last seen in the office in Nov 2021  Since the last visit, she was admitted to [facility] for a possible foot cellulitis  Her Bariatric procedure is scheduled 2/28/2021 (sleeve gastrectomy)    DM hx: Ms Shantell Galindo was diagnosed with T2DM ~35yrs ago, and has mutliple complications  She has neuropathy the has led to Charcot foot, and amputations and gangrene of her toes  S/p TMA of the right foot      She takes Lantus pens 51KBCVN once daily, Trulicity 3mg on Mondays, metformin 1g twice daily  She is adherent to all meds and does not miss doses  She is tolerating the insulin and metformin  She states she had an "allergy" to Levemir stating it makes her "itchy" and dizzy      She has a right TMA (5/2020) and left toe amputation (Feb 2020)     Ms Shantell Galindo is checking FSBGs, but forgot her logs  She is checking 3x daily  Fasting 119-130  Pre-lunch 150-190 (one >200)  Pre-dinner below 215  Denies hypoglycemia once to 65 at 4am     Diabetes education: No past DM classes  Diet: she is now drinking a protein shake 2x per day and one meal for dinner  Activity: She is walking around the inside of the house, walking with cane and boot     Eyes: approx 2019  Still has not scheduled  Pod: seeing regularly for foot  Hx of Charcot  Dentist: most teeth removed, has an appt scheduled  Flu: received 20-21  Received 2 COVID vaccine, not yet boosted    CKD: eGFR is preserved  Saw Dr Tom Cevallos Sept 3, 2021  Hyperlipidemia is treated atorvastatin which she is tolerating  She denies hx of pancreatitis, personal or family hx of MTC or MEN  She denies alcohol use, gallbladder issues, or hx of elevated triglycerides       Patient Active Problem List   Diagnosis    Essential hypertension    HLD (hyperlipidemia)    PAD (peripheral artery disease) ([facility] )    Diabetes mellitus with stage 2 chronic kidney disease ([place] [address] )    Anemia    Leukocytosis    Toe amputation status, left    Critical lower limb ischemia (HCC)    Polyneuropathy associated with underlying disease ([place] [address] )    Charcot foot due to diabetes mellitus ([place] [address] )    History of transmetatarsal amputation of foot ([place] [address] )    Class 2 severe obesity due to excess calories with serious comorbidity and body mass index (BMI) of 36 0 to 36 9 in adult [facility])    Cellulitis of right lower extremity    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, with necrosis of bone ([place] [address] )    S/P transmetatarsal amputation of foot, right (HCC)    Current use of insulin ([place] [address] )    Persistent proteinuria    Vitamin D deficiency    Obstructive sleep apnea    Well woman exam    Vaginal candida    CKD (chronic kidney disease) stage 2, GFR 60-89 ml/min    Right foot pain    Nicotine dependence in remission      Past Medical History:   Diagnosis Date    Cellulitis of toe 1/28/2019    CKD (chronic kidney disease)     Diabetes mellitus ([place] [address] )     Gangrene of toe of right foot ([place] [address] ) 10/30/2019    Added automatically from request for surgery [id number]    Hypertension       Past Surgical History:   Procedure Laterality Date    IR ABDOMINAL AORTAGRAM  2/1/2019    IR AORTAGRAM WITH RUN-OFF  5/5/2020    IR ARTERIAL LYSIS  4/30/2019    IR LOWER EXTREMITY / INTERVENTION  11/1/2019    IR LOWER EXTREMITY ANGIOGRAM  1/19/2022    IR TPA LYSIS CHECK  1/20/2022    NJ AMPUTATION FOOT,TRANSMETATARSAL Right 11/7/2019    Procedure: AMPUTATION TRANSMETATARSAL (TMA) WITH ACHILLES TENDON LENGTHING;  Surgeon: Abbey Colon DPM;  Location: [facility];  Service: Podiatry    NJ AMPUTATION FOOT,TRANSMETATARSAL Right 5/7/2020    Procedure: AMPUTATION TRANSMETATARSAL (TMA);  Surgeon: Lois Benitez Oniel Johnson DPM;  [facility];  Service: Podiatry    TOE AMPUTATION Left 2019    Procedure: AMPUTATION 5th TOE;  Surgeon: Ruth Rooney DPM;  [facility];  Service: Podiatry    TOE AMPUTATION Right 2019    Procedure: AMPUTATION RIGHT 3RD AND 4TH TOE;  Surgeon: Ruth Rooney DPM;  [facility];  Service: Podiatry      Family History   Problem Relation Age of Onset    Diabetes Mother     No Known Problems Father      Social History     Tobacco Use    Smoking status: Former Smoker     Types: Cigarettes     Quit date:      Years since quittin 1    Smokeless tobacco: Never Used   Substance Use Topics    Alcohol use: Not Currently     Allergies   Allergen Reactions    Levemir [Insulin Detemir] Itching         Current Outpatient Medications:     aspirin 81 mg chewable tablet, Chew 1 tablet (81 mg total) daily, Disp: , Rfl:     atorvastatin (LIPITOR) 20 mg tablet, Take 1 tablet by mouth once daily, Disp: 90 tablet, Rfl: 0    Blood Glucose Monitoring Suppl (ONE TOUCH ULTRA MINI) w/Device KIT, Use 4 times a day, Disp: 1 kit, Rfl: 2    cetirizine (ZyrTEC) 10 mg tablet, Take 1 tablet (10 mg total) by mouth daily (Patient taking differently: Take 10 mg by mouth daily as needed ), Disp: 90 tablet, Rfl: 1    clopidogrel (PLAVIX) 75 mg tablet, Take 1 tablet (75 mg total) by mouth daily, Disp: 90 tablet, Rfl: 3    Dulaglutide (Trulicity) 3 FX/4 6ZQ SOPN, Inject 0 5 mL (3 mg total) under the skin once a week, Disp: 2 mL, Rfl: 6    ergocalciferol (ERGOCALCIFEROL) 1 25 MG (35803 UT) capsule, Take 1 capsule (50,000 Units total) by mouth once a week, Disp: 12 capsule, Rfl: 3    insulin glargine (Lantus SoloStar) 100 units/mL injection pen, Use 15 units subcut once daily at 9pm, Disp: 20 mL, Rfl: 4    Insulin Pen Needle 32G X 4 MM MISC, Use 4 times daily with insulin pens, Disp: 120 each, Rfl: 5    lisinopril (ZESTRIL) 5 mg tablet, Take 1 tablet (5 mg total) by mouth daily, Disp: 90 tablet, Rfl: 3    metFORMIN (GLUCOPHAGE) 1000 MG tablet, Take 1 tablet (1,000 mg total) by mouth 2 (two) times a day with meals, Disp: 30 tablet, Rfl: 2    metoprolol tartrate (LOPRESSOR) 25 mg tablet, Take 1 tablet (25 mg total) by mouth every 12 (twelve) hours, Disp: 180 tablet, Rfl: 1    OneTouch Delica Lancets 97B MISC, Use to test 3x daily, Disp: 300 each, Rfl: 2    glucose blood (Glucose Meter Test) test strip, One touch ultra strips use to test 3 times daily, Disp: 100 strip, Rfl: 5  Review of Systems   Constitutional: Negative for unexpected weight change  HENT: Negative for trouble swallowing and voice change  Eyes: Negative for visual disturbance  Respiratory: Negative for shortness of breath  Gastrointestinal: Negative for abdominal pain, diarrhea and nausea  Musculoskeletal: Positive for gait problem  Neurological: Negative for tremors  Psychiatric/Behavioral: The patient is not nervous/anxious (most at dentist)  see HPI    Physical Exam:  Body mass index is 35 98 kg/m²  /80 (BP Location: Left arm, Patient Position: Sitting, Cuff Size: Large)   Pulse 86   Ht 5' (1 524 m)   Wt 83 6 kg (184 lb 4 oz)   LMP  (LMP Unknown)   BMI 35 98 kg/m²    Wt Readings from Last 3 Encounters:   02/09/22 83 6 kg (184 lb 4 oz)   02/04/22 82 2 kg (181 lb 3 2 oz)   01/26/22 83 9 kg (185 lb)         Physical Exam   Gen: appears well-developed and well-nourished  No apparent distress  Head: Normocephalic and atraumatic  Eyes: no stare or proptosis, no periorbital edema  E/N/M mask in place, hearing grossly intact  Neck: range of motion nl  Pulmonary/Chest: breathing  comfortably, no accessory muscle use, effort normal    Musculoskeletal: moves all extremities, ambulates with cane  Neurological: alert and oriented to person, place, and time   No upper ext tremor appreciated  Skin: does not appear diaphoretic  Psychiatric: normal mood and affect; behavior is normal; no gross lapses in memory, answer questions appropriately  Ext: deferred, regular Podiatry care  Labs:     Lab Results   Component Value Date    HGBA1C 6 8 (H) 08/25/2021         Lab Results   Component Value Date    CREATININE 0 85 01/22/2022    CREATININE 0 75 01/21/2022    CREATININE 0 71 01/20/2022    BUN 12 01/22/2022    K 3 7 01/22/2022     01/22/2022    CO2 26 01/22/2022     eGFR   Date Value Ref Range Status   09/01/2020 64 ml/min/1 73sq m Final     No components found for: [facility]    Lab Results   Component Value Date    HDL 41 04/22/2021    TRIG 127 04/22/2021       Lab Results   Component Value Date    ALT 26 01/16/2022    AST 22 01/16/2022    ALKPHOS 87 01/16/2022       Lab Results   Component Value Date    FREET4 1 17 05/06/2020       Impression:  1  Diabetes mellitus with stage 2 chronic kidney disease ([facility] )    2  Type 2 diabetes mellitus with other circulatory complication, with long-term current use of insulin ([facility])    3  Current use of insulin ([facility])    4  Hyperlipidemia, unspecified hyperlipidemia type    5  Essential hypertension           Plan:    Zhang Wheeler was seen today for diabetes type 2  Diagnoses and all orders for this visit:    Diabetes mellitus with stage 2 chronic kidney disease ([facility] )  -     Hemoglobin A1C; Future  -     Microalbumin / creatinine urine ratio; Future  -     glucose blood (Glucose Meter Test) test strip; One touch ultra strips use to test 3 times daily    Type 2 diabetes mellitus with other circulatory complication, with long-term current use of insulin ([facility])  -     metFORMIN (GLUCOPHAGE) 1000 MG tablet; Take 1 tablet (1,000 mg total) by mouth 2 (two) times a day with meals    Current use of insulin (HCC)  -     metFORMIN (GLUCOPHAGE) 1000 MG tablet; Take 1 tablet (1,000 mg total) by mouth 2 (two) times a day with meals  -     glucose blood (Glucose Meter Test) test strip;  One touch ultra strips use to test 3 times daily    Hyperlipidemia, unspecified hyperlipidemia type    Essential hypertension          1  V0UF, complicated by CKD and Charcot foot: Her Sleeve gastrectomy is scheduled for 2/28/22 with Dr Karina Pruitt  Reviewed possible changes with diabetes meds after the procedure  Metformin could irritate the stomach so would be held  Trulicity will be held as well, but may be resumed if she is doing well  Immediately post-op, she may need a low dose of Lantus if Bgs remain >200  2  CKD, HTN: saw Dr Christin Celeste  Some proteinuria is seen  I would not use SGLT2s at this time with her PAD and foot issues  Repeat urine MAC improved and she is due for another  3  Hyperlipidemia: Taking and tolerating atorvastatin well  Will return with LJ to review BGs 4-5 weeks after surgrey  Discussed with the patient and all questioned fully answered  Porter Roe will call me if any problems arise      Counseled patient on diagnostic results, prognosis, risk and benefit of treatment options, instruction for management, importance of treatment compliance, Risk  factor reduction and impressions      Nolan Moreno MD

## 2022-02-09 NOTE — PATIENT INSTRUCTIONS
Usually after the surgery, then sugars get better very quickly  You would not take the metformin or Trulicity right away after the surgery, but could use them again once your surgery has healed  if you need them   If your sugars do not get better right away after the surgery, then you may need a small amount of Lantus afterwards

## 2022-02-11 ENCOUNTER — OFFICE VISIT (OUTPATIENT)
Dept: FAMILY MEDICINE CLINIC | Facility: CLINIC | Age: 57
End: 2022-02-11

## 2022-02-11 VITALS
TEMPERATURE: 98.7 F | SYSTOLIC BLOOD PRESSURE: 106 MMHG | RESPIRATION RATE: 16 BRPM | BODY MASS INDEX: 35.35 KG/M2 | WEIGHT: 181 LBS | HEART RATE: 87 BPM | DIASTOLIC BLOOD PRESSURE: 72 MMHG | OXYGEN SATURATION: 99 %

## 2022-02-11 DIAGNOSIS — K59.03 DRUG-INDUCED CONSTIPATION: Primary | ICD-10-CM

## 2022-02-11 DIAGNOSIS — I10 ESSENTIAL HYPERTENSION: ICD-10-CM

## 2022-02-11 DIAGNOSIS — I73.9 PAD (PERIPHERAL ARTERY DISEASE) (HCC): ICD-10-CM

## 2022-02-11 DIAGNOSIS — E11.9 TYPE 2 DIABETES MELLITUS WITHOUT COMPLICATION, WITH LONG-TERM CURRENT USE OF INSULIN (HCC): ICD-10-CM

## 2022-02-11 DIAGNOSIS — Z79.4 TYPE 2 DIABETES MELLITUS WITHOUT COMPLICATION, WITH LONG-TERM CURRENT USE OF INSULIN (HCC): ICD-10-CM

## 2022-02-11 PROCEDURE — 99214 OFFICE O/P EST MOD 30 MIN: CPT | Performed by: FAMILY MEDICINE

## 2022-02-11 PROCEDURE — 1036F TOBACCO NON-USER: CPT | Performed by: FAMILY MEDICINE

## 2022-02-11 PROCEDURE — 3725F SCREEN DEPRESSION PERFORMED: CPT | Performed by: FAMILY MEDICINE

## 2022-02-11 RX ORDER — CLOPIDOGREL BISULFATE 75 MG/1
75 TABLET ORAL DAILY
Qty: 90 TABLET | Refills: 3 | Status: SHIPPED | OUTPATIENT
Start: 2022-02-11

## 2022-02-11 RX ORDER — ATORVASTATIN CALCIUM 20 MG/1
20 TABLET, FILM COATED ORAL DAILY
Qty: 90 TABLET | Refills: 2 | Status: SHIPPED | OUTPATIENT
Start: 2022-02-11

## 2022-02-11 RX ORDER — DOCUSATE SODIUM 100 MG/1
100 CAPSULE, LIQUID FILLED ORAL 2 TIMES DAILY
Qty: 60 CAPSULE | Refills: 1 | Status: SHIPPED | OUTPATIENT
Start: 2022-02-11

## 2022-02-11 NOTE — PROGRESS NOTES
Assessment/Plan:    No problem-specific Assessment & Plan notes found for this encounter  Diagnoses and all orders for this visit:    Drug-induced constipation  -     docusate sodium (COLACE) 100 mg capsule; Take 1 capsule (100 mg total) by mouth 2 (two) times a day    Essential hypertension  -     atorvastatin (LIPITOR) 20 mg tablet; Take 1 tablet (20 mg total) by mouth daily    Type 2 diabetes mellitus without complication, with long-term current use of insulin (HCC)  -     atorvastatin (LIPITOR) 20 mg tablet; Take 1 tablet (20 mg total) by mouth daily    PAD (peripheral artery disease) (HCC)  -     clopidogrel (PLAVIX) 75 mg tablet; Take 1 tablet (75 mg total) by mouth daily        Plavix ws refilled at the time of her discharge however it was sent to [facility] and patient prefers [facility] in [place] because it is closer for her  Suggested she get the [employer] booster at [facility] when she picks up her medication   Subjective:      Patient ID: Natalya Cheatham is a 64 y o  adult  Natalya Cheatham is a 64 y o   with history of PVD, hypertension, hyperlipidemia, type 2 diabetes, status post right TMA 2019 who presented to [facility] on 1/16/2022 due to right foot pain  On the day of admission, the patient is right TMA stump noted to be warm to touch and erythematous  Vascular surgery at that time recommending IV antibiotics for possible cellulitis and further evaluation with leads study      While admitted to the hospital, leads revealed free occlusion of the right SFA stents with poor distal perfusion  Patient was planned to undergo right lower extremity angiogram with potential IR intervention  Interventional radiology noted severe disease and placed thrombolysis infusion catheter across the stent  Following this, patient was placed on tPA infusion  Due to patient being placed on tPA infusion, was transferred to the intensive care unit for closer monitoring    She remained in the ICU for 1 day until completion of tPA  Following completion of tPA, interventional radiology performed lysis check and the patient was subsequently transferred back to the medical-surgical unit in stable condition  Vascular surgery re-evaluated the patient with repeat leads which revealed appropriate flow in the right leg and the patient was subsequently started back on dual anti-platelet therapy  She was stable for discharge on 01/22/2022  She is here today for follow up  Currently following with Endocrinology for her DM  Has planed Bariatric surgery later this month  Her main concern today is constipation since starting iron tablets  States she has to strain to have a BM every other day  Usually has no problems with constipation  The following portions of the patient's history were reviewed and updated as appropriate:   Colletta Monte  has a past medical history of Cellulitis of toe (1/28/2019), CKD (chronic kidney disease), Diabetes mellitus ([address] ), Gangrene of toe of right foot ([facility] ) (10/30/2019), and Hypertension    Bin Sal Lyon   Patient Active Problem List    Diagnosis Date Noted    Nicotine dependence in remission 01/20/2022    Right foot pain 01/16/2022    CKD (chronic kidney disease) stage 2, GFR 60-89 ml/min 10/05/2021    Well woman exam 02/10/2021    Vaginal candida 02/10/2021    Obstructive sleep apnea 10/30/2020    Persistent proteinuria 09/03/2020    Vitamin D deficiency 09/03/2020    Current use of insulin ([address] ) 08/27/2020    S/P transmetatarsal amputation of foot, right ([facility] ) 08/26/2020    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, with necrosis of bone ([address] ) 05/08/2020    Cellulitis of right lower extremity 05/04/2020    Class 2 severe obesity due to excess calories with serious comorbidity and body mass index (BMI) of 36 0 to 36 9 in adult [facility]) 01/27/2020    History of transmetatarsal amputation of foot ([facility] ) 11/20/2019    Francie foot due to diabetes mellitus ([place] [address] ) 08/05/2019    Polyneuropathy associated with underlying disease ([place] [address] ) 07/08/2019    Critical lower limb ischemia ([place] [address] ) 04/30/2019    Toe amputation status, left 02/11/2019    Leukocytosis 02/05/2019    Anemia 02/01/2019    Diabetes mellitus with stage 2 chronic kidney disease ([place] [address] ) 01/31/2019    HLD (hyperlipidemia) 01/28/2019    PAD (peripheral artery disease) ([place] [address] ) 01/28/2019    Essential hypertension 10/27/2016     Wily Mims  has a past surgical history that includes IR abdominal aortagram (2/1/2019); Toe amputation (Left, 2/4/2019); IR arterial lysis (4/30/2019); IR lower extremity / intervention (11/1/2019); Toe amputation (Right, 11/4/2019); pr amputation foot,transmetatarsal (Right, 11/7/2019); IR aortagram with run-off (5/5/2020); pr amputation foot,transmetatarsal (Right, 5/7/2020); IR lower extremity angiogram (1/19/2022); and IR TPA lysis check (1/20/2022)  Lois Lyon's family history includes Diabetes in Tommy Lyon's mother; No Known Problems in Tommy Lyon's father  [place] Ricky Lyon  reports that Tommy Lyon quit smoking about 9 years ago  Kevon Lyon's smoking use included cigarettes  Kevon Lyon has never used smokeless tobacco  Kevon Lyon reports previous alcohol use  Kevon Lyon reports that Tommy Lyon does not use drugs    Current Outpatient Medications   Medication Sig Dispense Refill    aspirin 81 mg chewable tablet Chew 1 tablet (81 mg total) daily      atorvastatin (LIPITOR) 20 mg tablet Take 1 tablet (20 mg total) by mouth daily 90 tablet 2    Blood Glucose Monitoring Suppl (ONE TOUCH ULTRA MINI) w/Device KIT Use 4 times a day 1 kit 2    cetirizine (ZyrTEC) 10 mg tablet Take 1 tablet (10 mg total) by mouth daily (Patient taking differently: Take 10 mg by mouth daily as needed ) 90 tablet 1    clopidogrel (PLAVIX) 75 mg tablet Take 1 tablet (75 mg total) by mouth daily 90 tablet 3    docusate sodium (COLACE) 100 mg capsule Take 1 capsule (100 mg total) by mouth 2 (two) times a day 60 capsule 1    Dulaglutide (Trulicity) 3 FP/4 3SB SOPN Inject 0 5 mL (3 mg total) under the skin once a week 2 mL 6    ergocalciferol (ERGOCALCIFEROL) 1 25 MG (94143 UT) capsule Take 1 capsule (50,000 Units total) by mouth once a week 12 capsule 3    glucose blood (Glucose Meter Test) test strip One touch ultra strips use to test 3 times daily 100 strip 5    insulin glargine (Lantus SoloStar) 100 units/mL injection pen Use 15 units subcut once daily at 9pm 20 mL 4    Insulin Pen Needle 32G X 4 MM MISC Use 4 times daily with insulin pens 120 each 5    lisinopril (ZESTRIL) 5 mg tablet Take 1 tablet (5 mg total) by mouth daily 90 tablet 3    metFORMIN (GLUCOPHAGE) 1000 MG tablet Take 1 tablet (1,000 mg total) by mouth 2 (two) times a day with meals 30 tablet 2    metoprolol tartrate (LOPRESSOR) 25 mg tablet Take 1 tablet (25 mg total) by mouth every 12 (twelve) hours 180 tablet 1    OneTouch Delica Lancets 86G MISC Use to test 3x daily 300 each 2     No current facility-administered medications for this visit       Current Outpatient Medications on File Prior to Visit   Medication Sig    aspirin 81 mg chewable tablet Chew 1 tablet (81 mg total) daily    Blood Glucose Monitoring Suppl (ONE TOUCH ULTRA MINI) w/Device KIT Use 4 times a day    cetirizine (ZyrTEC) 10 mg tablet Take 1 tablet (10 mg total) by mouth daily (Patient taking differently: Take 10 mg by mouth daily as needed )    Dulaglutide (Trulicity) 3 YT/2 0VO SOPN Inject 0 5 mL (3 mg total) under the skin once a week    ergocalciferol (ERGOCALCIFEROL) 1 25 MG (48761 UT) capsule Take 1 capsule (50,000 Units total) by mouth once a week    glucose blood (Glucose Meter Test) test strip One touch ultra strips use to test 3 times daily    insulin glargine (Lantus SoloStar) 100 units/mL injection pen Use 15 units subcut once daily at 9pm    Insulin Pen Needle 32G X 4 MM MISC Use 4 times daily with insulin pens    lisinopril (ZESTRIL) 5 mg tablet Take 1 tablet (5 mg total) by mouth daily    metFORMIN (GLUCOPHAGE) 1000 MG tablet Take 1 tablet (1,000 mg total) by mouth 2 (two) times a day with meals    metoprolol tartrate (LOPRESSOR) 25 mg tablet Take 1 tablet (25 mg total) by mouth every 12 (twelve) hours    OneTouch Delica Lancets 48C MISC Use to test 3x daily    [DISCONTINUED] atorvastatin (LIPITOR) 20 mg tablet Take 1 tablet by mouth once daily    [DISCONTINUED] clopidogrel (PLAVIX) 75 mg tablet Take 1 tablet (75 mg total) by mouth daily     No current facility-administered medications on file prior to visit       Review of Systems   Gastrointestinal: Positive for constipation  Musculoskeletal: Positive for arthralgias  All other systems reviewed and are negative  Objective:      /72 (BP Location: Left arm, Patient Position: Sitting, Cuff Size: Adult)   Pulse 87   Temp 98 7 °F (37 1 °C) (Temporal)   Resp 16   Wt 82 1 kg (181 lb)   LMP  (LMP Unknown)   SpO2 99%   BMI 35 35 kg/m²          Physical Exam  Vitals and nursing note reviewed  Constitutional:       Appearance: Taran Darby is well-developed  HENT:      Head: Normocephalic  Right Ear: External ear normal       Left Ear: External ear normal       Nose: Nose normal    Eyes:      Conjunctiva/sclera: Conjunctivae normal       Pupils: Pupils are equal, round, and reactive to light  Neck:      Thyroid: No thyromegaly  Cardiovascular:      Rate and Rhythm: Normal rate and regular rhythm  Heart sounds: Normal heart sounds  Pulmonary:      Effort: Pulmonary effort is normal       Breath sounds: Normal breath sounds  Abdominal:      Palpations: Abdomen is soft  Tenderness: There is no abdominal tenderness  There is no guarding or rebound     Musculoskeletal:         General: Normal range of motion  Cervical back: Normal range of motion and neck supple  Skin:     General: Skin is dry  Neurological:      Mental Status: Zygmunt Sox is alert and oriented to person, place, and time  Deep Tendon Reflexes: Reflexes are normal and symmetric

## 2022-02-14 ENCOUNTER — TELEPHONE (OUTPATIENT)
Dept: VASCULAR SURGERY | Facility: CLINIC | Age: 57
End: 2022-02-14

## 2022-02-14 NOTE — TELEPHONE ENCOUNTER
Received a call from [facility], with pre admission testing  Patient is scheduled for a laparoscopic sleeve gastrectomy on 2/28/22  Per [facility], the "usual" Plavix and ASA hold is 7 days prior to surgery  [facility] is requesting a call back from nursing at [phone] with Plavix and ASA hold instructions

## 2022-02-14 NOTE — PRE-PROCEDURE INSTRUCTIONS
Pre-Surgery Instructions:   Medication Instructions    aspirin 81 mg chewable tablet Patient was instructed by Physician and understands   atorvastatin (LIPITOR) 20 mg tablet Instructed patient per Anesthesia Guidelines  Take morning of surgery with small sip of water    cetirizine (ZyrTEC) 10 mg tablet Patient was instructed by Physician and understands  Do not take morning of surgery    clopidogrel (PLAVIX) 75 mg tablet Patient was instructed by Physician and understands   docusate sodium (COLACE) 100 mg capsule Instructed patient per Anesthesia Guidelines  Take morning of surgery with small sip of water    Dulaglutide (Trulicity) 3 XB/0 2ZT SOPN Instructed patient per Anesthesia Guidelines  Use morning of surgery on your regular scheduled dose    ergocalciferol (ERGOCALCIFEROL) 1 25 MG (07850 UT) capsule Instructed patient per Anesthesia Guidelines  Stop 2/22  Do not take morning of surgery    insulin glargine (Lantus SoloStar) 100 units/mL injection pen Instructed patient per Anesthesia Guidelines  Take in the evening as normal    lisinopril (ZESTRIL) 5 mg tablet Instructed patient per Anesthesia Guidelines  Do not take morning of surgery    metFORMIN (GLUCOPHAGE) 1000 MG tablet Instructed patient per Anesthesia Guidelines  Do not take morning of surgery    metoprolol tartrate (LOPRESSOR) 25 mg tablet Instructed patient per Anesthesia Guidelines  Take morning of surgery with small sip of water   Covid screening negative as per patient  Fully vaccinated  Reviewed showering and medication instructions  Patient verbalized understanding  Advised NPO after MN and ASC will call with scheduled surgical time  Reviewed instructions for carb drinks  Will receive them 2/17 from surgeon's office

## 2022-02-15 ENCOUNTER — TELEPHONE (OUTPATIENT)
Dept: ENDOCRINOLOGY | Facility: CLINIC | Age: 57
End: 2022-02-15

## 2022-02-15 NOTE — TELEPHONE ENCOUNTER
P  A   For OneTouch Ultra strips was sent to Pt's plan via Cover my med[employer]      Milagros Bowden: Kristina BERTRAND Case ID: [id number] - Rx #: [id number]  Need help?   Call us at [phone]    Status   Sent to [employer]    Drug    OneTouch Ultra strips   Form    [employer] Electronic PA Form           Martina Johnson Info    [phone]

## 2022-02-16 ENCOUNTER — OFFICE VISIT (OUTPATIENT)
Dept: PODIATRY | Facility: CLINIC | Age: 57
End: 2022-02-16
Payer: COMMERCIAL

## 2022-02-16 VITALS
HEART RATE: 84 BPM | BODY MASS INDEX: 35.53 KG/M2 | SYSTOLIC BLOOD PRESSURE: 131 MMHG | HEIGHT: 60 IN | WEIGHT: 181 LBS | DIASTOLIC BLOOD PRESSURE: 84 MMHG

## 2022-02-16 DIAGNOSIS — I73.9 PAD (PERIPHERAL ARTERY DISEASE) (HCC): ICD-10-CM

## 2022-02-16 DIAGNOSIS — S90.821A BLISTER OF RIGHT FOOT, INITIAL ENCOUNTER: Primary | ICD-10-CM

## 2022-02-16 PROCEDURE — 99213 OFFICE O/P EST LOW 20 MIN: CPT | Performed by: PODIATRIST

## 2022-02-16 PROCEDURE — 1111F DSCHRG MED/CURRENT MED MERGE: CPT | Performed by: PODIATRIST

## 2022-02-16 NOTE — PROGRESS NOTES
Assessment/Plan:      Diagnoses and all orders for this visit:    Blister of right foot, initial encounter    PAD (peripheral artery disease) ([address] )      Resolved acute ischemia, wounds are healed  Resume at risk foot care 9-12 weeks  She is at very high risk due to PAD and DM  Subjective:     Patient ID: Murtaza Hightower is a 64 y o  adult  1/26/22: Patient was hospitalized a week or 2 ago with an occluded right superficial femoral artery and ischemic changes to her right foot  She has previously had a proximal transmetatarsal amputation  There was some redness to the foot she was put on antibiotics  They were  Able to reopen her occluded stents but now there is some mild blistering to the right foot  Overall she feels well  2/16/22: Patient is following up from her ischemic limb treatment  She had developed a black blister on the front of her amputation which I saw 3 weeks ago  She is here to check and make sure it is healing  She denies drainage  The scab is falling off  There are no open wounds  She feels well and denies any acute pain to the right lower extremity      Review of Systems   Constitutional: Negative  Gastrointestinal: Negative for diarrhea, nausea and rectal pain  Musculoskeletal: Positive for arthralgias and joint swelling  Skin: Positive for color change  Negative for wound  Neurological: Positive for weakness and numbness  Objective:     Physical Exam  Vitals reviewed  Constitutional:       Appearance: Murtaza Hightower is obese  Murtaza Hightower is not ill-appearing or diaphoretic  Cardiovascular:      Pulses:           Dorsalis pedis pulses are 0 on the right side  Posterior tibial pulses are 0 on the right side  Musculoskeletal:      Right foot: Normal range of motion  Deformity ( transmetatarsal amputation is stable) present  Feet:      Right foot:      Protective Sensation: 5 sites tested  0 sites sensed        Skin integrity: Skin breakdown ( small black eschar to the tip of the amputation stump but no open wound or sign of acute ischemia  Capillary refill is brisk to the right amputation and the foot is warm to touch) present  No ulcer  Neurological:      Mental Status: Daniel Velasquez is alert

## 2022-02-17 ENCOUNTER — TELEPHONE (OUTPATIENT)
Dept: BARIATRICS | Facility: CLINIC | Age: 57
End: 2022-02-17

## 2022-02-17 ENCOUNTER — CLINICAL SUPPORT (OUTPATIENT)
Dept: BARIATRICS | Facility: CLINIC | Age: 57
End: 2022-02-17

## 2022-02-17 ENCOUNTER — OFFICE VISIT (OUTPATIENT)
Dept: BARIATRICS | Facility: CLINIC | Age: 57
End: 2022-02-17
Payer: COMMERCIAL

## 2022-02-17 VITALS
BODY MASS INDEX: 35.24 KG/M2 | DIASTOLIC BLOOD PRESSURE: 70 MMHG | TEMPERATURE: 97.9 F | SYSTOLIC BLOOD PRESSURE: 122 MMHG | WEIGHT: 179.5 LBS | HEIGHT: 60 IN | HEART RATE: 87 BPM

## 2022-02-17 DIAGNOSIS — G63 POLYNEUROPATHY ASSOCIATED WITH UNDERLYING DISEASE (HCC): ICD-10-CM

## 2022-02-17 DIAGNOSIS — E66.9 OBESITY, CLASS II, BMI 35-39.9: Primary | ICD-10-CM

## 2022-02-17 DIAGNOSIS — E78.5 HYPERLIPIDEMIA, UNSPECIFIED HYPERLIPIDEMIA TYPE: ICD-10-CM

## 2022-02-17 DIAGNOSIS — I73.9 PAD (PERIPHERAL ARTERY DISEASE) (HCC): ICD-10-CM

## 2022-02-17 DIAGNOSIS — M79.671 RIGHT FOOT PAIN: ICD-10-CM

## 2022-02-17 DIAGNOSIS — E11.22 DIABETES MELLITUS WITH STAGE 2 CHRONIC KIDNEY DISEASE (HCC): ICD-10-CM

## 2022-02-17 DIAGNOSIS — N18.2 CKD (CHRONIC KIDNEY DISEASE) STAGE 2, GFR 60-89 ML/MIN: ICD-10-CM

## 2022-02-17 DIAGNOSIS — I10 ESSENTIAL HYPERTENSION: ICD-10-CM

## 2022-02-17 DIAGNOSIS — G47.33 OBSTRUCTIVE SLEEP APNEA: Chronic | ICD-10-CM

## 2022-02-17 DIAGNOSIS — N18.2 DIABETES MELLITUS WITH STAGE 2 CHRONIC KIDNEY DISEASE (HCC): ICD-10-CM

## 2022-02-17 PROBLEM — E66.812 OBESITY, CLASS II, BMI 35-39.9: Status: ACTIVE | Noted: 2022-02-17

## 2022-02-17 PROCEDURE — 3008F BODY MASS INDEX DOCD: CPT | Performed by: SURGERY

## 2022-02-17 PROCEDURE — 3074F SYST BP LT 130 MM HG: CPT | Performed by: SURGERY

## 2022-02-17 PROCEDURE — 3078F DIAST BP <80 MM HG: CPT | Performed by: SURGERY

## 2022-02-17 PROCEDURE — 3008F BODY MASS INDEX DOCD: CPT | Performed by: FAMILY MEDICINE

## 2022-02-17 PROCEDURE — 99213 OFFICE O/P EST LOW 20 MIN: CPT | Performed by: SURGERY

## 2022-02-17 PROCEDURE — RECHECK: Performed by: DIETITIAN, REGISTERED

## 2022-02-17 PROCEDURE — 1036F TOBACCO NON-USER: CPT | Performed by: SURGERY

## 2022-02-17 RX ORDER — GABAPENTIN 300 MG/1
300 CAPSULE ORAL ONCE
Status: CANCELLED | OUTPATIENT
Start: 2022-02-28 | End: 2022-02-17

## 2022-02-17 RX ORDER — CEFAZOLIN SODIUM 2 G/50ML
2000 SOLUTION INTRAVENOUS ONCE
Status: CANCELLED | OUTPATIENT
Start: 2022-02-28 | End: 2022-02-17

## 2022-02-17 RX ORDER — SCOLOPAMINE TRANSDERMAL SYSTEM 1 MG/1
1 PATCH, EXTENDED RELEASE TRANSDERMAL ONCE
Status: CANCELLED | OUTPATIENT
Start: 2022-02-28 | End: 2022-02-17

## 2022-02-17 RX ORDER — ACETAMINOPHEN 325 MG/1
975 TABLET ORAL ONCE
Status: CANCELLED | OUTPATIENT
Start: 2022-02-28 | End: 2022-02-17

## 2022-02-17 RX ORDER — CELECOXIB 200 MG/1
200 CAPSULE ORAL ONCE
Status: CANCELLED | OUTPATIENT
Start: 2022-02-28 | End: 2022-02-17

## 2022-02-17 NOTE — PROGRESS NOTES
Weight Management Nutrition Note        Bariatric Surgeon: Dr Mateo Canavan  Surgery: Vertical Sleeve Gastrectomy    Class: Pre Op Class     Topics discussed today include:     Pt attended VIRTUAL pre-op education session  Standardized packet of information for bariatric surgery was sent via email and was reviewed with pt  Importance of lifestyle change and development of regular exercise routine stressed  Pt given the opportunity to ask questions  Ensure pre-surgery drink instructions were given  Questions were answered  Pt verbalized understanding of all information provided  Pt appeared prepared for upcoming surgery  Pt  educated on two-week pre operative liver shrinking diet  Pt understands that the diet needs to be followed for 2 weeks prior to surgery  Handout reviewed  Emphasized the need to drink 80 ounces of fluid per day while on the diet  Reviewed pre-op ERAS drink, post-operative clear liquid, full liquid, and pureed diet, post-operative nutrition rules and facts, and post-operative bariatric multivitamin/mineral recommendations and brand comparison  Contact information provided for any questions/concerns  Patient was able to verbalize basic diet (protein, fluid, vitamin and mineral) recommendations and possible nutrition-related complications   Yes

## 2022-02-17 NOTE — H&P (VIEW-ONLY)
BARIATRIC H&P - BARIATRIC SURGERY  Jason Lyon 64 y o  adult MRN: [id number]  Unit/Bed#:  Encounter: [id number]      HPI:  Daniel Velasquez is a 64 y o  adult who presents with a long-standing history of morbid obesity  She was found to be a good candidate to undergo a bariatric operation [facility]  She is here today to discuss details of her surgery  Review of Systems   All other systems reviewed and are negative        Historical Information   Past Medical History:   Diagnosis Date    Cellulitis of toe 2019    CKD (chronic kidney disease)     CPAP (continuous positive airway pressure) dependence     [facility] [address] [facility]     Gangrene of toe of right foot ([facility] [address] ) 10/30/2019    Added automatically from request for surgery [id number]    Hyperlipidemia     Hypertension     PVD (peripheral vascular disease) [facility] [facility]     Sleep apnea      Past Surgical History:   Procedure Laterality Date     SECTION      EGD      IR ABDOMINAL AORTAGRAM  2019    IR AORTAGRAM WITH RUN-OFF  2020    IR ARTERIAL LYSIS  2019    IR LOWER EXTREMITY / INTERVENTION  2019    IR LOWER EXTREMITY ANGIOGRAM  2022    IR TPA LYSIS CHECK  2022    [place] AMPUTATION FOOT,TRANSMETATARSAL Right 2019    Procedure: AMPUTATION TRANSMETATARSAL (TMA) WITH ACHILLES TENDON LENGTHING;  Surgeon: Yudith Arias DPM;  [facility];  Service: Podiatry    HI AMPUTATION FOOT,TRANSMETATARSAL Right 2020    Procedure: AMPUTATION TRANSMETATARSAL (TMA);  Surgeon: Gloria Vides DPM;  [facility];  Service: Podiatry    TOE AMPUTATION Left 2019    Procedure: AMPUTATION 5th TOE;  Surgeon: Yudith Arias DPM;  [facility];  Service: Podiatry    TOE AMPUTATION Right 2019    Procedure: AMPUTATION RIGHT 3RD AND 4TH TOE;  Surgeon: Yudith Arias DPM;  [facility];  Service: Podiatry     Social History Social History     Substance and Sexual Activity   Alcohol Use Not Currently    Comment: rarely     Social History     Substance and Sexual Activity   Drug Use No     Social History     Tobacco Use   Smoking Status Former Smoker    Types: Cigarettes    Quit date:     Years since quittin 1   Smokeless Tobacco Never Used     Family History: non-contributory    Meds/Allergies   all medications and allergies reviewed  Allergies   Allergen Reactions    Levemir [Insulin Detemir] Itching       Objective     Current Vitals:   Blood Pressure: 122/70 (22 1252)  Pulse: 87 (22 1252)  Temperature: 97 9 °F (36 6 °C) (22 1252)  Temp Source: Tympanic (22 1252)  Height: 5' (152 4 cm) (22 1252)  Weight - Scale: 81 4 kg (179 lb 8 oz) (22 125)      Invasive Devices  Report    None                 Physical Exam  Vitals and nursing note reviewed  Constitutional:       General: Teretha Stager is not in acute distress  Appearance: Normal appearance  Teretha Stager is well-developed  Teretha Stager is not diaphoretic  HENT:      Head: Normocephalic and atraumatic  Nose: Nose normal    Eyes:      General: No scleral icterus  Right eye: No discharge  Left eye: No discharge  Conjunctiva/sclera: Conjunctivae normal    Cardiovascular:      Rate and Rhythm: Normal rate and regular rhythm  Heart sounds: Normal heart sounds  Pulmonary:      Effort: Pulmonary effort is normal  No respiratory distress  Breath sounds: Normal breath sounds  No stridor  No wheezing or rales  Chest:      Chest wall: No tenderness  Abdominal:      General: Bowel sounds are normal       Palpations: Abdomen is soft  Tenderness: There is no abdominal tenderness  There is no guarding or rebound  Comments: Abdomen is obese, soft and benign    Well-healed infraumbilical midline incision from previous    Musculoskeletal: General: No deformity  Normal range of motion  Cervical back: Normal range of motion and neck supple  Lymphadenopathy:      Cervical: No cervical adenopathy  Skin:     General: Skin is warm and dry  Findings: No erythema or rash  Neurological:      Mental Status: Zygmunt Sox is alert and oriented to person, place, and time  Psychiatric:         Behavior: Behavior normal          Thought Content: Thought content normal          Judgment: Judgment normal          Lab Results: I have personally reviewed pertinent lab results  Imaging: I have personally reviewed pertinent reports  EKG, Pathology, and Other Studies: I have personally reviewed pertinent reports  The endoscopy showed gastritis and gastroparesis with retained gastric contents  The biopsies revealed  Final Diagnosis  A  Stomach, Biopsy:  - Mild chronic inactive antral gastritis  - Negative for intestinal metaplasia or dysplasia  - No Helicobacter pylori is identified on H&E stained slides        Assessment/PLAN:    64 y o  female morbidly obese found to be a good candidate to undergo a weight loss operation upon being enrolled here at the [facility]     Patient has a long history of morbid obesity and is presenting to discuss the surgical weight loss options  Despite the patient best efforts patient was unable to lose any meaningful or sustainable weight using nonsurgical means  We had a long discussion regarding all the surgical weight-loss options at our disposal at this point and reviewed the risks and benefits of each procedure in details as it relates to her age, BMI and medical conditions  She has been pre certified to undergo a Laparoscopic sleeve gastrectomy      We have discussed the 14%-20% incidence of post op heartburn after the sleeve gastrectomy and the fact that if this cannot be controlled with medication or conservative measures it might need to be converted to a Francesco-en-Y gastric bypass  We have also discussed the fact that the patient needs to be followed up postoperatively and potentially get screening endoscopies in the future to rule out any development of pathology as a result of the sleeve gastrectomy  Here today to review her pre op test results  Has been medically cleared for the procedure     ++++++++++++++++++++++++++++++++++  She has ABHINAV and wears a CPAP machine  ++++++++++++++++++++++++++++++++++    I have discussed with her at length the risks and benefits of the operation and reiterated the components of our multidisciplinary program and the importance of compliance and follow up in the post operative period  Although there is a great statistical chance of improvement or even resolution of most of her associated comorbidities, the results vary from patient to patient and they largely depend on her commitment  The patient was also instructed with regards to the importance of behavior modification, nutritional counseling, support meeting attendance and lifestyle changes that are important to ensure success  She was given the opportunity to ask questions and I have answered all of them  I have addressed with the patient the level of CODE STATUS for this hospital stay and after explaining the different options currently she wishes to be a Level I  She understands and wishes to proceed  She has lost all the weight required prior to surgery      Gerry De La Paz MD  2/17/2022  1:19 PM

## 2022-02-17 NOTE — TELEPHONE ENCOUNTER
Called patient , as she had a question this morning concerning the pre op diet  I reviewed her chart and she does have gastroparesis  Instructed patient to drink only liquids for one week prior to surgery  She is to drink 3 to 4 protein shakes per day  She can drink calorie containing juices  She can blenderize soups and add extra liquid so that it is liquid consistency  Pt verbalized understanding

## 2022-02-17 NOTE — H&P
BARIATRIC H&P - BARIATRIC SURGERY  Molly Lyon 64 y o  adult MRN: [id number]  Unit/Bed#:  Encounter: [id number]      HPI:  Karen Rubio is a 64 y o  adult who presents with a long-standing history of morbid obesity  She was found to be a good candidate to undergo a bariatric operation [facility]  She is here today to discuss details of her surgery  Review of Systems   All other systems reviewed and are negative        Historical Information   Past Medical History:   Diagnosis Date    Cellulitis of toe 2019    CKD (chronic kidney disease)     CPAP (continuous positive airway pressure) dependence     Diabetes mellitus [address] [address] )     Gangrene of toe of right foot ([facility] [address] ) 10/30/2019    Added automatically from request for surgery [id number]    Hyperlipidemia     Hypertension     PVD (peripheral vascular disease) ([facility] [address] )     Sleep apnea      Past Surgical History:   Procedure Laterality Date     SECTION      EGD      IR ABDOMINAL AORTAGRAM  2019    IR AORTAGRAM WITH RUN-OFF  2020    IR ARTERIAL LYSIS  2019    IR LOWER EXTREMITY / INTERVENTION  2019    IR LOWER EXTREMITY ANGIOGRAM  2022    IR TPA LYSIS CHECK  2022    [place] AMPUTATION FOOT,TRANSMETATARSAL Right 2019    Procedure: AMPUTATION TRANSMETATARSAL (TMA) WITH ACHILLES TENDON LENGTHING;  Surgeon: Kathy Madera DPM;  [facility];  Service: Podiatry    [place] AMPUTATION FOOT,TRANSMETATARSAL Right 2020    Procedure: AMPUTATION TRANSMETATARSAL (TMA);  Surgeon: Emerson Zheng DPM;  [facility];  Service: Podiatry    TOE AMPUTATION Left 2019    Procedure: AMPUTATION 5th TOE;  Surgeon: Kathy Madera DPM;  [facility];  Service: Podiatry    TOE AMPUTATION Right 2019    Procedure: AMPUTATION RIGHT 3RD AND 4TH TOE;  Surgeon: Kathy Madera DPM;  [facility];  Service: Podiatry     Social History Social History     Substance and Sexual Activity   Alcohol Use Not Currently    Comment: rarely     Social History     Substance and Sexual Activity   Drug Use No     Social History     Tobacco Use   Smoking Status Former Smoker    Types: Cigarettes    Quit date:     Years since quittin 1   Smokeless Tobacco Never Used     Family History: non-contributory    Meds/Allergies   all medications and allergies reviewed  Allergies   Allergen Reactions    Levemir [Insulin Detemir] Itching       Objective     Current Vitals:   Blood Pressure: 122/70 (22 1252)  Pulse: 87 (22 1252)  Temperature: 97 9 °F (36 6 °C) (22 1252)  Temp Source: Tympanic (22 1252)  Height: 5' (152 4 cm) (22 1252)  Weight - Scale: 81 4 kg (179 lb 8 oz) (22 125)      Invasive Devices  Report    None                 Physical Exam  Vitals and nursing note reviewed  Constitutional:       General: Teretha Stager is not in acute distress  Appearance: Normal appearance  Teretha Stager is well-developed  Teretha Stager is not diaphoretic  HENT:      Head: Normocephalic and atraumatic  Nose: Nose normal    Eyes:      General: No scleral icterus  Right eye: No discharge  Left eye: No discharge  Conjunctiva/sclera: Conjunctivae normal    Cardiovascular:      Rate and Rhythm: Normal rate and regular rhythm  Heart sounds: Normal heart sounds  Pulmonary:      Effort: Pulmonary effort is normal  No respiratory distress  Breath sounds: Normal breath sounds  No stridor  No wheezing or rales  Chest:      Chest wall: No tenderness  Abdominal:      General: Bowel sounds are normal       Palpations: Abdomen is soft  Tenderness: There is no abdominal tenderness  There is no guarding or rebound  Comments: Abdomen is obese, soft and benign    Well-healed infraumbilical midline incision from previous    Musculoskeletal: General: No deformity  Normal range of motion  Cervical back: Normal range of motion and neck supple  Lymphadenopathy:      Cervical: No cervical adenopathy  Skin:     General: Skin is warm and dry  Findings: No erythema or rash  Neurological:      Mental Status: Forrestine Canova is alert and oriented to person, place, and time  Psychiatric:         Behavior: Behavior normal          Thought Content: Thought content normal          Judgment: Judgment normal          Lab Results: I have personally reviewed pertinent lab results  Imaging: I have personally reviewed pertinent reports  EKG, Pathology, and Other Studies: I have personally reviewed pertinent reports  The endoscopy showed gastritis and gastroparesis with retained gastric contents  The biopsies revealed  Final Diagnosis  A  Stomach, Biopsy:  - Mild chronic inactive antral gastritis  - Negative for intestinal metaplasia or dysplasia  - No Helicobacter pylori is identified on H&E stained slides        Assessment/PLAN:    64 y o  female morbidly obese found to be a good candidate to undergo a weight loss operation upon being enrolled here at the [facility]     Patient has a long history of morbid obesity and is presenting to discuss the surgical weight loss options  Despite the patient best efforts patient was unable to lose any meaningful or sustainable weight using nonsurgical means  We had a long discussion regarding all the surgical weight-loss options at our disposal at this point and reviewed the risks and benefits of each procedure in details as it relates to her age, BMI and medical conditions  She has been pre certified to undergo a Laparoscopic sleeve gastrectomy      We have discussed the 14%-20% incidence of post op heartburn after the sleeve gastrectomy and the fact that if this cannot be controlled with medication or conservative measures it might need to be converted to a Francesco-en-Y gastric bypass  We have also discussed the fact that the patient needs to be followed up postoperatively and potentially get screening endoscopies in the future to rule out any development of pathology as a result of the sleeve gastrectomy  Here today to review her pre op test results  Has been medically cleared for the procedure     ++++++++++++++++++++++++++++++++++  She has ABHINAV and wears a CPAP machine  ++++++++++++++++++++++++++++++++++    I have discussed with her at length the risks and benefits of the operation and reiterated the components of our multidisciplinary program and the importance of compliance and follow up in the post operative period  Although there is a great statistical chance of improvement or even resolution of most of her associated comorbidities, the results vary from patient to patient and they largely depend on her commitment  The patient was also instructed with regards to the importance of behavior modification, nutritional counseling, support meeting attendance and lifestyle changes that are important to ensure success  She was given the opportunity to ask questions and I have answered all of them  I have addressed with the patient the level of CODE STATUS for this hospital stay and after explaining the different options currently she wishes to be a Level I  She understands and wishes to proceed  She has lost all the weight required prior to surgery      Mercedes Chu MD  2/17/2022  1:19 PM

## 2022-02-18 ENCOUNTER — TELEPHONE (OUTPATIENT)
Dept: BARIATRICS | Facility: CLINIC | Age: 57
End: 2022-02-18

## 2022-02-18 DIAGNOSIS — E66.01 CLASS 2 SEVERE OBESITY DUE TO EXCESS CALORIES WITH SERIOUS COMORBIDITY AND BODY MASS INDEX (BMI) OF 36.0 TO 36.9 IN ADULT (HCC): Primary | ICD-10-CM

## 2022-02-18 RX ORDER — OXYCODONE HYDROCHLORIDE 5 MG/1
5 TABLET ORAL EVERY 4 HOURS PRN
Qty: 10 TABLET | Refills: 0 | Status: SHIPPED | OUTPATIENT
Start: 2022-03-01

## 2022-02-18 RX ORDER — OMEPRAZOLE 20 MG/1
20 CAPSULE, DELAYED RELEASE ORAL DAILY
Qty: 30 CAPSULE | Refills: 3 | Status: SHIPPED | OUTPATIENT
Start: 2022-03-01 | End: 2022-06-17

## 2022-02-18 NOTE — TELEPHONE ENCOUNTER
I called the pharmacist and asked if there were any PPI's that they would prefer and she suggested Pantoprazole

## 2022-02-18 NOTE — TELEPHONE ENCOUNTER
Pharmacist called in reference to patients recent order of Omeprazole  Pharmacist stated that it is a contraindication of her current medication PLAVIX 75 mg

## 2022-02-21 DIAGNOSIS — E66.9 OBESITY, CLASS II, BMI 35-39.9: Primary | ICD-10-CM

## 2022-02-21 RX ORDER — PANTOPRAZOLE SODIUM 40 MG/1
40 TABLET, DELAYED RELEASE ORAL DAILY
Qty: 30 TABLET | Refills: 3 | Status: SHIPPED | OUTPATIENT
Start: 2022-03-01 | End: 2022-03-01 | Stop reason: HOSPADM

## 2022-02-21 NOTE — TELEPHONE ENCOUNTER
PO medication for Sleeve sx on 2/28/2022 with Dr Christina Iverson    **replacing Omeprazole 20 mg**

## 2022-02-22 PROCEDURE — U0003 INFECTIOUS AGENT DETECTION BY NUCLEIC ACID (DNA OR RNA); SEVERE ACUTE RESPIRATORY SYNDROME CORONAVIRUS 2 (SARS-COV-2) (CORONAVIRUS DISEASE [COVID-19]), AMPLIFIED PROBE TECHNIQUE, MAKING USE OF HIGH THROUGHPUT TECHNOLOGIES AS DESCRIBED BY CMS-2020-01-R: HCPCS | Performed by: SURGERY

## 2022-02-22 PROCEDURE — U0005 INFEC AGEN DETEC AMPLI PROBE: HCPCS | Performed by: SURGERY

## 2022-02-24 ENCOUNTER — ANESTHESIA EVENT (OUTPATIENT)
Dept: PERIOP | Facility: HOSPITAL | Age: 57
DRG: 621 | End: 2022-02-24
Payer: COMMERCIAL

## 2022-02-28 ENCOUNTER — ANESTHESIA (OUTPATIENT)
Dept: PERIOP | Facility: HOSPITAL | Age: 57
DRG: 621 | End: 2022-02-28
Payer: COMMERCIAL

## 2022-02-28 ENCOUNTER — HOSPITAL ENCOUNTER (INPATIENT)
Facility: HOSPITAL | Age: 57
LOS: 1 days | Discharge: HOME | DRG: 621 | End: 2022-03-01
Attending: SURGERY | Admitting: SURGERY
Payer: COMMERCIAL

## 2022-02-28 DIAGNOSIS — I10 ESSENTIAL HYPERTENSION, BENIGN: ICD-10-CM

## 2022-02-28 DIAGNOSIS — E11.9 SEVERE DIABETES MELLITUS (HCC): ICD-10-CM

## 2022-02-28 DIAGNOSIS — Z79.4 CURRENT USE OF INSULIN (HCC): Primary | ICD-10-CM

## 2022-02-28 DIAGNOSIS — E11.22 DIABETES MELLITUS WITH STAGE 2 CHRONIC KIDNEY DISEASE (HCC): ICD-10-CM

## 2022-02-28 DIAGNOSIS — E66.9 OBESITY, CLASS II, BMI 35-39.9: ICD-10-CM

## 2022-02-28 DIAGNOSIS — I10 ESSENTIAL HYPERTENSION: ICD-10-CM

## 2022-02-28 DIAGNOSIS — G47.33 OSA ON CPAP: ICD-10-CM

## 2022-02-28 DIAGNOSIS — E66.9 OBESITY, UNSPECIFIED CLASSIFICATION, UNSPECIFIED OBESITY TYPE, UNSPECIFIED WHETHER SERIOUS COMORBIDITY PRESENT: ICD-10-CM

## 2022-02-28 DIAGNOSIS — N18.2 CKD (CHRONIC KIDNEY DISEASE) STAGE 2, GFR 60-89 ML/MIN: ICD-10-CM

## 2022-02-28 DIAGNOSIS — I73.9 PAD (PERIPHERAL ARTERY DISEASE) (HCC): ICD-10-CM

## 2022-02-28 DIAGNOSIS — N18.2 DIABETES MELLITUS WITH STAGE 2 CHRONIC KIDNEY DISEASE (HCC): ICD-10-CM

## 2022-02-28 DIAGNOSIS — Z99.89 OSA ON CPAP: ICD-10-CM

## 2022-02-28 DIAGNOSIS — G47.33 OBSTRUCTIVE SLEEP APNEA: ICD-10-CM

## 2022-02-28 LAB
FLUAV RNA RESP QL NAA+PROBE: NEGATIVE
FLUBV RNA RESP QL NAA+PROBE: NEGATIVE
GLUCOSE SERPL-MCNC: 157 MG/DL (ref 65–140)
GLUCOSE SERPL-MCNC: 261 MG/DL (ref 65–140)
GLUCOSE SERPL-MCNC: 275 MG/DL (ref 65–140)
GLUCOSE SERPL-MCNC: 311 MG/DL (ref 65–140)
RSV RNA RESP QL NAA+PROBE: NEGATIVE
SARS-COV-2 RNA RESP QL NAA+PROBE: NEGATIVE

## 2022-02-28 PROCEDURE — 82948 REAGENT STRIP/BLOOD GLUCOSE: CPT

## 2022-02-28 PROCEDURE — C1781 MESH (IMPLANTABLE): HCPCS | Performed by: SURGERY

## 2022-02-28 PROCEDURE — 0DB64Z3 EXCISION OF STOMACH, PERCUTANEOUS ENDOSCOPIC APPROACH, VERTICAL: ICD-10-PCS | Performed by: SURGERY

## 2022-02-28 PROCEDURE — 0DJ08ZZ INSPECTION OF UPPER INTESTINAL TRACT, VIA NATURAL OR ARTIFICIAL OPENING ENDOSCOPIC: ICD-10-PCS | Performed by: SURGERY

## 2022-02-28 PROCEDURE — 88307 TISSUE EXAM BY PATHOLOGIST: CPT | Performed by: PATHOLOGY

## 2022-02-28 PROCEDURE — 43775 LAP SLEEVE GASTRECTOMY: CPT | Performed by: STUDENT IN AN ORGANIZED HEALTH CARE EDUCATION/TRAINING PROGRAM

## 2022-02-28 PROCEDURE — C9290 INJ, BUPIVACAINE LIPOSOME: HCPCS | Performed by: SURGERY

## 2022-02-28 PROCEDURE — 0241U HB NFCT DS VIR RESP RNA 4 TRGT: CPT | Performed by: ANESTHESIOLOGY

## 2022-02-28 PROCEDURE — 51798 US URINE CAPACITY MEASURE: CPT | Performed by: SURGERY

## 2022-02-28 PROCEDURE — 43775 LAP SLEEVE GASTRECTOMY: CPT | Performed by: SURGERY

## 2022-02-28 DEVICE — SEAMGUARD STPL REINF ENDO GIA ULTRA UNIV 60 PURPLE: Type: IMPLANTABLE DEVICE | Site: ABDOMEN | Status: FUNCTIONAL

## 2022-02-28 DEVICE — SEAMGUARD STPL REINF ENDO GIA ULTRA UNV 60 BLACK: Type: IMPLANTABLE DEVICE | Site: ABDOMEN | Status: FUNCTIONAL

## 2022-02-28 RX ORDER — SODIUM CHLORIDE 9 MG/ML
INJECTION, SOLUTION INTRAVENOUS AS NEEDED
Status: DISCONTINUED | OUTPATIENT
Start: 2022-02-28 | End: 2022-02-28 | Stop reason: HOSPADM

## 2022-02-28 RX ORDER — ONDANSETRON 2 MG/ML
4 INJECTION INTRAMUSCULAR; INTRAVENOUS ONCE AS NEEDED
Status: DISCONTINUED | OUTPATIENT
Start: 2022-02-28 | End: 2022-02-28 | Stop reason: HOSPADM

## 2022-02-28 RX ORDER — SODIUM CHLORIDE, SODIUM LACTATE, POTASSIUM CHLORIDE, CALCIUM CHLORIDE 600; 310; 30; 20 MG/100ML; MG/100ML; MG/100ML; MG/100ML
100 INJECTION, SOLUTION INTRAVENOUS CONTINUOUS
Status: DISCONTINUED | OUTPATIENT
Start: 2022-02-28 | End: 2022-03-01 | Stop reason: HOSPADM

## 2022-02-28 RX ORDER — PROMETHAZINE HYDROCHLORIDE 25 MG/ML
25 INJECTION, SOLUTION INTRAMUSCULAR; INTRAVENOUS EVERY 6 HOURS PRN
Status: DISCONTINUED | OUTPATIENT
Start: 2022-02-28 | End: 2022-03-01 | Stop reason: HOSPADM

## 2022-02-28 RX ORDER — HYDROMORPHONE HCL/PF 1 MG/ML
0.5 SYRINGE (ML) INJECTION
Status: DISCONTINUED | OUTPATIENT
Start: 2022-02-28 | End: 2022-02-28 | Stop reason: HOSPADM

## 2022-02-28 RX ORDER — CELECOXIB 200 MG/1
200 CAPSULE ORAL ONCE
Status: COMPLETED | OUTPATIENT
Start: 2022-02-28 | End: 2022-02-28

## 2022-02-28 RX ORDER — ONDANSETRON 2 MG/ML
4 INJECTION INTRAMUSCULAR; INTRAVENOUS EVERY 6 HOURS PRN
Status: DISCONTINUED | OUTPATIENT
Start: 2022-02-28 | End: 2022-03-01 | Stop reason: HOSPADM

## 2022-02-28 RX ORDER — MORPHINE SULFATE 4 MG/ML
4 INJECTION, SOLUTION INTRAMUSCULAR; INTRAVENOUS EVERY 4 HOURS PRN
Status: DISCONTINUED | OUTPATIENT
Start: 2022-02-28 | End: 2022-03-01 | Stop reason: HOSPADM

## 2022-02-28 RX ORDER — NEOSTIGMINE METHYLSULFATE 1 MG/ML
INJECTION INTRAVENOUS AS NEEDED
Status: DISCONTINUED | OUTPATIENT
Start: 2022-02-28 | End: 2022-02-28

## 2022-02-28 RX ORDER — METOCLOPRAMIDE HYDROCHLORIDE 5 MG/ML
10 INJECTION INTRAMUSCULAR; INTRAVENOUS EVERY 6 HOURS PRN
Status: DISCONTINUED | OUTPATIENT
Start: 2022-02-28 | End: 2022-03-01 | Stop reason: HOSPADM

## 2022-02-28 RX ORDER — ACETAMINOPHEN 325 MG/1
975 TABLET ORAL EVERY 8 HOURS
Status: DISCONTINUED | OUTPATIENT
Start: 2022-02-28 | End: 2022-03-01 | Stop reason: HOSPADM

## 2022-02-28 RX ORDER — SODIUM CHLORIDE, SODIUM LACTATE, POTASSIUM CHLORIDE, CALCIUM CHLORIDE 600; 310; 30; 20 MG/100ML; MG/100ML; MG/100ML; MG/100ML
125 INJECTION, SOLUTION INTRAVENOUS CONTINUOUS
Status: DISCONTINUED | OUTPATIENT
Start: 2022-02-28 | End: 2022-03-01 | Stop reason: HOSPADM

## 2022-02-28 RX ORDER — SIMETHICONE 80 MG
80 TABLET,CHEWABLE ORAL 4 TIMES DAILY PRN
Status: DISCONTINUED | OUTPATIENT
Start: 2022-02-28 | End: 2022-03-01 | Stop reason: HOSPADM

## 2022-02-28 RX ORDER — GABAPENTIN 300 MG/1
300 CAPSULE ORAL ONCE
Status: COMPLETED | OUTPATIENT
Start: 2022-02-28 | End: 2022-02-28

## 2022-02-28 RX ORDER — METOCLOPRAMIDE HYDROCHLORIDE 5 MG/ML
10 INJECTION INTRAMUSCULAR; INTRAVENOUS ONCE
Status: DISCONTINUED | OUTPATIENT
Start: 2022-02-28 | End: 2022-02-28 | Stop reason: HOSPADM

## 2022-02-28 RX ORDER — MEPERIDINE HYDROCHLORIDE 25 MG/ML
12.5 INJECTION INTRAMUSCULAR; INTRAVENOUS; SUBCUTANEOUS
Status: DISCONTINUED | OUTPATIENT
Start: 2022-02-28 | End: 2022-02-28 | Stop reason: HOSPADM

## 2022-02-28 RX ORDER — DIPHENHYDRAMINE HCL 25 MG
25 TABLET ORAL EVERY 8 HOURS PRN
Status: DISCONTINUED | OUTPATIENT
Start: 2022-02-28 | End: 2022-03-01 | Stop reason: HOSPADM

## 2022-02-28 RX ORDER — BUPIVACAINE HYDROCHLORIDE 5 MG/ML
INJECTION, SOLUTION EPIDURAL; INTRACAUDAL AS NEEDED
Status: DISCONTINUED | OUTPATIENT
Start: 2022-02-28 | End: 2022-02-28 | Stop reason: HOSPADM

## 2022-02-28 RX ORDER — CEFAZOLIN SODIUM 2 G/50ML
2000 SOLUTION INTRAVENOUS ONCE
Status: COMPLETED | OUTPATIENT
Start: 2022-02-28 | End: 2022-02-28

## 2022-02-28 RX ORDER — GLYCOPYRROLATE 0.2 MG/ML
INJECTION INTRAMUSCULAR; INTRAVENOUS AS NEEDED
Status: DISCONTINUED | OUTPATIENT
Start: 2022-02-28 | End: 2022-02-28

## 2022-02-28 RX ORDER — PROPOFOL 10 MG/ML
INJECTION, EMULSION INTRAVENOUS AS NEEDED
Status: DISCONTINUED | OUTPATIENT
Start: 2022-02-28 | End: 2022-02-28

## 2022-02-28 RX ORDER — HYDRALAZINE HYDROCHLORIDE 20 MG/ML
10 INJECTION INTRAMUSCULAR; INTRAVENOUS ONCE
Status: COMPLETED | OUTPATIENT
Start: 2022-02-28 | End: 2022-02-28

## 2022-02-28 RX ORDER — ONDANSETRON 2 MG/ML
INJECTION INTRAMUSCULAR; INTRAVENOUS AS NEEDED
Status: DISCONTINUED | OUTPATIENT
Start: 2022-02-28 | End: 2022-02-28

## 2022-02-28 RX ORDER — FENTANYL CITRATE/PF 50 MCG/ML
25 SYRINGE (ML) INJECTION
Status: DISCONTINUED | OUTPATIENT
Start: 2022-02-28 | End: 2022-02-28 | Stop reason: HOSPADM

## 2022-02-28 RX ORDER — SCOLOPAMINE TRANSDERMAL SYSTEM 1 MG/1
1 PATCH, EXTENDED RELEASE TRANSDERMAL
Status: DISCONTINUED | OUTPATIENT
Start: 2022-02-28 | End: 2022-02-28 | Stop reason: HOSPADM

## 2022-02-28 RX ORDER — PROMETHAZINE HYDROCHLORIDE 25 MG/ML
12.5 INJECTION, SOLUTION INTRAMUSCULAR; INTRAVENOUS
Status: DISCONTINUED | OUTPATIENT
Start: 2022-02-28 | End: 2022-02-28 | Stop reason: HOSPADM

## 2022-02-28 RX ORDER — FENTANYL CITRATE 50 UG/ML
INJECTION, SOLUTION INTRAMUSCULAR; INTRAVENOUS AS NEEDED
Status: DISCONTINUED | OUTPATIENT
Start: 2022-02-28 | End: 2022-02-28

## 2022-02-28 RX ORDER — MIDAZOLAM HYDROCHLORIDE 2 MG/2ML
INJECTION, SOLUTION INTRAMUSCULAR; INTRAVENOUS AS NEEDED
Status: DISCONTINUED | OUTPATIENT
Start: 2022-02-28 | End: 2022-02-28

## 2022-02-28 RX ORDER — MAGNESIUM HYDROXIDE 1200 MG/15ML
LIQUID ORAL AS NEEDED
Status: DISCONTINUED | OUTPATIENT
Start: 2022-02-28 | End: 2022-02-28 | Stop reason: HOSPADM

## 2022-02-28 RX ORDER — ROCURONIUM BROMIDE 10 MG/ML
INJECTION, SOLUTION INTRAVENOUS AS NEEDED
Status: DISCONTINUED | OUTPATIENT
Start: 2022-02-28 | End: 2022-02-28

## 2022-02-28 RX ORDER — LIDOCAINE HYDROCHLORIDE 20 MG/ML
INJECTION, SOLUTION EPIDURAL; INFILTRATION; INTRACAUDAL; PERINEURAL AS NEEDED
Status: DISCONTINUED | OUTPATIENT
Start: 2022-02-28 | End: 2022-02-28

## 2022-02-28 RX ORDER — ACETAMINOPHEN 325 MG/1
975 TABLET ORAL ONCE
Status: COMPLETED | OUTPATIENT
Start: 2022-02-28 | End: 2022-02-28

## 2022-02-28 RX ORDER — OXYCODONE HCL 5 MG/5 ML
5 SOLUTION, ORAL ORAL EVERY 4 HOURS PRN
Status: DISCONTINUED | OUTPATIENT
Start: 2022-02-28 | End: 2022-03-01 | Stop reason: HOSPADM

## 2022-02-28 RX ORDER — OXYCODONE HCL 5 MG/5 ML
10 SOLUTION, ORAL ORAL EVERY 4 HOURS PRN
Status: DISCONTINUED | OUTPATIENT
Start: 2022-02-28 | End: 2022-03-01 | Stop reason: HOSPADM

## 2022-02-28 RX ORDER — ACETAMINOPHEN 160 MG/5ML
975 SUSPENSION, ORAL (FINAL DOSE FORM) ORAL EVERY 8 HOURS
Status: DISCONTINUED | OUTPATIENT
Start: 2022-02-28 | End: 2022-03-01 | Stop reason: HOSPADM

## 2022-02-28 RX ORDER — SCOLOPAMINE TRANSDERMAL SYSTEM 1 MG/1
1 PATCH, EXTENDED RELEASE TRANSDERMAL ONCE
Status: DISCONTINUED | OUTPATIENT
Start: 2022-02-28 | End: 2022-03-01 | Stop reason: HOSPADM

## 2022-02-28 RX ADMIN — HYDRALAZINE HYDROCHLORIDE 10 MG: 20 INJECTION INTRAMUSCULAR; INTRAVENOUS at 10:34

## 2022-02-28 RX ADMIN — SODIUM CHLORIDE, SODIUM LACTATE, POTASSIUM CHLORIDE, AND CALCIUM CHLORIDE: .6; .31; .03; .02 INJECTION, SOLUTION INTRAVENOUS at 08:45

## 2022-02-28 RX ADMIN — FAMOTIDINE 20 MG: 10 INJECTION INTRAVENOUS at 21:04

## 2022-02-28 RX ADMIN — SCOPALAMINE 1 PATCH: 1 PATCH, EXTENDED RELEASE TRANSDERMAL at 05:50

## 2022-02-28 RX ADMIN — SODIUM CHLORIDE, SODIUM LACTATE, POTASSIUM CHLORIDE, AND CALCIUM CHLORIDE 100 ML/HR: .6; .31; .03; .02 INJECTION, SOLUTION INTRAVENOUS at 16:38

## 2022-02-28 RX ADMIN — MIDAZOLAM 2 MG: 1 INJECTION INTRAMUSCULAR; INTRAVENOUS at 07:26

## 2022-02-28 RX ADMIN — INSULIN LISPRO 2 UNITS: 100 INJECTION, SOLUTION INTRAVENOUS; SUBCUTANEOUS at 18:28

## 2022-02-28 RX ADMIN — LIDOCAINE HYDROCHLORIDE 100 MG: 20 INJECTION, SOLUTION EPIDURAL; INFILTRATION; INTRACAUDAL; PERINEURAL at 07:33

## 2022-02-28 RX ADMIN — ACETAMINOPHEN 975 MG: 325 TABLET, FILM COATED ORAL at 21:06

## 2022-02-28 RX ADMIN — PROPOFOL 180 MG: 10 INJECTION, EMULSION INTRAVENOUS at 07:33

## 2022-02-28 RX ADMIN — ACETAMINOPHEN 975 MG: 325 TABLET, FILM COATED ORAL at 05:49

## 2022-02-28 RX ADMIN — INSULIN LISPRO 3 UNITS: 100 INJECTION, SOLUTION INTRAVENOUS; SUBCUTANEOUS at 12:24

## 2022-02-28 RX ADMIN — NEOSTIGMINE METHYLSULFATE 3.5 MG: 1 INJECTION INTRAVENOUS at 09:37

## 2022-02-28 RX ADMIN — INSULIN HUMAN 6 UNITS: 100 INJECTION, SOLUTION PARENTERAL at 11:07

## 2022-02-28 RX ADMIN — GABAPENTIN 300 MG: 300 CAPSULE ORAL at 05:50

## 2022-02-28 RX ADMIN — GLYCOPYRROLATE 0.6 MG: 0.2 INJECTION, SOLUTION INTRAMUSCULAR; INTRAVENOUS at 09:37

## 2022-02-28 RX ADMIN — CEFAZOLIN SODIUM 2000 MG: 2 SOLUTION INTRAVENOUS at 07:22

## 2022-02-28 RX ADMIN — FENTANYL CITRATE 50 MCG: 50 INJECTION INTRAMUSCULAR; INTRAVENOUS at 08:08

## 2022-02-28 RX ADMIN — ONDANSETRON 4 MG: 2 INJECTION INTRAMUSCULAR; INTRAVENOUS at 09:37

## 2022-02-28 RX ADMIN — FENTANYL CITRATE 50 MCG: 50 INJECTION INTRAMUSCULAR; INTRAVENOUS at 08:51

## 2022-02-28 RX ADMIN — FENTANYL CITRATE 100 MCG: 50 INJECTION INTRAMUSCULAR; INTRAVENOUS at 07:33

## 2022-02-28 RX ADMIN — ENOXAPARIN SODIUM 40 MG: 40 INJECTION SUBCUTANEOUS at 06:06

## 2022-02-28 RX ADMIN — CELECOXIB 200 MG: 200 CAPSULE ORAL at 05:49

## 2022-02-28 RX ADMIN — SODIUM CHLORIDE, SODIUM LACTATE, POTASSIUM CHLORIDE, AND CALCIUM CHLORIDE 125 ML/HR: .6; .31; .03; .02 INJECTION, SOLUTION INTRAVENOUS at 06:05

## 2022-02-28 RX ADMIN — OXYCODONE HYDROCHLORIDE 5 MG: 5 SOLUTION ORAL at 15:38

## 2022-02-28 RX ADMIN — HYDRALAZINE HYDROCHLORIDE 10 MG: 20 INJECTION INTRAMUSCULAR; INTRAVENOUS at 16:33

## 2022-02-28 RX ADMIN — SODIUM CHLORIDE, SODIUM LACTATE, POTASSIUM CHLORIDE, AND CALCIUM CHLORIDE 100 ML/HR: .6; .31; .03; .02 INJECTION, SOLUTION INTRAVENOUS at 10:51

## 2022-02-28 RX ADMIN — ROCURONIUM BROMIDE 50 MG: 50 INJECTION, SOLUTION INTRAVENOUS at 07:33

## 2022-02-28 NOTE — INTERVAL H&P NOTE
H&P reviewed  After examining the patient I find no changes in the patients condition since the H&P had been written      Vitals:    02/28/22 0544   BP: 169/70   Pulse: 77   Resp: 14   Temp: 98 4 °F (36 9 °C)   SpO2: 99%

## 2022-02-28 NOTE — OP NOTE
[facility]   [address] [facility], [address]  [phone] (Fax)      Operative Report  C/ Wilfredo Mendes 93 EGD     Patient Name: Bobie Goldberg    :  1965  MRN: [id number]  Patient Location: [facility] OR ROOM 06  Surgery Date : 2022  Surgeons:  Surgeon(s) and Role:     * Shawn Arboleda MD - Primary     * Yehuda Sifuentes MD - Assistant    Diagnosis:    Pre-Op Diagnosis Codes:  Obesity, unspecified classification, unspecified obesity type, unspecified whether serious comorbidity present [E66 9]  Body mass index is 36 17 kg/m²  Severe diabetes mellitus ([address] ) [E11 9]  ABHINAV on CPAP [G47 33, Z99 89]  Essential hypertension, benign [I10]    Post-Op Diagnosis Codes:     * Obesity, unspecified classification, unspecified obesity type, unspecified whether serious comorbidity present [E66 9]    * Body mass index is 36 17 kg/m²  * Severe diabetes mellitus (HCC) [E11 9]     * ABHINAV on CPAP [G47 33, Z99 89]     * Essential hypertension, benign [I10]    Procedure  1  Laparoscopic Sleeve Gastrectomy  2  Intraoperative Endoscopy    Specimen(s):  ID Type Source Tests Collected by Time Destination   1 : Portion of stomach Tissue Stomach TISSUE EXAM Shawn Arboleda MD 2022 3505        Estimated Blood Loss:    20 mL    Anesthesia Type:     General    Operative Indications:    Obesity, unspecified classification, unspecified obesity type, unspecified whether serious comorbidity present [E66 9]  Severe diabetes mellitus ([address] ) [E11 9]  ABHINAV on CPAP [G47 33, Z99 89]  Essential hypertension, benign [I10]  Body mass index is 36 17 kg/m²  Operative Findings:    Intra abdominal adhesions     Complications:     None    Procedure and Technique:    [address] [facility] is a 64 y o  adult with a Body mass index is 36 17 kg/m²   and a long standing history of morbid obesity and inability to lose a significant amount of weight on its own  This patient was found to be a good candidate to undergo a bariatric procedure upon being enrolled here at the [address] [place] [address]  OPERATIVE TECHNIQUE    The patient was taken to the operating room and placed in a supine position  A dose of IV antibiotic prophylaxis that consisted of Ancef 2g was given  Also 40 mg of subcutaneous Enoxaparin to prevent deep vein thrombosis were administered  Sequential compression devices were placed on both lower extremities  After satisfactory general anesthesia induction and endotracheal intubation was achieved, the extremities were placed and properly secured to prevent neuromuscular damage as best as possible  Subsequently, the abdominal wall was prepped and draped in a surgical standard sterile fashion  After a timeout was done and the patient was properly identified and the type of procedure was confirmed a supra-umbilical transverse skin incision was made and the subcutaneous tissues dissected  Access to the peritoneal cavity was gained with the Visiport trocar  With this device, we were able to visualize the layers of the abdominal wall, and enter the peritoneal cavity under direct visualization  Pneumoperitoneum was then established with CO2 insufflation  A four quadrant transversus abdominis plane block was performed under direct laparoscopic vision  After this was completed four additional trocars were placed: a 12 mm in the right upper quadrant subcostal position in the anterior axillary line, a 15-mm port was placed in the right flank midclavicular line, a 12-mm port was placed in the left upper quadrant subcostal position in the midclavicular line and another 12-mm port was placed in the left quadrant anterior axillary line lateral to the supraumbilical port  Multiple dense adhesions were noted involving the omentum to the anterior abdominal wall where her previous open surgery was    Most of them were in the lower abdomen and did not interfere with the visualization of the stomach for the sleeve gastrectomy  The [facility] liver retractor was placed in the subxiphoid position through the use of a 5-mm trocar incision  The patient was repositioned to a reverse Trendelenburg position  With the trocars in place, the dissection was begun  We divided the gastrocolic ligament with the energy device to enter the lesser sac  We continued to divide this ligament along the greater curvature of the stomach towards the angle of His  Special care was taken while dividing the short gastric vessels close to the spleen  This process was completely hemostatic  We then turned to the creation of an elongated and thin gastric pouch  A 36 Icelandic calibration tube was placed by the anesthesia staff into the stomach under our laparoscopic surveillance  Once the tip of the bougie was confirmed to be next to the pylorus, serial firings of the laparoscopic stapler with 60-mm cartridges were utilized  We started in a point inferior to the incisura angularis and the Crows foot nerve looking to preserve the gastric emptying  This was 5 to 6 centimeters proximal to the pylorus  The staple lines were reinforced with buttressing material  We created a pouch based on the lesser curve, and in vertical orientation  We continued the vertical serial firings of the stapler to the angle of His gently cinching the bougie with our laparoscopic stapler looking to create a thin pouch  As we approached the fundus of the stomach and the angle of His, the stapler loads were changed appropriately according to the variable thickness of the tissue  This completely  the pouch from the remnant stomach  We then turned our attention to the newly created pouch and examined it for bleeding or obvious defects on the staple lines and none were found      The distal stomach pouch was occluded with a Mandy clamp, and an EGD as well as an air insufflation test was performed  Neither intraoperative bleeding nor leaks were detected  The periumbilical trocar site was dilated and the gastric remnant was externalized through it and passed off the surgical field to be sent to pathology  I then covered the staple line with a tongue of omentum in a Mark patch fashion and secured it in place with a single 2/0 Vicryl stitch  The sponge, needle and instrument count was reported complete  The previously dilated trocar site and the 15 mm were then closed with use of a suture closure device and a figure-of-eight with absorbable suture  The pneumoperitoneum was evacuated using the [facility] filter and smoke evacuator  The liver retractor and the remainder ports were then removed under direct laparoscopic visualization and no back bleeding was noted  The skin incisions were all closed with 4-0 absorbable subcuticular suture  The patient tolerated the procedure well, was extubated uneventfully and was transferred to the recovery room in stable condition  I was present for the entire length of the procedure as the attending of record  No qualified resident was available to assist   The presence of an assistant was necessary for camera holding, traction and counter traction and for help with suturing and stapling in addition to performing the intraop-EGD        Patient Disposition:    PACU     Signature: Lee Knox MD  Date: February 28, 2022  Time: 9:16 AM

## 2022-02-28 NOTE — ANESTHESIA PREPROCEDURE EVALUATION
Procedure:  LAPAROSCOPIC SLEEVE GASTRECTOMY & INTRAOPERATIVE EGD (N/A Abdomen)    Relevant Problems   CARDIO   (+) Critical lower limb ischemia (HCC)   (+) Essential hypertension   (+) HLD (hyperlipidemia)   (+) PAD (peripheral artery disease) (HCC)      /RENAL   (+) CKD (chronic kidney disease) stage 2, GFR 60-89 ml/min      HEMATOLOGY   (+) Anemia      MUSCULOSKELETAL   (+) Charcot foot due to diabetes mellitus (HCC)      PULMONARY   (+) Obstructive sleep apnea      Other   (+) Class 2 severe obesity due to excess calories with serious comorbidity and body mass index (BMI) of 36 0 to 36 9 in adult (HCC)   (+) Current use of insulin (HCC)   (+) Diabetes mellitus with stage 2 chronic kidney disease (HCC)   (+) 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, with necrosis of bone (HCC)   (+) History of transmetatarsal amputation of foot (HCC)        Physical Exam    Airway    Mallampati score: II  TM Distance: <3 FB  Neck ROM: full     Dental   Comment: Multiple missing teeth, upper dentures and lower dentures,     Cardiovascular  Rhythm: regular, Rate: normal,     Pulmonary  Breath sounds clear to auscultation,     Other Findings        Anesthesia Plan  ASA Score- 3     Anesthesia Type- general with ASA Monitors  Additional Monitors:   Airway Plan: ETT  Plan Factors-Exercise tolerance (METS): >4 METS  Exercise comment: Limited secondary to TMA on left, approx 4 mets  Chart reviewed  Existing labs reviewed  Patient is not a current smoker  Patient instructed to abstain from smoking on day of procedure  Patient did not smoke on day of surgery  Obstructive sleep apnea risk education given perioperatively  Induction- intravenous  Postoperative Plan- Plan for postoperative opioid use  Informed Consent- Anesthetic plan and risks discussed with patient 
Calm

## 2022-02-28 NOTE — NURSING NOTE
Admission Overview, Prior to Admission, and Domestic Abuse completed virtually - via iPad  Patient resting in bed with RN in room at the end of the admission  RN aware of remaining portions of admission

## 2022-03-01 VITALS
TEMPERATURE: 97.4 F | DIASTOLIC BLOOD PRESSURE: 67 MMHG | OXYGEN SATURATION: 96 % | BODY MASS INDEX: 36.36 KG/M2 | HEIGHT: 60 IN | SYSTOLIC BLOOD PRESSURE: 155 MMHG | RESPIRATION RATE: 18 BRPM | WEIGHT: 185.19 LBS | HEART RATE: 75 BPM

## 2022-03-01 LAB
ANION GAP SERPL CALCULATED.3IONS-SCNC: 7 MMOL/L (ref 4–13)
BUN SERPL-MCNC: 12 MG/DL (ref 5–25)
CALCIUM SERPL-MCNC: 8.5 MG/DL (ref 8.3–10.1)
CHLORIDE SERPL-SCNC: 104 MMOL/L (ref 100–108)
CO2 SERPL-SCNC: 28 MMOL/L (ref 21–32)
CREAT SERPL-MCNC: 0.76 MG/DL (ref 0.6–1.3)
ERYTHROCYTE [DISTWIDTH] IN BLOOD BY AUTOMATED COUNT: 15.9 % (ref 11.6–15.1)
GLUCOSE SERPL-MCNC: 101 MG/DL (ref 65–140)
GLUCOSE SERPL-MCNC: 118 MG/DL (ref 65–140)
GLUCOSE SERPL-MCNC: 146 MG/DL (ref 65–140)
HCT VFR BLD AUTO: 32.8 % (ref 36.5–46.1)
HGB BLD-MCNC: 10 G/DL (ref 12–15.4)
MCH RBC QN AUTO: 22.8 PG (ref 26.8–34.3)
MCHC RBC AUTO-ENTMCNC: 30.5 G/DL (ref 31.4–37.4)
MCV RBC AUTO: 75 FL (ref 82–98)
PLATELET # BLD AUTO: 258 THOUSANDS/UL (ref 149–390)
PMV BLD AUTO: 10.4 FL (ref 8.9–12.7)
POTASSIUM SERPL-SCNC: 4.1 MMOL/L (ref 3.5–5.3)
RBC # BLD AUTO: 4.39 MILLION/UL (ref 3.88–5.12)
SODIUM SERPL-SCNC: 139 MMOL/L (ref 136–145)
WBC # BLD AUTO: 10.56 THOUSAND/UL (ref 4.31–10.16)

## 2022-03-01 PROCEDURE — 99024 POSTOP FOLLOW-UP VISIT: CPT | Performed by: STUDENT IN AN ORGANIZED HEALTH CARE EDUCATION/TRAINING PROGRAM

## 2022-03-01 PROCEDURE — 99222 1ST HOSP IP/OBS MODERATE 55: CPT | Performed by: PHYSICIAN ASSISTANT

## 2022-03-01 PROCEDURE — 82948 REAGENT STRIP/BLOOD GLUCOSE: CPT

## 2022-03-01 PROCEDURE — 80048 BASIC METABOLIC PNL TOTAL CA: CPT | Performed by: PHYSICIAN ASSISTANT

## 2022-03-01 PROCEDURE — NC001 PR NO CHARGE: Performed by: STUDENT IN AN ORGANIZED HEALTH CARE EDUCATION/TRAINING PROGRAM

## 2022-03-01 PROCEDURE — 85027 COMPLETE CBC AUTOMATED: CPT | Performed by: PHYSICIAN ASSISTANT

## 2022-03-01 RX ORDER — ACETAMINOPHEN 160 MG/5ML
975 SUSPENSION, ORAL (FINAL DOSE FORM) ORAL EVERY 6 HOURS PRN
Qty: 852 ML | Refills: 0 | Status: SHIPPED | OUTPATIENT
Start: 2022-03-01 | End: 2022-03-08

## 2022-03-01 RX ORDER — HYDRALAZINE HYDROCHLORIDE 20 MG/ML
10 INJECTION INTRAMUSCULAR; INTRAVENOUS ONCE
Status: DISCONTINUED | OUTPATIENT
Start: 2022-03-01 | End: 2022-03-01 | Stop reason: HOSPADM

## 2022-03-01 RX ADMIN — ACETAMINOPHEN 975 MG: 325 SUSPENSION ORAL at 05:08

## 2022-03-01 RX ADMIN — MORPHINE SULFATE 4 MG: 4 INJECTION INTRAVENOUS at 05:09

## 2022-03-01 RX ADMIN — ENOXAPARIN SODIUM 40 MG: 40 INJECTION SUBCUTANEOUS at 09:05

## 2022-03-01 RX ADMIN — OXYCODONE HYDROCHLORIDE 5 MG: 5 SOLUTION ORAL at 00:37

## 2022-03-01 RX ADMIN — FAMOTIDINE 20 MG: 10 INJECTION INTRAVENOUS at 08:23

## 2022-03-01 RX ADMIN — METOPROLOL TARTRATE 25 MG: 25 TABLET, FILM COATED ORAL at 08:24

## 2022-03-01 NOTE — PLAN OF CARE
Problem: Prexisting or High Potential for Compromised Skin Integrity  Goal: Skin integrity is maintained or improved  Description: INTERVENTIONS:  - Identify patients at risk for skin breakdown  - Assess and monitor skin integrity  - Assess and monitor nutrition and hydration status  - Monitor labs   - Assess for incontinence   - Turn and reposition patient  - Assist with mobility/ambulation  - Relieve pressure over bony prominences  - Avoid friction and shearing  - Provide appropriate hygiene as needed including keeping skin clean and dry  - Evaluate need for skin moisturizer/barrier cream  - Collaborate with interdisciplinary team   - Patient/family teaching  - Consider wound care consult   2/28/2022 2159 by Bebo Taylor, RN  Outcome: Progressing  2/28/2022 2158 by Bebo Taylor, RN  Outcome: Progressing

## 2022-03-01 NOTE — UTILIZATION REVIEW
Inpatient Admission Authorization Request   NOTIFICATION OF INPATIENT ADMISSION/INPATIENT AUTHORIZATION REQUEST   SERVICING FACILITY:   [address] [place] [address], [facility], [facility]  Tax ID: [id number]  NPI: [id number]  Place of Service: Inpatient [address] [facility]  [address]  Place of Service Code: 24     ATTENDING PROVIDER:  Attending Name and NPI#: Tomas [place], [place] [[id number]]  Address: [address] [place] [address], [facility], [facility]  Phone: [phone]     UTILIZATION REVIEW CONTACT:  Elvi Chauhan Utilization   Network Utilization Review Department  Phone: [phone]  Fax: [phone]  Email: [email or web address]     PHYSICIAN ADVISORY SERVICES:  FOR [facility] REVIEW - MEDICAL NECESSITY DENIAL  Phone: [phone]  Fax: [phone]  Email: [email or web address]     TYPE OF REQUEST:  Inpatient Status     ADMISSION INFORMATION:  ADMISSION DATE/TIME: 2/28/22  7:42 AM  PATIENT DIAGNOSIS CODE/DESCRIPTION:  Obesity, unspecified classification, unspecified obesity type, unspecified whether serious comorbidity present [E66 9]  Severe diabetes mellitus ([address] [facility] [address] ) [E11 9]  ABHINAV on CPAP [G47 33, Z99 89]  Essential hypertension, benign [I10]  DISCHARGE DATE/TIME: 3/1/2022 11:17 AM  DISCHARGE DISPOSITION (IF DISCHARGED): Home/Self Care     IMPORTANT INFORMATION:  Please contact the Elvi Chauhan directly with any questions or concerns regarding this request  Department voicemails are confidential     Send requests for admission clinical reviews, concurrent reviews, approvals, and administrative denials due to lack of clinical to fax [phone]

## 2022-03-01 NOTE — PROGRESS NOTES
Progress Note - Bariatric Surgery   Lenka Lyon 64 y o  adult MRN: [id number]  Unit/Bed#: DENIL[facility] 03 Encounter: [id number]      Subjective/Objective     Subjective:  Patient with morbid obesity POD1 s/p laparoscopic sleeve gastrectomy   Patient denies fevers, chills, sweats, SOB, CP, calf pain  Pain adequately controlled on oral pain medication  Ambulating without assistance, voiding well, and using incentive spirometer  Tolerating liquid diet without nausea or vomiting today  Vital signs stable  CBC today shows stable H/H  BMP obtained today is within normal limits  SLIM Consulted yesterday for med management  Objective:    /65 (BP Location: Right arm)   Pulse 75   Temp 97 9 °F (36 6 °C) (Temporal)   Resp 18   Ht 5' (1 524 m)   Wt 84 kg (185 lb 3 oz) Comment: with clothing on  LMP  (LMP Unknown)   SpO2 97%   BMI 36 17 kg/m²       Intake/Output Summary (Last 24 hours) at 3/1/2022 0705  Last data filed at 3/1/2022 0501  Gross per 24 hour   Intake 3930 ml   Output 3345 ml   Net 585 ml       Invasive Devices  Report    Peripheral Intravenous Line            Peripheral IV 02/28/22 Left;Ventral (anterior) Forearm 1 day                ROS: 10-point system completed  All negative except see HPI  Physical Exam    General Appearance:    Alert, cooperative, no distress, appears stated age   Head:    Normocephalic, without obvious abnormality, atraumatic   Lungs:     Respirations unlabored   Heart:    Regular rate and rhythm   Abdomen:     Soft, appropriate tenderness, no masses, no organomegaly, non-distended   Extremities:   Extremities normal, atraumatic, no cyanosis or edema   Neurologic:  Incision:  Psych:   Normal strength and sensation    Clean, dry, and intact, no bleeding    Normal mood and affect       Lab, Imaging and other studies:  I have personally reviewed pertinent lab results    , CBC:   Lab Results   Component Value Date    WBC 10 56 (H) 03/01/2022    HGB 10 0 (L) 03/01/2022    HCT 32 8 (L) 03/01/2022    MCV 75 (L) 03/01/2022     03/01/2022    MCH 22 8 (L) 03/01/2022    MCHC 30 5 (L) 03/01/2022    RDW 15 9 (H) 03/01/2022    MPV 10 4 03/01/2022   , CMP:   Lab Results   Component Value Date    SODIUM 139 03/01/2022    K 4 1 03/01/2022     03/01/2022    CO2 28 03/01/2022    BUN 12 03/01/2022    CREATININE 0 76 03/01/2022    CALCIUM 8 5 03/01/2022        VTE Mechanical Prophylaxis: sequential compression device    Assessment/Plan  1)  Patient with morbid Obesity s/p laparoscopic Sleeve Gastrectomy with stable post op course  Patient afebrile and hemodynamically stable  - Encourage PO fluids  - Recommend ambulation, use of SCDs when not ambulating, and incentive spirometry    - Ok to give Lovenox   - Plan to D/C patient home today pending anticipated progression    Plan of care was discussed with patient    Care plan discussed with Dr Maykel Monet MD  Bariatric Surgery  3/1/2022  7:05 AM

## 2022-03-01 NOTE — UTILIZATION REVIEW
Initial Clinical Review    Elective IP surgical procedure  Age/Sex: 64 y o  adult  Surgery Date: 2/28/22  Procedure:   Pre-Op Diagnosis Codes:  Obesity, unspecified classification, unspecified obesity type, unspecified whether serious comorbidity present [E66 9]  Body mass index is 36 17 kg/m²  Severe diabetes mellitus ([address] ) [E11 9]  ABHINAV on CPAP [G47 33, Z99 89]  Essential hypertension, benign [I10]     Post-Op Diagnosis Codes:     * Obesity, unspecified classification, unspecified obesity type, unspecified whether serious comorbidity present [E66 9]    * Body mass index is 36 17 kg/m²  * Severe diabetes mellitus (HCC) [E11 9]     * ABHINAV on CPAP [G47 33, Z99 89]     * Essential hypertension, benign [I10]     Procedure  1  Laparoscopic Sleeve Gastrectomy  2  Intraoperative Endoscopy    Anesthesia: General   Operative Findings: Intra abdominal adhesions     POD#1 Progress Note:   Has good pain control, no SOB, CP, ambulating, voiding, tolerating liquid diet, VS and CBC stable  3/1 Medicine Consult - med mgmt  Will continue Metoprolol but d/c Lisinopril  Stop med regimen for DM and just use SSI cover  Must f/u with OP providers for further med guidance       Admission Orders: Date/Time/Statement:   Admission Orders (From admission, onward)     Ordered        02/28/22 0742  Inpatient Admission  Once                      Orders Placed This Encounter   Procedures    Inpatient Admission     Standing Status:   Standing     Number of Occurrences:   1     Order Specific Question:   Level of Care     Answer:   Med Surg [16]     Order Specific Question:   Estimated length of stay     Answer:   Inpatient Only Surgery     Vital Signs: /67 (BP Location: Right arm)   Pulse 75   Temp (!) 97 4 °F (36 3 °C) (Temporal)   Resp 18   Ht 5' (1 524 m)   Wt 84 kg (185 lb 3 oz) Comment: with clothing on  LMP  (LMP Unknown)   SpO2 96%   BMI 36 17 kg/m²     Pertinent Labs/Diagnostic Test Results:   Results from last 7 days   Lab Units 02/28/22  0534 02/22/22  1444   SARS-COV-2  Negative Negative     Results from last 7 days   Lab Units 03/01/22  0454   WBC Thousand/uL 10 56*   HEMOGLOBIN g/dL 10 0*   HEMATOCRIT % 32 8*   PLATELETS Thousands/uL 258         Results from last 7 days   Lab Units 03/01/22  0454   SODIUM mmol/L 139   POTASSIUM mmol/L 4 1   CHLORIDE mmol/L 104   CO2 mmol/L 28   ANION GAP mmol/L 7   BUN mg/dL 12   CREATININE mg/dL 0 76   CALCIUM mg/dL 8 5         Results from last 7 days   Lab Units 03/01/22  0551 03/01/22  0034 02/28/22  1825 02/28/22  1155 02/28/22  1055 02/28/22  0602   POC GLUCOSE mg/dl 118 146* 261* 311* 275* 157*     Results from last 7 days   Lab Units 03/01/22  0454   GLUCOSE RANDOM mg/dL 101     Results from last 7 days   Lab Units 02/28/22  0534   INFLUENZA A PCR  Negative   INFLUENZA B PCR  Negative   RSV PCR  Negative     Diet: diabetic darrion clears  Mobility: OOB as amanda   DVT Prophylaxis: SCDs, Lovenox sq    Medications/Pain Control:   Scheduled Medications:  acetaminophen, 975 mg, Oral, Q8H   Or  acetaminophen, 975 mg, Oral, Q8H  enoxaparin, 40 mg, Subcutaneous, Q24H  famotidine, 20 mg, Intravenous, BID  hydrALAZINE, 10 mg, Intravenous, Once  insulin lispro, 1-5 Units, Subcutaneous, Q6H ARRON  metoprolol tartrate, 25 mg, Oral, Q12H ARRON  scopolamine, 1 patch, Transdermal, Once      Continuous IV Infusions:  lactated ringers, 125 mL/hr, Intravenous, Continuous  lactated ringers, 100 mL/hr, Intravenous, Continuous      PRN Meds:  diphenhydrAMINE, 25 mg, Oral, Q8H PRN  metoclopramide, 10 mg, Intravenous, Q6H PRN  morphine injection, 4 mg, Intravenous, Q4H PRN - x 1 3/1  ondansetron, 4 mg, Intravenous, Q6H PRN  oxyCODONE, 10 mg, Oral, Q4H PRN  oxyCODONE, 5 mg, Oral, Q4H PRN -x 1 2/28, 3/1  phenol, 2 spray, Mouth/Throat, Q2H PRN  promethazine, 25 mg, Intravenous, Q6H PRN  simethicone, 80 mg, Oral, 4x Daily PRN  trimethobenzamide, 200 mg, Intramuscular, Once PRN    Network Utilization Review Department  ATTENTION: Please call with any questions or concerns to [phone] and carefully listen to the prompts so that you are directed to the right person  All voicemails are confidential   Jarett Guerrero all requests for admission clinical reviews, approved or denied determinations and any other requests to dedicated fax number below belonging to the campus where the patient is receiving treatment   List of dedicated fax numbers for the Facilities:  [address] DENIALS (Administrative/Medical Necessity) [phone]   [address] (Maternity/NICU/Pediatrics) [phone]   [address] [place] [address]  [address] [facility] [place] [address] [id number] [address] [place] [address] [phone]

## 2022-03-01 NOTE — DISCHARGE INSTR - AVS FIRST PAGE
your medications from [address] in [facility] [address] or cut your pills and open capsules, mix with liquid to drink  Take Tylenol every 8 hours around the clock, unless instructed otherwise  Take your omeprazole daily  It is important to stay hydrated and follow your discharge diet progression   Mild nausea is ok as long as you can drink fluids, sip very slowly and get up and walk during any periods of nausea  You may shower normally after 48 hours, but do not scrub incision sites, blot gently with clean towel to dry incisions  Take home medications as usual unless instructed otherwise while in hospital  Follow up with Dr Pierre and your PCP within the next week  Sleeve gastrectomy patients ONLY: Complete full course of lovenox injections!

## 2022-03-01 NOTE — PLAN OF CARE
Problem: Prexisting or High Potential for Compromised Skin Integrity  Goal: Skin integrity is maintained or improved  Description: INTERVENTIONS:  - Identify patients at risk for skin breakdown  - Assess and monitor skin integrity  - Assess and monitor nutrition and hydration status  - Monitor labs   - Assess for incontinence   - Turn and reposition patient  - Assist with mobility/ambulation  - Relieve pressure over bony prominences  - Avoid friction and shearing  - Provide appropriate hygiene as needed including keeping skin clean and dry  - Evaluate need for skin moisturizer/barrier cream  - Collaborate with interdisciplinary team   - Patient/family teaching  - Consider wound care consult   Outcome: Completed     Problem: Potential for Falls  Goal: Patient will remain free of falls  Description: INTERVENTIONS:  - Educate patient/family on patient safety including physical limitations  - Instruct patient to call for assistance with activity   - Consult OT/PT to assist with strengthening/mobility   - Keep Call bell within reach  - Keep bed low and locked with side rails adjusted as appropriate  - Keep care items and personal belongings within reach  - Initiate and maintain comfort rounds  - Make Fall Risk Sign visible to staff    Problem: Potential for Falls  Goal: Patient will remain free of falls  Description: INTERVENTIONS:  - Educate patient/family on patient safety including physical limitations  - Instruct patient to call for assistance with activity   - Consult OT/PT to assist with strengthening/mobility   - Keep Call bell within reach  - Keep bed low and locked with side rails adjusted as appropriate  - Keep care items and personal belongings within reach  - Initiate and maintain comfort rounds  - Make Fall Risk Sign visible to staff    Problem: GASTROINTESTINAL - ADULT  Goal: Minimal or absence of nausea and/or vomiting  Description: INTERVENTIONS:  - Administer IV fluids if ordered to ensure adequate hydration  - Maintain NPO status until nausea and vomiting are resolved  - Nasogastric tube if ordered  - Administer ordered antiemetic medications as needed  - Provide nonpharmacologic comfort measures as appropriate  - Advance diet as tolerated, if ordered  - Consider nutrition services referral to assist patient with adequate nutrition and appropriate food choices  Outcome: Completed  Goal: Maintains or returns to baseline bowel function  Description: INTERVENTIONS:  - Assess bowel function  - Encourage oral fluids to ensure adequate hydration  - Administer IV fluids if ordered to ensure adequate hydration  - Administer ordered medications as needed  - Encourage mobilization and activity  - Consider nutritional services referral to assist patient with adequate nutrition and appropriate food choices  Outcome: Completed  Goal: Maintains adequate nutritional intake  Description: INTERVENTIONS:  - Monitor percentage of each meal consumed  - Identify factors contributing to decreased intake, treat as appropriate  - Assist with meals as needed  - Monitor I&O, weight, and lab values if indicated  - Obtain nutrition services referral as needed  Outcome: Completed     Problem: GENITOURINARY - ADULT  Goal: Maintains or returns to baseline urinary function  Description: INTERVENTIONS:  - Assess urinary function  - Encourage oral fluids to ensure adequate hydration if ordered  - Administer IV fluids as ordered to ensure adequate hydration  - Administer ordered medications as needed  - Offer frequent toileting  - Follow urinary retention protocol if ordered  Outcome: Completed  Goal: Absence of urinary retention  Description: INTERVENTIONS:  - Assess patients ability to void and empty bladder  - Monitor I/O  - Bladder scan as needed  - Discuss with physician/AP medications to alleviate retention as needed  - Discuss catheterization for long term situations as appropriate  Outcome: Completed   - Apply yellow socks and bracelet for high fall risk patients  - Consider moving patient to room near nurses station  Outcome: Completed   - Apply yellow socks and bracelet for high fall risk patients  - Consider moving patient to room near nurses station  Outcome: Completed

## 2022-03-01 NOTE — DISCHARGE INSTRUCTIONS
Bariatric/Weight Loss Surgery  Hospital Discharge Instructions  1  ACTIVITY:  a  Progress as feels comfortable - a good rule is:  if you are doing something and it begins to hurt, stop doing the activity  Walk every hour while at home  b  Eren Us may walk stairs if you do so slowly  c  You may shower 48 hours after surgery  Do not scrub incision sites  Blot gently with clean towel to dry incisions  (see #4 below)   d  Use your incentive spirometer 10 times per hour while awake for 1 week after surgery  e  Do NOT drive for 48 hours after surgery  No driving 24 hours after taking certain prescription pain medications  Examples of such medication are Percocet, Darvocet, Oxycodone, Tylenol #3, and Tylenol with Codeine  2  DIET  a  Stay on a liquid diet for 7 days after your surgery date, sipping slowly  Refer to your manual for examples of choices  Remember to keep your liquids sugar free or low calorie  You may have protein drinks  Make sure to drink 48 to 64 ounces per day of fluids  b  Eren Us may advance to a pureed diet one week after surgery as instructed by your diet progression pamphlet  Once you get approval from your surgeon at your first post operative visit, you may advance to the soft diet and remain on soft diet for 8 weeks unless otherwise instructed  3  MEDICATIONS:  a  The abdominal nerve block will wear off during the first 1-2 days that you are home, and you may become sore (especially over incision site/sites where abdominal wall is sutured)  This may create a pulling sensation, especially while moving around, and will fade over time  Continue to take your Tylenol and your pain medication as instructed  b  Start vitamins and minerals one week after surgery or when you start stage 3/puree diet  c  Anti-acid Medication as per prescription  d  Other medications as indicated on the Physician Patient Discharge Instructions form given to you at the time of discharge    e  Make sure that you are splitting your pill or tablet medications in halves or fourths or even crushing them before you take them  Capsules should be opened and mixed with water or jello  You need to do this for at least 4 weeks after surgery  Eventually you will be able to take your medications the regular way as they were prescribed  Sailaja Gloss will need to consult with your Family Doctor in regards to all your prescribed medication, particularly those for blood pressure and diabetes  As you lose weight, medical conditions may change, requiring an alteration or elimination of the drug dose  Monitor blood pressure closely and call PCP with any concerns  g  Sleeve Gastrectomy patients ONLY:  Complete full course of lovenox injections!  h  DO NOT TAKE BIRTH CONTROL(BC) MEDICATIONS, INSERT BC VAGINAL RINGS, OR PLACE IUD OR ANY OTHER BC METHODS UNTIL 31 DAYS FROM DAY OF DISCHARGE FROM HOSPITAL  THIS PLACES YOU AT HIGH RISK FOR A POTENTIALLY LIFE THREATENING BLOOD CLOT  Remember to always use barrier methods for birth control and speak to your GYN about using two forms of birth control to start 31 days after surgery  It is very important to avoid pregnancy until at least 18-24 months after surgery  4  INCISION CARE  a  You may shower and get incisions wet 2 days after surgery  No soaking tub baths or swimming for 30 days after surgery  Keep abdominal area and incisions clean  Use soap and water to create a good lather and rinse off  Do not scrub incisions  b  If you have a drain, empty the drain as the nurses instructed  5  FOLLOW-UP APPOINTMENT should be made for one week after discharge  Call surgeons office at [phone] to schedule an appointment      6  CALL YOUR DOCTOR FOR:  pain not controlled by pain medications, a temperature greater than 101 5° F, any increase or change in drainage or redness from any incision, any vomiting or inability to keep liquids down, shortness of breath, shoulder pain, or bleeding

## 2022-03-01 NOTE — DISCHARGE SUMMARY
Discharge Summary - Mady Lyon 64 y o  adult MRN: [id number]    Unit/Bed#: SDS 03 Encounter: [id number]      Pre-Operative Diagnosis: Pre-Op Diagnosis Codes:     * Obesity, unspecified classification, unspecified obesity type, unspecified whether serious comorbidity present [E66 9]     * Severe diabetes mellitus ([facility] [address] ) [E11 9]     * ABHINAV on CPAP [G47 33, Z99 89]     * Essential hypertension, benign [I10]    Post-Operative Diagnosis: Post-Op Diagnosis Codes:     * Obesity, unspecified classification, unspecified obesity type, unspecified whether serious comorbidity present [E66 9]     * Severe diabetes mellitus ([facility] [address] ) [E11 9]     * ABHINAV on CPAP [G47 33, Z99 89]     * Essential hypertension, benign [I10]    Procedures Performed:  Procedure(s):  LAPAROSCOPIC SLEEVE GASTRECTOMY & INTRAOPERATIVE EGD    Surgeon: Cornelio Adam MD    See H & P for full details of admission and Operative Note for full details of operations performed  Patient tolerated surgery well without complications  In the morning postoperative Day 1, the patient had mild nausea and abdominal pain  Tolerated a clear liquid diet without vomiting  Able to ambulate and voiding independently  Patient was deemed ready for discharge home  Patient was seen and examined prior to discharge  Provisions for Follow-Up Care:  See After Visit Summary/Discharge Instructions for information related to follow-up care and home orders  Disposition: Home, in stable condition  Planned Readmission: No    Discharge Medications:  See After Visit Summary/Discharge Instructions for reconciled discharge medications provided to patient and family  Post Operative instructions: Reviewed with patient and/or family      Signature:   Weston Arredondo MD  Date: 3/1/2022 Time: 7:11 AM

## 2022-03-01 NOTE — CONSULTS
Inpatient Medical Consultation - [facility] Internal Medicine    Patient Information: Jason Lyon 64 y o  adult MRN: [id number]  Unit/Bed#: SDS 03 Encounter: [id number]  PCP: Zahra Keen MD  Date of Admission:  2/28/2022  Date of Consultation: 03/01/22  Requesting Physician: Myron Zamarripa MD    Reason For Consultation:   Post operative medical managment    Assessment/Plan:    · Morbid obesity/BMI 36 17: Status post elective laparoscopic sleeve gastrectomy with intraoperative EGD performed by Dr Nohelia Arvizu on 2/28/22  Postoperative day number 1  Care counseled on need to avoid NSAIDs    · Essential hypertension:  Current regimen lisinopril 5 mg daily, metoprolol tartrate 25 mg b i d  Blood pressures greater than 150 on 3 separate occasions  Advised continuing her metoprolol but holding further doses of lisinopril  Should follow up with PCP in a week's time for further blood pressure check  · Type 2 diabetes:  Most recent hemoglobin A1c from 8/25/21 was 6 8  Pre-hospital home regimen Trulicity weekly, Lantus 15 units before bed, oral metformin  Advised to stop regimen and continue with accu checks  Followup with PCP in 1 week for further titration  · PAD s/p proximal transmetarsal amputation:  Status post intervention  Maintained on aspirin 81, atorvastatin, Plavix 75  Held ASA and plavix 7 days prior to surgery  Defer to bariatics on when safe to resume  · GERD: continue Protonix      VTE Prophylaxis: Enoxaparin (Lovenox)  / sequential compression device     Counseling / Coordination of Care Time: 30 minutes  Greater than 50% of total time spent on patient counseling and coordination of care  History of Present Illness:    Daniel Velasquez is a 64 y o  adult who is originally admitted to the bariatric service on 2/28/2022 due to inability to lose meaningful and sustainable weight loss through conservative measures       We are consulted for postoperative medical management  Pt has a PMH of HLD, CKD, DM, PVD s/p intervention, multiple toe amputations, HTN, ABHINAV on CPAP, morbid obesity  Prehospital lost a total of 10 lbs prior to her surgery  Was also told to hold ASA and plavix 7 days prior to surgery  Discussed home medication regimen  Post operatively doing well  Tolerating oral diet of liquids  Had some mild nausea and abdominal pain but now doing better  Belching and urinating without difficulty  Denies any chest pain, chest pressure, SOB  Review of Systems:    Review of Systems   Constitutional: Negative for chills and fatigue  Respiratory: Negative for chest tightness  Cardiovascular: Negative for chest pain, palpitations and leg swelling  Gastrointestinal: Negative for abdominal distention  Genitourinary: Negative for dysuria  Neurological: Negative for headaches  Psychiatric/Behavioral: Negative for agitation         Past Medical and Surgical History:     Past Medical History:   Diagnosis Date    Ambulates with cane     Cellulitis of toe 2019    CKD (chronic kidney disease)     CPAP (continuous positive airway pressure) dependence     Diabetes mellitus ([address] )     Gangrene of toe of right foot ([facility] [address] ) 10/30/2019    Added automatically from request for surgery [id number]    Hyperlipidemia     Hypertension     Obese     gastric  sleeve today 2022    PVD (peripheral vascular disease) ([facility] [address] )     Sleep apnea     Teeth missing     Wears glasses        Past Surgical History:   Procedure Laterality Date     SECTION      EGD      IR ABDOMINAL AORTAGRAM  2019    IR AORTAGRAM WITH RUN-OFF  2020    IR ARTERIAL LYSIS  2019    IR LOWER EXTREMITY / INTERVENTION  2019    IR LOWER EXTREMITY ANGIOGRAM  2022    IR TPA LYSIS CHECK  2022    OR AMPUTATION FOOT,TRANSMETATARSAL Right 2019    Procedure: AMPUTATION TRANSMETATARSAL (TMA) WITH ACHILLES TENDON LENGTHING;  Surgeon: Abbey Colon DPM;  Location: [facility];  Service: Podiatry    NC AMPUTATION FOOT,TRANSMETATARSAL Right 2020    Procedure: AMPUTATION TRANSMETATARSAL (TMA);  Surgeon: Coty Suggs DPM;  [facility];  Service: Podiatry    TOE AMPUTATION Left 2019    Procedure: AMPUTATION 5th TOE;  Surgeon: Ambrocio Monsivais DPM;  [facility];  Service: Podiatry    TOE AMPUTATION Right 2019    Procedure: AMPUTATION RIGHT 3RD AND 4TH TOE;  Surgeon: Ambrocio Monsivais DPM;  [facility]: [facility];  Service: Podiatry       Meds/Allergies:    all medications and allergies reviewed    Allergies: Allergies   Allergen Reactions    Levemir [Insulin Detemir] Itching       Social History:     Marital Status: Single    Substance Use History:   Social History     Substance and Sexual Activity   Alcohol Use Not Currently    Comment: rarely     Social History     Tobacco Use   Smoking Status Former Smoker    Packs/day: 2 00    Years: 20 00    Pack years: 40 00    Types: Cigarettes    Quit date:     Years since quittin 1   Smokeless Tobacco Never Used     Social History     Substance and Sexual Activity   Drug Use No       Family History:    non-contributory    Physical Exam:     Vitals:   Blood Pressure: 155/67 (22 0700)  Pulse: 75 (22 0301)  Temperature: (!) 97 4 °F (36 3 °C) (22 07)  Temp Source: Temporal (22)  Respirations: 18 (22)  Height: 5' (152 4 cm) (22 4445)  Weight - Scale: 84 kg (185 lb 3 oz) (with clothing on) (22 0621)  SpO2: 96 % (22)    Physical Exam  Vitals and nursing note reviewed  Constitutional:       General: Jerrod Anne is not in acute distress  Appearance: Normal appearance  Jerrod Anne is normal weight  Jerrod Anne is not ill-appearing, toxic-appearing or diaphoretic  HENT:      Head: Normocephalic and atraumatic  Eyes:      General: No scleral icterus    Cardiovascular:      Rate and Rhythm: Normal rate and regular rhythm  Pulmonary:      Effort: Pulmonary effort is normal  No respiratory distress  Breath sounds: Normal breath sounds  No stridor  No wheezing or rhonchi  Abdominal:      General: Bowel sounds are normal  There is no distension  Palpations: Abdomen is soft  There is no mass  Tenderness: There is no abdominal tenderness  Hernia: No hernia is present  Comments: Abdominal incisions clean and intact   Musculoskeletal:         General: No swelling  Cervical back: Neck supple  Skin:     General: Skin is warm and dry  Neurological:      Mental Status: Clair Gerardo is alert and oriented to person, place, and time  Mental status is at baseline  Psychiatric:         Mood and Affect: Mood normal          Behavior: Behavior normal          Additional Data:     Lab Results: I have personally reviewed pertinent reports  Results from last 7 days   Lab Units 03/01/22  0454   WBC Thousand/uL 10 56*   HEMOGLOBIN g/dL 10 0*   HEMATOCRIT % 32 8*   PLATELETS Thousands/uL 258     Results from last 7 days   Lab Units 03/01/22  0454   POTASSIUM mmol/L 4 1   CHLORIDE mmol/L 104   CO2 mmol/L 28   BUN mg/dL 12   CREATININE mg/dL 0 76   CALCIUM mg/dL 8 5           Imaging: I have personally reviewed pertinent reports  No results found  ** Please Note: This note has been constructed using a voice recognition system   **

## 2022-03-01 NOTE — NURSING NOTE
No complaints at time of discharge  Discharge instructions reviewed with  patient and teaching on administration of Lovenox reviewed with patient

## 2022-03-02 ENCOUNTER — TELEPHONE (OUTPATIENT)
Dept: BARIATRICS | Facility: CLINIC | Age: 57
End: 2022-03-02

## 2022-03-02 ENCOUNTER — TRANSITIONAL CARE MANAGEMENT (OUTPATIENT)
Dept: FAMILY MEDICINE CLINIC | Facility: CLINIC | Age: 57
End: 2022-03-02

## 2022-03-03 ENCOUNTER — TELEPHONE (OUTPATIENT)
Dept: BARIATRICS | Facility: CLINIC | Age: 57
End: 2022-03-03

## 2022-03-03 NOTE — TELEPHONE ENCOUNTER
Post op follow up phone call completed   Pt is sipping liquids, using IS as instructed, reinforced importance of using IS to help prevent pneumonia  Ambulating about home without difficulty   Minimal pain, not using oxycodone   Reaffirmed examples of liquid diet over the next week   Pt stated understanding about discharge instructions and medication adjustments   Tolerating self administration of Lovenox injections without difficulty   Follow up appt with surgeon scheduled for next week    Instructed to call with any additional questions or concerns

## 2022-03-07 NOTE — PROGRESS NOTES
NEPHROLOGY PROGRESS NOTE   Vu Johnsonleatha 48 y o  female MRN: [id number]  Unit/Bed#: E2 -01 Encounter: [id number]      ASSESSMENT & PLAN:    48 medical history of hypertension and diabetes was admitted with left 5th toe cellulitis and osteomyelitis with need for angiogram    1  Stage 3 chronic kidney disease with a baseline creatinine 1 1-1 2  2  Peripheral arterial disease with toe wound and possible osteomyelitis  3  Uncontrolled type 2 diabetes mellitus  4   Hypertension     -meloxicam, lisinopril, chlorthalidone on hold  -urine eosinophils negative  -blood pressure is acceptable  -give D5 half normal saline starting at midnight tomorrow and continue 6 hours post angiography  -electrolytes acceptable  -discussed with vascular    SUBJECTIVE:    Patient seen today denies any new complaints no shortness of breath fevers or chills nausea vomiting    OBJECTIVE:  Current Weight: Weight - Scale: 86 8 kg (191 lb 5 8 oz)  Vitals:    01/31/19 0919   BP: 125/65   Pulse: 81   Resp:    Temp:    SpO2:        Intake/Output Summary (Last 24 hours) at 01/31/19 1229  Last data filed at 01/31/19 0558   Gross per 24 hour   Intake             1343 ml   Output              750 ml   Net              593 ml       General: conscious, cooperative, in not acute distress  Eyes: conjunctivae pink, anicteric sclerae  ENT: lips and mucous membranes moist  Neck: supple, no JVD  Chest: clear breath sounds bilateral, no crackles, ronchus or wheezings  CVS: distinct S1 & S2, normal rate, regular rhythm  Abdomen: soft, non-tender, non-distended, normoactive bowel sounds  Extremities:  No edema left foot with dressing  Skin: no rash  Neuro: awake, alert, oriented        Medications:    Current Facility-Administered Medications:     acetaminophen (TYLENOL) tablet 650 mg, 650 mg, Oral, Q6H PRN, Jeremiah Silva DPM    amLODIPine (NORVASC) tablet 10 mg, 10 mg, Oral, Daily, MIKEL Vela    aspirin chewable tablet 81 mg, 81 mg, Oral, Daily, MIKEL Tavera, 81 mg at 01/31/19 0915    atorvastatin (LIPITOR) tablet 20 mg, 20 mg, Oral, QPM, [place], DPM, 20 mg at 01/30/19 1941    ceFAZolin (ANCEF) IVPB (premix) 2,000 mg, 2,000 mg, Intravenous, Q8H, Padmini [place], DPM, Stopped at 01/31/19 0558    heparin (porcine) subcutaneous injection 5,000 Units, 5,000 Units, Subcutaneous, Q8H [address], 5,000 Units at 01/30/19 2115 **AND** [CANCELED] Platelet count, , , Once, Padmini [place], DPM    insulin glargine (LANTUS) subcutaneous injection 75 Units 0 75 mL, 75 Units, Subcutaneous, QAM, Padmini [place], DPM, 75 Units at 01/31/19 0915    insulin lispro (HumaLOG) 100 units/mL subcutaneous injection 1-5 Units, 1-5 Units, Subcutaneous, HS, Padmini [place], DPM, 1 Units at 01/30/19 2141    insulin lispro (HumaLOG) 100 units/mL subcutaneous injection 1-6 Units, 1-6 Units, Subcutaneous, TID AC, 1 Units at 01/31/19 0914 **AND** Fingerstick Glucose (POCT), , , TID AC, Padmini [place], DPM    insulin lispro (HumaLOG) 100 units/mL subcutaneous injection 15 Units, 15 Units, Subcutaneous, TID With Meals, Padmini [place], DPM, 15 Units at 01/31/19 0914    loratadine (CLARITIN) tablet 10 mg, 10 mg, Oral, Daily PRN, Padmini [place], DPM    oxyCODONE-acetaminophen (PERCOCET) 5-325 mg per tablet 1 tablet, 1 tablet, Oral, Q6H PRN, Padmini [place], DPM    sodium chloride 0 9 % infusion, 60 mL/hr, Intravenous, Continuous, MIKEL Bermudez, Last Rate: 60 mL/hr at 01/30/19 2356, 60 mL/hr at 01/30/19 2356    Invasive Devices:      Lab Results:     Results from last 7 days  Lab Units 01/31/19  0503 01/30/19  1335 01/30/19  0505 01/29/19  0546 01/28/19  1945   WBC Thousand/uL 8 37  --  8 67 9 07 9 22   HEMOGLOBIN g/dL 10 0*  --  10 5* 10 3* 10 1*   HEMATOCRIT % 33 2*  --  36 0 34 0* 34 0*   PLATELETS Thousands/uL 266  --  274 268 272   POTASSIUM mmol/L 4 1  --  5 1 4 4 4 1   CHLORIDE mmol/L 106  --  102 103 104   CO2 mmol/L 21  --  21 20* 23   BUN mg/dL 40*  --  39* 32* 31*   CREATININE mg/dL 1 16  --  1 41* 1 15 1 21   CALCIUM mg/dL 8 2*  --  8 6 8 8 8 5   ALK PHOS U/L  --   --   --   --  82   ALT U/L  --   --   --   --  22   AST U/L  --   --   --   --  22   LEUKOCYTES UA   --  Negative  --   --   --    BLOOD UA   --  Negative  --   --   --        Previous work up:  Please see previous notes wheelchair

## 2022-03-09 ENCOUNTER — OFFICE VISIT (OUTPATIENT)
Dept: FAMILY MEDICINE CLINIC | Facility: CLINIC | Age: 57
End: 2022-03-09

## 2022-03-09 VITALS
HEART RATE: 104 BPM | WEIGHT: 180 LBS | RESPIRATION RATE: 16 BRPM | DIASTOLIC BLOOD PRESSURE: 70 MMHG | BODY MASS INDEX: 33.99 KG/M2 | SYSTOLIC BLOOD PRESSURE: 128 MMHG | OXYGEN SATURATION: 99 % | TEMPERATURE: 98.7 F | HEIGHT: 61 IN

## 2022-03-09 DIAGNOSIS — E11.22 TYPE 2 DIABETES MELLITUS WITH STAGE 2 CHRONIC KIDNEY DISEASE, UNSPECIFIED WHETHER LONG TERM INSULIN USE (HCC): Primary | ICD-10-CM

## 2022-03-09 DIAGNOSIS — N18.2 TYPE 2 DIABETES MELLITUS WITH STAGE 2 CHRONIC KIDNEY DISEASE, UNSPECIFIED WHETHER LONG TERM INSULIN USE (HCC): Primary | ICD-10-CM

## 2022-03-09 LAB — SL AMB POCT HEMOGLOBIN AIC: 7.9 (ref ?–6.5)

## 2022-03-09 PROCEDURE — 3051F HG A1C>EQUAL 7.0%<8.0%: CPT | Performed by: FAMILY MEDICINE

## 2022-03-09 PROCEDURE — 3066F NEPHROPATHY DOC TX: CPT | Performed by: PODIATRIST

## 2022-03-09 PROCEDURE — 99495 TRANSJ CARE MGMT MOD F2F 14D: CPT | Performed by: NURSE PRACTITIONER

## 2022-03-09 PROCEDURE — 83036 HEMOGLOBIN GLYCOSYLATED A1C: CPT | Performed by: NURSE PRACTITIONER

## 2022-03-09 PROCEDURE — 1111F DSCHRG MED/CURRENT MED MERGE: CPT | Performed by: NURSE PRACTITIONER

## 2022-03-09 PROCEDURE — 3051F HG A1C>EQUAL 7.0%<8.0%: CPT | Performed by: SURGERY

## 2022-03-09 NOTE — PROGRESS NOTES
Transition of Care  Follow-up After Hospitalization    Marco A Johnsonleatha 64 y o  adult   Date:  3/9/2022    TCM Call (since 2/6/2022)     Date and time call was made  3/2/2022  9:59 AM    Hospital care reviewed  Records reviewed        Patient was hospitialized at  [facility] [place] - CLOSED        Date of Admission  02/28/22    Date of discharge  03/01/22    Diagnosis  Bariatric    Disposition  Home    Current Symptoms  None      TCM Call (since 2/6/2022)     Post hospital issues  None    Scheduled for follow up? Yes    I have advised the patient to call PCP with any new or worsening symptoms  [facility] records were reviewed  Medications upon discharge reviewed/updated  Discharge Disposition:  home  Follow up visits with other specialists: bariatrics, endocrine      HPI:  MP is a very pleasant 65 yo F here for transition of care with PCP following gastric sleeve operation 2/28/22  Note from surgeon at hospital discharge: "Patient tolerated surgery well without complications  In the morning postoperative Day 1, the patient had mild nausea and abdominal pain  Tolerated a clear liquid diet without vomiting  Able to ambulate and voiding independently  Patient was deemed ready for discharge home " Bariatrics nurse navigator called her 4 days after discharge, noting: "Post op follow up phone call completed   Pt is sipping liquids, using IS as instructed, reinforced importance of using IS to help prevent pneumonia  Ambulating about home without difficulty   Minimal pain, not using oxycodone   Reaffirmed examples of liquid diet over the next week   Pt stated understanding about discharge instructions and medication adjustments   Tolerating self administration of Lovenox injections without difficulty "    Today Sydney Richardson states she feels well and has no complaints  Her only question is regarding her DM medications and if she needs to modulate due to her liquid diet    She checks her BG in the morning which is usually 120-150  She does follow with endocrine, has a f/u appt 22  ROS: Review of Systems   Constitutional: Negative for activity change, chills, fatigue, fever and unexpected weight change  HENT: Negative for congestion, ear pain, hearing loss, rhinorrhea, sinus pressure and sore throat  Eyes: Negative for pain and visual disturbance  Respiratory: Negative for cough, shortness of breath and wheezing  Cardiovascular: Negative for chest pain, palpitations and leg swelling  Gastrointestinal: Negative for abdominal pain, nausea and vomiting  Genitourinary: Negative for dysuria and hematuria  Musculoskeletal: Negative for arthralgias, back pain, gait problem, joint swelling and myalgias  Skin: Negative for color change and rash  Neurological: Negative for dizziness, tremors, seizures, syncope, weakness, light-headedness and headaches  Psychiatric/Behavioral: Negative for agitation, behavioral problems, confusion, dysphoric mood, self-injury, sleep disturbance and suicidal ideas  The patient is not nervous/anxious  All other systems reviewed and are negative        Past Medical History:   Diagnosis Date    Ambulates with cane     Cellulitis of toe 2019    CKD (chronic kidney disease)     CPAP (continuous positive airway pressure) dependence     Diabetes mellitus ([address] )     Gangrene of toe of right foot ([address] ) 10/30/2019    Added automatically from request for surgery [id number]    Hyperlipidemia     Hypertension     Obese     gastric  sleeve today 2022    PVD (peripheral vascular disease) ([address] )     Sleep apnea     Teeth missing     Wears glasses        Past Surgical History:   Procedure Laterality Date     SECTION      EGD      IR ABDOMINAL AORTAGRAM  2019    IR AORTAGRAM WITH RUN-OFF  2020    IR ARTERIAL LYSIS  2019    IR LOWER EXTREMITY / INTERVENTION  2019    IR LOWER EXTREMITY ANGIOGRAM  2022    IR TPA LYSIS CHECK  2022    ID AMPUTATION FOOT,TRANSMETATARSAL Right 2019    Procedure: AMPUTATION TRANSMETATARSAL (TMA) WITH ACHILLES TENDON [address];  Surgeon: Marion Patel DPM;  [facility];  Service: Podiatry    [address] [place] [address] Right 2020    Procedure: AMPUTATION TRANSMETATARSAL (TMA);  Surgeon: Verl Soulier, DPM;  [facility];  Service: Podiatry    ID LAP, BUBBA RESTRICT PROC, LONGITUDINAL GASTRECTOMY N/A 2022    Procedure: [address] EGD;  Surgeon: Mercedes Chu MD;  [facility];  Service: Bariatrics    TOE AMPUTATION Left 2019    Procedure: AMPUTATION 5th TOE;  Surgeon: Marion Patel DPM;  [facility];  Service: Podiatry    TOE AMPUTATION Right 2019    Procedure: AMPUTATION RIGHT 3RD AND 4TH TOE;  Surgeon: Marion Patel DPM;  [facility];  Service: Podiatry       Social History     Socioeconomic History    Marital status: Single     Spouse name: None    Number of children: None    Years of education: None    Highest education level: None   Occupational History    None   Tobacco Use    Smoking status: Former Smoker     Packs/day: 2 00     Years: 20 00     Pack years: 40 00     Types: Cigarettes     Quit date:      Years since quittin     Smokeless tobacco: Never Used   Vaping Use    Vaping Use: Never used   Substance and Sexual Activity    Alcohol use: Not Currently     Comment: rarely    Drug use: No    Sexual activity: Not Currently   Other Topics Concern    None   Social History Narrative    None     Social Determinants of Health     Financial Resource Strain: Medium Risk    Difficulty of Paying Living Expenses: Somewhat hard   Food Insecurity: No Food Insecurity    Worried About Running Out of Food in the Last Year: Never true    Aliyah of Food in the Last Year: Never true   Transportation Needs: No Transportation Needs    Lack of Transportation (Medical):  No    Lack of Transportation (Non-Medical):  No   Physical Activity: Not on file   Stress: Not on file   Social Connections: Not on file   Intimate Partner Violence: Not on file   Housing Stability: Not on file       Family History   Problem Relation Age of Onset    Diabetes Mother     No Known Problems Father        Allergies   Allergen Reactions    Levemir [Insulin Detemir] Itching         Current Outpatient Medications:     aspirin 81 mg chewable tablet, Chew 1 tablet (81 mg total) daily, Disp: , Rfl:     atorvastatin (LIPITOR) 20 mg tablet, Take 1 tablet (20 mg total) by mouth daily, Disp: 90 tablet, Rfl: 2    Blood Glucose Monitoring Suppl (ONE TOUCH ULTRA MINI) w/Device KIT, Use 4 times a day, Disp: 1 kit, Rfl: 2    cetirizine (ZyrTEC) 10 mg tablet, Take 1 tablet (10 mg total) by mouth daily (Patient taking differently: Take 10 mg by mouth daily as needed ), Disp: 90 tablet, Rfl: 1    clopidogrel (PLAVIX) 75 mg tablet, Take 1 tablet (75 mg total) by mouth daily, Disp: 90 tablet, Rfl: 3    docusate sodium (COLACE) 100 mg capsule, Take 1 capsule (100 mg total) by mouth 2 (two) times a day, Disp: 60 capsule, Rfl: 1    enoxaparin (LOVENOX) 40 mg/0 4 mL, Inject 0 4 mL (40 mg total) under the skin in the morning for 13 days, Disp: 5 2 mL, Rfl: 0    ergocalciferol (ERGOCALCIFEROL) 1 25 MG (64061 UT) capsule, Take 1 capsule (50,000 Units total) by mouth once a week, Disp: 12 capsule, Rfl: 3    glucose blood (Glucose Meter Test) test strip, One touch ultra strips use to test 3 times daily, Disp: 100 strip, Rfl: 5    Insulin Pen Needle 32G X 4 MM MISC, Use 4 times daily with insulin pens, Disp: 120 each, Rfl: 5    metoprolol tartrate (LOPRESSOR) 25 mg tablet, Take 1 tablet (25 mg total) by mouth every 12 (twelve) hours, Disp: 180 tablet, Rfl: 1    omeprazole (PriLOSEC) 20 mg delayed release capsule, Take 1 capsule (20 mg total) by mouth daily, Disp: 30 capsule, Rfl: 3    OneTouch Delica Lancets 95A MISC, Use to test 3x daily, Disp: 300 each, Rfl: 2    oxyCODONE (Roxicodone) 5 immediate release tablet, Take 1 tablet (5 mg total) by mouth every 4 (four) hours as needed for moderate pain Max Daily Amount: 30 mg, Disp: 10 tablet, Rfl: 0      Physical Exam:  /70 (BP Location: Right arm, Patient Position: Sitting, Cuff Size: Standard)   Pulse 104   Temp 98 7 °F (37 1 °C) (Temporal)   Resp 16   Ht 5' 1" (1 549 m)   Wt 81 6 kg (180 lb)   LMP  (LMP Unknown)   SpO2 99%   Breastfeeding No   BMI 34 01 kg/m²     Physical Exam  Vitals reviewed  Constitutional:       General: Karen Rubio is not in acute distress  Appearance: Normal appearance  HENT:      Head: Normocephalic  Right Ear: External ear normal       Left Ear: External ear normal       Nose: Nose normal  No congestion  Mouth/Throat:      Mouth: Mucous membranes are moist    Eyes:      Extraocular Movements: Extraocular movements intact  Conjunctiva/sclera: Conjunctivae normal    Cardiovascular:      Rate and Rhythm: Normal rate and regular rhythm  Heart sounds: Normal heart sounds  No murmur heard  No friction rub  No gallop  Pulmonary:      Effort: Pulmonary effort is normal       Breath sounds: Normal breath sounds  No wheezing  Abdominal:      Comments: 5 trochar sites clean, dry, intact with routine healing, no SOI/discharge   Musculoskeletal:         General: Normal range of motion  Cervical back: Normal range of motion and neck supple  No muscular tenderness  Right lower leg: No edema  Left lower leg: No edema  Skin:     General: Skin is warm and dry  Capillary Refill: Capillary refill takes less than 2 seconds  Neurological:      General: No focal deficit present  Mental Status: Karen Rubio is alert  Psychiatric:         Mood and Affect: Mood normal          Behavior: Behavior normal          Thought Content:  Thought content normal              Labs:  Lab Results Component Value Date    WBC 10 56 (H) 03/01/2022    HGB 10 0 (L) 03/01/2022    HCT 32 8 (L) 03/01/2022    MCV 75 (L) 03/01/2022     03/01/2022     Lab Results   Component Value Date    K 4 1 03/01/2022     03/01/2022    CO2 28 03/01/2022    BUN 12 03/01/2022    CREATININE 0 76 03/01/2022    GLUF 122 (H) 10/04/2021    CALCIUM 8 5 03/01/2022    AST 22 01/16/2022    ALT 26 01/16/2022    ALKPHOS 87 01/16/2022    EGFR 64 09/01/2020       Assessment and Plan:    Jonathan Tillman was seen today for transition of care management  Diagnoses and all orders for this visit:    Type 2 diabetes mellitus with stage 2 chronic kidney disease, unspecified whether long term insulin use ([facility])  -     POCT hemoglobin A1c      Problem List Items Addressed This Visit     None      Visit Diagnoses     Type 2 diabetes mellitus with stage 2 chronic kidney disease, unspecified whether long term insulin use ([facility] )    -  Primary    Relevant Orders    POCT hemoglobin A1c (Completed)      ·  Because she is on a liquid diet and instructed to crush medications, I advised she continue with Metformin as ordered by endocrine  · I also gave her these instructions: If your fasting blood sugar in the morning is less than 100, hold your Lantus  Check sugar again after breakfast/shake (you're still on liquid diet), if above 150, take your Lantus, if 100-150, take half your prescribed dose of Lantus  · Please call endocrine and ask them if they request any post-operative change in your medication regimen

## 2022-03-09 NOTE — PATIENT INSTRUCTIONS
If your fasting blood sugar in the morning is less than 100, hold your Lantus  Check sugar again after breakfast/shake (you're still on liquid diet), if above 150, take your Lantus, if 100-150, take half your prescribed dose

## 2022-03-11 ENCOUNTER — OFFICE VISIT (OUTPATIENT)
Dept: BARIATRICS | Facility: CLINIC | Age: 57
End: 2022-03-11

## 2022-03-11 VITALS
HEART RATE: 83 BPM | DIASTOLIC BLOOD PRESSURE: 70 MMHG | OXYGEN SATURATION: 93 % | BODY MASS INDEX: 33.96 KG/M2 | HEIGHT: 60 IN | WEIGHT: 173 LBS | SYSTOLIC BLOOD PRESSURE: 120 MMHG | TEMPERATURE: 97.8 F

## 2022-03-11 DIAGNOSIS — E66.9 OBESITY, CLASS I, BMI 30-34.9: Primary | ICD-10-CM

## 2022-03-11 DIAGNOSIS — Z98.84 BARIATRIC SURGERY STATUS: ICD-10-CM

## 2022-03-11 DIAGNOSIS — Z98.84 BARIATRIC SURGERY STATUS: Primary | ICD-10-CM

## 2022-03-11 PROCEDURE — 99024 POSTOP FOLLOW-UP VISIT: CPT | Performed by: SURGERY

## 2022-03-11 PROCEDURE — RECHECK: Performed by: DIETITIAN, REGISTERED

## 2022-03-11 PROCEDURE — 3008F BODY MASS INDEX DOCD: CPT | Performed by: SURGERY

## 2022-03-11 PROCEDURE — 3008F BODY MASS INDEX DOCD: CPT | Performed by: FAMILY MEDICINE

## 2022-03-11 NOTE — PROGRESS NOTES
POST OP UP VISIT - BARIATRIC SURGERY  Debo Lyon 64 y o  adult MRN: [id number]  Unit/Bed#:  Encounter: [id number]      HPI:  Hanna Magdaleno is a 64 y o  adult status post laparoscopic sleeve gastrectomy performed by Dr Jarek Aguilera on 22 returning to office for first post op visit since surgery  Tolerating liquid diet  Denies nausea and vomiting  Taking multivitamins and PPI daily  Administering lovenox injections  Review of Systems   Constitutional: Negative for chills and fever  HENT: Negative for ear pain and sore throat  Eyes: Negative for pain and visual disturbance  Respiratory: Negative for cough and shortness of breath  Cardiovascular: Negative for chest pain and palpitations  Gastrointestinal: Negative for abdominal pain and vomiting  Genitourinary: Negative for dysuria and hematuria  Musculoskeletal: Negative for arthralgias and back pain  Skin: Negative for color change and rash  Neurological: Negative for seizures and syncope  All other systems reviewed and are negative        Historical Information   Past Medical History:   Diagnosis Date    Ambulates with cane     Bariatric surgery status     Cellulitis of toe 2019    CKD (chronic kidney disease)     CPAP (continuous positive airway pressure) dependence     Diabetes mellitus ([address] )     Gangrene of toe of right foot ([address] ) 10/30/2019    Added automatically from request for surgery [id number]    Hyperlipidemia     Hypertension     Obese     gastric  sleeve today 2022    PVD (peripheral vascular disease) ([address] )     Sleep apnea     Teeth missing     Wears glasses      Past Surgical History:   Procedure Laterality Date     SECTION      EGD      IR ABDOMINAL AORTAGRAM  2019    IR AORTAGRAM WITH RUN-OFF  2020    IR ARTERIAL LYSIS  2019    IR LOWER EXTREMITY / INTERVENTION  2019    IR LOWER EXTREMITY ANGIOGRAM  2022    IR TPA LYSIS CHECK 2022    CA AMPUTATION FOOT,TRANSMETATARSAL Right 2019    Procedure: AMPUTATION TRANSMETATARSAL (TMA) WITH ACHILLES TENDON LENGTHING;  Surgeon: Laura Villarreal DPM;  [facility];  Service: Podiatry    [facility] Right 2020    Procedure: AMPUTATION TRANSMETATARSAL (TMA);  Surgeon: Luis Angel Wynn DPM;  [facility];  Service: Podiatry    CA LAP, BUBBA RESTRICT PROC, LONGITUDINAL GASTRECTOMY N/A 2022    Procedure: LAPAROSCOPIC [address] EGD;  Surgeon: Carito Mcdowell MD;  [facility];  Service: Bariatrics    TOE AMPUTATION Left 2019    Procedure: AMPUTATION 5th TOE;  Surgeon: Laura Villarreal DPM;  [facility];  Service: Podiatry    TOE AMPUTATION Right 2019    Procedure: AMPUTATION RIGHT 3RD AND 4TH TOE;  Surgeon: Laura Villarreal DPM;  [facility];  Service: Podiatry     Social History   Social History     Substance and Sexual Activity   Alcohol Use Not Currently    Comment: rarely     Social History     Substance and Sexual Activity   Drug Use No     Social History     Tobacco Use   Smoking Status Former Smoker    Packs/day: 2 00    Years: 20 00    Pack years: 40 00    Types: Cigarettes    Quit date:     Years since quittin 1   Smokeless Tobacco Never Used     Family History: non-contributory    Meds/Allergies   all medications and allergies reviewed  Allergies   Allergen Reactions    Levemir [Insulin Detemir] Itching       Objective       Current Vitals:   Blood Pressure: 120/70 (22 1324)  Pulse: 83 (22 1324)  Temperature: 97 8 °F (36 6 °C) (22 1324)  Height: 5' (152 4 cm) (22 1324)  Weight - Scale: 78 5 kg (173 lb) (22 1324)  SpO2: 93 % (22 132)      Invasive Devices  Report    Peripheral Intravenous Line            Peripheral IV 22 Left;Ventral (anterior) Forearm 11 days                Physical Exam  Constitutional:       Appearance: Normal appearance  Porter Roe is obese  HENT:      Head: Normocephalic and atraumatic  Cardiovascular:      Rate and Rhythm: Normal rate  Pulmonary:      Effort: Pulmonary effort is normal    Abdominal:      Comments: Incisions c/d/i, no evidence of infection, healing well   Musculoskeletal:         General: Normal range of motion  Skin:     General: Skin is warm  Neurological:      General: No focal deficit present  Mental Status: Porter Roe is alert and oriented to person, place, and time  Psychiatric:         Mood and Affect: Mood normal          Behavior: Behavior normal          Assessment/PLAN:    64 y o  adult status post laparoscopic sleeve gastrectomy done on 2/28/22by Dr Karina Pruitt, doing well post op  No major issues and healing well  The pathology report was reviewed with the patient and the results were within normal limits  Pathology, and Other Studies: I have personally reviewed pertinent reports  Final Diagnosis   A  Stomach, sleeve gastrectomy:  - Segment of full-thickness stomach with mild chronic inactive gastritis  - Negative for intestinal metaplasia, dysplasia or carcinoma  - No Helicobacter pylori is identified on H&E stained slide         Increase physical activity slowly as tolerated and instructed  Advance diet as instructed by our dietitians today and as indicated in the binder  Continue Lovenox prophylaxis until completed  Continue PPI    Follow up in one month as scheduled          Jalen Martin MD  Bariatric Surgery   3/11/2022  1:35 PM

## 2022-03-11 NOTE — PROGRESS NOTES
Weight Management Nutrition Note         Bariatric Surgeon: Dr Brian Theodore    Surgery: Vertical Sleeve Gastrectomy    Class: first post op note    Topics discussed today include:     fluid goals post op, protein goals post op, constipation, chew food well, diet progression, protein supplems, vitamin/mineral supplements, calcium supplements, additional vitamin B12, iron supplements, fat soluble vitamins  and provided with free samples of Celebrate chewable one per day     Patient was able to verbalize basic diet (protein, fluid, vitamin and mineral) recommendations and possible nutrition-related complications   Yes

## 2022-03-16 ENCOUNTER — DOCUMENTATION (OUTPATIENT)
Dept: BARIATRICS | Facility: CLINIC | Age: 57
End: 2022-03-16

## 2022-04-04 ENCOUNTER — OFFICE VISIT (OUTPATIENT)
Dept: ENDOCRINOLOGY | Facility: CLINIC | Age: 57
End: 2022-04-04
Payer: COMMERCIAL

## 2022-04-04 VITALS
SYSTOLIC BLOOD PRESSURE: 130 MMHG | DIASTOLIC BLOOD PRESSURE: 76 MMHG | BODY MASS INDEX: 34.95 KG/M2 | WEIGHT: 178 LBS | HEIGHT: 60 IN | HEART RATE: 71 BPM

## 2022-04-04 DIAGNOSIS — Z79.4 TYPE 2 DIABETES MELLITUS WITH OTHER CIRCULATORY COMPLICATION, WITH LONG-TERM CURRENT USE OF INSULIN (HCC): ICD-10-CM

## 2022-04-04 DIAGNOSIS — E11.59 TYPE 2 DIABETES MELLITUS WITH OTHER CIRCULATORY COMPLICATION, WITH LONG-TERM CURRENT USE OF INSULIN (HCC): ICD-10-CM

## 2022-04-04 DIAGNOSIS — E78.5 HYPERLIPIDEMIA, UNSPECIFIED HYPERLIPIDEMIA TYPE: ICD-10-CM

## 2022-04-04 DIAGNOSIS — N18.2 DIABETES MELLITUS WITH STAGE 2 CHRONIC KIDNEY DISEASE (HCC): Primary | ICD-10-CM

## 2022-04-04 DIAGNOSIS — I10 ESSENTIAL HYPERTENSION: ICD-10-CM

## 2022-04-04 DIAGNOSIS — E11.22 DIABETES MELLITUS WITH STAGE 2 CHRONIC KIDNEY DISEASE (HCC): Primary | ICD-10-CM

## 2022-04-04 PROCEDURE — 3066F NEPHROPATHY DOC TX: CPT | Performed by: FAMILY MEDICINE

## 2022-04-04 PROCEDURE — 3066F NEPHROPATHY DOC TX: CPT

## 2022-04-04 PROCEDURE — 99214 OFFICE O/P EST MOD 30 MIN: CPT

## 2022-04-04 PROCEDURE — 1036F TOBACCO NON-USER: CPT

## 2022-04-04 RX ORDER — INSULIN GLARGINE 100 [IU]/ML
5 INJECTION, SOLUTION SUBCUTANEOUS DAILY
Qty: 5 ML | Refills: 0 | Status: SHIPPED | OUTPATIENT
Start: 2022-04-04 | End: 2022-06-14 | Stop reason: ALTCHOICE

## 2022-04-04 RX ORDER — DULAGLUTIDE 1.5 MG/.5ML
1.5 INJECTION, SOLUTION SUBCUTANEOUS WEEKLY
Qty: 5 ML | Refills: 0 | Status: SHIPPED | OUTPATIENT
Start: 2022-04-04 | End: 2022-06-14

## 2022-04-04 NOTE — PROGRESS NOTES
Established Patient Progress Note      Chief Complaint   Patient presents with    Diabetes Type 2        History of Present Illness:   Dusty Ruiz is a 64 y o  adult with type 2 diabetes with long term use of insulin for about 35 years  Last seen in the office 2/9/22  Since the last visit, she had a gastric sleeve operation 2/28/22  She has lost 9 pounds since surgery  She was taken off trulicity and metformin and managed on lantus  Reports complications of neuropathy with charcot foot, amputations and gangrene of toes, s/p TMA of right foot, CKD, and retinopathy  Last A1C was 7 9  Denies recent illness or hospitalizations  Denies recent severe hypoglycemic or severe hyperglycemic episodes  Denies any issues with current regimen  Home glucose monitoring: are performed regularly 3 x day  She feels well, denies any nausea or vomiting       Home blood glucose readings:   Before breakfast:   Before lunch: 114-190  Before dinner: 150-220     Current regimen:   Lantus 15 daily at night     Last Eye Exam: 3/28/22, mild retinopathy b/l eyes  Last Foot Exam: has appointment with podiatry this month, refusing today     Has hypertension: Taking metoprolol  Has hyperlipidemia: Taking atorvastatin     Vitamin D Deficiency: Taking 50,000 units weekly     Patient Active Problem List   Diagnosis    Essential hypertension    HLD (hyperlipidemia)    [facility]    Diabetes mellitus with stage 2 chronic kidney disease ([facility]    Anemia    Leukocytosis    Toe amputation status, left    Critical lower limb ischemia (HCC)    Polyneuropathy associated with underlying disease ([facility]    Charcot foot due to diabetes mellitus ([facility]    History of transmetatarsal amputation of foot ([facility]    Class 2 severe obesity due to excess calories with serious comorbidity and body mass index (BMI) of 36 0 to 36 9 in [facility])    Cellulitis of right lower extremity    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, with necrosis of bone ([facility] )    S/P transmetatarsal amputation of foot, right (HCC)    Current use of insulin (HCC)    Persistent proteinuria    Vitamin D deficiency    Obstructive sleep apnea    Well woman exam    Vaginal candida    CKD (chronic kidney disease) stage 2, GFR 60-89 ml/min    Right foot pain    Nicotine dependence in remission    Obesity, Class II, BMI 35-39 9      Past Medical History:   Diagnosis Date    Ambulates with cane     Bariatric surgery status     Cellulitis of toe 2019    CKD (chronic kidney disease)     CPAP (continuous positive airway pressure) dependence     Diabetes mellitus [facility]     Gangrene of toe of right foot ([facility] ) 10/30/2019    Added automatically from request for surgery [id number]    Hyperlipidemia     Hypertension     Obese     gastric  sleeve today 2022    PVD (peripheral vascular disease) ([facility] )     Sleep apnea     Teeth missing     Wears glasses       Past Surgical History:   Procedure Laterality Date     SECTION      EGD      IR ABDOMINAL AORTAGRAM  2019    IR AORTAGRAM WITH RUN-OFF  2020    IR ARTERIAL LYSIS  2019    IR LOWER EXTREMITY / INTERVENTION  2019    IR LOWER EXTREMITY ANGIOGRAM  2022    IR TPA LYSIS CHECK  2022    NJ AMPUTATION FOOT,TRANSMETATARSAL Right 2019    Procedure: AMPUTATION TRANSMETATARSAL (TMA) WITH ACHILLES TENDON LENGTHING;  Surgeon: Lui Soto DPM;  [facility];  Service: Podiatry    NJ AMPUTATION [facility] Right 2020    Procedure: AMPUTATION TRANSMETATARSAL (TMA);  Surgeon: Meme Matias DPM;  [facility];  Service: Podiatry    NJ LAP, BUBBA RESTRICT PROC, LONGITUDINAL GASTRECTOMY N/A 2022    Procedure: LAPAROSCOPIC SLEEVE GASTRECTOMY & INTRAOPERATIVE EGD;  Surgeon: Srinivasan Dover MD;  [facility];  Service: Bariatrics    TOE AMPUTATION Left 2019    Procedure: AMPUTATION 5th TOE;  Surgeon: Maryam More DPM;  [facility];  Service: Podiatry    TOE AMPUTATION Right 2019    Procedure: AMPUTATION RIGHT 3RD AND 4TH TOE;  Surgeon: Maryam More DPM;  [facility];  Service: Podiatry      Family History   Problem Relation Age of Onset    Diabetes Mother     No Known Problems Father      Social History     Tobacco Use    Smoking status: Former Smoker     Packs/day: 2 00     Years: 20 00     Pack years: 40 00     Types: Cigarettes     Quit date:      Years since quittin 2    Smokeless tobacco: Never Used   Substance Use Topics    Alcohol use: Not Currently     Comment: rarely     Allergies   Allergen Reactions    Levemir [Insulin Detemir] Itching         Current Outpatient Medications:     aspirin 81 mg chewable tablet, Chew 1 tablet (81 mg total) daily, Disp: , Rfl:     atorvastatin (LIPITOR) 20 mg tablet, Take 1 tablet (20 mg total) by mouth daily, Disp: 90 tablet, Rfl: 2    Blood Glucose Monitoring Suppl (ONE TOUCH ULTRA MINI) w/Device KIT, Use 4 times a day, Disp: 1 kit, Rfl: 2    cetirizine (ZyrTEC) 10 mg tablet, Take 1 tablet (10 mg total) by mouth daily (Patient taking differently: Take 10 mg by mouth daily as needed ), Disp: 90 tablet, Rfl: 1    clopidogrel (PLAVIX) 75 mg tablet, Take 1 tablet (75 mg total) by mouth daily, Disp: 90 tablet, Rfl: 3    docusate sodium (COLACE) 100 mg capsule, Take 1 capsule (100 mg total) by mouth 2 (two) times a day, Disp: 60 capsule, Rfl: 1    enoxaparin (LOVENOX) 40 mg/0 4 mL, Inject 0 4 mL (40 mg total) under the skin in the morning for 13 days, Disp: 5 2 mL, Rfl: 0    ergocalciferol (ERGOCALCIFEROL) 1 25 MG (09131 UT) capsule, Take 1 capsule (50,000 Units total) by mouth once a week, Disp: 12 capsule, Rfl: 3    glucose blood (Glucose Meter Test) test strip, One touch ultra strips use to test 3 times daily, Disp: 100 strip, Rfl: 5    Insulin Pen Needle 32G X 4 MM MISC, Use 4 times daily with insulin pens, Disp: 120 each, Rfl: 5    metoprolol tartrate (LOPRESSOR) 25 mg tablet, Take 1 tablet (25 mg total) by mouth every 12 (twelve) hours, Disp: 180 tablet, Rfl: 1    omeprazole (PriLOSEC) 20 mg delayed release capsule, Take 1 capsule (20 mg total) by mouth daily, Disp: 30 capsule, Rfl: 3    OneTouch Delica Lancets 85H MISC, Use to test 3x daily, Disp: 300 each, Rfl: 2    Dulaglutide (Trulicity) 1 5 QS/5 2FT SOPN, Inject 0 5 mL (1 5 mg total) under the skin once a week, Disp: 5 mL, Rfl: 0    insulin glargine (Lantus SoloStar) 100 units/mL injection pen, Inject 5 Units under the skin daily, Disp: 5 mL, Rfl: 0    oxyCODONE (Roxicodone) 5 immediate release tablet, Take 1 tablet (5 mg total) by mouth every 4 (four) hours as needed for moderate pain Max Daily Amount: 30 mg (Patient not taking: Reported on 3/11/2022 ), Disp: 10 tablet, Rfl: 0    Review of Systems   Constitutional: Positive for unexpected weight change (lost about 9 pounds since surgery)  Negative for activity change, appetite change, chills, fatigue and fever  HENT: Negative for sore throat, trouble swallowing and voice change  Eyes: Negative for visual disturbance  Respiratory: Negative for chest tightness and shortness of breath  Cardiovascular: Negative for chest pain, palpitations and leg swelling  Gastrointestinal: Negative for abdominal pain, constipation, diarrhea, nausea and vomiting  Endocrine: Negative for polydipsia, polyphagia and polyuria  Genitourinary: Negative for frequency  Skin: Negative for color change, pallor, rash and wound  Neurological: Negative for dizziness, tremors, weakness and light-headedness  All other systems reviewed and are negative  Physical Exam:  Body mass index is 34 76 kg/m²    /76   Pulse 71   Ht 5' (1 524 m)   Wt 80 7 kg (178 lb)   LMP  (LMP Unknown)   BMI 34 76 kg/m²    Wt Readings from Last 3 Encounters:   04/04/22 80 7 kg (178 lb)   03/11/22 78 5 kg (173 lb) 03/09/22 81 6 kg (180 lb)       Physical Exam  Vitals reviewed  Constitutional:       Appearance: Normal appearance  Sonja Hand is obese  HENT:      Head: Normocephalic  Eyes:      Extraocular Movements: Extraocular movements intact  Conjunctiva/sclera: Conjunctivae normal       Pupils: Pupils are equal, round, and reactive to light  Cardiovascular:      Rate and Rhythm: Normal rate and regular rhythm  Pulses: Normal pulses  Heart sounds: Normal heart sounds  No murmur heard  Pulmonary:      Effort: Pulmonary effort is normal  No respiratory distress  Breath sounds: Normal breath sounds  No wheezing, rhonchi or rales  Musculoskeletal:         General: No swelling, tenderness, deformity or signs of injury  Normal range of motion  Cervical back: Normal range of motion and neck supple  Right lower leg: No edema  Left lower leg: No edema  Skin:     General: Skin is warm and dry  Neurological:      General: No focal deficit present  Mental Status: Sonja Hand is alert and oriented to person, place, and time  Mental status is at baseline  Motor: No weakness  Gait: Gait normal    Psychiatric:         Mood and Affect: Mood normal          Behavior: Behavior normal          Thought Content:  Thought content normal          Judgment: Judgment normal        Labs:   Lab Results   Component Value Date    HGBA1C 7 9 (A) 03/09/2022    HGBA1C 6 8 (H) 08/25/2021    HGBA1C 7 2 (H) 04/22/2021     Lab Results   Component Value Date    CREATININE 0 76 03/01/2022    CREATININE 0 85 01/22/2022    CREATININE 0 75 01/21/2022    BUN 12 03/01/2022    K 4 1 03/01/2022     03/01/2022    CO2 28 03/01/2022     eGFR   Date Value Ref Range Status   09/01/2020 64 ml/min/1 73sq m Final     Lab Results   Component Value Date    HDL 41 04/22/2021    TRIG 127 04/22/2021     Lab Results   Component Value Date    ALT 26 01/16/2022    AST 22 01/16/2022 ALKPHOS 87 01/16/2022     Lab Results   Component Value Date    VLZ2RKTXLYML 3 013 04/22/2021    LCW3GJMPHDVM 2 180 05/06/2020     Lab Results   Component Value Date    FREET4 1 17 05/06/2020       Impression & Plan:    Problem List Items Addressed This Visit        Endocrine    Diabetes mellitus with stage 2 chronic kidney disease ([facility] ) - Primary     A1C above goal  She is having some lows in the morning and highs later in the day  She is feeling well and would like to restart on trulicity  Will start at 1 5 mg dose and work up to 3 mg dose if tolerating  Decrease lantus to 5 units daily - advised her to send BG log in 2 weeks and can adjust insulin as needed  If tolerating trulicity can increase to 3 mg weekly and possibly stop insulin  She has follow up with podiatry coming up  Repeat labs prior to next visit  Lab Results   Component Value Date    HGBA1C 7 9 (A) 03/09/2022            Relevant Medications    Dulaglutide (Trulicity) 1 5 CE/9 0MM SOPN    insulin glargine (Lantus SoloStar) 100 units/mL injection pen       Cardiovascular and Mediastinum    Essential hypertension     BP at goal  Continue current medication regimen  Other    HLD (hyperlipidemia)     Continue statin  Check lipid panel  Relevant Orders    Lipid panel Lab Collect Lab Collect      Other Visit Diagnoses     Type 2 diabetes mellitus with other circulatory complication, with long-term current use of insulin (HCC)        Relevant Medications    Dulaglutide (Trulicity) 1 5 PB/6 6RA SOPN    insulin glargine (Lantus SoloStar) 100 units/mL injection pen    Other Relevant Orders    Comprehensive metabolic panel Lab Collect    Microalbumin / creatinine urine ratio Lab Collect          Orders Placed This Encounter   Procedures    Lipid panel Lab Collect Lab Collect     This is a patient instruction: This test requires patient fasting for 10-12 hours or longer  Drinking of black coffee or black tea is acceptable       Standing Status:   Future     Standing Expiration Date:   4/4/2023    Comprehensive metabolic panel Lab Collect     This is a patient instruction: Patient fasting for 8 hours or longer recommended  Standing Status:   Future     Standing Expiration Date:   4/4/2023    Microalbumin / creatinine urine ratio Lab Collect     Standing Status:   Future     Standing Expiration Date:   4/4/2023       Patient Instructions   Start trulicity 1 5 mg weekly  Decrease lantus to 5 units daily       Discussed with the patient and all questioned fully answered  Sonja Hand will call me if any problems arise      MIKEL Grubbs

## 2022-04-04 NOTE — ASSESSMENT & PLAN NOTE
A1C above goal  She is having some lows in the morning and highs later in the day  She is feeling well and would like to restart on trulicity  Will start at 1 5 mg dose and work up to 3 mg dose if tolerating  Decrease lantus to 5 units daily - advised her to send BG log in 2 weeks and can adjust insulin as needed  If tolerating trulicity can increase to 3 mg weekly and possibly stop insulin  She has follow up with podiatry coming up  Repeat labs prior to next visit     Lab Results   Component Value Date    HGBA1C 7 9 (A) 03/09/2022

## 2022-04-12 ENCOUNTER — OFFICE VISIT (OUTPATIENT)
Dept: FAMILY MEDICINE CLINIC | Facility: CLINIC | Age: 57
End: 2022-04-12

## 2022-04-12 VITALS
WEIGHT: 173.2 LBS | OXYGEN SATURATION: 99 % | HEART RATE: 69 BPM | HEIGHT: 60 IN | TEMPERATURE: 97.7 F | SYSTOLIC BLOOD PRESSURE: 118 MMHG | DIASTOLIC BLOOD PRESSURE: 74 MMHG | RESPIRATION RATE: 18 BRPM | BODY MASS INDEX: 34 KG/M2

## 2022-04-12 DIAGNOSIS — N18.2 DIABETES MELLITUS WITH STAGE 2 CHRONIC KIDNEY DISEASE (HCC): Primary | ICD-10-CM

## 2022-04-12 DIAGNOSIS — E11.22 DIABETES MELLITUS WITH STAGE 2 CHRONIC KIDNEY DISEASE (HCC): Primary | ICD-10-CM

## 2022-04-12 PROCEDURE — 1036F TOBACCO NON-USER: CPT | Performed by: FAMILY MEDICINE

## 2022-04-12 PROCEDURE — 99213 OFFICE O/P EST LOW 20 MIN: CPT | Performed by: FAMILY MEDICINE

## 2022-04-12 NOTE — ASSESSMENT & PLAN NOTE
Lab Results   Component Value Date    HGBA1C 7 9 (A) 03/09/2022   Continue to follow with Endocrinology

## 2022-04-12 NOTE — PROGRESS NOTES
Assessment/Plan:    Diabetes mellitus with stage 2 chronic kidney disease ([facility] )    Lab Results   Component Value Date    HGBA1C 7 9 (A) 03/09/2022   Continue to follow with Endocrinology      Essential hypertension  At goal        Diagnoses and all orders for this visit:    Diabetes mellitus with stage 2 chronic kidney disease ([facility] )          Subjective:      Patient ID: Mable Garzon is a 64 y o  adult  65 yo female with DM complications of neuropathy with charcot foot, amputations and gangrene of toes, s/p TMA of right foot, CKD, and retinopathy  Last A1C was 7 9, status post laparoscopic sleeve gastrectomy performed by Dr Leny Alcala on 2/28/22  Doing well has started with soft foods  Originally taken off Trulicity and Metformin and managed with Lantus per Bariatric team, however Endocrinology restarted Trulicity and is weaning off Lantus  This am , lunchtime BG 79   Doing well, no complains today  Following with Endocrinology and Bariatric team        The following portions of the patient's history were reviewed and updated as appropriate:   Mable Garzon  has a past medical history of Ambulates with cane, Bariatric surgery status, Cellulitis of toe (1/28/2019), CKD (chronic kidney disease), CPAP (continuous positive airway pressure) dependence, Diabetes mellitus ([facility] ), Gangrene of toe of right foot ([facility] ) (10/30/2019), Hyperlipidemia, Hypertension, Obese, PVD (peripheral vascular disease) ([facility] ), Sleep apnea, Teeth missing, and Wears glasses    Yosef Lyon   Patient Active Problem List    Diagnosis Date Noted    Obesity, Class II, BMI 35-39 9 02/17/2022    Nicotine dependence in remission 01/20/2022    Right foot pain 01/16/2022    CKD (chronic kidney disease) stage 2, GFR 60-89 ml/min 10/05/2021    Well woman exam 02/10/2021    Vaginal candida 02/10/2021    Obstructive sleep apnea 10/30/2020    Persistent proteinuria 09/03/2020    Vitamin D deficiency 2020    Current use of insulin ([facility] ) 2020    S/P transmetatarsal amputation of foot, right ([facility] ) 2020    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, with necrosis of bone ([facility] ) 2020    Cellulitis of right lower extremity 2020    Class 2 severe obesity due to excess calories with serious comorbidity and body mass index (BMI) of 36 0 to 36 9 in adult [facility]) 2020    History of [facility]metatarsal [facility] foot [email or web address] 2019    Charcot foot due to diabetes mellitus ([place] [address] ) 2019    Polyneuropathy associated with underlying disease ([place] [address] ) 2019    Critical lower limb ischemia ([place] [address] ) 2019    Toe amputation status, left 2019    Leukocytosis 2019    Anemia 2019    Diabetes mellitus with stage 2 chronic kidney disease ([facility] ) 2019    HLD (hyperlipidemia) 2019    PAD (peripheral artery disease) ([place] [address] ) 2019    Essential hypertension 10/27/2016     Rigoberto Webber  has a past surgical history that includes IR abdominal aortagram (2019); Toe amputation (Left, 2019); IR arterial lysis (2019); IR lower extremity / intervention (2019); Toe amputation (Right, 2019); pr amputation foot,transmetatarsal (Right, 2019); IR aortagram with run-off (2020); pr amputation foot,transmetatarsal (Right, 2020); IR lower extremity angiogram (2022); IR TPA lysis check (2022);  section; EGD; and pr lap, addison restrict proc, longitudinal gastrectomy (N/A, 2022)  Albert Lyon's family history includes Diabetes in Gisele Lyon's mother; No Known Problems in Gearl Regconnor Lyon's father  Albert Lyon  reports that Gisele Lyon quit smoking about 9 years ago  Juliane Krabbe PerezChevere's smoking use included cigarettes  Juliane Krabbe PerezChevere has a 40 00 pack-year smoking history  Juliane Krabbe PerezChevere has never used smokeless tobacco  Sarahi Lyon reports previous alcohol use  Sarahi Lyon reports that Giles Trevizo I  Camron does not use drugs    Current Outpatient Medications   Medication Sig Dispense Refill    aspirin 81 mg chewable tablet Chew 1 tablet (81 mg total) daily      atorvastatin (LIPITOR) 20 mg tablet Take 1 tablet (20 mg total) by mouth daily 90 tablet 2    Blood Glucose Monitoring Suppl (ONE TOUCH ULTRA MINI) w/Device KIT Use 4 times a day 1 kit 2    cetirizine (ZyrTEC) 10 mg tablet Take 1 tablet (10 mg total) by mouth daily (Patient taking differently: Take 10 mg by mouth daily as needed ) 90 tablet 1    clopidogrel (PLAVIX) 75 mg tablet Take 1 tablet (75 mg total) by mouth daily 90 tablet 3    docusate sodium (COLACE) 100 mg capsule Take 1 capsule (100 mg total) by mouth 2 (two) times a day 60 capsule 1    Dulaglutide (Trulicity) 1 5 FQ/1 3UE SOPN Inject 0 5 mL (1 5 mg total) under the skin once a week 5 mL 0    enoxaparin (LOVENOX) 40 mg/0 4 mL Inject 0 4 mL (40 mg total) under the skin in the morning for 13 days 5 2 mL 0    ergocalciferol (ERGOCALCIFEROL) 1 25 MG (75143 UT) capsule Take 1 capsule (50,000 Units total) by mouth once a week 12 capsule 3    glucose blood (Glucose Meter Test) test strip One touch ultra strips use to test 3 times daily 100 strip 5    insulin glargine (Lantus SoloStar) 100 units/mL injection pen Inject 5 Units under the skin daily 5 mL 0    Insulin Pen Needle 32G X 4 MM MISC Use 4 times daily with insulin pens 120 each 5    metoprolol tartrate (LOPRESSOR) 25 mg tablet Take 1 tablet (25 mg total) by mouth every 12 (twelve) hours 180 tablet 1    omeprazole (PriLOSEC) 20 mg delayed release capsule Take 1 capsule (20 mg total) by mouth daily 30 capsule 3    OneTouch Delica Lancets 45S MISC Use to test 3x daily 300 each 2    oxyCODONE (Roxicodone) 5 immediate release tablet Take 1 tablet (5 mg total) by mouth every 4 (four) hours as needed for moderate pain Max Daily Amount: 30 mg (Patient not taking: Reported on 3/11/2022 ) 10 tablet 0     No current facility-administered medications for this visit       Current Outpatient Medications on File Prior to Visit   Medication Sig    aspirin 81 mg chewable tablet Chew 1 tablet (81 mg total) daily    atorvastatin (LIPITOR) 20 mg tablet Take 1 tablet (20 mg total) by mouth daily    Blood Glucose Monitoring Suppl (ONE TOUCH ULTRA MINI) w/Device KIT Use 4 times a day    cetirizine (ZyrTEC) 10 mg tablet Take 1 tablet (10 mg total) by mouth daily (Patient taking differently: Take 10 mg by mouth daily as needed )    clopidogrel (PLAVIX) 75 mg tablet Take 1 tablet (75 mg total) by mouth daily    docusate sodium (COLACE) 100 mg capsule Take 1 capsule (100 mg total) by mouth 2 (two) times a day    Dulaglutide (Trulicity) 1 5 FC/9 5ZL SOPN Inject 0 5 mL (1 5 mg total) under the skin once a week    enoxaparin (LOVENOX) 40 mg/0 4 mL Inject 0 4 mL (40 mg total) under the skin in the morning for 13 days    ergocalciferol (ERGOCALCIFEROL) 1 25 MG (82058 UT) capsule Take 1 capsule (50,000 Units total) by mouth once a week    glucose blood (Glucose Meter Test) test strip One touch ultra strips use to test 3 times daily    insulin glargine (Lantus SoloStar) 100 units/mL injection pen Inject 5 Units under the skin daily    Insulin Pen Needle 32G X 4 MM MISC Use 4 times daily with insulin pens    metoprolol tartrate (LOPRESSOR) 25 mg tablet Take 1 tablet (25 mg total) by mouth every 12 (twelve) hours    omeprazole (PriLOSEC) 20 mg delayed release capsule Take 1 capsule (20 mg total) by mouth daily    OneTouch Delica Lancets 57Y MISC Use to test 3x daily    oxyCODONE (Roxicodone) 5 immediate release tablet Take 1 tablet (5 mg total) by mouth every 4 (four) hours as needed for moderate pain Max Daily Amount: 30 mg (Patient not taking: Reported on 3/11/2022 )     No current facility-administered medications on file prior to visit       Review of Systems   All other systems reviewed and are negative  Objective:      /74 (BP Location: Right arm, Patient Position: Sitting, Cuff Size: Standard)   Pulse 69   Temp 97 7 °F (36 5 °C) (Temporal)   Resp 18   Ht 5' (1 524 m)   Wt 78 6 kg (173 lb 3 2 oz)   LMP  (LMP Unknown)   SpO2 99%   BMI 33 83 kg/m²          Physical Exam  Vitals and nursing note reviewed  Constitutional:       Appearance: Kike Jimenez is well-developed  HENT:      Head: Normocephalic  Right Ear: External ear normal       Left Ear: External ear normal       Nose: Nose normal    Eyes:      Conjunctiva/sclera: Conjunctivae normal       Pupils: Pupils are equal, round, and reactive to light  Neck:      Thyroid: No thyromegaly  Cardiovascular:      Rate and Rhythm: Normal rate and regular rhythm  Heart sounds: Normal heart sounds  Pulmonary:      Effort: Pulmonary effort is normal       Breath sounds: Normal breath sounds  Abdominal:      Palpations: Abdomen is soft  Tenderness: There is no abdominal tenderness  There is no guarding or rebound  Musculoskeletal:      Cervical back: Normal range of motion and neck supple  Skin:     General: Skin is dry  Neurological:      Mental Status: Kike Jimenez is alert and oriented to person, place, and time  Deep Tendon Reflexes: Reflexes are normal and symmetric

## 2022-04-20 ENCOUNTER — TELEPHONE (OUTPATIENT)
Dept: OTHER | Facility: OTHER | Age: 57
End: 2022-04-20

## 2022-04-27 ENCOUNTER — OFFICE VISIT (OUTPATIENT)
Dept: PODIATRY | Facility: CLINIC | Age: 57
End: 2022-04-27
Payer: COMMERCIAL

## 2022-04-27 VITALS
SYSTOLIC BLOOD PRESSURE: 182 MMHG | HEART RATE: 75 BPM | HEIGHT: 60 IN | DIASTOLIC BLOOD PRESSURE: 87 MMHG | WEIGHT: 173 LBS | BODY MASS INDEX: 33.96 KG/M2

## 2022-04-27 DIAGNOSIS — Z89.431 S/P TRANSMETATARSAL AMPUTATION OF FOOT, RIGHT (HCC): ICD-10-CM

## 2022-04-27 DIAGNOSIS — I73.9 PAD (PERIPHERAL ARTERY DISEASE) (HCC): ICD-10-CM

## 2022-04-27 DIAGNOSIS — B35.1 TINEA UNGUIUM: ICD-10-CM

## 2022-04-27 DIAGNOSIS — E11.42 DIABETIC POLYNEUROPATHY ASSOCIATED WITH TYPE 2 DIABETES MELLITUS (HCC): Primary | ICD-10-CM

## 2022-04-27 PROCEDURE — 3008F BODY MASS INDEX DOCD: CPT

## 2022-04-27 PROCEDURE — 11720 DEBRIDE NAIL 1-5: CPT | Performed by: PODIATRIST

## 2022-04-27 PROCEDURE — 3008F BODY MASS INDEX DOCD: CPT | Performed by: FAMILY MEDICINE

## 2022-04-27 NOTE — PROGRESS NOTES
Genemiguel Arredondomichell Lyon  1965  AT RISK FOOT CARE    1  Diabetic polyneuropathy associated with type 2 diabetes mellitus ([address] )     2  Tinea unguium     3  PAD (peripheral artery disease) ([facility] )     4  S/P transmetatarsal amputation of foot, right [facility])         Patient presents for at-risk foot care  Patient has no acute concerns today  Patient has significant lower extremity risk due to previous amputation  On exam patient has thickened, hypertrophic, discolored, brittle toenails with subungual debris and tenderness x3   Callus: none  Amputation: right TMA, left 1,5      Today's treatment includes:  Debridement of toenails  Using nail nipper, kacie, and curette, nails were sharply debrided, reduced in thickness and length  Devitalized nail tissue and fungal debris excised and removed  Patient tolerated well  Discussed proper shoe gear, daily inspections of feet, and general foot health with patient  Patient has Q7  findings and is recommended for at risk foot care every 9-10 weeks  Diabetic Foot Exam    Patient's shoes and socks removed  Right Foot/Ankle   Right Foot Inspection  Skin Exam: skin normal and skin intact  No dry skin, no warmth, no callus, no erythema, no maceration, no abnormal color, no pre-ulcer, no ulcer and no callus  Toe Exam: right toe deformity (TMA)  Sensory   Vibration: absent  Proprioception: absent  Monofilament testing: absent    Vascular  Capillary refills: < 3 seconds  The right DP pulse is 0  The right PT pulse is 0  Left Foot/Ankle  Left Foot Inspection  Skin Exam: skin normal and skin intact  No dry skin, no warmth, no erythema, no maceration, normal color, no pre-ulcer, no ulcer and no callus  Toe Exam: left toe deformity (toe amputation)  Sensory   Vibration: absent  Proprioception: absent  Monofilament testing: absent    Vascular  Capillary refills: < 3 seconds  The left DP pulse is 1+  The left PT pulse is 0       Left Toe  - Comprehensive Exam  Claw toes: fourth toe, third toe and second toe      Assign Risk Category  Deformity present  Loss of protective sensation  Weak pulses  Risk: 3

## 2022-05-09 ENCOUNTER — HOSPITAL ENCOUNTER (OUTPATIENT)
Dept: NON INVASIVE DIAGNOSTICS | Facility: CLINIC | Age: 57
Discharge: HOME | End: 2022-05-09
Payer: COMMERCIAL

## 2022-05-09 DIAGNOSIS — I73.9 PAD (PERIPHERAL ARTERY DISEASE) (HCC): ICD-10-CM

## 2022-05-09 PROCEDURE — 93925 LOWER EXTREMITY STUDY: CPT

## 2022-05-09 PROCEDURE — 93923 UPR/LXTR ART STDY 3+ LVLS: CPT

## 2022-05-09 PROCEDURE — 93922 UPR/L XTREMITY ART 2 LEVELS: CPT | Performed by: SURGERY

## 2022-05-09 PROCEDURE — 93925 LOWER EXTREMITY STUDY: CPT | Performed by: SURGERY

## 2022-05-12 ENCOUNTER — TRANSCRIBE ORDERS (OUTPATIENT)
Dept: VASCULAR SURGERY | Facility: CLINIC | Age: 57
End: 2022-05-12

## 2022-05-12 ENCOUNTER — APPOINTMENT (OUTPATIENT)
Dept: LAB | Facility: HOSPITAL | Age: 57
End: 2022-05-12
Attending: INTERNAL MEDICINE
Payer: COMMERCIAL

## 2022-05-12 DIAGNOSIS — E11.22 DIABETES MELLITUS WITH STAGE 2 CHRONIC KIDNEY DISEASE (HCC): ICD-10-CM

## 2022-05-12 DIAGNOSIS — Z79.4 TYPE 2 DIABETES MELLITUS WITH OTHER CIRCULATORY COMPLICATION, WITH LONG-TERM CURRENT USE OF INSULIN (HCC): ICD-10-CM

## 2022-05-12 DIAGNOSIS — I73.9 PAD (PERIPHERAL ARTERY DISEASE) (HCC): Primary | ICD-10-CM

## 2022-05-12 DIAGNOSIS — N18.2 DIABETES MELLITUS WITH STAGE 2 CHRONIC KIDNEY DISEASE (HCC): ICD-10-CM

## 2022-05-12 DIAGNOSIS — E78.5 HYPERLIPIDEMIA, UNSPECIFIED HYPERLIPIDEMIA TYPE: ICD-10-CM

## 2022-05-12 DIAGNOSIS — E11.59 TYPE 2 DIABETES MELLITUS WITH OTHER CIRCULATORY COMPLICATION, WITH LONG-TERM CURRENT USE OF INSULIN (HCC): ICD-10-CM

## 2022-05-12 LAB
ALBUMIN SERPL BCP-MCNC: 3.4 G/DL (ref 3.5–5)
ALP SERPL-CCNC: 89 U/L (ref 46–116)
ALT SERPL W P-5'-P-CCNC: 100 U/L (ref 12–78)
ANION GAP SERPL CALCULATED.3IONS-SCNC: 7 MMOL/L (ref 4–13)
AST SERPL W P-5'-P-CCNC: 60 U/L (ref 5–45)
BILIRUB SERPL-MCNC: 0.17 MG/DL (ref 0.2–1)
BUN SERPL-MCNC: 16 MG/DL (ref 5–25)
CALCIUM ALBUM COR SERPL-MCNC: 9.4 MG/DL (ref 8.3–10.1)
CALCIUM SERPL-MCNC: 8.9 MG/DL (ref 8.3–10.1)
CHLORIDE SERPL-SCNC: 107 MMOL/L (ref 100–108)
CHOLEST SERPL-MCNC: 137 MG/DL
CO2 SERPL-SCNC: 26 MMOL/L (ref 21–32)
CREAT SERPL-MCNC: 0.97 MG/DL (ref 0.6–1.3)
CREAT UR-MCNC: 204 MG/DL
EST. AVERAGE GLUCOSE BLD GHB EST-MCNC: 171 MG/DL
GLUCOSE P FAST SERPL-MCNC: 122 MG/DL (ref 65–99)
HBA1C MFR BLD: 7.6 %
HDLC SERPL-MCNC: 46 MG/DL
LDLC SERPL CALC-MCNC: 63 MG/DL (ref 0–100)
MICROALBUMIN UR-MCNC: 417 MG/L (ref 0–20)
MICROALBUMIN/CREAT 24H UR: 204 MG/G CREATININE (ref 0–30)
NONHDLC SERPL-MCNC: 91 MG/DL
POTASSIUM SERPL-SCNC: 4.7 MMOL/L (ref 3.5–5.3)
PROT SERPL-MCNC: 7.3 G/DL (ref 6.4–8.2)
SODIUM SERPL-SCNC: 140 MMOL/L (ref 136–145)
TRIGL SERPL-MCNC: 140 MG/DL

## 2022-05-12 PROCEDURE — 80053 COMPREHEN METABOLIC PANEL: CPT

## 2022-05-12 PROCEDURE — 80061 LIPID PANEL: CPT

## 2022-05-12 PROCEDURE — 83036 HEMOGLOBIN GLYCOSYLATED A1C: CPT

## 2022-05-12 PROCEDURE — 3051F HG A1C>EQUAL 7.0%<8.0%: CPT

## 2022-05-12 PROCEDURE — 36415 COLL VENOUS BLD VENIPUNCTURE: CPT

## 2022-05-12 PROCEDURE — 3062F POS MACROALBUMINURIA REV: CPT

## 2022-05-12 PROCEDURE — 82043 UR ALBUMIN QUANTITATIVE: CPT

## 2022-05-12 PROCEDURE — 3062F POS MACROALBUMINURIA REV: CPT | Performed by: FAMILY MEDICINE

## 2022-05-12 PROCEDURE — 3051F HG A1C>EQUAL 7.0%<8.0%: CPT | Performed by: FAMILY MEDICINE

## 2022-05-12 PROCEDURE — 82570 ASSAY OF URINE CREATININE: CPT

## 2022-05-13 ENCOUNTER — TELEPHONE (OUTPATIENT)
Dept: ENDOCRINOLOGY | Facility: CLINIC | Age: 57
End: 2022-05-13

## 2022-05-13 NOTE — TELEPHONE ENCOUNTER
----- Message from [facility], [address] Siddhartha Veliz sent at 5/13/2022  1:07 PM EDT -----  Cholesterol panel look good, liver function labs are slightly elevated but otherwise labs normal  A1c has improved - still remains above goal  Is she able to send in BG log?

## 2022-05-19 ENCOUNTER — OFFICE VISIT (OUTPATIENT)
Dept: BARIATRICS | Facility: CLINIC | Age: 57
End: 2022-05-19

## 2022-05-19 VITALS — HEIGHT: 60 IN | WEIGHT: 164.9 LBS | BODY MASS INDEX: 32.38 KG/M2

## 2022-05-19 DIAGNOSIS — Z98.84 BARIATRIC SURGERY STATUS: Primary | ICD-10-CM

## 2022-05-19 PROCEDURE — 3008F BODY MASS INDEX DOCD: CPT | Performed by: FAMILY MEDICINE

## 2022-05-19 PROCEDURE — RECHECK: Performed by: DIETITIAN, REGISTERED

## 2022-05-19 PROCEDURE — 3008F BODY MASS INDEX DOCD: CPT

## 2022-05-19 NOTE — PROGRESS NOTES
Weight Management Nutrition Note       Bariatric Surgeon: Dr Enid Trammell    Surgery: Vertical Sleeve Gastrectomy    Class:  5/6 week post op     Topics discussed today include:     Patient attended 5 week post op class  Reviewed diet progression, vitamin and mineral recommendations, 30/60 rules, volume of foods, protein supplements, hydration needs, antacid  guidelines, recommendation to f/u with pcp concerning med adjustments, exercise guidelines, hair shedding, contraception guidelines, constipation prevention and treatment, alcohol avoidance and recommendations of when to call the office  Patient was also encouraged to fill  out form for program   Time was left for discussion and to answer questions  Patient was able to verbalize basic diet (protein, fluid, vitamin and mineral) recommendations and possible nutrition-related complications   Yes

## 2022-06-14 ENCOUNTER — OFFICE VISIT (OUTPATIENT)
Dept: ENDOCRINOLOGY | Facility: CLINIC | Age: 57
End: 2022-06-14
Payer: COMMERCIAL

## 2022-06-14 VITALS
WEIGHT: 166.4 LBS | HEART RATE: 71 BPM | BODY MASS INDEX: 32.67 KG/M2 | DIASTOLIC BLOOD PRESSURE: 70 MMHG | HEIGHT: 60 IN | SYSTOLIC BLOOD PRESSURE: 130 MMHG

## 2022-06-14 DIAGNOSIS — E78.5 HYPERLIPIDEMIA, UNSPECIFIED HYPERLIPIDEMIA TYPE: ICD-10-CM

## 2022-06-14 DIAGNOSIS — N18.2 DIABETES MELLITUS WITH STAGE 2 CHRONIC KIDNEY DISEASE (HCC): Primary | ICD-10-CM

## 2022-06-14 DIAGNOSIS — Z89.439 HISTORY OF TRANSMETATARSAL AMPUTATION OF FOOT (HCC): ICD-10-CM

## 2022-06-14 DIAGNOSIS — R80.1 PERSISTENT PROTEINURIA: ICD-10-CM

## 2022-06-14 DIAGNOSIS — E11.22 DIABETES MELLITUS WITH STAGE 2 CHRONIC KIDNEY DISEASE (HCC): Primary | ICD-10-CM

## 2022-06-14 DIAGNOSIS — E55.9 VITAMIN D DEFICIENCY: ICD-10-CM

## 2022-06-14 DIAGNOSIS — N18.2 CKD (CHRONIC KIDNEY DISEASE) STAGE 2, GFR 60-89 ML/MIN: ICD-10-CM

## 2022-06-14 DIAGNOSIS — I10 ESSENTIAL HYPERTENSION: ICD-10-CM

## 2022-06-14 PROCEDURE — 3078F DIAST BP <80 MM HG: CPT

## 2022-06-14 PROCEDURE — 99214 OFFICE O/P EST MOD 30 MIN: CPT

## 2022-06-14 PROCEDURE — 3066F NEPHROPATHY DOC TX: CPT | Performed by: PHYSICIAN ASSISTANT

## 2022-06-14 PROCEDURE — 3075F SYST BP GE 130 - 139MM HG: CPT

## 2022-06-14 RX ORDER — DULAGLUTIDE 3 MG/.5ML
3 INJECTION, SOLUTION SUBCUTANEOUS WEEKLY
Qty: 6 ML | Refills: 1 | Status: SHIPPED | OUTPATIENT
Start: 2022-06-14

## 2022-06-14 NOTE — ASSESSMENT & PLAN NOTE
HGA1C has improved, above goal  Having hypoglycemia in the morning and some hyperglycemia later in the day  Treatment regimen: Will stop lantus and increase trulicity to 3 mg weekly  This will help eliminate hypoglycemia in the morning and post meal hyperglycemia later in the day  Discussed risks/complications associated with uncontrolled diabetes  Advised to adhere to diabetic diet, and recommended staying active/exercising routinely  Keep carbohydrates consistent to limit blood glucose fluctuations  Advised to call if blood sugars less than 70 mg/dl or over 300 mg/dl  Check blood glucose 2 times a day  Discussed symptoms and treatment of hypoglycemia  Recommended routine follow-up with podiatry and ophthalmology  Ordered blood work to complete prior to next visit    Lab Results   Component Value Date    HGBA1C 7 6 (H) 05/12/2022

## 2022-06-14 NOTE — ASSESSMENT & PLAN NOTE
Lab Results   Component Value Date    EGFR 64 09/01/2020    EGFR 62 05/07/2020    EGFR 85 05/06/2020    CREATININE 0 97 05/12/2022    CREATININE 0 76 03/01/2022    CREATININE 0 85 01/22/2022   Continue follow up with nephrology  She is not candidate for SGLT2 due to hx of amputations and yeast infections

## 2022-06-14 NOTE — PROGRESS NOTES
Established Patient Progress Note      Chief Complaint   Patient presents with    Diabetes Type 2        History of Present Illness:   Stuart Ravi is a 64 y o  adult with type 2 diabetes with long term use of insulin for about 35 years  Last seen in the office 4/4/22  She had gastric sleeve operation 2/28/22  Reports complications of neuropathy with charcot foot, amputations and gangrene of toes, s/p TMA of right foot, CKD, proteinuria and retinopathy  Last A1C was 7 6 down from 7 9  Denies recent illness or hospitalizations  Denies recent severe hypoglycemic or severe hyperglycemic episodes  Denies any issues with current regimen  Home glucose monitoring: are performed regularly      Home blood glucose readings:   Before breakfast:   Before lunch: 120-170  Before dinner:      Current regimen:   Trulicity 1 5 mg weekly   Lantus 5 units daily     Last Eye Exam: 3/28/22, mild retinopathy b/l eyes  Last Foot Exam: 4/27/22, UTD     Has hypertension: Taking metoprolol  Has hyperlipidemia: Taking atorvastatin        Vitamin D Deficiency: Taking 50,000 units weekly     Patient Active Problem List   Diagnosis    Essential hypertension    HLD (hyperlipidemia)    PAD (peripheral artery disease) ([facility] )    Diabetes mellitus with stage 2 chronic kidney disease ([facility] )    Anemia    Leukocytosis    Toe amputation status, left    Critical lower limb ischemia (HCC)    Polyneuropathy associated with underlying disease ([facility] )    Charcot foot due to diabetes mellitus ([facility] )    History of transmetatarsal amputation of foot ([facility] )    Class 2 severe obesity due to excess calories with serious comorbidity and body mass index (BMI) of 36 0 to 36 9 in adult [facility])    Cellulitis of right lower extremity    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, with necrosis of bone ([address] )    S/P transmetatarsal amputation of foot, right ([facility] )    Current use of insulin (HCC)    Persistent proteinuria    Vitamin D deficiency    Obstructive sleep apnea    Well woman exam    Vaginal candida    CKD (chronic kidney disease) stage 2, GFR 60-89 ml/min    Right foot pain    Nicotine dependence in remission    Obesity, Class II, BMI 35-39 9      Past Medical History:   Diagnosis Date    Ambulates with cane     Bariatric surgery status     Cellulitis of toe 2019    CKD (chronic kidney disease)     CPAP (continuous positive airway pressure) dependence     Diabetes mellitus [facility] )     Gangrene of toe of right foot ([facility] ) 10/30/2019    Added automatically from request for surgery [id number]    Hyperlipidemia     Hypertension     Obese     gastric  sleeve today 2022    PVD (peripheral vascular disease) ([facility] )     Sleep apnea     Teeth missing     Wears glasses       Past Surgical History:   Procedure Laterality Date     SECTION      EGD      IR ABDOMINAL AORTAGRAM  2019    IR AORTAGRAM WITH RUN-OFF  2020    IR ARTERIAL LYSIS  2019    IR LOWER EXTREMITY / INTERVENTION  2019    IR LOWER EXTREMITY ANGIOGRAM  2022    IR TPA LYSIS CHECK  2022    RI AMPUTATION FOOT,TRANSMETATARSAL Right 2019    Procedure: AMPUTATION TRANSMETATARSAL (TMA) WITH ACHILLES TENDON LENGTHING;  Surgeon: Lucas Aase, DPM;  Location: [facility];  Service: Podiatry    RI AMPUTATION [facility] Right 2020    Procedure: AMPUTATION TRANSMETATARSAL (TMA);  Surgeon: Jackelyn Boast, DPM;  [facility];  Service: Podiatry    RI LAP, BUBBA RESTRICT [address], LONGITUDINAL GASTRECTOMY N/A 2022    Procedure: LAPAROSCOPIC SLEEVE GASTRECTOMY & INTRAOPERATIVE EGD;  Surgeon: Mikie Terry MD;  Location: [facility];  Service: Bariatrics    TOE AMPUTATION Left 2019    Procedure: AMPUTATION 5th TOE;  Surgeon: Lucas Aase, DPM;  [facility]: [facility];  Service: Podiatry    TOE AMPUTATION Right 2019    Procedure: AMPUTATION RIGHT 3RD AND 4TH TOE; Surgeon: Mike Robles DPM;  Location[facility];  Service: Podiatry      Family History   Problem Relation Age of Onset    Diabetes Mother     No Known Problems Father      Social History     Tobacco Use    Smoking status: Former Smoker     Packs/day: 2 00     Years: 20 00     Pack years: 40 00     Types: Cigarettes     Quit date:      Years since quittin 4    Smokeless tobacco: Never Used   Substance Use Topics    Alcohol use: Not Currently     Comment: rarely     Allergies   Allergen Reactions    Levemir [Insulin Detemir] Itching         Current Outpatient Medications:     aspirin 81 mg chewable tablet, Chew 1 tablet (81 mg total) daily, Disp: , Rfl:     atorvastatin (LIPITOR) 20 mg tablet, Take 1 tablet (20 mg total) by mouth daily, Disp: 90 tablet, Rfl: 2    Blood Glucose Monitoring Suppl (ONE TOUCH ULTRA MINI) w/Device KIT, Use 4 times a day, Disp: 1 kit, Rfl: 2    cetirizine (ZyrTEC) 10 mg tablet, Take 1 tablet (10 mg total) by mouth daily (Patient taking differently: Take 10 mg by mouth daily as needed), Disp: 90 tablet, Rfl: 1    clopidogrel (PLAVIX) 75 mg tablet, Take 1 tablet (75 mg total) by mouth daily, Disp: 90 tablet, Rfl: 3    docusate sodium (COLACE) 100 mg capsule, Take 1 capsule (100 mg total) by mouth 2 (two) times a day, Disp: 60 capsule, Rfl: 1    Dulaglutide (Trulicity) 3 IB/4 3XV SOPN, Inject 0 5 mL (3 mg total) under the skin once a week, Disp: 6 mL, Rfl: 1    ergocalciferol (ERGOCALCIFEROL) 1 25 MG (42485 UT) capsule, Take 1 capsule (50,000 Units total) by mouth once a week, Disp: 12 capsule, Rfl: 3    glucose blood (Glucose Meter Test) test strip, One touch ultra strips use to test 3 times daily, Disp: 100 strip, Rfl: 5    Insulin Pen Needle 32G X 4 MM MISC, Use 4 times daily with insulin pens, Disp: 120 each, Rfl: 5    metoprolol tartrate (LOPRESSOR) 25 mg tablet, Take 1 tablet (25 mg total) by mouth every 12 (twelve) hours, Disp: 180 tablet, Rfl: 1   omeprazole (PriLOSEC) 20 mg delayed release capsule, Take 1 capsule (20 mg total) by mouth daily, Disp: 30 capsule, Rfl: 3    OneTouch Delica Lancets 04S MISC, Use to test 3x daily, Disp: 300 each, Rfl: 2    enoxaparin (LOVENOX) 40 mg/0 4 mL, Inject 0 4 mL (40 mg total) under the skin in the morning for 13 days (Patient not taking: Reported on 6/14/2022), Disp: 5 2 mL, Rfl: 0    oxyCODONE (Roxicodone) 5 immediate release tablet, Take 1 tablet (5 mg total) by mouth every 4 (four) hours as needed for moderate pain Max Daily Amount: 30 mg (Patient not taking: No sig reported), Disp: 10 tablet, Rfl: 0    Review of Systems   Constitutional: Negative for activity change, appetite change, chills, diaphoresis, fatigue and fever  Eyes: Negative for visual disturbance  Respiratory: Negative for chest tightness and shortness of breath  Cardiovascular: Negative for chest pain, palpitations and leg swelling  Gastrointestinal: Negative for abdominal pain, constipation, diarrhea, nausea and vomiting  Endocrine: Negative for polydipsia, polyphagia and polyuria  Genitourinary: Negative for frequency  Skin: Negative for color change, pallor, rash and wound  Neurological: Positive for numbness (b/l feet)  Negative for dizziness, tremors, weakness and light-headedness  All other systems reviewed and are negative  Physical Exam:  Body mass index is 32 5 kg/m²  /70   Pulse 71   Ht 5' (1 524 m)   Wt 75 5 kg (166 lb 6 4 oz)   LMP  (LMP Unknown)   BMI 32 50 kg/m²    Wt Readings from Last 3 Encounters:   06/14/22 75 5 kg (166 lb 6 4 oz)   05/19/22 74 8 kg (164 lb 14 4 oz)   04/27/22 78 5 kg (173 lb)       Physical Exam  Vitals reviewed  Constitutional:       Appearance: Normal appearance  Chick Renteria is obese  HENT:      Head: Normocephalic  Cardiovascular:      Rate and Rhythm: Normal rate and regular rhythm  Pulses: Normal pulses  Heart sounds: Normal heart sounds   No murmur heard  Pulmonary:      Effort: Pulmonary effort is normal  No respiratory distress  Breath sounds: Normal breath sounds  No wheezing, rhonchi or rales  Musculoskeletal:      Right lower leg: No edema  Left lower leg: No edema  Skin:     General: Skin is warm and dry  Neurological:      Mental Status: Kunal Ortega is alert and oriented to person, place, and time  Psychiatric:         Mood and Affect: Mood normal          Behavior: Behavior normal          Thought Content: Thought content normal          Judgment: Judgment normal        Labs:   Lab Results   Component Value Date    HGBA1C 7 6 (H) 05/12/2022    HGBA1C 7 9 (A) 03/09/2022    HGBA1C 6 8 (H) 08/25/2021     Lab Results   Component Value Date    CREATININE 0 97 05/12/2022    CREATININE 0 76 03/01/2022    CREATININE 0 85 01/22/2022    BUN 16 05/12/2022    K 4 7 05/12/2022     05/12/2022    CO2 26 05/12/2022     eGFR   Date Value Ref Range Status   09/01/2020 64 ml/min/1 73sq m Final     Lab Results   Component Value Date    HDL 46 05/12/2022    TRIG 140 05/12/2022     Lab Results   Component Value Date     (H) 05/12/2022    AST 60 (H) 05/12/2022    ALKPHOS 89 05/12/2022     Lab Results   Component Value Date    [id number] 3 013 04/22/2021    BLJ2BIYASILK 2 180 05/06/2020     Lab Results   Component Value Date    FREET4 1 17 05/06/2020       Impression & Plan:    Problem List Items Addressed This Visit        Endocrine    Diabetes mellitus with stage 2 chronic kidney disease ([facility] ) - Primary     HGA1C has improved, above goal  Having hypoglycemia in the morning and some hyperglycemia later in the day  Treatment regimen: Will stop lantus and increase trulicity to 3 mg weekly  This will help eliminate hypoglycemia in the morning and post meal hyperglycemia later in the day  Discussed risks/complications associated with uncontrolled diabetes      Advised to adhere to diabetic diet, and recommended staying active/exercising routinely  Keep carbohydrates consistent to limit blood glucose fluctuations  Advised to call if blood sugars less than 70 mg/dl or over 300 mg/dl  Check blood glucose 2 times a day  Discussed symptoms and treatment of hypoglycemia  Recommended routine follow-up with podiatry and ophthalmology  Ordered blood work to complete prior to next visit  Lab Results   Component Value Date    HGBA1C 7 6 (H) 05/12/2022              Relevant Medications    Dulaglutide (Trulicity) 3 QA/4 6GV SOPN    Other Relevant Orders    Microalbumin / creatinine urine ratio    Hemoglobin A1C    Comprehensive metabolic panel       Cardiovascular and Mediastinum    Essential hypertension     BP at goal  Continue current medication regimen  Genitourinary    CKD (chronic kidney disease) stage 2, GFR 60-89 ml/min     Lab Results   Component Value Date    EGFR 64 09/01/2020    EGFR 62 05/07/2020    EGFR 85 05/06/2020    CREATININE 0 97 05/12/2022    CREATININE 0 76 03/01/2022    CREATININE 0 85 01/22/2022   Continue follow up with nephrology  She is not candidate for SGLT2 due to hx of amputations and yeast infections  Other    HLD (hyperlipidemia)     Lipid panel at goal  Continue statin  History of transmetatarsal amputation of foot ([facility] )     Continue follow up with podiatry  Persistent proteinuria     Continue follow up with nephrology  She is not candidate for SGLT2 due to hx of amputations and yeast infections  Will continue to monitor  Vitamin D deficiency     Continue supplementation                    Orders Placed This Encounter   Procedures    Microalbumin / creatinine urine ratio     Standing Status:   Future     Standing Expiration Date:   6/14/2023    Hemoglobin A1C     Standing Status:   Future     Standing Expiration Date:   6/14/2023    Comprehensive metabolic panel     This is a patient instruction: Patient fasting for 8 hours or longer recommended  Standing Status:   Future     Standing Expiration Date:   6/14/2023       Patient Instructions   Stop lantus  Increase trulicity to 3 mg       Discussed with the patient and all questioned fully answered  Dusty Ruiz will call me if any problems arise      Corrinne Finlay, CRNP

## 2022-06-14 NOTE — ASSESSMENT & PLAN NOTE
Continue follow up with nephrology  She is not candidate for SGLT2 due to hx of amputations and yeast infections  Will continue to monitor

## 2022-06-17 ENCOUNTER — OFFICE VISIT (OUTPATIENT)
Dept: BARIATRICS | Facility: CLINIC | Age: 57
End: 2022-06-17
Payer: COMMERCIAL

## 2022-06-17 VITALS
BODY MASS INDEX: 31.8 KG/M2 | HEIGHT: 60 IN | TEMPERATURE: 97.5 F | HEART RATE: 79 BPM | WEIGHT: 162 LBS | DIASTOLIC BLOOD PRESSURE: 80 MMHG | SYSTOLIC BLOOD PRESSURE: 120 MMHG

## 2022-06-17 DIAGNOSIS — Z48.815 ENCOUNTER FOR SURGICAL AFTERCARE FOLLOWING SURGERY OF DIGESTIVE SYSTEM: Primary | ICD-10-CM

## 2022-06-17 DIAGNOSIS — E78.5 HYPERLIPIDEMIA, UNSPECIFIED HYPERLIPIDEMIA TYPE: ICD-10-CM

## 2022-06-17 DIAGNOSIS — K91.2 POSTSURGICAL MALABSORPTION: ICD-10-CM

## 2022-06-17 DIAGNOSIS — N18.2 DIABETES MELLITUS WITH STAGE 2 CHRONIC KIDNEY DISEASE (HCC): ICD-10-CM

## 2022-06-17 DIAGNOSIS — Z89.431 S/P TRANSMETATARSAL AMPUTATION OF FOOT, RIGHT (HCC): ICD-10-CM

## 2022-06-17 DIAGNOSIS — E11.22 DIABETES MELLITUS WITH STAGE 2 CHRONIC KIDNEY DISEASE (HCC): ICD-10-CM

## 2022-06-17 DIAGNOSIS — E66.9 OBESITY, CLASS I, BMI 30-34.9: ICD-10-CM

## 2022-06-17 DIAGNOSIS — Z98.84 BARIATRIC SURGERY STATUS: ICD-10-CM

## 2022-06-17 PROCEDURE — 1036F TOBACCO NON-USER: CPT | Performed by: PHYSICIAN ASSISTANT

## 2022-06-17 PROCEDURE — 99214 OFFICE O/P EST MOD 30 MIN: CPT | Performed by: PHYSICIAN ASSISTANT

## 2022-06-17 PROCEDURE — 3008F BODY MASS INDEX DOCD: CPT | Performed by: PHYSICIAN ASSISTANT

## 2022-06-17 RX ORDER — PANTOPRAZOLE SODIUM 40 MG/1
40 TABLET, DELAYED RELEASE ORAL DAILY
Qty: 30 TABLET | Refills: 2 | Status: SHIPPED | OUTPATIENT
Start: 2022-06-17 | End: 2022-07-17

## 2022-06-17 NOTE — PATIENT INSTRUCTIONS
Follow-up in 3 months  We kindly ask that your arrive 15 minutes before your scheduled appointment time with your provider to allow our staff to room you, get your vital signs and update your chart  Get lab work done  Please call the office if you need a script  It is recommended to check with your insurance BEFORE getting labs done to make sure they are covered by your policy  Call our office if you have any problems with abdominal pain especially associated with fever, chills, nausea, vomiting or any other concerns  All  Post-bariatric surgery patients should be aware that very small quantities of any alcohol can cause impairment and it is very possible not to feel the effect  The effect can be in the system for several hours  It is also a stomach irritant  It is advised to AVOID alcohol, Nonsteroidal antiinflammatory drugs (NSAIDS) and nicotine of all forms   Any of these can cause stomach irritation/pain  Discussed the effects of alcohol on a bariatric patient and the increased impairment risk  Keep up the good work!

## 2022-06-17 NOTE — PROGRESS NOTES
Assessment/Plan:     Patient ID: Kaylene Adhikari is a 64 y o  adult  Bariatric Surgery Status    -s/p Vertical Sleeve Gastrectomy with Dr Silvestre Ralph on 2/28/2022  Presents to the office today for 2nd postop  DM with stage 2 CKD   - off metforim and lantus   - is on trulicity, managed by endo  States her sugars overall stable     HTN  - continue antihypertensives as prescribed     ABHINAV  - wearing CPAP machine but inconsistently , encouraged consistent use   Advised making appt with sleep medicine     · Continued/Maintain healthy weight loss with good nutrition intakes  · Adequate hydration with at least 64oz  fluid intake  · Follow diet as discussed  · Follow vitamin and mineral recommendations as reviewed with you  · Exercise as tolerated  Colonoscopy referral made: no, pt unsure if she id due would like to wait to talk to her PCP at upcoming appt in a few months   Mammogram: due , follow up PCP as she is due     · Follow-up in 3 months  We kindly ask that your arrive 15 minutes before your scheduled appointment time with your provider to allow our staff to room you, get your vital signs and update your chart  · Get lab work done  Please call the office if you need a script  It is recommended to check with your insurance BEFORE getting labs done to make sure they are covered by your policy  · Call our office if you have any problems with abdominal pain especially associated with fever, chills, nausea, vomiting or any other concerns  · All  Post-bariatric surgery patients should be aware that very small quantities of any alcohol can cause impairment and it is very possible not to feel the effect  The effect can be in the system for several hours  It is also a stomach irritant  · It is advised to AVOID alcohol, Nonsteroidal antiinflammatory drugs (NSAIDS) and nicotine of all forms   Any of these can cause stomach irritation/pain      · Discussed the effects of alcohol on a bariatric patient and the increased impairment risk  · Keep up the good work! Postsurgical Malabsorption   -At risk for malabsorption of vitamins/minerals secondary to malabsorption and restriction of intake from bariatric surgery  -Currently taking adequate postop bariatric surgery vitamin supplementation  -Next set of bariatric labs ordered for approximately 2 weeks  -Patient received education about the importance of adhering to a lifelong supplementation regimen to avoid vitamin/mineral deficiencies      Diagnoses and all orders for this visit:    Encounter for surgical aftercare following surgery of digestive system  -     pantoprazole (PROTONIX) 40 mg tablet; Take 1 tablet (40 mg total) by mouth daily  -     Zinc; Future  -     Vitamin D 25 hydroxy; Future  -     Vitamin B12; Future  -     Vitamin B1, whole blood; Future  -     Vitamin A; Future  -     PTH, intact; Future  -     Iron Saturation %; Future  -     Ferritin; Future  -     Folate; Future  -     CBC and Platelet; Future    Bariatric surgery status  -     pantoprazole (PROTONIX) 40 mg tablet; Take 1 tablet (40 mg total) by mouth daily  -     Zinc; Future  -     Vitamin D 25 hydroxy; Future  -     Vitamin B12; Future  -     Vitamin B1, whole blood; Future  -     Vitamin A; Future  -     PTH, intact; Future  -     Iron Saturation %; Future  -     Ferritin; Future  -     Folate; Future  -     CBC and Platelet; Future    Postsurgical malabsorption  -     Zinc; Future  -     Vitamin D 25 hydroxy; Future  -     Vitamin B12; Future  -     Vitamin B1, whole blood; Future  -     Vitamin A; Future  -     PTH, intact; Future  -     Iron Saturation %; Future  -     Ferritin; Future  -     Folate; Future  -     CBC and Platelet; Future    Obesity, Class I, BMI 30-34 9  -     Zinc; Future  -     Vitamin D 25 hydroxy; Future  -     Vitamin B12; Future  -     Vitamin B1, whole blood; Future  -     Vitamin A; Future  -     PTH, intact;  Future  -     Iron Saturation %; Future  -     Ferritin; Future  -     Folate; Future  -     CBC and Platelet; Future    Diabetes mellitus with stage 2 chronic kidney disease (HCC)  -     Zinc; Future  -     Vitamin D 25 hydroxy; Future  -     Vitamin B12; Future  -     Vitamin B1, whole blood; Future  -     Vitamin A; Future  -     PTH, intact; Future  -     Iron Saturation %; Future  -     Ferritin; Future  -     Folate; Future  -     CBC and Platelet; Future    S/P transmetatarsal amputation of foot, right (HCC)  -     Zinc; Future  -     Vitamin D 25 hydroxy; Future  -     Vitamin B12; Future  -     Vitamin B1, whole blood; Future  -     Vitamin A; Future  -     PTH, intact; Future  -     Iron Saturation %; Future  -     Ferritin; Future  -     Folate; Future  -     CBC and Platelet; Future    Hyperlipidemia, unspecified hyperlipidemia type  -     Zinc; Future  -     Vitamin D 25 hydroxy; Future  -     Vitamin B12; Future  -     Vitamin B1, whole blood; Future  -     Vitamin A; Future  -     PTH, intact; Future  -     Iron Saturation %; Future  -     Ferritin; Future  -     Folate; Future  -     CBC and Platelet; Future         Subjective:      Patient ID: Benton Garcia is a 64 y o  adult  -s/p Vertical Sleeve Gastrectomy with Dr Queen Varela on 2/28/2022  Presents to the office today for 2nd postop  Taking PPI daily  Will continue  Since she is on plavix will switch her over to pronotix    Initial: about 200  Current:162  EWL: (Weight loss is ahead of schedule at this post surgical period )  Ronak: current   Current BMI is Body mass index is 31 64 kg/m²      · Tolerating a regular diet-yes  · Eating at least 60 grams of protein per day-yes  · Following 30/60 minute rule with liquids-yes  · Drinking at least 64 ounces of fluid per day-yes  · Drinking carbonated beverages-no  · Sufficient exercise-yes, gym   · Using NSAIDs regularly-yes aspirin daily   · Using nicotine-no  · Using alcohol-rare  · Supplements: darrion mvi + calcium + iron + vit d     · EWL is 47%, which places the patient ahead of schedule for expected post surgical weight loss at this time  The following portions of the patient's history were reviewed and updated as appropriate: allergies, current medications, past family history, past medical history, past social history, past surgical history and problem list     Review of Systems   Constitutional: Negative  Respiratory: Negative  Cardiovascular: Negative  Gastrointestinal: Negative  Musculoskeletal: Negative  Skin: Negative  Neurological: Negative  Psychiatric/Behavioral: Negative  Objective:    /80   Pulse 79   Temp 97 5 °F (36 4 °C) (Tympanic)   Ht 5' (1 524 m)   Wt 73 5 kg (162 lb)   LMP  (LMP Unknown)   BMI 31 64 kg/m²      Physical Exam  Vitals and nursing note reviewed  Constitutional:       Appearance: Normal appearance  HENT:      Head: Normocephalic and atraumatic  Eyes:      Extraocular Movements: Extraocular movements intact  Pupils: Pupils are equal, round, and reactive to light  Cardiovascular:      Rate and Rhythm: Normal rate and regular rhythm  Pulmonary:      Effort: Pulmonary effort is normal       Breath sounds: Normal breath sounds  Abdominal:      General: Bowel sounds are normal       Tenderness: There is no abdominal tenderness  Musculoskeletal:         General: Normal range of motion  Cervical back: Normal range of motion  Skin:     General: Skin is warm and dry  Neurological:      General: No focal deficit present  Mental Status: Glory Agosto is alert and oriented to person, place, and time     Psychiatric:         Mood and Affect: Mood normal

## 2022-07-06 ENCOUNTER — OFFICE VISIT (OUTPATIENT)
Dept: PODIATRY | Facility: CLINIC | Age: 57
End: 2022-07-06
Payer: COMMERCIAL

## 2022-07-06 VITALS
BODY MASS INDEX: 31.8 KG/M2 | SYSTOLIC BLOOD PRESSURE: 156 MMHG | HEIGHT: 60 IN | WEIGHT: 162 LBS | DIASTOLIC BLOOD PRESSURE: 83 MMHG | HEART RATE: 72 BPM

## 2022-07-06 DIAGNOSIS — I73.9 PAD (PERIPHERAL ARTERY DISEASE) (HCC): ICD-10-CM

## 2022-07-06 DIAGNOSIS — Z89.431 S/P TRANSMETATARSAL AMPUTATION OF FOOT, RIGHT (HCC): ICD-10-CM

## 2022-07-06 DIAGNOSIS — B35.1 TINEA UNGUIUM: ICD-10-CM

## 2022-07-06 DIAGNOSIS — E11.42 DIABETIC POLYNEUROPATHY ASSOCIATED WITH TYPE 2 DIABETES MELLITUS (HCC): Primary | ICD-10-CM

## 2022-07-06 PROCEDURE — 11720 DEBRIDE NAIL 1-5: CPT | Performed by: PODIATRIST

## 2022-07-06 NOTE — PROGRESS NOTES
[place] FoxDelialeatha  1965  AT RISK FOOT CARE    1  Diabetic polyneuropathy associated with type 2 diabetes mellitus ([place] [address] )     2  Tinea unguium     3  S/P transmetatarsal amputation of foot, right ([place] [address] )     4  PAD (peripheral artery disease) ([place] [address] )         Patient presents for at-risk foot care  Patient has no acute concerns today  Patient has significant lower extremity risk due to previous amputation  On exam patient has thickened, hypertrophic, discolored, brittle toenails with subungual debris and tenderness x4   Callus: none  Amputation: right TMA, left 5th toe    Today's treatment includes:  Debridement of toenails  Using nail nipper, kacie, and curette, nails were sharply debrided, reduced in thickness and length  Devitalized nail tissue and fungal debris excised and removed  Patient tolerated well  Discussed proper shoe gear, daily inspections of feet, and general foot health with patient  Patient has Q7  findings and is recommended for at risk foot care every 9-10 weeks      Patients most recent complete clinical exam was performed: 4/27/2022

## 2022-07-14 ENCOUNTER — TELEPHONE (OUTPATIENT)
Dept: NEPHROLOGY | Facility: CLINIC | Age: 57
End: 2022-07-14

## 2022-07-20 ENCOUNTER — TELEPHONE (OUTPATIENT)
Dept: VASCULAR SURGERY | Facility: CLINIC | Age: 57
End: 2022-07-20

## 2022-07-20 NOTE — TELEPHONE ENCOUNTER
----- Message from Shane Law PA-C sent at 7/15/2022 12:20 PM EDT -----  Patient due for office visit for review

## 2022-08-24 ENCOUNTER — APPOINTMENT (OUTPATIENT)
Dept: LAB | Facility: HOSPITAL | Age: 57
End: 2022-08-24
Payer: COMMERCIAL

## 2022-08-24 DIAGNOSIS — E55.9 VITAMIN D DEFICIENCY: ICD-10-CM

## 2022-08-24 DIAGNOSIS — I10 ESSENTIAL HYPERTENSION: ICD-10-CM

## 2022-08-24 DIAGNOSIS — K91.2 POSTSURGICAL MALABSORPTION: ICD-10-CM

## 2022-08-24 DIAGNOSIS — E11.22 DIABETES MELLITUS WITH STAGE 2 CHRONIC KIDNEY DISEASE (HCC): ICD-10-CM

## 2022-08-24 DIAGNOSIS — E66.9 OBESITY, CLASS I, BMI 30-34.9: ICD-10-CM

## 2022-08-24 DIAGNOSIS — E78.5 HYPERLIPIDEMIA, UNSPECIFIED HYPERLIPIDEMIA TYPE: ICD-10-CM

## 2022-08-24 DIAGNOSIS — Z89.431 S/P TRANSMETATARSAL AMPUTATION OF FOOT, RIGHT (HCC): ICD-10-CM

## 2022-08-24 DIAGNOSIS — E11.22 CKD STAGE 2 DUE TO TYPE 2 DIABETES MELLITUS (HCC): ICD-10-CM

## 2022-08-24 DIAGNOSIS — Z48.815 ENCOUNTER FOR SURGICAL AFTERCARE FOLLOWING SURGERY OF DIGESTIVE SYSTEM: ICD-10-CM

## 2022-08-24 DIAGNOSIS — N18.2 DIABETES MELLITUS WITH STAGE 2 CHRONIC KIDNEY DISEASE (HCC): ICD-10-CM

## 2022-08-24 DIAGNOSIS — N18.2 CKD STAGE 2 DUE TO TYPE 2 DIABETES MELLITUS (HCC): ICD-10-CM

## 2022-08-24 DIAGNOSIS — Z98.84 BARIATRIC SURGERY STATUS: ICD-10-CM

## 2022-08-24 DIAGNOSIS — R80.1 PERSISTENT PROTEINURIA: ICD-10-CM

## 2022-08-24 LAB
25(OH)D3 SERPL-MCNC: 34.1 NG/ML (ref 30–100)
ERYTHROCYTE [DISTWIDTH] IN BLOOD BY AUTOMATED COUNT: 17.4 % (ref 11.6–15.1)
FERRITIN SERPL-MCNC: 40 NG/ML (ref 8–388)
FOLATE SERPL-MCNC: >20 NG/ML (ref 3.1–17.5)
HCT VFR BLD AUTO: 40.9 % (ref 36.5–46.1)
HGB BLD-MCNC: 12.4 G/DL (ref 12–15.4)
IRON SATN MFR SERPL: 42 %
IRON SERPL-MCNC: 120 UG/DL (ref 65–170)
MCH RBC QN AUTO: 22.6 PG (ref 26.8–34.3)
MCHC RBC AUTO-ENTMCNC: 30.3 G/DL (ref 31.4–37.4)
MCV RBC AUTO: 75 FL (ref 82–98)
PLATELET # BLD AUTO: 211 THOUSANDS/UL (ref 149–390)
PMV BLD AUTO: 11.8 FL (ref 8.9–12.7)
PTH-INTACT SERPL-MCNC: 88.9 PG/ML (ref 18.4–80.1)
RBC # BLD AUTO: 5.49 MILLION/UL (ref 3.88–5.12)
TIBC SERPL-MCNC: 286 UG/DL (ref 250–450)
VIT B12 SERPL-MCNC: 271 PG/ML (ref 100–900)
WBC # BLD AUTO: 6.95 THOUSAND/UL (ref 4.31–10.16)

## 2022-08-24 PROCEDURE — 83970 ASSAY OF PARATHORMONE: CPT

## 2022-08-24 PROCEDURE — 83550 IRON BINDING TEST: CPT

## 2022-08-24 PROCEDURE — 83540 ASSAY OF IRON: CPT

## 2022-08-24 PROCEDURE — 82746 ASSAY OF FOLIC ACID SERUM: CPT

## 2022-08-24 PROCEDURE — 82728 ASSAY OF FERRITIN: CPT

## 2022-08-24 PROCEDURE — 82306 VITAMIN D 25 HYDROXY: CPT

## 2022-08-24 PROCEDURE — 36415 COLL VENOUS BLD VENIPUNCTURE: CPT

## 2022-08-24 PROCEDURE — 85027 COMPLETE CBC AUTOMATED: CPT

## 2022-08-24 PROCEDURE — 82607 VITAMIN B-12: CPT

## 2022-08-24 PROCEDURE — 84630 ASSAY OF ZINC: CPT

## 2022-08-24 PROCEDURE — 84590 ASSAY OF VITAMIN A: CPT

## 2022-08-24 PROCEDURE — 84425 ASSAY OF VITAMIN B-1: CPT

## 2022-08-26 LAB — ZINC SERPL-MCNC: 87 UG/DL (ref 44–115)

## 2022-08-28 LAB — VIT A SERPL-MCNC: 52.1 UG/DL (ref 20.1–62)

## 2022-08-29 LAB — VIT B1 BLD-SCNC: 101.5 NMOL/L (ref 66.5–200)

## 2022-08-30 DIAGNOSIS — E53.8 LOW SERUM VITAMIN B12: ICD-10-CM

## 2022-08-30 DIAGNOSIS — Z98.84 BARIATRIC SURGERY STATUS: Primary | ICD-10-CM

## 2022-08-30 DIAGNOSIS — E21.3 HYPERPARATHYROIDISM (HCC): ICD-10-CM

## 2022-08-30 DIAGNOSIS — K91.2 POSTSURGICAL MALABSORPTION: ICD-10-CM

## 2022-09-07 ENCOUNTER — TELEPHONE (OUTPATIENT)
Dept: ENDOCRINOLOGY | Facility: CLINIC | Age: 57
End: 2022-09-07

## 2022-09-07 NOTE — TELEPHONE ENCOUNTER
Pt was advised to get her labs done that was ordered by [facility] after 09/14/2022

## 2022-09-08 ENCOUNTER — OFFICE VISIT (OUTPATIENT)
Dept: VASCULAR SURGERY | Facility: CLINIC | Age: 57
End: 2022-09-08
Payer: COMMERCIAL

## 2022-09-08 VITALS
HEART RATE: 71 BPM | HEIGHT: 60 IN | DIASTOLIC BLOOD PRESSURE: 94 MMHG | BODY MASS INDEX: 30.47 KG/M2 | OXYGEN SATURATION: 99 % | SYSTOLIC BLOOD PRESSURE: 154 MMHG | WEIGHT: 155.2 LBS

## 2022-09-08 DIAGNOSIS — I73.9 PAD (PERIPHERAL ARTERY DISEASE) (HCC): Primary | ICD-10-CM

## 2022-09-08 PROCEDURE — 99213 OFFICE O/P EST LOW 20 MIN: CPT | Performed by: SURGERY

## 2022-09-08 NOTE — PROGRESS NOTES
Assessment/Plan:    PAD (peripheral artery disease) ([facility] [address] )  Patient is doing well since her last visit, wounds have healed well  Her GEORGIA is stable with improved toe pressure on the left  Ultrasound overall stable  Patient is asymptomatic without any rest pain or claudication, she does not walk much to begin with but is able to do her ADL  Repeat ultrasound planned for November  Will see her again in 6 months unless there is something significant on the next study       Diagnoses and all orders for this visit:    PAD (peripheral artery disease) ([facility] [address] )        Subjective:      Patient ID: Venus Prince is a 64 y o  adult  Patient is here today to review results of SHERYL done 5/9/2022  She is s/p Recannulization and PTA of SFA stent and proximal peroneal artery done 5/5/2020  She is also s/p a Rt TMA done 5/7/2020  Patient has a history of multiple other vascular interventions  She denies any open wound or any pain in her legs when walking  Patient is taking ASA 81mg, Plavix, and Atorvastatin  She is a former smoker  65 yo F with hx of R TMA, PAD s/p RLE lysis with stenting of the SFA and popliteal angioplasty with DCB and tibioperoneal disease s/p angioplasty  Patient is returning today for a check up after her last ultrasound in May  Her GEORGIA appears stable and there is improved toe pressure on the left side despite a known SFA occlusion with distal reconstitution  The patient denies any pain or claudication symptoms  He wounds have healed nicely  No new non-healing wounds  She is able to walk around the grocery store without issues and reports she doesn't leave the house often otherwise      The following portions of the patient's history were reviewed and updated as appropriate: allergies, current medications, past family history, past medical history, past social history, past surgical history and problem list     Review of Systems   Constitutional: Negative  HENT: Negative      Eyes: Negative  Respiratory: Negative  Cardiovascular: Negative  Gastrointestinal: Negative  Endocrine: Negative  Genitourinary: Negative  Musculoskeletal: Negative  Skin: Negative  Allergic/Immunologic: Negative  Neurological: Negative  Hematological: Negative  Psychiatric/Behavioral: Negative  Objective:      /94 (BP Location: Left arm, Patient Position: Sitting, Cuff Size: Standard)   Pulse 71   Ht 5' (1 524 m)   Wt 70 4 kg (155 lb 3 2 oz)   LMP  (LMP Unknown)   SpO2 99%   BMI 30 31 kg/m²          Physical Exam  Constitutional:       Appearance: Normal appearance  HENT:      Head: Normocephalic  Mouth/Throat:      Mouth: Mucous membranes are moist       Pharynx: Oropharynx is clear  Eyes:      Extraocular Movements: Extraocular movements intact  Pupils: Pupils are equal, round, and reactive to light  Cardiovascular:      Rate and Rhythm: Normal rate and regular rhythm  Pulses:           Dorsalis pedis pulses are detected w/ Doppler on the right side and detected w/ Doppler on the left side  Posterior tibial pulses are detected w/ Doppler on the right side and detected w/ Doppler on the left side  Pulmonary:      Effort: Pulmonary effort is normal    Abdominal:      General: Abdomen is flat  Tenderness: There is no abdominal tenderness  Musculoskeletal:      Cervical back: Normal range of motion  Right lower leg: No edema  Left lower leg: No edema  Right Lower Extremity: (TMA site well healed)     Left Lower Extremity: (toe amp site well healed)  Skin:     General: Skin is warm and dry  Neurological:      General: No focal deficit present  Mental Status: Parul Cher is alert and oriented to person, place, and time     Psychiatric:         Mood and Affect: Mood normal          Behavior: Behavior normal

## 2022-09-08 NOTE — PATIENT INSTRUCTIONS
Your last ultrasound appeared stable with no new issues  Your wounds appear well healed  Please follow up for the ultrasound you have scheduled in November and then we can see you in 6 months

## 2022-09-08 NOTE — LETTER
September 8, 2022     Charlene Hough MD  [address]  [address]    Patient: Guru Smith   YOB: 1965   Date of Visit: 9/8/2022     Dear Dr Huong Caal      Thank you for referring Genaro Luan to me for evaluation  Below are the relevant portions of my assessment and plan of care  If you have questions, please do not hesitate to call me  I look forward to following Sydney Richardson along with you  Sincerely,        Belle Lo MD        CC: No Recipients    Progress Notes:    Assessment/Plan:     PAD (peripheral artery disease) [facility])  Patient is doing well since her last visit, wounds have healed well  Her GE[place]IA is stable with improved toe pressure on the left  Ultrasound overall stable      Patient is asymptomatic without any rest pain or claudication, she does not walk much to begin with but is able to do her ADL      Repeat ultrasound planned for November   Will see her again in 6 months unless there is something significant on the next study

## 2022-09-08 NOTE — ASSESSMENT & PLAN NOTE
Patient is doing well since her last visit, wounds have healed well  Her GEORGIA is stable with improved toe pressure on the left  Ultrasound overall stable  Patient is asymptomatic without any rest pain or claudication, she does not walk much to begin with but is able to do her ADL  Repeat ultrasound planned for November   Will see her again in 6 months unless there is something significant on the next study

## 2022-09-13 ENCOUNTER — TELEPHONE (OUTPATIENT)
Dept: ENDOCRINOLOGY | Facility: CLINIC | Age: 57
End: 2022-09-13

## 2022-09-13 ENCOUNTER — APPOINTMENT (OUTPATIENT)
Dept: LAB | Facility: HOSPITAL | Age: 57
End: 2022-09-13
Payer: COMMERCIAL

## 2022-09-13 DIAGNOSIS — K91.2 POSTSURGICAL MALABSORPTION: ICD-10-CM

## 2022-09-13 DIAGNOSIS — Z98.84 BARIATRIC SURGERY STATUS: ICD-10-CM

## 2022-09-13 DIAGNOSIS — E21.3 HYPERPARATHYROIDISM (HCC): ICD-10-CM

## 2022-09-13 DIAGNOSIS — E53.8 LOW SERUM VITAMIN B12: ICD-10-CM

## 2022-09-13 LAB
25(OH)D3 SERPL-MCNC: 41.8 NG/ML (ref 30–100)
ALBUMIN SERPL BCP-MCNC: 3.5 G/DL (ref 3.5–5)
ALP SERPL-CCNC: 84 U/L (ref 46–116)
ALT SERPL W P-5'-P-CCNC: 32 U/L (ref 12–78)
ANION GAP SERPL CALCULATED.3IONS-SCNC: 7 MMOL/L (ref 4–13)
AST SERPL W P-5'-P-CCNC: 26 U/L (ref 5–45)
BILIRUB SERPL-MCNC: 0.36 MG/DL (ref 0.2–1)
BUN SERPL-MCNC: 23 MG/DL (ref 5–25)
CALCIUM SERPL-MCNC: 9.5 MG/DL (ref 8.3–10.1)
CHLORIDE SERPL-SCNC: 106 MMOL/L (ref 96–108)
CO2 SERPL-SCNC: 29 MMOL/L (ref 21–32)
CREAT SERPL-MCNC: 0.82 MG/DL (ref 0.6–1.3)
CREAT UR-MCNC: 78.5 MG/DL
CREAT UR-MCNC: 83 MG/DL
EST. AVERAGE GLUCOSE BLD GHB EST-MCNC: 160 MG/DL
GLUCOSE SERPL-MCNC: 128 MG/DL (ref 65–140)
HBA1C MFR BLD: 7.2 %
MICROALBUMIN UR-MCNC: 23.8 MG/L (ref 0–20)
MICROALBUMIN/CREAT 24H UR: 29 MG/G CREATININE (ref 0–30)
PHOSPHATE SERPL-MCNC: 3.7 MG/DL (ref 2.7–4.5)
POTASSIUM SERPL-SCNC: 4.7 MMOL/L (ref 3.5–5.3)
PROT SERPL-MCNC: 7.7 G/DL (ref 6.4–8.4)
PROT UR-MCNC: 8 MG/DL
PROT/CREAT UR: 0.1 MG/G{CREAT} (ref 0–0.1)
SODIUM SERPL-SCNC: 142 MMOL/L (ref 135–147)

## 2022-09-13 PROCEDURE — 80053 COMPREHEN METABOLIC PANEL: CPT

## 2022-09-13 PROCEDURE — 3051F HG A1C>EQUAL 7.0%<8.0%: CPT | Performed by: PHYSICIAN ASSISTANT

## 2022-09-13 PROCEDURE — 3061F NEG MICROALBUMINURIA REV: CPT | Performed by: PHYSICIAN ASSISTANT

## 2022-09-13 PROCEDURE — 82570 ASSAY OF URINE CREATININE: CPT

## 2022-09-13 PROCEDURE — 82043 UR ALBUMIN QUANTITATIVE: CPT

## 2022-09-13 PROCEDURE — 36415 COLL VENOUS BLD VENIPUNCTURE: CPT

## 2022-09-13 PROCEDURE — 82306 VITAMIN D 25 HYDROXY: CPT

## 2022-09-13 PROCEDURE — 84100 ASSAY OF PHOSPHORUS: CPT

## 2022-09-13 PROCEDURE — 83036 HEMOGLOBIN GLYCOSYLATED A1C: CPT

## 2022-09-13 PROCEDURE — 84156 ASSAY OF PROTEIN URINE: CPT

## 2022-09-14 ENCOUNTER — OFFICE VISIT (OUTPATIENT)
Dept: PODIATRY | Facility: CLINIC | Age: 57
End: 2022-09-14
Payer: COMMERCIAL

## 2022-09-14 VITALS
DIASTOLIC BLOOD PRESSURE: 94 MMHG | WEIGHT: 155 LBS | BODY MASS INDEX: 30.43 KG/M2 | SYSTOLIC BLOOD PRESSURE: 154 MMHG | HEIGHT: 60 IN | HEART RATE: 84 BPM

## 2022-09-14 DIAGNOSIS — I73.9 PAD (PERIPHERAL ARTERY DISEASE) (HCC): ICD-10-CM

## 2022-09-14 DIAGNOSIS — B35.1 TINEA UNGUIUM: ICD-10-CM

## 2022-09-14 DIAGNOSIS — Z89.431 S/P TRANSMETATARSAL AMPUTATION OF FOOT, RIGHT (HCC): ICD-10-CM

## 2022-09-14 DIAGNOSIS — E11.42 DIABETIC POLYNEUROPATHY ASSOCIATED WITH TYPE 2 DIABETES MELLITUS (HCC): Primary | ICD-10-CM

## 2022-09-14 PROCEDURE — 11720 DEBRIDE NAIL 1-5: CPT | Performed by: PODIATRIST

## 2022-09-14 NOTE — PROGRESS NOTES
Luiz Lyon  1965  AT RISK FOOT CARE    1  Diabetic polyneuropathy associated with type 2 diabetes mellitus ([facility] )     2  Tinea unguium     3  PAD (peripheral artery disease) ([facility] )     4  S/P transmetatarsal amputation of foot, right [facility])         Patient presents for at-risk foot care  Patient has no acute concerns today  Patient has significant lower extremity risk due to previous amputation  On exam patient has thickened, hypertrophic, discolored, brittle toenails with subungual debris and tenderness x3   Callus: none  Amputation: right TMA, left 4,5    Today's treatment includes:  Debridement of toenails  Using nail nipper, kacie, and curette, nails were sharply debrided, reduced in thickness and length  Devitalized nail tissue and fungal debris excised and removed  Patient tolerated well  Discussed proper shoe gear, daily inspections of feet, and general foot health with patient  Patient has Q7  findings and is recommended for at risk foot care every 9-10 weeks      Patients most recent complete clinical exam was performed: 4/27/22

## 2022-09-30 ENCOUNTER — OFFICE VISIT (OUTPATIENT)
Dept: BARIATRICS | Facility: CLINIC | Age: 57
End: 2022-09-30
Payer: COMMERCIAL

## 2022-09-30 VITALS
WEIGHT: 150.5 LBS | SYSTOLIC BLOOD PRESSURE: 154 MMHG | BODY MASS INDEX: 29.55 KG/M2 | HEIGHT: 60 IN | DIASTOLIC BLOOD PRESSURE: 80 MMHG | TEMPERATURE: 97.5 F | HEART RATE: 72 BPM

## 2022-09-30 DIAGNOSIS — K91.2 POSTSURGICAL MALABSORPTION: ICD-10-CM

## 2022-09-30 DIAGNOSIS — G47.33 OSA (OBSTRUCTIVE SLEEP APNEA): ICD-10-CM

## 2022-09-30 DIAGNOSIS — I10 HTN (HYPERTENSION): ICD-10-CM

## 2022-09-30 DIAGNOSIS — E11.9 TYPE 2 DIABETES MELLITUS WITHOUT COMPLICATION (HCC): ICD-10-CM

## 2022-09-30 DIAGNOSIS — E66.3 OVERWEIGHT: ICD-10-CM

## 2022-09-30 DIAGNOSIS — Z98.84 BARIATRIC SURGERY STATUS: ICD-10-CM

## 2022-09-30 DIAGNOSIS — Z48.815 ENCOUNTER FOR SURGICAL AFTERCARE FOLLOWING SURGERY OF DIGESTIVE SYSTEM: Primary | ICD-10-CM

## 2022-09-30 PROCEDURE — 99214 OFFICE O/P EST MOD 30 MIN: CPT | Performed by: PHYSICIAN ASSISTANT

## 2022-09-30 PROCEDURE — 3077F SYST BP >= 140 MM HG: CPT | Performed by: PHYSICIAN ASSISTANT

## 2022-09-30 PROCEDURE — 3079F DIAST BP 80-89 MM HG: CPT | Performed by: PHYSICIAN ASSISTANT

## 2022-09-30 RX ORDER — PANTOPRAZOLE SODIUM 40 MG/1
40 TABLET, DELAYED RELEASE ORAL DAILY
Qty: 90 TABLET | Refills: 0 | Status: SHIPPED | OUTPATIENT
Start: 2022-09-30 | End: 2022-10-30

## 2022-09-30 NOTE — PROGRESS NOTES
Assessment/Plan:     Patient ID: Clair Gerardo is a 64 y o  adult  Bariatric Surgery Status    -s/p Vertical Sleeve Gastrectomy with Dr Jordan Gallardo on 2/28/2022  Presents to the office today for 3rd postop doing well     continue daily PPI as she takes daily aspirin       DM with stage 2 CKD   - off metforim and lantus   - is on trulicity, managed by endo  States her sugars overall stable last A1c 7 2      HTN  - continue antihypertensives as prescribed          ABHINAV  - wearing CPAP machine but inconsistently , encouraged consistent use   Advised making appt with sleep medicine     · Continued/Maintain healthy weight loss with good nutrition intakes  · Adequate hydration with at least 64oz  fluid intake  · Follow diet as discussed  · Follow vitamin and mineral recommendations as reviewed with you  · Exercise as tolerated  · Colonoscopy referral made: she wants to talk to her PCP, has appt next week  · Mammo: has upcoming appt with PCP to discuss    · Follow-up in 6 months  We kindly ask that your arrive 15 minutes before your scheduled appointment time with your provider to allow our staff to room you, get your vital signs and update your chart  · Get lab work done prior to annual visit  Please call the office if you need a script  It is recommended to check with your insurance BEFORE getting labs done to make sure they are covered by your policy  · Call our office if you have any problems with abdominal pain especially associated with fever, chills, nausea, vomiting or any other concerns  · All  Post-bariatric surgery patients should be aware that very small quantities of any alcohol can cause impairment and it is very possible not to feel the effect  The effect can be in the system for several hours  It is also a stomach irritant  · It is advised to AVOID alcohol, Nonsteroidal antiinflammatory drugs (NSAIDS) and nicotine of all forms    Any of these can cause stomach irritation/pain  · Discussed the effects of alcohol on a bariatric patient and the increased impairment risk  · Keep up the good work! Postsurgical Malabsorption   -At risk for malabsorption of vitamins/minerals secondary to malabsorption and restriction of intake from bariatric surgery  -Currently taking adequate postop bariatric surgery vitamin supplementation  -Last set of bariatric labs completed 8/2022- refer to letter   -- increase CA intake   -- she started taking additional b12  - repeat pth and b12 in 4 months  -Patient received education about the importance of adhering to a lifelong supplementation regimen to avoid vitamin/mineral deficiencies      Diagnoses and all orders for this visit:    Encounter for surgical aftercare following surgery of digestive system  -     pantoprazole (PROTONIX) 40 mg tablet; Take 1 tablet (40 mg total) by mouth daily    Bariatric surgery status  -     pantoprazole (PROTONIX) 40 mg tablet; Take 1 tablet (40 mg total) by mouth daily    Postsurgical malabsorption    Overweight    Type 2 diabetes mellitus without complication (HCC)    HTN (hypertension)    ABHINAV (obstructive sleep apnea)         Subjective:      Patient ID: Wily Mims is a 64 y o  adult  -s/p Vertical Sleeve Gastrectomy with Dr Rj Kang on 2/28/2022  Presents to the office today for 3rd postop doing well     Initial:200  Current:150  EWL: (Weight loss is ahead of schedule at this post surgical period )  Ronak: current   Current BMI is Body mass index is 29 39 kg/m²      · Tolerating a regular diet-yes  · Eating at least 60 grams of protein per day-yes  · Following 30/60 minute rule with liquids-yes  · Drinking at least 64 ounces of fluid per day-yes  · Drinking carbonated beverages-no  · Sufficient exercise-yes  · Using NSAIDs regularly-yes on aspirin daily , taking PPI daily   · Using nicotine-no  · Using alcohol-occasional   · Supplements: darrion mvi + calcium + iron + vit d     · EWL is 65%, which places the patient ahead of schedule for expected post surgical weight loss at this time  The following portions of the patient's history were reviewed and updated as appropriate: allergies, current medications, past family history, past medical history, past social history, past surgical history and problem list     Review of Systems   Constitutional: Negative  Respiratory: Negative  Cardiovascular: Negative  Gastrointestinal: Negative  Neurological: Negative  Psychiatric/Behavioral: Negative  Objective:    /80   Pulse 72   Temp 97 5 °F (36 4 °C) (Tympanic)   Ht 5' (1 524 m)   Wt 68 3 kg (150 lb 8 oz)   LMP  (LMP Unknown)   BMI 29 39 kg/m²      Physical Exam  Vitals and nursing note reviewed  Constitutional:       Appearance: Normal appearance  HENT:      Head: Normocephalic and atraumatic  Eyes:      Extraocular Movements: Extraocular movements intact  Pupils: Pupils are equal, round, and reactive to light  Cardiovascular:      Rate and Rhythm: Normal rate and regular rhythm  Pulmonary:      Effort: Pulmonary effort is normal       Breath sounds: Normal breath sounds  Abdominal:      General: Bowel sounds are normal       Tenderness: There is no abdominal tenderness  Musculoskeletal:         General: Normal range of motion  Cervical back: Normal range of motion  Skin:     General: Skin is warm and dry  Neurological:      General: No focal deficit present  Mental Status: Venus Prince is alert and oriented to person, place, and time     Psychiatric:         Mood and Affect: Mood normal

## 2022-10-11 ENCOUNTER — TELEPHONE (OUTPATIENT)
Dept: FAMILY MEDICINE CLINIC | Facility: CLINIC | Age: 57
End: 2022-10-11

## 2022-10-12 ENCOUNTER — OFFICE VISIT (OUTPATIENT)
Dept: FAMILY MEDICINE CLINIC | Facility: CLINIC | Age: 57
End: 2022-10-12

## 2022-10-12 VITALS
HEIGHT: 60 IN | SYSTOLIC BLOOD PRESSURE: 138 MMHG | DIASTOLIC BLOOD PRESSURE: 74 MMHG | TEMPERATURE: 97.5 F | BODY MASS INDEX: 29.9 KG/M2 | OXYGEN SATURATION: 99 % | HEART RATE: 73 BPM | RESPIRATION RATE: 16 BRPM | WEIGHT: 152.3 LBS

## 2022-10-12 DIAGNOSIS — Z23 FLU VACCINE NEED: Primary | ICD-10-CM

## 2022-10-12 DIAGNOSIS — N18.2 DIABETES MELLITUS WITH STAGE 2 CHRONIC KIDNEY DISEASE (HCC): ICD-10-CM

## 2022-10-12 DIAGNOSIS — Z79.4 TYPE 2 DIABETES MELLITUS WITHOUT COMPLICATION, WITH LONG-TERM CURRENT USE OF INSULIN (HCC): ICD-10-CM

## 2022-10-12 DIAGNOSIS — E11.22 DIABETES MELLITUS WITH STAGE 2 CHRONIC KIDNEY DISEASE (HCC): ICD-10-CM

## 2022-10-12 DIAGNOSIS — Z12.31 ENCOUNTER FOR SCREENING MAMMOGRAM FOR MALIGNANT NEOPLASM OF BREAST: ICD-10-CM

## 2022-10-12 DIAGNOSIS — E11.9 TYPE 2 DIABETES MELLITUS WITHOUT COMPLICATION, WITH LONG-TERM CURRENT USE OF INSULIN (HCC): ICD-10-CM

## 2022-10-12 DIAGNOSIS — I10 ESSENTIAL HYPERTENSION: ICD-10-CM

## 2022-10-12 DIAGNOSIS — I73.9 PAD (PERIPHERAL ARTERY DISEASE) (HCC): ICD-10-CM

## 2022-10-12 PROCEDURE — 99214 OFFICE O/P EST MOD 30 MIN: CPT | Performed by: FAMILY MEDICINE

## 2022-10-12 PROCEDURE — G0008 ADMIN INFLUENZA VIRUS VAC: HCPCS | Performed by: FAMILY MEDICINE

## 2022-10-12 PROCEDURE — 90682 RIV4 VACC RECOMBINANT DNA IM: CPT | Performed by: FAMILY MEDICINE

## 2022-10-12 RX ORDER — DULAGLUTIDE 3 MG/.5ML
3 INJECTION, SOLUTION SUBCUTANEOUS WEEKLY
Qty: 6 ML | Refills: 1 | Status: SHIPPED | OUTPATIENT
Start: 2022-10-12 | End: 2022-10-19 | Stop reason: SDUPTHER

## 2022-10-12 RX ORDER — CLOPIDOGREL BISULFATE 75 MG/1
75 TABLET ORAL DAILY
Qty: 90 TABLET | Refills: 3 | Status: SHIPPED | OUTPATIENT
Start: 2022-10-12

## 2022-10-12 RX ORDER — ATORVASTATIN CALCIUM 20 MG/1
20 TABLET, FILM COATED ORAL DAILY
Qty: 90 TABLET | Refills: 2 | Status: SHIPPED | OUTPATIENT
Start: 2022-10-12

## 2022-10-12 NOTE — PROGRESS NOTES
Name: Obdulia Nuñez      : 1965      MRN: [id number]  Encounter Provider: Martin Prajapati MD  Encounter Date: 10/12/2022   Encounter department: [address] [place] [address]     1  Flu vaccine need  -     influenza vaccine, quadrivalent, recombinant, PF, 0 5 mL, for patients 18 yr+ (FLUBLOK)    2  Essential hypertension  -     metoprolol tartrate (LOPRESSOR) 25 mg tablet; Take 1 tablet (25 mg total) by mouth every 12 (twelve) hours  -     atorvastatin (LIPITOR) 20 mg tablet; Take 1 tablet (20 mg total) by mouth daily    3  Type 2 diabetes mellitus without complication, with long-term current use of insulin (HCC)  -     atorvastatin (LIPITOR) 20 mg tablet; Take 1 tablet (20 mg total) by mouth daily    4  PAD (peripheral artery disease) (HCC)  -     clopidogrel (PLAVIX) 75 mg tablet; Take 1 tablet (75 mg total) by mouth daily    5  Diabetes mellitus with stage 2 chronic kidney disease (HCC)  -     Dulaglutide (Trulicity) 3 HO/2 7UG SOPN; Inject 0 5 mL (3 mg total) under the skin once a week    6  Encounter for screening mammogram for malignant neoplasm of breast  -     Mammo screening bilateral w 3d & cad; Future; Expected date: 10/12/2022           Subjective      65 yo female with DM s/p Vertical Sleeve Gastrectomy with Dr Chase Darby on 2022, currently of Metformin and Lantus continues on weekly Trulicity  Doing well  No complains today  Follows with Podaitry     Review of Systems   All other systems reviewed and are negative        Current Outpatient Medications on File Prior to Visit   Medication Sig   • aspirin 81 mg chewable tablet Chew 1 tablet (81 mg total) daily   • Blood Glucose Monitoring Suppl (ONE TOUCH ULTRA MINI) w/Device KIT Use 4 times a day   • cetirizine (ZyrTEC) 10 mg tablet Take 1 tablet (10 mg total) by mouth daily (Patient taking differently: Take 10 mg by mouth daily as needed)   • docusate sodium (COLACE) 100 mg capsule Take 1 capsule (100 mg total) by mouth 2 (two) times a day   • enoxaparin (LOVENOX) 40 mg/0 4 mL Inject 0 4 mL (40 mg total) under the skin in the morning for 13 days (Patient not taking: Reported on 6/14/2022)   • ergocalciferol (ERGOCALCIFEROL) 1 25 MG (70910 UT) capsule Take 1 capsule (50,000 Units total) by mouth once a week   • glucose blood (Glucose Meter Test) test strip One touch ultra strips use to test 3 times daily   • Insulin Pen Needle 32G X 4 MM MISC Use 4 times daily with insulin pens   • OneTouch Delica Lancets 82O MISC Use to test 3x daily   • oxyCODONE (Roxicodone) 5 immediate release tablet Take 1 tablet (5 mg total) by mouth every 4 (four) hours as needed for moderate pain Max Daily Amount: 30 mg (Patient not taking: Reported on 9/30/2022)   • pantoprazole (PROTONIX) 40 mg tablet Take 1 tablet (40 mg total) by mouth daily   • [DISCONTINUED] atorvastatin (LIPITOR) 20 mg tablet Take 1 tablet (20 mg total) by mouth daily   • [DISCONTINUED] clopidogrel (PLAVIX) 75 mg tablet Take 1 tablet (75 mg total) by mouth daily   • [DISCONTINUED] Dulaglutide (Trulicity) 3 NA/6 9OT SOPN Inject 0 5 mL (3 mg total) under the skin once a week   • [DISCONTINUED] metoprolol tartrate (LOPRESSOR) 25 mg tablet Take 1 tablet (25 mg total) by mouth every 12 (twelve) hours       Objective     /74 (BP Location: Right arm, Patient Position: Sitting, Cuff Size: Adult)   Pulse 73   Temp 97 5 °F (36 4 °C) (Temporal)   Resp 16   Ht 5' (1 524 m)   Wt 69 1 kg (152 lb 4 8 oz)   LMP  (LMP Unknown)   SpO2 99%   BMI 29 74 kg/m²     Physical Exam  Vitals and nursing note reviewed  Constitutional:       Appearance: Colletta Monte is well-developed  HENT:      Head: Normocephalic  Right Ear: External ear normal       Left Ear: External ear normal       Nose: Nose normal    Eyes:      Conjunctiva/sclera: Conjunctivae normal       Pupils: Pupils are equal, round, and reactive to light     Neck:      Thyroid: No thyromegaly  Cardiovascular:      Rate and Rhythm: Normal rate and regular rhythm  Heart sounds: Normal heart sounds  Pulmonary:      Effort: Pulmonary effort is normal       Breath sounds: Normal breath sounds  Abdominal:      Palpations: Abdomen is soft  Tenderness: There is no abdominal tenderness  There is no guarding or rebound  Musculoskeletal:         General: Normal range of motion  Cervical back: Normal range of motion and neck supple  Skin:     General: Skin is dry  Neurological:      Mental Status: Ruth Yang is alert and oriented to person, place, and time  Deep Tendon Reflexes: Reflexes are normal and symmetric         Loretta Reyes MD

## 2022-10-19 DIAGNOSIS — E11.22 DIABETES MELLITUS WITH STAGE 2 CHRONIC KIDNEY DISEASE (HCC): ICD-10-CM

## 2022-10-19 DIAGNOSIS — N18.2 DIABETES MELLITUS WITH STAGE 2 CHRONIC KIDNEY DISEASE (HCC): ICD-10-CM

## 2022-10-19 RX ORDER — DULAGLUTIDE 3 MG/.5ML
3 INJECTION, SOLUTION SUBCUTANEOUS WEEKLY
Qty: 6 ML | Refills: 1 | Status: SHIPPED | OUTPATIENT
Start: 2022-10-19

## 2022-11-14 ENCOUNTER — HOSPITAL ENCOUNTER (OUTPATIENT)
Dept: NON INVASIVE DIAGNOSTICS | Facility: CLINIC | Age: 57
Discharge: HOME | End: 2022-11-14

## 2022-11-14 DIAGNOSIS — I73.9 PAD (PERIPHERAL ARTERY DISEASE) (HCC): ICD-10-CM

## 2022-11-15 ENCOUNTER — TELEPHONE (OUTPATIENT)
Dept: PODIATRY | Facility: CLINIC | Age: 57
End: 2022-11-15

## 2022-11-15 NOTE — TELEPHONE ENCOUNTER
Left voicemail letting patient know we need to reschedule her appt due to closing at 3:00   I have an appt open 12/6

## 2022-11-29 ENCOUNTER — TELEPHONE (OUTPATIENT)
Dept: PODIATRY | Facility: CLINIC | Age: 57
End: 2022-11-29

## 2022-11-30 ENCOUNTER — OFFICE VISIT (OUTPATIENT)
Dept: PODIATRY | Facility: CLINIC | Age: 57
End: 2022-11-30

## 2022-11-30 VITALS
BODY MASS INDEX: 29.84 KG/M2 | WEIGHT: 152 LBS | HEART RATE: 75 BPM | SYSTOLIC BLOOD PRESSURE: 135 MMHG | HEIGHT: 60 IN | DIASTOLIC BLOOD PRESSURE: 73 MMHG

## 2022-11-30 DIAGNOSIS — I73.9 PAD (PERIPHERAL ARTERY DISEASE) (HCC): ICD-10-CM

## 2022-11-30 DIAGNOSIS — B35.1 TINEA UNGUIUM: ICD-10-CM

## 2022-11-30 DIAGNOSIS — Z89.431 S/P TRANSMETATARSAL AMPUTATION OF FOOT, RIGHT (HCC): ICD-10-CM

## 2022-11-30 DIAGNOSIS — E11.42 DIABETIC POLYNEUROPATHY ASSOCIATED WITH TYPE 2 DIABETES MELLITUS (HCC): ICD-10-CM

## 2022-11-30 DIAGNOSIS — L03.032 PARONYCHIA OF GREAT TOE OF LEFT FOOT: Primary | ICD-10-CM

## 2022-11-30 RX ORDER — CEPHALEXIN 500 MG/1
500 CAPSULE ORAL EVERY 6 HOURS SCHEDULED
Qty: 28 CAPSULE | Refills: 0 | Status: SHIPPED | OUTPATIENT
Start: 2022-11-30 | End: 2022-12-07

## 2022-11-30 RX ORDER — LIDOCAINE HYDROCHLORIDE 10 MG/ML
3 INJECTION, SOLUTION INFILTRATION; PERINEURAL ONCE
Status: COMPLETED | OUTPATIENT
Start: 2022-11-30 | End: 2022-11-30

## 2022-11-30 RX ADMIN — LIDOCAINE HYDROCHLORIDE 3 ML: 10 INJECTION, SOLUTION INFILTRATION; PERINEURAL at 08:25

## 2022-11-30 NOTE — PROGRESS NOTES
Assessment/Plan:         Diagnoses and all orders for this visit:    Paronychia of great toe of left foot  -     lidocaine (XYLOCAINE) 1 % injection 3 mL  -     cephalexin (KEFLEX) 500 mg capsule; Take 1 capsule (500 mg total) by mouth every 6 (six) hours for 7 days  -     Nail removal    Diabetic polyneuropathy associated with type 2 diabetes mellitus (HCC)    Tinea unguium    S/P transmetatarsal amputation of foot, right (HCC)    PAD (peripheral artery disease) ([facility] )      Diagnosis and options discussed with patient  Patient agreeable to the plan as stated below    Acute issue: infected ingrown toenail, see procedure below  Check in 1 week  Antibiotic prescribed  High risk for complications, need to watch closely  Chronic issue: PAD, DM neuropathy, h/y right TMA  AMputations stable  Examined shoe gear and inserts  Nail removal    Date/Time: 11/30/2022 8:24 AM  Performed by: Janis Martinez DPM  Authorized by: Janis Martinez DPM     Patient location:  ClinicUniversal Protocol:  Consent: Verbal consent obtained  Risks and benefits: risks, benefits and alternatives were discussed  Consent given by: patient  Time out: Immediately prior to procedure a "time out" was called to verify the correct patient, procedure, equipment, support staff and site/side marked as required  Timeout called at: 11/30/2022 8:24 AM   Patient understanding: patient states understanding of the procedure being performed  Patient identity confirmed: verbally with patient      Location:     Foot:  L big toe  Pre-procedure details:     Skin preparation:  Betadine  Anesthesia (see MAR for exact dosages):      Anesthesia method:  Local infiltration    Local anesthetic:  Lidocaine 1% w/o epi (5)  Nail Removal:     Nail removed:  Partial    Nail side:  Medial  Ingrown nail:     Nail matrix removed or ablated:  Partial  Post-procedure details:     Dressing:  4x4 sterile gauze, antibiotic ointment and gauze roll    Patient tolerance of procedure: Tolerated well, no immediate complications          Subjective:      Patient ID: Kunal Ortega is a 62 y o  adult  PAtient normally seen for at risk foot care  Last week her left great toenail became ingrwon and red  She has been soaking it  There is drainage  She denies n/f/v/c/d  The following portions of the patient's history were reviewed and updated as appropriate: allergies, current medications, past family history, past medical history, past social history, past surgical history and problem list     Review of Systems    Constitutional: Negative  HENT: Negative for sinus pressure and sinus pain  Respiratory: Negative for cough and shortness of breath  Cardiovascular: Negative for chest pain and leg swelling  Gastrointestinal: Negative for diarrhea, nausea and vomiting  Musculoskeletal: foot amputation  Skin: ingrown toenail  Neurological: Neuropathy  Psychiatric/Behavioral: The patient is not nervous/anxious  Objective:      /73   Pulse 75   Ht 5' (1 524 m)   Wt 68 9 kg (152 lb)   LMP  (LMP Unknown)   BMI 29 69 kg/m²          Physical Exam  Vitals reviewed  Constitutional:       General: Kunal Ortega is not in acute distress  Appearance: Kunal Ortega is not toxic-appearing  Cardiovascular:      Rate and Rhythm: Normal rate  Pulmonary:      Effort: Pulmonary effort is normal  No respiratory distress  Abdominal:      General: There is no distension  Musculoskeletal:         General: Deformity (right TMA< left 4,5 ray amputations stable) present  Skin:     Capillary Refill: Capillary refill takes 2 to 3 seconds  Findings: Erythema (paronychia medial left great toe with pus and cellulitis) present  Neurological:      Mental Status: Kunal Ortega is alert and oriented to person, place, and time  Sensory: Sensory deficit present  Motor: No weakness        Gait: Gait abnormal    Psychiatric:         Mood and Affect: Mood normal

## 2022-12-06 ENCOUNTER — OFFICE VISIT (OUTPATIENT)
Dept: ENDOCRINOLOGY | Facility: CLINIC | Age: 57
End: 2022-12-06

## 2022-12-06 ENCOUNTER — OFFICE VISIT (OUTPATIENT)
Dept: PODIATRY | Facility: CLINIC | Age: 57
End: 2022-12-06

## 2022-12-06 VITALS
DIASTOLIC BLOOD PRESSURE: 70 MMHG | SYSTOLIC BLOOD PRESSURE: 130 MMHG | BODY MASS INDEX: 29.17 KG/M2 | HEART RATE: 76 BPM | WEIGHT: 148.6 LBS | HEIGHT: 60 IN

## 2022-12-06 VITALS
WEIGHT: 148 LBS | HEART RATE: 79 BPM | SYSTOLIC BLOOD PRESSURE: 135 MMHG | HEIGHT: 60 IN | DIASTOLIC BLOOD PRESSURE: 74 MMHG | BODY MASS INDEX: 29.06 KG/M2

## 2022-12-06 DIAGNOSIS — I10 ESSENTIAL HYPERTENSION: ICD-10-CM

## 2022-12-06 DIAGNOSIS — E78.5 HYPERLIPIDEMIA, UNSPECIFIED HYPERLIPIDEMIA TYPE: ICD-10-CM

## 2022-12-06 DIAGNOSIS — E55.9 VITAMIN D DEFICIENCY: ICD-10-CM

## 2022-12-06 DIAGNOSIS — E11.42 TYPE 2 DIABETES MELLITUS WITH DIABETIC POLYNEUROPATHY, WITHOUT LONG-TERM CURRENT USE OF INSULIN (HCC): Primary | ICD-10-CM

## 2022-12-06 DIAGNOSIS — L03.032 PARONYCHIA OF GREAT TOE OF LEFT FOOT: Primary | ICD-10-CM

## 2022-12-06 RX ORDER — DULAGLUTIDE 3 MG/.5ML
3 INJECTION, SOLUTION SUBCUTANEOUS WEEKLY
Qty: 6 ML | Refills: 1 | Status: SHIPPED | OUTPATIENT
Start: 2022-12-06

## 2022-12-06 NOTE — PROGRESS NOTES
Established Patient Progress Note      Chief Complaint   Patient presents with   • Diabetes Type 2        History of Present Illness:   Jason Lyon is a 62 y  o  adult with type 2 diabetes with long term use of insulin for about 35 years  Insulin therapy was discontinued in June 2022  Last seen in the office 6/14/22  She had gastric sleeve operation 2/28/22  Reports complications of neuropathy with charcot foot, amputations and gangrene of toes, s/p TMA of right foot, CKD, proteinuria and retinopathy  Last A1C was 7 2  Denies recent illness or hospitalizations  Denies recent severe hypoglycemic or severe hyperglycemic episodes  Denies any issues with current regimen  Home glucose monitoring: are performed regularly  She had infection on toe nail, which was removed by podiatry  She has appointment with podiatry scheduled for today  She has lost about 30-40 pounds since bariatric surgery  She denies any issues with Trulicity  She denies any hypoglycemia since stopping insulin       Home blood glucose readings:   Before breakfast:   Before lunch:   Before dinner: 130-190     Current regimen:   Trulicity 3 mg weekly    Last Eye Exam: 3/28/22, mild retinopathy b/l eyes  Last Foot Exam: 4/27/22, UTD     Has hypertension: Taking metoprolol  Has hyperlipidemia: Taking atorvastatin        Vitamin D Deficiency: Taking 50,000 units weekly     Patient Active Problem List   Diagnosis   • Essential hypertension   • HLD (hyperlipidemia)   • PAD (peripheral artery disease) (HCC)   • Diabetes mellitus with stage 2 chronic kidney disease (HCC)   • Anemia   • Leukocytosis   • Toe amputation status, left   • Critical lower limb ischemia (HCC)   • Polyneuropathy associated with underlying disease ([facility] )   • Charcot foot due to diabetes mellitus ([facility] )   • History of transmetatarsal amputation of foot ([facility] )   • Class 2 severe obesity due to excess calories with serious comorbidity and body mass index (BMI) of 36 0 to 36 9 in adult [facility])   • Cellulitis of right lower extremity   • 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, with necrosis of bone ([facility] [facility] [address] )   • S/P transmetatarsal amputation of foot, right (HCC)   • Current use of insulin ([facility] )   • Persistent proteinuria   • Vitamin D deficiency   • Obstructive sleep apnea   • Well woman exam   • Vaginal candida   • CKD (chronic kidney disease) stage 2, GFR 60-89 ml/min   • Right foot pain   • Nicotine dependence in remission   • Obesity, Class II, BMI 35-39 9   • Type 2 diabetes mellitus with diabetic polyneuropathy, without long-term current use of insulin ([facility])      Past Medical History:   Diagnosis Date   • Ambulates with cane    • Bariatric surgery status    • Cellulitis of toe 2019   • CKD (chronic kidney disease)    • CPAP (continuous positive airway pressure) dependence    • Diabetes mellitus ([facility] )    • Gangrene of toe of right foot ([facility] ) 10/30/2019    Added automatically from request for surgery [id number]   • Hyperlipidemia    • Hypertension    • Obese     gastric  sleeve today 2022   • PVD (peripheral vascular disease) ([facility] )    • Sleep apnea    • Teeth missing    • Wears glasses       Past Surgical History:   Procedure Laterality Date   •  SECTION     • EGD     • IR ABDOMINAL AORTAGRAM  2019   • IR AORTAGRAM WITH RUN-OFF  2020   • IR ARTERIAL LYSIS  2019   • IR LOWER EXTREMITY / INTERVENTION  2019   • IR LOWER EXTREMITY ANGIOGRAM  2022   • IR TPA LYSIS CHECK  2022   • NE AMPUTATION FOOT,TRANSMETATARSAL Right 2019    Procedure: AMPUTATION TRANSMETATARSAL (TMA) WITH ACHILLES TENDON LENGTHING;  Surgeon: Jono Seals DPM;  [facility];  Service: Podiatry   • NE AMPUTATION FOOT,TRANSMETATARSAL Right 2020    Procedure: AMPUTATION TRANSMETATARSAL (TMA);  Surgeon: Manisha Latham DPM;  [facility];  Service: Podiatry   • NE LAP, BUBBA RESTRICT PROC, LONGITUDINAL GASTRECTOMY N/A 2022    Procedure: LAPAROSCOPIC SLEEVE GASTRECTOMY & INTRAOPERATIVE EGD;  Surgeon: Valeriy Greer MD;  [facility]: [facility];  Service: Bariatrics   • TOE AMPUTATION Left 2019    Procedure: AMPUTATION 5th TOE;  Surgeon: Rachell Manzo DPM;  [facility];  Service: Podiatry   • TOE AMPUTATION Right 2019    Procedure: AMPUTATION RIGHT 3RD AND 4TH TOE;  Surgeon: Rachell Manzo DPM;  [facility];  Service: Podiatry      Family History   Problem Relation Age of Onset   • Diabetes Mother    • No Known Problems Father      Social History     Tobacco Use   • Smoking status: Former     Packs/day: 2 00     Years: 20 00     Pack years: 40 00     Types: Cigarettes     Quit date:      Years since quittin    • Smokeless tobacco: Never   Substance Use Topics   • Alcohol use: Not Currently     Comment: rarely     Allergies   Allergen Reactions   • Levemir [Insulin Detemir] Itching         Current Outpatient Medications:   •  aspirin 81 mg chewable tablet, Chew 1 tablet (81 mg total) daily, Disp: , Rfl:   •  atorvastatin (LIPITOR) 20 mg tablet, Take 1 tablet (20 mg total) by mouth daily, Disp: 90 tablet, Rfl: 2  •  Blood Glucose Monitoring Suppl (ONE TOUCH ULTRA MINI) w/Device KIT, Use 4 times a day, Disp: 1 kit, Rfl: 2  •  cephalexin (KEFLEX) 500 mg capsule, Take 1 capsule (500 mg total) by mouth every 6 (six) hours for 7 days, Disp: 28 capsule, Rfl: 0  •  cetirizine (ZyrTEC) 10 mg tablet, Take 1 tablet (10 mg total) by mouth daily (Patient taking differently: Take 10 mg by mouth daily as needed), Disp: 90 tablet, Rfl: 1  •  clopidogrel (PLAVIX) 75 mg tablet, Take 1 tablet (75 mg total) by mouth daily, Disp: 90 tablet, Rfl: 3  •  docusate sodium (COLACE) 100 mg capsule, Take 1 capsule (100 mg total) by mouth 2 (two) times a day, Disp: 60 capsule, Rfl: 1  •  Dulaglutide (Trulicity) 3 / 7JP SOPN, Inject 0 5 mL (3 mg total) under the skin once a week, Disp: 6 mL, Rfl: 1  •  ergocalciferol (ERGOCALCIFEROL) 1 25 MG (00323 UT) capsule, Take 1 capsule (50,000 Units total) by mouth once a week, Disp: 12 capsule, Rfl: 3  •  glucose blood (Glucose Meter Test) test strip, One touch ultra strips use to test 3 times daily, Disp: 100 strip, Rfl: 5  •  Insulin Pen Needle 32G X 4 MM MISC, Use 4 times daily with insulin pens, Disp: 120 each, Rfl: 5  •  metoprolol tartrate (LOPRESSOR) 25 mg tablet, Take 1 tablet (25 mg total) by mouth every 12 (twelve) hours, Disp: 180 tablet, Rfl: 1  •  OneTouch Delica Lancets 59S MISC, Use to test 3x daily, Disp: 300 each, Rfl: 2  •  oxyCODONE (Roxicodone) 5 immediate release tablet, Take 1 tablet (5 mg total) by mouth every 4 (four) hours as needed for moderate pain Max Daily Amount: 30 mg, Disp: 10 tablet, Rfl: 0  •  pantoprazole (PROTONIX) 40 mg tablet, Take 1 tablet (40 mg total) by mouth daily, Disp: 90 tablet, Rfl: 0    Review of Systems   Constitutional: Positive for unexpected weight change (lost 36 pounds since bariatric surgery)  Negative for activity change, appetite change, chills, diaphoresis, fatigue and fever  Eyes: Negative for visual disturbance  Respiratory: Negative for chest tightness and shortness of breath  Cardiovascular: Negative for chest pain, palpitations and leg swelling  Gastrointestinal: Positive for constipation  Negative for abdominal pain, diarrhea, nausea and vomiting  Endocrine: Negative for polydipsia, polyphagia and polyuria  Skin: Negative for color change, pallor, rash and wound  Neurological: Positive for numbness  Negative for dizziness, tremors, weakness and light-headedness  All other systems reviewed and are negative  Physical Exam:  Body mass index is 29 02 kg/m²    /70   Pulse 76   Ht 5' (1 524 m)   Wt 67 4 kg (148 lb 9 6 oz)   LMP  (LMP Unknown)   BMI 29 02 kg/m²    Wt Readings from Last 3 Encounters:   12/06/22 67 4 kg (148 lb 9 6 oz)   11/30/22 68 9 kg (152 lb)   10/12/22 69 1 kg (152 lb 4 8 oz) Physical Exam  Vitals reviewed  Constitutional:       Appearance: Normal appearance  HENT:      Head: Normocephalic  Cardiovascular:      Rate and Rhythm: Normal rate and regular rhythm  Pulses: Normal pulses  Heart sounds: Normal heart sounds  No murmur heard  Pulmonary:      Effort: Pulmonary effort is normal  No respiratory distress  Breath sounds: Normal breath sounds  No wheezing, rhonchi or rales  Musculoskeletal:      Right lower leg: No edema  Left lower leg: No edema  Skin:     General: Skin is warm and dry  Neurological:      Mental Status: Taran Darby is alert and oriented to person, place, and time  Psychiatric:         Mood and Affect: Mood normal          Behavior: Behavior normal          Thought Content: Thought content normal          Judgment: Judgment normal        Labs:   Lab Results   Component Value Date    HGBA1C 7 2 (H) 09/13/2022    HGBA1C 7 6 (H) 05/12/2022    HGBA1C 7 9 (A) 03/09/2022     Lab Results   Component Value Date    CREATININE 0 82 09/13/2022    CREATININE 0 97 05/12/2022    CREATININE 0 76 03/01/2022    BUN 23 09/13/2022    K 4 7 09/13/2022     09/13/2022    CO2 29 09/13/2022     eGFR   Date Value Ref Range Status   09/01/2020 64 ml/min/1 73sq m Final     Lab Results   Component Value Date    HDL 46 05/12/2022    TRIG 140 05/12/2022     Lab Results   Component Value Date    ALT 32 09/13/2022    AST 26 09/13/2022    ALKPHOS 84 09/13/2022     Lab Results   Component Value Date    GPO1JCEQZWHO 3 013 04/22/2021    TBC5MSEJEZAO 2 180 05/06/2020     Lab Results   Component Value Date    FREET4 1 17 05/06/2020       Impression & Plan:    Problem List Items Addressed This Visit        Endocrine    Type 2 diabetes mellitus with diabetic polyneuropathy, without long-term current use of insulin (HCC) - Primary     HGA1C improving, close to goal  BGs well controlled, no hypoglycemia and occasional hyperglycemia due to excursions  Treatment regimen:   1  Continue with current medication regimen  2  Continue checking BGs 2 times daily  Discussed risks/complications associated with uncontrolled diabetes  Advised to adhere to diabetic diet, and recommended staying active/exercising routinely  Keep carbohydrates consistent to limit blood glucose fluctuations  Advised to call if blood sugars less than 70 mg/dl or over 300 mg/dl  Discussed symptoms and treatment of hypoglycemia  Recommended routine follow-up with podiatry and ophthalmology  Ordered blood work to complete prior to next visit  Lab Results   Component Value Date    HGBA1C 7 2 (H) 09/13/2022            Relevant Medications    Dulaglutide (Trulicity) 3 LE/4 8BL SOPN    Other Relevant Orders    Comprehensive metabolic panel    Hemoglobin A1C       Cardiovascular and Mediastinum    Essential hypertension     BP at goal  Continue current medication regimen  Other    HLD (hyperlipidemia)     Lipid panel at goal  Continue statin  Relevant Orders    Lipid panel    Vitamin D deficiency     Continue supplementation  Orders Placed This Encounter   Procedures   • Comprehensive metabolic panel     This is a patient instruction: Patient fasting for 8 hours or longer recommended  Standing Status:   Future     Standing Expiration Date:   12/6/2023   • Hemoglobin A1C     Standing Status:   Future     Standing Expiration Date:   12/6/2023   • Lipid panel     This is a patient instruction: This test requires patient fasting for 10-12 hours or longer  Drinking of black coffee or black tea is acceptable  Standing Status:   Future     Standing Expiration Date:   12/6/2023       There are no Patient Instructions on file for this visit  Discussed with the patient and all questioned fully answered  Jerrod Anne will call me if any problems arise      MIKEL Bell

## 2022-12-06 NOTE — ASSESSMENT & PLAN NOTE
HGA1C improving, close to goal  BGs well controlled, no hypoglycemia and occasional hyperglycemia due to excursions  Treatment regimen:   1  Continue with current medication regimen  2  Continue checking BGs 2 times daily  Discussed risks/complications associated with uncontrolled diabetes  Advised to adhere to diabetic diet, and recommended staying active/exercising routinely  Keep carbohydrates consistent to limit blood glucose fluctuations  Advised to call if blood sugars less than 70 mg/dl or over 300 mg/dl  Discussed symptoms and treatment of hypoglycemia  Recommended routine follow-up with podiatry and ophthalmology  Ordered blood work to complete prior to next visit    Lab Results   Component Value Date    HGBA1C 7 2 (H) 09/13/2022 Bilobed Flap Text: The defect edges were debeveled with a #15 scalpel blade.  Given the location of the defect and the proximity to free margins a bilobe flap was deemed most appropriate.  Using a sterile surgical marker, an appropriate bilobe flap drawn around the defect.    The area thus outlined was incised deep to adipose tissue with a #15 scalpel blade.  The skin margins were undermined to an appropriate distance in all directions utilizing iris scissors.

## 2022-12-06 NOTE — PROGRESS NOTES
Assessment/Plan:      Diagnoses and all orders for this visit:    Paronychia of great toe of left foot      Resolving nail infection, resume at risk foot care 9 weeks    Subjective:     Patient ID: Chris Jordan is a 62 y o  adult  Patient had ingrown nail removed a week ago  She is very high risk given her previous amputations, arterial disease  The nail was ingrown and removed  She finished antibiotic without difficulty  She feels well  The pain and redness are much better  Review of Systems      Objective:     Physical Exam  Constitutional:       General: Chris Jordan is not in acute distress  Skin:     Capillary Refill: Capillary refill takes 2 to 3 seconds  Comments: Left great toe medial nail is avulsed  There is no drainage  Resolved cellulitis  Postinfectious rubor but blanchable  No drainage  Neurological:      Mental Status: Chris Jordan is alert

## 2022-12-07 ENCOUNTER — TELEPHONE (OUTPATIENT)
Dept: NEPHROLOGY | Facility: CLINIC | Age: 57
End: 2022-12-07

## 2022-12-07 NOTE — TELEPHONE ENCOUNTER
Called and spoke with [employer] to complete their bloodwork prior to their appointment on 12/13 with Dr Marcelo Miller at the [facility]

## 2022-12-09 ENCOUNTER — TELEPHONE (OUTPATIENT)
Dept: NEPHROLOGY | Facility: CLINIC | Age: 57
End: 2022-12-09

## 2022-12-09 NOTE — TELEPHONE ENCOUNTER
Called and spoke with [employer] to complete their bloodwork prior to their appointment on 12/14 with Dr Margarita Torres at the [facility] or [facility]

## 2022-12-13 ENCOUNTER — OFFICE VISIT (OUTPATIENT)
Dept: NEPHROLOGY | Facility: CLINIC | Age: 57
End: 2022-12-13

## 2022-12-13 VITALS
HEIGHT: 61 IN | SYSTOLIC BLOOD PRESSURE: 128 MMHG | WEIGHT: 148 LBS | DIASTOLIC BLOOD PRESSURE: 74 MMHG | BODY MASS INDEX: 27.94 KG/M2

## 2022-12-13 DIAGNOSIS — N18.2 CKD (CHRONIC KIDNEY DISEASE) STAGE 2, GFR 60-89 ML/MIN: Primary | ICD-10-CM

## 2022-12-13 DIAGNOSIS — E11.22 DIABETES MELLITUS WITH STAGE 2 CHRONIC KIDNEY DISEASE (HCC): ICD-10-CM

## 2022-12-13 DIAGNOSIS — N18.2 DIABETES MELLITUS WITH STAGE 2 CHRONIC KIDNEY DISEASE (HCC): ICD-10-CM

## 2022-12-13 DIAGNOSIS — I10 ESSENTIAL HYPERTENSION: ICD-10-CM

## 2022-12-13 DIAGNOSIS — E55.9 VITAMIN D DEFICIENCY: ICD-10-CM

## 2022-12-13 DIAGNOSIS — R80.1 PERSISTENT PROTEINURIA: ICD-10-CM

## 2022-12-13 DIAGNOSIS — E11.42 TYPE 2 DIABETES MELLITUS WITH DIABETIC POLYNEUROPATHY, WITHOUT LONG-TERM CURRENT USE OF INSULIN (HCC): ICD-10-CM

## 2022-12-13 RX ORDER — LISINOPRIL 5 MG/1
5 TABLET ORAL DAILY
COMMUNITY

## 2022-12-13 NOTE — PATIENT INSTRUCTIONS
- Please call me in 10 days after having your blood work done to review the results if you do not hear back from me or my office, as I may have not received the results  - please remember to perform blood work prior to the next visit  - Please call if the blood pressure top number is greater than 150 or less than 110 consistently  - Please call if you are gaining more than 2lbs in 2 days for adjustment of water pills   ~ Please AVOID the following pain medications  LIST OF NSAIDS (NONSTEROIDAL ANTI-INFLAMMATORY DRUGS) AND BOBO-2 INHIBITORS    DIFLUNISAL (DOLOBID)  IBUPROFEN (MOTRIN, ADVIL)  FLURBIPROFEN (ANSAID)  KETOPROFEN (ORUDIS, ORUVAIL)  FENOPROFEN (NALFON)  NABUMETONE (RELAFEN)  PIROXICAM (FELDENE)  NAPROXEN (ALEVE, NAPROSYN, NAPRELAN, ANAPROX)  DICLOFENAC (VOLTAREN, CATAFLAM)  INDOMETHACIN (INDOCIN)  SULINDAC (CLINORIL)  TOLMETIN (TOLETIN)  ETODOLAC (LODINE)  MELOXICAM (MOBIC)  KETOROLAC (TORADOL)  OXAPROZIN (DAYPRO)  CELECOXIB (CELEBREX)    Things to do to reduce your blood pressure include working with all your physician to do the following:  ~ stop smoking if you smoke  ~ increase cardiovascular exercise like walking and swimming    ~ modify your diet to decrease fat and salt intake  ~ reduce your weight if you are overweight or obese   ~ increase the consumption of fruits, vegetables and whole grains  ~ decrease alcohol consumption if you consume alcohol    ~ try to minimize stress in your life with lifestyle modifications  ~ be compliant with your anti-hypertensive medications  ~ adjust your medications to help improve your vascular stiffness and decrease risks for heart attacks and strokes

## 2022-12-13 NOTE — PROGRESS NOTES
Nephrology Follow up Consultation  Wilda Lyon 62 y o  adult MRN: [id number]            BACKGROUND:  Deneen Albert is a 62 y  o adult who was referred by Karen Roland MD for evaluation of Follow-up and Chronic Kidney Disease    ASSESSMENT / PLAN:   62 y o   female with pmh of multiple co-morbidities including hypertension, diabetes, PAD and CKD stage 2 presents to the office for routine follow-up  1  CKD stage II:  - Patient has a baseline creatinine of 0 9-1 1mg/dL  Most recent labs show a Creatinine of 0 82 mg/dL on 9/13/2022  Renal function remains stable and at baseline    - patient likely has underlying CKD secondary to diabetic glomerular nodular sclerosis plus hypertensive nephrosclerosis plus renal vascular disease plus age-related nephron loss  - SPEP from June 20, 2019 within normal limits  - renal ultrasound from July 9, 2019 shows right kidney 10 8 cm left kidney 10 5 cm  No hydronephrosis no masses  - Proteinuria - most recent protein creatinine ratio of 100 mg/dL as of September 2022, increased from prior   - Acid base and lytes stable  - Clinically the patient appears to be euvolemic  - Recommend to avoid use of NSAIDs, nephrotoxins  Caution advised with regards to exposure to IV contrast dye    - Discussed with the patient in depth her renal status, including the possible etiologies for CKD  - Advised the patient that when her GFR is close to 20mL/min then will start discussing about RRT(renal replacement therapy) options such as renal transplant, peritoneal dialysis and hemodialysis  - advised patient that she is at increased risk for CKD progression due to her episode of acute kidney injury  - Informed the patient about the various options for Renal Replacement therapy    - Discussed with the patient how we need to work together to delay the progression of CKD with optimal BP control based on their age and co-morbidities, optimal BS control with HbA1c of <7% and trying to reduce proteinuria by the use of anti-proteinuric agents  2  Hypertension:  - Patient is on  Lopressor 25 mg p o  B i d, lisinopril 5 mg p o  q day  -No medication changes for now pressure stable at the visit  - Goal BP of < 140/90 based on age and comorbidities  - Instructed to follow low sodium (2gm)diet   - Advised to hold ACEI/ARBs if patient suffers from dehydration due to gastrointestinal losses due to risk of DEMETRIUS secondary to failure to autoregulate  3  Hemoglobin:  - Goal Hb of 10-12 g/dL  - Most recent labs suggestive of 12 4 grams/deciliter  - no role for IV iron at this time    4  CKD-MBD(Mineral Bone Disease)/vitamin-D deficiency:  - Based on patients CKD stage following is the goal of therapy  - Maintain calcium phosphorus product of < 55   - Most recent vitamin D level 41 8 as of 9/30/2022  - cont ergocalciferol 14337 units Qw  - prior to next visit check vitamin-D levels,     5  Lipids:  - goal LDL less than 70  - management as per PCP  - on lipitor    6  DM:  - management as per Primary team  - most recent A1c of 7 2 % as of September 2022, starting to improve  - advised patient for tighter glycemic control   - on lnsulin   -Positive evidence of diabetic retinopathy on most recent eye exam   -Advised patient to be in touch with her PCP with regards to possible initiation of SGLT2 inhibitors to help with her diabetes as well as her CKD progression   - readvised patient that if her renal parameters continued to deteriorate then she will need to be taken off the metformin  7  PAD:  - Management as per primary team  - follow-up with vascular  - status post right SFA occlusion with right lower extremity arteriogram on 04/30/2019  - status post right TMA on 11/07/2019     8  Nutrition obesity:  - Encouraged patient to follow a renal diet comprising of moderate potassium, low phosphorus and protein restriction to 0 8gm/kg    Also advised patient to restrict fluid to 1 8 L per day   - Will check serum albumin with next blood work  Advised of dietary habits and weight loss    9  Followup:  - Patient is to follow-up in 12 months, with lab work to be performed in a few days prior to the next visit  Advised patient to call me after she has blood work done  Rossi Joseph Jazmin Wili, 12/13/2022, 3:23 PM             SUBJECTIVE: 62 y o  female presents to the office for routine follow-up  Patient has lost about 30 to 40 pounds since bariatric surgery  Doing well no longer on metformin has been following up closely with endocrinology adjusting her insulin regimen, A1c is improving thankful for the care information that she got today  Renal parameters are stable  Unfortunately does not have a machine to check blood pressures at home  Review of Systems   Constitutional: Negative for chills and fever  HENT: Negative for sore throat  Respiratory: Negative for cough and shortness of breath  Cardiovascular: Negative for leg swelling  Gastrointestinal: Negative for diarrhea and vomiting  Genitourinary: Negative for difficulty urinating, dysuria and hematuria  Musculoskeletal: Negative for back pain  Skin: Negative for wound  Neurological: Negative for dizziness and headaches  Psychiatric/Behavioral: Negative for agitation and confusion  All other systems reviewed and are negative        PAST MEDICAL HISTORY:  Past Medical History:   Diagnosis Date   • Ambulates with cane    • Bariatric surgery status    • Cellulitis of toe 1/28/2019   • CKD (chronic kidney disease)    • CPAP (continuous positive airway pressure) dependence    • Diabetes mellitus ([facility] )    • Gangrene of toe of right foot ([facility] [address] ) 10/30/2019    Added automatically from request for surgery [id number]   • Hyperlipidemia    • Hypertension    • Obese     gastric  sleeve today 2/28/2022   • PVD (peripheral vascular disease) ([facility] [address] )    • Sleep apnea    • Teeth missing    • Wears glasses        PROBLEM LIST    Patient Active Problem List   Diagnosis   • Essential hypertension   • HLD (hyperlipidemia)   • PAD (peripheral artery disease) ([facility])   • Diabetes mellitus with stage 2 chronic kidney disease ([facility] [address] )   • Anemia   • Leukocytosis   • Toe amputation status, left   • Critical lower limb ischemia (HCC)   • Polyneuropathy associated with underlying disease ([facility] [address] )   • Charcot foot due to diabetes mellitus ([facility] [address] )   • History of transmetatarsal amputation of foot ([facility] )   • Class 2 severe obesity due to excess calories with serious comorbidity and body mass index (BMI) of 36 0 to 36 9 in [facility])   • Cellulitis of right lower extremity   • 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, with necrosis of bone ([facility] )   • S/P transmetatarsal amputation of foot, right ([facility])   • Current use of insulin ([facility] )   • Persistent proteinuria   • Vitamin D deficiency   • Obstructive sleep apnea   • Well woman exam   • Vaginal candida   • CKD (chronic kidney disease) stage 2, GFR 60-89 ml/min   • Right foot pain   • Nicotine dependence in remission   • Obesity, Class II, BMI 35-39 9   • Type 2 diabetes mellitus with diabetic polyneuropathy, without long-term current use of insulin ([facility]  )       PAST SURGICAL HISTORY:  Past Surgical History:   Procedure Laterality Date   •  SECTION     • EGD     • IR ABDOMINAL AORTAGRAM  2019   • IR AORTAGRAM WITH RUN-OFF  2020   • IR ARTERIAL LYSIS  2019   • IR LOWER EXTREMITY / INTERVENTION  2019   • IR LOWER EXTREMITY ANGIOGRAM  2022   • IR TPA LYSIS CHECK  2022   • ND AMPUTATION FOOT,TRANSMETATARSAL Right 2019    Procedure: AMPUTATION TRANSMETATARSAL (TMA) WITH ACHILLES TENDON LENGTHING;  Surgeon: Patria Brown DPM;  [facility];  Service: Podiatry   • ND AMPUTATION FOOT,TRANSMETATARSAL Right 2020    Procedure: AMPUTATION TRANSMETATARSAL (TMA);  Surgeon: Bran Claros DPM;  [facility];  Service: Podiatry   • ND LAP, BUBBA RESTRICT PROC, LONGITUDINAL GASTRECTOMY N/A 2/28/2022    Procedure: LAPAROSCOPIC SLEEVE GASTRECTOMY & INTRAOPERATIVE EGD;  Surgeon: Althea Reed MD;  [facility];  Service: Bariatrics   • TOE AMPUTATION Left 2/4/2019    Procedure: AMPUTATION 5th TOE;  Surgeon: Juan Manuel Block DPM;  [facility];  Service: Podiatry   • TOE AMPUTATION Right 11/4/2019    Procedure: AMPUTATION RIGHT 3RD AND 4TH TOE;  Surgeon: Juan Manuel Block DPM;  [facility];  Service: Podiatry       SOCIAL HISTORY :   reports that Gilberto Lyon quit smoking about 9 years ago  Gilberto Lyon's smoking use included cigarettes  Gilberto Lyon has a 40 00 pack-year smoking history  Gilberto Johnsonleatha has never used smokeless tobacco  Gilberto Lyon reports that Vika Lyon does not currently use alcohol  Gilberto Lyon reports that Vika Lyon does not use drugs  FAMILY HISTORY:  Family History   Problem Relation Age of Onset   • Diabetes Mother    • No Known Problems Father        ALLERGIES:  Allergies   Allergen Reactions   • Levemir [Insulin Detemir] Itching           PHYSICAL EXAM:  Vitals:    12/13/22 1509   BP: 128/74   Weight: 67 1 kg (148 lb)   Height: 5' 0 5" (1 537 m)     Body mass index is 28 43 kg/m²  Physical Exam  Vitals reviewed  Constitutional:       General: Clair Gerardo is not in acute distress  Appearance: Normal appearance  Clair Gerardo is normal weight  Clair Gerardo is not ill-appearing, toxic-appearing or diaphoretic  HENT:      Head: Normocephalic and atraumatic  Mouth/Throat:      Mouth: Mucous membranes are moist       Pharynx: Oropharynx is clear  No oropharyngeal exudate  Eyes:      General: No scleral icterus  Conjunctiva/sclera: Conjunctivae normal    Cardiovascular:      Rate and Rhythm: Normal rate  Pulses: Normal pulses  Heart sounds: No friction rub     Pulmonary: Effort: Pulmonary effort is normal  No respiratory distress  Breath sounds: Normal breath sounds  No stridor  Abdominal:      General: There is no distension  Palpations: Abdomen is soft  There is no mass  Tenderness: There is no abdominal tenderness  There is no right CVA tenderness or left CVA tenderness  Musculoskeletal:         General: No swelling  Cervical back: Normal range of motion and neck supple  No rigidity  Skin:     General: Skin is warm  Coloration: Skin is not jaundiced  Neurological:      General: No focal deficit present  Mental Status: Sendy Melendez is alert and oriented to person, place, and time  Mental status is at baseline     Psychiatric:         Mood and Affect: Mood normal          Behavior: Behavior normal          LABORATORY DATA:     Results from last 6 Months   Lab Units 09/13/22  0835 08/24/22  1056   WBC Thousand/uL  --  6 95   HEMOGLOBIN g/dL  --  12 4   HEMATOCRIT %  --  40 9   PLATELETS Thousands/uL  --  211   POTASSIUM mmol/L 4 7  --    CHLORIDE mmol/L 106  --    CO2 mmol/L 29  --    BUN mg/dL 23  --    CREATININE mg/dL 0 82  --    CALCIUM mg/dL 9 5  --    PHOSPHORUS mg/dL 3 7  --    IRON ug/dL  --  120   FERRITIN ng/mL  --  40        rest all reviewed    RADIOLOGY:  No orders to display     Rest all reviewed        MEDICATIONS:    Current Outpatient Medications:   •  aspirin 81 mg chewable tablet, Chew 1 tablet (81 mg total) daily, Disp: , Rfl:   •  atorvastatin (LIPITOR) 20 mg tablet, Take 1 tablet (20 mg total) by mouth daily, Disp: 90 tablet, Rfl: 2  •  cetirizine (ZyrTEC) 10 mg tablet, Take 1 tablet (10 mg total) by mouth daily (Patient taking differently: Take 10 mg by mouth daily as needed), Disp: 90 tablet, Rfl: 1  •  clopidogrel (PLAVIX) 75 mg tablet, Take 1 tablet (75 mg total) by mouth daily, Disp: 90 tablet, Rfl: 3  •  docusate sodium (COLACE) 100 mg capsule, Take 1 capsule (100 mg total) by mouth 2 (two) times a day, Disp: 60 capsule, Rfl: 1  •  Dulaglutide (Trulicity) 3 PS/9 0XC SOPN, Inject 0 5 mL (3 mg total) under the skin once a week, Disp: 6 mL, Rfl: 1  •  ergocalciferol (ERGOCALCIFEROL) 1 25 MG (48311 UT) capsule, Take 1 capsule (50,000 Units total) by mouth once a week, Disp: 12 capsule, Rfl: 3  •  lisinopril (ZESTRIL) 5 mg tablet, Take 5 mg by mouth daily, Disp: , Rfl:   •  metoprolol tartrate (LOPRESSOR) 25 mg tablet, Take 1 tablet (25 mg total) by mouth every 12 (twelve) hours, Disp: 180 tablet, Rfl: 1  •  pantoprazole (PROTONIX) 40 mg tablet, Take 1 tablet (40 mg total) by mouth daily, Disp: 90 tablet, Rfl: 0  •  Blood Glucose Monitoring Suppl (ONE TOUCH ULTRA MINI) w/Device KIT, Use 4 times a day, Disp: 1 kit, Rfl: 2  •  glucose blood (Glucose Meter Test) test strip, One touch ultra strips use to test 3 times daily, Disp: 100 strip, Rfl: 5  •  Insulin Pen Needle 32G X 4 MM MISC, Use 4 times daily with insulin pens, Disp: 120 each, Rfl: 5  •  OneTouch Delica Lancets 88B MISC, Use to test 3x daily, Disp: 300 each, Rfl: 2  •  oxyCODONE (Roxicodone) 5 immediate release tablet, Take 1 tablet (5 mg total) by mouth every 4 (four) hours as needed for moderate pain Max Daily Amount: 30 mg (Patient not taking: Reported on 12/13/2022), Disp: 10 tablet, Rfl: 0          Portions of the record may have been created with voice recognition software  Occasional wrong word or "sound a like" substitutions may have occurred due to the inherent limitations of voice recognition software  Read the chart carefully and recognize, using context, where substitutions have occurred  If you have any questions, please contact the dictating provider

## 2022-12-27 ENCOUNTER — TELEPHONE (OUTPATIENT)
Dept: PODIATRY | Facility: CLINIC | Age: 57
End: 2022-12-27

## 2022-12-27 NOTE — TELEPHONE ENCOUNTER
Left voicemail to the patient letting her know Sindy Hidden is changing office  We are keeping same time and day just different office  Message from Leixirhart:  Original authorizing provider: ANOOP LIVINGSTON would like a refill of the following medications:  atorvastatin (LIPITOR) 20 MG tablet [BJORN KNUTSON PA-C]    Preferred pharmacy: [facility] - [address]    Comment:

## 2022-12-28 DIAGNOSIS — Z48.815 ENCOUNTER FOR SURGICAL AFTERCARE FOLLOWING SURGERY OF DIGESTIVE SYSTEM: ICD-10-CM

## 2022-12-28 DIAGNOSIS — Z98.84 BARIATRIC SURGERY STATUS: ICD-10-CM

## 2022-12-28 RX ORDER — PANTOPRAZOLE SODIUM 40 MG/1
TABLET, DELAYED RELEASE ORAL
Qty: 90 TABLET | Refills: 0 | Status: SHIPPED | OUTPATIENT
Start: 2022-12-28

## 2023-01-02 ENCOUNTER — NURSE TRIAGE (OUTPATIENT)
Dept: OTHER | Facility: OTHER | Age: 58
End: 2023-01-02

## 2023-01-02 ENCOUNTER — TELEPHONE (OUTPATIENT)
Dept: OTHER | Facility: HOSPITAL | Age: 58
End: 2023-01-02

## 2023-01-02 DIAGNOSIS — E11.22 DIABETES MELLITUS WITH STAGE 2 CHRONIC KIDNEY DISEASE (HCC): Primary | ICD-10-CM

## 2023-01-02 DIAGNOSIS — N18.2 DIABETES MELLITUS WITH STAGE 2 CHRONIC KIDNEY DISEASE (HCC): Primary | ICD-10-CM

## 2023-01-02 RX ORDER — SEMAGLUTIDE 1.34 MG/ML
0.5 INJECTION, SOLUTION SUBCUTANEOUS WEEKLY
Qty: 1.5 ML | Refills: 0 | Status: SHIPPED | OUTPATIENT
Start: 2023-01-02 | End: 2023-01-03 | Stop reason: CLARIF

## 2023-01-02 NOTE — TELEPHONE ENCOUNTER
Reason for Disposition  • [1] Caller has URGENT medicine question about med that PCP or specialist prescribed AND [2] triager unable to answer question    Protocols used: MEDICATION QUESTION CALL-ADULT-

## 2023-01-02 NOTE — TELEPHONE ENCOUNTER
Answer Assessment - Initial Assessment Questions  1  NAME of MEDICATION: "What medicine are you calling about?"      Trulicity   2  QUESTION: "What is your question?" (e g , medication refill, side effect)      I have not been able to fill script they are out of stock  3  PRESCRIBING HCP: "Who prescribed it?" Reason: if prescribed by specialist, call should be referred to that group  [facility]   4   SYMPTOMS: "Do you have any symptoms?"     n/a  Took last Monday (takes once a week)  She was told that it will be available 5pm today [place]    Protocols used: MEDICATION QUESTION CALL-ADULT-

## 2023-01-02 NOTE — TELEPHONE ENCOUNTER
Regarding: Medication question  ----- Message from Ines Vargas sent at 1/2/2023  8:07 AM EST -----  "I have been trying to get my Trulicity script for a week and no one has the medication in stock  I am supposed to take it today and I do not have it   Will anything happen to me if I don't take it?"

## 2023-01-02 NOTE — TELEPHONE ENCOUNTER
Called and spoke with Zhang Wheeler, reports that her last dose of Trulicity was on Monday, December 26  Patient was unable to obtain Trulicity 3 mg dose due to its shortage  Discussed with the patient to either transition to another GLP-1 agonist or insulin therapy  Patient chose another GLP-1 agonist   Will start patient on Ozempic 0 5 mg weekly for 4 weeks  Patient will call us back in 4 weeks and if it is tolerated well, would recommend increasing Ozempic to 1 mg weekly thereafter

## 2023-01-03 ENCOUNTER — TELEPHONE (OUTPATIENT)
Dept: ENDOCRINOLOGY | Facility: CLINIC | Age: 58
End: 2023-01-03

## 2023-01-03 DIAGNOSIS — E11.42 TYPE 2 DIABETES MELLITUS WITH DIABETIC POLYNEUROPATHY, WITHOUT LONG-TERM CURRENT USE OF INSULIN (HCC): Primary | ICD-10-CM

## 2023-01-03 DIAGNOSIS — E11.42 TYPE 2 DIABETES MELLITUS WITH DIABETIC POLYNEUROPATHY, WITHOUT LONG-TERM CURRENT USE OF INSULIN (HCC): ICD-10-CM

## 2023-01-03 RX ORDER — DULAGLUTIDE 1.5 MG/.5ML
1.5 INJECTION, SOLUTION SUBCUTANEOUS WEEKLY
Qty: 2 ML | Refills: 1 | Status: SHIPPED | OUTPATIENT
Start: 2023-01-03 | End: 2023-01-03 | Stop reason: SDUPTHER

## 2023-01-03 RX ORDER — DULAGLUTIDE 1.5 MG/.5ML
1.5 INJECTION, SOLUTION SUBCUTANEOUS WEEKLY
Qty: 2 ML | Refills: 1 | Status: SHIPPED | OUTPATIENT
Start: 2023-01-03

## 2023-01-03 NOTE — TELEPHONE ENCOUNTER
Daughter did call back and [facility] in [facility] does have Trulicity 7 1NS, which I did sent it to that pharmacy

## 2023-01-03 NOTE — TELEPHONE ENCOUNTER
Patient called regarding Trulicity, she's completely out and due to the shortage is unable to find medication at a pharmacy  Patient would like to know is there another medication that can be substituted until Trulicity becomes back in stock

## 2023-01-03 NOTE — TELEPHONE ENCOUNTER
Spoke with Louie De La Paz pt's daughter she would like a new Rx  For Trulicity 1 5 mg to be sent  To [facility]   Also she would like to know when her mother should we take the new dose since she missed her dose Monday 01/02/2023

## 2023-01-03 NOTE — TELEPHONE ENCOUNTER
Received a call from Pt's daughter Kit Manzo does not have any Trulicity 1 5 mg she did check with Other pharmacy and they are all out of Trulicity,, Pt's daughter would like to know what to do next, can you prescribe a tablet or pill  She is afraid that he mother BS are going to be high with out this medication

## 2023-01-15 DIAGNOSIS — I10 ESSENTIAL HYPERTENSION: ICD-10-CM

## 2023-02-03 ENCOUNTER — NURSE TRIAGE (OUTPATIENT)
Dept: OTHER | Facility: OTHER | Age: 58
End: 2023-02-03

## 2023-02-03 DIAGNOSIS — N18.2 DIABETES MELLITUS WITH STAGE 2 CHRONIC KIDNEY DISEASE (HCC): ICD-10-CM

## 2023-02-03 DIAGNOSIS — E11.22 DIABETES MELLITUS WITH STAGE 2 CHRONIC KIDNEY DISEASE (HCC): ICD-10-CM

## 2023-02-03 DIAGNOSIS — Z79.4 CURRENT USE OF INSULIN (HCC): ICD-10-CM

## 2023-02-03 RX ORDER — BLOOD SUGAR DIAGNOSTIC
STRIP MISCELLANEOUS
Qty: 100 STRIP | Refills: 0 | Status: SHIPPED | OUTPATIENT
Start: 2023-02-03

## 2023-02-03 NOTE — TELEPHONE ENCOUNTER
Regarding: urgent med refill (glucose meter test strips)  ----- Message from Andrew Miller sent at 2/3/2023  5:39 PM EST -----  Medication Refill Request     Name glucose blood (Glucose Meter Test) test strip [[id number]]  Dose/Frequency One touch ultra strips use to test 3 times daily  Quantity 100 strip  Verified pharmacy   x  Verified ordering Provider   x  Does patient have enough for the next 3 days?  No

## 2023-02-03 NOTE — TELEPHONE ENCOUNTER
Reason for Disposition  • [1] Caller requesting a prescription renewal (no refills left), no triage required, AND [2] triager able to renew prescription per department policy    Answer Assessment - Initial Assessment Questions  1  DRUG NAME: "What medicine do you need to have refilled?"      Glucose test strips    2  REFILLS REMAINING: "How many refills are remaining?" (Note: The label on the medicine or pill bottle will show how many refills are remaining  If there are no refills remaining, then a renewal may be needed )      0    3  EXPIRATION DATE: "What is the expiration date?" (Note: The label states when the prescription will , and thus can no longer be refilled )     Unknown     4  PRESCRIBING HCP: "Who prescribed it?" Reason: If prescribed by specialist, call should be referred to that group        Rylee Ruiz    Protocols used: [facility]

## 2023-02-23 ENCOUNTER — TELEPHONE (OUTPATIENT)
Dept: FAMILY MEDICINE CLINIC | Facility: CLINIC | Age: 58
End: 2023-02-23

## 2023-02-23 NOTE — TELEPHONE ENCOUNTER
Hi, my name is Bob Willy  I my birthday is [birth year]  October 31st, 1965  I wanted to let you know I changed my pharmacy so I wanted to give you that information  My phone number is [phone]  The phone number again is [phone]   Thank you     I lt a vm to call back with the name
Arterial Catheter

## 2023-02-25 DIAGNOSIS — E11.42 TYPE 2 DIABETES MELLITUS WITH DIABETIC POLYNEUROPATHY, WITHOUT LONG-TERM CURRENT USE OF INSULIN (HCC): ICD-10-CM

## 2023-02-27 ENCOUNTER — OFFICE VISIT (OUTPATIENT)
Dept: PODIATRY | Facility: CLINIC | Age: 58
End: 2023-02-27

## 2023-02-27 VITALS
DIASTOLIC BLOOD PRESSURE: 75 MMHG | BODY MASS INDEX: 29.06 KG/M2 | WEIGHT: 148 LBS | HEART RATE: 75 BPM | HEIGHT: 60 IN | SYSTOLIC BLOOD PRESSURE: 125 MMHG

## 2023-02-27 DIAGNOSIS — Z89.431 S/P TRANSMETATARSAL AMPUTATION OF FOOT, RIGHT (HCC): ICD-10-CM

## 2023-02-27 DIAGNOSIS — I73.9 PAD (PERIPHERAL ARTERY DISEASE) (HCC): ICD-10-CM

## 2023-02-27 DIAGNOSIS — E11.42 DIABETIC POLYNEUROPATHY ASSOCIATED WITH TYPE 2 DIABETES MELLITUS (HCC): Primary | ICD-10-CM

## 2023-02-27 DIAGNOSIS — L03.032 PARONYCHIA OF GREAT TOE OF LEFT FOOT: ICD-10-CM

## 2023-02-27 RX ORDER — DULAGLUTIDE 1.5 MG/.5ML
1.5 INJECTION, SOLUTION SUBCUTANEOUS WEEKLY
Qty: 2 ML | Refills: 0 | Status: SHIPPED | OUTPATIENT
Start: 2023-02-27

## 2023-02-27 RX ORDER — CEPHALEXIN 500 MG/1
500 CAPSULE ORAL EVERY 8 HOURS SCHEDULED
Qty: 15 CAPSULE | Refills: 0 | Status: SHIPPED | OUTPATIENT
Start: 2023-02-27 | End: 2023-03-04

## 2023-02-27 RX ORDER — LIDOCAINE HYDROCHLORIDE 10 MG/ML
5 INJECTION, SOLUTION INFILTRATION; PERINEURAL ONCE
Status: COMPLETED | OUTPATIENT
Start: 2023-02-27 | End: 2023-02-27

## 2023-02-27 RX ADMIN — LIDOCAINE HYDROCHLORIDE 5 ML: 10 INJECTION, SOLUTION INFILTRATION; PERINEURAL at 08:54

## 2023-02-27 NOTE — PATIENT INSTRUCTIONS
POST-OPERATIVE INSTRUCTIONS FOLLOWING NAIL REMOVAL    TODAY  Leave dressing intact  Rest  Take ibuprofen or tylenol as needed  By tomorrow most of the bleeding should stop  Some light bleeding the first 24 hours is normal   TOMORROW AM  Remove the dressing, if it sticks, get it wet with water  Soak the toe in warm water for 10 minutes, you may add epsom salts if you want but they're not mandatory  Dry toe, apply small amount of neosporin or bacitracin or generic antibiotic ointment to the nail bed and cover with a standard bandaid  TOMORROW PM  Remove the dressing, if it sticks, get it wet with water  Soak the toe in warm water for 10 minutes, you may add epsom salts if you want  Dry toe, apply small amount of neosporin or bacitracin or generic antibiotic ointment to the nail bed and cover with a standard bandaid  NEXT 5 DAYS  Repeat steps 2 and 3  After 5 days you can stop applying neosporin  Just clean the toe daily with soap and water and cover with a bandaid  Once all drainage stops, you can stop placing bandaid on the toe      You may notice some redness on the side of the nail bed  This is ok  Some minor bleeding or clear-yellowish drainage is normal  If your toe begins to drain thick, white creamy drainage (pus) call immediately  If you notice redness streaking onto the foot and ankle, call our office immediately  Infections are rare after these procedures but we may have to prescribe an antibiotic

## 2023-02-27 NOTE — PROGRESS NOTES
Assessment/Plan:     Diagnoses and all orders for this visit:    Diabetic polyneuropathy associated with type 2 diabetes mellitus ([facility] )    Paronychia of great toe of left foot  -     lidocaine (XYLOCAINE) 1 % injection 5 mL  -     Nail removal  -     cephalexin (KEFLEX) 500 mg capsule; Take 1 capsule (500 mg total) by mouth every 8 (eight) hours for 5 days    S/P transmetatarsal amputation of foot, right (HCC)    PAD (peripheral artery disease) ([facility] )        Diagnosis and options discussed with patient  Patient agreeable to the plan as stated below    Reviewed arterial doppler left foot from May 2022  GTP was 74 (adequate)  Acute paronychia left hallux, see procedure  Her perfusion seems adequate but she is very high risk for this ingrown toenail causing serious infection  See procedure below  Check next week  Wound care instructions given    -Discussed DM risk to lower extremities, proper shoe gear, and daily monitoring of feet  She has h/o multiple toe amputations and midfoot amputation on the right  High risk  (Q7)    -Educated on A1C and the risks of poorly controlled Diabetes  Reviewed recent A1C:  Lab Results   Component Value Date    HGBA1C 7 2 (H) 09/13/2022         -Discussed weight loss and suitable exercise regiment  Reviewed most recent PCP visit on 10/12/2022    Reviewed endocrine visit 12/6/2022    Nail removal    Date/Time: 2/27/2023 8:53 AM  Performed by: Jil Puckett DPM  Authorized by: Jil Puckett DPM     Patient location:  ClinicUniversal Protocol:  Consent: Verbal consent obtained  Risks and benefits: risks, benefits and alternatives were discussed  Consent given by: patient  Time out: Immediately prior to procedure a "time out" was called to verify the correct patient, procedure, equipment, support staff and site/side marked as required    Timeout called at: 2/27/2023 8:53 AM   Patient understanding: patient states understanding of the procedure being performed  Patient identity confirmed: verbally with patient      Location:     Foot:  L big toe  Pre-procedure details:     Skin preparation:  Betadine  Anesthesia (see MAR for exact dosages): Anesthesia method:  Local infiltration    Local anesthetic:  Lidocaine 1% w/o epi  Nail Removal:     Nail removed:  Complete  Ingrown nail:     Nail matrix removed or ablated:  None  Post-procedure details:     Dressing:  4x4 sterile gauze, antibiotic ointment and gauze roll    Patient tolerance of procedure: Tolerated well, no immediate complications        Subjective:      Patient ID: Libby Fields is a 62 y o  adult  Annual DM foot risk assessment  A1C is around 7  She has preious amputations that are stable  Her left great toenail looks ingrown but she denies pain (she has known neuropathy)  Her sneakers are in good shape  She feels well today      The following portions of the patient's history were reviewed and updated as appropriate: allergies, current medications, past family history, past medical history, past social history, past surgical history and problem list     Review of Systems    Constitutional: Negative  Respiratory: Negative for cough and shortness of breath  Gastrointestinal: Negative for diarrhea, nausea and vomiting  Musculoskeletal: foot amputation   Skin: ingrown toenail  Neurological: Neuropathy      Objective:      /75 (BP Location: Left arm, Patient Position: Sitting, Cuff Size: Standard)   Pulse 75   Ht 5' (1 524 m)   Wt 67 1 kg (148 lb)   LMP  (LMP Unknown)   BMI 28 90 kg/m²          Physical Exam  Vitals reviewed  Constitutional:       Appearance: Libby Fields is not ill-appearing or diaphoretic  Cardiovascular:      Rate and Rhythm: Normal rate  Pulses: Pulses are weak  Dorsalis pedis pulses are 0 on the right side and 1+ on the left side  Posterior tibial pulses are 0 on the right side and 0 on the left side     Pulmonary:      Effort: Pulmonary effort is normal  No respiratory distress  Musculoskeletal:         General: Deformity present  Right foot: Deformity (TMA stable) present  Left foot: Deformity (total 5th toe and partial 4th toe amputation) present  Feet:      Right foot:      Protective Sensation: 0 sites sensed  Skin integrity: No ulcer or callus  Left foot:      Protective Sensation: 0 sites sensed  Skin integrity: Erythema (nail bed abscess and paronychia left hallux) present  No ulcer or callus  Toenail Condition: Left toenails are ingrown  Skin:     Capillary Refill: Capillary refill takes less than 2 seconds  Findings: No erythema or rash  Neurological:      Mental Status: Waylon Delgado is alert and oriented to person, place, and time  Sensory: Sensory deficit present  Gait: Gait normal    Psychiatric:         Mood and Affect: Mood normal              Diabetic Foot Exam    Patient's shoes and socks removed  Right Foot/Ankle   Right Foot Inspection  Skin Exam: No callus, no ulcer and no callus  Toe Exam: right toe deformity  Sensory   Vibration: absent  Proprioception: absent  Monofilament testing: absent    Vascular  Capillary refills: elevated  The right DP pulse is 0  The right PT pulse is 0  Left Foot/Ankle  Left Foot Inspection  Skin Exam: erythema (nail bed abscess and paronychia left hallux) and abnormal color  No ulcer and no callus  Toe Exam: left toe deformity  Sensory   Vibration: absent  Proprioception: absent  Monofilament testing: absent    Vascular  Capillary refills: < 3 seconds  The left DP pulse is 1+  The left PT pulse is 0       Assign Risk Category  Deformity present  Loss of protective sensation  Weak pulses  Risk: 3

## 2023-03-08 ENCOUNTER — OFFICE VISIT (OUTPATIENT)
Dept: PODIATRY | Facility: CLINIC | Age: 58
End: 2023-03-08

## 2023-03-08 VITALS
SYSTOLIC BLOOD PRESSURE: 140 MMHG | DIASTOLIC BLOOD PRESSURE: 80 MMHG | HEIGHT: 60 IN | BODY MASS INDEX: 29.06 KG/M2 | WEIGHT: 148 LBS | HEART RATE: 75 BPM

## 2023-03-08 DIAGNOSIS — L03.032 PARONYCHIA OF GREAT TOE OF LEFT FOOT: Primary | ICD-10-CM

## 2023-03-08 NOTE — PROGRESS NOTES
Assessment/Plan:      Diagnoses and all orders for this visit:    Paronychia of great toe of left foot  Comments:  healing  No SOI today  Resume at risk foot care          Subjective:     Patient ID: Tonna Koyanagi is a 62 y o  adult  Patient was seen 2/27/23 with acute left hallux nail bed infection  The nail was removed and she was placed on an antibiotic  She is very high risk for infection due to her PAD, DM and h/o amputations  She is here to get her nail avulsion and infection checked  She has no issues with the antibiotic and feels well this morning      Review of Systems    Constitutional: Negative  Respiratory: Negative for cough and shortness of breath  Gastrointestinal: Negative for diarrhea, nausea and vomiting  Musculoskeletal: previous partial foot amputation  Skin: ingrown toenail  Neurological: Neuropathy      Objective:     Physical Exam  Vitals reviewed  Skin:     Findings: No erythema  Comments: Left hallux nail is absent   NO drainage or redenss or ischemic changes

## 2023-03-25 DIAGNOSIS — E11.42 TYPE 2 DIABETES MELLITUS WITH DIABETIC POLYNEUROPATHY, WITHOUT LONG-TERM CURRENT USE OF INSULIN (HCC): ICD-10-CM

## 2023-03-27 RX ORDER — DULAGLUTIDE 1.5 MG/.5ML
1.5 INJECTION, SOLUTION SUBCUTANEOUS WEEKLY
Qty: 2 ML | Refills: 2 | Status: SHIPPED | OUTPATIENT
Start: 2023-03-27

## 2023-03-27 NOTE — TELEPHONE ENCOUNTER
Requested medication(s) are due for refill today: Yes  Patient has already received a courtesy refill: No  Other reason request has been forwarded to provider: unable to authorize per protocol

## 2023-03-30 DIAGNOSIS — Z48.815 ENCOUNTER FOR SURGICAL AFTERCARE FOLLOWING SURGERY OF DIGESTIVE SYSTEM: ICD-10-CM

## 2023-03-30 DIAGNOSIS — Z98.84 BARIATRIC SURGERY STATUS: ICD-10-CM

## 2023-03-30 RX ORDER — PANTOPRAZOLE SODIUM 40 MG/1
TABLET, DELAYED RELEASE ORAL
Qty: 90 TABLET | Refills: 0 | Status: SHIPPED | OUTPATIENT
Start: 2023-03-30

## 2023-04-06 ENCOUNTER — RA CDI HCC (OUTPATIENT)
Dept: OTHER | Facility: HOSPITAL | Age: 58
End: 2023-04-06

## 2023-04-06 NOTE — PROGRESS NOTES
E11 51  [facility]  coding opportunities          Chart Reviewed number of suggestions sent to Provider: 1     Patients Insurance     Medicare Insurance: Toño Abbott

## 2023-04-23 DIAGNOSIS — E11.22 DIABETES MELLITUS WITH STAGE 2 CHRONIC KIDNEY DISEASE (HCC): ICD-10-CM

## 2023-04-23 DIAGNOSIS — Z79.4 CURRENT USE OF INSULIN (HCC): ICD-10-CM

## 2023-04-23 DIAGNOSIS — N18.2 DIABETES MELLITUS WITH STAGE 2 CHRONIC KIDNEY DISEASE (HCC): ICD-10-CM

## 2023-04-24 RX ORDER — BLOOD SUGAR DIAGNOSTIC
STRIP MISCELLANEOUS
Qty: 300 STRIP | Refills: 0 | Status: SHIPPED | OUTPATIENT
Start: 2023-04-24

## 2023-05-04 ENCOUNTER — TELEPHONE (OUTPATIENT)
Dept: GASTROENTEROLOGY | Facility: MEDICAL CENTER | Age: 58
End: 2023-05-04

## 2023-05-04 ENCOUNTER — CONSULT (OUTPATIENT)
Dept: GASTROENTEROLOGY | Facility: MEDICAL CENTER | Age: 58
End: 2023-05-04

## 2023-05-04 VITALS
HEART RATE: 87 BPM | BODY MASS INDEX: 27.42 KG/M2 | DIASTOLIC BLOOD PRESSURE: 78 MMHG | TEMPERATURE: 95.5 F | SYSTOLIC BLOOD PRESSURE: 146 MMHG | WEIGHT: 140.4 LBS

## 2023-05-04 DIAGNOSIS — Z79.02 ANTIPLATELET OR ANTITHROMBOTIC LONG-TERM USE: ICD-10-CM

## 2023-05-04 DIAGNOSIS — Z12.11 SCREEN FOR COLON CANCER: ICD-10-CM

## 2023-05-04 DIAGNOSIS — I73.9 PAD (PERIPHERAL ARTERY DISEASE) (HCC): Primary | ICD-10-CM

## 2023-05-04 DIAGNOSIS — K59.04 CHRONIC IDIOPATHIC CONSTIPATION: ICD-10-CM

## 2023-05-04 NOTE — TELEPHONE ENCOUNTER
BLOOD THINNER CLEARANCE    Our mutual patient is scheduled for procedure: Colonoscopy     On: 06/15/2023     With: Dr Benetta Frankel    She is taking the following blood thinner:     Plavix                                           Can this be stopped 5 days prior to the procedure?        Physician Approving clearance: ________________________

## 2023-05-04 NOTE — PATIENT INSTRUCTIONS
Scheduled date of Colon (as of today) 06/15/2023  Physician performing: Diane Bateman  Location of procedure: [facility]  Instructions given to patient:  Shelia  Clearances: Needs Plavix hold       AM Diabetic

## 2023-05-04 NOTE — PROGRESS NOTES
[facility] - Outpatient Consultation  Kirk BraxtonChevere 62 y o  adult MRN: [id number]  Encounter: [id number]          ASSESSMENT AND PLAN:    59-year-old female with diabetes, CKD, neuropathy, ABHINAV, hypertension, PAD on plavix referred for colon cancer screening  Average risk, no red flags  1  Screen for colon cancer  - Ambulatory referral to Gastroenterology  - Colonoscopy; Future  - polyethylene glycol (COLYTE) 4000 mL solution; Take 4,000 mL by mouth once for 1 dose  Dispense: 4000 mL; Refill: 0    2  Chronic idiopathic constipation  Recommended more consistent use of Miralax when having constipation  3  PAD (peripheral artery disease) ([facility] [address] )  4  Antiplatelet or antithrombotic long-term use  Will request Plavix hold for colonoscopy    ______________________________________________________________________    HPI:      Gets some constipation  Typically goes 2 days between BM  Stool is hard and  Has to strain  Uses stool softeners and benefiber  If gets severe, uses miralax  No blood in stool  No family history of colon cancer  REVIEW OF SYSTEMS:    CONSTITUTIONAL: Denies any fever, chills, rigors, and weight loss  HEENT: No earache or tinnitus  Denies hearing loss or visual disturbances  CARDIOVASCULAR: No chest pain or palpitations  RESPIRATORY: Denies any cough, hemoptysis, shortness of breath or dyspnea on exertion  GASTROINTESTINAL: As noted in the History of Present Illness  GENITOURINARY: No problems with urination  Denies any hematuria or dysuria  NEUROLOGIC: No dizziness or vertigo, denies headaches  MUSCULOSKELETAL: Denies any muscle or joint pain  SKIN: Denies skin rashes or itching  ENDOCRINE: Denies excessive thirst  Denies intolerance to heat or cold  PSYCHOSOCIAL: Denies depression or anxiety  Denies any recent memory loss         Historical Information   Past Medical History:   Diagnosis Date   • Ambulates with cane    • Bariatric surgery status    • Cellulitis of toe 2019   • CKD (chronic kidney disease)    • CPAP (continuous positive airway pressure) dependence    • Diabetes mellitus ([facility] )    • Gangrene of toe of right foot ([facility] ) 10/30/2019    Added automatically from request for surgery [id number]   • Hyperlipidemia    • Hypertension    • Obese     gastric  sleeve today 2022   • PVD (peripheral vascular disease) ([facility] )    • Sleep apnea    • Teeth missing    • Wears glasses      Past Surgical History:   Procedure Laterality Date   •  SECTION     • EGD     • IR ABDOMINAL AORTAGRAM  2019   • IR AORTAGRAM WITH RUN-OFF  2020   • IR ARTERIAL LYSIS  2019   • IR LOWER EXTREMITY / INTERVENTION  2019   • IR LOWER EXTREMITY ANGIOGRAM  2022   • IR TPA LYSIS CHECK  2022   • MO AMPUTATION FOOT TRANSMETARSAL Right 2019    Procedure: AMPUTATION TRANSMETATARSAL (TMA) WITH ACHILLES TENDON LENGTHING;  Surgeon: Priyanka Shabazz DPM;  Location: [facility];  Service: Podiatry   • MO AMPUTATION FOOT TRANSMETARSAL Right 2020    Procedure: AMPUTATION TRANSMETATARSAL (TMA);  Surgeon: Mt Ellison DPM;  [facility]: [facility];  Service: Podiatry   • MO LAPS [address] [facility] LONGITUDINAL GASTRECTOMY N/A 2022    Procedure: LAPAROSCOPIC SLEEVE GASTRECTOMY & INTRAOPERATIVE EGD;  Surgeon: Karis Chan MD;  Location: [facility];  Service: Bariatrics   • TOE AMPUTATION Left 2019    Procedure: AMPUTATION 5th TOE;  Surgeon: Priyanka Shabazz DPM;  [facility];  Service: Podiatry   • TOE AMPUTATION Right 2019    Procedure: AMPUTATION RIGHT 3RD AND 4TH TOE;  Surgeon: Priyanka Shabazz DPM;  [facility];  Service: Podiatry     Social History   Social History     Substance and Sexual Activity   Alcohol Use Not Currently    Comment: rarely     Social History     Substance and Sexual Activity   Drug Use No     Social History     Tobacco Use   Smoking Status Former   • Packs/day: 2 00   • Years: 20 00   • Pack years: 40 00   • Types: Cigarettes   • Quit date: 2013   • Years since quitting: 10 3   Smokeless Tobacco Never     Family History   Problem Relation Age of Onset   • Diabetes Mother    • No Known Problems Father        Meds/Allergies       Current Outpatient Medications:   •  aspirin 81 mg chewable tablet  •  atorvastatin (LIPITOR) 20 mg tablet  •  Blood Glucose Monitoring Suppl (ONE TOUCH ULTRA MINI) w/Device KIT  •  cetirizine (ZyrTEC) 10 mg tablet  •  clopidogrel (PLAVIX) 75 mg tablet  •  docusate sodium (COLACE) 100 mg capsule  •  dulaglutide (Trulicity) 1 5 QG/7 3CX injection  •  ergocalciferol (ERGOCALCIFEROL) 1 25 MG (69883 UT) capsule  •  Insulin Pen Needle 32G X 4 MM MISC  •  lisinopril (ZESTRIL) 5 mg tablet  •  metoprolol tartrate (LOPRESSOR) 25 mg tablet  •  OneTouch Delica Lancets 25N MISC  •  OneTouch Ultra test strip  •  oxyCODONE (Roxicodone) 5 immediate release tablet  •  pantoprazole (PROTONIX) 40 mg tablet    Allergies   Allergen Reactions   • Levemir [Insulin Detemir] Itching           Objective     Blood pressure 146/78, pulse 87, temperature (!) 95 5 °F (35 3 °C), weight 63 7 kg (140 lb 6 4 oz), not currently breastfeeding  Body mass index is 27 42 kg/m²  PHYSICAL EXAM:      General Appearance:   Alert, cooperative, no distress   HEENT:   Normocephalic, atraumatic, anicteric  Neck:  Supple, symmetrical, trachea midline   Lungs:   Clear to auscultation bilaterally; no rales, rhonchi or wheezing; respirations unlabored    Heart[de-identified]   Regular rate and rhythm; no murmur, rub, or gallop  Abdomen:   Soft, non-tender, non-distended; normal bowel sounds; no masses, no organomegaly    Genitalia:   Deferred    Rectal:   Deferred    Extremities:  No cyanosis, clubbing or edema    Pulses:  2+ and symmetric    Skin:  No jaundice, rashes, or lesions    Lymph nodes:  No palpable cervical lymphadenopathy        Lab Results:   No visits with results within 1 Day(s) from this visit  Latest known visit with results is:   Appointment on 08/24/2022   Component Date Value   • Vit D, 25-Hydroxy 09/13/2022 41 8    • Creatinine, Ur 09/13/2022 78 5    • Protein Urine Random 09/13/2022 8    • Prot/Creat Ratio, Ur 09/13/2022 0 10    • Phosphorus 09/13/2022 3 7    • Creatinine, Ur 09/13/2022 83 0    • Microalbum  ,U,Random 09/13/2022 23 8 (H)    • Microalb Creat Ratio 09/13/2022 29    • Hemoglobin A1C 09/13/2022 7 2 (H)    • EAG 09/13/2022 160    • Sodium 09/13/2022 142    • Potassium 09/13/2022 4 7    • Chloride 09/13/2022 106    • CO2 09/13/2022 29    • ANION GAP 09/13/2022 7    • BUN 09/13/2022 23    • Creatinine 09/13/2022 0 82    • Glucose 09/13/2022 128    • Calcium 09/13/2022 9 5    • AST 09/13/2022 26    • ALT 09/13/2022 32    • Alkaline Phosphatase 09/13/2022 84    • Total Protein 09/13/2022 7 7    • Albumin 09/13/2022 3 5    • Total Bilirubin 09/13/2022 0 36    • Zinc 08/24/2022 87    • Vit D, 25-Hydroxy 08/24/2022 34 1    • Vitamin B-12 08/24/2022 271    • Vitamin B1, Whole Blood 08/24/2022 101 5    • Vitamin A 08/24/2022 52 1    • PTH 08/24/2022 88 9 (H)    • Iron Saturation 08/24/2022 42    • TIBC 08/24/2022 286    • Iron 08/24/2022 120    • Ferritin 08/24/2022 40    • Folate 08/24/2022 >20 0 (H)    • WBC 08/24/2022 6 95    • RBC 08/24/2022 5 49 (H)    • Hemoglobin 08/24/2022 12 4    • Hematocrit 08/24/2022 40 9    • MCV 08/24/2022 75 (L)    • MCH 08/24/2022 22 6 (L)    • MCHC 08/24/2022 30 3 (L)    • RDW 08/24/2022 17 4 (H)    • Platelets 68/85/9890 211    • MPV 08/24/2022 11 8          Radiology Results:   No results found  Answers for HPI/ROS submitted by the patient on 5/4/2023  anorexia: No  arthralgias: No  belching: No  constipation: No  diarrhea: No  dysuria: No  fever: No  flatus: No  frequency: No  headaches: No  hematochezia: No  hematuria: No  melena: No  myalgias: No  nausea:  No  weight loss: No  vomiting: No

## 2023-05-16 ENCOUNTER — OFFICE VISIT (OUTPATIENT)
Dept: BARIATRICS | Facility: CLINIC | Age: 58
End: 2023-05-16

## 2023-05-16 VITALS
HEART RATE: 77 BPM | BODY MASS INDEX: 27.29 KG/M2 | SYSTOLIC BLOOD PRESSURE: 144 MMHG | HEIGHT: 60 IN | TEMPERATURE: 96 F | WEIGHT: 139 LBS | DIASTOLIC BLOOD PRESSURE: 80 MMHG

## 2023-05-16 DIAGNOSIS — E11.22 DIABETES MELLITUS WITH STAGE 2 CHRONIC KIDNEY DISEASE (HCC): ICD-10-CM

## 2023-05-16 DIAGNOSIS — K91.2 POSTSURGICAL MALABSORPTION: ICD-10-CM

## 2023-05-16 DIAGNOSIS — N18.2 CKD (CHRONIC KIDNEY DISEASE) STAGE 2, GFR 60-89 ML/MIN: ICD-10-CM

## 2023-05-16 DIAGNOSIS — Z98.84 BARIATRIC SURGERY STATUS: ICD-10-CM

## 2023-05-16 DIAGNOSIS — I10 ESSENTIAL HYPERTENSION: ICD-10-CM

## 2023-05-16 DIAGNOSIS — E66.3 OVERWEIGHT: ICD-10-CM

## 2023-05-16 DIAGNOSIS — N18.2 DIABETES MELLITUS WITH STAGE 2 CHRONIC KIDNEY DISEASE (HCC): ICD-10-CM

## 2023-05-16 DIAGNOSIS — Z48.815 ENCOUNTER FOR SURGICAL AFTERCARE FOLLOWING SURGERY OF DIGESTIVE SYSTEM: Primary | ICD-10-CM

## 2023-05-16 DIAGNOSIS — I73.9 PAD (PERIPHERAL ARTERY DISEASE) (HCC): ICD-10-CM

## 2023-05-16 NOTE — PROGRESS NOTES
Assessment/Plan:     Patient ID: Lela Nava is a 62 y o  adult  Bariatric Surgery Status       -s/p Vertical Jayson Few Dr Dannette Paget 2/28/2022  Presents to the office today for annual postop doing very well      continue daily PPI as she takes daily aspirin         DM with stage 2 CKD   - off metforim and lantus   - is on trulicity, managed by endo  States her sugars overall stable         HTN  - continue antihypertensives as prescribed          ABHINAV  - had issues with cpap recently and insurance coverage   Advised making appt with sleep medicine     · Continued/Maintain healthy weight loss with good nutrition intakes  · Adequate hydration with at least 64oz  fluid intake  · Follow diet as discussed  · Follow vitamin and mineral recommendations as reviewed with you  · Exercise as tolerated  · Colonoscopy referral made: scheduled  · Mammo: scheduled    · Follow-up in 6 months  We kindly ask that your arrive 15 minutes before your scheduled appointment time with your provider to allow our staff to room you, get your vital signs and update your chart  · Get lab work done  Please call the office if you need a script  It is recommended to check with your insurance BEFORE getting labs done to make sure they are covered by your policy  · Call our office if you have any problems with abdominal pain especially associated with fever, chills, nausea, vomiting or any other concerns  · All  Post-bariatric surgery patients should be aware that very small quantities of any alcohol can cause impairment and it is very possible not to feel the effect  The effect can be in the system for several hours  It is also a stomach irritant  · It is advised to AVOID alcohol, Nonsteroidal antiinflammatory drugs (NSAIDS) and nicotine of all forms   Any of these can cause stomach irritation/pain  · Discussed the effects of alcohol on a bariatric patient and the increased impairment risk  · Keep up the good work! Postsurgical Malabsorption   -At risk for malabsorption of vitamins/minerals secondary to malabsorption and restriction of intake from bariatric surgery  -Currently taking adequate postop bariatric surgery vitamin supplementation  -Next set of bariatric labs ordered for approximately 2 weeks  -Patient received education about the importance of adhering to a lifelong supplementation regimen to avoid vitamin/mineral deficiencies      Diagnoses and all orders for this visit:    Encounter for surgical aftercare following surgery of digestive system  -     CBC and Platelet; Future  -     Zinc; Future  -     Vitamin B12; Future  -     Vitamin B1, whole blood; Future  -     Vitamin A; Future  -     PTH, intact; Future  -     Ferritin; Future  -     Folate; Future  -     Iron Saturation %; Future    Bariatric surgery status  -     CBC and Platelet; Future  -     Zinc; Future  -     Vitamin B12; Future  -     Vitamin B1, whole blood; Future  -     Vitamin A; Future  -     PTH, intact; Future  -     Ferritin; Future  -     Folate; Future  -     Iron Saturation %; Future    Postsurgical malabsorption  -     CBC and Platelet; Future  -     Zinc; Future  -     Vitamin B12; Future  -     Vitamin B1, whole blood; Future  -     Vitamin A; Future  -     PTH, intact; Future  -     Ferritin; Future  -     Folate; Future  -     Iron Saturation %; Future    Overweight  -     CBC and Platelet; Future  -     Zinc; Future  -     Vitamin B12; Future  -     Vitamin B1, whole blood; Future  -     Vitamin A; Future  -     PTH, intact; Future  -     Ferritin; Future  -     Folate; Future  -     Iron Saturation %; Future    CKD (chronic kidney disease) stage 2, GFR 60-89 ml/min  -     CBC and Platelet; Future  -     Zinc; Future  -     Vitamin B12; Future  -     Vitamin B1, whole blood; Future  -     Vitamin A; Future  -     PTH, intact; Future  -     Ferritin; Future  -     Folate;  Future  -     Iron Saturation %; Future    PAD (peripheral artery disease) (HCC)  -     CBC and Platelet; Future  -     Zinc; Future  -     Vitamin B12; Future  -     Vitamin B1, whole blood; Future  -     Vitamin A; Future  -     PTH, intact; Future  -     Ferritin; Future  -     Folate; Future  -     Iron Saturation %; Future    Diabetes mellitus with stage 2 chronic kidney disease (HCC)  -     CBC and Platelet; Future  -     Zinc; Future  -     Vitamin B12; Future  -     Vitamin B1, whole blood; Future  -     Vitamin A; Future  -     PTH, intact; Future  -     Ferritin; Future  -     Folate; Future  -     Iron Saturation %; Future    Essential hypertension  -     CBC and Platelet; Future  -     Zinc; Future  -     Vitamin B12; Future  -     Vitamin B1, whole blood; Future  -     Vitamin A; Future  -     PTH, intact; Future  -     Ferritin; Future  -     Folate; Future  -     Iron Saturation %; Future         Subjective:      Patient ID: Dru Landa is a 62 y o  adult  -s/p Bernadette Grullon 2/28/2022  Presents to the office today for annual postop doing very well  Tolerating diet without issues; denies N/V, dysphagia, reflux  Initial:200  Current:139  EWL: (Weight loss is ahead of schedule at this post surgical period )  Ronak: current   Current BMI is Body mass index is 27 15 kg/m²  · Tolerating a regular diet-yes  · Eating at least 60 grams of protein per day-yes  · Following 30/60 minute rule with liquids-yes  · Drinking at least 64 ounces of fluid per day-yes  · Drinking carbonated beverages-no  · Sufficient exercise-yes  · Using NSAIDs regularly-yes, aspirin but take ppi daily  · Using nicotine-no  · Using alcohol-occasional wine   · Supplements:  darrion mvi + calcium + iron + vit d     · EWL is 82%, which places the patient ahead of schedule for expected post surgical weight loss at this time       The following portions of the patient's history were reviewed and updated as appropriate: allergies, current medications, past family history, past medical history, past social history, past surgical history and problem list     Review of Systems   Constitutional: Negative  Respiratory: Negative  Cardiovascular: Negative  Gastrointestinal: Negative  Neurological: Negative  Psychiatric/Behavioral: Negative            Objective:    /80   Pulse 77   Temp (!) 96 °F (35 6 °C) (Tympanic)   Ht 5' (1 524 m)   Wt 63 kg (139 lb)   LMP  (LMP Unknown)   BMI 27 15 kg/m²      Physical Exam

## 2023-05-17 ENCOUNTER — TELEPHONE (OUTPATIENT)
Dept: VASCULAR SURGERY | Facility: CLINIC | Age: 58
End: 2023-05-17

## 2023-05-17 ENCOUNTER — TRANSCRIBE ORDERS (OUTPATIENT)
Dept: VASCULAR SURGERY | Facility: CLINIC | Age: 58
End: 2023-05-17

## 2023-05-17 DIAGNOSIS — I73.9 PAD (PERIPHERAL ARTERY DISEASE) (HCC): Primary | ICD-10-CM

## 2023-05-17 NOTE — TELEPHONE ENCOUNTER
Attempted to contact patient to schedule appointment(s) listed below  Requested patient call [phone] option 3 to schedule appointment(s)  Patient's appointment(s) are past due, expected ASAP      Dopplers  [] Abdominal Aorta Iliac (AOIL)  [] Carotid (CV)   [] Celiac and/or Mesenteric  [] Endovascular Aortic Repair (EVAR)   [] Hemodialysis Access (HD)   [x] Lower Limb Arterial (SHERYL)due now   [] Lower Limb Venous (LEV)  [] Lower Limb Venous Duplex with Reflux (LEVDR)  [] Renal Artery  [] Upper Limb Arterial (UEA)    [] Upper Limb Venous (UEV)              [] [place] and Waveform analysis     Advanced Imaging   [] CTA head/neck    [] CTA abdomen    [] CTA abdomen & pelvis    [] CT abdomen with/ without contrast  [] CT abdomen with contrast  [] CT abdomen without contrast    [] CT abdomen & pelvis with/ without contrast  [] CT abdomen & pelvis with contrast  [] CT abdomen & pelvis without contrast    Office Visit   [] New patient, patient last seen over 3 years ago  [] Risk factor modification (RFM)   [x] Follow up   [] Lost to follow up (LTFU)  Pt is due for lead on or after 5/14/23 and a f/u dom/ Alexandra Betancourt

## 2023-05-23 ENCOUNTER — OFFICE VISIT (OUTPATIENT)
Dept: PODIATRY | Facility: CLINIC | Age: 58
End: 2023-05-23

## 2023-05-23 VITALS
BODY MASS INDEX: 27.29 KG/M2 | DIASTOLIC BLOOD PRESSURE: 75 MMHG | HEIGHT: 60 IN | HEART RATE: 96 BPM | WEIGHT: 139 LBS | SYSTOLIC BLOOD PRESSURE: 133 MMHG

## 2023-05-23 DIAGNOSIS — Z89.431 S/P TRANSMETATARSAL AMPUTATION OF FOOT, RIGHT (HCC): ICD-10-CM

## 2023-05-23 DIAGNOSIS — E11.42 DIABETIC POLYNEUROPATHY ASSOCIATED WITH TYPE 2 DIABETES MELLITUS (HCC): Primary | ICD-10-CM

## 2023-05-23 DIAGNOSIS — L84 CALLUS OF TOE: ICD-10-CM

## 2023-05-23 DIAGNOSIS — B35.1 TINEA UNGUIUM: ICD-10-CM

## 2023-05-23 NOTE — PROGRESS NOTES
"Brown Cruz Camron  1965  AT RISK FOOT CARE    1  Diabetic polyneuropathy associated with type 2 diabetes mellitus ([facility] [address] )  Lesion Destruction      2  S/P transmetatarsal amputation of foot, right (HCC)  Lesion Destruction      3  Tinea unguium        4  Callus of toe  Lesion Destruction          Patient presents for at-risk foot care  Patient has no acute concerns today  Patient has significant lower extremity risk due to previous amputation  On exam patient has thickened, hypertrophic, discolored, brittle toenails with subungual debris and tenderness x3   Callus: left 4th toe  Amputation: right TMA stable  Left 5th ray amp stable    Today's treatment includes:  Debridement of toenails  Using nail nipper, kacie, and curette, nails were sharply debrided, reduced in thickness and length  Devitalized nail tissue and fungal debris excised and removed  Patient tolerated well  Lesion Destruction    Date/Time: 5/23/2023 2:45 PM  Performed by: Srinath Platt DPM  Authorized by: Srinath Platt DPM   Universal Protocol:  Consent: Verbal consent obtained  Risks and benefits: risks, benefits and alternatives were discussed  Consent given by: patient  Time out: Immediately prior to procedure a \"time out\" was called to verify the correct patient, procedure, equipment, support staff and site/side marked as required  Timeout called at: 5/23/2023 2:25 PM   Patient understanding: patient states understanding of the procedure being performed  Patient identity confirmed: verbally with patient      Procedure Details - Lesion Destruction:     Number of Lesions:  1  Lesion 1:     Body area:  Lower extremity    Lower extremity location:  L fourth toe    Malignancy: benign hyperkeratotic lesion      Destruction method: scissors used for extraction          Discussed proper shoe gear, daily inspections of feet, and general foot health with patient   Patient has Q7  findings and is recommended for at risk " foot care every 9-10 weeks      Patients most recent complete clinical exam was performed: 2/27/2023

## 2023-06-05 DIAGNOSIS — E11.42 TYPE 2 DIABETES MELLITUS WITH DIABETIC POLYNEUROPATHY, WITHOUT LONG-TERM CURRENT USE OF INSULIN (HCC): ICD-10-CM

## 2023-06-05 RX ORDER — DULAGLUTIDE 1.5 MG/.5ML
1.5 INJECTION, SOLUTION SUBCUTANEOUS WEEKLY
Qty: 2 ML | Refills: 0 | Status: SHIPPED | OUTPATIENT
Start: 2023-06-05 | End: 2023-06-13 | Stop reason: SDUPTHER

## 2023-06-06 ENCOUNTER — HOSPITAL ENCOUNTER (OUTPATIENT)
Dept: MAMMOGRAPHY | Facility: MEDICAL CENTER | Age: 58
Discharge: HOME | End: 2023-06-06
Payer: COMMERCIAL

## 2023-06-06 VITALS — BODY MASS INDEX: 27.27 KG/M2 | HEIGHT: 60 IN | WEIGHT: 138.89 LBS

## 2023-06-06 DIAGNOSIS — Z12.31 ENCOUNTER FOR SCREENING MAMMOGRAM FOR MALIGNANT NEOPLASM OF BREAST: ICD-10-CM

## 2023-06-06 PROCEDURE — 77063 BREAST TOMOSYNTHESIS BI: CPT

## 2023-06-06 PROCEDURE — 77067 SCR MAMMO BI INCL CAD: CPT

## 2023-06-13 ENCOUNTER — OFFICE VISIT (OUTPATIENT)
Dept: ENDOCRINOLOGY | Facility: CLINIC | Age: 58
End: 2023-06-13
Payer: COMMERCIAL

## 2023-06-13 VITALS
HEART RATE: 68 BPM | DIASTOLIC BLOOD PRESSURE: 70 MMHG | SYSTOLIC BLOOD PRESSURE: 122 MMHG | BODY MASS INDEX: 27.29 KG/M2 | WEIGHT: 139 LBS | HEIGHT: 60 IN

## 2023-06-13 DIAGNOSIS — N18.2 DIABETES MELLITUS WITH STAGE 2 CHRONIC KIDNEY DISEASE (HCC): Primary | ICD-10-CM

## 2023-06-13 DIAGNOSIS — E11.42 TYPE 2 DIABETES MELLITUS WITH DIABETIC POLYNEUROPATHY, WITHOUT LONG-TERM CURRENT USE OF INSULIN (HCC): ICD-10-CM

## 2023-06-13 DIAGNOSIS — E78.5 HYPERLIPIDEMIA, UNSPECIFIED HYPERLIPIDEMIA TYPE: ICD-10-CM

## 2023-06-13 DIAGNOSIS — E66.01 MORBID (SEVERE) OBESITY DUE TO EXCESS CALORIES (HCC): ICD-10-CM

## 2023-06-13 DIAGNOSIS — I10 ESSENTIAL HYPERTENSION: ICD-10-CM

## 2023-06-13 DIAGNOSIS — E11.22 DIABETES MELLITUS WITH STAGE 2 CHRONIC KIDNEY DISEASE (HCC): Primary | ICD-10-CM

## 2023-06-13 PROBLEM — Z79.4 CURRENT USE OF INSULIN (HCC): Status: RESOLVED | Noted: 2020-08-27 | Resolved: 2023-06-13

## 2023-06-13 PROCEDURE — 99214 OFFICE O/P EST MOD 30 MIN: CPT | Performed by: INTERNAL MEDICINE

## 2023-06-13 RX ORDER — DULAGLUTIDE 1.5 MG/.5ML
1.5 INJECTION, SOLUTION SUBCUTANEOUS WEEKLY
Qty: 2 ML | Refills: 5 | Status: SHIPPED | OUTPATIENT
Start: 2023-06-13

## 2023-06-13 NOTE — PROGRESS NOTES
Established Patient Progress Note      Chief Complaint   Patient presents with   • Diabetes Type 2        History of Present Illness:   Noemi Lyon is a 62 y  o  adult with type 2 diabetes with long term use of insulin for about 35 years  Last seen in the office in Dec 2022 by Roger Caballero     She had gastric sleeve operation 2/28/22  Insulin therapy was discontinued in June 2022  Reports complications of neuropathy with charcot foot, amputations and gangrene of toes, s/p TMA of right foot, CKD, proteinuria and retinopathy  Last A1C was 7 2  Denies recent illness or hospitalizations  Denies recent severe hypoglycemic or severe hyperglycemic episodes  Denies any issues with current regimen  Home glucose monitoring: are performed regularly  She has lost approx 40# since sleeve gastrectomy in Feb 2022  She denies any issues with Trulicity  She denies any hypoglycemia since stopping insulin       Home blood glucose readings:   Before breakfast:   Before lunch: 129-160  Before dinner: 130-190, occ 216     Current regimen:   Trulicity 1 5 mg weekly    Last Eye Exam: 3/28/22, mild retinopathy b/l eyes  Pod: Dr Justin Whiting, regularly  Flu: received 22-23  COVID: primary series and 1 booster  PNA: received     Has hypertension: Taking metoprolol and lisinopril  Has hyperlipidemia: Taking atorvastatin        Vitamin D Deficiency: Taking 50,000 units weekly     Patient Active Problem List   Diagnosis   • Essential hypertension   • HLD (hyperlipidemia)   • PAD (peripheral artery disease) ([facility] [address] )   • Diabetes mellitus with stage 2 chronic kidney disease ([facility] [address] )   • Anemia   • Leukocytosis   • Toe amputation status, left   • Critical lower limb ischemia (HCC)   • Polyneuropathy associated with underlying disease ([facility] [address] )   • Charcot foot due to diabetes mellitus ([facility] [address] )   • History of transmetatarsal amputation of foot ([facility] [address] )   • Class 2 severe obesity due to excess calories with serious comorbidity and body mass index (BMI) of 36 0 to 36 9 in adult [facility])   • Cellulitis of right lower extremity   • 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, with necrosis of bone (HCC)   • S/P transmetatarsal amputation of foot, right (HCC)   • Persistent proteinuria   • Vitamin D deficiency   • Obstructive sleep apnea   • Well woman exam   • Vaginal candida   • CKD (chronic kidney disease) stage 2, GFR 60-89 ml/min   • Right foot pain   • Nicotine dependence in remission   • Obesity, Class II, BMI 35-39 9   • Type 2 diabetes mellitus with diabetic polyneuropathy, without long-term current use of insulin ([facility])      Past Medical History:   Diagnosis Date   • Ambulates with cane    • Bariatric surgery status    • Cellulitis of toe 2019   • CKD (chronic kidney disease)    • CPAP (continuous positive airway pressure) dependence    • Diabetes mellitus ([address] )    • Gangrene of toe of right foot ([facility] [address] ) 10/30/2019    Added automatically from request for surgery [id number]   • Hyperlipidemia    • Hypertension    • Obese     gastric  sleeve today 2022   • PVD (peripheral vascular disease) ([address] )    • Sleep apnea    • Teeth missing    • Wears glasses       Past Surgical History:   Procedure Laterality Date   •  SECTION     • EGD     • IR ABDOMINAL AORTAGRAM  2019   • IR AORTAGRAM WITH RUN-OFF  2020   • IR ARTERIAL LYSIS  2019   • IR LOWER EXTREMITY / INTERVENTION  2019   • IR LOWER EXTREMITY ANGIOGRAM  2022   • IR TPA LYSIS CHECK  2022   • [place] AMPUTATION FOOT TRANSMETARSAL Right 2019    Procedure: AMPUTATION TRANSMETATARSAL (TMA) WITH ACHILLES TENDON LENGTHING;  Surgeon: Marley Baker DPM;  [facility];  Service: Podiatry   • [place] AMPUTATION FOOT TRANSMETARSAL Right 2020    Procedure: AMPUTATION TRANSMETATARSAL (TMA);  Surgeon: Chito Gibbs DPM;  Location:  MAIN OR;  Service: Podiatry   • [place] [address] RSTRICTIV PX LONGITUDINAL GASTRECTOMY N/A 2/28/2022    Procedure: LAPAROSCOPIC SLEEVE GASTRECTOMY & INTRAOPERATIVE EGD;  Surgeon: Franca Coughlin MD;  [facility];  Service: Bariatrics   • TOE AMPUTATION Left 2/4/2019    Procedure: AMPUTATION 5th TOE;  Surgeon: Symone Hurst DPM;  [facility];  Service: Podiatry   • TOE AMPUTATION Right 11/4/2019    Procedure: AMPUTATION RIGHT 3RD AND 4TH TOE;  Surgeon: Symone Hurst DPM;  [facility];  Service: Podiatry      Family History   Problem Relation Age of Onset   • Diabetes Mother    • No Known Problems Father    • Breast cancer Maternal Aunt      Social History     Tobacco Use   • Smoking status: Former     Packs/day: 2 00     Years: 20 00     Total pack years: 40 00     Types: Cigarettes     Quit date: 1/1/2013     Years since quitting: 10 4   • Smokeless tobacco: Never   Substance Use Topics   • Alcohol use: Not Currently     Comment: rarely     Allergies   Allergen Reactions   • Levemir [Insulin Detemir] Itching         Current Outpatient Medications:   •  aspirin 81 mg chewable tablet, Chew 1 tablet (81 mg total) daily, Disp: , Rfl:   •  atorvastatin (LIPITOR) 20 mg tablet, Take 1 tablet (20 mg total) by mouth daily, Disp: 90 tablet, Rfl: 2  •  Blood Glucose Monitoring Suppl (ONE TOUCH ULTRA MINI) w/Device KIT, Use 4 times a day, Disp: 1 kit, Rfl: 2  •  cetirizine (ZyrTEC) 10 mg tablet, Take 1 tablet (10 mg total) by mouth daily (Patient taking differently: Take 10 mg by mouth daily as needed), Disp: 90 tablet, Rfl: 1  •  clopidogrel (PLAVIX) 75 mg tablet, Take 1 tablet (75 mg total) by mouth daily, Disp: 90 tablet, Rfl: 3  •  docusate sodium (COLACE) 100 mg capsule, Take 1 capsule (100 mg total) by mouth 2 (two) times a day, Disp: 60 capsule, Rfl: 1  •  dulaglutide (Trulicity) 1 5 VC/1 0PC injection, Inject 0 5 mL (1 5 mg total) under the skin once a week, Disp: 2 mL, Rfl: 5  •  ergocalciferol (ERGOCALCIFEROL) 1 25 MG (95866 UT) capsule, Take 1 capsule (50,000 Units total) by mouth once a week, Disp: 12 capsule, Rfl: 3  •  Insulin Pen Needle 32G X 4 MM MISC, Use 4 times daily with insulin pens, Disp: 120 each, Rfl: 5  •  lisinopril (ZESTRIL) 5 mg tablet, Take 5 mg by mouth daily, Disp: , Rfl:   •  metoprolol tartrate (LOPRESSOR) 25 mg tablet, TAKE 1 TABLET(25 MG) BY MOUTH EVERY 12 HOURS, Disp: 180 tablet, Rfl: 1  •  OneTouch Delica Lancets 58O MISC, Use to test 3x daily, Disp: 300 each, Rfl: 2  •  OneTouch Ultra test strip, TEST THREE TIMES DAILY, Disp: 300 strip, Rfl: 0  •  pantoprazole (PROTONIX) 40 mg tablet, TAKE 1 TABLET(40 MG) BY MOUTH DAILY, Disp: 90 tablet, Rfl: 0  •  polyethylene glycol (COLYTE) 4000 mL solution, Take 4,000 mL by mouth once for 1 dose, Disp: 4000 mL, Rfl: 0    Review of Systems   Constitutional: Negative for unexpected weight change  Eyes: Negative for visual disturbance  Respiratory: Negative for shortness of breath  Cardiovascular: Negative for chest pain and palpitations  Gastrointestinal: Positive for constipation (improved)  Negative for diarrhea, nausea and vomiting  Endocrine: Negative for polydipsia and polyuria  Neurological: Negative for tremors and weakness  Psychiatric/Behavioral: The patient is nervous/anxious  Physical Exam:  Body mass index is 27 15 kg/m²    /70 (BP Location: Left arm, Patient Position: Sitting, Cuff Size: Standard)   Pulse 68   Ht 5' (1 524 m)   Wt 63 kg (139 lb)   LMP  (LMP Unknown)   BMI 27 15 kg/m²    Wt Readings from Last 3 Encounters:   06/13/23 63 kg (139 lb)   06/06/23 63 kg (138 lb 14 2 oz)   05/23/23 63 kg (139 lb)       Labs:   Lab Results   Component Value Date    HGBA1C 7 2 (H) 09/13/2022    HGBA1C 7 6 (H) 05/12/2022    HGBA1C 7 9 (A) 03/09/2022     Lab Results   Component Value Date    BUN 23 09/13/2022     09/13/2022    CO2 29 09/13/2022    CREATININE 0 82 09/13/2022    CREATININE 0 97 05/12/2022    CREATININE 0 76 03/01/2022    K 4 7 09/13/2022     eGFR   Date Value Ref Range Status   09/01/2020 64 ml/min/1 73sq m Final     Lab Results   Component Value Date    HDL 46 05/12/2022    TRIG 140 05/12/2022     Lab Results   Component Value Date    ALKPHOS 84 09/13/2022    ALT 32 09/13/2022    AST 26 09/13/2022     Lab Results   Component Value Date    FHP0UABVZBUS 3 013 04/22/2021    AAN2DEHSFZFD 2 180 05/06/2020     Lab Results   Component Value Date    FREET4 1 17 05/06/2020       Impression & Plan:    1  T2DM: Doing well  Her weight is down after her sleeve gastrectomy and she is tolerating the Trulicity 6 7JD weekly well  She is planning labs jorge  Few weeks and will get A1c, urine MAC and lipids dose at that time    2  HTN: on metoprolol and lisinopril  Order for urine MAC provided    3  Dyslipidemia: on statin  Lipids to be done  Orders Placed This Encounter   Procedures   • Albumin / creatinine urine ratio     Standing Status:   Future     Standing Expiration Date:   6/13/2024   • Comprehensive metabolic panel     This is a patient instruction: Patient fasting for 8 hours or longer recommended  Standing Status:   Future     Standing Expiration Date:   6/13/2024   • Hemoglobin A1C     Standing Status:   Future     Standing Expiration Date:   6/13/2024   • Lipid panel Lab Collect Lab Collect     This is a patient instruction: This test requires patient fasting for 10-12 hours or longer  Drinking of black coffee or black tea is acceptable  Standing Status:   Future     Standing Expiration Date:   6/13/2024   • Ambulatory referral to Optometry     Standing Status:   Future     Standing Expiration Date:   6/13/2024     Referral Priority:   Routine     Referral Type:   Optometry     Referral Reason:   Specialty Services Required     Referred to Provider:   Mariaa Zendejas OD     Requested Specialty:   Optometry     Number of Visits Requested:   1     Expiration Date:   6/13/2024       There are no Patient Instructions on file for this visit      Discussed with the patient and all questioned fully answered  Hope Cecil will call me if any problems arise

## 2023-06-14 ENCOUNTER — TELEPHONE (OUTPATIENT)
Dept: FAMILY MEDICINE CLINIC | Facility: CLINIC | Age: 58
End: 2023-06-14

## 2023-06-14 RX ORDER — SODIUM CHLORIDE 9 MG/ML
125 INJECTION, SOLUTION INTRAVENOUS CONTINUOUS
Status: CANCELLED | OUTPATIENT
Start: 2023-06-14

## 2023-06-14 NOTE — TELEPHONE ENCOUNTER
Hello,  I'm calling in regards of my mother, Therese Paalcios  Her birthday is October 31st, 1965  She needs a letter of recommendation for a breast reduction or lift  and the reason for letter of recommendation is because she had a gastric sleeve and back pain

## 2023-06-15 ENCOUNTER — ANESTHESIA (OUTPATIENT)
Dept: GASTROENTEROLOGY | Facility: MEDICAL CENTER | Age: 58
End: 2023-06-15

## 2023-06-15 ENCOUNTER — ANESTHESIA EVENT (OUTPATIENT)
Dept: GASTROENTEROLOGY | Facility: MEDICAL CENTER | Age: 58
End: 2023-06-15

## 2023-06-15 ENCOUNTER — HOSPITAL ENCOUNTER (OUTPATIENT)
Dept: GASTROENTEROLOGY | Facility: MEDICAL CENTER | Age: 58
Setting detail: OUTPATIENT SURGERY
End: 2023-06-15
Payer: COMMERCIAL

## 2023-06-15 VITALS
WEIGHT: 136 LBS | HEART RATE: 73 BPM | TEMPERATURE: 97.6 F | SYSTOLIC BLOOD PRESSURE: 96 MMHG | DIASTOLIC BLOOD PRESSURE: 51 MMHG | BODY MASS INDEX: 26.56 KG/M2 | RESPIRATION RATE: 18 BRPM | OXYGEN SATURATION: 98 %

## 2023-06-15 DIAGNOSIS — J30.9 ALLERGIC RHINITIS, UNSPECIFIED SEASONALITY, UNSPECIFIED TRIGGER: ICD-10-CM

## 2023-06-15 DIAGNOSIS — Z12.11 SCREEN FOR COLON CANCER: ICD-10-CM

## 2023-06-15 LAB — GLUCOSE SERPL-MCNC: 105 MG/DL (ref 65–140)

## 2023-06-15 PROCEDURE — 82948 REAGENT STRIP/BLOOD GLUCOSE: CPT

## 2023-06-15 PROCEDURE — 88305 TISSUE EXAM BY PATHOLOGIST: CPT | Performed by: PATHOLOGY

## 2023-06-15 PROCEDURE — 45385 COLONOSCOPY W/LESION REMOVAL: CPT | Performed by: INTERNAL MEDICINE

## 2023-06-15 RX ORDER — PROPOFOL 10 MG/ML
INJECTION, EMULSION INTRAVENOUS AS NEEDED
Status: DISCONTINUED | OUTPATIENT
Start: 2023-06-15 | End: 2023-06-15

## 2023-06-15 RX ORDER — LIDOCAINE HYDROCHLORIDE 20 MG/ML
INJECTION, SOLUTION EPIDURAL; INFILTRATION; INTRACAUDAL; PERINEURAL AS NEEDED
Status: DISCONTINUED | OUTPATIENT
Start: 2023-06-15 | End: 2023-06-15

## 2023-06-15 RX ORDER — SODIUM CHLORIDE 9 MG/ML
125 INJECTION, SOLUTION INTRAVENOUS CONTINUOUS
Status: DISCONTINUED | OUTPATIENT
Start: 2023-06-15 | End: 2023-06-19 | Stop reason: HOSPADM

## 2023-06-15 RX ORDER — EPHEDRINE SULFATE 50 MG/ML
INJECTION INTRAVENOUS AS NEEDED
Status: DISCONTINUED | OUTPATIENT
Start: 2023-06-15 | End: 2023-06-15

## 2023-06-15 RX ADMIN — LIDOCAINE HYDROCHLORIDE 50 MG: 20 INJECTION, SOLUTION EPIDURAL; INFILTRATION; INTRACAUDAL at 07:29

## 2023-06-15 RX ADMIN — SODIUM CHLORIDE 125 ML/HR: 0.9 INJECTION, SOLUTION INTRAVENOUS at 07:13

## 2023-06-15 RX ADMIN — PROPOFOL 50 MG: 10 INJECTION, EMULSION INTRAVENOUS at 07:42

## 2023-06-15 RX ADMIN — PROPOFOL 50 MG: 10 INJECTION, EMULSION INTRAVENOUS at 07:36

## 2023-06-15 RX ADMIN — EPHEDRINE SULFATE 10 MG: 50 INJECTION, SOLUTION INTRAVENOUS at 07:34

## 2023-06-15 RX ADMIN — PROPOFOL 50 MG: 10 INJECTION, EMULSION INTRAVENOUS at 07:46

## 2023-06-15 RX ADMIN — PROPOFOL 50 MG: 10 INJECTION, EMULSION INTRAVENOUS at 07:29

## 2023-06-15 RX ADMIN — Medication 40 MG: at 07:35

## 2023-06-15 NOTE — ANESTHESIA POSTPROCEDURE EVALUATION
Post-Op Assessment Note    CV Status:  Stable    Pain management: adequate     Mental Status:  Alert and awake   Hydration Status:  Euvolemic   PONV Controlled:  Controlled   Airway Patency:  Patent      Post Op Vitals Reviewed: Yes      Staff: Anesthesiologist         No notable events documented    BP 96/51   Pulse 73   Temp 97 6 °F (36 4 °C) (Temporal)   Resp 18   Wt 61 7 kg (136 lb)   LMP  (LMP Unknown)   SpO2 98%   BMI 26 56 kg/m²   BP      Temp      Pulse     Resp      SpO2

## 2023-06-15 NOTE — ANESTHESIA PREPROCEDURE EVALUATION
Procedure:  COLONOSCOPY    Relevant Problems   CARDIO   (+) Essential hypertension   (+) HLD (hyperlipidemia)   (+) PAD (peripheral artery disease) (HCC)      ENDO   (+) Type 2 diabetes mellitus with diabetic polyneuropathy, without long-term current use of insulin (HCC)      /RENAL   (+) CKD (chronic kidney disease) stage 2, GFR 60-89 ml/min      HEMATOLOGY   (+) Anemia      MUSCULOSKELETAL   (+) Charcot foot due to diabetes mellitus (HCC)      PULMONARY   (+) Obstructive sleep apnea        Physical Exam    Airway    Mallampati score: II  TM Distance: >3 FB  Neck ROM: full     Dental   Comment: Poor dentition, multiple missing,     Cardiovascular  Cardiovascular exam normal    Pulmonary  Pulmonary exam normal     Other Findings        Anesthesia Plan  ASA Score- 3     Anesthesia Type- IV sedation with anesthesia with ASA Monitors  Additional Monitors:   Airway Plan:           Plan Factors-    Chart reviewed  Existing labs reviewed  Patient summary reviewed  Induction- intravenous  Postoperative Plan-     Informed Consent- Anesthetic plan and risks discussed with patient

## 2023-06-15 NOTE — H&P
History and Physical - [facility]  Noemi Johnsonleatha 62 y o  adult MRN: [id number]                  HPI: Rochelle Lehman is a 62y o  year old adult who presents for colonoscopy for CRC screening and constipation      REVIEW OF SYSTEMS: Per the HPI, and otherwise unremarkable      Historical Information   Past Medical History:   Diagnosis Date   • Ambulates with cane    • Bariatric surgery status    • Cellulitis of toe 2019   • CKD (chronic kidney disease)    • CPAP (continuous positive airway pressure) dependence    • Diabetes mellitus ([facility] [address] )    • Gangrene of toe of right foot ([facility] [address] ) 10/30/2019    Added automatically from request for surgery [id number]   • Hyperlipidemia    • Hypertension    • Obese     gastric  sleeve today 2022   • PVD (peripheral vascular disease) ([facility] [address] )    • Sleep apnea    • Teeth missing    • Wears glasses      Past Surgical History:   Procedure Laterality Date   •  SECTION     • EGD     • IR ABDOMINAL AORTAGRAM  2019   • IR AORTAGRAM WITH RUN-OFF  2020   • IR ARTERIAL LYSIS  2019   • IR LOWER EXTREMITY / INTERVENTION  2019   • IR LOWER EXTREMITY ANGIOGRAM  2022   • IR TPA LYSIS CHECK  2022   • [place] AMPUTATION FOOT TRANSMETARSAL Right 2019    Procedure: AMPUTATION TRANSMETATARSAL (TMA) WITH ACHILLES TENDON LENGTHING;  Surgeon: Sofía Gilliland DPM;  [facility];  Service: Podiatry   • [place] AMPUTATION FOOT TRANSMETARSAL Right 2020    Procedure: AMPUTATION TRANSMETATARSAL (TMA);  Surgeon: Daisy Vidal DPM;  [facility]: [facility];  Service: Podiatry   • [place] LAPS [address] [facility] LONGITUDINAL GASTRECTOMY N/A 2022    Procedure: LAPAROSCOPIC SLEEVE GASTRECTOMY & INTRAOPERATIVE EGD;  Surgeon: Sherman Fitch MD;  [facility];  Service: Bariatrics   • TOE AMPUTATION Left 2019    Procedure: AMPUTATION 5th TOE;  Surgeon: Sofía Gilliland DPM;  [facility];  Service: Podiatry   • TOE AMPUTATION Right 11/4/2019    Procedure: AMPUTATION RIGHT 3RD AND 4TH TOE;  Surgeon: Marley Baker DPM;  [facility];  Service: Podiatry     Social History   Social History     Substance and Sexual Activity   Alcohol Use Not Currently    Comment: rarely     Social History     Substance and Sexual Activity   Drug Use No     Social History     Tobacco Use   Smoking Status Former   • Packs/day: 2 00   • Years: 20 00   • Total pack years: 40 00   • Types: Cigarettes   • Quit date: 1/1/2013   • Years since quitting: 10 4   Smokeless Tobacco Never     Family History   Problem Relation Age of Onset   • Diabetes Mother    • No Known Problems Father    • Breast cancer Maternal Aunt        Meds/Allergies     (Not in a hospital admission)      Allergies   Allergen Reactions   • Levemir [Insulin Detemir] Itching       Objective     Blood pressure (!) 202/93, pulse 82, temperature 97 6 °F (36 4 °C), temperature source Temporal, resp  rate 18, weight 61 7 kg (136 lb), SpO2 100 %, not currently breastfeeding        PHYSICAL EXAM    Gen: NAD  CV: RRR  CHEST: Clear  ABD: soft, NT/ND  EXT: no edema      ASSESSMENT/PLAN:  This is a 62y o  year old adult here for colonoscopy

## 2023-06-19 PROCEDURE — 88305 TISSUE EXAM BY PATHOLOGIST: CPT | Performed by: PATHOLOGY

## 2023-06-30 ENCOUNTER — TELEPHONE (OUTPATIENT)
Dept: GASTROENTEROLOGY | Facility: CLINIC | Age: 58
End: 2023-06-30

## 2023-06-30 NOTE — TELEPHONE ENCOUNTER
I tried to contact the patient to go over results, noone answered and the patient voice message is not set up yet,  I will attempt at a later time, Thank you

## 2023-07-01 DIAGNOSIS — Z98.84 BARIATRIC SURGERY STATUS: ICD-10-CM

## 2023-07-01 DIAGNOSIS — Z48.815 ENCOUNTER FOR SURGICAL AFTERCARE FOLLOWING SURGERY OF DIGESTIVE SYSTEM: ICD-10-CM

## 2023-07-05 RX ORDER — PANTOPRAZOLE SODIUM 40 MG/1
TABLET, DELAYED RELEASE ORAL
Qty: 90 TABLET | Refills: 0 | Status: SHIPPED | OUTPATIENT
Start: 2023-07-05

## 2023-07-10 ENCOUNTER — LAB (OUTPATIENT)
Dept: LAB | Facility: HOSPITAL | Age: 58
End: 2023-07-10
Payer: COMMERCIAL

## 2023-07-10 DIAGNOSIS — E11.22 DIABETES MELLITUS WITH STAGE 2 CHRONIC KIDNEY DISEASE (HCC): ICD-10-CM

## 2023-07-10 DIAGNOSIS — N18.2 CKD (CHRONIC KIDNEY DISEASE) STAGE 2, GFR 60-89 ML/MIN: ICD-10-CM

## 2023-07-10 DIAGNOSIS — K91.2 POSTSURGICAL MALABSORPTION: ICD-10-CM

## 2023-07-10 DIAGNOSIS — Z48.815 ENCOUNTER FOR SURGICAL AFTERCARE FOLLOWING SURGERY OF DIGESTIVE SYSTEM: ICD-10-CM

## 2023-07-10 DIAGNOSIS — N18.2 DIABETES MELLITUS WITH STAGE 2 CHRONIC KIDNEY DISEASE (HCC): ICD-10-CM

## 2023-07-10 DIAGNOSIS — E66.3 OVERWEIGHT: ICD-10-CM

## 2023-07-10 DIAGNOSIS — Z98.84 BARIATRIC SURGERY STATUS: ICD-10-CM

## 2023-07-10 DIAGNOSIS — I73.9 PAD (PERIPHERAL ARTERY DISEASE) (HCC): ICD-10-CM

## 2023-07-10 DIAGNOSIS — I10 ESSENTIAL HYPERTENSION: ICD-10-CM

## 2023-07-10 LAB
ALBUMIN SERPL BCP-MCNC: 3.8 G/DL (ref 3.5–5)
ALP SERPL-CCNC: 75 U/L (ref 34–104)
ALT SERPL W P-5'-P-CCNC: 30 U/L (ref 7–52)
ANION GAP SERPL CALCULATED.3IONS-SCNC: 3 MMOL/L
AST SERPL W P-5'-P-CCNC: 30 U/L (ref 5–45)
BILIRUB SERPL-MCNC: 0.47 MG/DL (ref 0.2–1)
BUN SERPL-MCNC: 13 MG/DL (ref 5–25)
CALCIUM SERPL-MCNC: 8.7 MG/DL (ref 8.4–10.2)
CHLORIDE SERPL-SCNC: 107 MMOL/L (ref 96–108)
CHOLEST SERPL-MCNC: 132 MG/DL
CO2 SERPL-SCNC: 29 MMOL/L (ref 21–32)
CREAT SERPL-MCNC: 0.58 MG/DL (ref 0.6–1.3)
ERYTHROCYTE [DISTWIDTH] IN BLOOD BY AUTOMATED COUNT: 15 % (ref 11.6–15.1)
FERRITIN SERPL-MCNC: 39 NG/ML (ref 24–307)
FOLATE SERPL-MCNC: 10 NG/ML
GLUCOSE P FAST SERPL-MCNC: 108 MG/DL (ref 65–99)
HCT VFR BLD AUTO: 39.8 % (ref 36.5–46.1)
HDLC SERPL-MCNC: 54 MG/DL
HGB BLD-MCNC: 12 G/DL (ref 12–15.4)
IRON SATN MFR SERPL: 33 %
IRON SERPL-MCNC: 96 UG/DL (ref 65–170)
LDLC SERPL CALC-MCNC: 53 MG/DL (ref 0–100)
MCH RBC QN AUTO: 23 PG (ref 26.8–34.3)
MCHC RBC AUTO-ENTMCNC: 30.2 G/DL (ref 31.4–37.4)
MCV RBC AUTO: 76 FL (ref 82–98)
NONHDLC SERPL-MCNC: 78 MG/DL
PLATELET # BLD AUTO: 205 THOUSANDS/UL (ref 149–390)
PMV BLD AUTO: 12 FL (ref 8.9–12.7)
POTASSIUM SERPL-SCNC: 4 MMOL/L (ref 3.5–5.3)
PROT SERPL-MCNC: 6.5 G/DL (ref 6.4–8.4)
PTH-INTACT SERPL-MCNC: 77.3 PG/ML (ref 12–88)
RBC # BLD AUTO: 5.21 MILLION/UL (ref 3.88–5.12)
SODIUM SERPL-SCNC: 139 MMOL/L (ref 135–147)
TIBC SERPL-MCNC: 293 UG/DL (ref 250–450)
TRIGL SERPL-MCNC: 123 MG/DL
VIT B12 SERPL-MCNC: 211 PG/ML (ref 180–914)
WBC # BLD AUTO: 6.12 THOUSAND/UL (ref 4.31–10.16)

## 2023-07-10 PROCEDURE — 84425 ASSAY OF VITAMIN B-1: CPT

## 2023-07-10 PROCEDURE — 36415 COLL VENOUS BLD VENIPUNCTURE: CPT

## 2023-07-10 PROCEDURE — 85027 COMPLETE CBC AUTOMATED: CPT

## 2023-07-10 PROCEDURE — 83550 IRON BINDING TEST: CPT

## 2023-07-10 PROCEDURE — 82043 UR ALBUMIN QUANTITATIVE: CPT

## 2023-07-10 PROCEDURE — 84630 ASSAY OF ZINC: CPT

## 2023-07-10 PROCEDURE — 80061 LIPID PANEL: CPT

## 2023-07-10 PROCEDURE — 83540 ASSAY OF IRON: CPT

## 2023-07-10 PROCEDURE — 80053 COMPREHEN METABOLIC PANEL: CPT

## 2023-07-10 PROCEDURE — 82570 ASSAY OF URINE CREATININE: CPT

## 2023-07-10 PROCEDURE — 84590 ASSAY OF VITAMIN A: CPT

## 2023-07-10 PROCEDURE — 83036 HEMOGLOBIN GLYCOSYLATED A1C: CPT

## 2023-07-10 PROCEDURE — 82746 ASSAY OF FOLIC ACID SERUM: CPT

## 2023-07-10 PROCEDURE — 82728 ASSAY OF FERRITIN: CPT

## 2023-07-11 ENCOUNTER — TELEPHONE (OUTPATIENT)
Dept: NEPHROLOGY | Facility: CLINIC | Age: 58
End: 2023-07-11

## 2023-07-11 LAB
CREAT UR-MCNC: 52.4 MG/DL
EST. AVERAGE GLUCOSE BLD GHB EST-MCNC: 160 MG/DL
HBA1C MFR BLD: 7.2 %
MICROALBUMIN UR-MCNC: 59.8 MG/L (ref 0–20)
MICROALBUMIN/CREAT 24H UR: 114 MG/G CREATININE (ref 0–30)

## 2023-07-11 NOTE — RESULT ENCOUNTER NOTE
Labs reviewed and stable. Renal function stable. Please call patient let her know her labs are stable. Follow-up in office in December with repeat labs as previously planned.

## 2023-07-11 NOTE — TELEPHONE ENCOUNTER
----- Message from Karolina Cowart PA-C sent at 7/11/2023  2:24 PM EDT -----  Labs reviewed and stable. Renal function stable. Please call patient let her know her labs are stable. Follow-up in office in December with repeat labs as previously planned.

## 2023-07-12 ENCOUNTER — CONSULT (OUTPATIENT)
Dept: FAMILY MEDICINE CLINIC | Facility: CLINIC | Age: 58
End: 2023-07-12

## 2023-07-12 VITALS
DIASTOLIC BLOOD PRESSURE: 88 MMHG | HEIGHT: 60 IN | OXYGEN SATURATION: 95 % | WEIGHT: 139.4 LBS | BODY MASS INDEX: 27.37 KG/M2 | HEART RATE: 67 BPM | SYSTOLIC BLOOD PRESSURE: 130 MMHG | RESPIRATION RATE: 18 BRPM | TEMPERATURE: 97.5 F

## 2023-07-12 DIAGNOSIS — I10 ESSENTIAL HYPERTENSION: ICD-10-CM

## 2023-07-12 DIAGNOSIS — Z01.818 PREOP EXAMINATION: Primary | ICD-10-CM

## 2023-07-12 DIAGNOSIS — Z79.4 TYPE 2 DIABETES MELLITUS WITHOUT COMPLICATION, WITH LONG-TERM CURRENT USE OF INSULIN (HCC): ICD-10-CM

## 2023-07-12 DIAGNOSIS — N18.2 DIABETES MELLITUS WITH STAGE 2 CHRONIC KIDNEY DISEASE (HCC): ICD-10-CM

## 2023-07-12 DIAGNOSIS — N18.2 DIABETES MELLITUS WITH STAGE 2 CHRONIC KIDNEY DISEASE (HCC): Primary | ICD-10-CM

## 2023-07-12 DIAGNOSIS — E11.22 DIABETES MELLITUS WITH STAGE 2 CHRONIC KIDNEY DISEASE (HCC): Primary | ICD-10-CM

## 2023-07-12 DIAGNOSIS — E11.9 TYPE 2 DIABETES MELLITUS WITHOUT COMPLICATION, WITH LONG-TERM CURRENT USE OF INSULIN (HCC): ICD-10-CM

## 2023-07-12 DIAGNOSIS — Z79.4 CURRENT USE OF INSULIN (HCC): ICD-10-CM

## 2023-07-12 DIAGNOSIS — E11.22 DIABETES MELLITUS WITH STAGE 2 CHRONIC KIDNEY DISEASE (HCC): ICD-10-CM

## 2023-07-12 LAB — VIT B1 BLD-SCNC: 88.1 NMOL/L (ref 66.5–200)

## 2023-07-12 PROCEDURE — 99214 OFFICE O/P EST MOD 30 MIN: CPT | Performed by: FAMILY MEDICINE

## 2023-07-12 RX ORDER — ATORVASTATIN CALCIUM 20 MG/1
20 TABLET, FILM COATED ORAL DAILY
Qty: 90 TABLET | Refills: 0 | Status: SHIPPED | OUTPATIENT
Start: 2023-07-12

## 2023-07-12 RX ORDER — DULAGLUTIDE 3 MG/.5ML
3 INJECTION, SOLUTION SUBCUTANEOUS
Qty: 2 ML | Refills: 5 | Status: SHIPPED | OUTPATIENT
Start: 2023-07-12

## 2023-07-12 RX ORDER — BLOOD SUGAR DIAGNOSTIC
1 STRIP MISCELLANEOUS 3 TIMES DAILY
Qty: 300 STRIP | Refills: 0 | Status: SHIPPED | OUTPATIENT
Start: 2023-07-12

## 2023-07-12 NOTE — PROGRESS NOTES
FAMILY PRACTICE PRE-OPERATIVE EVALUATION  [facility] PHYSICIAN [facility] ALEXSANDER    NAME: Geena Lyon  AGE: 62 y.o. SEX: adult  : 1965     DATE: 2023    [facility] Practice Pre-Operative Evaluation      Chief Complaint: Pre-operative Evaluation     Surgery: B/l breast reduction  Anticipated Date of Surgery: 2023     History of Present Illness:     Patient has no prior history of bleeding issues or blood clots. Chronic conditions, medications and allergies were reviewed. There is no currently active infectious process. Assessment of Cardiac Risk:  No unstable or severe angina or MI in the last 6 weeks or history of stent placement in the last year   No decompensated heart failure (e.g. New onset heart failure, NYHA functional class IV heart failure, or worsening existing heart failure) in past 3 mos. No severe heart valve disease including aortic stenosis or symptomatic mitral stenosis     Exercise Capacity:  Able to walk 4 blocks without symptoms  Able to walk 2 flights without symptoms    NO Prior Anesthesia Reactions       No prolonged steroid use in past 6 mos. P.A.T. If done reviewed. Pending for tomorrow   Review of Systems:     Review of Systems   All other systems reviewed and are negative.       Current Problem List:     Patient Active Problem List   Diagnosis   • Essential hypertension   • HLD (hyperlipidemia)   • PAD (peripheral artery disease) (HCC)   • Diabetes mellitus with stage 2 chronic kidney disease ([address])   • Anemia   • Leukocytosis   • Toe amputation status, left   • Critical lower limb ischemia (HCC)   • Polyneuropathy associated with underlying disease ([address])   • Charcot foot due to diabetes mellitus ([address])   • History of transmetatarsal amputation of foot ([address])   • Class 2 severe obesity due to excess calories with serious comorbidity and body mass index (BMI) of 36.0 to 36.9 in adult [facility])   • Cellulitis of right lower extremity   • 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, with necrosis of bone (HCC)   • S/P transmetatarsal amputation of foot, right (HCC)   • Persistent proteinuria   • Vitamin D deficiency   • Obstructive sleep apnea   • Well woman exam   • Vaginal candida   • CKD (chronic kidney disease) stage 2, GFR 60-89 ml/min   • Right foot pain   • Nicotine dependence in remission   • Obesity, Class II, BMI 35-39.9   • Type 2 diabetes mellitus with diabetic polyneuropathy, without long-term current use of insulin (HCC)       Allergies:      Allergies   Allergen Reactions   • Levemir [Insulin Detemir] Itching       Current Medications:       Current Outpatient Medications:   •  aspirin 81 mg chewable tablet, Chew 1 tablet (81 mg total) daily, Disp: , Rfl:   •  atorvastatin (LIPITOR) 20 mg tablet, Take 1 tablet (20 mg total) by mouth daily, Disp: 90 tablet, Rfl: 2  •  Blood Glucose Monitoring Suppl (ONE TOUCH ULTRA MINI) w/Device KIT, Use 4 times a day, Disp: 1 kit, Rfl: 2  •  cetirizine (ZyrTEC) 10 mg tablet, Take 1 tablet (10 mg total) by mouth daily (Patient taking differently: Take 10 mg by mouth daily as needed), Disp: 90 tablet, Rfl: 1  •  clopidogrel (PLAVIX) 75 mg tablet, Take 1 tablet (75 mg total) by mouth daily, Disp: 90 tablet, Rfl: 3  •  docusate sodium (COLACE) 100 mg capsule, Take 1 capsule (100 mg total) by mouth 2 (two) times a day, Disp: 60 capsule, Rfl: 1  •  dulaglutide (Trulicity) 3 MH/0.0LE injection, Inject 0.5 mL (3 mg total) under the skin every 7 days, Disp: 2 mL, Rfl: 5  •  ergocalciferol (ERGOCALCIFEROL) 1.25 MG (91943 UT) capsule, Take 1 capsule (50,000 Units total) by mouth once a week (Patient not taking: Reported on 6/15/2023), Disp: 12 capsule, Rfl: 3  •  Insulin Pen Needle 32G X 4 MM MISC, Use 4 times daily with insulin pens, Disp: 120 each, Rfl: 5  •  lisinopril (ZESTRIL) 5 mg tablet, Take 5 mg by mouth daily, Disp: , Rfl:   •  metoprolol tartrate (LOPRESSOR) 25 mg tablet, TAKE 1 TABLET(25 MG) BY MOUTH EVERY 12 HOURS, Disp: 180 tablet, Rfl: 1  •  OneTouch Delica Lancets 46S MISC, Use to test 3x daily, Disp: 300 each, Rfl: 2  •  OneTouch Ultra test strip, TEST THREE TIMES DAILY, Disp: 300 strip, Rfl: 0  •  pantoprazole (PROTONIX) 40 mg tablet, TAKE 1 TABLET(40 MG) BY MOUTH DAILY, Disp: 90 tablet, Rfl: 0  •  polyethylene glycol (COLYTE) 4000 mL solution, Take 4,000 mL by mouth once for 1 dose, Disp: 4000 mL, Rfl: 0    Past Medical History:       Past Medical History:   Diagnosis Date   • Ambulates with cane    • Bariatric surgery status    • Cellulitis of toe 2019   • CKD (chronic kidney disease)    • CPAP (continuous positive airway pressure) dependence    • Diabetes mellitus (HCC)    • Gangrene of toe of right foot ([address]) 10/30/2019    Added automatically from request for surgery [id number]   • Hyperlipidemia    • Hypertension    • Obese     gastric  sleeve today 2022   • PVD (peripheral vascular disease) ([address])    • Sleep apnea    • Teeth missing    • Wears glasses         Past Surgical History:   Procedure Laterality Date   •  SECTION     • EGD     • IR ABDOMINAL AORTAGRAM  2019   • IR AORTAGRAM WITH RUN-OFF  2020   • IR ARTERIAL LYSIS  2019   • IR LOWER EXTREMITY / INTERVENTION  2019   • IR LOWER EXTREMITY ANGIOGRAM  2022   • IR TPA LYSIS CHECK  2022   • FL AMPUTATION FOOT TRANSMETARSAL Right 2019    Procedure: AMPUTATION TRANSMETATARSAL (TMA) WITH ACHILLES TENDON LENGTHING;  Surgeon: Kelvin Domínguez DPM;  Location: [facility];  Service: Podiatry   • FL AMPUTATION FOOT TRANSMETARSAL Right 2020    Procedure: AMPUTATION TRANSMETATARSAL (TMA);  Surgeon: Minoo Rocha DPM;  [facility]: [facility];  Service: Podiatry   • TAMMY [address] Dr RSTRICTIV [address] [place] [address] LONGITUDINAL GASTRECTOMY N/A 2022    Procedure: LAPAROSCOPIC SLEEVE GASTRECTOMY & INTRAOPERATIVE EGD;  Surgeon: Natalia Lanza MD;  Location[facility];  Service: Jose Alfredo Navarro • TOE AMPUTATION Left 2/4/2019    Procedure: AMPUTATION 5th TOE;  Surgeon: Abner De La Paz DPM;  [facility];  Service: Podiatry   • TOE AMPUTATION Right 11/4/2019    Procedure: AMPUTATION RIGHT 3RD AND 4TH TOE;  Surgeon: Abner De La Paz DPM;  [facility];  Service: Podiatry        Family History   Problem Relation Age of Onset   • Diabetes Mother    • No Known Problems Father    • Breast cancer Maternal Aunt         Social History     Socioeconomic History   • Marital status: Single     Spouse name: Not on file   • Number of children: Not on file   • Years of education: Not on file   • Highest education level: Not on file   Occupational History   • Not on file   Tobacco Use   • Smoking status: Former     Packs/day: 2.00     Years: 20.00     Total pack years: 40.00     Types: Cigarettes     Quit date: 1/1/2013     Years since quitting: 10.5   • Smokeless tobacco: Never   Vaping Use   • Vaping Use: Never used   Substance and Sexual Activity   • Alcohol use: Not Currently     Comment: rarely   • Drug use: No   • Sexual activity: Not Currently   Other Topics Concern   • Not on file   Social History Narrative   • Not on file     Social Determinants of Health     Financial Resource Strain: Medium Risk (7/11/2023)    Overall Financial Resource Strain (CARDIA)    • Difficulty of Paying Living Expenses: Somewhat hard   Food Insecurity: No Food Insecurity (7/11/2023)    Hunger Vital Sign    • Worried About Running Out of Food in the Last Year: Never true    • Ran Out of Food in the Last Year: Never true   Transportation Needs: No Transportation Needs (7/11/2023)    PRAPARE - Transportation    • Lack of Transportation (Medical): No    • Lack of Transportation (Non-Medical):  No   Physical Activity: Not on file   Stress: Not on file   Social Connections: Not on file   Intimate Partner Violence: Not on file   Housing Stability: Not on file        Physical Exam:     /88   Pulse 67   Temp 97.5 °F (36.4 °C) (Temporal)   Resp 18   Ht 5' (1.524 m)   Wt 63.2 kg (139 lb 6.4 oz)   LMP  (LMP Unknown)   SpO2 95%   BMI 27.22 kg/m²     Physical Exam  Vitals and nursing note reviewed. Constitutional:       Appearance: Keily Ho is well-developed. HENT:      Head: Normocephalic. Right Ear: External ear normal.      Left Ear: External ear normal.      Nose: Nose normal.   Eyes:      Conjunctiva/sclera: Conjunctivae normal.      Pupils: Pupils are equal, round, and reactive to light. Neck:      Thyroid: No thyromegaly. Cardiovascular:      Rate and Rhythm: Normal rate and regular rhythm. Heart sounds: Normal heart sounds. Pulmonary:      Effort: Pulmonary effort is normal.      Breath sounds: Normal breath sounds. Abdominal:      Palpations: Abdomen is soft. Tenderness: There is no abdominal tenderness. There is no guarding or rebound. Musculoskeletal:         General: Normal range of motion. Cervical back: Normal range of motion and neck supple. Skin:     General: Skin is dry. Neurological:      Mental Status: Keily Ho is alert and oriented to person, place, and time. Deep Tendon Reflexes: Reflexes are normal and symmetric. Operative site has been examined and clear of skin infection and inflammation. Assessment & Recommendations:     Patient is cleared for surgery as detailed above. Pending Cardiology clearance      Patient specific operative risk categegory: *DM, PAD, CKD stage 2      All of patients questions were answered. Patient understands and agrees with the above plan.      Liss Cobian MD  07/12/23

## 2023-07-13 ENCOUNTER — HOSPITAL ENCOUNTER (OUTPATIENT)
Dept: NON INVASIVE DIAGNOSTICS | Facility: CLINIC | Age: 58
Discharge: HOME | End: 2023-07-13
Payer: COMMERCIAL

## 2023-07-13 DIAGNOSIS — I73.9 PAD (PERIPHERAL ARTERY DISEASE) (HCC): ICD-10-CM

## 2023-07-13 LAB
VIT A SERPL-MCNC: 57.6 UG/DL (ref 20.1–62)
ZINC SERPL-MCNC: 64 UG/DL (ref 44–115)

## 2023-07-13 PROCEDURE — 93925 LOWER EXTREMITY STUDY: CPT | Performed by: SURGERY

## 2023-07-13 PROCEDURE — 93923 UPR/LXTR ART STDY 3+ LVLS: CPT

## 2023-07-13 PROCEDURE — 93922 UPR/L XTREMITY ART 2 LEVELS: CPT | Performed by: SURGERY

## 2023-07-13 PROCEDURE — 93925 LOWER EXTREMITY STUDY: CPT

## 2023-07-14 ENCOUNTER — TELEPHONE (OUTPATIENT)
Dept: BARIATRICS | Facility: CLINIC | Age: 58
End: 2023-07-14

## 2023-07-14 NOTE — TELEPHONE ENCOUNTER
----- Message from Indio Betancourt sent at 7/14/2023 11:51 AM EDT -----  Please call patient to let her know Our office received your most recent labs results.   Your levels are within acceptable limits     Thank you  MIKEL Wiggins

## 2023-07-25 ENCOUNTER — OFFICE VISIT (OUTPATIENT)
Dept: CARDIOLOGY CLINIC | Facility: CLINIC | Age: 58
End: 2023-07-25
Payer: COMMERCIAL

## 2023-07-25 ENCOUNTER — TELEPHONE (OUTPATIENT)
Dept: CARDIOLOGY CLINIC | Facility: CLINIC | Age: 58
End: 2023-07-25

## 2023-07-25 VITALS
OXYGEN SATURATION: 99 % | HEIGHT: 60 IN | WEIGHT: 136.1 LBS | HEART RATE: 68 BPM | DIASTOLIC BLOOD PRESSURE: 88 MMHG | BODY MASS INDEX: 26.72 KG/M2 | SYSTOLIC BLOOD PRESSURE: 180 MMHG

## 2023-07-25 DIAGNOSIS — I10 ESSENTIAL HYPERTENSION: Primary | ICD-10-CM

## 2023-07-25 DIAGNOSIS — I73.9 PAD (PERIPHERAL ARTERY DISEASE) (HCC): ICD-10-CM

## 2023-07-25 DIAGNOSIS — R94.31 ABNORMAL EKG: ICD-10-CM

## 2023-07-25 DIAGNOSIS — E78.5 HYPERLIPIDEMIA, UNSPECIFIED HYPERLIPIDEMIA TYPE: ICD-10-CM

## 2023-07-25 DIAGNOSIS — Z01.810 PREOP CARDIOVASCULAR EXAM: ICD-10-CM

## 2023-07-25 PROCEDURE — 93000 ELECTROCARDIOGRAM COMPLETE: CPT | Performed by: INTERNAL MEDICINE

## 2023-07-25 PROCEDURE — 99203 OFFICE O/P NEW LOW 30 MIN: CPT | Performed by: INTERNAL MEDICINE

## 2023-07-25 NOTE — TELEPHONE ENCOUNTER
Faxed two cardiac clearances for Sunday Ro in regards to her breast reduction surgery and a dental clearance, per Dr. Pola Kruse at today's office visit.     -Faxed cardiac clearance form with EKG and office visit notes to [facility] [place] and [facility] (F#: [phone])    -Faxed office visit notes to [address] [place] [address] per Dr. Pola Kruse for Niesha's dental clearance, (F#: [phone])

## 2023-07-25 NOTE — PROGRESS NOTES
Cardiology Follow Up    Senait Lyon  1965  [id number]  [address] [facility] CATH LAB  [address]  [address] [facility]  [phone]    1. Essential hypertension  POCT ECG      2. PAD (peripheral artery disease) (HCC)  POCT ECG      3. Hyperlipidemia, unspecified hyperlipidemia type  POCT ECG      4. Preop cardiovascular exam  POCT ECG      5. Abnormal EKG            Interval History: Cardiology consultation, preoperative cardiac clearance for reduction mammoplasty and dental surgery. Multiple records reviewed. 17-year-old female who has no previous cardiac history. Patient does have diabetes mellitus type 2, and significant vascular disease of the lower extremities. History of right transmetatarsal amputation, history of angioplasty stenting of the right SFA. Lower extremity duplex this year revealed diffuse disease with occlusion of the stent on the right, and occlusion of the proximal SFA on the left. She is currently on dual antiplatelet therapy for that reason. She does have history of dyslipidemia on moderate intensity statin therapy, lipids this year Da Yasmine 132, HDL 54, LDL 53. The patient ambulation is limited. At her level ambulation, she denies any chest pain or dyspnea, no orthopnea no PND. No significant bleeding diathesis on DAPT. She does have history of hypertension on beta-blocker plus ACE inhibitor. Under adequate control per patient, today's blood pressure is 180/88.     Patient Active Problem List   Diagnosis   • Essential hypertension   • HLD (hyperlipidemia)   • PAD (peripheral artery disease) (HCC)   • Diabetes mellitus with stage 2 chronic kidney disease ([address])   • Anemia   • Leukocytosis   • Toe amputation status, left   • Critical lower limb ischemia (HCC)   • Polyneuropathy associated with underlying disease ([address])   • Charcot foot due to diabetes mellitus ([address])   • History of transmetatarsal amputation of foot (HCC)   • Class 2 severe obesity due to excess calories with serious comorbidity and body mass index (BMI) of 36.0 to 36.9 in adult [facility])   • Cellulitis of right lower extremity   • 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, with necrosis of bone (HCC)   • S/P transmetatarsal amputation of foot, right (HCC)   • Persistent proteinuria   • Vitamin D deficiency   • Obstructive sleep apnea   • Preop cardiovascular exam   • Vaginal candida   • CKD (chronic kidney disease) stage 2, GFR 60-89 ml/min   • Right foot pain   • Nicotine dependence in remission   • Obesity, Class II, BMI 35-39.9   • Type 2 diabetes mellitus with diabetic polyneuropathy, without long-term current use of insulin (HCC)   • Abnormal EKG     Past Medical History:   Diagnosis Date   • Ambulates with cane    • Bariatric surgery status    • Cellulitis of toe 1/28/2019   • CKD (chronic kidney disease)    • CPAP (continuous positive airway pressure) dependence    • Diabetes mellitus (HCC)    • Gangrene of toe of right foot ([address]) 10/30/2019    Added automatically from request for surgery [id number]   • Hyperlipidemia    • Hypertension    • Obese     gastric  sleeve today 2/28/2022   • PVD (peripheral vascular disease) ([address])    • Sleep apnea    • Teeth missing    • Wears glasses      Social History     Socioeconomic History   • Marital status: Single     Spouse name: Not on file   • Number of children: Not on file   • Years of education: Not on file   • Highest education level: Not on file   Occupational History   • Not on file   Tobacco Use   • Smoking status: Former     Packs/day: 2.00     Years: 20.00     Total pack years: 40.00     Types: Cigarettes     Quit date: 1/1/2013     Years since quitting: 10.5   • Smokeless tobacco: Never   Vaping Use   • Vaping Use: Never used   Substance and Sexual Activity   • Alcohol use: Not Currently     Comment: rarely   • Drug use: No   • Sexual activity: Not Currently   Other Topics Concern   • Not on file   Social History Narrative   • Not on file     Social Determinants of Health     Financial Resource Strain: Medium Risk (2023)    Overall Financial Resource Strain (CARDIA)    • Difficulty of Paying Living Expenses: Somewhat hard   Food Insecurity: No Food Insecurity (2023)    Hunger Vital Sign    • Worried About Running Out of Food in the Last Year: Never true    • Ran Out of Food in the Last Year: Never true   Transportation Needs: No Transportation Needs (2023)    PRAPARE - Transportation    • Lack of Transportation (Medical): No    • Lack of Transportation (Non-Medical):  No   Physical Activity: Not on file   Stress: Not on file   Social Connections: Not on file   Intimate Partner Violence: Not on file   Housing Stability: Not on file      Family History   Problem Relation Age of Onset   • Diabetes Mother    • No Known Problems Father    • Breast cancer Maternal Aunt      Past Surgical History:   Procedure Laterality Date   •  SECTION     • EGD     • IR ABDOMINAL AORTAGRAM  2019   • IR AORTAGRAM WITH RUN-OFF  2020   • IR ARTERIAL LYSIS  2019   • IR LOWER EXTREMITY / INTERVENTION  2019   • IR LOWER EXTREMITY ANGIOGRAM  2022   • IR TPA LYSIS CHECK  2022   • WI AMPUTATION FOOT TRANSMETARSAL Right 2019    Procedure: AMPUTATION TRANSMETATARSAL (TMA) WITH ACHILLES TENDON LENGTHING;  Surgeon: Rigoberto Bowden DPM;  [facility];  Service: Podiatry   • WI AMPUTATION FOOT TRANSMETARSAL Right 2020    Procedure: AMPUTATION TRANSMETATARSAL (TMA);  Surgeon: Joe Tineo DPM;  Location:  MAIN OR;  Service: Podiatry   • WI LAPS [address] [place] [address] LONGITUDINAL GASTRECTOMY N/A 2022    Procedure: LAPAROSCOPIC SLEEVE GASTRECTOMY & INTRAOPERATIVE EGD;  Surgeon: Dash Perez MD;  [facility];  Service: Bariatrics   • TOE AMPUTATION Left 2019    Procedure: AMPUTATION 5th TOE;  Surgeon: Roland Parada DPM;  [facility];  Service: Podiatry   • TOE AMPUTATION Right 11/4/2019    Procedure: AMPUTATION RIGHT 3RD AND 4TH TOE;  Surgeon: Roland Parada DPM;  [facility];  Service: Podiatry       Current Outpatient Medications:   •  aspirin 81 mg chewable tablet, Chew 1 tablet (81 mg total) daily, Disp: , Rfl:   •  atorvastatin (LIPITOR) 20 mg tablet, Take 1 tablet (20 mg total) by mouth daily, Disp: 90 tablet, Rfl: 0  •  Blood Glucose Monitoring Suppl (ONE TOUCH ULTRA MINI) w/Device KIT, Use 4 times a day, Disp: 1 kit, Rfl: 2  •  cetirizine (ZyrTEC) 10 mg tablet, Take 1 tablet (10 mg total) by mouth daily (Patient taking differently: Take 10 mg by mouth daily as needed), Disp: 90 tablet, Rfl: 1  •  clopidogrel (PLAVIX) 75 mg tablet, Take 1 tablet (75 mg total) by mouth daily, Disp: 90 tablet, Rfl: 3  •  docusate sodium (COLACE) 100 mg capsule, Take 1 capsule (100 mg total) by mouth 2 (two) times a day, Disp: 60 capsule, Rfl: 1  •  dulaglutide (Trulicity) 3 YW/1.7OI injection, Inject 0.5 mL (3 mg total) under the skin every 7 days, Disp: 2 mL, Rfl: 5  •  glucose blood (OneTouch Ultra) test strip, Use 1 each 3 (three) times a day Test, Disp: 300 strip, Rfl: 0  •  lisinopril (ZESTRIL) 5 mg tablet, Take 5 mg by mouth daily, Disp: , Rfl:   •  metoprolol tartrate (LOPRESSOR) 25 mg tablet, TAKE 1 TABLET(25 MG) BY MOUTH EVERY 12 HOURS, Disp: 180 tablet, Rfl: 1  •  OneTouch Delica Lancets 90O MISC, Use to test 3x daily, Disp: 300 each, Rfl: 2  •  pantoprazole (PROTONIX) 40 mg tablet, TAKE 1 TABLET(40 MG) BY MOUTH DAILY, Disp: 90 tablet, Rfl: 0  •  polyethylene glycol (COLYTE) 4000 mL solution, Take 4,000 mL by mouth once for 1 dose, Disp: 4000 mL, Rfl: 0  •  ergocalciferol (ERGOCALCIFEROL) 1.25 MG (56216 UT) capsule, Take 1 capsule (50,000 Units total) by mouth once a week (Patient not taking: Reported on 7/25/2023), Disp: 12 capsule, Rfl: 3  •  Insulin Pen Needle 32G X 4 MM MISC, Use 4 times daily with insulin pens, Disp: 120 each, Rfl: 5  Allergies   Allergen Reactions   • Levemir [Insulin Detemir] Itching       Labs:  Lab on 07/10/2023   Component Date Value   • Creatinine, Ur 07/10/2023 52.4    • Albumin,U,Random 07/10/2023 59.8 (H)    • Albumin Creat Ratio 07/10/2023 114 (H)    • Sodium 07/10/2023 139    • Potassium 07/10/2023 4.0    • Chloride 07/10/2023 107    • CO2 07/10/2023 29    • ANION GAP 07/10/2023 3    • BUN 07/10/2023 13    • Creatinine 07/10/2023 0.58 (L)    • Glucose, Fasting 07/10/2023 108 (H)    • Calcium 07/10/2023 8.7    • AST 07/10/2023 30    • ALT 07/10/2023 30    • Alkaline Phosphatase 07/10/2023 75    • Total Protein 07/10/2023 6.5    • Albumin 07/10/2023 3.8    • Total Bilirubin 07/10/2023 0.47    • Hemoglobin A1C 07/10/2023 7.2 (H)    • EAG 07/10/2023 160    • Cholesterol 07/10/2023 132    • Triglycerides 07/10/2023 123    • HDL, Direct 07/10/2023 54    • LDL Calculated 07/10/2023 53    • Non-HDL-Chol (CHOL-HDL) 07/10/2023 78    • WBC 07/10/2023 6.12    • RBC 07/10/2023 5.21 (H)    • Hemoglobin 07/10/2023 12.0    • Hematocrit 07/10/2023 39.8    • MCV 07/10/2023 76 (L)    • MCH 07/10/2023 23.0 (L)    • MCHC 07/10/2023 30.2 (L)    • RDW 07/10/2023 15.0    • Platelets 52/47/5710 205    • MPV 07/10/2023 12.0    • Zinc 07/10/2023 64    • Vitamin B1, Whole Blood 07/10/2023 88.1    • Vitamin A 07/10/2023 57.6    • Ferritin 07/10/2023 39    • Folate 07/10/2023 10.0    • Iron Saturation 07/10/2023 33    • TIBC 07/10/2023 293    • Iron 07/10/2023 96    Hospital Outpatient Visit on 06/15/2023   Component Date Value   • POC Glucose 06/15/2023 105    • Case Report 06/15/2023                      Value:Surgical Pathology Report                         Case: [id number]                                   Authorizing Provider:  Trish Villalobos MD  Collected:           06/15/2023 [address] [place] [address]              Ordering Location:     [facility] [address] [facility]        Received:            06/15/2023 1909 [address] [facility] Endoscopy                                                     Pathologist:           Kiki Sams MD                                                         Specimens:   A) - Large Intestine, Right/Ascending Colon, polyp ascending colon cold snare                       B) - Rectum, polyp rectum cold snare                                                      • Final Diagnosis 06/15/2023                      Value: This result contains rich text formatting which cannot be displayed here. • Additional Information 06/15/2023                      Value: This result contains rich text formatting which cannot be displayed here. • Synoptic Checklist 06/15/2023                      Value:                            COLON/RECTUM POLYP FORM - GI - All Specimens                                                                                     :    Adenoma(s)                                                         : Other     • Gross Description 06/15/2023                      Value: This result contains rich text formatting which cannot be displayed here. • Clinical Information 06/15/2023                      Value:· One sessile polyp measuring smaller than 5 mm in the ascending colon; completely removed en bloc by cold snare  · One sessile polyp measuring smaller than 5 mm in the rectum; completely removed en bloc by cold snare and retrieved specimen  · The cecum, transverse colon, descending colon and sigmoid colon appeared normal.     Imaging: VAS lower limb arterial duplex, complete bilateral    Result Date: 7/13/2023  Narrative:  [facility] REPORT CLINICAL: Indications: 6 month surveillance for progression of disease. The patient denies any pain in her legs.  Operative History: 2022-01-20 Ir tpa lysis check 2022-01-19 Ir lower extremity angiogram 2020-05-07 Right TMA 2020-05-05 Right recanalization and PTA of SFA/stent and proximal peroneal artery 2020-05-04 Right IR Abdominal Angiography 2019-11-07 Right transmetatarsal amputations 2019-11-01 Right PTA TPT w/ Distal SFA Stent 2019-04-30 Ir arterial lysis 2019-02-04 Toe amputation (Left) 2019-02-01 IR Abdominal Angiography Risk Factors The patient has history of Obesity, HTN, Diabetes, Hyperlipidemia, CKD, PAD and previous smoking (quit >10yrs ago). Clinical Right Pressure:  197/ mm Hg, Left Pressure:  177/ mm Hg. FINDINGS:  Right                  Impression      Waveform        PSV (cm/s)  EDV  Post. Tibial                           Not Identified                   Ant. Tibial                            Monophasic                       Peroneal                               Monophasic                       Common Femoral Artery                                          89    0  Prox Profunda                                                 129    6  Prox SFA - Stent       Occluded                                         Mid SFA - Stent        Occluded                                         Dist SFA - Stent       Occluded                                         Proximal Pop                                                   34    8  Distal Pop                                                     44   11  Dist Post Tibial       Not visualized                                   Dist. Ant. Tibial                                              28   11  Dist Peroneal                                                  21    8   Left                   Impression      Waveform        PSV (cm/s)  EDV  Post. Tibial                           Not Identified                   Ant.  Tibial                            Monophasic                       Peroneal                               Monophasic                       Common Femoral Artery                                          98    6  Prox Profunda                                                 109    0  Prox SFA               Occluded                                44    0  Mid SFA Occluded                                         Dist SFA                                                       23    0  Proximal Pop                                                   37    5  Distal Pop                                                     41    5  Dist Post Tibial       Not visualized                                   Dist. Ant. Tibial                                              34    7  Dist Peroneal                                                  29    6     CONCLUSION: Impression: RIGHT LOWER LIMB: Diffuser disease throughout with an occlusion noted in the superficial femoral artery stent. There is reconstitution of flow in the proximal popliteal artery. Ankle/Brachial index: 0.82 which is in the moderate disease category. (Prior: 1.40) PVR/ PPG tracings are dampened. Metatarsal pressure of 60 mmHg Patient has a metatarsal amputation. LEFT LOWER LIMB: Diffuse disease throughout with a known occlusion of the proximal and mid superficial femoral artery. Flow reconstitutes in the distal superficial femoral artery. Ankle/Brachial index: 0.60 which is in the severe disease category (Prior: 0.65) PVR/ PPG tracings are dampened. Metatarsal pressure of 96 mmHg Great toe pressure of 71 mmHg, within the healing range. Compared to previous study on 11/14/2022, the right superficial femoral artery stent is now occluded. SIGNATURE: Electronically Signed by: Rufina Lindo MD, [facility] on [id number] 04:29:17 PM      Review of Systems:  Review of Systems   Constitutional: Positive for activity change. HENT: Negative for hearing loss and nosebleeds. Eyes: Negative for visual disturbance. Respiratory: Negative for apnea, shortness of breath, wheezing and stridor. Cardiovascular: Negative for chest pain and palpitations. Genitourinary: Negative for hematuria. Musculoskeletal: Positive for gait problem. Neurological: Negative for speech difficulty.    Hematological: Does not bruise/bleed easily. Psychiatric/Behavioral: Negative for sleep disturbance. Physical Exam:  Physical Exam  Vitals reviewed. Constitutional:       General: Coni Keen is not in acute distress. Appearance: Normal appearance. Coni Keen is not ill-appearing, toxic-appearing or diaphoretic. Eyes:      General: No scleral icterus. Neck:      Vascular: No carotid bruit. Cardiovascular:      Rate and Rhythm: Normal rate and regular rhythm. Heart sounds: Normal heart sounds. No murmur heard. No friction rub. No gallop. Pulmonary:      Effort: Pulmonary effort is normal. No respiratory distress. Breath sounds: Normal breath sounds. No stridor. No wheezing, rhonchi or rales. Abdominal:      General: Abdomen is flat. Bowel sounds are normal.      Tenderness: There is no abdominal tenderness. Skin:     General: Skin is warm and dry. Capillary Refill: Capillary refill takes less than 2 seconds. Neurological:      Mental Status: Coni Keen is alert and oriented to person, place, and time. Psychiatric:         Mood and Affect: Mood normal.         Discussion/Summary: Preoperative cardiac clearance, EKG is borderline abnormal questionable septal infarct, likely positional.  EKG from a year ago was normal.   currently having no cardiac symptoms. She is to undergo low risk surgery. Patient is cleared for surgery. We will do an echocardiogram because of the EKG abnormalities, consideration for pharmacological stress test in the future given the EKG abnormalities but if the echocardiogram is normal, EKG changes are probably positional.  The patient is on dual antiplatelet therapy because of vascular problems I will ask her to hold 1 week prior to surgery. Patient will follow-up in [facility] as is closer to her    This note was completed in part utilizing [employer] fluency direct voice recognition software.    Grammatical errors, random word insertion, spelling mistakes, and incomplete sentences may be an occasional consequence of the system secondary to software limitations, ambient noise and hardware issues. At the time of dictation, efforts were made to edit, clarify and /or correct errors. Please read the chart carefully and recognize, using context, where substitutions have occurred. If you have any questions or concerns about the context, text or information contained within the body of this dictation, please contact myself, the provider, for further clarification.

## 2023-08-03 NOTE — PRE-PROCEDURE INSTRUCTIONS
Pre-Surgery Instructions:   Medication Instructions   • aspirin 81 mg chewable tablet Instructions provided by MD   • atorvastatin (LIPITOR) 20 mg tablet Take day of surgery. • cetirizine (ZyrTEC) 10 mg tablet Uses PRN- OK to take day of surgery   • clopidogrel (PLAVIX) 75 mg tablet Instructions provided by MD   • docusate sodium (COLACE) 100 mg capsule Uses PRN- OK to take day of surgery   • dulaglutide (Trulicity) 3 QV/8.3NM injection Continue normal schedule   • ergocalciferol (ERGOCALCIFEROL) 1.25 MG (78171 UT) capsule Continue normal schedule   • lisinopril (ZESTRIL) 5 mg tablet Hold day of surgery. • metoprolol tartrate (LOPRESSOR) 25 mg tablet Take day of surgery. • pantoprazole (PROTONIX) 40 mg tablet Take day of surgery. Medication instructions for day surgery reviewed. Please use only a sip of water to take your instructed medications. Avoid all over the counter vitamins, supplements and NSAIDS for one week prior to surgery per anesthesia guidelines. Tylenol is ok to take as needed. You will receive a call one business day prior to surgery with an arrival time and hospital directions. If your surgery is scheduled on a Monday, the hospital will be calling you on the Friday prior to your surgery. If you have not heard from anyone by 8pm, please call the hospital supervisor through the hospital  at [phone]. Brice Rodriguez [phone]). Do not eat or drink anything after midnight the night before your surgery, including candy, mints, lifesavers, or chewing gum. Do not drink alcohol 24hrs before your surgery. Try not to smoke at least 24hrs before your surgery. Follow the pre surgery showering instructions as listed in the [facility] Surgical Experience Booklet” or otherwise provided by your surgeon's office. Do not shave the surgical area 24 hours before surgery.  Do not apply any lotions, creams, including makeup, cologne, deodorant, or perfumes after showering on the day of your surgery. No contact lenses, eye make-up, or artificial eyelashes. Remove nail polish, including gel polish, and any artificial, gel, or acrylic nails if possible. Remove all jewelry including rings and body piercing jewelry. Wear causal clothing that is easy to take on and off. Consider your type of surgery. Keep any valuables, jewelry, piercings at home. Please bring any specially ordered equipment (sling, braces) if indicated. Arrange for a responsible person to drive you to and from the hospital on the day of your surgery. Visitor Guidelines discussed. Call the surgeon's office with any new illnesses, exposures, or additional questions prior to surgery. Please reference your [facility] Surgical Experience Booklet” for additional information to prepare for your upcoming surgery.

## 2023-08-04 ENCOUNTER — ANESTHESIA EVENT (OUTPATIENT)
Dept: PERIOP | Facility: HOSPITAL | Age: 58
End: 2023-08-04
Payer: COMMERCIAL

## 2023-08-04 ENCOUNTER — HOSPITAL ENCOUNTER (OUTPATIENT)
Facility: HOSPITAL | Age: 58
Setting detail: OUTPATIENT SURGERY
Discharge: HOME | End: 2023-08-04
Attending: PLASTIC SURGERY | Admitting: PLASTIC SURGERY
Payer: COMMERCIAL

## 2023-08-04 ENCOUNTER — ANESTHESIA (OUTPATIENT)
Dept: PERIOP | Facility: HOSPITAL | Age: 58
End: 2023-08-04
Payer: COMMERCIAL

## 2023-08-04 VITALS
HEIGHT: 60 IN | RESPIRATION RATE: 18 BRPM | BODY MASS INDEX: 26.7 KG/M2 | OXYGEN SATURATION: 99 % | HEART RATE: 69 BPM | DIASTOLIC BLOOD PRESSURE: 74 MMHG | SYSTOLIC BLOOD PRESSURE: 111 MMHG | WEIGHT: 136 LBS | TEMPERATURE: 96.4 F

## 2023-08-04 DIAGNOSIS — M54.89 OTHER BACK PAIN, UNSPECIFIED CHRONICITY: ICD-10-CM

## 2023-08-04 DIAGNOSIS — N62 HYPERTROPHY OF BREAST: ICD-10-CM

## 2023-08-04 DIAGNOSIS — J30.9 ALLERGIC RHINITIS, UNSPECIFIED SEASONALITY, UNSPECIFIED TRIGGER: ICD-10-CM

## 2023-08-04 LAB
GLUCOSE SERPL-MCNC: 123 MG/DL (ref 65–140)
GLUCOSE SERPL-MCNC: 178 MG/DL (ref 65–140)

## 2023-08-04 PROCEDURE — 88305 TISSUE EXAM BY PATHOLOGIST: CPT | Performed by: PATHOLOGY

## 2023-08-04 PROCEDURE — 82948 REAGENT STRIP/BLOOD GLUCOSE: CPT

## 2023-08-04 RX ORDER — SODIUM CHLORIDE, SODIUM LACTATE, POTASSIUM CHLORIDE, CALCIUM CHLORIDE 600; 310; 30; 20 MG/100ML; MG/100ML; MG/100ML; MG/100ML
125 INJECTION, SOLUTION INTRAVENOUS CONTINUOUS
Status: DISCONTINUED | OUTPATIENT
Start: 2023-08-04 | End: 2023-08-04 | Stop reason: HOSPADM

## 2023-08-04 RX ORDER — GABAPENTIN 300 MG/1
300 CAPSULE ORAL ONCE
Status: COMPLETED | OUTPATIENT
Start: 2023-08-04 | End: 2023-08-04

## 2023-08-04 RX ORDER — PROPOFOL 10 MG/ML
INJECTION, EMULSION INTRAVENOUS CONTINUOUS PRN
Status: DISCONTINUED | OUTPATIENT
Start: 2023-08-04 | End: 2023-08-04

## 2023-08-04 RX ORDER — FENTANYL CITRATE/PF 50 MCG/ML
25 SYRINGE (ML) INJECTION
Status: DISCONTINUED | OUTPATIENT
Start: 2023-08-04 | End: 2023-08-04 | Stop reason: HOSPADM

## 2023-08-04 RX ORDER — HYDROMORPHONE HCL/PF 1 MG/ML
SYRINGE (ML) INJECTION AS NEEDED
Status: DISCONTINUED | OUTPATIENT
Start: 2023-08-04 | End: 2023-08-04

## 2023-08-04 RX ORDER — ONDANSETRON 2 MG/ML
4 INJECTION INTRAMUSCULAR; INTRAVENOUS EVERY 8 HOURS PRN
Status: DISCONTINUED | OUTPATIENT
Start: 2023-08-04 | End: 2023-08-04 | Stop reason: HOSPADM

## 2023-08-04 RX ORDER — PROPOFOL 10 MG/ML
INJECTION, EMULSION INTRAVENOUS AS NEEDED
Status: DISCONTINUED | OUTPATIENT
Start: 2023-08-04 | End: 2023-08-04

## 2023-08-04 RX ORDER — SODIUM CHLORIDE, SODIUM LACTATE, POTASSIUM CHLORIDE, CALCIUM CHLORIDE 600; 310; 30; 20 MG/100ML; MG/100ML; MG/100ML; MG/100ML
INJECTION, SOLUTION INTRAVENOUS CONTINUOUS PRN
Status: DISCONTINUED | OUTPATIENT
Start: 2023-08-04 | End: 2023-08-04

## 2023-08-04 RX ORDER — ONDANSETRON 2 MG/ML
4 INJECTION INTRAMUSCULAR; INTRAVENOUS ONCE AS NEEDED
Status: DISCONTINUED | OUTPATIENT
Start: 2023-08-04 | End: 2023-08-04 | Stop reason: HOSPADM

## 2023-08-04 RX ORDER — ONDANSETRON 2 MG/ML
INJECTION INTRAMUSCULAR; INTRAVENOUS AS NEEDED
Status: DISCONTINUED | OUTPATIENT
Start: 2023-08-04 | End: 2023-08-04

## 2023-08-04 RX ORDER — MIDAZOLAM HYDROCHLORIDE 2 MG/2ML
INJECTION, SOLUTION INTRAMUSCULAR; INTRAVENOUS AS NEEDED
Status: DISCONTINUED | OUTPATIENT
Start: 2023-08-04 | End: 2023-08-04

## 2023-08-04 RX ORDER — CEFAZOLIN SODIUM 1 G/50ML
1000 SOLUTION INTRAVENOUS ONCE
Status: COMPLETED | OUTPATIENT
Start: 2023-08-04 | End: 2023-08-04

## 2023-08-04 RX ORDER — BUPIVACAINE HYDROCHLORIDE AND EPINEPHRINE 5; 5 MG/ML; UG/ML
INJECTION, SOLUTION EPIDURAL; INTRACAUDAL; PERINEURAL AS NEEDED
Status: DISCONTINUED | OUTPATIENT
Start: 2023-08-04 | End: 2023-08-04 | Stop reason: HOSPADM

## 2023-08-04 RX ORDER — ACETAMINOPHEN 325 MG/1
650 TABLET ORAL ONCE
Status: COMPLETED | OUTPATIENT
Start: 2023-08-04 | End: 2023-08-04

## 2023-08-04 RX ORDER — MAGNESIUM HYDROXIDE 1200 MG/15ML
LIQUID ORAL AS NEEDED
Status: DISCONTINUED | OUTPATIENT
Start: 2023-08-04 | End: 2023-08-04 | Stop reason: HOSPADM

## 2023-08-04 RX ORDER — LIDOCAINE HYDROCHLORIDE 10 MG/ML
INJECTION, SOLUTION EPIDURAL; INFILTRATION; INTRACAUDAL; PERINEURAL AS NEEDED
Status: DISCONTINUED | OUTPATIENT
Start: 2023-08-04 | End: 2023-08-04

## 2023-08-04 RX ORDER — DEXAMETHASONE SODIUM PHOSPHATE 10 MG/ML
INJECTION, SOLUTION INTRAMUSCULAR; INTRAVENOUS AS NEEDED
Status: DISCONTINUED | OUTPATIENT
Start: 2023-08-04 | End: 2023-08-04

## 2023-08-04 RX ORDER — OXYCODONE HYDROCHLORIDE 5 MG/1
5 TABLET ORAL EVERY 4 HOURS PRN
Status: DISCONTINUED | OUTPATIENT
Start: 2023-08-04 | End: 2023-08-04 | Stop reason: HOSPADM

## 2023-08-04 RX ORDER — NEOSTIGMINE METHYLSULFATE 1 MG/ML
INJECTION INTRAVENOUS AS NEEDED
Status: DISCONTINUED | OUTPATIENT
Start: 2023-08-04 | End: 2023-08-04

## 2023-08-04 RX ORDER — GLYCOPYRROLATE 0.2 MG/ML
INJECTION INTRAMUSCULAR; INTRAVENOUS AS NEEDED
Status: DISCONTINUED | OUTPATIENT
Start: 2023-08-04 | End: 2023-08-04

## 2023-08-04 RX ORDER — FENTANYL CITRATE 50 UG/ML
INJECTION, SOLUTION INTRAMUSCULAR; INTRAVENOUS AS NEEDED
Status: DISCONTINUED | OUTPATIENT
Start: 2023-08-04 | End: 2023-08-04

## 2023-08-04 RX ORDER — ACETAMINOPHEN 325 MG/1
650 TABLET ORAL EVERY 6 HOURS PRN
Status: DISCONTINUED | OUTPATIENT
Start: 2023-08-04 | End: 2023-08-04 | Stop reason: HOSPADM

## 2023-08-04 RX ORDER — ROCURONIUM BROMIDE 10 MG/ML
INJECTION, SOLUTION INTRAVENOUS AS NEEDED
Status: DISCONTINUED | OUTPATIENT
Start: 2023-08-04 | End: 2023-08-04

## 2023-08-04 RX ORDER — OXYCODONE HYDROCHLORIDE 10 MG/1
10 TABLET ORAL EVERY 4 HOURS PRN
Status: DISCONTINUED | OUTPATIENT
Start: 2023-08-04 | End: 2023-08-04 | Stop reason: HOSPADM

## 2023-08-04 RX ORDER — PHENYLEPHRINE HCL IN 0.9% NACL 1 MG/10 ML
SYRINGE (ML) INTRAVENOUS AS NEEDED
Status: DISCONTINUED | OUTPATIENT
Start: 2023-08-04 | End: 2023-08-04

## 2023-08-04 RX ORDER — SODIUM CHLORIDE, SODIUM LACTATE, POTASSIUM CHLORIDE, CALCIUM CHLORIDE 600; 310; 30; 20 MG/100ML; MG/100ML; MG/100ML; MG/100ML
75 INJECTION, SOLUTION INTRAVENOUS CONTINUOUS
Status: DISCONTINUED | OUTPATIENT
Start: 2023-08-04 | End: 2023-08-04 | Stop reason: HOSPADM

## 2023-08-04 RX ORDER — ONDANSETRON 4 MG/1
4 TABLET, ORALLY DISINTEGRATING ORAL ONCE
Status: COMPLETED | OUTPATIENT
Start: 2023-08-04 | End: 2023-08-04

## 2023-08-04 RX ADMIN — MIDAZOLAM HYDROCHLORIDE 2 MG: 1 INJECTION, SOLUTION INTRAMUSCULAR; INTRAVENOUS at 13:29

## 2023-08-04 RX ADMIN — ROCURONIUM BROMIDE 40 MG: 10 INJECTION INTRAVENOUS at 13:35

## 2023-08-04 RX ADMIN — PHENYLEPHRINE HYDROCHLORIDE 30 MCG/MIN: 10 INJECTION INTRAVENOUS at 13:49

## 2023-08-04 RX ADMIN — FENTANYL CITRATE 25 MCG: 50 INJECTION, SOLUTION INTRAMUSCULAR; INTRAVENOUS at 14:54

## 2023-08-04 RX ADMIN — ONDANSETRON 4 MG: 4 TABLET, ORALLY DISINTEGRATING ORAL at 10:33

## 2023-08-04 RX ADMIN — SODIUM CHLORIDE, SODIUM LACTATE, POTASSIUM CHLORIDE, AND CALCIUM CHLORIDE: .6; .31; .03; .02 INJECTION, SOLUTION INTRAVENOUS at 13:29

## 2023-08-04 RX ADMIN — DEXAMETHASONE SODIUM PHOSPHATE 5 MG: 10 INJECTION, SOLUTION INTRAMUSCULAR; INTRAVENOUS at 15:08

## 2023-08-04 RX ADMIN — ACETAMINOPHEN 650 MG: 325 TABLET ORAL at 10:32

## 2023-08-04 RX ADMIN — NEOSTIGMINE METHYLSULFATE 3 MG: 1 INJECTION INTRAVENOUS at 16:23

## 2023-08-04 RX ADMIN — PROPOFOL 120 MCG/KG/MIN: 10 INJECTION, EMULSION INTRAVENOUS at 13:40

## 2023-08-04 RX ADMIN — FENTANYL CITRATE 25 MCG: 50 INJECTION, SOLUTION INTRAMUSCULAR; INTRAVENOUS at 16:20

## 2023-08-04 RX ADMIN — SODIUM CHLORIDE, SODIUM LACTATE, POTASSIUM CHLORIDE, AND CALCIUM CHLORIDE: .6; .31; .03; .02 INJECTION, SOLUTION INTRAVENOUS at 14:56

## 2023-08-04 RX ADMIN — GLYCOPYRROLATE 0.4 MG: 0.4 INJECTION INTRAMUSCULAR; INTRAVENOUS at 16:23

## 2023-08-04 RX ADMIN — PROPOFOL 140 MG: 10 INJECTION, EMULSION INTRAVENOUS at 13:35

## 2023-08-04 RX ADMIN — Medication 100 MCG: at 13:39

## 2023-08-04 RX ADMIN — HYDROMORPHONE HYDROCHLORIDE 0.5 MG: 1 INJECTION, SOLUTION INTRAMUSCULAR; INTRAVENOUS; SUBCUTANEOUS at 16:24

## 2023-08-04 RX ADMIN — FENTANYL CITRATE 50 MCG: 50 INJECTION, SOLUTION INTRAMUSCULAR; INTRAVENOUS at 13:34

## 2023-08-04 RX ADMIN — SODIUM CHLORIDE, SODIUM LACTATE, POTASSIUM CHLORIDE, AND CALCIUM CHLORIDE 125 ML/HR: .6; .31; .03; .02 INJECTION, SOLUTION INTRAVENOUS at 11:02

## 2023-08-04 RX ADMIN — GABAPENTIN 300 MG: 300 CAPSULE ORAL at 10:33

## 2023-08-04 RX ADMIN — ONDANSETRON 4 MG: 2 INJECTION INTRAMUSCULAR; INTRAVENOUS at 16:15

## 2023-08-04 RX ADMIN — CEFAZOLIN SODIUM 1000 MG: 1 SOLUTION INTRAVENOUS at 13:34

## 2023-08-04 RX ADMIN — LIDOCAINE HYDROCHLORIDE 50 MG: 10 INJECTION, SOLUTION EPIDURAL; INFILTRATION; INTRACAUDAL; PERINEURAL at 13:35

## 2023-08-04 NOTE — INTERVAL H&P NOTE
H&P reviewed. After examining the patient I find no changes in the patients condition since the H&P had been written.     Vitals:    08/04/23 1025   BP: (!) 189/81   Pulse: 66   Resp: 18   Temp: (!) 97.3 °F (36.3 °C)   SpO2: 100%

## 2023-08-04 NOTE — DISCHARGE INSTR - AVS FIRST PAGE
[phone], [address] [place] [address] TJ Streeter, [facility] [phone]/V [phone]       No heavy lifting >20 pounds    Do not raise hands above head    No ice or heating pack to chest    Wear the surgical bra at all times except the shower.  If the bra is tight and is leaving marks on the skin unclasp the bottom clasps of the bra    Ok to shower    No bathing    Change dressing daily    Do not lay on stomach    No smoking    No pushing or pulling    Call the office for an appointment in 5-14 days - [phone]

## 2023-08-04 NOTE — DISCHARGE SUMMARY
Discharge Summary - [facility] [facility] 62 [facility] MRN: [id number]    [address] OR MAIN Room / Bed: OR POOL/OR POOL Encounter: [id number]    BRIEF OVERVIEW  Admitting Provider: Ramos Faulkner MD  Discharge Provider: Ramos Faulkner MD  Primary Care Physician at Discharge: Nora Galeano [phone]    Discharge To: Home      Admission Date: 8/4/2023     Discharge Date: No discharge date for patient encounter. Code Status: Prior  Advance Directive and Living Will: <no information>  Power of :        Primary Discharge Diagnosis  Active Problems:    * No active hospital problems. *  Resolved Problems:    * No resolved hospital problems.  *        Discharge Disposition: [id number] [place] [address],[address]    Presenting Problem/History of Present Illness  <principal problem not specified>      Discharge Condition: stable    Patient tolerated the procedure well, recovered and was discharged home in stable condition    Ramos Faulkner MD  8/4/2023  4:35 PM

## 2023-08-04 NOTE — ANESTHESIA PREPROCEDURE EVALUATION
Procedure:  BREAST REDUCTION (Bilateral: Breast)    Relevant Problems   CARDIO   (+) Critical lower limb ischemia (HCC)   (+) Essential hypertension   (+) HLD (hyperlipidemia)   (+) PAD (peripheral artery disease) (HCC)      ENDO   (+) Type 2 diabetes mellitus with diabetic polyneuropathy, without long-term current use of insulin (HCC)      /RENAL   (+) CKD (chronic kidney disease) stage 2, GFR 60-89 ml/min      HEMATOLOGY   (+) Anemia      MUSCULOSKELETAL   (+) Charcot foot due to diabetes mellitus (HCC)      PULMONARY   (+) Obstructive sleep apnea      Endocrine   (+) Diabetes mellitus with stage 2 chronic kidney disease (HCC)   (+) 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, with necrosis of bone (HCC)      Nervous and Auditory   (+) Polyneuropathy associated with underlying disease (HCC)      Genitourinary   (+) Vaginal candida      Other   (+) Abnormal EKG   (+) Cellulitis of right lower extremity   (+) Class 2 severe obesity due to excess calories with serious comorbidity and body mass index (BMI) of 36.0 to 36.9 in adult ([facility])   (+) History of transmetatarsal amputation of foot ([facility])   (+) Leukocytosis   (+) Nicotine dependence in remission   (+) Obesity, Class II, BMI 35-39.9   (+) Persistent proteinuria   (+) Preop cardiovascular exam   (+) Right foot pain   (+) S/P transmetatarsal amputation of foot, right (HCC)   (+) Toe amputation status, left   (+) Vitamin D deficiency        Physical Exam    Airway    Mallampati score: II  TM Distance: >3 FB  Neck ROM: full     Dental       Cardiovascular  Cardiovascular exam normal    Pulmonary  Pulmonary exam normal     Other Findings        Anesthesia Plan  ASA Score- 3     Anesthesia Type- general with ASA Monitors. Additional Monitors:   Airway Plan: ETT. Plan Factors-Exercise tolerance (METS): >4 METS. Chart reviewed. Existing labs reviewed. Patient is not a current smoker.  Patient not instructed to abstain from smoking on day of procedure. Patient did not smoke on day of surgery. Induction- intravenous. Postoperative Plan-     Informed Consent- Anesthetic plan and risks discussed with patient. I personally reviewed this patient with the CRNA. Discussed and agreed on the Anesthesia Plan with the CRNA. Ellie Bhatia

## 2023-08-04 NOTE — OP NOTE
OPERATIVE REPORT  PATIENT NAME: Karol Suarez    :  1965  MRN: [id number]  Pt Location:  OR ROOM 10    SURGERY DATE: 2023    Surgeon(s) and Role:     * Collis Nageotte, MD - Primary    Preop Diagnosis:  Hypertrophy of breast [N62]  Other back pain, unspecified chronicity [M54.89]    Post-Op Diagnosis Codes:     * Hypertrophy of breast [N62]     * Other back pain, unspecified chronicity [M54.89]    Procedure(s):  Bilateral - BREAST REDUCTION    Specimen(s):  ID Type Source Tests Collected by Time Destination   1 : LEFT BREAST REDUCTION TISSUE Tissue Breast, Left TISSUE EXAM Brennan Lucas MD 2023 1401    2 : RIGHT BREAST REDUCTION TISSUE Tissue Breast, Right TISSUE EXAM Brenann Lucas MD 2023 1401        Estimated Blood Loss:   Minimal    Drains:  * No LDAs found *    Anesthesia Type:   General    Operative Indications:  Hypertrophy of breast [N62]  Other back pain, unspecified chronicity [M54.89]      Operative Findings:      Complications:   None    Procedure and Technique:  The patient was marked while standing in the preoperative holding area. The patient was brought to the operating room and placed supine on the operating table. A time-out procedure was performed. Sequential compression devices were applied. IV antibiotics were given. After adequate anesthesia was obtained, the chest was prepped and draped using standard surgical    technique. Attention was turned to the right breast. The patient had been marked with Wise pattern inferior pedicle technique with 8 cm vertical limbs. An 8 cm wide inferior-based pedicle was designed at the breast meridian. The pedicle was de-epithelialized after designing    a 38 mm nipple areolar cut out. The medial and lateral borders of the pedicle were dissected down to just superficial to the pectoral fascia. The medial and lateral triangles were excised.  Following this, the superior border of the pedicle was divided down to the muscular fascia. At this point, the pedicle was examined. Excellent vascularity was noted at the distal aspect. At this point the decision was made to excise this superior portion of the Jerome pattern. After this tumescent anesthesia was infiltrated into the lateral breast. Direct trimming of the superior breast flap was performed using a curved Vazquez scissor to obtain healthy flap thickness. The direct excision of breast tissue was 79 grams. At this point, the wounds were copiously irrigated. Excellent hemostasis was achieved using electrocautery. The inferior pedicle was tacked to the pectoral fascia using 2-0 Vicryl suture. The vertical limbs were secured to the inframammary fold using 2-0 Vicryl suture in half buried mattress technique. The skin was closed using 2-0 Vicryl, 3-0 Vicryl and 3-0 PDS suture in interrupted deep dermal technique. The superficial skin was closed using 3-0 Monocryl suture in running subcuticular technique. There was excellent vascularity of the nipple    at the end of the closure. Attention was turned to the left breast. The patient had been marked with Wise pattern inferior pedicle technique with 8 cm vertical limbs. An 8 cm wide inferior-based pedicle was designed at the breast meridian. The pedicle was de-epithelialized after designing    a 38 mm nipple areolar cut out. The medial and lateral borders of the pedicle were dissected down to just superficial to the pectoral fascia. The medial and lateral triangles were excised. Following this, the superior border of the pedicle was divided down to the muscular fascia. At this point, the pedicle was examined. Excellent vascularity was noted at the distal aspect. At this point the decision was made to excise this superior portion of the Jerome pattern.  After this tumescent anesthesia was infiltrated into the lateral breast. Direct trimming of the superior breast flap was performed using a curved Vazquez scissor to obtain healthy flap thickness. The direct excision of breast tissue was 690 grams. At this point, the wounds were copiously irrigated. Excellent hemostasis was achieved using electrocautery. The inferior pedicle was tacked to the pectoral fascia using 2-0 Vicryl suture. The vertical limbs were secured to the inframammary fold using 2-0 Vicryl suture in half buried mattress technique. The skin was closed using 2-0 Vicryl, 3-0 Vicryl and 3-0 PDS suture in interrupted deep dermal technique. The superficial skin was closed using 3-0 Monocryl suture in running subcuticular technique. There was excellent vascularity of the nipple    at the end of the closure. The breasts were examined and excellent symmetry was achieved. The wounds were cleaned and dried skin glue was applied followed by a sterile gauze and a surgical bra. I was present for the entire procedure. and A qualified resident physician was not available.     Patient Disposition:  hemodynamically stable and extubated and stable        SIGNATURE: Deann Finch MD  DATE: August 4, 2023  TIME: 4:32 PM

## 2023-08-04 NOTE — ANESTHESIA POSTPROCEDURE EVALUATION
Post-Op Assessment Note    CV Status:  Stable    Pain management: adequate     Mental Status:  Alert and awake   Hydration Status:  Euvolemic   PONV Controlled:  Controlled   Airway Patency:  Patent      Post Op Vitals Reviewed: Yes      Staff: CRNA         No notable events documented.     BP  167/75   Temp     Pulse  70   Resp   16   SpO2   99

## 2023-08-08 PROCEDURE — 88305 TISSUE EXAM BY PATHOLOGIST: CPT | Performed by: PATHOLOGY

## 2023-08-30 ENCOUNTER — RA CDI HCC (OUTPATIENT)
Dept: OTHER | Facility: HOSPITAL | Age: 58
End: 2023-08-30

## 2023-08-30 NOTE — PROGRESS NOTES
e11.51  [facility] coding opportunities          Chart Reviewed number of suggestions sent to Provider: 1     Patients Insurance     Medicare Insurance: [address] [place] [address]

## 2023-08-31 ENCOUNTER — OFFICE VISIT (OUTPATIENT)
Dept: VASCULAR SURGERY | Facility: CLINIC | Age: 58
End: 2023-08-31
Payer: COMMERCIAL

## 2023-08-31 VITALS
OXYGEN SATURATION: 99 % | HEIGHT: 60 IN | SYSTOLIC BLOOD PRESSURE: 172 MMHG | DIASTOLIC BLOOD PRESSURE: 88 MMHG | WEIGHT: 132 LBS | BODY MASS INDEX: 25.91 KG/M2 | HEART RATE: 69 BPM

## 2023-08-31 DIAGNOSIS — I73.9 PAD (PERIPHERAL ARTERY DISEASE) (HCC): ICD-10-CM

## 2023-08-31 PROCEDURE — 99213 OFFICE O/P EST LOW 20 MIN: CPT | Performed by: SURGERY

## 2023-08-31 RX ORDER — CLOPIDOGREL BISULFATE 75 MG/1
75 TABLET ORAL DAILY
Qty: 90 TABLET | Refills: 3 | Status: SHIPPED | OUTPATIENT
Start: 2023-08-31

## 2023-08-31 RX ORDER — ASPIRIN 81 MG/1
81 TABLET, CHEWABLE ORAL DAILY
Start: 2023-08-31

## 2023-08-31 NOTE — ASSESSMENT & PLAN NOTE
63 yo F with hx of R TMA, PAD with multiple interventions most recent in January of 2022 s/p RLE lysis with stenting of the SFA and popliteal angioplasty with DCB and tibioperoneal disease s/p angioplasty    Patient reports no wounds or LE pain. She is compliant with medications    Duplex shows occluded right SFA stents with pop reconstitution, left SFA has known occlusions. Left [place] 0.6/96/71  Right [place] 0.82/60/not obtainable (hx of TMA)    Unfortunately stents are now occluded but she is asymptomatic. No intervention at this time. Will continue current medications ASA/Plavix/Statin.  Repeat duplex in 1 year

## 2023-08-31 NOTE — PROGRESS NOTES
Assessment/Plan:    PAD (peripheral artery disease) ([address])  63 yo F with hx of R TMA, PAD with multiple interventions most recent in January of 2022 s/p RLE lysis with stenting of the SFA and popliteal angioplasty with DCB and tibioperoneal disease s/p angioplasty    Patient reports no wounds or LE pain. She is compliant with medications    Duplex shows occluded right SFA stents with pop reconstitution, left SFA has known occlusions. Left [place] 0.6/96/71  Right [place] 0.82/60/not obtainable (hx of TMA)    Unfortunately stents are now occluded but she is asymptomatic. No intervention at this time. Will continue current medications ASA/Plavix/Statin. Repeat duplex in 1 year       Diagnoses and all orders for this visit:    PAD (peripheral artery disease) ([facility])  -     clopidogrel (PLAVIX) 75 mg tablet; Take 1 tablet (75 mg total) by mouth daily  -     aspirin 81 mg chewable tablet; Chew 1 tablet (81 mg total) daily  -     VAS lower limb arterial duplex, complete bilateral; Future          Subjective:      Patient ID: Jori Adjutant is a 62 y.o. adult. Patient is here today to review results of a SHERYL done 7/13/2023. Patient has a history of multiple vascular interventions. Patient denies any pain in her legs when walking or any open wounds. She is taking Plavix and Atorvastatin. Patient is a former smoker.      HPI    The following portions of the patient's history were reviewed and updated as appropriate: allergies, current medications, past family history, past medical history, past social history, past surgical history and problem list.    I have spent a total time of 25 minutes on 08/31/23 in caring for this patient including Diagnostic results, Prognosis, Risks and benefits of tx options, Instructions for management, Patient and family education, Importance of tx compliance, Risk factor reductions, Impressions, Counseling / Coordination of care, Documenting in the medical record, Reviewing / ordering tests, medicine, procedures   and Obtaining or reviewing history  . Review of Systems   Constitutional: Negative. HENT: Negative. Eyes: Negative. Respiratory: Negative. Cardiovascular: Negative. Gastrointestinal: Negative. Endocrine: Negative. Genitourinary: Negative. Musculoskeletal: Negative. Skin: Negative. Allergic/Immunologic: Negative. Neurological: Negative. Hematological: Negative. Psychiatric/Behavioral: Negative. Objective:      BP (!) 172/88 (BP Location: Left arm, Patient Position: Sitting, Cuff Size: Standard)   Pulse 69   Ht 5' (1.524 m)   Wt 59.9 kg (132 lb)   LMP  (LMP Unknown)   SpO2 99%   BMI 25.78 kg/m²          Physical Exam  Constitutional:       Appearance: Normal appearance. HENT:      Head: Normocephalic and atraumatic. Eyes:      Extraocular Movements: Extraocular movements intact. Pupils: Pupils are equal, round, and reactive to light. Cardiovascular:      Rate and Rhythm: Normal rate and regular rhythm. Pulmonary:      Effort: Pulmonary effort is normal.      Breath sounds: Normal breath sounds. Abdominal:      Palpations: Abdomen is soft. Tenderness: There is no abdominal tenderness. Musculoskeletal:         General: Normal range of motion. Cervical back: Normal range of motion. Right lower leg: No edema. Left lower leg: No edema. Neurological:      General: No focal deficit present. Mental Status: Octavio Kang is oriented to person, place, and time.    Psychiatric:         Mood and Affect: Mood normal.

## 2023-09-06 ENCOUNTER — OFFICE VISIT (OUTPATIENT)
Dept: FAMILY MEDICINE CLINIC | Facility: CLINIC | Age: 58
End: 2023-09-06

## 2023-09-06 VITALS
SYSTOLIC BLOOD PRESSURE: 120 MMHG | HEIGHT: 60 IN | RESPIRATION RATE: 18 BRPM | OXYGEN SATURATION: 98 % | HEART RATE: 70 BPM | TEMPERATURE: 97.6 F | BODY MASS INDEX: 25.95 KG/M2 | DIASTOLIC BLOOD PRESSURE: 84 MMHG | WEIGHT: 132.2 LBS

## 2023-09-06 DIAGNOSIS — N18.30 STAGE 3 CHRONIC KIDNEY DISEASE, UNSPECIFIED WHETHER STAGE 3A OR 3B CKD (HCC): ICD-10-CM

## 2023-09-06 DIAGNOSIS — I10 ESSENTIAL HYPERTENSION: ICD-10-CM

## 2023-09-06 PROCEDURE — 99213 OFFICE O/P EST LOW 20 MIN: CPT | Performed by: FAMILY MEDICINE

## 2023-09-06 NOTE — ASSESSMENT & PLAN NOTE
Metoprolol last refilled in January 2023 for 6 months should have run out almost 2 months ago. As per patient she has been taking it   Asked patient to review at home her medications to ensure she has been in fact taking it.  If she has continue it, if NOT no need to restart it

## 2023-09-06 NOTE — PATIENT INSTRUCTIONS
Please check at home to confirm you have been taking Metoprolol, if you have been taking it continue taking it  If you were NOT taking it you do NOT need to restart it

## 2023-09-06 NOTE — PROGRESS NOTES
Name: Jamel Randall      : 1965      MRN: [id number]  Encounter Provider: Haley Quinteros MD  Encounter Date: 2023   Encounter department: [address] [facility],[address]     1. Essential hypertension  Assessment & Plan:  Metoprolol last refilled in 2023 for 6 months should have run out almost 2 months ago. As per patient she has been taking it   Asked patient to review at home her medications to ensure she has been in fact taking it. If she has continue it, if NOT no need to restart it     Orders:  -     metoprolol tartrate (LOPRESSOR) 25 mg tablet; Take 1 tablet (25 mg total) by mouth every 12 (twelve) hours    2. Stage 3 chronic kidney disease, unspecified whether stage 3a or 3b CKD ([address])           Subjective      63 yo female with DM followed by Endocrinology, CAD, s/p weight loss surgery, s/p breast reduction surgery on 2023, here today for follow up  Patient currently hs no complains      Review of Systems   All other systems reviewed and are negative.       Current Outpatient Medications on File Prior to Visit   Medication Sig   • aspirin 81 mg chewable tablet Chew 1 tablet (81 mg total) daily   • atorvastatin (LIPITOR) 20 mg tablet Take 1 tablet (20 mg total) by mouth daily   • Blood Glucose Monitoring Suppl (ONE TOUCH ULTRA MINI) w/Device KIT Use 4 times a day   • clopidogrel (PLAVIX) 75 mg tablet Take 1 tablet (75 mg total) by mouth daily   • docusate sodium (COLACE) 100 mg capsule Take 1 capsule (100 mg total) by mouth 2 (two) times a day   • dulaglutide (Trulicity) 3 VQ/9.3UT injection Inject 0.5 mL (3 mg total) under the skin every 7 days   • glucose blood (OneTouch Ultra) test strip Use 1 each 3 (three) times a day Test   • Insulin Pen Needle 32G X 4 MM MISC Use 4 times daily with insulin pens   • lisinopril (ZESTRIL) 5 mg tablet Take 5 mg by mouth daily   • OneTouch Delica Lancets 59I MISC Use to test 3x daily   • pantoprazole (PROTONIX) 40 mg tablet TAKE 1 TABLET(40 MG) BY MOUTH DAILY   • polyethylene glycol (COLYTE) 4000 mL solution Take 4,000 mL by mouth once for 1 dose   • [DISCONTINUED] ergocalciferol (ERGOCALCIFEROL) 1.25 MG (31027 UT) capsule Take 1 capsule (50,000 Units total) by mouth once a week   • [DISCONTINUED] metoprolol tartrate (LOPRESSOR) 25 mg tablet TAKE 1 TABLET(25 MG) BY MOUTH EVERY 12 HOURS       Objective     /84 (BP Location: Right arm, Patient Position: Sitting, Cuff Size: Standard)   Pulse 70   Temp 97.6 °F (36.4 °C) (Temporal)   Resp 18   Ht 5' (1.524 m)   Wt 60 kg (132 lb 3.2 oz)   LMP  (LMP Unknown)   SpO2 98%   BMI 25.82 kg/m²     Physical Exam  Vitals and nursing note reviewed. Constitutional:       Appearance: Edgard Magdaleno is well-developed. HENT:      Head: Normocephalic. Right Ear: External ear normal.      Left Ear: External ear normal.      Nose: Nose normal.   Eyes:      Conjunctiva/sclera: Conjunctivae normal.      Pupils: Pupils are equal, round, and reactive to light. Neck:      Thyroid: No thyromegaly. Cardiovascular:      Rate and Rhythm: Normal rate and regular rhythm. Heart sounds: Normal heart sounds. Pulmonary:      Effort: Pulmonary effort is normal.      Breath sounds: Normal breath sounds. Abdominal:      Palpations: Abdomen is soft. Tenderness: There is no abdominal tenderness. There is no guarding or rebound. Musculoskeletal:         General: Normal range of motion. Cervical back: Normal range of motion and neck supple. Skin:     General: Skin is dry. Neurological:      Mental Status: Edgard Magdaleno is alert and oriented to person, place, and time. Deep Tendon Reflexes: Reflexes are normal and symmetric.        Queenie Galeas MD

## 2023-09-22 ENCOUNTER — HOSPITAL ENCOUNTER (OUTPATIENT)
Dept: NON INVASIVE DIAGNOSTICS | Facility: HOSPITAL | Age: 58
Discharge: HOME | End: 2023-09-22
Attending: INTERNAL MEDICINE

## 2023-09-22 VITALS — WEIGHT: 132 LBS | BODY MASS INDEX: 25.91 KG/M2 | HEIGHT: 60 IN

## 2023-09-22 DIAGNOSIS — R94.31 ABNORMAL EKG: ICD-10-CM

## 2023-09-22 DIAGNOSIS — Z01.810 PREOP CARDIOVASCULAR EXAM: ICD-10-CM

## 2023-10-01 DIAGNOSIS — Z48.815 ENCOUNTER FOR SURGICAL AFTERCARE FOLLOWING SURGERY OF DIGESTIVE SYSTEM: ICD-10-CM

## 2023-10-01 DIAGNOSIS — Z98.84 BARIATRIC SURGERY STATUS: ICD-10-CM

## 2023-10-02 RX ORDER — PANTOPRAZOLE SODIUM 40 MG/1
TABLET, DELAYED RELEASE ORAL
Qty: 90 TABLET | Refills: 0 | Status: SHIPPED | OUTPATIENT
Start: 2023-10-02

## 2023-10-04 ENCOUNTER — OFFICE VISIT (OUTPATIENT)
Dept: PODIATRY | Facility: CLINIC | Age: 58
End: 2023-10-04
Payer: COMMERCIAL

## 2023-10-04 VITALS
WEIGHT: 132 LBS | HEART RATE: 78 BPM | SYSTOLIC BLOOD PRESSURE: 157 MMHG | HEIGHT: 60 IN | DIASTOLIC BLOOD PRESSURE: 82 MMHG | BODY MASS INDEX: 25.91 KG/M2

## 2023-10-04 DIAGNOSIS — Z89.431 S/P TRANSMETATARSAL AMPUTATION OF FOOT, RIGHT (HCC): ICD-10-CM

## 2023-10-04 DIAGNOSIS — I73.9 PAD (PERIPHERAL ARTERY DISEASE) (HCC): ICD-10-CM

## 2023-10-04 DIAGNOSIS — B35.1 TINEA UNGUIUM: ICD-10-CM

## 2023-10-04 DIAGNOSIS — E11.42 DIABETIC POLYNEUROPATHY ASSOCIATED WITH TYPE 2 DIABETES MELLITUS (HCC): Primary | ICD-10-CM

## 2023-10-04 PROCEDURE — 11720 DEBRIDE NAIL 1-5: CPT | Performed by: PODIATRIST

## 2023-10-04 NOTE — PROGRESS NOTES
Claudette Peapurva Lyon  1965  AT RISK FOOT CARE    1. Diabetic polyneuropathy associated with type 2 diabetes mellitus ([address] )        2. S/P transmetatarsal amputation of foot, right ([address])        3. Tinea unguium        4. PAD (peripheral artery disease) ([address])            Patient presents for at-risk foot care. Patient has no acute concerns today. Patient has significant lower extremity risk due to previous amputation. On exam patient has thickened, hypertrophic, discolored, brittle toenails with subungual debris and tenderness x3   Callus: none  Amputation: right TMA stable, left 5th ray amputation stable    Today's treatment includes:  Debridement of toenails. Using nail nipper, kacie, and curette, nails were sharply debrided, reduced in thickness and length. Devitalized nail tissue and fungal debris excised and removed. Patient tolerated well. Discussed proper shoe gear, daily inspections of feet, and general foot health with patient. Patient has Q7  findings and is recommended for at risk foot care every 9-10 weeks.     Patients most recent complete clinical exam was performed: 2/27/2023

## 2023-11-29 ENCOUNTER — OFFICE VISIT (OUTPATIENT)
Dept: BARIATRICS | Facility: CLINIC | Age: 58
End: 2023-11-29
Payer: COMMERCIAL

## 2023-11-29 VITALS
HEART RATE: 77 BPM | DIASTOLIC BLOOD PRESSURE: 78 MMHG | HEIGHT: 60 IN | TEMPERATURE: 97.5 F | BODY MASS INDEX: 25.72 KG/M2 | SYSTOLIC BLOOD PRESSURE: 138 MMHG | WEIGHT: 131 LBS

## 2023-11-29 DIAGNOSIS — Z48.815 ENCOUNTER FOR SURGICAL AFTERCARE FOLLOWING SURGERY OF DIGESTIVE SYSTEM: Primary | ICD-10-CM

## 2023-11-29 DIAGNOSIS — N18.2 DIABETES MELLITUS WITH STAGE 2 CHRONIC KIDNEY DISEASE: ICD-10-CM

## 2023-11-29 DIAGNOSIS — K91.2 POSTSURGICAL MALABSORPTION: ICD-10-CM

## 2023-11-29 DIAGNOSIS — I10 HTN (HYPERTENSION): ICD-10-CM

## 2023-11-29 DIAGNOSIS — E11.22 DIABETES MELLITUS WITH STAGE 2 CHRONIC KIDNEY DISEASE: ICD-10-CM

## 2023-11-29 DIAGNOSIS — E66.3 OVERWEIGHT: ICD-10-CM

## 2023-11-29 DIAGNOSIS — Z98.84 BARIATRIC SURGERY STATUS: ICD-10-CM

## 2023-11-29 PROCEDURE — 99213 OFFICE O/P EST LOW 20 MIN: CPT | Performed by: PHYSICIAN ASSISTANT

## 2023-11-29 NOTE — PROGRESS NOTES
Assessment/Plan:     Patient ID: Marquita Yarbrough is a 62 y.o. adult. Bariatric Surgery Status    -s/p Vertical Sleeve Gastrectomy with Dr. Yu Waldrop on 2/28/2022. Presents to the office today for 18 month postop doing well      continue daily PPI as she takes daily aspirin     DM with stage 2 CKD   - off metforim and lantus   - is on trulicity, managed by endo. States her sugars overall stable         HTN  - continue antihypertensives as prescribed          ABHINAV  - had issues with cpap recently and insurance coverage   Advised making appt with sleep medicine        Continued/Maintain healthy weight loss with good nutrition intakes. Adequate hydration with at least 64oz. fluid intake. Follow diet as discussed. Follow vitamin and mineral recommendations as reviewed with you. Exercise as tolerated. Colonoscopy referral made: utd  Mammo: utd     Follow-up in 6 months. We kindly ask that your arrive 15 minutes before your scheduled appointment time with your provider to allow our staff to room you, get your vital signs and update your chart. Get lab work done. Please call the office if you need a script. It is recommended to check with your insurance BEFORE getting labs done to make sure they are covered by your policy. Call our office if you have any problems with abdominal pain especially associated with fever, chills, nausea, vomiting or any other concerns. All  Post-bariatric surgery patients should be aware that very small quantities of any alcohol can cause impairment and it is very possible not to feel the effect. The effect can be in the system for several hours. It is also a stomach irritant. It is advised to AVOID alcohol, Nonsteroidal antiinflammatory drugs (NSAIDS) and nicotine of all forms . Any of these can cause stomach irritation/pain. Discussed the effects of alcohol on a bariatric patient and the increased impairment risk. Keep up the good work!      Postsurgical Malabsorption   -At risk for malabsorption of vitamins/minerals secondary to malabsorption and restriction of intake from bariatric surgery  -Currently taking adequate postop bariatric surgery vitamin supplementation  -Last set of bariatric labs completed 7/2023 and wnl  -Patient received education about the importance of adhering to a lifelong supplementation regimen to avoid vitamin/mineral deficiencies      There are no diagnoses linked to this encounter. Subjective:      Patient ID: Aguilar Calderon is a 62 y.o. adult. -s/p Vertical Sleeve Gastrectomy with Dr. Antonia Mckeon on 2/28/2022. Presents to the office today for 18 month postop doing well  Tolerating diet without issues; denies N/V, dysphagia, reflux. Initial:200  Current:131  EWL: (Weight loss is ahead of schedule at this post surgical period.)  Ronak: current   Current BMI is Body mass index is 25.58 kg/m². Tolerating a regular diet-yes  Eating at least 60 grams of protein per day-yes  Following 30/60 minute rule with liquids-yes  Drinking at least 64 ounces of fluid per day-yes  Sufficient exercise-yes  Using NSAIDs regularly-yes  Using nicotine-no  Using alcohol-occasional   Supplements:    darrion mvi + calcium + iron + vit d     The following portions of the patient's history were reviewed and updated as appropriate: allergies, current medications, past family history, past medical history, past social history, past surgical history and problem list.    Review of Systems   Constitutional: Negative. Respiratory: Negative. Cardiovascular: Negative. Gastrointestinal: Negative. Neurological: Negative. Psychiatric/Behavioral: Negative. Objective:    /78   Pulse 77   Temp 97.5 °F (36.4 °C) (Tympanic)   Ht 5' (1.524 m)   Wt 59.4 kg (131 lb)   LMP  (LMP Unknown)   BMI 25.58 kg/m²      Physical Exam  Vitals and nursing note reviewed. Constitutional:       Appearance: Normal appearance.    HENT: Head: Normocephalic and atraumatic. Eyes:      Extraocular Movements: Extraocular movements intact. Pupils: Pupils are equal, round, and reactive to light. Cardiovascular:      Rate and Rhythm: Normal rate. Pulmonary:      Effort: Pulmonary effort is normal.   Musculoskeletal:         General: Normal range of motion. Cervical back: Normal range of motion. Skin:     General: Skin is warm and dry. Neurological:      General: No focal deficit present. Mental Status: Tiara Miller is alert and oriented to person, place, and time.    Psychiatric:         Mood and Affect: Mood normal.

## 2023-12-06 ENCOUNTER — OFFICE VISIT (OUTPATIENT)
Dept: FAMILY MEDICINE CLINIC | Facility: CLINIC | Age: 58
End: 2023-12-06

## 2023-12-06 VITALS
OXYGEN SATURATION: 100 % | SYSTOLIC BLOOD PRESSURE: 130 MMHG | BODY MASS INDEX: 25.72 KG/M2 | HEART RATE: 91 BPM | DIASTOLIC BLOOD PRESSURE: 80 MMHG | WEIGHT: 131.7 LBS | TEMPERATURE: 98.1 F

## 2023-12-06 DIAGNOSIS — I73.9 PAD (PERIPHERAL ARTERY DISEASE) (HCC): ICD-10-CM

## 2023-12-06 DIAGNOSIS — Z79.4 TYPE 2 DIABETES MELLITUS WITHOUT COMPLICATION, WITH LONG-TERM CURRENT USE OF INSULIN (HCC): ICD-10-CM

## 2023-12-06 DIAGNOSIS — E11.42 TYPE 2 DIABETES MELLITUS WITH DIABETIC POLYNEUROPATHY, WITHOUT LONG-TERM CURRENT USE OF INSULIN (HCC): ICD-10-CM

## 2023-12-06 DIAGNOSIS — Z23 ENCOUNTER FOR IMMUNIZATION: Primary | ICD-10-CM

## 2023-12-06 DIAGNOSIS — E11.9 TYPE 2 DIABETES MELLITUS WITHOUT COMPLICATION, WITH LONG-TERM CURRENT USE OF INSULIN (HCC): ICD-10-CM

## 2023-12-06 DIAGNOSIS — I10 ESSENTIAL HYPERTENSION: ICD-10-CM

## 2023-12-06 DIAGNOSIS — Z89.439 HISTORY OF TRANSMETATARSAL AMPUTATION OF FOOT (HCC): ICD-10-CM

## 2023-12-06 DIAGNOSIS — G63 POLYNEUROPATHY ASSOCIATED WITH UNDERLYING DISEASE (HCC): ICD-10-CM

## 2023-12-06 LAB — SL AMB POCT HEMOGLOBIN AIC: 7.7 (ref ?–6.5)

## 2023-12-06 PROCEDURE — G0008 ADMIN INFLUENZA VIRUS VAC: HCPCS | Performed by: FAMILY MEDICINE

## 2023-12-06 PROCEDURE — 83036 HEMOGLOBIN GLYCOSYLATED A1C: CPT | Performed by: FAMILY MEDICINE

## 2023-12-06 PROCEDURE — 90686 IIV4 VACC NO PRSV 0.5 ML IM: CPT | Performed by: FAMILY MEDICINE

## 2023-12-06 PROCEDURE — 99214 OFFICE O/P EST MOD 30 MIN: CPT | Performed by: FAMILY MEDICINE

## 2023-12-06 NOTE — PROGRESS NOTES
Assessment/Plan:    Type 2 diabetes mellitus with diabetic polyneuropathy, without long-term current use of insulin (HCC)    Lab Results   Component Value Date    HGBA1C 7.7 (A) 12/06/2023   HgA1c increased slighly compared to prio, however patient has been homeless for several months till just recently so was unable to prepare low carb diet. Admits to eating more fast food  Now has stable living situation again, can restart low carb diet     Charcot foot due to diabetes mellitus ([address])    Lab Results   Component Value Date    HGBA1C 7.7 (A) 12/06/2023   Follows with Podiatry     PAD (peripheral artery disease) (HCC)  Continue ASA/Plavix/Statin  F/u with Vascular as scheduled  Currently asymptomatic     Polyneuropathy associated with underlying disease ([address])  Symptoms stable   Discussed importance of continued blood sugar control     History of transmetatarsal amputation of foot ([address])  Continue follow up with Podiatry     Essential hypertension  Doing well on Metoprolol        Diagnoses and all orders for this visit:    Encounter for immunization  -     influenza vaccine, quadrivalent, 0.5 mL, preservative-free, for adult and pediatric patients 6 mos+ (AFLURIA, FLUARIX, FLULAVAL, FLUZONE)    Type 2 diabetes mellitus without complication, with long-term current use of insulin (HCC)  -     POCT hemoglobin A1c    Type 2 diabetes mellitus with diabetic polyneuropathy, without long-term current use of insulin (HCC)    Polyneuropathy associated with underlying disease ([address])    History of transmetatarsal amputation of foot ([address])    PAD (peripheral artery disease) ([address])    Essential hypertension          Subjective:      Patient ID: Heather Regan is a 62 y.o. adult.     Rivas Petty is a pleasant 61 yo female with multiple comorbid conditions including but not limited to DM with multiple complications, PAD, HTN  Here today for follow up  Patient states she was homeless for several months, recently found a place to live  She was unable to follow a low carb/healthy diet as she was eating more processed foods     Hypertension  This is a chronic problem. The current episode started more than 1 year ago. The problem has been gradually improving since onset. The problem is controlled. There are no associated agents to hypertension. Risk factors for coronary artery disease include diabetes mellitus, dyslipidemia, sedentary lifestyle and post-menopausal state. Past treatments include beta blockers and ACE inhibitors. The current treatment provides significant improvement. Compliance problems include diet and exercise. The following portions of the patient's history were reviewed and updated as appropriate: Nellie Stoner  has a past medical history of Ambulates with cane, Bariatric surgery status, Cellulitis of toe (01/28/2019), Chronic kidney disease, CKD (chronic kidney disease), CPAP (continuous positive airway pressure) dependence, Diabetes mellitus ([address]), Gangrene of toe of right foot ([address]) (10/30/2019), Hyperlipidemia, Hypertension, Obese, PVD (peripheral vascular disease) ([address]), Seasonal allergies, Sleep apnea, Teeth missing, and Wears glasses.   Paula Lyon   Patient Active Problem List    Diagnosis Date Noted   • Abnormal EKG 07/25/2023   • Type 2 diabetes mellitus with diabetic polyneuropathy, without long-term current use of insulin ([address]) 12/06/2022   • Obesity, Class II, BMI 35-39.9 02/17/2022   • Nicotine dependence in remission 01/20/2022   • Right foot pain 01/16/2022   • CKD (chronic kidney disease) stage 2, GFR 60-89 ml/min 10/05/2021   • Preop cardiovascular exam 02/10/2021   • Vaginal candida 02/10/2021   • Obstructive sleep apnea 10/30/2020   • Persistent proteinuria 09/03/2020   • Vitamin D deficiency 09/03/2020   • S/P transmetatarsal amputation of foot, right ([address]) 08/26/2020   • 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, with necrosis of bone ([address]) 2020   • Cellulitis of right lower extremity 2020   • Class 2 severe obesity due to excess calories with serious comorbidity and body mass index (BMI) of 36.0 to 36.9 in adult  2020   • History of transmetatarsal amputation of foot ([address]) 2019   • Charcot foot due to diabetes mellitus ([address]) 2019   • Polyneuropathy associated with underlying disease ([address]) 2019   • Critical lower limb ischemia ([address]) 2019   • Toe amputation status, left 2019   • Leukocytosis 2019   • Anemia 2019   • Diabetes mellitus with stage 2 chronic kidney disease  2019   • HLD (hyperlipidemia) 2019   • PAD (peripheral artery disease) ([address]) 2019   • Essential hypertension 10/27/2016     Sonia Umaña  has a past surgical history that includes IR abdominal aortagram (2019); Toe amputation (Left, 2019); IR arterial lysis (2019); IR lower extremity / intervention (2019); Toe amputation (Right, 2019); pr amputation foot transmetarsal (Right, 2019); IR aortagram with run-off (2020); pr amputation foot transmetarsal (Right, 2020); IR lower extremity angiogram (2022); IR TPA lysis check (2022);  section; EGD; pr laps gstrc rstrictiv px longitudinal gastrectomy (N/A, 2022); and pr breast reduction (Bilateral, 2023). Fara Johnsonleatha's family history includes Breast cancer in Addis Lyon's maternal aunt; Diabetes in Addis Lyon's mother and sister; No Known Problems in Addis Lyon's father. Fara Aguilera Camron  reports that Addis Lyon quit smoking about 10 years ago. Clara Lyon's smoking use included cigarettes. Clara Johnsonleatha has a 40.00 pack-year smoking history. Clara Lyon has never used smokeless tobacco. Clara Lyon reports current alcohol use of about 2.0 standard drinks of alcohol per week.  Clara Motta Camron reports that Kina Doe IJasmin Lyon does not use drugs. Current Outpatient Medications   Medication Sig Dispense Refill   • aspirin 81 mg chewable tablet Chew 1 tablet (81 mg total) daily     • atorvastatin (LIPITOR) 20 mg tablet Take 1 tablet (20 mg total) by mouth daily 90 tablet 0   • Blood Glucose Monitoring Suppl (ONE TOUCH ULTRA MINI) w/Device KIT Use 4 times a day 1 kit 2   • clopidogrel (PLAVIX) 75 mg tablet Take 1 tablet (75 mg total) by mouth daily 90 tablet 3   • docusate sodium (COLACE) 100 mg capsule Take 1 capsule (100 mg total) by mouth 2 (two) times a day 60 capsule 1   • dulaglutide (Trulicity) 3 DI/3.2FE injection Inject 0.5 mL (3 mg total) under the skin every 7 days 2 mL 5   • glucose blood (OneTouch Ultra) test strip Use 1 each 3 (three) times a day Test 300 strip 0   • lisinopril (ZESTRIL) 5 mg tablet Take 5 mg by mouth daily     • metoprolol tartrate (LOPRESSOR) 25 mg tablet Take 1 tablet (25 mg total) by mouth every 12 (twelve) hours 180 tablet 1   • OneTouch Delica Lancets 06S MISC Use to test 3x daily 300 each 2   • pantoprazole (PROTONIX) 40 mg tablet TAKE 1 TABLET(40 MG) BY MOUTH DAILY 90 tablet 0   • Insulin Pen Needle 32G X 4 MM MISC Use 4 times daily with insulin pens 120 each 5   • polyethylene glycol (COLYTE) 4000 mL solution Take 4,000 mL by mouth once for 1 dose 4000 mL 0     No current facility-administered medications for this visit.      Current Outpatient Medications on File Prior to Visit   Medication Sig   • aspirin 81 mg chewable tablet Chew 1 tablet (81 mg total) daily   • atorvastatin (LIPITOR) 20 mg tablet Take 1 tablet (20 mg total) by mouth daily   • Blood Glucose Monitoring Suppl (ONE TOUCH ULTRA MINI) w/Device KIT Use 4 times a day   • clopidogrel (PLAVIX) 75 mg tablet Take 1 tablet (75 mg total) by mouth daily   • docusate sodium (COLACE) 100 mg capsule Take 1 capsule (100 mg total) by mouth 2 (two) times a day   • dulaglutide (Trulicity) 3 HH/4.1WF injection Inject 0.5 mL (3 mg total) under the skin every 7 days   • glucose blood (OneTouch Ultra) test strip Use 1 each 3 (three) times a day Test   • lisinopril (ZESTRIL) 5 mg tablet Take 5 mg by mouth daily   • metoprolol tartrate (LOPRESSOR) 25 mg tablet Take 1 tablet (25 mg total) by mouth every 12 (twelve) hours   • OneTouch Delica Lancets 95J MISC Use to test 3x daily   • pantoprazole (PROTONIX) 40 mg tablet TAKE 1 TABLET(40 MG) BY MOUTH DAILY   • Insulin Pen Needle 32G X 4 MM MISC Use 4 times daily with insulin pens   • polyethylene glycol (COLYTE) 4000 mL solution Take 4,000 mL by mouth once for 1 dose     No current facility-administered medications on file prior to visit. Mindi Guerrero is allergic to levemir [insulin detemir]. .    Review of Systems   All other systems reviewed and are negative. Objective:      /80 (BP Location: Right arm, Patient Position: Sitting, Cuff Size: Standard)   Pulse 91   Temp 98.1 °F (36.7 °C) (Temporal)   Wt 59.7 kg (131 lb 11.2 oz)   LMP  (LMP Unknown)   SpO2 100%   BMI 25.72 kg/m²          Physical Exam  Vitals and nursing note reviewed. Constitutional:       Appearance: Mindi Guerrero is well-developed. HENT:      Head: Normocephalic. Right Ear: External ear normal.      Left Ear: External ear normal.      Nose: Nose normal.   Eyes:      Conjunctiva/sclera: Conjunctivae normal.      Pupils: Pupils are equal, round, and reactive to light. Neck:      Thyroid: No thyromegaly. Cardiovascular:      Rate and Rhythm: Normal rate and regular rhythm. Heart sounds: Normal heart sounds. Pulmonary:      Effort: Pulmonary effort is normal.      Breath sounds: Normal breath sounds. Abdominal:      Palpations: Abdomen is soft. Tenderness: There is no abdominal tenderness. There is no guarding or rebound. Musculoskeletal:         General: Normal range of motion.       Cervical back: Normal range of motion and neck supple. Skin:     General: Skin is dry. Neurological:      Mental Status: Keily Crawford is alert and oriented to person, place, and time. Deep Tendon Reflexes: Reflexes are normal and symmetric.

## 2023-12-06 NOTE — ASSESSMENT & PLAN NOTE
Lab Results   Component Value Date    HGBA1C 7.7 (A) 12/06/2023   HgA1c increased slighly compared to prio, however patient has been homeless for several months till just recently so was unable to prepare low carb diet.  Admits to eating more fast food  Now has stable living situation again, can restart low carb diet

## 2024-01-25 ENCOUNTER — OFFICE VISIT (OUTPATIENT)
Dept: PODIATRY | Facility: CLINIC | Age: 59
End: 2024-01-25
Payer: COMMERCIAL

## 2024-01-25 VITALS
BODY MASS INDEX: 23.98 KG/M2 | WEIGHT: 127 LBS | HEART RATE: 90 BPM | HEIGHT: 61 IN | DIASTOLIC BLOOD PRESSURE: 100 MMHG | SYSTOLIC BLOOD PRESSURE: 193 MMHG

## 2024-01-25 DIAGNOSIS — B35.1 TINEA UNGUIUM: ICD-10-CM

## 2024-01-25 DIAGNOSIS — E11.42 DIABETIC POLYNEUROPATHY ASSOCIATED WITH TYPE 2 DIABETES MELLITUS (HCC): Primary | ICD-10-CM

## 2024-01-25 DIAGNOSIS — Z89.431 S/P TRANSMETATARSAL AMPUTATION OF FOOT, RIGHT (HCC): ICD-10-CM

## 2024-01-25 DIAGNOSIS — I73.9 PAD (PERIPHERAL ARTERY DISEASE) (HCC): ICD-10-CM

## 2024-01-25 PROCEDURE — 11720 DEBRIDE NAIL 1-5: CPT | Performed by: PODIATRIST

## 2024-01-25 NOTE — PROGRESS NOTES
Niesha Rankin Camron  1965  AT RISK FOOT CARE    1. Diabetic polyneuropathy associated with type 2 diabetes mellitus ([facility])        2. Tinea unguium        3. PAD (peripheral artery disease) ([facility])        4. S/P transmetatarsal amputation of foot, right (HCC)            Patient presents for at-risk foot care.  Patient has no acute concerns today.  Patient has significant lower extremity risk due to previous amputation.    On exam patient has thickened, hypertrophic, discolored, brittle toenails with subungual debris and tenderness x3   Callus: none  Amputation: right TMA, left 4,5 amputations stable    Today's treatment includes:  Debridement of toenails. Using nail nipper, kacie, and curette, nails were sharply debrided, reduced in thickness and length. Devitalized nail tissue and fungal debris excised and removed. Patient tolerated well.        Discussed proper shoe gear, daily inspections of feet, and general foot health with patient. Patient has Q7  findings and is recommended for at risk foot care every 9-10 weeks.    Patients most recent complete clinical exam was performed: 2/27/2023

## 2024-02-01 ENCOUNTER — TELEPHONE (OUTPATIENT)
Dept: NEPHROLOGY | Facility: CLINIC | Age: 59
End: 2024-02-01

## 2024-02-02 DIAGNOSIS — N18.2 DIABETES MELLITUS WITH STAGE 2 CHRONIC KIDNEY DISEASE: ICD-10-CM

## 2024-02-02 DIAGNOSIS — E11.22 DIABETES MELLITUS WITH STAGE 2 CHRONIC KIDNEY DISEASE: ICD-10-CM

## 2024-02-05 RX ORDER — DULAGLUTIDE 3 MG/.5ML
INJECTION, SOLUTION SUBCUTANEOUS
Qty: 2 ML | Refills: 0 | Status: SHIPPED | OUTPATIENT
Start: 2024-02-05 | End: 2024-02-06 | Stop reason: DRUGHIGH

## 2024-02-06 ENCOUNTER — OFFICE VISIT (OUTPATIENT)
Dept: ENDOCRINOLOGY | Facility: CLINIC | Age: 59
End: 2024-02-06
Payer: COMMERCIAL

## 2024-02-06 VITALS
WEIGHT: 133 LBS | OXYGEN SATURATION: 99 % | BODY MASS INDEX: 26.11 KG/M2 | HEART RATE: 78 BPM | DIASTOLIC BLOOD PRESSURE: 78 MMHG | SYSTOLIC BLOOD PRESSURE: 136 MMHG | HEIGHT: 60 IN

## 2024-02-06 DIAGNOSIS — N18.2 DIABETES MELLITUS WITH STAGE 2 CHRONIC KIDNEY DISEASE: Primary | ICD-10-CM

## 2024-02-06 DIAGNOSIS — I10 ESSENTIAL HYPERTENSION: ICD-10-CM

## 2024-02-06 DIAGNOSIS — E11.22 DIABETES MELLITUS WITH STAGE 2 CHRONIC KIDNEY DISEASE: Primary | ICD-10-CM

## 2024-02-06 DIAGNOSIS — E78.5 HYPERLIPIDEMIA, UNSPECIFIED HYPERLIPIDEMIA TYPE: ICD-10-CM

## 2024-02-06 DIAGNOSIS — E66.01 CLASS 2 SEVERE OBESITY DUE TO EXCESS CALORIES WITH SERIOUS COMORBIDITY AND BODY MASS INDEX (BMI) OF 36.0 TO 36.9 IN ADULT: ICD-10-CM

## 2024-02-06 PROBLEM — N18.30 STAGE 3 CHRONIC KIDNEY DISEASE, UNSPECIFIED WHETHER STAGE 3A OR 3B CKD (HCC): Status: ACTIVE | Noted: 2024-02-06

## 2024-02-06 PROCEDURE — 99214 OFFICE O/P EST MOD 30 MIN: CPT

## 2024-02-06 RX ORDER — ACYCLOVIR 400 MG/1
1 TABLET ORAL DAILY
Qty: 1 EACH | Refills: 0 | Status: SHIPPED | OUTPATIENT
Start: 2024-02-06

## 2024-02-06 RX ORDER — DULAGLUTIDE 4.5 MG/.5ML
4.5 INJECTION, SOLUTION SUBCUTANEOUS
Qty: 2.12 ML | Refills: 2 | Status: SHIPPED | OUTPATIENT
Start: 2024-02-06

## 2024-02-06 RX ORDER — ACYCLOVIR 400 MG/1
1 TABLET ORAL
Qty: 3 EACH | Refills: 2 | Status: SHIPPED | OUTPATIENT
Start: 2024-02-06

## 2024-02-06 NOTE — ASSESSMENT & PLAN NOTE
A1C is above goal. Fasting blood sugars are higher before dinner. She admits to snacking between lunch and dinner with a piece of fruit- apple or orange. Suggest she pair apple with peanut butter to help with blood sugar spikes. Recommend she increase Trulicity to 4.5 mg/week. She is requesting a CGM so I have given her the order for this as she does have Medicaid. Referral to diabetes education also given.    Lab Results   Component Value Date    HGBA1C 7.7 (A) 12/06/2023

## 2024-02-06 NOTE — PROGRESS NOTES
Established Patient Progress Note    CC: Follow up for type 2 diabetes mellitus    Impression & Plan:    Problem List Items Addressed This Visit       Essential hypertension     Stable in office. Continue current regimen         HLD (hyperlipidemia)     Due for updated level. Order given         Diabetes mellitus with stage 2 chronic kidney disease  - Primary     A1C is above goal. Fasting blood sugars are higher before dinner. She admits to snacking between lunch and dinner with a piece of fruit- apple or orange. Suggest she pair apple with peanut butter to help with blood sugar spikes. Recommend she increase Trulicity to 4.5 mg/week. She is requesting a CGM so I have given her the order for this as she does have Medicaid. Referral to diabetes education also given.    Lab Results   Component Value Date    HGBA1C 7.7 (A) 12/06/2023            Relevant Medications    dulaglutide (Trulicity) 4.5 MG/0.5ML injection    Continuous Blood Gluc Sensor (Dexcom G7 Sensor)    Continuous Blood Gluc  (Dexcom G7 ) KATE    Other Relevant Orders    Lipid panel    Comprehensive metabolic panel    Hemoglobin A1C    Ambulatory referral to Diabetic Education    Class 2 severe obesity due to excess calories with serious comorbidity and body mass index (BMI) of 36.0 to 36.9 in adult      Continue with Trulicity and lifestyle modifications            Orders Placed This Encounter   Procedures    Lipid panel     This is a patient instruction: This test requires patient fasting for 10-12 hours or longer. Drinking of black coffee or black tea is acceptable.     Standing Status:   Future     Standing Expiration Date:   2/6/2025    Comprehensive metabolic panel     This is a patient instruction: Patient fasting for 8 hours or longer recommended.     Standing Status:   Future     Standing Expiration Date:   2/6/2025    Hemoglobin A1C     Standing Status:   Future     Standing Expiration Date:   2/6/2025    Ambulatory referral to  Diabetic Education     Standing Status:   Future     Standing Expiration Date:   2/6/2025     Referral Priority:   Routine     Referral Type:   Consult - AMB     Referral Reason:   Specialty Services Required     Requested Specialty:   Diabetes Services     Number of Visits Requested:   1     Expiration Date:   2/6/2025       History of Present Illness:     Niesha Lyon is a 58 y.o. adult with type 2 diabetes with long term use of insulin for about 35 years, hypertension, hyperlipidemia, obesity.      Per chart review, she had gastric sleeve operation 2/28/22.  Insulin therapy was discontinued in June 2022.     Reports complications of neuropathy with charcot foot, amputations and gangrene of toes, s/p TMA of right foot, CKD, proteinuria and retinopathy. Last A1C was 7.7. Denies recent illness or hospitalizations. Denies recent severe hypoglycemic or severe hyperglycemic episodes. Denies any issues with current regimen. Home glucose monitoring: are performed regularly.     She has no complaints today.     Home blood glucose readings:   Before breakfast: 88 to 120  Before lunch: 119 to 140  Before dinner: 130 to 195     Current regimen:   Trulicity 3 mg weekly     Last Eye Exam: overdue, needs to find new eye specialist  Follows with podiatry regularly     Has hypertension: Taking metoprolol and lisinopril  Has hyperlipidemia: Taking atorvastatin            Patient Active Problem List   Diagnosis    Essential hypertension    HLD (hyperlipidemia)    PAD (peripheral artery disease) (HCC)    Diabetes mellitus with stage 2 chronic kidney disease     Anemia    Leukocytosis    Toe amputation status, left    Critical lower limb ischemia (HCC)    Polyneuropathy associated with underlying disease (HCC)    Charcot foot due to diabetes mellitus (HCC)    History of transmetatarsal amputation of foot (HCC)    Class 2 severe obesity due to excess calories with serious comorbidity and body mass index (BMI) of 36.0 to  36.9 in adult     Cellulitis of right lower extremity    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, with necrosis of bone ([facility])    S/P transmetatarsal amputation of foot, right (HCC)    Persistent proteinuria    Vitamin D deficiency    Obstructive sleep apnea    Preop cardiovascular exam    Vaginal candida    CKD (chronic kidney disease) stage 2, GFR 60-89 ml/min    Right foot pain    Nicotine dependence in remission    Obesity, Class II, BMI 35-39.9    Type 2 diabetes mellitus with diabetic polyneuropathy, without long-term current use of insulin (HCC)    Abnormal EKG    Stage 3 chronic kidney disease, unspecified whether stage 3a or 3b CKD (HCC)      Past Medical History:   Diagnosis Date    Ambulates with cane     Bariatric surgery status     Cellulitis of toe 2019    Chronic kidney disease     CKD (chronic kidney disease)     CPAP (continuous positive airway pressure) dependence     Diabetes mellitus ([facility])     Gangrene of toe of right foot ([facility]) 10/30/2019    Added automatically from request for surgery [id number]    Hyperlipidemia     Hypertension     Obese     gastric  sleeve today 2022    PVD (peripheral vascular disease) ([facility])     Seasonal allergies     Sleep apnea     Teeth missing     Wears glasses       Past Surgical History:   Procedure Laterality Date     SECTION      EGD      IR ABDOMINAL AORTAGRAM  2019    IR AORTAGRAM WITH RUN-OFF  2020    IR ARTERIAL LYSIS  2019    IR LOWER EXTREMITY / INTERVENTION  2019    IR LOWER EXTREMITY ANGIOGRAM  2022    IR TPA LYSIS CHECK  2022    KY AMPUTATION FOOT TRANSMETARSAL Right 2019    Procedure: AMPUTATION TRANSMETATARSAL (TMA) WITH ACHILLES TENDON LENGTHING;  Surgeon: Edinson Bragg DPM;  Location: [facility];  Service: Podiatry    KY AMPUTATION FOOT TRANSMETARSAL Right 2020    Procedure: AMPUTATION TRANSMETATARSAL (TMA);  Surgeon: Jd Hartley DPM;  Location: [facility];  Service:  Podiatry    KS BREAST REDUCTION Bilateral 2023    Procedure: BREAST REDUCTION;  Surgeon: Alice Lucas MD;  Location: [facility];  Service: Plastics    KS LAPS GSTRC RSTRICTIV PX LONGITUDINAL GASTRECTOMY N/A 2022    Procedure: LAPAROSCOPIC SLEEVE GASTRECTOMY & INTRAOPERATIVE EGD;  Surgeon: Lavon Calderon MD;  Location: [facility];  Service: Bariatrics    TOE AMPUTATION Left 2019    Procedure: AMPUTATION 5th TOE;  Surgeon: Edinsno Bragg DPM;  [facility];  Service: Podiatry    TOE AMPUTATION Right 2019    Procedure: AMPUTATION RIGHT 3RD AND 4TH TOE;  Surgeon: Edinson Bragg DPM;  [facility];  Service: Podiatry      Family History   Problem Relation Age of Onset    Diabetes Mother     No Known Problems Father     Diabetes Sister     Breast cancer Maternal Aunt      Social History     Tobacco Use    Smoking status: Former     Current packs/day: 0.00     Average packs/day: 2.0 packs/day for 20.0 years (40.0 ttl pk-yrs)     Types: Cigarettes     Start date: 1993     Quit date: 2013     Years since quittin.1    Smokeless tobacco: Never   Substance Use Topics    Alcohol use: Yes     Alcohol/week: 2.0 standard drinks of alcohol     Types: 2 Glasses of wine per week     Comment: rarely     Allergies   Allergen Reactions    Levemir [Insulin Detemir] Itching         Current Outpatient Medications:     Continuous Blood Gluc  (Dexcom G7 ) KATE, Use 1 each daily Use per  guidelines, Disp: 1 each, Rfl: 0    Continuous Blood Gluc Sensor (Dexcom G7 Sensor), Use 1 Device every 10 days, Disp: 3 each, Rfl: 2    dulaglutide (Trulicity) 4.5 MG/0.5ML injection, Inject 0.5 mL (4.5 mg total) under the skin every 7 days, Disp: 2.12 mL, Rfl: 2    aspirin 81 mg chewable tablet, Chew 1 tablet (81 mg total) daily, Disp: , Rfl:     atorvastatin (LIPITOR) 20 mg tablet, Take 1 tablet (20 mg total) by mouth daily, Disp: 90 tablet, Rfl: 0    Blood Glucose Monitoring  "Suppl (ONE TOUCH ULTRA MINI) w/Device KIT, Use 4 times a day, Disp: 1 kit, Rfl: 2    clopidogrel (PLAVIX) 75 mg tablet, Take 1 tablet (75 mg total) by mouth daily, Disp: 90 tablet, Rfl: 3    docusate sodium (COLACE) 100 mg capsule, Take 1 capsule (100 mg total) by mouth 2 (two) times a day, Disp: 60 capsule, Rfl: 1    glucose blood (OneTouch Ultra) test strip, Use 1 each 3 (three) times a day Test, Disp: 300 strip, Rfl: 0    Insulin Pen Needle 32G X 4 MM MISC, Use 4 times daily with insulin pens, Disp: 120 each, Rfl: 5    lisinopril (ZESTRIL) 5 mg tablet, Take 5 mg by mouth daily, Disp: , Rfl:     metoprolol tartrate (LOPRESSOR) 25 mg tablet, Take 1 tablet (25 mg total) by mouth every 12 (twelve) hours, Disp: 180 tablet, Rfl: 1    OneTouch Delica Lancets 33G MISC, Use to test 3x daily, Disp: 300 each, Rfl: 2    pantoprazole (PROTONIX) 40 mg tablet, TAKE 1 TABLET(40 MG) BY MOUTH DAILY, Disp: 90 tablet, Rfl: 0    polyethylene glycol (COLYTE) 4000 mL solution, Take 4,000 mL by mouth once for 1 dose, Disp: 4000 mL, Rfl: 0    Review of Systems   Constitutional:  Negative for chills and fever.   HENT:  Negative for ear pain and sore throat.    Eyes:  Negative for pain and visual disturbance.   Respiratory:  Negative for cough and shortness of breath.    Cardiovascular:  Negative for chest pain and palpitations.   Gastrointestinal:  Negative for abdominal pain and vomiting.   Genitourinary:  Negative for dysuria and hematuria.   Musculoskeletal:  Negative for arthralgias and back pain.   Skin:  Negative for color change and rash.   Neurological:  Negative for seizures and syncope.   All other systems reviewed and are negative.      Physical Exam:  Body mass index is 25.93 kg/m².  /78 (BP Location: Left arm, Patient Position: Sitting, Cuff Size: Adult) Comment: nervous  Pulse 78   Ht 5' 0.05\" (1.525 m)   Wt 60.3 kg (133 lb)   LMP  (LMP Unknown)   SpO2 99%   BMI 25.93 kg/m²    Wt Readings from Last 3 Encounters: "   02/06/24 60.3 kg (133 lb)   01/25/24 57.6 kg (127 lb)   12/06/23 59.7 kg (131 lb 11.2 oz)       Physical Exam  Vitals reviewed.   Constitutional:       Appearance: Normal appearance.   HENT:      Head: Normocephalic and atraumatic.   Cardiovascular:      Rate and Rhythm: Normal rate.   Pulmonary:      Effort: Pulmonary effort is normal.   Neurological:      Mental Status: Niesha is alert and oriented to person, place, and time.   Psychiatric:         Mood and Affect: Mood normal.         Behavior: Behavior normal.       Diabetic Foot Exam    Labs:   Lab Results   Component Value Date    HGBA1C 7.7 (A) 12/06/2023    HGBA1C 7.2 (H) 07/10/2023    HGBA1C 7.2 (H) 09/13/2022     Lab Results   Component Value Date    CREATININE 0.58 (L) 07/10/2023    CREATININE 0.82 09/13/2022    CREATININE 0.97 05/12/2022    BUN 13 07/10/2023    K 4.0 07/10/2023     07/10/2023    CO2 29 07/10/2023     eGFR   Date Value Ref Range Status   09/01/2020 64 ml/min/1.73sq m Final     Lab Results   Component Value Date    HDL 54 07/10/2023    TRIG 123 07/10/2023     Lab Results   Component Value Date    ALT 30 07/10/2023    AST 30 07/10/2023    ALKPHOS 75 07/10/2023     Lab Results   Component Value Date    XGP8AQOLDMKD 3.013 04/22/2021    DRX1SLQSFSYY 2.180 05/06/2020     Lab Results   Component Value Date    FREET4 1.17 05/06/2020         There are no Patient Instructions on file for this visit.      Discussed with the patient and all questioned fully answered. Niesha will call me if any problems arise.

## 2024-02-15 ENCOUNTER — TELEPHONE (OUTPATIENT)
Dept: ENDOCRINOLOGY | Facility: CLINIC | Age: 59
End: 2024-02-15

## 2024-02-15 NOTE — TELEPHONE ENCOUNTER
Niesha Gonzales (Key: [id number])  PA Case ID #: [id number]  Rx #: [id number]  Need Help? Call us at [phone]  Status  Sent to Plan today  Drug  Dexcom G7 Sensor    Form  [employer] PA Form

## 2024-02-15 NOTE — TELEPHONE ENCOUNTER
Niesha Gonzales (Key: [id number])  PA Case ID #: [id number]  Rx #: [id number]  Need Help? Call us at [phone]  Status  Sent to Plan today  Drug  Dexcom G7  device    Form  [employer] Electronic PA Form

## 2024-03-14 ENCOUNTER — OFFICE VISIT (OUTPATIENT)
Dept: DIABETES SERVICES | Facility: CLINIC | Age: 59
End: 2024-03-14
Payer: COMMERCIAL

## 2024-03-14 DIAGNOSIS — N18.2 DIABETES MELLITUS WITH STAGE 2 CHRONIC KIDNEY DISEASE: Primary | ICD-10-CM

## 2024-03-14 DIAGNOSIS — E11.22 DIABETES MELLITUS WITH STAGE 2 CHRONIC KIDNEY DISEASE: Primary | ICD-10-CM

## 2024-03-14 PROCEDURE — 95249 CONT GLUC MNTR PT PROV EQP: CPT

## 2024-03-14 NOTE — PROGRESS NOTES
Dexcom G7 Personal Training    Met with Carolinasoto Lyon for Dexcom G7 personal training. Patient comes in today with there own unit to be trained on. Completed all aspects of training, including site selection on rotation, not infusing insulin near the sensor site, proper insertion technique, inserting codes into the , charging, waterproof sensor, range of 20ft, setting high low alarms that can be adjusted based on their preferences. They put on their first sensor by themselves with no issue.  Left my office today with sensor on and in 30 min warm up mode.      Niesha Rankin FoxCharan will be running the dexcom through their .    Discussed creating a Clarity account to be able to link and upload from home or auto upload from their phone. Patient was added to our clarity account today while in office and invited to link to us if using their phone. Patient understands that reciever will be downloaded while in office at time of visit and/or cell phone will automatically upload to our clarity for them. They understand that their blood sugars are not monitored by us on a regular basis, but that we can access them as needed or desired by the patient and provider.     Dexcom's phone number is in their paperwork, encouraged Niesha to reach out to Dexcom if they have any issues after hours, 24/7. Training completed, will call with questions.     Lab Results   Component Value Date    HGBA1C 7.7 (A) 12/06/2023       Lab Results   Component Value Date    SODIUM 139 07/10/2023    K 4.0 07/10/2023     07/10/2023    CO2 29 07/10/2023    AGAP 3 07/10/2023    BUN 13 07/10/2023    CREATININE 0.58 (L) 07/10/2023    GLUC 128 09/13/2022    GLUF 108 (H) 07/10/2023    CALCIUM 8.7 07/10/2023    AST 30 07/10/2023    ALT 30 07/10/2023    ALKPHOS 75 07/10/2023    TP 6.5 07/10/2023    TBILI 0.47 07/10/2023    EGFR 64 09/01/2020           Patient response to instruction    Comprehension: good  Motivation:  good  Expected Compliance: good  Response to Teachback: 100%, demonstrated understanding    Start- Stop: 2:00-2:24  Total Minutes: 24 Minutes  Group or Individual Instruction: ISAAC  Other: MIKEL Levine    Thank you for referring your patient to [facility], it was a pleasure working with them today. Please feel free to call with any questions or concerns.

## 2024-03-26 ENCOUNTER — OFFICE VISIT (OUTPATIENT)
Dept: DIABETES SERVICES | Facility: CLINIC | Age: 59
End: 2024-03-26

## 2024-03-26 ENCOUNTER — TELEPHONE (OUTPATIENT)
Dept: DIABETES SERVICES | Facility: CLINIC | Age: 59
End: 2024-03-26

## 2024-03-26 DIAGNOSIS — E11.22 DIABETES MELLITUS WITH STAGE 2 CHRONIC KIDNEY DISEASE  (HCC): Primary | ICD-10-CM

## 2024-03-26 DIAGNOSIS — N18.2 DIABETES MELLITUS WITH STAGE 2 CHRONIC KIDNEY DISEASE  (HCC): Primary | ICD-10-CM

## 2024-03-26 NOTE — PROGRESS NOTES
Personal CGM Follow-up    Met with Niesha BraxtonCharan, 58 -yr old patient, was present today for Personal Continuous Glucose Monitoring follow-up: Dexcom G7 with her .     The patient/family report was printed.  Data was downloaded, reviewed with the patient/family, and submitted to Dr. Rylee Ruiz for review.  The patient/family was assisted with set-up of the device.   High alert: 250mg/dl    Snooze time:  1  minutes.   Low alert: 70 mg/dl    Snooze time:  1  minutes.   Other alert: 55 mg/dl   Alerts set? Yes.    Sensor was removed by Niesha BERGMAN   Site condition was: nice and dry.    After review, Niesha performed sensor insertion under my supervision/with my assistance.  The patient tolerance of the procedure: well tolerated.    Other notes: Patient will be changing the sensor in every 10 days as demonstrated.       *I spent less than 20 minutes with the patient. Patient was present for sensor change and download. This is a non- billable appointment.     Start- Stop: 9:08-:9:19  Total Minutes: 11 Minutes  Group or Individual Instruction: DSME  Other: MIKEL Levine, Msc, MPH, RDN, LDN  [facility], [address]

## 2024-04-04 ENCOUNTER — TELEPHONE (OUTPATIENT)
Dept: NEPHROLOGY | Facility: CLINIC | Age: 59
End: 2024-04-04

## 2024-04-05 ENCOUNTER — TELEPHONE (OUTPATIENT)
Dept: NEPHROLOGY | Facility: CLINIC | Age: 59
End: 2024-04-05

## 2024-04-11 ENCOUNTER — OFFICE VISIT (OUTPATIENT)
Dept: PODIATRY | Facility: CLINIC | Age: 59
End: 2024-04-11
Payer: COMMERCIAL

## 2024-04-11 VITALS
BODY MASS INDEX: 25.32 KG/M2 | DIASTOLIC BLOOD PRESSURE: 84 MMHG | HEIGHT: 60 IN | SYSTOLIC BLOOD PRESSURE: 147 MMHG | WEIGHT: 129 LBS

## 2024-04-11 DIAGNOSIS — E11.42 TYPE 2 DIABETES MELLITUS WITH DIABETIC POLYNEUROPATHY, WITHOUT LONG-TERM CURRENT USE OF INSULIN (HCC): ICD-10-CM

## 2024-04-11 DIAGNOSIS — I73.9 PAD (PERIPHERAL ARTERY DISEASE) (HCC): ICD-10-CM

## 2024-04-11 DIAGNOSIS — Z89.431 S/P TRANSMETATARSAL AMPUTATION OF FOOT, RIGHT (HCC): ICD-10-CM

## 2024-04-11 DIAGNOSIS — B35.1 TINEA UNGUIUM: Primary | ICD-10-CM

## 2024-04-11 PROCEDURE — 99213 OFFICE O/P EST LOW 20 MIN: CPT | Performed by: PODIATRIST

## 2024-04-11 PROCEDURE — 11720 DEBRIDE NAIL 1-5: CPT | Performed by: PODIATRIST

## 2024-04-11 NOTE — PROGRESS NOTES
"Assessment/Plan:       Diagnoses and all orders for this visit:    Tinea unguium    S/P transmetatarsal amputation of foot, right (HCC)    PAD (peripheral artery disease) (HCC)    Type 2 diabetes mellitus with diabetic polyneuropathy, without long-term current use of insulin (HCC)        Diagnosis and options discussed with patient  Patient agreeable to the plan as stated below    -DM foot risk is high. Recommend at risk foot care (Q7)  -Discussed DM risk to lower extremities, proper shoe gear, and daily monitoring of feet.   -Discussed weight loss and suitable exercise regiment.   -Reviewed most recent PCP visit on 12/26/2023  -Educated on A1C and the risks of poorly controlled Diabetes. Reviewed recent A1C:  Lab Results   Component Value Date    HGBA1C 7.7 (A) 12/06/2023    HGBA1C 7.2 (H) 07/10/2023   .   Using nail nipper, kacie, and curette, nails were sharply debrided, reduced in thickness and length. Devitalized tissue removed. Patient tolerated well. Patient has Q7  findings and is recommended for at risk foot care every 9-10 weeks.       Subjective:      Patient ID: Niesha Lyon is a 58 y.o. adult.    Annual DM foot risk. H/O right TMA and left 4,5 amputations. She says her feet are good today, the nails grow thick. She saw her dcotor last December.         The following portions of the patient's history were reviewed and updated as appropriate: allergies, current medications, past family history, past medical history, past social history, past surgical history, and problem list.    Review of Systems    As stated in HPI, otherwise normal    Objective:      /84   Ht 5' 0.05\" (1.525 m)   Wt 58.5 kg (129 lb)   LMP  (LMP Unknown)   BMI 25.15 kg/m²          Physical Exam  Vitals reviewed.   Constitutional:       Appearance: Niesha is not ill-appearing or diaphoretic.   Cardiovascular:      Rate and Rhythm: Normal rate.      Pulses: Pulses are weak.           Dorsalis pedis pulses are 1+ on " the right side and 1+ on the left side.        Posterior tibial pulses are 1+ on the right side and 1+ on the left side.   Pulmonary:      Effort: Pulmonary effort is normal. No respiratory distress.   Musculoskeletal:         General: Deformity present.      Right foot: Deformity (TMA stable) present.      Left foot: Deformity (4,5 amputations stable) present.   Feet:      Right foot:      Protective Sensation:   0 sites sensed.      Skin integrity: Skin integrity normal. No ulcer, skin breakdown, erythema, warmth, callus or dry skin.      Left foot:      Protective Sensation:   0 sites sensed.      Skin integrity: Skin integrity normal. No ulcer, skin breakdown, erythema, warmth, callus or dry skin.      Toenail Condition: Left toenails are abnormally thick. Fungal disease present.  Skin:     Findings: No erythema.   Neurological:      Mental Status: Niesha is alert and oriented to person, place, and time.   Psychiatric:         Mood and Affect: Mood normal.         Diabetic Foot Exam    Patient's shoes and socks removed.    Right Foot/Ankle   Right Foot Inspection  Skin Exam: skin normal and skin intact. No dry skin, no warmth, no callus, no erythema, no maceration, no abnormal color, no pre-ulcer, no ulcer and no callus.     Toe Exam: right toe deformity.     Sensory   Vibration: absent  Monofilament testing: absent    Vascular  Capillary refills: < 3 seconds  The right DP pulse is 1+. The right PT pulse is 1+.     Left Foot/Ankle  Left Foot Inspection  Skin Exam: skin normal and skin intact. No dry skin, no warmth, no erythema, no maceration, normal color, no pre-ulcer, no ulcer and no callus.     Toe Exam: left toe deformity.     Sensory   Vibration: absent  Monofilament testing: absent    Vascular  Capillary refills: < 3 seconds  The left DP pulse is 1+. The left PT pulse is 1+.     Assign Risk Category  Deformity present  Loss of protective sensation  Weak pulses  Risk: 3

## 2024-05-01 ENCOUNTER — TELEPHONE (OUTPATIENT)
Dept: NEPHROLOGY | Facility: CLINIC | Age: 59
End: 2024-05-01

## 2024-05-23 DIAGNOSIS — E11.22 DIABETES MELLITUS WITH STAGE 2 CHRONIC KIDNEY DISEASE  (HCC): ICD-10-CM

## 2024-05-23 DIAGNOSIS — N18.2 DIABETES MELLITUS WITH STAGE 2 CHRONIC KIDNEY DISEASE  (HCC): ICD-10-CM

## 2024-05-24 RX ORDER — ACYCLOVIR 400 MG/1
1 TABLET ORAL
Qty: 3 EACH | Refills: 5 | Status: SHIPPED | OUTPATIENT
Start: 2024-05-24 | End: 2024-05-28 | Stop reason: SDUPTHER

## 2024-05-28 ENCOUNTER — OFFICE VISIT (OUTPATIENT)
Dept: ENDOCRINOLOGY | Facility: CLINIC | Age: 59
End: 2024-05-28
Payer: COMMERCIAL

## 2024-05-28 VITALS
SYSTOLIC BLOOD PRESSURE: 138 MMHG | OXYGEN SATURATION: 99 % | HEART RATE: 78 BPM | WEIGHT: 126 LBS | BODY MASS INDEX: 23.79 KG/M2 | DIASTOLIC BLOOD PRESSURE: 78 MMHG | HEIGHT: 61 IN

## 2024-05-28 DIAGNOSIS — N18.2 DIABETES MELLITUS WITH STAGE 2 CHRONIC KIDNEY DISEASE  (HCC): Primary | ICD-10-CM

## 2024-05-28 DIAGNOSIS — E11.22 DIABETES MELLITUS WITH STAGE 2 CHRONIC KIDNEY DISEASE  (HCC): Primary | ICD-10-CM

## 2024-05-28 DIAGNOSIS — E11.3299 MILD NONPROLIFERATIVE DIABETIC RETINOPATHY ASSOCIATED WITH TYPE 2 DIABETES MELLITUS, MACULAR EDEMA PRESENCE UNSPECIFIED, UNSPECIFIED LATERALITY (HCC): ICD-10-CM

## 2024-05-28 DIAGNOSIS — N18.30 STAGE 3 CHRONIC KIDNEY DISEASE, UNSPECIFIED WHETHER STAGE 3A OR 3B CKD (HCC): ICD-10-CM

## 2024-05-28 DIAGNOSIS — E78.5 HYPERLIPIDEMIA, UNSPECIFIED HYPERLIPIDEMIA TYPE: ICD-10-CM

## 2024-05-28 LAB — SL AMB POCT HEMOGLOBIN AIC: 6.9 (ref ?–6.5)

## 2024-05-28 PROCEDURE — 99214 OFFICE O/P EST MOD 30 MIN: CPT | Performed by: PHYSICIAN ASSISTANT

## 2024-05-28 PROCEDURE — 83036 HEMOGLOBIN GLYCOSYLATED A1C: CPT | Performed by: PHYSICIAN ASSISTANT

## 2024-05-28 RX ORDER — ACYCLOVIR 400 MG/1
1 TABLET ORAL
Qty: 3 EACH | Refills: 5 | Status: SHIPPED | OUTPATIENT
Start: 2024-05-28

## 2024-05-28 NOTE — ASSESSMENT & PLAN NOTE
Diabetes is under good control with A1C of 6.9 today at visit.  Continue current regimen and lifestyle modification  Refer to Ophthalmology.

## 2024-05-28 NOTE — PROGRESS NOTES
Established Patient Progress Note    Chief Complaint:  Diabetes follow up visit    Impression & Plan:    Problem List Items Addressed This Visit        Endocrine    Diabetes mellitus with stage 2 chronic kidney disease  (HCC) - Primary     Diabetes is under good control with A1C of 6.9 today at visit.  Continue current regimen and lifestyle modification  Refer to Ophthalmology.            Relevant Medications    Continuous Glucose Sensor (Dexcom G7 Sensor)    Other Relevant Orders    POCT hemoglobin A1c (Completed)    Comprehensive metabolic panel    Lipid Panel with Direct LDL reflex    Albumin / creatinine urine ratio       Genitourinary    Stage 3 chronic kidney disease, unspecified whether stage 3a or 3b CKD (HCC)       Other    HLD (hyperlipidemia)     Continue atorvastatin.          Relevant Orders    Comprehensive metabolic panel    Lipid Panel with Direct LDL reflex   Other Visit Diagnoses     Mild nonproliferative diabetic retinopathy associated with type 2 diabetes mellitus, macular edema presence unspecified, unspecified laterality (HCC)        Relevant Orders    Ambulatory Referral to Ophthalmology          History of Present Illness:   Niesha Lyon is a 58 y.o. adult with a history of type 2 diabetes  since age 19. Reports complications of neuropathy with charcot foot and history of TMA right food, CKD, and retinopathy. Hx Gastric sleeve in 2022, off insulin since that time.     Seeing podiatry-- appt next week  Needs eye exam, will make appt      Ran out of Dexcom Sensors last week, was not downloaded today.   Brief review of  CGM -- Average glucose 153 over past 30 days, 77% in range, 1% low, 22% high.      Current regimen:   Trulicity 4.5mg weekly     Has hypertension: Taking lisiopril  Has hyperlipidemia: Taking atorvastatin        Patient Active Problem List   Diagnosis   • Essential hypertension   • HLD (hyperlipidemia)   • PAD (peripheral artery disease) ([facility])   •  Diabetes mellitus with stage 2 chronic kidney disease  ([facility])   • Anemia   • Leukocytosis   • Toe amputation status, left   • Critical lower limb ischemia ([facility])   • Polyneuropathy associated with underlying disease ([facility])   • Charcot foot due to diabetes mellitus ([facility])   • History of transmetatarsal amputation of foot ([facility])   • Class 2 severe obesity due to excess calories with serious comorbidity and body mass index (BMI) of 36.0 to 36.9 in adult ([facility])   • Cellulitis of right lower extremity   • 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, with necrosis of bone ([facility])   • S/P transmetatarsal amputation of foot, right ([facility])   • Persistent proteinuria   • Vitamin D deficiency   • Obstructive sleep apnea   • Preop cardiovascular exam   • Vaginal candida   • CKD (chronic kidney disease) stage 2, GFR 60-89 ml/min   • Right foot pain   • Nicotine dependence in remission   • Obesity, Class II, BMI 35-39.9   • Type 2 diabetes mellitus with diabetic polyneuropathy, without long-term current use of insulin ([facility])   • Abnormal EKG   • Stage 3 chronic kidney disease, unspecified whether stage 3a or 3b CKD ([facility])      Past Medical History:   Diagnosis Date   • Ambulates with cane    • Bariatric surgery status    • Cellulitis of toe 2019   • Chronic kidney disease    • CKD (chronic kidney disease)    • CPAP (continuous positive airway pressure) dependence    • Diabetes mellitus ([facility])    • Gangrene of toe of right foot ([facility]) 10/30/2019    Added automatically from request for surgery [id number]   • Hyperlipidemia    • Hypertension    • Obese     gastric  sleeve today 2022   • PVD (peripheral vascular disease) ([facility])    • Seasonal allergies    • Sleep apnea    • Teeth missing    • Wears glasses       Past Surgical History:   Procedure Laterality Date   •  SECTION     • EGD     • IR ABDOMINAL AORTAGRAM  2019   • IR AORTAGRAM WITH RUN-OFF  2020   • IR ARTERIAL LYSIS  2019   • IR LOWER  EXTREMITY / INTERVENTION  2019   • IR LOWER EXTREMITY ANGIOGRAM  2022   • IR TPA LYSIS CHECK  2022   • DC AMPUTATION FOOT TRANSMETARSAL Right 2019    Procedure: AMPUTATION TRANSMETATARSAL (TMA) WITH ACHILLES TENDON LENGTHING;  Surgeon: Edinson Bragg DPM;  Location: [facility];  Service: Podiatry   • DC AMPUTATION FOOT TRANSMETARSAL Right 2020    Procedure: AMPUTATION TRANSMETATARSAL (TMA);  Surgeon: Jd Hartley DPM;  [facility]: [facility];  Service: Podiatry   • DC BREAST REDUCTION Bilateral 2023    Procedure: BREAST REDUCTION;  Surgeon: Alice Lucas MD;  Location:  MAIN OR;  Service: Plastics   • DC LAPS GSTRC RSTRICTIV PX LONGITUDINAL GASTRECTOMY N/A 2022    Procedure: LAPAROSCOPIC SLEEVE GASTRECTOMY & INTRAOPERATIVE EGD;  Surgeon: Lavon Calderon MD;  Location: [facility];  Service: Bariatrics   • TOE AMPUTATION Left 2019    Procedure: AMPUTATION 5th TOE;  Surgeon: Edinson Bragg DPM;  Location: [facility];  Service: Podiatry   • TOE AMPUTATION Right 2019    Procedure: AMPUTATION RIGHT 3RD AND 4TH TOE;  Surgeon: Edinson Bragg DPM;  [facility];  Service: Podiatry      Family History   Problem Relation Age of Onset   • Diabetes Mother    • No Known Problems Father    • Diabetes Sister    • Breast cancer Maternal Aunt      Social History     Tobacco Use   • Smoking status: Former     Current packs/day: 0.00     Average packs/day: 2.0 packs/day for 20.0 years (40.0 ttl pk-yrs)     Types: Cigarettes     Start date: 1993     Quit date: 2013     Years since quittin.4   • Smokeless tobacco: Never   Substance Use Topics   • Alcohol use: Yes     Alcohol/week: 2.0 standard drinks of alcohol     Types: 2 Glasses of wine per week     Comment: rarely     Allergies   Allergen Reactions   • Levemir [Insulin Detemir] Itching         Current Outpatient Medications:   •  Continuous Glucose Sensor (Dexcom G7 Sensor), Use 1 Device every 10 days, Disp: 3  each, Rfl: 5  •  aspirin 81 mg chewable tablet, Chew 1 tablet (81 mg total) daily, Disp: , Rfl:   •  atorvastatin (LIPITOR) 20 mg tablet, Take 1 tablet (20 mg total) by mouth daily, Disp: 90 tablet, Rfl: 0  •  Blood Glucose Monitoring Suppl (ONE TOUCH ULTRA MINI) w/Device KIT, Use 4 times a day, Disp: 1 kit, Rfl: 2  •  clopidogrel (PLAVIX) 75 mg tablet, Take 1 tablet (75 mg total) by mouth daily, Disp: 90 tablet, Rfl: 3  •  Continuous Blood Gluc  (Dexcom G7 ) KATE, Use 1 each daily Use per  guidelines, Disp: 1 each, Rfl: 0  •  docusate sodium (COLACE) 100 mg capsule, Take 1 capsule (100 mg total) by mouth 2 (two) times a day, Disp: 60 capsule, Rfl: 1  •  dulaglutide (Trulicity) 4.5 MG/0.5ML injection, Inject 0.5 mL (4.5 mg total) under the skin every 7 days, Disp: 2.12 mL, Rfl: 2  •  glucose blood (OneTouch Ultra) test strip, Use 1 each 3 (three) times a day Test, Disp: 300 strip, Rfl: 0  •  lisinopril (ZESTRIL) 5 mg tablet, Take 5 mg by mouth daily, Disp: , Rfl:   •  metoprolol tartrate (LOPRESSOR) 25 mg tablet, Take 1 tablet (25 mg total) by mouth every 12 (twelve) hours, Disp: 180 tablet, Rfl: 1  •  OneTouch Delica Lancets 33G MISC, Use to test 3x daily, Disp: 300 each, Rfl: 2  •  pantoprazole (PROTONIX) 40 mg tablet, TAKE 1 TABLET(40 MG) BY MOUTH DAILY, Disp: 90 tablet, Rfl: 0  •  polyethylene glycol (COLYTE) 4000 mL solution, Take 4,000 mL by mouth once for 1 dose, Disp: 4000 mL, Rfl: 0    Review of Systems   Constitutional:  Negative for activity change, appetite change, chills, diaphoresis, fatigue, fever and unexpected weight change.   HENT:  Negative for trouble swallowing and voice change.    Eyes:  Negative for visual disturbance.   Respiratory:  Negative for shortness of breath.    Cardiovascular:  Negative for chest pain and palpitations.   Gastrointestinal:  Negative for abdominal pain, constipation and diarrhea.   Endocrine: Negative for cold intolerance, heat intolerance,  "polydipsia, polyphagia and polyuria.   Genitourinary:  Negative for frequency and menstrual problem.   Musculoskeletal:  Positive for gait problem. Negative for arthralgias and myalgias.   Skin:  Negative for rash.   Allergic/Immunologic: Negative for food allergies.   Neurological:  Negative for dizziness and tremors.   Hematological:  Negative for adenopathy.   Psychiatric/Behavioral:  Negative for sleep disturbance.    All other systems reviewed and are negative.      Physical Exam:  Body mass index is 24.2 kg/m².  /78 (BP Location: Left arm, Patient Position: Sitting, Cuff Size: Adult)   Pulse 78   Ht 5' 0.5\" (1.537 m)   Wt 57.2 kg (126 lb)   LMP  (LMP Unknown)   SpO2 99%   BMI 24.20 kg/m²    Wt Readings from Last 3 Encounters:   05/28/24 57.2 kg (126 lb)   04/11/24 58.5 kg (129 lb)   02/06/24 60.3 kg (133 lb)       Physical Exam  Vitals reviewed.   Constitutional:       General: Niesha is not in acute distress.     Appearance: Niesha is well-developed.   HENT:      Head: Normocephalic and atraumatic.   Eyes:      Conjunctiva/sclera: Conjunctivae normal.      Pupils: Pupils are equal, round, and reactive to light.   Neck:      Thyroid: No thyromegaly.   Cardiovascular:      Rate and Rhythm: Normal rate and regular rhythm.      Heart sounds: Normal heart sounds. No murmur heard.  Pulmonary:      Effort: Pulmonary effort is normal. No respiratory distress.      Breath sounds: Normal breath sounds. No wheezing or rales.   Abdominal:      General: Bowel sounds are normal. There is no distension.      Palpations: Abdomen is soft.      Tenderness: There is no abdominal tenderness.   Musculoskeletal:         General: Normal range of motion.      Cervical back: Normal range of motion and neck supple.   Lymphadenopathy:      Cervical: No cervical adenopathy.   Skin:     General: Skin is warm and dry.   Neurological:      Mental Status: Niesha is alert and oriented to person, place, and time.           Labs: "   Lab Results   Component Value Date    HGBA1C 6.9 (A) 05/28/2024    HGBA1C 7.7 (A) 12/06/2023    HGBA1C 7.2 (H) 07/10/2023     Lab Results   Component Value Date    CREATININE 0.58 (L) 07/10/2023    CREATININE 0.82 09/13/2022    CREATININE 0.97 05/12/2022    BUN 13 07/10/2023    K 4.0 07/10/2023     07/10/2023    CO2 29 07/10/2023     eGFR   Date Value Ref Range Status   09/01/2020 64 ml/min/1.73sq m Final     Lab Results   Component Value Date    HDL 54 07/10/2023    TRIG 123 07/10/2023     Lab Results   Component Value Date    ALT 30 07/10/2023    AST 30 07/10/2023    ALKPHOS 75 07/10/2023     Lab Results   Component Value Date    VMT1IVDLVAGS 3.013 04/22/2021    BVN3XHDRDATQ 2.180 05/06/2020     Lab Results   Component Value Date    FREET4 1.17 05/06/2020       Discussed with the patient and all questioned fully answered. Niesha will call me if any problems arise.    Follow-up appointment in 6 months.     Counseled patient on diagnostic results, prognosis, risk and benefit of treatment options, instruction for management, importance of treatment compliance, Risk  factor reduction and impressions    Rosa Christie PA-C

## 2024-06-01 DIAGNOSIS — E11.22 DIABETES MELLITUS WITH STAGE 2 CHRONIC KIDNEY DISEASE  (HCC): ICD-10-CM

## 2024-06-01 DIAGNOSIS — N18.2 DIABETES MELLITUS WITH STAGE 2 CHRONIC KIDNEY DISEASE  (HCC): ICD-10-CM

## 2024-06-03 RX ORDER — DULAGLUTIDE 4.5 MG/.5ML
4.5 INJECTION, SOLUTION SUBCUTANEOUS
Qty: 2 ML | Refills: 1 | Status: SHIPPED | OUTPATIENT
Start: 2024-06-03

## 2024-06-26 ENCOUNTER — OFFICE VISIT (OUTPATIENT)
Dept: PODIATRY | Facility: CLINIC | Age: 59
End: 2024-06-26
Payer: COMMERCIAL

## 2024-06-26 VITALS
HEIGHT: 61 IN | HEART RATE: 75 BPM | SYSTOLIC BLOOD PRESSURE: 160 MMHG | DIASTOLIC BLOOD PRESSURE: 94 MMHG | WEIGHT: 126 LBS | BODY MASS INDEX: 23.79 KG/M2

## 2024-06-26 DIAGNOSIS — E11.42 TYPE 2 DIABETES MELLITUS WITH DIABETIC POLYNEUROPATHY, WITHOUT LONG-TERM CURRENT USE OF INSULIN (HCC): ICD-10-CM

## 2024-06-26 DIAGNOSIS — Z89.431 S/P TRANSMETATARSAL AMPUTATION OF FOOT, RIGHT (HCC): ICD-10-CM

## 2024-06-26 DIAGNOSIS — B35.1 TINEA UNGUIUM: Primary | ICD-10-CM

## 2024-06-26 DIAGNOSIS — I73.9 PAD (PERIPHERAL ARTERY DISEASE) (HCC): ICD-10-CM

## 2024-06-26 PROCEDURE — RECHECK: Performed by: PODIATRIST

## 2024-06-26 PROCEDURE — 11720 DEBRIDE NAIL 1-5: CPT | Performed by: PODIATRIST

## 2024-06-26 NOTE — PROGRESS NOTES
Niesha BraxtonCharan  1965  AT RISK FOOT CARE    1. Tinea unguium        2. S/P transmetatarsal amputation of foot, right ([facility])        3. PAD (peripheral artery disease) ([facility])        4. Type 2 diabetes mellitus with diabetic polyneuropathy, without long-term current use of insulin ([facility])            Patient presents for at-risk foot care.  Patient has no acute concerns today.  Patient has significant lower extremity risk due to previous amputation.    On exam patient has thickened, hypertrophic, discolored, brittle toenails with subungual debris and tenderness x3   Callus: minimal  Amputation: right TMA, left 4,5 amputations stable    Today's treatment includes:  Debridement of toenails. Using nail nipper, kacie, and curette, nails were sharply debrided, reduced in thickness and length. Devitalized nail tissue and fungal debris excised and removed. Patient tolerated well.        Discussed proper shoe gear, daily inspections of feet, and general foot health with patient. Patient has Q7  findings and is recommended for at risk foot care every 9-10 weeks.    Patients most recent complete clinical exam was performed: 4/11/2024

## 2024-07-29 ENCOUNTER — OFFICE VISIT (OUTPATIENT)
Dept: BARIATRICS | Facility: CLINIC | Age: 59
End: 2024-07-29
Payer: COMMERCIAL

## 2024-07-29 VITALS
HEART RATE: 77 BPM | WEIGHT: 128 LBS | TEMPERATURE: 96.1 F | OXYGEN SATURATION: 99 % | BODY MASS INDEX: 25.13 KG/M2 | HEIGHT: 60 IN | SYSTOLIC BLOOD PRESSURE: 130 MMHG | DIASTOLIC BLOOD PRESSURE: 90 MMHG

## 2024-07-29 DIAGNOSIS — I10 ESSENTIAL HYPERTENSION: ICD-10-CM

## 2024-07-29 DIAGNOSIS — N18.2 DIABETES MELLITUS WITH STAGE 2 CHRONIC KIDNEY DISEASE  (HCC): ICD-10-CM

## 2024-07-29 DIAGNOSIS — I73.9 PAD (PERIPHERAL ARTERY DISEASE) (HCC): ICD-10-CM

## 2024-07-29 DIAGNOSIS — K91.2 POSTSURGICAL MALABSORPTION: ICD-10-CM

## 2024-07-29 DIAGNOSIS — Z98.84 BARIATRIC SURGERY STATUS: ICD-10-CM

## 2024-07-29 DIAGNOSIS — E11.22 DIABETES MELLITUS WITH STAGE 2 CHRONIC KIDNEY DISEASE  (HCC): ICD-10-CM

## 2024-07-29 DIAGNOSIS — Z48.815 ENCOUNTER FOR SURGICAL AFTERCARE FOLLOWING SURGERY OF DIGESTIVE SYSTEM: Primary | ICD-10-CM

## 2024-07-29 PROCEDURE — 3066F NEPHROPATHY DOC TX: CPT | Performed by: PHYSICIAN ASSISTANT

## 2024-07-29 PROCEDURE — 99213 OFFICE O/P EST LOW 20 MIN: CPT | Performed by: PHYSICIAN ASSISTANT

## 2024-07-29 PROCEDURE — 3075F SYST BP GE 130 - 139MM HG: CPT | Performed by: PHYSICIAN ASSISTANT

## 2024-07-29 PROCEDURE — 3080F DIAST BP >= 90 MM HG: CPT | Performed by: PHYSICIAN ASSISTANT

## 2024-07-29 NOTE — PATIENT INSTRUCTIONS
Follow-up in 1 year. We kindly ask that your arrive 15 minutes before your scheduled appointment time with your provider to allow our staff to room you, get your vital signs and update your chart.    Get lab work done Please call the office if you need a script.  It is recommended to check with your insurance BEFORE getting labs done to make sure they are covered by your policy.      Call our office if you have any problems with abdominal pain especially associated with fever, chills, nausea, vomiting or any other concerns.    All  Post-bariatric surgery patients should be aware that very small quantities of any alcohol can cause impairment and it is very possible not to feel the effect. The effect can be in the system for several hours.  It is also a stomach irritant.     It is advised to AVOID alcohol, Nonsteroidal antiinflammatory drugs (NSAIDS) and nicotine of all forms . Any of these can cause stomach irritation/pain.    Discussed the effects of alcohol on a bariatric patient and the increased impairment risk.     Keep up the good work!    He has established follow-up with neurosurgery, no need for further intervention while in the hospital  No Need for urgent lumbar puncture

## 2024-07-29 NOTE — PROGRESS NOTES
Assessment/Plan:     Patient ID: Niesha Lyon is a 58 y.o. adult.     Bariatric Surgery Status    -s/p Vertical Sleeve Gastrectomy with Dr. Calderon on 2/28/2022. Presents to the office today for postop doing well     continue daily PPI as she takes daily aspirin      DM with stage 2 CKD   - off metforim and lantus   - is on trulicity, managed by endo. States her sugars overall stable        HTN  - continue antihypertensives as prescribed      ABHINAV  -needs to make follow up    Continued/Maintain healthy weight loss with good nutrition intakes.  Adequate hydration with at least 64oz. fluid intake.  Follow diet as discussed.  Follow vitamin and mineral recommendations as reviewed with you.  Exercise as tolerated.    Colonoscopy referral made: utd  Mammo: utd    Follow-up in 1 year. We kindly ask that your arrive 15 minutes before your scheduled appointment time with your provider to allow our staff to room you, get your vital signs and update your chart.    Get lab work done Please call the office if you need a script.  It is recommended to check with your insurance BEFORE getting labs done to make sure they are covered by your policy.      Call our office if you have any problems with abdominal pain especially associated with fever, chills, nausea, vomiting or any other concerns.    All  Post-bariatric surgery patients should be aware that very small quantities of any alcohol can cause impairment and it is very possible not to feel the effect. The effect can be in the system for several hours.  It is also a stomach irritant.     It is advised to AVOID alcohol, Nonsteroidal antiinflammatory drugs (NSAIDS) and nicotine of all forms . Any of these can cause stomach irritation/pain.    Discussed the effects of alcohol on a bariatric patient and the increased impairment risk.     Keep up the good work!     Postsurgical Malabsorption   -At risk for malabsorption of vitamins/minerals secondary to malabsorption and  restriction of intake from bariatric surgery  -Currently taking adequate postop bariatric surgery vitamin supplementation  -Next set of bariatric labs ordered for approximately 2 weeks  -Patient received education about the importance of adhering to a lifelong supplementation regimen to avoid vitamin/mineral deficiencies      Diagnoses and all orders for this visit:    Encounter for surgical aftercare following surgery of digestive system  -     Zinc; Future  -     Vitamin D 25 hydroxy; Future  -     Vitamin B12; Future  -     Vitamin B1, whole blood; Future  -     Vitamin A; Future  -     PTH, intact; Future  -     CBC and Platelet; Future  -     Ferritin; Future  -     Folate; Future  -     TIBC Panel (incl. Iron, TIBC, % Iron Saturation); Future    Bariatric surgery status  -     Zinc; Future  -     Vitamin D 25 hydroxy; Future  -     Vitamin B12; Future  -     Vitamin B1, whole blood; Future  -     Vitamin A; Future  -     PTH, intact; Future  -     CBC and Platelet; Future  -     Ferritin; Future  -     Folate; Future  -     TIBC Panel (incl. Iron, TIBC, % Iron Saturation); Future    Postsurgical malabsorption  -     Zinc; Future  -     Vitamin D 25 hydroxy; Future  -     Vitamin B12; Future  -     Vitamin B1, whole blood; Future  -     Vitamin A; Future  -     PTH, intact; Future  -     CBC and Platelet; Future  -     Ferritin; Future  -     Folate; Future  -     TIBC Panel (incl. Iron, TIBC, % Iron Saturation); Future    BMI 25.0-25.9,adult  -     Zinc; Future  -     Vitamin D 25 hydroxy; Future  -     Vitamin B12; Future  -     Vitamin B1, whole blood; Future  -     Vitamin A; Future  -     PTH, intact; Future  -     CBC and Platelet; Future  -     Ferritin; Future  -     Folate; Future  -     TIBC Panel (incl. Iron, TIBC, % Iron Saturation); Future    Diabetes mellitus with stage 2 chronic kidney disease  (HCC)  -     Zinc; Future  -     Vitamin D 25 hydroxy; Future  -     Vitamin B12; Future  -     Vitamin  B1, whole blood; Future  -     Vitamin A; Future  -     PTH, intact; Future  -     CBC and Platelet; Future  -     Ferritin; Future  -     Folate; Future  -     TIBC Panel (incl. Iron, TIBC, % Iron Saturation); Future    Essential hypertension  -     Zinc; Future  -     Vitamin D 25 hydroxy; Future  -     Vitamin B12; Future  -     Vitamin B1, whole blood; Future  -     Vitamin A; Future  -     PTH, intact; Future  -     CBC and Platelet; Future  -     Ferritin; Future  -     Folate; Future  -     TIBC Panel (incl. Iron, TIBC, % Iron Saturation); Future    PAD (peripheral artery disease) (HCC)  -     Zinc; Future  -     Vitamin D 25 hydroxy; Future  -     Vitamin B12; Future  -     Vitamin B1, whole blood; Future  -     Vitamin A; Future  -     PTH, intact; Future  -     CBC and Platelet; Future  -     Ferritin; Future  -     Folate; Future  -     TIBC Panel (incl. Iron, TIBC, % Iron Saturation); Future         Subjective:      Patient ID: Niesha Lyon is a 58 y.o. adult.    Date of surgery: 2/8/2022  Procedure: sleeve  Performing surgeon: Dr Calderon     Initial Weight - 193.0 lbs   Current Weight - 128.0 lbs  Total Body Weight Loss (EWL)- 65.0 lbs   EWL% - 99%   TWB % - 34%      Current BMI is Body mass index is 25 kg/m².    Tolerating a regular diet-yes  Eating at least 60 grams of protein per day-yes  Drinking at least 64 ounces of fluid per day-yes  Drinking carbonated beverages-no  Sufficient exercise-light   Using NSAIDs regularly-no  Using nicotine-no  Using alcohol-occasional   Supplements: MVI       The following portions of the patient's history were reviewed and updated as appropriate: allergies, current medications, past family history, past medical history, past social history, past surgical history and problem list.    Review of Systems   Constitutional: Negative.    Respiratory: Negative.     Cardiovascular: Negative.    Gastrointestinal: Negative.    Neurological: Negative.     Psychiatric/Behavioral: Negative.           Objective:    /90 (BP Location: Left arm, Patient Position: Sitting, Cuff Size: Large)   Pulse 77   Temp (!) 96.1 °F (35.6 °C) (Tympanic)   Ht 5' (1.524 m)   Wt 58.1 kg (128 lb)   LMP  (LMP Unknown)   SpO2 99%   BMI 25.00 kg/m²      Physical Exam  Vitals and nursing note reviewed.   Constitutional:       Appearance: Normal appearance.   HENT:      Head: Normocephalic and atraumatic.   Eyes:      Extraocular Movements: Extraocular movements intact.      Pupils: Pupils are equal, round, and reactive to light.   Cardiovascular:      Rate and Rhythm: Normal rate.   Pulmonary:      Effort: Pulmonary effort is normal.   Musculoskeletal:         General: Normal range of motion.      Cervical back: Normal range of motion.   Skin:     General: Skin is warm and dry.   Neurological:      General: No focal deficit present.      Mental Status: Niesha is alert and oriented to person, place, and time.   Psychiatric:         Mood and Affect: Mood normal.

## 2024-07-29 NOTE — PROGRESS NOTES
Date of surgery: 2/8/2022  Procedure: sleeve  Performing surgeon: Dr Calderon    Initial Weight - 193.0 lbs   Current Weight - 128.0 lbs  Total Body Weight Loss (EWL)- 65.0 lbs   EWL% - 99%   TWB % - 34%

## 2024-08-07 DIAGNOSIS — E11.22 DIABETES MELLITUS WITH STAGE 2 CHRONIC KIDNEY DISEASE  (HCC): ICD-10-CM

## 2024-08-07 DIAGNOSIS — N18.2 DIABETES MELLITUS WITH STAGE 2 CHRONIC KIDNEY DISEASE  (HCC): ICD-10-CM

## 2024-08-07 RX ORDER — DULAGLUTIDE 4.5 MG/.5ML
4.5 INJECTION, SOLUTION SUBCUTANEOUS
Qty: 2 ML | Refills: 0 | Status: SHIPPED | OUTPATIENT
Start: 2024-08-07

## 2024-09-03 DIAGNOSIS — E11.22 DIABETES MELLITUS WITH STAGE 2 CHRONIC KIDNEY DISEASE  (HCC): ICD-10-CM

## 2024-09-03 DIAGNOSIS — N18.2 DIABETES MELLITUS WITH STAGE 2 CHRONIC KIDNEY DISEASE  (HCC): ICD-10-CM

## 2024-09-03 RX ORDER — DULAGLUTIDE 4.5 MG/.5ML
4.5 INJECTION, SOLUTION SUBCUTANEOUS
Qty: 2 ML | Refills: 5 | Status: SHIPPED | OUTPATIENT
Start: 2024-09-03

## 2024-09-11 ENCOUNTER — OFFICE VISIT (OUTPATIENT)
Dept: PODIATRY | Facility: CLINIC | Age: 59
End: 2024-09-11
Payer: COMMERCIAL

## 2024-09-11 VITALS
DIASTOLIC BLOOD PRESSURE: 90 MMHG | HEART RATE: 81 BPM | BODY MASS INDEX: 25.13 KG/M2 | SYSTOLIC BLOOD PRESSURE: 132 MMHG | HEIGHT: 60 IN | WEIGHT: 128 LBS

## 2024-09-11 DIAGNOSIS — B35.1 TINEA UNGUIUM: ICD-10-CM

## 2024-09-11 DIAGNOSIS — L84 CALLUS OF FOOT: ICD-10-CM

## 2024-09-11 DIAGNOSIS — Z89.431 S/P TRANSMETATARSAL AMPUTATION OF FOOT, RIGHT (HCC): ICD-10-CM

## 2024-09-11 DIAGNOSIS — E11.42 TYPE 2 DIABETES MELLITUS WITH DIABETIC POLYNEUROPATHY, WITHOUT LONG-TERM CURRENT USE OF INSULIN (HCC): Primary | ICD-10-CM

## 2024-09-11 DIAGNOSIS — I73.9 PAD (PERIPHERAL ARTERY DISEASE) (HCC): ICD-10-CM

## 2024-09-11 PROCEDURE — 11720 DEBRIDE NAIL 1-5: CPT | Performed by: PODIATRIST

## 2024-09-11 PROCEDURE — 11055 PARING/CUTG B9 HYPRKER LES 1: CPT | Performed by: PODIATRIST

## 2024-09-11 PROCEDURE — RECHECK: Performed by: PODIATRIST

## 2024-09-11 NOTE — PROGRESS NOTES
"Niesha Johnsonleatha  1965  AT RISK FOOT CARE    1. PAD (peripheral artery disease) ([facility])        2. Type 2 diabetes mellitus with diabetic polyneuropathy, without long-term current use of insulin ([facility])        3. S/P transmetatarsal amputation of foot, right ([facility])        4. Tinea unguium            Patient presents for at-risk foot care.  Patient has no acute concerns today.  Patient has significant lower extremity risk due to previous amputation.    On exam patient has thickened, hypertrophic, discolored, brittle toenails with subungual debris and tenderness x3   Callus: left sub met 1  Amputation: right TMA intact, no concerns.     Today's treatment includes:  Debridement of toenails. Using nail nipper, kacie, and curette, nails were sharply debrided, reduced in thickness and length. Devitalized nail tissue and fungal debris excised and removed. Patient tolerated well.      Lesion Destruction    Date/Time: 9/11/2024 11:15 AM    Performed by: Jd Hartley DPM  Authorized by: Jd Hartley DPM  Universal Protocol:  Consent: Verbal consent obtained.  Risks and benefits: risks, benefits and alternatives were discussed  Consent given by: patient  Time out: Immediately prior to procedure a \"time out\" was called to verify the correct patient, procedure, equipment, support staff and site/side marked as required.  Timeout called at: 9/11/2024 11:33 AM.  Patient understanding: patient states understanding of the procedure being performed  Patient identity confirmed: verbally with patient    Procedure Details - Lesion Destruction:     Number of Lesions:  1  Lesion 1:     Body area:  Lower extremity    Lower extremity location:  L foot    Malignancy: benign hyperkeratotic lesion      Destruction method: scissors used for extraction        Discussed proper shoe gear, daily inspections of feet, and general foot health with patient. Patient has Q7  findings and is recommended for at risk foot care every " 9-10 weeks.    Patients most recent complete clinical exam was performed: 4/11/2024

## 2024-09-16 DIAGNOSIS — I73.9 PAD (PERIPHERAL ARTERY DISEASE) (HCC): ICD-10-CM

## 2024-09-17 DIAGNOSIS — I73.9 PAD (PERIPHERAL ARTERY DISEASE) (HCC): ICD-10-CM

## 2024-09-17 DIAGNOSIS — I70.229 CRITICAL LOWER LIMB ISCHEMIA (HCC): ICD-10-CM

## 2024-09-17 RX ORDER — CLOPIDOGREL BISULFATE 75 MG/1
75 TABLET ORAL DAILY
Qty: 90 TABLET | Refills: 3 | Status: SHIPPED | OUTPATIENT
Start: 2024-09-17

## 2024-09-17 NOTE — TELEPHONE ENCOUNTER
Approved.  Please advise patient to have CBC and hepatic function drawn at her earliest convenience

## 2024-09-19 ENCOUNTER — TELEPHONE (OUTPATIENT)
Dept: NEPHROLOGY | Facility: CLINIC | Age: 59
End: 2024-09-19

## 2024-09-19 DIAGNOSIS — R80.1 PERSISTENT PROTEINURIA: ICD-10-CM

## 2024-09-19 DIAGNOSIS — N18.2 CKD (CHRONIC KIDNEY DISEASE) STAGE 2, GFR 60-89 ML/MIN: Primary | ICD-10-CM

## 2024-09-19 DIAGNOSIS — E55.9 VITAMIN D DEFICIENCY: ICD-10-CM

## 2024-09-19 NOTE — TELEPHONE ENCOUNTER
Lm for patient to go for blood prior to appt next week if she had any questions call the office.

## 2024-09-19 NOTE — TELEPHONE ENCOUNTER
----- Message from Rossi Baron MD sent at 9/19/2024 11:54 AM EDT -----  Please get renal function panel vitamin D urine microalbumin creatinine ratio prior to the visit thank you  ----- Message -----  From: Palak Martinez MA  Sent: 9/18/2024   3:10 PM EDT  To: Rossi Baron MD    9/24 appt, no active labs.

## 2024-09-24 ENCOUNTER — OFFICE VISIT (OUTPATIENT)
Dept: NEPHROLOGY | Facility: CLINIC | Age: 59
End: 2024-09-24
Payer: COMMERCIAL

## 2024-09-24 VITALS — DIASTOLIC BLOOD PRESSURE: 100 MMHG | SYSTOLIC BLOOD PRESSURE: 150 MMHG | BODY MASS INDEX: 24.61 KG/M2 | WEIGHT: 126 LBS

## 2024-09-24 DIAGNOSIS — E78.2 MIXED HYPERLIPIDEMIA: ICD-10-CM

## 2024-09-24 DIAGNOSIS — N18.2 CKD (CHRONIC KIDNEY DISEASE) STAGE 2, GFR 60-89 ML/MIN: ICD-10-CM

## 2024-09-24 DIAGNOSIS — N18.2 DIABETES MELLITUS WITH STAGE 2 CHRONIC KIDNEY DISEASE  (HCC): Primary | ICD-10-CM

## 2024-09-24 DIAGNOSIS — E55.9 VITAMIN D DEFICIENCY: ICD-10-CM

## 2024-09-24 DIAGNOSIS — E11.22 DIABETES MELLITUS WITH STAGE 2 CHRONIC KIDNEY DISEASE  (HCC): Primary | ICD-10-CM

## 2024-09-24 DIAGNOSIS — R80.1 PERSISTENT PROTEINURIA: ICD-10-CM

## 2024-09-24 DIAGNOSIS — I10 ESSENTIAL HYPERTENSION: ICD-10-CM

## 2024-09-24 PROBLEM — E66.9 OBESITY, CLASS II, BMI 35-39.9: Status: RESOLVED | Noted: 2022-02-17 | Resolved: 2024-09-24

## 2024-09-24 PROBLEM — E66.812 OBESITY, CLASS II, BMI 35-39.9: Status: RESOLVED | Noted: 2022-02-17 | Resolved: 2024-09-24

## 2024-09-24 PROBLEM — N18.30 STAGE 3 CHRONIC KIDNEY DISEASE, UNSPECIFIED WHETHER STAGE 3A OR 3B CKD (HCC): Status: RESOLVED | Noted: 2024-02-06 | Resolved: 2024-09-24

## 2024-09-24 PROCEDURE — 99213 OFFICE O/P EST LOW 20 MIN: CPT | Performed by: INTERNAL MEDICINE

## 2024-09-24 RX ORDER — LISINOPRIL 10 MG/1
10 TABLET ORAL DAILY
Qty: 90 TABLET | Refills: 3 | Status: SHIPPED | OUTPATIENT
Start: 2024-09-24

## 2024-09-24 NOTE — PROGRESS NOTES
Nephrology Follow up Consultation  Niesha Lyon 58 y.o. adult MRN: [id number]            BACKGROUND:  Niesha Lyon is a 58 y.o.adult who was referred by Key Schwarz MD for evaluation of Follow-up and Chronic Kidney Disease  .      ASSESSMENT / PLAN:   58 y.o.  female with pmh of multiple co-morbidities including hypertension, diabetes, PAD and CKD stage 2 presents to the office for routine follow-up.     1. CKD stage II:  - Patient has a baseline creatinine of 0.9-1.1mg/dL. Most recent labs show a Creatinine of 0.58 mg/dL on 7/10/23. Renal function remains stable and at baseline.  No recent lab work encourage patient to get blood work after the visit  - patient likely has underlying CKD secondary to diabetic glomerular nodular sclerosis plus hypertensive nephrosclerosis plus renal vascular disease plus age-related nephron loss.    - SPEP from June 20, 2019 within normal limits.  - renal ultrasound from July 9, 2019 shows right kidney 10.8 cm left kidney 10.5 cm.  No hydronephrosis no masses.  - Proteinuria - most recent protein creatinine ratio of 100 mg/dL as of September 2022, increased from prior.  Most recent micral and creatinine ratio 114 mg as of July 2023 check after the visit  - Acid base and lytes stable  - Clinically the patient appears to be euvolemic  - Recommend to avoid use of NSAIDs, nephrotoxins. Caution advised with regards to exposure to IV contrast dye.   - Discussed with the patient in depth her renal status, including the possible etiologies for CKD.   - Advised the patient that when her GFR is close to 20mL/min then will start discussing about RRT(renal replacement therapy) options such as renal transplant, peritoneal dialysis and hemodialysis.   - advised patient that she is at increased risk for CKD progression due to her episode of acute kidney injury.  - Informed the patient about the various options for Renal Replacement therapy.  - Discussed with the  patient how we need to work together to delay the progression of CKD with optimal BP control based on their age and co-morbidities, optimal BS control with HbA1c of <7% and trying to reduce proteinuria by the use of anti-proteinuric agents.     2. Hypertension:  BP Readings from Last 3 Encounters:   09/24/24 150/100   09/11/24 132/90   07/29/24 130/90     - Patient is on  Lopressor 25 mg p.o. B.i.d, lisinopril 5 mg p.o. q.day  -Increase to  lisinopril 10 mg p.o. daily  -Encourage patient if possible to get a blood pressure machine at home to check her blood pressures advised of low-salt diet.  - Goal BP of < 130/80 based on age and comorbidities  - Instructed to follow low sodium (2gm)diet.  - Advised to hold ACEI/ARBs if patient suffers from dehydration due to gastrointestinal losses due to risk of DEMETRIUS secondary to failure to autoregulate.    3. Hemoglobin:  - Goal Hb of 10-12 g/dL  - Most recent labs suggestive of 12 grams/deciliter.   - no role for IV iron at this time    4.CKD-MBD(Mineral Bone Disease)/vitamin-D deficiency:  - Based on patients CKD stage following is the goal of therapy.  - Maintain calcium phosphorus product of < 55.  - Most recent vitamin D level 41.8 as of 9/30/2022  - not on vit D  - prior to next visit check vitamin-D levels, and prior to next visit    5. Lipids:  - goal LDL less than 70  - management as per PCP  - on lipitor    6.DM:  - management as per Primary team  - most recent A1c of 6.9% as of May 2024 improved from prior  - advised patient for tighter glycemic control.  - on trulicity  -Positive evidence of diabetic retinopathy on most recent eye exam.  -Advised patient to be in touch with her PCP with regards to possible initiation of SGLT2 inhibitors to help with her diabetes as well as her CKD progression.  - readvised patient that if her renal parameters continued to deteriorate then she will need to be taken off the metformin.    7. PAD:  - Management as per primary team  -  follow-up with vascular  - status post right SFA occlusion with right lower extremity arteriogram on 04/30/2019  - status post right TMA on 11/07/2019.    8. Nutrition :  - Encouraged patient to follow a renal diet comprising of moderate potassium, low phosphorus and protein restriction to 0.8gm/kg.  Also advised patient to restrict fluid to 1.8 L per day.  - Will check serum albumin with next blood work.  Advised of dietary habits and weight loss    9. Followup:  - Patient is to follow-up in 12 months, with lab work to be performed after the visit and then again in in a few days prior to the next visit.   Advised patient to call me after she has blood work done.    CARLY Ryan, 9/24/2024, 10:30 AM             SUBJECTIVE: 58 y.o. female presents to the office for routine follow-up.  No lab work since last visit encourage patient to get blood work after the visit, unfortunately due to her daughter's work schedule could not go for blood work prior to the visit.  Did consume some extra salt food including alvarez lately.  Not checking blood pressures at home agreeable to increasing lisinopril.  Thankful for the care information that she is gone today no recent hospitalization no issues with edema.  Review of Systems   Constitutional:  Negative for chills and fever.   HENT:  Negative for congestion.    Respiratory:  Negative for shortness of breath and wheezing.    Cardiovascular:  Negative for leg swelling.   Gastrointestinal:  Negative for abdominal pain.   Genitourinary:  Negative for dysuria.   Neurological:  Negative for dizziness and headaches.   Psychiatric/Behavioral:  Negative for agitation and confusion.    All other systems reviewed and are negative.      PAST MEDICAL HISTORY:  Past Medical History:   Diagnosis Date    Ambulates with cane     Bariatric surgery status     Cellulitis of toe 01/28/2019    Chronic kidney disease     CKD (chronic kidney disease)     CPAP (continuous positive airway  pressure) dependence     Diabetes mellitus ([facility])     Gangrene of toe of right foot ([facility]) 10/30/2019    Added automatically from request for surgery [id number]    Hyperlipidemia     Hypertension     Obese     gastric  sleeve today 2022    PVD (peripheral vascular disease) ([facility])     Seasonal allergies     Sleep apnea     Teeth missing     Wears glasses        PROBLEM LIST    Patient Active Problem List   Diagnosis    Essential hypertension    HLD (hyperlipidemia)    PAD (peripheral artery disease) ([facility])    Diabetes mellitus with stage 2 chronic kidney disease  ([facility])    Anemia    Leukocytosis    Toe amputation status, left    Critical lower limb ischemia ([facility])    Polyneuropathy associated with underlying disease ([facility])    Charcot foot due to diabetes mellitus ([facility])    History of transmetatarsal amputation of foot ([facility])    Class 2 severe obesity due to excess calories with serious comorbidity and body mass index (BMI) of 36.0 to 36.9 in adult ([facility])    Cellulitis of right lower extremity    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, with necrosis of bone ([facility])    S/P transmetatarsal amputation of foot, right ([facility])    Persistent proteinuria    Vitamin D deficiency    Obstructive sleep apnea    Preop cardiovascular exam    Vaginal candida    CKD (chronic kidney disease) stage 2, GFR 60-89 ml/min    Right foot pain    Nicotine dependence in remission    Type 2 diabetes mellitus with diabetic polyneuropathy, without long-term current use of insulin ([facility])    Abnormal EKG       PAST SURGICAL HISTORY:  Past Surgical History:   Procedure Laterality Date     SECTION      EGD      IR ABDOMINAL AORTAGRAM  2019    IR AORTAGRAM WITH RUN-OFF  2020    IR ARTERIAL LYSIS  2019    IR LOWER EXTREMITY / INTERVENTION  2019    IR LOWER EXTREMITY ANGIOGRAM  2022    IR TPA LYSIS CHECK  2022    IA AMPUTATION FOOT TRANSMETARSAL Right 2019    Procedure: AMPUTATION  TRANSMETATARSAL (TMA) WITH ACHILLES TENDON LENGTHING;  Surgeon: Edinson Bragg DPM;  Location: [facility];  Service: Podiatry    CO AMPUTATION FOOT TRANSMETARSAL Right 05/07/2020    Procedure: AMPUTATION TRANSMETATARSAL (TMA);  Surgeon: Jd Hartley DPM;  Location: [facility];  Service: Podiatry    CO BREAST REDUCTION Bilateral 08/04/2023    Procedure: BREAST REDUCTION;  Surgeon: Alice Lucas MD;  Location:  MAIN OR;  Service: Plastics    CO LAPS GSTRC RSTRICTIV PX LONGITUDINAL GASTRECTOMY N/A 02/28/2022    Procedure: LAPAROSCOPIC SLEEVE GASTRECTOMY & INTRAOPERATIVE EGD;  Surgeon: Lavon Calderon MD;  Location: [facility];  Service: Bariatrics    SLEEVE GASTROPLASTY      TOE AMPUTATION Left 02/04/2019    Procedure: AMPUTATION 5th TOE;  Surgeon: Edinson Bragg DPM;  Location: [facility];  Service: Podiatry    TOE AMPUTATION Right 11/04/2019    Procedure: AMPUTATION RIGHT 3RD AND 4TH TOE;  Surgeon: Edinson Bragg DPM;  Location: [facility];  Service: Podiatry       SOCIAL HISTORY :   reports that Niesha quit smoking about 11 years ago. Niesha's smoking use included cigarettes. Niesha started smoking about 31 years ago. Niesha has a 40 pack-year smoking history. Niesha has never used smokeless tobacco. Niesha reports current alcohol use of about 2.0 standard drinks of alcohol per week. Niesha reports that Niesha does not use drugs.    FAMILY HISTORY:  Family History   Problem Relation Age of Onset    Diabetes Mother     No Known Problems Father     Diabetes Sister     Breast cancer Maternal Aunt        ALLERGIES:  Allergies   Allergen Reactions    Levemir [Insulin Detemir] Itching           PHYSICAL EXAM:  Vitals:    09/24/24 1014   BP: 150/100   BP Location: Right arm   Patient Position: Sitting   Cuff Size: Adult   Weight: 57.2 kg (126 lb)     Body mass index is 24.61 kg/m².    Physical Exam  Vitals reviewed.   Constitutional:       General: Niesha is not in acute distress.     Appearance: Normal appearance. Niesha  "is normal weight. Niesha is not ill-appearing, toxic-appearing or diaphoretic.   HENT:      Head: Normocephalic and atraumatic.      Mouth/Throat:      Mouth: Mucous membranes are moist.      Pharynx: No oropharyngeal exudate.   Eyes:      General: No scleral icterus.  Cardiovascular:      Rate and Rhythm: Normal rate.   Pulmonary:      Effort: No respiratory distress.      Breath sounds: Normal breath sounds. No stridor. No wheezing.   Abdominal:      Palpations: There is no mass.      Tenderness: There is no abdominal tenderness. There is no right CVA tenderness or left CVA tenderness.   Musculoskeletal:         General: No swelling.      Cervical back: Normal range of motion. No rigidity.   Skin:     Coloration: Skin is not jaundiced.   Neurological:      General: No focal deficit present.      Mental Status: Niesha is alert and oriented to person, place, and time.   Psychiatric:         Mood and Affect: Mood normal.         Behavior: Behavior normal.         LABORATORY DATA:           Invalid input(s): \"ALBUMIN\", \"INTACTPTH\", \"IRONSAT\"       rest all reviewed    RADIOLOGY:  No orders to display     Rest all reviewed        MEDICATIONS:    Current Outpatient Medications:     aspirin 81 mg chewable tablet, Chew 1 tablet (81 mg total) daily, Disp: , Rfl:     atorvastatin (LIPITOR) 20 mg tablet, Take 1 tablet (20 mg total) by mouth daily, Disp: 90 tablet, Rfl: 0    Blood Glucose Monitoring Suppl (ONE TOUCH ULTRA MINI) w/Device KIT, Use 4 times a day, Disp: 1 kit, Rfl: 2    clopidogrel (PLAVIX) 75 mg tablet, TAKE 1 TABLET(75 MG) BY MOUTH DAILY, Disp: 90 tablet, Rfl: 3    Continuous Blood Gluc  (Dexcom G7 ) KATE, Use 1 each daily Use per  guidelines, Disp: 1 each, Rfl: 0    Continuous Glucose Sensor (Dexcom G7 Sensor), Use 1 Device every 10 days, Disp: 3 each, Rfl: 5    docusate sodium (COLACE) 100 mg capsule, Take 1 capsule (100 mg total) by mouth 2 (two) times a day, Disp: 60 capsule, Rfl: " "1    dulaglutide (Trulicity) 4.5 MG/0.5ML injection, Inject 0.5 mL (4.5 mg total) under the skin every 7 days, Disp: 2 mL, Rfl: 5    glucose blood (OneTouch Ultra) test strip, Use 1 each 3 (three) times a day Test, Disp: 300 strip, Rfl: 0    lisinopril (ZESTRIL) 10 mg tablet, Take 1 tablet (10 mg total) by mouth daily, Disp: 90 tablet, Rfl: 3    metoprolol tartrate (LOPRESSOR) 25 mg tablet, Take 1 tablet (25 mg total) by mouth every 12 (twelve) hours, Disp: 180 tablet, Rfl: 1    OneTouch Delica Lancets 33G MISC, Use to test 3x daily, Disp: 300 each, Rfl: 2    pantoprazole (PROTONIX) 40 mg tablet, TAKE 1 TABLET(40 MG) BY MOUTH DAILY, Disp: 90 tablet, Rfl: 0    polyethylene glycol (COLYTE) 4000 mL solution, Take 4,000 mL by mouth once for 1 dose (Patient not taking: Reported on 7/29/2024), Disp: 4000 mL, Rfl: 0          Portions of the record may have been created with voice recognition software. Occasional wrong word or \"sound a like\" substitutions may have occurred due to the inherent limitations of voice recognition software. Read the chart carefully and recognize, using context, where substitutions have occurred.If you have any questions, please contact the dictating provider.      "

## 2024-10-03 ENCOUNTER — OFFICE VISIT (OUTPATIENT)
Dept: FAMILY MEDICINE CLINIC | Facility: CLINIC | Age: 59
End: 2024-10-03

## 2024-10-03 ENCOUNTER — TELEPHONE (OUTPATIENT)
Age: 59
End: 2024-10-03

## 2024-10-03 VITALS
WEIGHT: 127 LBS | BODY MASS INDEX: 24.94 KG/M2 | SYSTOLIC BLOOD PRESSURE: 136 MMHG | DIASTOLIC BLOOD PRESSURE: 72 MMHG | HEART RATE: 92 BPM | RESPIRATION RATE: 18 BRPM | HEIGHT: 60 IN | TEMPERATURE: 97.8 F | OXYGEN SATURATION: 99 %

## 2024-10-03 DIAGNOSIS — I73.9 PAD (PERIPHERAL ARTERY DISEASE) (HCC): ICD-10-CM

## 2024-10-03 DIAGNOSIS — E11.22 DIABETES MELLITUS WITH STAGE 2 CHRONIC KIDNEY DISEASE  (HCC): ICD-10-CM

## 2024-10-03 DIAGNOSIS — F17.211 NICOTINE DEPENDENCE, CIGARETTES, IN REMISSION: Primary | ICD-10-CM

## 2024-10-03 DIAGNOSIS — N18.2 DIABETES MELLITUS WITH STAGE 2 CHRONIC KIDNEY DISEASE  (HCC): ICD-10-CM

## 2024-10-03 DIAGNOSIS — Z23 NEED FOR COVID-19 VACCINE: ICD-10-CM

## 2024-10-03 DIAGNOSIS — Z23 ENCOUNTER FOR IMMUNIZATION: ICD-10-CM

## 2024-10-03 DIAGNOSIS — I10 ESSENTIAL HYPERTENSION: ICD-10-CM

## 2024-10-03 DIAGNOSIS — N18.2 CKD (CHRONIC KIDNEY DISEASE) STAGE 2, GFR 60-89 ML/MIN: ICD-10-CM

## 2024-10-03 DIAGNOSIS — I73.9 PAD (PERIPHERAL ARTERY DISEASE) (HCC): Primary | ICD-10-CM

## 2024-10-03 PROBLEM — E66.812 CLASS 2 SEVERE OBESITY DUE TO EXCESS CALORIES WITH SERIOUS COMORBIDITY AND BODY MASS INDEX (BMI) OF 36.0 TO 36.9 IN ADULT (HCC): Status: RESOLVED | Noted: 2020-01-27 | Resolved: 2024-10-03

## 2024-10-03 PROBLEM — E66.01 CLASS 2 SEVERE OBESITY DUE TO EXCESS CALORIES WITH SERIOUS COMORBIDITY AND BODY MASS INDEX (BMI) OF 36.0 TO 36.9 IN ADULT (HCC): Status: RESOLVED | Noted: 2020-01-27 | Resolved: 2024-10-03

## 2024-10-03 PROCEDURE — 91320 SARSCV2 VAC 30MCG TRS-SUC IM: CPT

## 2024-10-03 PROCEDURE — 99214 OFFICE O/P EST MOD 30 MIN: CPT

## 2024-10-03 PROCEDURE — 90673 RIV3 VACCINE NO PRESERV IM: CPT

## 2024-10-03 PROCEDURE — G0008 ADMIN INFLUENZA VIRUS VAC: HCPCS

## 2024-10-03 PROCEDURE — 90480 ADMN SARSCOV2 VAC 1/ONLY CMP: CPT

## 2024-10-03 RX ORDER — ATORVASTATIN CALCIUM 20 MG/1
20 TABLET, FILM COATED ORAL DAILY
Qty: 90 TABLET | Refills: 1 | Status: SHIPPED | OUTPATIENT
Start: 2024-10-03

## 2024-10-03 RX ORDER — BLOOD PRESSURE TEST KIT
KIT MISCELLANEOUS 2 TIMES DAILY
Qty: 1 KIT | Refills: 0 | Status: SHIPPED | OUTPATIENT
Start: 2024-10-03

## 2024-10-03 RX ORDER — ASPIRIN 81 MG/1
81 TABLET, CHEWABLE ORAL DAILY
Qty: 90 TABLET | Refills: 1 | Status: SHIPPED | OUTPATIENT
Start: 2024-10-03

## 2024-10-03 RX ORDER — METOPROLOL TARTRATE 25 MG/1
25 TABLET, FILM COATED ORAL EVERY 12 HOURS
Qty: 180 TABLET | Refills: 1 | Status: SHIPPED | OUTPATIENT
Start: 2024-10-03

## 2024-10-03 NOTE — ASSESSMENT & PLAN NOTE
- Continue ASA/Plavix/Statin  - Established with vascular  - Repeat Vas arterial Duplex scheduled on December      Orders:    aspirin 81 mg chewable tablet; Chew 1 tablet (81 mg total) daily

## 2024-10-03 NOTE — ASSESSMENT & PLAN NOTE
BP Readings from Last 3 Encounters:   10/03/24 136/72   09/24/24 150/100   09/11/24 132/90   - Goal BP of < 130/80 based on age and comorbidities  - Patient recently seen by nephrology on 9/24/24. Was recommended to increased lisinopril to 10 mg daily. Continue  Lopressor 25 mg p.o. B.i.d.  -  to check home BP.   - Instructed to follow low sodium (2gm)diet.  - Advised to hold ACEI/ARBs if patient suffers from dehydration due to gastrointestinal losses due to risk of DEMETRIUS secondary to failure to autoregulate.  - Discussed to obtain labs in 1-2 weeks.        Orders:    metoprolol tartrate (LOPRESSOR) 25 mg tablet; Take 1 tablet (25 mg total) by mouth every 12 (twelve) hours    atorvastatin (LIPITOR) 20 mg tablet; Take 1 tablet (20 mg total) by mouth daily    Blood Pressure KIT; Use 2 (two) times a day

## 2024-10-03 NOTE — ASSESSMENT & PLAN NOTE
Lab Results   Component Value Date    HGBA1C 6.9 (A) 05/28/2024   - Continue treatment per endocrinology  - Patient is on trulicity  - Have F/U with endocrinology on 12/3    Orders:    atorvastatin (LIPITOR) 20 mg tablet; Take 1 tablet (20 mg total) by mouth daily

## 2024-10-03 NOTE — ASSESSMENT & PLAN NOTE
Smoker since she was 16 years old. Stopped smoking 12 years ago  - Smoker for 30 years, stopped smoking <15 years  - Will obtain baseline CT lung

## 2024-10-03 NOTE — ASSESSMENT & PLAN NOTE
Lab Results   Component Value Date    EGFR 64 09/01/2020    EGFR 62 05/07/2020    EGFR 85 05/06/2020    CREATININE 0.58 (L) 07/10/2023    CREATININE 0.82 09/13/2022    CREATININE 0.97 05/12/2022   - Continue treatment per nephrology

## 2024-10-03 NOTE — PROGRESS NOTES
Ambulatory Visit  Name: Niesha Lyon      : 1965      MRN: [id number]  Encounter Provider: MIKEL Taylor  Encounter Date: 10/3/2024   Encounter department: [facility]    Assessment & Plan  Diabetes mellitus with stage 2 chronic kidney disease  (HCC)    Lab Results   Component Value Date    HGBA1C 6.9 (A) 2024   - Continue treatment per endocrinology  - Patient is on trulicity  - Have F/U with endocrinology on 12/3    Orders:    atorvastatin (LIPITOR) 20 mg tablet; Take 1 tablet (20 mg total) by mouth daily    Essential hypertension  BP Readings from Last 3 Encounters:   10/03/24 136/72   24 150/100   24 132/90   - Goal BP of < 130/80 based on age and comorbidities  - Patient recently seen by nephrology on 24. Was recommended to increased lisinopril to 10 mg daily. Continue  Lopressor 25 mg p.o. B.i.d.  -  to check home BP.   - Instructed to follow low sodium (2gm)diet.  - Advised to hold ACEI/ARBs if patient suffers from dehydration due to gastrointestinal losses due to risk of DEMETRIUS secondary to failure to autoregulate.  - Discussed to obtain labs in 1-2 weeks.        Orders:    metoprolol tartrate (LOPRESSOR) 25 mg tablet; Take 1 tablet (25 mg total) by mouth every 12 (twelve) hours    atorvastatin (LIPITOR) 20 mg tablet; Take 1 tablet (20 mg total) by mouth daily    Blood Pressure KIT; Use 2 (two) times a day    CKD (chronic kidney disease) stage 2, GFR 60-89 ml/min  Lab Results   Component Value Date    EGFR 64 2020    EGFR 62 2020    EGFR 85 2020    CREATININE 0.58 (L) 07/10/2023    CREATININE 0.82 2022    CREATININE 0.97 2022   - Continue treatment per nephrology         Nicotine dependence, cigarettes, in remission  Smoker since she was 16 years old. Stopped smoking 12 years ago  - Smoker for 30 years, stopped smoking <15 years  - Will obtain baseline CT lung cancer  screening        Orders:    CT lung screening program; Future    PAD (peripheral artery disease) (HCC)  - Continue ASA/Plavix/Statin  - Established with vascular  - Repeat Vas arterial Duplex scheduled on December      Orders:    aspirin 81 mg chewable tablet; Chew 1 tablet (81 mg total) daily    Encounter for immunization    Orders:    influenza vaccine, recombinant, PF, 0.5 mL IM (Flublok)    Need for COVID-19 vaccine    Orders:    COVID-19 Pfizer mRNA vaccine 12 yr and older (Comirnaty pre-filled syringe)       History of Present Illness     Niesha Lyon is a 58 years old female with past medical history of Ambulates with cane, Bariatric surgery status, Cellulitis of toe, Chronic kidney disease, CKD (chronic kidney disease), CPAP (continuous positive airway pressure) dependence, Diabetes mellitus (HCC), Gangrene of toe of right foot (HCC), Hyperlipidemia, Hypertension, Obese, PVD (peripheral vascular disease) (HCC), Seasonal allergies, Sleep apnea, Teeth missing, and Wears glasses.     Patient is here  to the clinic for management of her chronic medical conditions.  Patient's medical conditions are stable unless noted otherwise above.  Patient has no further complaints other than what is mentioned in the ROS.            Review of Systems   Constitutional: Negative.  Negative for chills, fatigue and fever.   HENT: Negative.  Negative for ear pain and sore throat.    Eyes: Negative.  Negative for pain and visual disturbance.   Respiratory: Negative.  Negative for cough and shortness of breath.    Cardiovascular: Negative.  Negative for chest pain and palpitations.   Gastrointestinal: Negative.  Negative for abdominal pain and vomiting.   Endocrine: Negative.    Genitourinary: Negative.  Negative for dysuria and hematuria.   Musculoskeletal: Negative.  Negative for arthralgias and back pain.   Skin: Negative.  Negative for color change and rash.   Allergic/Immunologic: Negative.    Neurological: Negative.   Negative for seizures and syncope.   Hematological: Negative.    Psychiatric/Behavioral: Negative.     All other systems reviewed and are negative.          Objective     /72 (BP Location: Right arm, Patient Position: Sitting, Cuff Size: Standard)   Pulse 92   Temp 97.8 °F (36.6 °C) (Temporal)   Resp 18   Ht 5' (1.524 m)   Wt 57.6 kg (127 lb)   LMP  (LMP Unknown)   SpO2 99%   BMI 24.80 kg/m²     Physical Exam  Vitals and nursing note reviewed.   Constitutional:       General: Niesha is not in acute distress.     Appearance: Normal appearance. Niesha is well-developed.   HENT:      Head: Normocephalic and atraumatic.      Right Ear: Tympanic membrane normal.      Left Ear: Tympanic membrane normal.      Nose: Nose normal.      Mouth/Throat:      Mouth: Mucous membranes are moist.   Eyes:      Conjunctiva/sclera: Conjunctivae normal.   Cardiovascular:      Rate and Rhythm: Normal rate and regular rhythm.      Heart sounds: No murmur heard.  Pulmonary:      Effort: Pulmonary effort is normal. No respiratory distress.      Breath sounds: Normal breath sounds.   Abdominal:      General: Abdomen is flat. Bowel sounds are normal.      Palpations: Abdomen is soft.      Tenderness: There is no abdominal tenderness.   Musculoskeletal:         General: No swelling. Normal range of motion.      Cervical back: Normal range of motion and neck supple.   Skin:     General: Skin is warm and dry.      Capillary Refill: Capillary refill takes less than 2 seconds.   Neurological:      General: No focal deficit present.      Mental Status: Niesha is alert and oriented to person, place, and time. Mental status is at baseline.   Psychiatric:         Mood and Affect: Mood normal.         Behavior: Behavior normal.         Thought Content: Thought content normal.         Judgment: Judgment normal.

## 2024-10-03 NOTE — TELEPHONE ENCOUNTER
Patient's daughter Bill called regarding patient's upcoming SHERYL scheduled for 12/12/24. Patient was informed by Central Scheduling that they will need a new order placed for the SHERYL, as the previous order was placed over a year ago.

## 2024-11-06 ENCOUNTER — LAB (OUTPATIENT)
Dept: LAB | Facility: HOSPITAL | Age: 59
End: 2024-11-06
Payer: COMMERCIAL

## 2024-11-06 ENCOUNTER — OFFICE VISIT (OUTPATIENT)
Dept: FAMILY MEDICINE CLINIC | Facility: CLINIC | Age: 59
End: 2024-11-06

## 2024-11-06 VITALS
SYSTOLIC BLOOD PRESSURE: 112 MMHG | RESPIRATION RATE: 18 BRPM | HEART RATE: 73 BPM | DIASTOLIC BLOOD PRESSURE: 74 MMHG | HEIGHT: 60 IN | TEMPERATURE: 97.6 F | OXYGEN SATURATION: 96 % | BODY MASS INDEX: 25.42 KG/M2 | WEIGHT: 129.5 LBS

## 2024-11-06 DIAGNOSIS — I10 ESSENTIAL HYPERTENSION: ICD-10-CM

## 2024-11-06 DIAGNOSIS — N18.2 DIABETES MELLITUS WITH STAGE 2 CHRONIC KIDNEY DISEASE  (HCC): ICD-10-CM

## 2024-11-06 DIAGNOSIS — Z98.84 BARIATRIC SURGERY STATUS: ICD-10-CM

## 2024-11-06 DIAGNOSIS — Z48.815 ENCOUNTER FOR SURGICAL AFTERCARE FOLLOWING SURGERY OF DIGESTIVE SYSTEM: ICD-10-CM

## 2024-11-06 DIAGNOSIS — N18.2 CKD (CHRONIC KIDNEY DISEASE) STAGE 2, GFR 60-89 ML/MIN: ICD-10-CM

## 2024-11-06 DIAGNOSIS — H93.12 TINNITUS OF LEFT EAR: ICD-10-CM

## 2024-11-06 DIAGNOSIS — H61.22 CERUMEN DEBRIS ON TYMPANIC MEMBRANE OF LEFT EAR: ICD-10-CM

## 2024-11-06 DIAGNOSIS — E55.9 VITAMIN D DEFICIENCY: ICD-10-CM

## 2024-11-06 DIAGNOSIS — E78.5 HYPERLIPIDEMIA, UNSPECIFIED HYPERLIPIDEMIA TYPE: ICD-10-CM

## 2024-11-06 DIAGNOSIS — Z00.00 MEDICARE ANNUAL WELLNESS VISIT, SUBSEQUENT: Primary | ICD-10-CM

## 2024-11-06 DIAGNOSIS — R80.1 PERSISTENT PROTEINURIA: ICD-10-CM

## 2024-11-06 DIAGNOSIS — E78.2 MIXED HYPERLIPIDEMIA: ICD-10-CM

## 2024-11-06 DIAGNOSIS — I73.9 PAD (PERIPHERAL ARTERY DISEASE) (HCC): ICD-10-CM

## 2024-11-06 DIAGNOSIS — E11.22 DIABETES MELLITUS WITH STAGE 2 CHRONIC KIDNEY DISEASE  (HCC): ICD-10-CM

## 2024-11-06 DIAGNOSIS — K91.2 POSTSURGICAL MALABSORPTION: ICD-10-CM

## 2024-11-06 DIAGNOSIS — I70.229 CRITICAL LOWER LIMB ISCHEMIA (HCC): ICD-10-CM

## 2024-11-06 LAB
25(OH)D3 SERPL-MCNC: 15.9 NG/ML (ref 30–100)
ALBUMIN SERPL BCG-MCNC: 4 G/DL (ref 3.5–5)
ALP SERPL-CCNC: 63 U/L (ref 34–104)
ALT SERPL W P-5'-P-CCNC: 26 U/L (ref 7–52)
ANION GAP SERPL CALCULATED.3IONS-SCNC: 5 MMOL/L (ref 4–13)
AST SERPL W P-5'-P-CCNC: 25 U/L (ref 5–45)
BASOPHILS # BLD AUTO: 0.03 THOUSANDS/ÂΜL (ref 0–0.1)
BASOPHILS NFR BLD AUTO: 1 % (ref 0–1)
BILIRUB DIRECT SERPL-MCNC: 0.09 MG/DL (ref 0–0.2)
BILIRUB SERPL-MCNC: 0.44 MG/DL (ref 0.2–1)
BUN SERPL-MCNC: 21 MG/DL (ref 5–25)
CALCIUM SERPL-MCNC: 9.2 MG/DL (ref 8.4–10.2)
CHLORIDE SERPL-SCNC: 106 MMOL/L (ref 96–108)
CHOLEST SERPL-MCNC: 150 MG/DL
CO2 SERPL-SCNC: 29 MMOL/L (ref 21–32)
CREAT SERPL-MCNC: 0.71 MG/DL (ref 0.6–1.3)
CREAT UR-MCNC: 59 MG/DL
EOSINOPHIL # BLD AUTO: 0.13 THOUSAND/ÂΜL (ref 0–0.61)
EOSINOPHIL NFR BLD AUTO: 2 % (ref 0–6)
ERYTHROCYTE [DISTWIDTH] IN BLOOD BY AUTOMATED COUNT: 14.6 % (ref 11.6–15.1)
FERRITIN SERPL-MCNC: 61 NG/ML (ref 24–307)
FOLATE SERPL-MCNC: 13 NG/ML
GLUCOSE P FAST SERPL-MCNC: 112 MG/DL (ref 65–99)
HCT VFR BLD AUTO: 38.1 % (ref 36.5–46.1)
HDLC SERPL-MCNC: 64 MG/DL
HGB BLD-MCNC: 11.7 G/DL (ref 12–15.4)
IMM GRANULOCYTES # BLD AUTO: 0.01 THOUSAND/UL (ref 0–0.2)
IMM GRANULOCYTES NFR BLD AUTO: 0 % (ref 0–2)
IRON SATN MFR SERPL: 39 % (ref 15–50)
IRON SERPL-MCNC: 124 UG/DL (ref 50–212)
LDLC SERPL CALC-MCNC: 66 MG/DL (ref 0–100)
LYMPHOCYTES # BLD AUTO: 2.45 THOUSANDS/ÂΜL (ref 0.6–4.47)
LYMPHOCYTES NFR BLD AUTO: 45 % (ref 14–44)
MCH RBC QN AUTO: 23.1 PG (ref 26.8–34.3)
MCHC RBC AUTO-ENTMCNC: 30.7 G/DL (ref 31.4–37.4)
MCV RBC AUTO: 75 FL (ref 82–98)
MICROALBUMIN UR-MCNC: 9.2 MG/L
MICROALBUMIN/CREAT 24H UR: 16 MG/G CREATININE (ref 0–30)
MONOCYTES # BLD AUTO: 0.41 THOUSAND/ÂΜL (ref 0.17–1.22)
MONOCYTES NFR BLD AUTO: 8 % (ref 4–12)
NEUTROPHILS # BLD AUTO: 2.45 THOUSANDS/ÂΜL (ref 1.85–7.62)
NEUTS SEG NFR BLD AUTO: 44 % (ref 43–75)
NRBC BLD AUTO-RTO: 0 /100 WBCS
PHOSPHATE SERPL-MCNC: 4 MG/DL (ref 2.7–4.5)
PLATELET # BLD AUTO: 181 THOUSANDS/UL (ref 149–390)
PMV BLD AUTO: 10.7 FL (ref 8.9–12.7)
POTASSIUM SERPL-SCNC: 4.6 MMOL/L (ref 3.5–5.3)
PROT SERPL-MCNC: 6.8 G/DL (ref 6.4–8.4)
PTH-INTACT SERPL-MCNC: 54.6 PG/ML (ref 12–88)
RBC # BLD AUTO: 5.07 MILLION/UL (ref 3.88–5.12)
SODIUM SERPL-SCNC: 140 MMOL/L (ref 135–147)
TIBC SERPL-MCNC: 318 UG/DL (ref 250–450)
TRIGL SERPL-MCNC: 102 MG/DL
UIBC SERPL-MCNC: 194 UG/DL (ref 155–355)
VIT B12 SERPL-MCNC: 138 PG/ML (ref 180–914)
WBC # BLD AUTO: 5.48 THOUSAND/UL (ref 4.31–10.16)

## 2024-11-06 PROCEDURE — 80053 COMPREHEN METABOLIC PANEL: CPT

## 2024-11-06 PROCEDURE — 84425 ASSAY OF VITAMIN B-1: CPT

## 2024-11-06 PROCEDURE — G0439 PPPS, SUBSEQ VISIT: HCPCS

## 2024-11-06 PROCEDURE — 83550 IRON BINDING TEST: CPT

## 2024-11-06 PROCEDURE — 83970 ASSAY OF PARATHORMONE: CPT

## 2024-11-06 PROCEDURE — 82248 BILIRUBIN DIRECT: CPT

## 2024-11-06 PROCEDURE — 36415 COLL VENOUS BLD VENIPUNCTURE: CPT

## 2024-11-06 PROCEDURE — 85025 COMPLETE CBC W/AUTO DIFF WBC: CPT

## 2024-11-06 PROCEDURE — 83540 ASSAY OF IRON: CPT

## 2024-11-06 PROCEDURE — 82306 VITAMIN D 25 HYDROXY: CPT

## 2024-11-06 PROCEDURE — 80061 LIPID PANEL: CPT

## 2024-11-06 PROCEDURE — 84100 ASSAY OF PHOSPHORUS: CPT

## 2024-11-06 PROCEDURE — 82043 UR ALBUMIN QUANTITATIVE: CPT

## 2024-11-06 PROCEDURE — 84590 ASSAY OF VITAMIN A: CPT

## 2024-11-06 PROCEDURE — 82746 ASSAY OF FOLIC ACID SERUM: CPT

## 2024-11-06 PROCEDURE — 82570 ASSAY OF URINE CREATININE: CPT

## 2024-11-06 PROCEDURE — 82607 VITAMIN B-12: CPT

## 2024-11-06 PROCEDURE — 84630 ASSAY OF ZINC: CPT

## 2024-11-06 PROCEDURE — 82728 ASSAY OF FERRITIN: CPT

## 2024-11-06 NOTE — PATIENT INSTRUCTIONS
Medicare Preventive Visit Patient Instructions  Thank you for completing your Welcome to Medicare Visit or Medicare Annual Wellness Visit today. Your next wellness visit will be due in one year (11/7/2025).  The screening/preventive services that you may require over the next 5-10 years are detailed below. Some tests may not apply to you based off risk factors and/or age. Screening tests ordered at today's visit but not completed yet may show as past due. Also, please note that scanned in results may not display below.  Preventive Screenings:  Service Recommendations Previous Testing/Comments   Colorectal Cancer Screening  * Colonoscopy    * Fecal Occult Blood Test (FOBT)/Fecal Immunochemical Test (FIT)  * Fecal DNA/Cologuard Test  * Flexible Sigmoidoscopy Age: 45-75 years old   Colonoscopy: every 10 years (may be performed more frequently if at higher risk)  OR  FOBT/FIT: every 1 year  OR  Cologuard: every 3 years  OR  Sigmoidoscopy: every 5 years  Screening may be recommended earlier than age 45 if at higher risk for colorectal cancer. Also, an individualized decision between you and your healthcare provider will decide whether screening between the ages of 76-85 would be appropriate. Colonoscopy: 06/15/2023  FOBT/FIT: Not on file  Cologuard: Not on file  Sigmoidoscopy: Not on file    Screening Current     Breast Cancer Screening Age: 40+ years old  Frequency: every 1-2 years  Not required if history of left and right mastectomy Mammogram: 06/06/2023    Screening Current   Cervical Cancer Screening Between the ages of 21-29, pap smear recommended once every 3 years.   Between the ages of 30-65, can perform pap smear with HPV co-testing every 5 years.   Recommendations may differ for women with a history of total hysterectomy, cervical cancer, or abnormal pap smears in past. Pap Smear: 02/10/2021        Hepatitis C Screening Once for adults born between 1945 and 1965  More frequently in patients at high risk for  Hepatitis C Hep C Antibody: 04/22/2021    Screening Current   Diabetes Screening 1-2 times per year if you're at risk for diabetes or have pre-diabetes Fasting glucose: 112 mg/dL (11/6/2024)  A1C: 6.9 (5/28/2024)  Screening Not Indicated  History Diabetes   Cholesterol Screening Once every 5 years if you don't have a lipid disorder. May order more often based on risk factors. Lipid panel: 11/06/2024    Screening Not Indicated  History Lipid Disorder     Other Preventive Screenings Covered by Medicare:  Abdominal Aortic Aneurysm (AAA) Screening: covered once if your at risk. You're considered to be at risk if you have a family history of AAA.  Lung Cancer Screening: covers low dose CT scan once per year if you meet all of the following conditions: (1) Age 55-77; (2) No signs or symptoms of lung cancer; (3) Current smoker or have quit smoking within the last 15 years; (4) You have a tobacco smoking history of at least 20 pack years (packs per day multiplied by number of years you smoked); (5) You get a written order from a healthcare provider.  Glaucoma Screening: covered annually if you're considered high risk: (1) You have diabetes OR (2) Family history of glaucoma OR (3)  aged 50 and older OR (4)  American aged 65 and older  Osteoporosis Screening: covered every 2 years if you meet one of the following conditions: (1) You're estrogen deficient and at risk for osteoporosis based off medical history and other findings; (2) Have a vertebral abnormality; (3) On glucocorticoid therapy for more than 3 months; (4) Have primary hyperparathyroidism; (5) On osteoporosis medications and need to assess response to drug therapy.   Last bone density test (DXA Scan): Not on file.  HIV Screening: covered annually if you're between the age of 15-65. Also covered annually if you are younger than 15 and older than 65 with risk factors for HIV infection. For pregnant patients, it is covered up to 3 times per  pregnancy.    Immunizations:  Immunization Recommendations   Influenza Vaccine Annual influenza vaccination during flu season is recommended for all persons aged >= 6 months who do not have contraindications   Pneumococcal Vaccine   * Pneumococcal conjugate vaccine = PCV13 (Prevnar 13), PCV15 (Vaxneuvance), PCV20 (Prevnar 20)  * Pneumococcal polysaccharide vaccine = PPSV23 (Pneumovax) Adults 19-65 yo with certain risk factors or if 65+ yo  If never received any pneumonia vaccine: recommend Prevnar 20 (PCV20)  Give PCV20 if previously received 1 dose of PCV13 or PPSV23   Hepatitis B Vaccine 3 dose series if at intermediate or high risk (ex: diabetes, end stage renal disease, liver disease)   Respiratory syncytial virus (RSV) Vaccine - COVERED BY MEDICARE PART D  * RSVPreF3 (Arexvy) CDC recommends that adults 60 years of age and older may receive a single dose of RSV vaccine using shared clinical decision-making (SCDM)   Tetanus (Td) Vaccine - COST NOT COVERED BY MEDICARE PART B Following completion of primary series, a booster dose should be given every 10 years to maintain immunity against tetanus. Td may also be given as tetanus wound prophylaxis.   Tdap Vaccine - COST NOT COVERED BY MEDICARE PART B Recommended at least once for all adults. For pregnant patients, recommended with each pregnancy.   Shingles Vaccine (Shingrix) - COST NOT COVERED BY MEDICARE PART B  2 shot series recommended in those 19 years and older who have or will have weakened immune systems or those 50 years and older     Health Maintenance Due:      Topic Date Due   • Lung Cancer Screening  Never done   • Breast Cancer Screening: Mammogram  06/06/2025   • Cervical Cancer Screening  02/10/2026   • Colorectal Cancer Screening  06/13/2030   • HIV Screening  Completed   • Hepatitis C Screening  Completed     Immunizations Due:      Topic Date Due   • Hepatitis A Vaccine (1 of 2 - Risk 2-dose series) Never done     Advance Directives   What are  advance directives?  Advance directives are legal documents that state your wishes and plans for medical care. These plans are made ahead of time in case you lose your ability to make decisions for yourself. Advance directives can apply to any medical decision, such as the treatments you want, and if you want to donate organs.   What are the types of advance directives?  There are many types of advance directives, and each state has rules about how to use them. You may choose a combination of any of the following:  Living will:  This is a written record of the treatment you want. You can also choose which treatments you do not want, which to limit, and which to stop at a certain time. This includes surgery, medicine, IV fluid, and tube feedings.   Durable power of  for healthcare (DPAHC):  This is a written record that states who you want to make healthcare choices for you when you are unable to make them for yourself. This person, called a proxy, is usually a family member or a friend. You may choose more than 1 proxy.  Do not resuscitate (DNR) order:  A DNR order is used in case your heart stops beating or you stop breathing. It is a request not to have certain forms of treatment, such as CPR. A DNR order may be included in other types of advance directives.  Medical directive:  This covers the care that you want if you are in a coma, near death, or unable to make decisions for yourself. You can list the treatments you want for each condition. Treatment may include pain medicine, surgery, blood transfusions, dialysis, IV or tube feedings, and a ventilator (breathing machine).  Values history:  This document has questions about your views, beliefs, and how you feel and think about life. This information can help others choose the care that you would choose.  Why are advance directives important?  An advance directive helps you control your care. Although spoken wishes may be used, it is better to have your  wishes written down. Spoken wishes can be misunderstood, or not followed. Treatments may be given even if you do not want them. An advance directive may make it easier for your family to make difficult choices about your care.   Weight Management   Why it is important to manage your weight:  Being overweight increases your risk of health conditions such as heart disease, high blood pressure, type 2 diabetes, and certain types of cancer. It can also increase your risk for osteoarthritis, sleep apnea, and other respiratory problems. Aim for a slow, steady weight loss. Even a small amount of weight loss can lower your risk of health problems.  How to lose weight safely:  A safe and healthy way to lose weight is to eat fewer calories and get regular exercise. You can lose up about 1 pound a week by decreasing the number of calories you eat by 500 calories each day.   Healthy meal plan for weight management:  A healthy meal plan includes a variety of foods, contains fewer calories, and helps you stay healthy. A healthy meal plan includes the following:  Eat whole-grain foods more often.  A healthy meal plan should contain fiber. Fiber is the part of grains, fruits, and vegetables that is not broken down by your body. Whole-grain foods are healthy and provide extra fiber in your diet. Some examples of whole-grain foods are whole-wheat breads and pastas, oatmeal, brown rice, and bulgur.  Eat a variety of vegetables every day.  Include dark, leafy greens such as spinach, kale, jeremiah greens, and mustard greens. Eat yellow and orange vegetables such as carrots, sweet potatoes, and winter squash.   Eat a variety of fruits every day.  Choose fresh or canned fruit (canned in its own juice or light syrup) instead of juice. Fruit juice has very little or no fiber.  Eat low-fat dairy foods.  Drink fat-free (skim) milk or 1% milk. Eat fat-free yogurt and low-fat cottage cheese. Try low-fat cheeses such as mozzarella and other  reduced-fat cheeses.  Choose meat and other protein foods that are low in fat.  Choose beans or other legumes such as split peas or lentils. Choose fish, skinless poultry (chicken or turkey), or lean cuts of red meat (beef or pork). Before you cook meat or poultry, cut off any visible fat.   Use less fat and oil.  Try baking foods instead of frying them. Add less fat, such as margarine, sour cream, regular salad dressing and mayonnaise to foods. Eat fewer high-fat foods. Some examples of high-fat foods include french fries, doughnuts, ice cream, and cakes.  Eat fewer sweets.  Limit foods and drinks that are high in sugar. This includes candy, cookies, regular soda, and sweetened drinks.  Exercise:  Exercise at least 30 minutes per day on most days of the week. Some examples of exercise include walking, biking, dancing, and swimming. You can also fit in more physical activity by taking the stairs instead of the elevator or parking farther away from stores. Ask your healthcare provider about the best exercise plan for you.      © Copyright [employer] 2018 Information is for End User's use only and may not be sold, redistributed or otherwise used for commercial purposes. All illustrations and images included in CareNotes® are the copyrighted property of [employer] or [employer]      Medicare Preventive Visit Patient Instructions  Thank you for completing your Welcome to Medicare Visit or Medicare Annual Wellness Visit today. Your next wellness visit will be due in one year (11/7/2025).  The screening/preventive services that you may require over the next 5-10 years are detailed below. Some tests may not apply to you based off risk factors and/or age. Screening tests ordered at today's visit but not completed yet may show as past due. Also, please note that scanned in results may not display below.  Preventive Screenings:  Service Recommendations Previous Testing/Comments   Colorectal Cancer Screening  *  Colonoscopy    * Fecal Occult Blood Test (FOBT)/Fecal Immunochemical Test (FIT)  * Fecal DNA/Cologuard Test  * Flexible Sigmoidoscopy Age: 45-75 years old   Colonoscopy: every 10 years (may be performed more frequently if at higher risk)  OR  FOBT/FIT: every 1 year  OR  Cologuard: every 3 years  OR  Sigmoidoscopy: every 5 years  Screening may be recommended earlier than age 45 if at higher risk for colorectal cancer. Also, an individualized decision between you and your healthcare provider will decide whether screening between the ages of 76-85 would be appropriate. Colonoscopy: 06/15/2023  FOBT/FIT: Not on file  Cologuard: Not on file  Sigmoidoscopy: Not on file    Screening Current     Breast Cancer Screening Age: 40+ years old  Frequency: every 1-2 years  Not required if history of left and right mastectomy Mammogram: 06/06/2023    Screening Current   Cervical Cancer Screening Between the ages of 21-29, pap smear recommended once every 3 years.   Between the ages of 30-65, can perform pap smear with HPV co-testing every 5 years.   Recommendations may differ for women with a history of total hysterectomy, cervical cancer, or abnormal pap smears in past. Pap Smear: 02/10/2021        Hepatitis C Screening Once for adults born between 1945 and 1965  More frequently in patients at high risk for Hepatitis C Hep C Antibody: 04/22/2021    Screening Current   Diabetes Screening 1-2 times per year if you're at risk for diabetes or have pre-diabetes Fasting glucose: 112 mg/dL (11/6/2024)  A1C: 6.9 (5/28/2024)  Screening Not Indicated  History Diabetes   Cholesterol Screening Once every 5 years if you don't have a lipid disorder. May order more often based on risk factors. Lipid panel: 11/06/2024    Screening Not Indicated  History Lipid Disorder     Other Preventive Screenings Covered by Medicare:  Abdominal Aortic Aneurysm (AAA) Screening: covered once if your at risk. You're considered to be at risk if you have a family  history of AAA.  Lung Cancer Screening: covers low dose CT scan once per year if you meet all of the following conditions: (1) Age 55-77; (2) No signs or symptoms of lung cancer; (3) Current smoker or have quit smoking within the last 15 years; (4) You have a tobacco smoking history of at least 20 pack years (packs per day multiplied by number of years you smoked); (5) You get a written order from a healthcare provider.  Glaucoma Screening: covered annually if you're considered high risk: (1) You have diabetes OR (2) Family history of glaucoma OR (3)  aged 50 and older OR (4)  American aged 65 and older  Osteoporosis Screening: covered every 2 years if you meet one of the following conditions: (1) You're estrogen deficient and at risk for osteoporosis based off medical history and other findings; (2) Have a vertebral abnormality; (3) On glucocorticoid therapy for more than 3 months; (4) Have primary hyperparathyroidism; (5) On osteoporosis medications and need to assess response to drug therapy.   Last bone density test (DXA Scan): Not on file.  HIV Screening: covered annually if you're between the age of 15-65. Also covered annually if you are younger than 15 and older than 65 with risk factors for HIV infection. For pregnant patients, it is covered up to 3 times per pregnancy.    Immunizations:  Immunization Recommendations   Influenza Vaccine Annual influenza vaccination during flu season is recommended for all persons aged >= 6 months who do not have contraindications   Pneumococcal Vaccine   * Pneumococcal conjugate vaccine = PCV13 (Prevnar 13), PCV15 (Vaxneuvance), PCV20 (Prevnar 20)  * Pneumococcal polysaccharide vaccine = PPSV23 (Pneumovax) Adults 19-63 yo with certain risk factors or if 65+ yo  If never received any pneumonia vaccine: recommend Prevnar 20 (PCV20)  Give PCV20 if previously received 1 dose of PCV13 or PPSV23   Hepatitis B Vaccine 3 dose series if at intermediate or  high risk (ex: diabetes, end stage renal disease, liver disease)   Respiratory syncytial virus (RSV) Vaccine - COVERED BY MEDICARE PART D  * RSVPreF3 (Arexvy) CDC recommends that adults 60 years of age and older may receive a single dose of RSV vaccine using shared clinical decision-making (SCDM)   Tetanus (Td) Vaccine - COST NOT COVERED BY MEDICARE PART B Following completion of primary series, a booster dose should be given every 10 years to maintain immunity against tetanus. Td may also be given as tetanus wound prophylaxis.   Tdap Vaccine - COST NOT COVERED BY MEDICARE PART B Recommended at least once for all adults. For pregnant patients, recommended with each pregnancy.   Shingles Vaccine (Shingrix) - COST NOT COVERED BY MEDICARE PART B  2 shot series recommended in those 19 years and older who have or will have weakened immune systems or those 50 years and older     Health Maintenance Due:      Topic Date Due   • Lung Cancer Screening  Never done   • Breast Cancer Screening: Mammogram  06/06/2025   • Cervical Cancer Screening  02/10/2026   • Colorectal Cancer Screening  06/13/2030   • HIV Screening  Completed   • Hepatitis C Screening  Completed     Immunizations Due:      Topic Date Due   • Hepatitis A Vaccine (1 of 2 - Risk 2-dose series) Never done     Advance Directives   What are advance directives?  Advance directives are legal documents that state your wishes and plans for medical care. These plans are made ahead of time in case you lose your ability to make decisions for yourself. Advance directives can apply to any medical decision, such as the treatments you want, and if you want to donate organs.   What are the types of advance directives?  There are many types of advance directives, and each state has rules about how to use them. You may choose a combination of any of the following:  Living will:  This is a written record of the treatment you want. You can also choose which treatments you do not  want, which to limit, and which to stop at a certain time. This includes surgery, medicine, IV fluid, and tube feedings.   Durable power of  for healthcare (DPAHC):  This is a written record that states who you want to make healthcare choices for you when you are unable to make them for yourself. This person, called a proxy, is usually a family member or a friend. You may choose more than 1 proxy.  Do not resuscitate (DNR) order:  A DNR order is used in case your heart stops beating or you stop breathing. It is a request not to have certain forms of treatment, such as CPR. A DNR order may be included in other types of advance directives.  Medical directive:  This covers the care that you want if you are in a coma, near death, or unable to make decisions for yourself. You can list the treatments you want for each condition. Treatment may include pain medicine, surgery, blood transfusions, dialysis, IV or tube feedings, and a ventilator (breathing machine).  Values history:  This document has questions about your views, beliefs, and how you feel and think about life. This information can help others choose the care that you would choose.  Why are advance directives important?  An advance directive helps you control your care. Although spoken wishes may be used, it is better to have your wishes written down. Spoken wishes can be misunderstood, or not followed. Treatments may be given even if you do not want them. An advance directive may make it easier for your family to make difficult choices about your care.   Weight Management   Why it is important to manage your weight:  Being overweight increases your risk of health conditions such as heart disease, high blood pressure, type 2 diabetes, and certain types of cancer. It can also increase your risk for osteoarthritis, sleep apnea, and other respiratory problems. Aim for a slow, steady weight loss. Even a small amount of weight loss can lower your risk of  health problems.  How to lose weight safely:  A safe and healthy way to lose weight is to eat fewer calories and get regular exercise. You can lose up about 1 pound a week by decreasing the number of calories you eat by 500 calories each day.   Healthy meal plan for weight management:  A healthy meal plan includes a variety of foods, contains fewer calories, and helps you stay healthy. A healthy meal plan includes the following:  Eat whole-grain foods more often.  A healthy meal plan should contain fiber. Fiber is the part of grains, fruits, and vegetables that is not broken down by your body. Whole-grain foods are healthy and provide extra fiber in your diet. Some examples of whole-grain foods are whole-wheat breads and pastas, oatmeal, brown rice, and bulgur.  Eat a variety of vegetables every day.  Include dark, leafy greens such as spinach, kale, jeremiah greens, and mustard greens. Eat yellow and orange vegetables such as carrots, sweet potatoes, and winter squash.   Eat a variety of fruits every day.  Choose fresh or canned fruit (canned in its own juice or light syrup) instead of juice. Fruit juice has very little or no fiber.  Eat low-fat dairy foods.  Drink fat-free (skim) milk or 1% milk. Eat fat-free yogurt and low-fat cottage cheese. Try low-fat cheeses such as mozzarella and other reduced-fat cheeses.  Choose meat and other protein foods that are low in fat.  Choose beans or other legumes such as split peas or lentils. Choose fish, skinless poultry (chicken or turkey), or lean cuts of red meat (beef or pork). Before you cook meat or poultry, cut off any visible fat.   Use less fat and oil.  Try baking foods instead of frying them. Add less fat, such as margarine, sour cream, regular salad dressing and mayonnaise to foods. Eat fewer high-fat foods. Some examples of high-fat foods include french fries, doughnuts, ice cream, and cakes.  Eat fewer sweets.  Limit foods and drinks that are high in sugar. This  includes candy, cookies, regular soda, and sweetened drinks.  Exercise:  Exercise at least 30 minutes per day on most days of the week. Some examples of exercise include walking, biking, dancing, and swimming. You can also fit in more physical activity by taking the stairs instead of the elevator or parking farther away from stores. Ask your healthcare provider about the best exercise plan for you.      © Copyright [employer] 2018 Information is for End User's use only and may not be sold, redistributed or otherwise used for commercial purposes. All illustrations and images included in CareNotes® are the copyrighted property of [employer] or [employer]

## 2024-11-06 NOTE — PROGRESS NOTES
Ambulatory Visit  Name: Niesha Lyon      : 1965      MRN: [id number]  Encounter Provider: MIKEL Taylor  Encounter Date: 2024   Encounter department: [facility]    Assessment & Plan  Medicare annual wellness visit, subsequent         Tinnitus of left ear    Orders:    Ambulatory Referral to Otolaryngology; Future    Cerumen debris on tympanic membrane of left ear    Orders:    carbamide peroxide (DEBROX) 6.5 % otic solution; Administer 5 drops into the left ear 2 (two) times a day    BMI Counseling: Body mass index is 25.29 kg/m². The BMI is above normal. Nutrition recommendations include decreasing portion sizes, encouraging healthy choices of fruits and vegetables, decreasing fast food intake, consuming healthier snacks, limiting drinks that contain sugar, moderation in carbohydrate intake, increasing intake of lean protein, reducing intake of saturated and trans fat and reducing intake of cholesterol. Rationale for BMI follow-up plan is due to patient being overweight or obese.       Preventive health issues were discussed with patient, and age appropriate screening tests were ordered as noted in patient's After Visit Summary. Personalized health advice and appropriate referrals for health education or preventive services given if needed, as noted in patient's After Visit Summary.    History of Present Illness     Niesha Lyon is a 59 y.o. with  has a past medical history of Ambulates with cane, Bariatric surgery status, Cellulitis of toe, Chronic kidney disease, CKD (chronic kidney disease), CPAP (continuous positive airway pressure) dependence, Diabetes mellitus (HCC), Gangrene of toe of right foot (HCC), Hyperlipidemia, Hypertension, Obese, PVD (peripheral vascular disease) (HCC), Seasonal allergies, Sleep apnea, Teeth missing, and Wears glasses.     Patient presents for a Medicare Wellness Visit       Patient Care Team:  Tami  MIKEL Clark as PCP - General (Family Medicine)  Rylee Ruiz MD as PCP - Endocrinology (Endocrinology)  Rossi Baron MD (Nephrology)  Jd Hartley DPM (Podiatry)  MIKEL Bob as Nurse Practitioner (Endocrinology)  MIKEL Levine as Nurse Practitioner (Endocrinology)  Mare Lee RD as Diabetes Educator (Nutrition)    Review of Systems   Constitutional: Negative.  Negative for chills, fatigue and fever.   HENT:  Positive for tinnitus. Negative for ear pain and sore throat.    Eyes: Negative.  Negative for pain and visual disturbance.   Respiratory: Negative.  Negative for cough and shortness of breath.    Cardiovascular: Negative.  Negative for chest pain and palpitations.   Gastrointestinal: Negative.  Negative for abdominal pain and vomiting.   Endocrine: Negative.    Genitourinary: Negative.  Negative for dysuria and hematuria.   Musculoskeletal: Negative.  Negative for arthralgias and back pain.   Skin: Negative.  Negative for color change and rash.   Allergic/Immunologic: Negative.    Neurological: Negative.  Negative for seizures and syncope.   Hematological: Negative.    Psychiatric/Behavioral: Negative.     All other systems reviewed and are negative.    Medical History Reviewed by provider this encounter:       Annual Wellness Visit Questionnaire   Last Medicare Wellness visit information reviewed, patient interviewed and updates made to the record today.      Health Risk Assessment:   Patient rates overall health as good. Patient feels that their physical health rating is same. Patient is satisfied with their life. Eyesight was rated as same. Hearing was rated as slightly worse. Patient feels that their emotional and mental health rating is same. Patients states they are never, rarely angry. Patient states they are never, rarely unusually tired/fatigued. Pain experienced in the last 7 days has been none. Patient states that Niesha has experienced no weight loss or  gain in last 6 months.     Fall Risk Screening:   In the past year, patient has experienced: no history of falling in past year      Urinary Incontinence Screening:   Patient has not leaked urine accidently in the last six months.     Home Safety:  Patient does not have trouble with stairs inside or outside of their home. Patient has working smoke alarms and has working carbon monoxide detector. Home safety hazards include: none.     Nutrition:   Current diet is Diabetic, Low Carb and No Added Salt.     Medications:   Patient is not currently taking any over-the-counter supplements. Patient is able to manage medications.     Activities of Daily Living (ADLs)/Instrumental Activities of Daily Living (IADLs):   Walk and transfer into and out of bed and chair?: Yes  Dress and groom yourself?: Yes    Bathe or shower yourself?: Yes    Feed yourself? Yes  Do your laundry/housekeeping?: Yes  Manage your money, pay your bills and track your expenses?: Yes  Make your own meals?: Yes    Do your own shopping?: Yes    Previous Hospitalizations:   Any hospitalizations or ED visits within the last 12 months?: No      Advance Care Planning:   Living will: No    Durable POA for healthcare: No    Advanced directive: No      PREVENTIVE SCREENINGS      Cardiovascular Screening:    General: Screening Not Indicated and History Lipid Disorder      Diabetes Screening:     General: Screening Not Indicated and History Diabetes      Colorectal Cancer Screening:     General: Screening Current      Breast Cancer Screening:     General: Screening Current      Hepatitis C Screening:    General: Screening Current    Screening, Brief Intervention, and Referral to Treatment (SBIRT)    Screening  Typical number of drinks in a day: 2  Typical number of drinks in a week: 6  Interpretation: Low risk drinking behavior.    AUDIT-C Screening:    3) How often did you have 6 or more drinks on one occasion in the past year? less than monthly    Single Item  Drug Screening:  How often have you used an illegal drug (including marijuana) or a prescription medication for non-medical reasons in the past year? never    Single Item Drug Screen Score: 0  Interpretation: Negative screen for possible drug use disorder    Social Determinants of Health     Financial Resource Strain: Medium Risk (11/6/2024)    Overall Financial Resource Strain (CARDIA)     Difficulty of Paying Living Expenses: Somewhat hard   Food Insecurity: Patient Declined (11/5/2024)    Hunger Vital Sign     Worried About Running Out of Food in the Last Year: Patient declined     Ran Out of Food in the Last Year: Patient declined   Recent Concern: Food Insecurity - Food Insecurity Present (10/1/2024)    Nursing - Inadequate Food Risk Classification     Worried About Running Out of Food in the Last Year: Sometimes true     Ran Out of Food in the Last Year: Sometimes true   Transportation Needs: No Transportation Needs (11/5/2024)    PRAPARE - Transportation     Lack of Transportation (Medical): No     Lack of Transportation (Non-Medical): No   Housing Stability: Unknown (11/5/2024)    Housing Stability Vital Sign     Unable to Pay for Housing in the Last Year: Patient declined     Number of Times Moved in the Last Year: 0     Homeless in the Last Year: No   Utilities: Not At Risk (11/5/2024)    [facility]     Threatened with loss of utilities: No     No results found.    Objective     /74 (BP Location: Right arm, Patient Position: Sitting, Cuff Size: Standard)   Pulse 73   Temp 97.6 °F (36.4 °C) (Temporal)   Resp 18   Ht 5' (1.524 m)   Wt 58.7 kg (129 lb 8 oz)   LMP  (LMP Unknown)   SpO2 96%   BMI 25.29 kg/m²     Physical Exam  Vitals and nursing note reviewed.   Constitutional:       General: Niesha is not in acute distress.     Appearance: Normal appearance. Niesha is well-developed.   HENT:      Head: Normocephalic and atraumatic.      Right Ear: Tympanic membrane normal.      Left Ear:  Tympanic membrane normal. There is impacted cerumen.      Nose: Nose normal.      Mouth/Throat:      Mouth: Mucous membranes are moist.      Pharynx: Oropharynx is clear.   Eyes:      Conjunctiva/sclera: Conjunctivae normal.   Cardiovascular:      Rate and Rhythm: Normal rate and regular rhythm.      Pulses: Normal pulses.      Heart sounds: Normal heart sounds. No murmur heard.  Pulmonary:      Effort: Pulmonary effort is normal. No respiratory distress.      Breath sounds: Normal breath sounds.   Abdominal:      General: Bowel sounds are normal.      Palpations: Abdomen is soft.      Tenderness: There is no abdominal tenderness.   Musculoskeletal:         General: No swelling. Normal range of motion.      Cervical back: Normal range of motion and neck supple.   Skin:     General: Skin is warm and dry.      Capillary Refill: Capillary refill takes less than 2 seconds.   Neurological:      General: No focal deficit present.      Mental Status: Niesha is alert and oriented to person, place, and time. Mental status is at baseline.   Psychiatric:         Mood and Affect: Mood normal.         Behavior: Behavior normal.         Thought Content: Thought content normal.         Judgment: Judgment normal.

## 2024-11-09 LAB
VIT A SERPL-MCNC: 77.7 UG/DL (ref 20.1–62)
VIT B1 BLD-SCNC: 85 NMOL/L (ref 66.5–200)

## 2024-11-12 ENCOUNTER — TELEPHONE (OUTPATIENT)
Dept: BARIATRICS | Facility: CLINIC | Age: 59
End: 2024-11-12

## 2024-11-12 DIAGNOSIS — E55.9 VITAMIN D DEFICIENCY: ICD-10-CM

## 2024-11-12 DIAGNOSIS — K91.2 POSTSURGICAL MALABSORPTION: ICD-10-CM

## 2024-11-12 DIAGNOSIS — E67.0 HIGH VITAMIN A LEVEL: ICD-10-CM

## 2024-11-12 DIAGNOSIS — Z98.84 BARIATRIC SURGERY STATUS: Primary | ICD-10-CM

## 2024-11-12 LAB — ZINC SERPL-MCNC: 49 UG/DL (ref 44–115)

## 2024-11-12 NOTE — TELEPHONE ENCOUNTER
"----- Message from Peggy Gillespie PA-C sent at 11/12/2024  8:48 AM EST -----  Please call with labs:    Your vitamin B12 level is very  low, which can affect your nerve health. If you have any of the following symptoms: extreme fatigue, numbness/tingling in your hands/feet, weakness, poor coordination, or heart palpitations, please let us know. For this level I would recommend we start b12 injections, 1000 mcg a month for 3 months. If agreeable please schedule      Your vitamin A level is HIGH. High levels of vitamin A can be toxic to the liver. Symptoms of high vitamin A include nausea, vomiting, blurred vision, headache and vertigo. If you are experiencing these symptoms please let us know or make an appointment with your family doctor. It is important you stop all vitamins and minerals that contain vitamin A for 4 weeks. Avoid supplements like \"hair skin and nails\" which contain extra vitamin A. Avoid vitamin schmid and other supplements that contain extra vitamin A. Avoid anti-wrinkle creams that contain retinol in the ingredient panel. Alcohol intake can also raise vitamin A levels.     While holding you multivitamin, recommend you take the following supplements:  One super B complex that contains thiamine  500 mg calcium citrate with vitamin d (three times a day)  One 2000 IU vitamin D3 daily   Extra iron if you are a menstruating female     Your vitamin D level is low which can affect your bone health.  Recommend that you take 50,000 IU of vitamin D2 twice per week for 12 weeks. A script will be sent to your pharmacy. In addition, you need to take 1200 to 1500 mg of calcium citrate per day, in divided doses of 500 to 600 mg each.  After 12 weeks, switch to a maintenance dose of 2000 IU vitamin D3 per day and continue to take calcium daily.       Will order labs to recheck b12, vit A and vit D in 3 months, labs are ordered in the system    "

## 2024-11-12 NOTE — TELEPHONE ENCOUNTER
Spoke with pt about her labs and some recommendations of vitamins that she should start taking and also scheduled an appt for her 3 b12 shots for the next 3 months pt had no other questions and will be in office at 11/18/2024 for her first b12 shot.

## 2024-11-18 ENCOUNTER — CLINICAL SUPPORT (OUTPATIENT)
Dept: BARIATRICS | Facility: CLINIC | Age: 59
End: 2024-11-18
Payer: COMMERCIAL

## 2024-11-18 DIAGNOSIS — E53.8 VITAMIN B12 DEFICIENCY: ICD-10-CM

## 2024-11-18 DIAGNOSIS — Z98.84 BARIATRIC SURGERY STATUS: Primary | ICD-10-CM

## 2024-11-18 DIAGNOSIS — E53.8 VITAMIN B12 DEFICIENCY: Primary | ICD-10-CM

## 2024-11-18 PROCEDURE — RECHECK

## 2024-11-18 PROCEDURE — 96372 THER/PROPH/DIAG INJ SC/IM: CPT | Performed by: NURSE PRACTITIONER

## 2024-11-18 RX ORDER — CYANOCOBALAMIN 1000 UG/ML
1000 INJECTION, SOLUTION INTRAMUSCULAR; SUBCUTANEOUS
Status: SHIPPED | OUTPATIENT
Start: 2024-11-18 | End: 2025-02-16

## 2024-11-18 RX ADMIN — CYANOCOBALAMIN 1000 MCG: 1000 INJECTION, SOLUTION INTRAMUSCULAR; SUBCUTANEOUS at 12:48

## 2024-11-18 NOTE — PROGRESS NOTES
Left deltoid Pt tolerated well no issues noted .         Bariatric surgery status    Vitamin B12 deficiency.

## 2024-11-20 ENCOUNTER — OFFICE VISIT (OUTPATIENT)
Dept: PODIATRY | Facility: CLINIC | Age: 59
End: 2024-11-20
Payer: COMMERCIAL

## 2024-11-20 DIAGNOSIS — B35.1 TINEA UNGUIUM: ICD-10-CM

## 2024-11-20 DIAGNOSIS — Z89.431 S/P TRANSMETATARSAL AMPUTATION OF FOOT, RIGHT (HCC): ICD-10-CM

## 2024-11-20 DIAGNOSIS — E11.42 TYPE 2 DIABETES MELLITUS WITH DIABETIC POLYNEUROPATHY, WITHOUT LONG-TERM CURRENT USE OF INSULIN (HCC): ICD-10-CM

## 2024-11-20 DIAGNOSIS — I73.9 PAD (PERIPHERAL ARTERY DISEASE) (HCC): Primary | ICD-10-CM

## 2024-11-20 DIAGNOSIS — L84 CALLUS OF FOOT: ICD-10-CM

## 2024-11-20 PROCEDURE — 11055 PARING/CUTG B9 HYPRKER LES 1: CPT | Performed by: PODIATRIST

## 2024-11-20 PROCEDURE — RECHECK: Performed by: PODIATRIST

## 2024-11-20 PROCEDURE — 11720 DEBRIDE NAIL 1-5: CPT | Performed by: PODIATRIST

## 2024-11-20 NOTE — PROGRESS NOTES
"Niesha Johnsonleatha  1965  AT RISK FOOT CARE    1. PAD (peripheral artery disease) (HCC)  Lesion Destruction      2. Type 2 diabetes mellitus with diabetic polyneuropathy, without long-term current use of insulin (HCC)  Lesion Destruction      3. Tinea unguium  Lesion Destruction      4. S/P transmetatarsal amputation of foot, right (HCC)  Lesion Destruction      5. Callus of foot  Lesion Destruction          Patient presents for at-risk foot care.  Patient has no acute concerns today.  Patient has significant lower extremity risk due to previous amputation.    On exam patient has thickened, hypertrophic, discolored, brittle toenails with subungual debris and tenderness x3   Callus: left forefoot, no hemorrhage  Amputation: right TMA stable. LEft 4,5 ray amputation stable    Today's treatment includes:  Debridement of toenails. Using nail nipper, kacie, and curette, nails were sharply debrided, reduced in thickness and length. Devitalized nail tissue and fungal debris excised and removed. Patient tolerated well.    Lesion Destruction    Date/Time: 11/20/2024 11:15 AM    Performed by: Jd Hartley DPM  Authorized by: Jd Hartley DPM  Universal Protocol:  Consent: Verbal consent obtained.  Risks and benefits: risks, benefits and alternatives were discussed  Consent given by: patient  Time out: Immediately prior to procedure a \"time out\" was called to verify the correct patient, procedure, equipment, support staff and site/side marked as required.  Timeout called at: 11/20/2024 11:20 AM.  Patient understanding: patient states understanding of the procedure being performed  Patient identity confirmed: verbally with patient    Procedure Details - Lesion Destruction:     Number of Lesions:  1  Lesion 1:     Body area:  Lower extremity    Lower extremity location:  L foot    Malignancy: benign hyperkeratotic lesion      Destruction method: scissors used for extraction          Discussed proper " shoe gear, daily inspections of feet, and general foot health with patient. Patient has Q7  findings and is recommended for at risk foot care every 9-10 weeks.    Patients most recent complete clinical exam was performed: 4/11/2024

## 2024-12-03 ENCOUNTER — OFFICE VISIT (OUTPATIENT)
Dept: ENDOCRINOLOGY | Facility: CLINIC | Age: 59
End: 2024-12-03
Payer: COMMERCIAL

## 2024-12-03 VITALS
HEIGHT: 60 IN | BODY MASS INDEX: 25.25 KG/M2 | OXYGEN SATURATION: 99 % | WEIGHT: 128.6 LBS | DIASTOLIC BLOOD PRESSURE: 72 MMHG | HEART RATE: 81 BPM | SYSTOLIC BLOOD PRESSURE: 130 MMHG

## 2024-12-03 DIAGNOSIS — E55.9 VITAMIN D DEFICIENCY: ICD-10-CM

## 2024-12-03 DIAGNOSIS — N18.2 DIABETES MELLITUS WITH STAGE 2 CHRONIC KIDNEY DISEASE  (HCC): Primary | ICD-10-CM

## 2024-12-03 DIAGNOSIS — E11.22 DIABETES MELLITUS WITH STAGE 2 CHRONIC KIDNEY DISEASE  (HCC): Primary | ICD-10-CM

## 2024-12-03 DIAGNOSIS — E78.2 MIXED HYPERLIPIDEMIA: ICD-10-CM

## 2024-12-03 LAB — SL AMB POCT HEMOGLOBIN AIC: 6.9 (ref ?–6.5)

## 2024-12-03 PROCEDURE — 99214 OFFICE O/P EST MOD 30 MIN: CPT | Performed by: INTERNAL MEDICINE

## 2024-12-03 PROCEDURE — 95251 CONT GLUC MNTR ANALYSIS I&R: CPT | Performed by: INTERNAL MEDICINE

## 2024-12-03 PROCEDURE — 83036 HEMOGLOBIN GLYCOSYLATED A1C: CPT | Performed by: INTERNAL MEDICINE

## 2024-12-03 RX ORDER — ACYCLOVIR 400 MG/1
1 TABLET ORAL
Qty: 3 EACH | Refills: 11 | Status: SHIPPED | OUTPATIENT
Start: 2024-12-03

## 2024-12-03 NOTE — PROGRESS NOTES
Established Patient Progress Note      Chief Complaint   Patient presents with    Diabetes Type 2        History of Present Illness:   Niesha Lyon is a 57 y.o. adult with type 2 diabetes with long term use of insulin for about 36 years.   Last seen in the office in May 2024 by Volodymyr Mark     She had gastric sleeve operation 2/28/22.  Insulin therapy was discontinued in June 2022.    Reports complications of neuropathy with charcot foot, amputations and gangrene of toes, s/p TMA of right foot, CKD, proteinuria and retinopathy.  No recent illness or hospitalizations. Denies recent severe hypoglycemic or severe hyperglycemic episodes. Denies any issues with current regimen. Home glucose monitoring: are performed regularly.     She is s/p  sleeve gastrectomy in Feb 2022. Weight is stable. She denies any issues with Trulicity. She denies any hypoglycemia since stopping insulin.   Niesha Lyon   Device used  Home use G7    Indication   Type 2 Diabetes    More than 72 hours of data was reviewed. Report to be scanned to chart.     Date Range:  11/20/2024-12/3/2024    Analysis of data:   Average Glucose:  144  Coefficient of Variation:     SD :   41  Time in Target Range:   81%  Time Above Range:   18% high, 1% very high  Time Below Range:   0    Interpretation of data:   No hypoglycemia, but she reports having a snack (apples+PB) in order to prevent lows overnight.      Current regimen:  Trulicity 1.5 mg weekly    Last Eye Exam: 3/28/22, mild retinopathy b/l eyes. Needs to scheduled  Pod: Dr. Cheney, regularly  Flu: received 22-23  COVID: received 2024 booster  PNA: received     Has hypertension: Taking metoprolol and lisinopril  Has hyperlipidemia: Taking atorvastatin          Vitamin D deficiency- stopped ergocalciferol in the past. Has recently restarted cholecalciferol but is unsure of the dose. Weight Management did contact her after labs showed a 25OH D of 15. She and her  daughter will confirm dose.       Patient Active Problem List   Diagnosis    Essential hypertension    HLD (hyperlipidemia)    PAD (peripheral artery disease) (HCC)    Diabetes mellitus with stage 2 chronic kidney disease  (HCC)    Anemia    Leukocytosis    Toe amputation status, left    Critical lower limb ischemia (HCC)    Polyneuropathy associated with underlying disease (HCC)    Charcot foot due to diabetes mellitus (HCC)    History of transmetatarsal amputation of foot (HCC)    Cellulitis of right lower extremity    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, with necrosis of bone (HCC)    S/P transmetatarsal amputation of foot, right (HCC)    Persistent proteinuria    Vitamin D deficiency    Obstructive sleep apnea    Preop cardiovascular exam    Vaginal candida    CKD (chronic kidney disease) stage 2, GFR 60-89 ml/min    Right foot pain    Nicotine dependence in remission    Type 2 diabetes mellitus with diabetic polyneuropathy, without long-term current use of insulin ([facility])    Abnormal EKG      Past Medical History:   Diagnosis Date    Ambulates with cane     Bariatric surgery status     Cellulitis of toe 2019    Chronic kidney disease     CKD (chronic kidney disease)     CPAP (continuous positive airway pressure) dependence     Diabetes mellitus (HCC)     Gangrene of toe of right foot ([facility]) 10/30/2019    Added automatically from request for surgery [id number]    Hyperlipidemia     Hypertension     Obese     gastric  sleeve today 2022    PVD (peripheral vascular disease) ([facility])     Seasonal allergies     Sleep apnea     Teeth missing     Wears glasses       Past Surgical History:   Procedure Laterality Date     SECTION      EGD      IR ABDOMINAL AORTAGRAM  2019    IR AORTAGRAM WITH RUN-OFF  2020    IR ARTERIAL LYSIS  2019    IR LOWER EXTREMITY / INTERVENTION  2019    IR LOWER EXTREMITY ANGIOGRAM  2022    IR TPA LYSIS CHECK  2022    [place]  AMPUTATION FOOT TRANSMETARSAL Right 2019    Procedure: AMPUTATION TRANSMETATARSAL (TMA) WITH ACHILLES TENDON LENGTHING;  Surgeon: Edinson Bragg DPM;  [facility]: [facility];  Service: Podiatry    [place] AMPUTATION FOOT TRANSMETARSAL Right 2020    Procedure: AMPUTATION TRANSMETATARSAL (TMA);  Surgeon: Jd Hartley DPM;  Location: [facility];  Service: Podiatry    [place] BREAST REDUCTION Bilateral 2023    Procedure: BREAST REDUCTION;  Surgeon: Alice Lucas MD;  Location:  MAIN OR;  Service: Plastics    [place] LAPS GSTRC RSTRICTIV PX LONGITUDINAL GASTRECTOMY N/A 2022    Procedure: LAPAROSCOPIC SLEEVE GASTRECTOMY & INTRAOPERATIVE EGD;  Surgeon: Lavon Calderon MD;  [facility]: [facility];  Service: Bariatrics    SLEEVE GASTROPLASTY      TOE AMPUTATION Left 2019    Procedure: AMPUTATION 5th TOE;  Surgeon: Edinson Bragg DPM;  [facility];  Service: Podiatry    TOE AMPUTATION Right 2019    Procedure: AMPUTATION RIGHT 3RD AND 4TH TOE;  Surgeon: Edinson Bragg DPM;  [facility];  Service: Podiatry      Family History   Problem Relation Age of Onset    Diabetes Mother     No Known Problems Father     Diabetes Sister     Breast cancer Maternal Aunt      Social History     Tobacco Use    Smoking status: Former     Current packs/day: 0.00     Average packs/day: 2.0 packs/day for 20.0 years (40.0 ttl pk-yrs)     Types: Cigarettes     Start date: 1993     Quit date: 2013     Years since quittin.9     Passive exposure: Past    Smokeless tobacco: Never   Substance Use Topics    Alcohol use: Yes     Alcohol/week: 2.0 standard drinks of alcohol     Types: 2 Standard drinks or equivalent per week     Comment: rarely     Allergies   Allergen Reactions    Levemir [Insulin Detemir] Itching         Current Outpatient Medications:     aspirin 81 mg chewable tablet, Chew 1 tablet (81 mg total) daily, Disp: 90 tablet, Rfl: 1    atorvastatin (LIPITOR) 20 mg tablet, Take 1 tablet  (20 mg total) by mouth daily, Disp: 90 tablet, Rfl: 1    Blood Pressure KIT, Use 2 (two) times a day, Disp: 1 kit, Rfl: 0    carbamide peroxide (DEBROX) 6.5 % otic solution, Administer 5 drops into the left ear 2 (two) times a day, Disp: 15 mL, Rfl: 0    clopidogrel (PLAVIX) 75 mg tablet, TAKE 1 TABLET(75 MG) BY MOUTH DAILY, Disp: 90 tablet, Rfl: 3    Continuous Blood Gluc  (Dexcom G7 ) KATE, Use 1 each daily Use per  guidelines, Disp: 1 each, Rfl: 0    Continuous Glucose Sensor (Dexcom G7 Sensor), Use 1 Device every 10 days, Disp: 3 each, Rfl: 11    docusate sodium (COLACE) 100 mg capsule, Take 1 capsule (100 mg total) by mouth 2 (two) times a day, Disp: 60 capsule, Rfl: 1    Dulaglutide 4.5 MG/0.5ML SOAJ, Inject 4.5mg once per week, Disp: 2 mL, Rfl: 11    lisinopril (ZESTRIL) 10 mg tablet, Take 1 tablet (10 mg total) by mouth daily, Disp: 90 tablet, Rfl: 3    metoprolol tartrate (LOPRESSOR) 25 mg tablet, Take 1 tablet (25 mg total) by mouth every 12 (twelve) hours, Disp: 180 tablet, Rfl: 1    pantoprazole (PROTONIX) 40 mg tablet, TAKE 1 TABLET(40 MG) BY MOUTH DAILY, Disp: 90 tablet, Rfl: 0    Blood Glucose Monitoring Suppl (ONE TOUCH ULTRA MINI) w/Device KIT, Use 4 times a day (Patient not taking: Reported on 11/6/2024), Disp: 1 kit, Rfl: 2    glucose blood (OneTouch Ultra) test strip, Use 1 each 3 (three) times a day Test (Patient not taking: Reported on 11/6/2024), Disp: 300 strip, Rfl: 0    OneTouch Delica Lancets 33G MISC, Use to test 3x daily (Patient not taking: Reported on 11/6/2024), Disp: 300 each, Rfl: 2    Current Facility-Administered Medications:     cyanocobalamin injection 1,000 mcg, 1,000 mcg, Intramuscular, Q30 Days, , 1,000 mcg at 11/18/24 1248    Review of Systems   Constitutional:  Negative for unexpected weight change.   Eyes:  Negative for visual disturbance.   Gastrointestinal:  Positive for constipation. Negative for diarrhea and nausea.   Endocrine: Negative for  polydipsia and polyuria.   Neurological:  Negative for tremors and weakness.       Physical Exam:  Body mass index is 25.12 kg/m².  /72   Pulse 81   Ht 5' (1.524 m)   Wt 58.3 kg (128 lb 9.6 oz)   LMP  (LMP Unknown)   SpO2 99%   BMI 25.12 kg/m²    Wt Readings from Last 3 Encounters:   12/03/24 58.3 kg (128 lb 9.6 oz)   11/06/24 58.7 kg (129 lb 8 oz)   10/03/24 57.6 kg (127 lb)       Physical Exam   Gen: appears well-developed and well-nourished. No apparent distress.   Head: Normocephalic and atraumatic.   Eyes: no stare or proptosis, no periorbital edema  E/N/M nl facies  Neck: range of motion nl.   Pulmonary/Chest: breathing  comfortably, no accessory muscle use, effort normal.   Musculoskeletal: moves all 4 extremities, ambulates with cane  Neurological: alert and oriented to person, place, and time. No upper ext tremor appreciated  Skin: does not appear diaphoretic, no facial plethora  Psychiatric: normal mood and affect; behavior is normal; no gross lapses in memory, answer questions appropriately    Labs:   Lab Results   Component Value Date    HGBA1C 6.9 (A) 12/03/2024    HGBA1C 6.9 (A) 05/28/2024    HGBA1C 7.7 (A) 12/06/2023     Lab Results   Component Value Date    CREATININE 0.71 11/06/2024    CREATININE 0.58 (L) 07/10/2023    CREATININE 0.82 09/13/2022    BUN 21 11/06/2024    K 4.6 11/06/2024     11/06/2024    CO2 29 11/06/2024     eGFR   Date Value Ref Range Status   09/01/2020 64 ml/min/1.73sq m Final     Lab Results   Component Value Date    HDL 64 11/06/2024    TRIG 102 11/06/2024     Lab Results   Component Value Date    ALT 26 11/06/2024    AST 25 11/06/2024    ALKPHOS 63 11/06/2024     Lab Results   Component Value Date    PQT2FPHLBHEB 3.013 04/22/2021    HJE5FCTKZPCJ 2.180 05/06/2020     Lab Results   Component Value Date    FREET4 1.17 05/06/2020       Impression & Plan:    Problem List Items Addressed This Visit       HLD (hyperlipidemia)    On statin without issues          Diabetes mellitus with stage 2 chronic kidney disease  (HCC) - Primary      Lab Results   Component Value Date    HGBA1C 6.9 (A) 12/03/2024       Doing well on Trulicity 1.5mg weekly. Refills on this and Dexcom sent. Encouraged eye exam.         Relevant Medications    Continuous Glucose Sensor (Dexcom G7 Sensor)    Dulaglutide 4.5 MG/0.5ML SOAJ    Other Relevant Orders    POCT hemoglobin A1c (Completed)    Vitamin D deficiency    Asked that hte daughter verifies Vit D dose at home. They will check and update the office.                          Discussed with the patient and all questioned fully answered. Niesha will call me if any problems arise.

## 2024-12-03 NOTE — ASSESSMENT & PLAN NOTE
Lab Results   Component Value Date    HGBA1C 6.9 (A) 12/03/2024       Doing well on Trulicity 1.5mg weekly. Refills on this and Dexcom sent. Encouraged eye exam.

## 2024-12-23 ENCOUNTER — TELEPHONE (OUTPATIENT)
Age: 59
End: 2024-12-23

## 2024-12-23 NOTE — TELEPHONE ENCOUNTER
"Patients daughter called to reschedule missed nurse visit. Unfortunatly I can not make new appt without rescheduling \"no show appt\" Patients daughter can be reached at [phone]  "

## 2024-12-26 ENCOUNTER — CLINICAL SUPPORT (OUTPATIENT)
Dept: BARIATRICS | Facility: CLINIC | Age: 59
End: 2024-12-26
Payer: COMMERCIAL

## 2024-12-26 DIAGNOSIS — E53.8 VITAMIN B12 DEFICIENCY: Primary | ICD-10-CM

## 2024-12-26 PROCEDURE — RECHECK

## 2024-12-26 PROCEDURE — 96372 THER/PROPH/DIAG INJ SC/IM: CPT

## 2024-12-26 RX ORDER — CYANOCOBALAMIN 1000 UG/ML
1000 INJECTION, SOLUTION INTRAMUSCULAR; SUBCUTANEOUS
Status: DISCONTINUED | OUTPATIENT
Start: 2024-12-26 | End: 2024-12-26

## 2024-12-26 RX ADMIN — CYANOCOBALAMIN 1000 MCG: 1000 INJECTION, SOLUTION INTRAMUSCULAR; SUBCUTANEOUS at 08:40

## 2024-12-26 NOTE — PROGRESS NOTES
MIKEL Fournier  Nurse Practitioner Bariatric surgery status +1 more  Dx     Orders Placed    None Medication Changes      Cyanocobalamin 1,000 mcg Intramuscular Every 30 days  Medication List   Visit Diagnoses      Bariatric surgery status    Vitamin B12 deficiency  Problem List  Additional Documentation    Vitals: LMP  (LMP Unknown)   PT tolerated well - No Issues Occurred - gave in left deltoid - pt will follow up for her next B12 on 1/27/2025

## 2024-12-27 ENCOUNTER — TELEPHONE (OUTPATIENT)
Dept: NEPHROLOGY | Facility: CLINIC | Age: 59
End: 2024-12-27

## 2024-12-27 NOTE — TELEPHONE ENCOUNTER
Spoke with pt to schedule her 1 year follow up with Dr. Baron. Pt is scheduled for 9/16/2025 at 2 pm in the AO.

## 2025-01-14 ENCOUNTER — HOSPITAL ENCOUNTER (OUTPATIENT)
Dept: NON INVASIVE DIAGNOSTICS | Facility: HOSPITAL | Age: 60
Discharge: HOME | End: 2025-01-14
Payer: COMMERCIAL

## 2025-01-14 ENCOUNTER — RESULTS FOLLOW-UP (OUTPATIENT)
Dept: VASCULAR SURGERY | Facility: CLINIC | Age: 60
End: 2025-01-14

## 2025-01-14 DIAGNOSIS — I73.9 PAD (PERIPHERAL ARTERY DISEASE) (HCC): ICD-10-CM

## 2025-01-14 PROCEDURE — 93922 UPR/L XTREMITY ART 2 LEVELS: CPT | Performed by: SURGERY

## 2025-01-14 PROCEDURE — 93923 UPR/LXTR ART STDY 3+ LVLS: CPT

## 2025-01-14 PROCEDURE — 93925 LOWER EXTREMITY STUDY: CPT | Performed by: SURGERY

## 2025-01-14 PROCEDURE — 93925 LOWER EXTREMITY STUDY: CPT

## 2025-01-15 ENCOUNTER — TELEPHONE (OUTPATIENT)
Dept: VASCULAR SURGERY | Facility: CLINIC | Age: 60
End: 2025-01-15

## 2025-01-15 NOTE — TELEPHONE ENCOUNTER
Attempted to contact patient to schedule appointment(s) listed below.  Requested patient call [phone] to schedule appointment(s).    Patient's appointment(s) are past due, expected ASAP.    Dopplers  [] Abdominal Aorta Iliac (AOIL)  [] Carotid (CV)   [] Celiac and/or Mesenteric  [] Endovascular Aortic Repair (EVAR)   [] Hemodialysis Access (HD)   [] Lower Limb Arterial (SHERYL)  [] Lower Limb Venous (LEV)  [] Lower Limb Venous Duplex with Reflux (LEVDR)  [] Renal Artery  [] Upper Limb Arterial (UEA)    [] Upper Limb Venous (UEV)              [] [place] and Waveform analysis     Advanced Imaging   [] CTA head/neck    [] CTA abdomen    [] CTA abdomen & pelvis    [] CT abdomen with/ without contrast  [] CT abdomen with contrast  [] CT abdomen without contrast    [] CT abdomen & pelvis with/ without contrast  [] CT abdomen & pelvis with contrast  [] CT abdomen & pelvis without contrast    Office Visit   [] New patient, patient last seen over 3 years ago  [x] Risk factor modification (RFM)   [x] Follow up   [] Lost to follow up (LTFU)   Called patient & LMOM to schedule 1 yr rfm ov/  RFM L/S 8/31/23 MJQ RR SHERYL 9/4/24

## 2025-01-29 ENCOUNTER — CLINICAL SUPPORT (OUTPATIENT)
Dept: BARIATRICS | Facility: CLINIC | Age: 60
End: 2025-01-29
Payer: COMMERCIAL

## 2025-01-29 ENCOUNTER — OFFICE VISIT (OUTPATIENT)
Dept: PODIATRY | Facility: CLINIC | Age: 60
End: 2025-01-29
Payer: COMMERCIAL

## 2025-01-29 ENCOUNTER — TELEPHONE (OUTPATIENT)
Age: 60
End: 2025-01-29

## 2025-01-29 VITALS — HEIGHT: 60 IN | BODY MASS INDEX: 25.13 KG/M2 | WEIGHT: 128 LBS

## 2025-01-29 DIAGNOSIS — Z89.431 S/P TRANSMETATARSAL AMPUTATION OF FOOT, RIGHT (HCC): ICD-10-CM

## 2025-01-29 DIAGNOSIS — I73.9 PAD (PERIPHERAL ARTERY DISEASE) (HCC): Primary | ICD-10-CM

## 2025-01-29 DIAGNOSIS — E53.8 VITAMIN B12 DEFICIENCY: Primary | ICD-10-CM

## 2025-01-29 DIAGNOSIS — B35.1 TINEA UNGUIUM: ICD-10-CM

## 2025-01-29 DIAGNOSIS — E11.42 TYPE 2 DIABETES MELLITUS WITH DIABETIC POLYNEUROPATHY, WITHOUT LONG-TERM CURRENT USE OF INSULIN (HCC): ICD-10-CM

## 2025-01-29 PROCEDURE — RECHECK: Performed by: PODIATRIST

## 2025-01-29 PROCEDURE — RECHECK

## 2025-01-29 PROCEDURE — 96372 THER/PROPH/DIAG INJ SC/IM: CPT | Performed by: NURSE PRACTITIONER

## 2025-01-29 PROCEDURE — 11720 DEBRIDE NAIL 1-5: CPT | Performed by: PODIATRIST

## 2025-01-29 RX ADMIN — CYANOCOBALAMIN 1000 MCG: 1000 INJECTION, SOLUTION INTRAMUSCULAR; SUBCUTANEOUS at 11:03

## 2025-01-29 NOTE — TELEPHONE ENCOUNTER
Patient daughter called to reschedule mom appointment 01/29/2025 at 1:30 to earlier time 01/29/2025 at 11:45 am.

## 2025-01-29 NOTE — PROGRESS NOTES
Niesha Rankin Camron  1965  AT RISK FOOT CARE    1. PAD (peripheral artery disease) ([facility])        2. Type 2 diabetes mellitus with diabetic polyneuropathy, without long-term current use of insulin ([facility])        3. Tinea unguium        4. S/P transmetatarsal amputation of foot, right ([facility])            Patient presents for at-risk foot care.  Patient has no acute concerns today.  Patient has significant lower extremity risk due to previous amputation.    On exam patient has thickened, hypertrophic, discolored, brittle toenails with subungual debris and tenderness x5   Callus: none  Amputation: right TMA stable, left 4n5 amputation stable    Today's treatment includes:  Debridement of toenails. Using nail nipper, kacie, and curette, nails were sharply debrided, reduced in thickness and length. Devitalized nail tissue and fungal debris excised and removed. Patient tolerated well.        Discussed proper shoe gear, daily inspections of feet, and general foot health with patient. Patient has Q7  findings and is recommended for at risk foot care every 9-10 weeks.    Patients most recent complete clinical exam was performed: 4/2024. Full at risk assessment at next visit

## 2025-02-03 ENCOUNTER — TELEPHONE (OUTPATIENT)
Dept: FAMILY MEDICINE CLINIC | Facility: CLINIC | Age: 60
End: 2025-02-03

## 2025-02-07 ENCOUNTER — TELEPHONE (OUTPATIENT)
Dept: FAMILY MEDICINE CLINIC | Facility: CLINIC | Age: 60
End: 2025-02-07

## 2025-02-10 ENCOUNTER — OFFICE VISIT (OUTPATIENT)
Dept: FAMILY MEDICINE CLINIC | Facility: CLINIC | Age: 60
End: 2025-02-10

## 2025-02-10 VITALS
BODY MASS INDEX: 25.91 KG/M2 | WEIGHT: 132 LBS | RESPIRATION RATE: 18 BRPM | HEIGHT: 60 IN | DIASTOLIC BLOOD PRESSURE: 74 MMHG | OXYGEN SATURATION: 97 % | HEART RATE: 58 BPM | TEMPERATURE: 97.8 F | SYSTOLIC BLOOD PRESSURE: 128 MMHG

## 2025-02-10 DIAGNOSIS — I10 ESSENTIAL HYPERTENSION: Primary | ICD-10-CM

## 2025-02-10 DIAGNOSIS — I73.9 PAD (PERIPHERAL ARTERY DISEASE) (HCC): ICD-10-CM

## 2025-02-10 DIAGNOSIS — E11.22 DIABETES MELLITUS WITH STAGE 2 CHRONIC KIDNEY DISEASE  (HCC): ICD-10-CM

## 2025-02-10 DIAGNOSIS — N18.2 DIABETES MELLITUS WITH STAGE 2 CHRONIC KIDNEY DISEASE  (HCC): ICD-10-CM

## 2025-02-10 DIAGNOSIS — E55.9 VITAMIN D DEFICIENCY: ICD-10-CM

## 2025-02-10 DIAGNOSIS — H61.22 CERUMEN DEBRIS ON TYMPANIC MEMBRANE OF LEFT EAR: ICD-10-CM

## 2025-02-10 PROCEDURE — 99214 OFFICE O/P EST MOD 30 MIN: CPT

## 2025-02-10 PROCEDURE — G2211 COMPLEX E/M VISIT ADD ON: HCPCS

## 2025-02-10 RX ORDER — ATORVASTATIN CALCIUM 20 MG/1
20 TABLET, FILM COATED ORAL DAILY
Qty: 90 TABLET | Refills: 1 | Status: SHIPPED | OUTPATIENT
Start: 2025-02-10

## 2025-02-10 RX ORDER — METOPROLOL TARTRATE 25 MG/1
25 TABLET, FILM COATED ORAL EVERY 12 HOURS
Qty: 180 TABLET | Refills: 1 | Status: SHIPPED | OUTPATIENT
Start: 2025-02-10

## 2025-02-10 RX ORDER — LISINOPRIL 10 MG/1
10 TABLET ORAL DAILY
Qty: 90 TABLET | Refills: 3 | Status: SHIPPED | OUTPATIENT
Start: 2025-02-10

## 2025-02-10 RX ORDER — ASPIRIN 81 MG/1
81 TABLET, CHEWABLE ORAL DAILY
Qty: 90 TABLET | Refills: 1 | Status: SHIPPED | OUTPATIENT
Start: 2025-02-10

## 2025-02-10 NOTE — PROGRESS NOTES
Name: Niesha Lyon      : 1965      MRN: [id number]  Encounter Provider: MIKEL Taylor  Encounter Date: 2/10/2025   Encounter department: [facility]  :  Assessment & Plan  Essential hypertension  BP Readings from Last 3 Encounters:   02/10/25 128/74   24 130/72   24 112/74   -At goal  -Continue lisinopril 10 mg daily and metoprolol 25 mg Q12H.  -Reports compliance with medications.  - eating a low salt diet,  exercising, and weight loss.   Orders:    metoprolol tartrate (LOPRESSOR) 25 mg tablet; Take 1 tablet (25 mg total) by mouth every 12 (twelve) hours    lisinopril (ZESTRIL) 10 mg tablet; Take 1 tablet (10 mg total) by mouth daily    atorvastatin (LIPITOR) 20 mg tablet; Take 1 tablet (20 mg total) by mouth daily    Basic metabolic panel; Future    Vitamin D deficiency    Orders:    Cholecalciferol (VITAMIN D3) 1,000 units tablet; Take 2 tablets (2,000 Units total) by mouth daily    Diabetes mellitus with stage 2 chronic kidney disease  (HCC)    Lab Results   Component Value Date    HGBA1C 6.9 (A) 2024       Orders:    atorvastatin (LIPITOR) 20 mg tablet; Take 1 tablet (20 mg total) by mouth daily    PAD (peripheral artery disease) ([facility])    Orders:    aspirin 81 mg chewable tablet; Chew 1 tablet (81 mg total) daily    Cerumen debris on tympanic membrane of left ear    Orders:    carbamide peroxide (DEBROX) 6.5 % otic solution; Administer 5 drops into the left ear 2 (two) times a day           History of Present Illness   Niesha Lyon is a 59 y.o. with  has a past medical history of Ambulates with cane, Bariatric surgery status, Cellulitis of toe, Chronic kidney disease, CKD (chronic kidney disease), CPAP (continuous positive airway pressure) dependence, Diabetes mellitus (HCC), Gangrene of toe of right foot (HCC), Hyperlipidemia, Hypertension, Obese, PVD (peripheral vascular disease) ([facility]), Seasonal allergies,  Sleep apnea, Teeth missing, and Wears glasses.     Patient is here for HTN follow up. Patient reports compliance with lisinopril and metoprolol. Patient denies any acute complains.       Review of Systems   Constitutional: Negative.  Negative for chills, fatigue and fever.   HENT: Negative.  Negative for ear pain and sore throat.    Eyes: Negative.  Negative for pain and visual disturbance.   Respiratory: Negative.  Negative for cough.    Cardiovascular: Negative.    Gastrointestinal: Negative.  Negative for abdominal pain and vomiting.   Endocrine: Negative.    Genitourinary: Negative.  Negative for dysuria and hematuria.   Musculoskeletal: Negative.  Negative for arthralgias and back pain.   Skin: Negative.  Negative for color change and rash.   Allergic/Immunologic: Negative.    Neurological: Negative.  Negative for seizures and syncope.   Hematological: Negative.    Psychiatric/Behavioral: Negative.     All other systems reviewed and are negative.      Objective   /74 (BP Location: Left arm, Patient Position: Sitting, Cuff Size: Standard)   Pulse 58   Temp 97.8 °F (36.6 °C) (Temporal)   Resp 18   Ht 5' (1.524 m)   Wt 59.9 kg (132 lb)   LMP  (LMP Unknown)   SpO2 97%   BMI 25.78 kg/m²      Physical Exam  Vitals and nursing note reviewed.   Constitutional:       General: Niesha is not in acute distress.     Appearance: Normal appearance. Niesha is well-developed.   HENT:      Head: Normocephalic and atraumatic.      Right Ear: Tympanic membrane normal.      Left Ear: Tympanic membrane normal. There is impacted cerumen.      Nose: Nose normal.      Mouth/Throat:      Mouth: Mucous membranes are moist.      Pharynx: Oropharynx is clear.   Eyes:      Conjunctiva/sclera: Conjunctivae normal.   Cardiovascular:      Rate and Rhythm: Normal rate and regular rhythm.      Pulses: Normal pulses.      Heart sounds: Normal heart sounds. No murmur heard.  Pulmonary:      Effort: Pulmonary effort is normal. No  respiratory distress.      Breath sounds: Normal breath sounds.   Abdominal:      General: Abdomen is flat. Bowel sounds are normal.      Palpations: Abdomen is soft.      Tenderness: There is no abdominal tenderness.   Musculoskeletal:         General: No swelling. Normal range of motion.      Cervical back: Normal range of motion and neck supple.   Skin:     General: Skin is warm and dry.      Capillary Refill: Capillary refill takes less than 2 seconds.   Neurological:      General: No focal deficit present.      Mental Status: Niesha is alert. Mental status is at baseline.   Psychiatric:         Mood and Affect: Mood normal.         Behavior: Behavior normal.         Thought Content: Thought content normal.         Judgment: Judgment normal.

## 2025-02-10 NOTE — ASSESSMENT & PLAN NOTE
BP Readings from Last 3 Encounters:   02/10/25 128/74   12/03/24 130/72   11/06/24 112/74   -At goal  -Continue lisinopril 10 mg daily and metoprolol 25 mg Q12H.  -Reports compliance with medications.  - eating a low salt diet,  exercising, and weight loss.   Orders:    metoprolol tartrate (LOPRESSOR) 25 mg tablet; Take 1 tablet (25 mg total) by mouth every 12 (twelve) hours    lisinopril (ZESTRIL) 10 mg tablet; Take 1 tablet (10 mg total) by mouth daily    atorvastatin (LIPITOR) 20 mg tablet; Take 1 tablet (20 mg total) by mouth daily    Basic metabolic panel; Future

## 2025-02-10 NOTE — ASSESSMENT & PLAN NOTE
Lab Results   Component Value Date    HGBA1C 6.9 (A) 12/03/2024       Orders:    atorvastatin (LIPITOR) 20 mg tablet; Take 1 tablet (20 mg total) by mouth daily

## 2025-02-10 NOTE — ASSESSMENT & PLAN NOTE
Orders:    Cholecalciferol (VITAMIN D3) 1,000 units tablet; Take 2 tablets (2,000 Units total) by mouth daily

## 2025-03-24 ENCOUNTER — TELEPHONE (OUTPATIENT)
Dept: FAMILY MEDICINE CLINIC | Facility: CLINIC | Age: 60
End: 2025-03-24

## 2025-03-24 NOTE — TELEPHONE ENCOUNTER
WD/ FMLA  form received on 03/24/25  to be completed by PCP. Copy made and placed in PCP folder.    Forms to be delivered to PCP mailbox at assigned time.

## 2025-03-25 ENCOUNTER — TELEPHONE (OUTPATIENT)
Age: 60
End: 2025-03-25

## 2025-03-25 NOTE — TELEPHONE ENCOUNTER
Phone call from patient's daughter Bill Arzate will be faxing [facility] Paperwork for herself. Informed Bill that it takes 7-10 days for any paperwork to be completed.

## 2025-03-26 ENCOUNTER — TELEPHONE (OUTPATIENT)
Dept: VASCULAR SURGERY | Facility: CLINIC | Age: 60
End: 2025-03-26

## 2025-03-26 NOTE — TELEPHONE ENCOUNTER
----- Message -----  From: MIKEL Minaya  Sent: 3/26/2025   7:46 AM EDT  To: Ramandeep Simons RN    This can be addressed at her office visit. I'm not even sure what she is applying for [place] for.  
Form scan to chart and place in  bin under (P)    first attempt to contact patient. no answer left message to return my call on answering machine       
Left message for patient and patient's daughter Bill - phone # [phone] - to return call to discuss FMLA paperwork.  
Patient's daughter returned call.  Informed her  of information from MIKEL Denis that the [facility] paperwork can be addressed at patient's appointment.  Bill stated that her employer wanted the paperwork completed by all of patient's providers.  Informed her again of information from MIKEL Denis.  Informed her patient has not been seen in office since 8/31/23 and patient would need to be seen prior to forms being completed.  Verbal understanding received and she stated that she may have to change patient's appointment if she is not able to bring her.  
Received FMLA paperwork for patient's daughter Bill Arzate.  Patient was last seen in office on 8/31/23 by Dr. Cat.  Patient has upcoming appointment scheduled on 4/29/25.  FMLA paperwork may not be completed until after appointment.  Will send message to Vascular Triage to inquire if paperwork is able to be completed prior to patient's appointment or after.  
03-Sep-2024 19:15

## 2025-03-27 ENCOUNTER — TELEPHONE (OUTPATIENT)
Dept: NEPHROLOGY | Facility: CLINIC | Age: 60
End: 2025-03-27

## 2025-03-27 NOTE — TELEPHONE ENCOUNTER
Patient daughter stopped in office to drop of FMLA forms to be completed so patient daughter is able to take patient to appointments and any lab work or testing that is needed. I advised pt daughter I will notify Dr. Tod Cid once she is back from lunch.

## 2025-03-27 NOTE — TELEPHONE ENCOUNTER
Patient's daughter, Bill, called back stating she forgot to give you the fax number to fax the [facility] paperwork to [facility] [phone].

## 2025-03-27 NOTE — TELEPHONE ENCOUNTER
Patient's daughter Bill called stating that she is going to pass by today and drop the FMLA paperwork.     No further action needed at this time.

## 2025-03-28 NOTE — TELEPHONE ENCOUNTER
Hello, FMLA form was not accepted due to needing explanation on page 3 question 6. Daughter stating that pt is disable and can't walk far and also needs transportation. Daughter stating deadline is on Monday. Form placed in pcp bin. Thanks! Fax once completed.

## 2025-03-28 NOTE — TELEPHONE ENCOUNTER
Mickey for Niesha advised per  she is unable to sign the FMLA forms as patient is seen only yearly by us and would not have sufficient information to clear her. Advised pt to contact PCP.

## 2025-03-31 ENCOUNTER — TELEPHONE (OUTPATIENT)
Dept: ENDOCRINOLOGY | Facility: CLINIC | Age: 60
End: 2025-03-31

## 2025-04-29 ENCOUNTER — OFFICE VISIT (OUTPATIENT)
Dept: VASCULAR SURGERY | Facility: CLINIC | Age: 60
End: 2025-04-29
Payer: COMMERCIAL

## 2025-04-29 VITALS
HEIGHT: 60 IN | HEART RATE: 63 BPM | DIASTOLIC BLOOD PRESSURE: 78 MMHG | SYSTOLIC BLOOD PRESSURE: 150 MMHG | WEIGHT: 133 LBS | OXYGEN SATURATION: 98 % | BODY MASS INDEX: 26.11 KG/M2

## 2025-04-29 DIAGNOSIS — E78.2 MIXED HYPERLIPIDEMIA: ICD-10-CM

## 2025-04-29 DIAGNOSIS — I10 ESSENTIAL HYPERTENSION: ICD-10-CM

## 2025-04-29 DIAGNOSIS — I73.9 PAD (PERIPHERAL ARTERY DISEASE) (HCC): Primary | ICD-10-CM

## 2025-04-29 DIAGNOSIS — N18.2 DIABETES MELLITUS WITH STAGE 2 CHRONIC KIDNEY DISEASE  (HCC): ICD-10-CM

## 2025-04-29 DIAGNOSIS — E11.22 DIABETES MELLITUS WITH STAGE 2 CHRONIC KIDNEY DISEASE  (HCC): ICD-10-CM

## 2025-04-29 PROCEDURE — 99213 OFFICE O/P EST LOW 20 MIN: CPT

## 2025-04-29 NOTE — PROGRESS NOTES
Name: Niesha Lyon      : 1965      MRN: [id number]  Encounter Provider: MIKEL Minaya  Encounter Date: 2025   [facility] [place]  :  Assessment & Plan  PAD (peripheral artery disease) (HCC)  Patient reports that she is doing well since her last office visit.  She is denying any claudication, rest pain, or new tissue loss.  Patient remains active and independent with ADLs at this time.    Imaging:  LEAD 2025:  RIGHT LOWER LIMB:  An occlusion of the proximal SFA/stent with distal reconstitution and diffuse disease noted throughout the remaining portions of the femoral-popliteal arteries. Findings suggests tibioperoneal disease.  Ankle/Brachial index: 0.71/-/-  LEFT LOWER LIMB:  An occlusion of the proximal thigh level of the SFA with distal reconstitution and diffuse disease noted throughout the remaining portions of the femoral-popliteal arteries. Findings suggests tibioperoneal disease.  Ankle/Brachial index: 0.74/88/59    Plan:  -We discussed the pathophysiology of peripheral arterial disease as well as contributing lifestyle factors.  -We discussed the results of LEAD at length. No significant change from previous studies  -Continue medical optimization with atorvastatin, aspirin, and clopidogrel  -Will repeat LEAD in 1 year  -Instructed patient to notify office of any claudication, rest pain, or tissue loss  -Instructed patient to report to the ED for any symptoms of acute limb ischemia (color/temperature change, motor/sensory loss, tissue loss).  -Follow up in the office in 1 year    Orders:    VAS ARTERIAL DUPLEX- LOWER LIMB BILATERAL; Future    Essential hypertension  Blood pressure is elevated in the office today, 150/78.    - Continue medical management per PCP.  - Low-salt diet.         Diabetes mellitus with stage 2 chronic kidney disease  (HCC)    Mixed hyperlipidemia        History of Present Illness   HPI  Niesha Rankin  Camron is a 59 y.o. adult, former smoker, with PMH HTN, HLD, type II DM, CKD 2, ABHINAV, and PAD s/p multiple interventions, most recently RLE arteriogram and lysis with stenting of the SFA and popliteal angioplasty with DCB.  Patient is presenting to the vascular surgery office to review results of LEAD completed 1/14/2025.    Patient reports that she is doing well since her last office visit.  She is denying any claudication, rest pain, or new tissue loss.  Patient remains active and independent with ADLs at this time.  She reports that her blood sugar is well-controlled and that she utilizes a continuous glucose monitor to track her sugars.  History obtained from: patient    Review of Systems   Constitutional: Negative.  Negative for activity change.   HENT: Negative.     Eyes: Negative.    Respiratory: Negative.  Negative for shortness of breath.    Cardiovascular: Negative.  Negative for chest pain.   Gastrointestinal: Negative.    Endocrine: Negative.    Genitourinary: Negative.    Musculoskeletal: Negative.    Skin:  Negative for color change, pallor and wound.   Allergic/Immunologic: Negative.    Neurological: Negative.    Hematological: Negative.    Psychiatric/Behavioral: Negative.       Pertinent Medical History   Past Medical History:   Diagnosis Date    Ambulates with cane     Bariatric surgery status     Cellulitis of toe 01/28/2019    Chronic kidney disease     CKD (chronic kidney disease)     CPAP (continuous positive airway pressure) dependence     Diabetes mellitus (HCC)     Gangrene of toe of right foot (HCC) 10/30/2019    Added automatically from request for surgery [id number]    Hyperlipidemia     Hypertension     Obese     gastric  sleeve today 2/28/2022    PVD (peripheral vascular disease) ([facility])     Seasonal allergies     Sleep apnea     Teeth missing     Wears glasses          Medical History Reviewed by provider this encounter:  Tobacco  Allergies  Meds  Problems  Med Hx  Surg Hx  Fam  Hx     .  Current Outpatient Medications on File Prior to Visit   Medication Sig Dispense Refill    aspirin 81 mg chewable tablet Chew 1 tablet (81 mg total) daily 90 tablet 1    atorvastatin (LIPITOR) 20 mg tablet Take 1 tablet (20 mg total) by mouth daily 90 tablet 1    Blood Pressure KIT Use 2 (two) times a day 1 kit 0    Cholecalciferol (VITAMIN D3) 1,000 units tablet Take 2 tablets (2,000 Units total) by mouth daily 180 tablet 1    clopidogrel (PLAVIX) 75 mg tablet TAKE 1 TABLET(75 MG) BY MOUTH DAILY 90 tablet 3    Continuous Blood Gluc  (Dexcom G7 ) KATE Use 1 each daily Use per  guidelines 1 each 0    Continuous Glucose Sensor (Dexcom G7 Sensor) Use 1 Device every 10 days 3 each 11    docusate sodium (COLACE) 100 mg capsule Take 1 capsule (100 mg total) by mouth 2 (two) times a day 60 capsule 1    Dulaglutide 4.5 MG/0.5ML SOAJ Inject 4.5mg once per week 2 mL 11    lisinopril (ZESTRIL) 10 mg tablet Take 1 tablet (10 mg total) by mouth daily 90 tablet 3    metoprolol tartrate (LOPRESSOR) 25 mg tablet Take 1 tablet (25 mg total) by mouth every 12 (twelve) hours 180 tablet 1    Blood Glucose Monitoring Suppl (ONE TOUCH ULTRA MINI) w/Device KIT Use 4 times a day (Patient not taking: Reported on 11/6/2024) 1 kit 2    carbamide peroxide (DEBROX) 6.5 % otic solution Administer 5 drops into the left ear 2 (two) times a day (Patient not taking: Reported on 4/29/2025) 15 mL 0    glucose blood (OneTouch Ultra) test strip Use 1 each 3 (three) times a day Test (Patient not taking: Reported on 11/6/2024) 300 strip 0    OneTouch Delica Lancets 33G MISC Use to test 3x daily (Patient not taking: Reported on 11/6/2024) 300 each 2     No current facility-administered medications on file prior to visit.      Social History     Tobacco Use    Smoking status: Former     Current packs/day: 0.00     Average packs/day: 2.0 packs/day for 20.0 years (40.0 ttl pk-yrs)     Types: Cigarettes     Start date:  1993     Quit date: 2013     Years since quittin.3     Passive exposure: Past    Smokeless tobacco: Never   Vaping Use    Vaping status: Never Used   Substance and Sexual Activity    Alcohol use: Yes     Alcohol/week: 2.0 standard drinks of alcohol     Types: 2 Standard drinks or equivalent per week     Comment: rarely    Drug use: No    Sexual activity: Not Currently        Objective   /78 (BP Location: Left arm, Patient Position: Sitting, Cuff Size: Standard)   Pulse 63   Ht 5' (1.524 m)   Wt 60.3 kg (133 lb)   LMP  (LMP Unknown)   SpO2 98%   BMI 25.97 kg/m²      Physical Exam  Vitals and nursing note reviewed.   Constitutional:       General: Niesha is not in acute distress.     Appearance: Normal appearance. Niesha is well-developed.   HENT:      Head: Normocephalic and atraumatic.   Eyes:      Conjunctiva/sclera: Conjunctivae normal.   Cardiovascular:      Rate and Rhythm: Normal rate and regular rhythm.      Pulses:           Radial pulses are 2+ on the right side and 2+ on the left side.        Dorsalis pedis pulses are detected w/ Doppler on the right side and detected w/ Doppler on the left side.        Posterior tibial pulses are detected w/ Doppler on the right side and detected w/ Doppler on the left side.      Heart sounds: Normal heart sounds. No murmur heard.  Pulmonary:      Effort: Pulmonary effort is normal. No respiratory distress.      Breath sounds: Normal breath sounds.   Abdominal:      Palpations: Abdomen is soft.      Tenderness: There is no abdominal tenderness.   Musculoskeletal:         General: No swelling or tenderness.      Cervical back: Normal range of motion and neck supple.      Right lower leg: No edema.      Left lower leg: No edema.      Right Lower Extremity: Right leg is amputated below ankle.   Feet:      Right foot:      Skin integrity: Skin integrity normal.      Left foot:      Skin integrity: Skin integrity normal.   Skin:     General: Skin is warm  and dry.      Capillary Refill: Capillary refill takes less than 2 seconds.      Findings: No lesion, rash or wound.   Neurological:      General: No focal deficit present.      Mental Status: Niesha is alert and oriented to person, place, and time.   Psychiatric:         Mood and Affect: Mood normal.         Behavior: Behavior normal.         Administrative Statements   I have spent a total time of 20 minutes in caring for this patient on the day of the visit/encounter including Diagnostic results, Prognosis, Risks and benefits of tx options, Instructions for management, Patient and family education, Importance of tx compliance, Risk factor reductions, Impressions, Counseling / Coordination of care, Documenting in the medical record, Reviewing/placing orders in the medical record (including tests, medications, and/or procedures), and Obtaining or reviewing history  .

## 2025-04-29 NOTE — ASSESSMENT & PLAN NOTE
Blood pressure is elevated in the office today, 150/78.    - Continue medical management per PCP.  - Low-salt diet.

## 2025-04-29 NOTE — ASSESSMENT & PLAN NOTE
Patient reports that she is doing well since her last office visit.  She is denying any claudication, rest pain, or new tissue loss.  Patient remains active and independent with ADLs at this time.    Imaging:  LEAD 1/14/2025:  RIGHT LOWER LIMB:  An occlusion of the proximal SFA/stent with distal reconstitution and diffuse disease noted throughout the remaining portions of the femoral-popliteal arteries. Findings suggests tibioperoneal disease.  Ankle/Brachial index: 0.71/-/-  LEFT LOWER LIMB:  An occlusion of the proximal thigh level of the SFA with distal reconstitution and diffuse disease noted throughout the remaining portions of the femoral-popliteal arteries. Findings suggests tibioperoneal disease.  Ankle/Brachial index: 0.74/88/59    Plan:  -We discussed the pathophysiology of peripheral arterial disease as well as contributing lifestyle factors.  -We discussed the results of LEAD at length. No significant change from previous studies  -Continue medical optimization with atorvastatin, aspirin, and clopidogrel  -Will repeat LEAD in 1 year  -Instructed patient to notify office of any claudication, rest pain, or tissue loss  -Instructed patient to report to the ED for any symptoms of acute limb ischemia (color/temperature change, motor/sensory loss, tissue loss).  -Follow up in the office in 1 year    Orders:    VAS ARTERIAL DUPLEX- LOWER LIMB BILATERAL; Future

## 2025-04-29 NOTE — ASSESSMENT & PLAN NOTE
Lab Results   Component Value Date    HGBA1C 6.9 (A) 12/03/2024   Well-controlled, on dulaglutide.  Continue management per PCP

## 2025-05-13 ENCOUNTER — OFFICE VISIT (OUTPATIENT)
Dept: FAMILY MEDICINE CLINIC | Facility: CLINIC | Age: 60
End: 2025-05-13

## 2025-05-13 VITALS
WEIGHT: 130 LBS | RESPIRATION RATE: 16 BRPM | TEMPERATURE: 97.6 F | HEART RATE: 70 BPM | DIASTOLIC BLOOD PRESSURE: 72 MMHG | SYSTOLIC BLOOD PRESSURE: 116 MMHG | OXYGEN SATURATION: 99 % | HEIGHT: 60 IN | BODY MASS INDEX: 25.52 KG/M2

## 2025-05-13 DIAGNOSIS — I73.9 PAD (PERIPHERAL ARTERY DISEASE) (HCC): ICD-10-CM

## 2025-05-13 DIAGNOSIS — I10 ESSENTIAL HYPERTENSION: Primary | ICD-10-CM

## 2025-05-13 DIAGNOSIS — F17.211 CIGARETTE NICOTINE DEPENDENCE IN REMISSION: ICD-10-CM

## 2025-05-13 DIAGNOSIS — N18.2 DIABETES MELLITUS WITH STAGE 2 CHRONIC KIDNEY DISEASE  (HCC): ICD-10-CM

## 2025-05-13 DIAGNOSIS — E11.22 DIABETES MELLITUS WITH STAGE 2 CHRONIC KIDNEY DISEASE  (HCC): ICD-10-CM

## 2025-05-13 PROCEDURE — 99214 OFFICE O/P EST MOD 30 MIN: CPT

## 2025-05-13 PROCEDURE — G2211 COMPLEX E/M VISIT ADD ON: HCPCS

## 2025-05-13 RX ORDER — ASPIRIN 81 MG/1
81 TABLET, CHEWABLE ORAL DAILY
Qty: 90 TABLET | Refills: 1 | Status: SHIPPED | OUTPATIENT
Start: 2025-05-13

## 2025-05-13 RX ORDER — METOPROLOL TARTRATE 25 MG/1
25 TABLET, FILM COATED ORAL EVERY 12 HOURS
Qty: 180 TABLET | Refills: 1 | Status: SHIPPED | OUTPATIENT
Start: 2025-05-13

## 2025-05-13 RX ORDER — ATORVASTATIN CALCIUM 20 MG/1
20 TABLET, FILM COATED ORAL DAILY
Qty: 90 TABLET | Refills: 1 | Status: SHIPPED | OUTPATIENT
Start: 2025-05-13

## 2025-05-13 NOTE — ASSESSMENT & PLAN NOTE
Smoker since she was 16 years old. Stopped smoking 13 years ago  - Smoker for 30 years, stopped smoking <15 years  - Encourage to obtain baseline CT lung

## 2025-05-13 NOTE — ASSESSMENT & PLAN NOTE
BP Readings from Last 3 Encounters:   05/13/25 116/72   04/29/25 150/78   02/10/25 128/74   -At goal  -Continue lisinopril 10 mg daily and metoprolol 25 mg Q12H.  -Reports compliance with medications.  - eating a low salt diet,  exercising, and weight loss.   Orders:    metoprolol tartrate (LOPRESSOR) 25 mg tablet; Take 1 tablet (25 mg total) by mouth every 12 (twelve) hours    atorvastatin (LIPITOR) 20 mg tablet; Take 1 tablet (20 mg total) by mouth daily

## 2025-05-13 NOTE — PROGRESS NOTES
Name: Niesha Lyon      : 1965      MRN: [id number]  Encounter Provider: MIKEL Taylor  Encounter Date: 2025   Encounter department: [facility]  :  Assessment & Plan  Essential hypertension  BP Readings from Last 3 Encounters:   25 116/72   25 150/78   02/10/25 128/74   -At goal  -Continue lisinopril 10 mg daily and metoprolol 25 mg Q12H.  -Reports compliance with medications.  - eating a low salt diet,  exercising, and weight loss.   Orders:    metoprolol tartrate (LOPRESSOR) 25 mg tablet; Take 1 tablet (25 mg total) by mouth every 12 (twelve) hours    atorvastatin (LIPITOR) 20 mg tablet; Take 1 tablet (20 mg total) by mouth daily      Cigarette nicotine dependence in remission  Smoker since she was 16 years old. Stopped smoking 13 years ago  - Smoker for 30 years, stopped smoking <15 years  - Encourage to obtain baseline CT lung             PAD (peripheral artery disease) ([facility])    Orders:    aspirin 81 mg chewable tablet; Chew 1 tablet (81 mg total) daily    Diabetes mellitus with stage 2 chronic kidney disease  ([facility])    Lab Results   Component Value Date    HGBA1C 6.9 (A) 2024       Orders:    atorvastatin (LIPITOR) 20 mg tablet; Take 1 tablet (20 mg total) by mouth daily           History of Present Illness   Niesha Lyon is a 59 y.o. with  has a past medical history of Ambulates with cane, Bariatric surgery status, Cellulitis of toe, Chronic kidney disease, CKD (chronic kidney disease), CPAP (continuous positive airway pressure) dependence, Diabetes mellitus ([facility]), Gangrene of toe of right foot ([facility]), Hyperlipidemia, Hypertension, Obese, PVD (peripheral vascular disease) ([facility]), Seasonal allergies, Sleep apnea, Teeth missing, and Wears glasses.     Patient is here for hypertension follow up. Patient reports being compliant with lisinopril 10 mg and metoprolol 25 mg Q12H. Patient denies any acute complains.        Review of Systems   Constitutional: Negative.  Negative for chills, fatigue and fever.   HENT: Negative.  Negative for ear pain and sore throat.    Eyes: Negative.  Negative for pain and visual disturbance.   Respiratory: Negative.  Negative for cough and shortness of breath.    Cardiovascular: Negative.  Negative for chest pain and palpitations.   Gastrointestinal: Negative.  Negative for abdominal pain and vomiting.   Genitourinary: Negative.  Negative for dysuria and hematuria.   Musculoskeletal: Negative.  Negative for arthralgias and back pain.   Skin: Negative.  Negative for color change and rash.   Neurological: Negative.  Negative for seizures and syncope.   Hematological: Negative.    Psychiatric/Behavioral: Negative.  Negative for behavioral problems.    All other systems reviewed and are negative.      Objective   /72 (BP Location: Left arm, Patient Position: Sitting, Cuff Size: Standard)   Pulse 70   Temp 97.6 °F (36.4 °C) (Temporal)   Resp 16   Ht 5' (1.524 m)   Wt 59 kg (130 lb)   LMP  (LMP Unknown)   SpO2 99%   BMI 25.39 kg/m²      Physical Exam  Vitals and nursing note reviewed.   Constitutional:       General: Niesha is not in acute distress.     Appearance: Normal appearance. Niesha is well-developed.   HENT:      Head: Normocephalic and atraumatic.      Right Ear: Tympanic membrane normal.      Left Ear: Tympanic membrane normal.      Nose: Nose normal.      Mouth/Throat:      Mouth: Mucous membranes are moist.   Eyes:      Conjunctiva/sclera: Conjunctivae normal.   Cardiovascular:      Rate and Rhythm: Normal rate and regular rhythm.      Pulses: Normal pulses.      Heart sounds: Normal heart sounds. No murmur heard.  Pulmonary:      Effort: Pulmonary effort is normal. No respiratory distress.      Breath sounds: Normal breath sounds.   Abdominal:      General: Abdomen is flat.      Palpations: Abdomen is soft.      Tenderness: There is no abdominal tenderness.    Musculoskeletal:         General: No swelling. Normal range of motion.      Cervical back: Neck supple.   Skin:     General: Skin is warm and dry.      Capillary Refill: Capillary refill takes less than 2 seconds.   Neurological:      General: No focal deficit present.      Mental Status: Niesha is alert and oriented to person, place, and time. Mental status is at baseline.   Psychiatric:         Mood and Affect: Mood normal.         Behavior: Behavior normal.         Thought Content: Thought content normal.         Judgment: Judgment normal.

## 2025-06-06 ENCOUNTER — PROCEDURE VISIT (OUTPATIENT)
Dept: PODIATRY | Facility: CLINIC | Age: 60
End: 2025-06-06
Payer: COMMERCIAL

## 2025-06-06 VITALS — WEIGHT: 130 LBS | HEIGHT: 60 IN | BODY MASS INDEX: 25.52 KG/M2

## 2025-06-06 DIAGNOSIS — E11.42 TYPE 2 DIABETES MELLITUS WITH DIABETIC POLYNEUROPATHY, WITHOUT LONG-TERM CURRENT USE OF INSULIN (HCC): ICD-10-CM

## 2025-06-06 DIAGNOSIS — B35.1 TINEA UNGUIUM: ICD-10-CM

## 2025-06-06 DIAGNOSIS — L84 CALLUS OF FOOT: ICD-10-CM

## 2025-06-06 DIAGNOSIS — I73.9 PAD (PERIPHERAL ARTERY DISEASE) (HCC): Primary | ICD-10-CM

## 2025-06-06 DIAGNOSIS — Z89.431 S/P TRANSMETATARSAL AMPUTATION OF FOOT, RIGHT (HCC): ICD-10-CM

## 2025-06-06 PROCEDURE — 11720 DEBRIDE NAIL 1-5: CPT | Performed by: PODIATRIST

## 2025-06-06 PROCEDURE — 11055 PARING/CUTG B9 HYPRKER LES 1: CPT | Performed by: PODIATRIST

## 2025-06-06 PROCEDURE — 99213 OFFICE O/P EST LOW 20 MIN: CPT | Performed by: PODIATRIST

## 2025-06-06 NOTE — ASSESSMENT & PLAN NOTE
Diagnosis and options discussed with patient  Patient agreeable to the plan as stated below    -DM foot risk is high. Recommend at risk foot care (Q8,7,9)  -Discussed DM risk to lower extremities, proper shoe gear, and daily monitoring of feet.   -Discussed weight loss and suitable exercise regiment.   -Reviewed most recent PCP visit on 5/13/2025  -Educated on A1C and the risks of poorly controlled Diabetes. Reviewed recent A1C:  Lab Results   Component Value Date    HGBA1C 6.9 (A) 12/03/2024    HGBA1C 7.2 (H) 07/10/2023   .       Lab Results   Component Value Date    HGBA1C 6.9 (A) 12/03/2024       Orders:  •  Lesion Destruction

## 2025-06-06 NOTE — PROGRESS NOTES
Name: Niesha Lyon      : 1965      MRN: [id number]  Encounter Provider: Jd Hartley DPM  Encounter Date: 2025   Encounter department: [facility]  :  Assessment & Plan  PAD (peripheral artery disease) ([facility])  Arterial doppler 2025 reviewed, stable from  exam. She checks annually which is good  Orders:  •  Lesion Destruction    Type 2 diabetes mellitus with diabetic polyneuropathy, without long-term current use of insulin ([facility])  Diagnosis and options discussed with patient  Patient agreeable to the plan as stated below    -DM foot risk is high. Recommend at risk foot care (Q8,7,9)  -Discussed DM risk to lower extremities, proper shoe gear, and daily monitoring of feet.   -Discussed weight loss and suitable exercise regiment.   -Reviewed most recent PCP visit on 2025  -Educated on A1C and the risks of poorly controlled Diabetes. Reviewed recent A1C:  Lab Results   Component Value Date    HGBA1C 6.9 (A) 2024    HGBA1C 7.2 (H) 07/10/2023   .       Lab Results   Component Value Date    HGBA1C 6.9 (A) 2024       Orders:  •  Lesion Destruction    S/P transmetatarsal amputation of foot, right ([facility])    Orders:  •  Lesion Destruction    Tinea unguium  Using nail nipper, kacie, and curette, nails were sharply debrided, reduced in thickness and length. Devitalized tissue removed. Patient tolerated well. Patient has Q8  findings and is recommended for at risk foot care every 9-10 weeks.       Orders:  •  Lesion Destruction    Callus of foot  Left 4th toe, no hemorrhage. See procedure for at risk foot care. Risk is high    Orders:  •  Lesion Destruction    Lesion Destruction    Date/Time: 2025 10:15 AM    Performed by: Jd Hartley DPM  Authorized by: Jd Hartley DPM    Universal Protocol:  Consent: Verbal consent obtained  Risks and benefits: risks, benefits and alternatives were discussed  Consent given by:  "patient  Time out: Immediately prior to procedure a \"time out\" was called to verify the correct patient, procedure, equipment, support staff and site/side marked as required.  Timeout called at: 6/6/2025 10:45 AM.  Patient understanding: patient states understanding of the procedure being performed  Patient identity confirmed: verbally with patient    Procedure Details - Lesion Destruction:     Number of Lesions:  1  Lesion 1:     Body area:  Lower extremity    Lower extremity location:  L fourth toe    Malignancy: benign hyperkeratotic lesion      Destruction method: scissors used for extraction          History of Present Illness   HPI  Niesha Lyon is a 59 y.o. adult who presents for F/U  PAtient presents for at risk foot exam. NO acute concerns today      Review of Systems  As stated in HPI, otherwise normal    Medical History Reviewed by provider this encounter:  Tobacco  Allergies  Meds  Problems  Med Hx  Surg Hx  Fam Hx           Objective   Ht 5' (1.524 m)   Wt 59 kg (130 lb)   LMP  (LMP Unknown)   BMI 25.39 kg/m²      Physical Exam    Cardiovascular:      Pulses: Pulses are weak.           Dorsalis pedis pulses are 1+ on the right side and 1+ on the left side.        Posterior tibial pulses are 1+ on the right side and 1+ on the left side.   Feet:      Left foot:      Skin integrity: Callus (4tht eo) present.       Diabetic Foot Exam    Patient's shoes and socks removed.    Right Foot/Ankle   Right Foot Inspection  Skin Exam: skin intact.     Toe Exam: right toe deformity (TMA stable).     Sensory   Vibration: absent  Monofilament testing: absent    Vascular  Capillary refills: < 3 seconds  The right DP pulse is 1+. The right PT pulse is 1+.     Left Foot/Ankle  Left Foot Inspection  Skin Exam: skin intact and callus (4tht eo).     Toe Exam: left toe deformity (5th toe is amputated).     Sensory   Vibration: absent  Monofilament testing: absent    Vascular  Capillary refills: < 3 " seconds  The left DP pulse is 1+. The left PT pulse is 1+.     Assign Risk Category  Deformity present  Loss of protective sensation  Weak pulses  Risk: 3

## 2025-06-06 NOTE — ASSESSMENT & PLAN NOTE
Arterial doppler 1/14/2025 reviewed, stable from 2024 exam. She checks annually which is good  Orders:  •  Lesion Destruction

## 2025-07-15 ENCOUNTER — TELEPHONE (OUTPATIENT)
Dept: FAMILY MEDICINE CLINIC | Facility: CLINIC | Age: 60
End: 2025-07-15

## 2025-07-16 NOTE — TELEPHONE ENCOUNTER
Dear Eduardo can you add this to the letter:    Yolette #:[id number]  Appointment date: 8/4/2025     Please and Thanks.

## 2025-07-16 NOTE — TELEPHONE ENCOUNTER
FAXED ON 07/16/25 TO [employer] at [phone]. FAX CONFIRMATION RECEIVED.    Patient's daughter had been informed by voicemail.

## 2025-07-18 ENCOUNTER — TELEPHONE (OUTPATIENT)
Dept: BARIATRICS | Facility: CLINIC | Age: 60
End: 2025-07-18

## 2025-07-23 ENCOUNTER — TELEPHONE (OUTPATIENT)
Dept: BARIATRICS | Facility: CLINIC | Age: 60
End: 2025-07-23

## 2025-07-23 NOTE — TELEPHONE ENCOUNTER
"Please call with lab results:    Your vitamin B12 level is elevated. This is not harmful, but indicates you are taking more vitamin B12 than you need. The recommended does after weight loss surgery is 350 to 500 mcg per day.  You may need to decrease the amount of vitamin B12 that you are taking on a daily basis.       Your vitamin A level is HIGH. High levels of vitamin A can be toxic to the liver. Symptoms of high vitamin A include nausea, vomiting, blurred vision, headache and vertigo. If you are experiencing these symptoms please let us know or make an appointment with your family doctor. It is important you stop all vitamins and minerals that contain vitamin A for 4 weeks. Avoid supplements like \"hair skin and nails\" which contain extra vitamin A. Avoid vitamin schmid and other supplements that contain extra vitamin A. Avoid anti-wrinkle creams that contain retinol in the ingredient panel. Alcohol intake can also raise vitamin A levels.     While holding you multivitamin, recommend you take the following supplements:  One super B complex that contains thiamine  500 mg calcium citrate with vitamin d (three times a day)  One 2000 IU vitamin D3 daily   Extra iron if you are a menstruating female       We will order lab repeats at her upcoming visit   "

## 2025-07-25 ENCOUNTER — TELEPHONE (OUTPATIENT)
Age: 60
End: 2025-07-25

## 2025-07-25 NOTE — TELEPHONE ENCOUNTER
LVM - Called the pt to go over the lab results and recommendations , I sent the pt a my chart message and then I also  left the office number if the pt had any questions regarding the Labs.

## 2025-07-29 ENCOUNTER — OFFICE VISIT (OUTPATIENT)
Dept: BARIATRICS | Facility: CLINIC | Age: 60
End: 2025-07-29
Payer: MEDICARE

## 2025-07-29 VITALS
HEIGHT: 60 IN | TEMPERATURE: 97.6 F | OXYGEN SATURATION: 97 % | HEART RATE: 67 BPM | BODY MASS INDEX: 26.8 KG/M2 | DIASTOLIC BLOOD PRESSURE: 70 MMHG | SYSTOLIC BLOOD PRESSURE: 146 MMHG | WEIGHT: 136.5 LBS

## 2025-07-29 DIAGNOSIS — E11.42 TYPE 2 DIABETES MELLITUS WITH DIABETIC POLYNEUROPATHY, WITHOUT LONG-TERM CURRENT USE OF INSULIN (HCC): ICD-10-CM

## 2025-07-29 DIAGNOSIS — Z98.84 BARIATRIC SURGERY STATUS: ICD-10-CM

## 2025-07-29 DIAGNOSIS — G47.33 OBSTRUCTIVE SLEEP APNEA: Chronic | ICD-10-CM

## 2025-07-29 DIAGNOSIS — Z48.815 ENCOUNTER FOR SURGICAL AFTERCARE FOLLOWING SURGERY OF DIGESTIVE SYSTEM: Primary | ICD-10-CM

## 2025-07-29 DIAGNOSIS — E66.3 OVERWEIGHT: ICD-10-CM

## 2025-07-29 DIAGNOSIS — I10 ESSENTIAL HYPERTENSION: ICD-10-CM

## 2025-07-29 DIAGNOSIS — K91.2 POSTSURGICAL MALABSORPTION: ICD-10-CM

## 2025-07-29 PROCEDURE — 99214 OFFICE O/P EST MOD 30 MIN: CPT | Performed by: PHYSICIAN ASSISTANT

## 2025-07-29 RX ORDER — PANTOPRAZOLE SODIUM 40 MG/1
40 TABLET, DELAYED RELEASE ORAL DAILY
Qty: 90 TABLET | Refills: 1 | Status: SHIPPED | OUTPATIENT
Start: 2025-07-29

## 2025-07-31 ENCOUNTER — OFFICE VISIT (OUTPATIENT)
Dept: ENDOCRINOLOGY | Facility: CLINIC | Age: 60
End: 2025-07-31
Payer: MEDICARE

## 2025-07-31 ENCOUNTER — TELEPHONE (OUTPATIENT)
Age: 60
End: 2025-07-31

## 2025-07-31 VITALS
DIASTOLIC BLOOD PRESSURE: 80 MMHG | OXYGEN SATURATION: 99 % | BODY MASS INDEX: 26.7 KG/M2 | HEART RATE: 75 BPM | HEIGHT: 60 IN | SYSTOLIC BLOOD PRESSURE: 126 MMHG | WEIGHT: 136 LBS

## 2025-07-31 DIAGNOSIS — E11.22 DIABETES MELLITUS WITH STAGE 2 CHRONIC KIDNEY DISEASE  (HCC): ICD-10-CM

## 2025-07-31 DIAGNOSIS — E11.42 TYPE 2 DIABETES MELLITUS WITH DIABETIC POLYNEUROPATHY, WITHOUT LONG-TERM CURRENT USE OF INSULIN (HCC): ICD-10-CM

## 2025-07-31 DIAGNOSIS — N18.2 DIABETES MELLITUS WITH STAGE 2 CHRONIC KIDNEY DISEASE  (HCC): ICD-10-CM

## 2025-07-31 LAB — SL AMB POCT HEMOGLOBIN AIC: 7.5 (ref ?–6.5)

## 2025-07-31 PROCEDURE — 99214 OFFICE O/P EST MOD 30 MIN: CPT | Performed by: PHYSICIAN ASSISTANT

## 2025-07-31 PROCEDURE — 83036 HEMOGLOBIN GLYCOSYLATED A1C: CPT | Performed by: PHYSICIAN ASSISTANT

## 2025-07-31 PROCEDURE — 95251 CONT GLUC MNTR ANALYSIS I&R: CPT | Performed by: PHYSICIAN ASSISTANT

## 2025-08-13 ENCOUNTER — OFFICE VISIT (OUTPATIENT)
Dept: FAMILY MEDICINE CLINIC | Facility: CLINIC | Age: 60
End: 2025-08-13

## (undated) DEVICE — GLOVE SRG BIOGEL 7.5

## (undated) DEVICE — INTENDED FOR TISSUE SEPARATION, AND OTHER PROCEDURES THAT REQUIRE A SHARP SURGICAL BLADE TO PUNCTURE OR CUT.: Brand: BARD-PARKER ® CARBON RIB-BACK BLADES

## (undated) DEVICE — GLOVE SRG BIOGEL 6.5

## (undated) DEVICE — INTENDED FOR TISSUE SEPARATION, AND OTHER PROCEDURES THAT REQUIRE A SHARP SURGICAL BLADE TO PUNCTURE OR CUT.: Brand: BARD-PARKER SAFETY BLADES SIZE 15, STERILE

## (undated) DEVICE — ABDOMINAL PAD: Brand: DERMACEA

## (undated) DEVICE — TUBING SUCTION 5MM X 12 FT

## (undated) DEVICE — SPONGE LAP 18 X 18 IN STRL RFD

## (undated) DEVICE — 1820 FOAM BLOCK NEEDLE COUNTER: Brand: DEVON

## (undated) DEVICE — GLOVE INDICATOR PI UNDERGLOVE SZ 7.5 BLUE

## (undated) DEVICE — GAUZE SPONGES,16 PLY: Brand: CURITY

## (undated) DEVICE — IRRIG ENDO FLO TUBING

## (undated) DEVICE — STAPLE ENDO TRI-STAPLE 2.0 BLCK RELOAD XTRA THICK

## (undated) DEVICE — TROCAR: Brand: KII FIOS FIRST ENTRY

## (undated) DEVICE — URETERAL CATHETER ADAPTOR TIP

## (undated) DEVICE — ENDOPATH 5MM CURVED SCISSORS WITH MONOPOLAR CAUTERY: Brand: ENDOPATH

## (undated) DEVICE — WET SKIN PREP TRAY: Brand: MEDLINE INDUSTRIES, INC.

## (undated) DEVICE — SUT MONOCRYL 3-0 PS-2 27 IN Y427H

## (undated) DEVICE — TUBING SMOKE EVAC W/FILTRATION DEVICE PLUMEPORT ACTIV

## (undated) DEVICE — SUT ETHILON 3-0 FS-1 18 IN 663G

## (undated) DEVICE — MEDI-VAC YANK SUCT HNDL W/TPRD BULBOUS TIP: Brand: CARDINAL HEALTH

## (undated) DEVICE — COVIDIEN ENDO GIA PURPLE (MED) RELOAD 60MM

## (undated) DEVICE — TROCARS: Brand: KII® OPTICAL ACCESS SYSTEM

## (undated) DEVICE — ELECTRODE BLADE MOD E-Z CLEAN 2.5IN 6.4CM -0012M

## (undated) DEVICE — PENCIL ELECTROSURG E-Z CLEAN -0035H

## (undated) DEVICE — POWER SHELL SIGNIA

## (undated) DEVICE — NEPTUNE E-SEP SMOKE EVACUATION PENCIL, COATED, 70MM BLADE, PUSH BUTTON SWITCH: Brand: NEPTUNE E-SEP

## (undated) DEVICE — STERILE POLYISOPRENE POWDER-FREE SURGICAL GLOVES WITH EMOLLIENT COATING: Brand: PROTEXIS

## (undated) DEVICE — ASTOUND STANDARD SURGICAL GOWN, XL: Brand: CONVERTORS

## (undated) DEVICE — VISUALIZATION SYSTEM: Brand: CLEARIFY

## (undated) DEVICE — SPONGE STICK WITH PVP-I: Brand: KENDALL

## (undated) DEVICE — CHLORAPREP HI-LITE 26ML ORANGE

## (undated) DEVICE — SCD SEQUENTIAL COMPRESSION COMFORT SLEEVE MEDIUM KNEE LENGTH: Brand: KENDALL SCD

## (undated) DEVICE — SILVER-COATED ANTIMICROBIAL BARRIER DRESSING: Brand: ACTICOAT FLEX7 4" X 5"

## (undated) DEVICE — NEEDLE 22 G X 1 1/2 SAFETY

## (undated) DEVICE — OCCLUSIVE GAUZE STRIP,3% BISMUTH TRIBROMOPHENATE IN PETROLATUM BLEND: Brand: XEROFORM

## (undated) DEVICE — INTENDED FOR TISSUE SEPARATION, AND OTHER PROCEDURES THAT REQUIRE A SHARP SURGICAL BLADE TO PUNCTURE OR CUT.: Brand: BARD-PARKER ® SAFETYLOCK CARBON RIB-BACK BLADES

## (undated) DEVICE — DRAPE EQUIPMENT RF WAND

## (undated) DEVICE — PMI DISPOSABLE PUNCTURE CLOSURE DEVICE / SUTURE GRASPER: Brand: PMI

## (undated) DEVICE — CUFF TOURNIQUET 18 X 4 IN QUICK CONNECT DISP 1 BLADDER

## (undated) DEVICE — STANDARD SURGICAL GOWN, L: Brand: CONVERTORS

## (undated) DEVICE — [HIGH FLOW INSUFFLATOR,  DO NOT USE IF PACKAGE IS DAMAGED,  KEEP DRY,  KEEP AWAY FROM SUNLIGHT,  PROTECT FROM HEAT AND RADIOACTIVE SOURCES.]: Brand: PNEUMOSURE

## (undated) DEVICE — SUT ETHILON 3-0 PS-1 18 IN 1663G

## (undated) DEVICE — SUT PROLENE 3-0 SH 48 IN 8534H

## (undated) DEVICE — TIBURON LAPAROSCOPIC ABDOMINAL DRAPE: Brand: CONVERTORS

## (undated) DEVICE — 10FR FRAZIER SUCTION HANDLE: Brand: CARDINAL HEALTH

## (undated) DEVICE — GLOVE INDICATOR PI UNDERGLOVE SZ 6.5 BLUE

## (undated) DEVICE — 3000CC GUARDIAN II: Brand: GUARDIAN

## (undated) DEVICE — TRAVELKIT CONTAINS FIRST STEP KIT (200ML EP-4 KIT) AND SOILED SCOPE BAG - 1 KIT: Brand: TRAVELKIT CONTAINS FIRST STEP KIT AND SOILED SCOPE BAG

## (undated) DEVICE — BASIC SINGLE BASIN-LF: Brand: MEDLINE INDUSTRIES, INC.

## (undated) DEVICE — PROXIMATE SKIN STAPLERS (35 WIDE) CONTAINS 35 STAINLESS STEEL STAPLES (FIXED HEAD): Brand: PROXIMATE

## (undated) DEVICE — NEEDLE 25G X 1 1/2

## (undated) DEVICE — SUT ETHILON 2-0 FS 18 IN 664H

## (undated) DEVICE — KERLIX BANDAGE ROLL: Brand: KERLIX

## (undated) DEVICE — ALLENTOWN LAP CHOLE APP PACK: Brand: CARDINAL HEALTH

## (undated) DEVICE — GLOVE SRG BIOGEL 8

## (undated) DEVICE — VIOLET BRAIDED (POLYGLACTIN 910), SYNTHETIC ABSORBABLE SUTURE: Brand: COATED VICRYL

## (undated) DEVICE — BLADE SAGITTAL 25.6 X 9.5MM

## (undated) DEVICE — DRAPE SHEET THREE QUARTER

## (undated) DEVICE — UNDYED MONOFILAMENT (POLYDIOXANONE), ABSORBABLE SURGICAL SUTURE: Brand: PDS

## (undated) DEVICE — VISIGI 3D®  CALIBRATION SYSTEM  SIZE 36FR SLEEVE/STD: Brand: BOEHRINGER® VISIGI 3D™ SLEEVE GASTRECTOMY CALIBRATION SYSTEM, SIZE 36FR

## (undated) DEVICE — NEEDLE BLUNT 18 G X 1 1/2IN

## (undated) DEVICE — UNIVERSAL MAJOR EXTREMITY,KIT: Brand: CARDINAL HEALTH

## (undated) DEVICE — PLUMEPEN PRO 10FT

## (undated) DEVICE — ACE WRAP 4 IN UNSTERILE

## (undated) DEVICE — SYRINGE 10ML LL

## (undated) DEVICE — PACK UNIVERSAL DRAPES SUB-Q ICD

## (undated) DEVICE — TROCAR VISIPORT

## (undated) DEVICE — PAD CAST 4 IN COTTON NON STERILE

## (undated) DEVICE — DISPOSABLE OR TOWEL: Brand: CARDINAL HEALTH

## (undated) DEVICE — CURITY NON-ADHERENT STRIPS: Brand: CURITY

## (undated) DEVICE — SUT VICRYL 2-0 SH 27 IN UNDYED J417H

## (undated) DEVICE — SUT MONOCRYL 4-0 PS-2 27 IN Y426H

## (undated) DEVICE — CURITY STRETCH BANDAGE: Brand: CURITY

## (undated) DEVICE — GLOVE SRG BIOGEL 6

## (undated) DEVICE — SYRINGE 30ML LL

## (undated) DEVICE — STERILE POLYISOPRENE POWDER-FREE SURGICAL GLOVES: Brand: PROTEXIS

## (undated) DEVICE — BETHLEHEM UNIVERSAL  MIONR EXT: Brand: CARDINAL HEALTH

## (undated) DEVICE — SUT VICRYL 3-0 REEL 54 IN J285G

## (undated) DEVICE — HARMONIC 1100 SHEARS, 36CM SHAFT LENGTH: Brand: HARMONIC

## (undated) DEVICE — WEBRIL 6 IN UNSTERILE

## (undated) DEVICE — MEDI-VAC YANKAUER SUCTION HANDLE W/BULBOUS AND CONTROL VENT: Brand: CARDINAL HEALTH

## (undated) DEVICE — ADHESIVE SKIN CLSR DERMABOND NX

## (undated) DEVICE — SKIN MARKER DUAL TIP WITH RULER CAP, FLEXIBLE RULER AND LABELS: Brand: DEVON

## (undated) DEVICE — SUT ETHILON 4-0 PS-2 18 IN 1667H

## (undated) DEVICE — NEEDLE 18 G X 1 1/2

## (undated) DEVICE — LAPAROSCOPIC TROCAR SLEEVE/SINGLE USE: Brand: KII® SLEEVE

## (undated) DEVICE — NEEDLE SPINAL18G X 3.5 IN QUINCKE

## (undated) DEVICE — THE SIMPULSE SOLO SYSTEM WITH ULTREX RETRACTABLE SPLASH SHIELD TIP: Brand: SIMPULSE SOLO

## (undated) DEVICE — SPONGE PVP SCRUB WING STERILE

## (undated) DEVICE — BULB SYRINGE,IRRIGATION WITH PROTECTIVE CAP: Brand: DOVER

## (undated) DEVICE — LIGHT GLOVE GREEN

## (undated) DEVICE — CAST PADDING 4 IN SYNTHETIC NON-STRL

## (undated) DEVICE — SYRINGE 20ML LL

## (undated) DEVICE — ADHESIVE SKIN HIGH VISCOSITY EXOFIN PRECISION PEN